# Patient Record
Sex: FEMALE | Race: WHITE | NOT HISPANIC OR LATINO | Employment: OTHER | URBAN - METROPOLITAN AREA
[De-identification: names, ages, dates, MRNs, and addresses within clinical notes are randomized per-mention and may not be internally consistent; named-entity substitution may affect disease eponyms.]

---

## 2017-01-05 ENCOUNTER — ALLSCRIPTS OFFICE VISIT (OUTPATIENT)
Dept: OTHER | Facility: OTHER | Age: 72
End: 2017-01-05

## 2017-05-04 ENCOUNTER — ALLSCRIPTS OFFICE VISIT (OUTPATIENT)
Dept: OTHER | Facility: OTHER | Age: 72
End: 2017-05-04

## 2017-05-04 DIAGNOSIS — G20 PARKINSON'S DISEASE (HCC): ICD-10-CM

## 2017-09-07 ENCOUNTER — GENERIC CONVERSION - ENCOUNTER (OUTPATIENT)
Dept: OTHER | Facility: OTHER | Age: 72
End: 2017-09-07

## 2017-09-08 ENCOUNTER — GENERIC CONVERSION - ENCOUNTER (OUTPATIENT)
Dept: OTHER | Facility: OTHER | Age: 72
End: 2017-09-08

## 2017-09-25 ENCOUNTER — GENERIC CONVERSION - ENCOUNTER (OUTPATIENT)
Dept: OTHER | Facility: OTHER | Age: 72
End: 2017-09-25

## 2017-09-25 ENCOUNTER — ALLSCRIPTS OFFICE VISIT (OUTPATIENT)
Dept: OTHER | Facility: OTHER | Age: 72
End: 2017-09-25

## 2018-01-11 ENCOUNTER — ALLSCRIPTS OFFICE VISIT (OUTPATIENT)
Dept: OTHER | Facility: OTHER | Age: 73
End: 2018-01-11

## 2018-01-12 NOTE — PROGRESS NOTES
Assessment   1  Parkinson's disease (332 0) (G20)   2  S/P deep brain stimulator placement (V45 89) (Z96 89)   3  Anxiety disorder (300 00) (F41 9)    Plan   Parkinson's disease    · Rasagiline Mesylate 1 MG Oral Tablet; Take 1 tablet by mouth  daily   Rx By: Rommel Williamson; Dispense: 90 Days ; #:90 Tablet; Refill: 2;For: Parkinson's disease; SHARDA = N; Verified Transmission to [address] [place] [address]; Last Updated By: [employer]; 1/11/2018 9:34:06 AM   · Follow-up visit in 5 months Evaluation and Treatment  Follow-up  DBS 1 with Dr Doreen Jacinto     Status: Abe Carranza for: [id number]   Ordered; For: Parkinson's disease; Ordered By: Rommel Williamson Performed:  Due: [id number]    Discussion/Summary   Discussion Summary:    Parkinson's disease with progression in postural instability and gait  Overall stable since last seen  No need for changes in stimulation or medication  She was encouraged to try to exercise at home  She never went back to the Boxing program     has been better controlled  Counseling Documentation With Imm: The patient was counseled regarding impressions  Patient Guardian understands agrees: The treatment plan was reviewed with the patient/guardian  The patient/guardian understands and agrees with the treatment plan      Chief Complaint   Chief Complaint Free Text Note Form: Patient present for a neurological follow up for Parkinson's disease with a DBS device, doing well  History of Present Illness   HPI: Ms Lord Soriano is a 66year old female s/p bilateral CTS s/p surgery, significant arthritis (psoriatic), Parkinson's disease since about 2009 now s/p bilateral STN DBS placement 11/5/14 with ACTIVA SC 11/11/14 here for follow up  She is no longer on Sinemet 25/100 q 4 hours tid and amantadine bid since surgery  She remains on Azilect  Previously she tried ropinirole, Requip XL (highest dose 8mg) and Mirapex ER 1 5 with no effect   Insurance would not cover Neupro and she found it a nuisance  Discussion about moving the IPG had with Dr Niya Crowe but she decided to wait until the next needed replacement  She continues to have postural instability and trouble with gait  We tried restarting Sinemet 25/100 1 po tid q 4 hours part to see if this provides more energy and helps with intermittent gait issues but she stopped it after 3 weeks as she noticed no difference  remains on Azilect 1mg  Overall stable  She continues to have issues with gait and balance  No falls since last seen She freezes on initiating gait  She is dressing and showering without any issues  She is eating well and denies any issues with swallowing  She sleeps well  No major cognitive changes  She sometimes needs to search for a name or words  tried PD Boxing but she stopped after 7-8 times because she felt it was not helping  One of the woman from the group called her asked her to come back as it does not happen all at once  She is considering returning  She is upsets seeing other that are farther along with PD  She gets upset thinking about what is going to happen to her and if she is going to die with PD  She does not talk to her  about this as she feels she is already a burden  Review of Systems   Neurological ROS:      Constitutional: fatigue  HEENT:  no sinus problems, not feeling congested, no blurred vision, no dryness of the eyes, no eye pain, no hearing loss, no tinnitus, no mouth sores, no sore throat, no hoarseness, no dysphagia, no masses, no bleeding  Cardiovascular:  no chest pain or pressure, no palpitations present, the heart rate was not rapid or irregular, no swelling in the arms or legs, no poor circulation  Respiratory: unusual or persistant cough  Gastrointestinal:  no nausea, no vomiting, no diarrhea, no abdominal pain, no changes in bowel habits, no melena, no loss of bowel control  Genitourinary: incontinence        Musculoskeletal:  no arthralgias, no myalgias, no immobility or loss of function, no head/neck/back pain, no pain while walking  Integumentary  no masses, no rash, no skin lesions, no livedo reticularis  Psychiatric:  no anxiety, no depression, no mood swings, no psychiatric hospitalizations, no sleep problems  Endocrine  no unusual weight loss or gain, no excessive urination, no excessive thirst, no hair loss or gain, no hot or cold intolerance, no menstrual period change or irregularity, no loss of sexual ability or drive, no erection difficulty, no nipple discharge  Hematologic/Lymphatic:  no unusual bleeding, no tendency for easy bruising, no clotting skin or lumps  Neurological General:  no headache, no nausea or vomiting, no lightheadedness, no convulsions, no blackouts, no syncope, no trauma, no photopsia, no increased sleepiness, no trouble falling asleep, no snoring, no awakening at night  Neurological Mental Status:  no confusion, no mood swings, no alteration or loss of consciousness, no difficulty expressing/understanding speech, no memory problems  Neurological Cranial Nerves:  no blurry or double vision, no loss of vision, no face drooping, no facial numbness or weakness, no taste or smell loss/changes, no hearing loss or ringing, no vertigo or dizziness, no dysphagia, no slurred speech  Neurological Motor findings include:  no tremor, no twitching, no cramping(pre/post exercise), no atrophy  Neurological Coordination: unsteadiness-- and-- balance difficulties  Neurological Sensory:  no numbness, no pain, no tingling, does not fall when eyes closed or taking a shower  Neurological Gait: difficulty walking  ROS Reviewed:    ROS reviewed  Active Problems   1  Anxiety disorder (300 00) (F41 9)   2  Arthritis (716 90) (M19 90)   3  Hyperlipidemia (272 4) (E78 5)   4  Hypertension (401 9) (I10)   5  Parkinson's disease (332 0) (G20)   6   S/P deep brain stimulator placement (V45 89) (S32 05)    Past Medical History   1  History of Ovarian cyst (620 2) (N83 20)  Active Problems And Past Medical History Reviewed: The active problems and past medical history were reviewed and updated today  Surgical History   1  History of Peripheral Nerve Block Wrist Median Bilateral   2  History of Reported Hx Of Knee Replacement  Surgical History Reviewed: The surgical history was reviewed and updated today  Family History   Mother    1  No pertinent family history    Social History    · Alcohol Use (History)   · Caffeine Use   · Educational Level - Completed Bachelors Degree   · Marital History - Currently    · Never A Smoker  Social History Reviewed: The social history was reviewed and updated today  Current Meds    1  Aspirin 81 MG TABS; Take 1 tablet daily; Therapy: (Recorded:05Jan2017) to Recorded   2  Calcium 600/Vitamin D 600-400 MG-UNIT Oral Tablet; Take 1 tablet daily; Therapy: (Recorded:05Jan2017) to Recorded   3  Cinnamon 500 MG Oral Tablet; TAKE 2 TABLETS ONCE AS DIRECTED Recorded   4  Coenzyme Q10 100 MG Oral Capsule; take 1 capsule daily; Therapy: (Recorded:05Jan2017) to Recorded   5  Crestor 5 MG Oral Tablet; TAKE 1 TABLET EVERY OTHER DAY; Therapy: (Recorded:01Hgb2703) to Recorded   6  Omega-3 Fish Oil 1200 MG Oral Capsule; take 1 capsule daily; Therapy: (Recorded:05Jan2017) to Recorded   7  Paxil 20 MG Oral Tablet; TAKE 1 TABLET DAILY; Therapy: (Recorded:02Jan2014) to Recorded   8  Rasagiline Mesylate 1 MG Oral Tablet; Take 1 tablet by mouth  daily; Therapy: [id number] to (Monisha Arana)  Requested for: 05OLP9517; Last     Rx:[id number] Ordered   9  Tenormin 50 MG Oral Tablet; TAKE 1 TABLET DAILY; Therapy: (Recorded:02Jan2014) to Recorded  Medication List Reviewed: The medication list was reviewed and updated today  Allergies   1   Sulfa Drugs    Vitals   Signs   Recorded: 14KHB2812 09:35AM Heart Rate: 55  Systolic: 551, LUE, Standing  Diastolic: 58, LUE, Standing  Recorded: 50FJI7844 09:34AM   Heart Rate: 50  Systolic: 359, LUE, Sitting  Diastolic: 68, LUE, Sitting  Height: 5 ft 7 in  Weight: 238 lb   BMI Calculated: 37 28  BSA Calculated: 2 18    Physical Exam        Constitutional      General appearance: Abnormal  -- (Moderate facial masking 2  Mild hypophonia 1)      Musculoskeletal      Gait and station: Abnormal  -- (Arose using her hands without difficulty  Rounded shoulders  Steady gait with slight decrease stride  Normal pull test No freezing)      Muscle strength: Normal strength throughout  Muscle tone: Abnormal  -- (mild in arms and legs 1,1, neck 3)      Motor tone:  cogwheel rigidity was present in both arms  Involuntary movements: Abnormal involuntary movements were observed  -- (Finger to nose was normal  No rest or action tremor  Moderate decrement on finger taps 2,2 Normal handgrip , rapid alternating movements  Mild decrement on heel taps 1,1  Partially limited due to arthritis  There is no dystonia, or dyskinesia)      Neurologic      Orientation to person, place, and time: Normal        Recent and remote memory: Demonstrates normal memory  Attention span and concentration: Normal thought process and attention span  Language: Names objects, able to repeat phrases and speaks spontaneously         2nd cranial nerve: Normal        3rd, 4th, and 6th cranial nerves: Normal        5th cranial nerve: Normal        7th cranial nerve: Normal        8th cranial nerve: Normal        9th cranial nerve: Normal        11th cranial nerve: Normal        12th cranial nerve: Normal        Mood and affect: Normal        Signatures    Electronically signed by : Ramses Sanchez MD; Jan 11 2018  9:52AM EST                       (Author)

## 2018-01-14 VITALS
BODY MASS INDEX: 37.9 KG/M2 | DIASTOLIC BLOOD PRESSURE: 59 MMHG | WEIGHT: 241.5 LBS | HEART RATE: 58 BPM | HEIGHT: 67 IN | RESPIRATION RATE: 16 BRPM | SYSTOLIC BLOOD PRESSURE: 123 MMHG

## 2018-01-15 VITALS
OXYGEN SATURATION: 98 % | RESPIRATION RATE: 12 BRPM | WEIGHT: 241 LBS | SYSTOLIC BLOOD PRESSURE: 124 MMHG | BODY MASS INDEX: 37.83 KG/M2 | DIASTOLIC BLOOD PRESSURE: 80 MMHG | HEART RATE: 64 BPM | HEIGHT: 67 IN

## 2018-01-22 VITALS — SYSTOLIC BLOOD PRESSURE: 122 MMHG | HEART RATE: 60 BPM | DIASTOLIC BLOOD PRESSURE: 67 MMHG

## 2018-01-22 VITALS
SYSTOLIC BLOOD PRESSURE: 150 MMHG | DIASTOLIC BLOOD PRESSURE: 70 MMHG | WEIGHT: 238 LBS | HEART RATE: 54 BPM | BODY MASS INDEX: 37.28 KG/M2

## 2018-01-22 VITALS — DIASTOLIC BLOOD PRESSURE: 70 MMHG | HEART RATE: 60 BPM | SYSTOLIC BLOOD PRESSURE: 145 MMHG

## 2018-01-22 VITALS
HEIGHT: 67 IN | DIASTOLIC BLOOD PRESSURE: 65 MMHG | SYSTOLIC BLOOD PRESSURE: 136 MMHG | RESPIRATION RATE: 16 BRPM | HEART RATE: 54 BPM | WEIGHT: 245 LBS | BODY MASS INDEX: 38.45 KG/M2

## 2018-01-23 VITALS
SYSTOLIC BLOOD PRESSURE: 122 MMHG | HEART RATE: 55 BPM | HEIGHT: 67 IN | WEIGHT: 238 LBS | DIASTOLIC BLOOD PRESSURE: 58 MMHG | BODY MASS INDEX: 37.35 KG/M2

## 2018-04-25 ENCOUNTER — CONVERSION ENCOUNTER (OUTPATIENT)
Dept: MAMMOGRAPHY | Facility: CLINIC | Age: 73
End: 2018-04-25

## 2018-06-21 ENCOUNTER — PROCEDURE VISIT (OUTPATIENT)
Dept: NEUROLOGY | Facility: CLINIC | Age: 73
End: 2018-06-21
Payer: MEDICARE

## 2018-06-21 VITALS
HEIGHT: 67 IN | DIASTOLIC BLOOD PRESSURE: 64 MMHG | BODY MASS INDEX: 37.83 KG/M2 | SYSTOLIC BLOOD PRESSURE: 116 MMHG | WEIGHT: 241 LBS | HEART RATE: 75 BPM

## 2018-06-21 DIAGNOSIS — G20 PARKINSON'S DISEASE WITH USE OF ELECTRICAL BRAIN STIMULATION (HCC): Primary | ICD-10-CM

## 2018-06-21 PROBLEM — G20.A1 PARKINSON'S DISEASE WITH USE OF ELECTRICAL BRAIN STIMULATION: Status: ACTIVE | Noted: 2018-06-21

## 2018-06-21 PROCEDURE — 95970 ALYS NPGT W/O PRGRMG: CPT | Performed by: PSYCHIATRY & NEUROLOGY

## 2018-06-21 PROCEDURE — 99214 OFFICE O/P EST MOD 30 MIN: CPT | Performed by: PSYCHIATRY & NEUROLOGY

## 2018-06-21 RX ORDER — UBIDECARENONE 100 MG
1 CAPSULE ORAL DAILY
COMMUNITY
End: 2018-09-21 | Stop reason: HOSPADM

## 2018-06-21 RX ORDER — RASAGILINE 1 MG/1
1 TABLET ORAL DAILY
COMMUNITY
Start: 2016-07-25 | End: 2018-06-21 | Stop reason: SDUPTHER

## 2018-06-21 RX ORDER — ROSUVASTATIN CALCIUM 5 MG/1
1 TABLET, COATED ORAL EVERY OTHER DAY
COMMUNITY
End: 2018-09-21 | Stop reason: HOSPADM

## 2018-06-21 RX ORDER — B-COMPLEX WITH VITAMIN C
1 TABLET ORAL DAILY
COMMUNITY
End: 2018-09-21 | Stop reason: HOSPADM

## 2018-06-21 RX ORDER — CHLORAL HYDRATE 500 MG
1 CAPSULE ORAL DAILY
COMMUNITY
End: 2018-09-21 | Stop reason: HOSPADM

## 2018-06-21 RX ORDER — PAROXETINE HYDROCHLORIDE 20 MG/1
TABLET, FILM COATED ORAL
COMMUNITY
Start: 2018-03-28 | End: 2018-09-21 | Stop reason: HOSPADM

## 2018-06-21 RX ORDER — RASAGILINE 1 MG/1
1 TABLET ORAL DAILY
Qty: 90 EACH | Refills: 2 | Status: SHIPPED | OUTPATIENT
Start: 2018-06-21 | End: 2018-09-21 | Stop reason: HOSPADM

## 2018-06-21 RX ORDER — ATENOLOL 50 MG/1
50 TABLET ORAL
COMMUNITY
Start: 2007-10-24 | End: 2018-09-21 | Stop reason: HOSPADM

## 2018-06-21 NOTE — PROGRESS NOTES
Patient ID: Syeda Santiago is a 67 y o  female  Assessment/Plan:    Parkinson's disease with use of electrical brain stimulation (HCC)  Parkinsonian symptoms are overall well controlled with mild bradykinesia  Main issue if occasional freezing of gait on initiation, particularly after prolonged sitting  Previous trial of restarting levodopa , ineffective  Last time  checked was in 2017 so DBS interrogated but no changes made  She will continue on Azilect  Discussed retrial of levodopa but she is not interested at this time  She was encouraged to continue to stay active with her exercise at home and at the center  Diagnoses and all orders for this visit:    Parkinson's disease with use of electrical brain [facility] ([facility] )  -     rasagiline (AZILECT) 1 MG; Take 1 tablet (1 mg total) by mouth daily    Other orders  -     atenolol (TENORMIN) 50 mg tablet; Take 50 mg by mouth  -     aspirin 81 MG tablet; Take 1 tablet by mouth daily  -     Discontinue: rasagiline (AZILECT) 1 MG; Take 1 tablet by mouth daily  -     Calcium Carbonate-Vitamin D (CALCIUM 600+D) 600-200 MG-UNIT TABS; Take 1 tablet by mouth daily  -     Cinnamon 500 MG TABS; Take 2 tablets by mouth  -     co-enzyme Q-10 100 mg capsule; Take 1 capsule by mouth daily  -     rosuvastatin (CRESTOR) 5 mg tablet; Take 1 tablet by mouth every other day  -     Omega-3 Fatty Acids (FISH OIL) 1,000 mg; Take 1 capsule by mouth daily  -     PARoxetine (PAXIL) 20 mg tablet;          Subjective:    Ms Syeda Santiago is a 67 y o  female s/p bilateral CTS s/p surgery, significant arthritis (psoriatic), Parkinson's disease since about 2009 now s/p bilateral STN DBS placement 11/5/14 with ACTIVA SC 11/11/14 here for follow up  She is no longer on Sinemet 25/100 q 4 hours tid and amantadine bid since surgery  She remains on Azilect  Previously she tried ropinirole, Requip XL (highest dose 8mg) and Mirapex ER 1 5 with no effect   Insurance would not cover Neupro and she found it a nuisance  Discussion about moving the IPG had with Dr Didi Frank but she decided to wait until the next needed replacement  She continues to have postural instability and trouble with gait  We tried restarting Sinemet 25/100 1 po tid q 4 hours part to see if this provides more energy and helps with intermittent gait issues but she stopped it after 3 weeks as she noticed no difference  She remains on Azilect 1mg  Overall stable  She continues to have issues with gait and balance  No falls since last seen She continues to freeze when initiating gait  She is dressing and showering without any issues with mild difficulty with pants  She denies any issues with swallowing except the calcium which is not coated  No issues with her other pills  She sleeps well  She denies any daytime sedation  No cognitive changes except for occasional word finding difficulty  She is taking a balance class and a chair yoga class  The following portions of the patient's history were reviewed and updated as appropriate: past family history, past medical history, past social history and past surgical history  Objective:    Blood pressure 116/64, pulse 75, height 5' 7" (1 702 m), weight 109 kg (241 lb)  Physical Exam   Constitutional: She appears well-developed  Eyes: EOM are normal  Pupils are equal, round, and reactive to light  Neurological: She has normal strength  Gait normal    Psychiatric: Her speech is normal    Vitals reviewed  Neurological Exam    Mental Status  The patient is alert and oriented to person, place, time, and situation  Her recent and remote memory are normal  She has no visuospatial neglect  Her speech is normal  Her language is fluent with no aphasia  She has normal attention span and concentration  She has a normal fund of knowledge      Cranial Nerves  CN I: The patient has not tested  CN III, IV, VI: The patient's pupils are equally round and reactive to light and ocular movements are normal   CN V: The patient has normal facial sensation  CN VII:  The patient has symmetric facial movement  CN VIII:  The patient's hearing is normal   CN IX, X: The patient has symmetric palate movement  CN XI: The patient's shoulder shrug strength is normal   CN XII: The patient's tongue is midline without atrophy or fasciculations  Motor   Her overall muscle tone is normal throughout  Her strength is 5/5 throughout all four extremities  UPDRS 20  Moderate Hypomimia 2  Mild hypophonia 1  No rest tremor RUE, LUE, RLE, LLE  No facial, lip, or chin tremor  No action tremors of RUE, LUE  Rigidity present in neck 2  None in  RUE, LUE, RLE, LUE  Mild to moderate bradykinesia on finger taps 2,2, handgrips 1,0, CJ 1,1, heel taps 0,0 , toe taps 1,0  Arose without using hands on second attempt 1  Posture stooped 1  Gait with shortened stride but no freezing 2  Postural instability with retropulsion but recovered in three steps 1  Mild but abnormal body bradykinesia 2       uoinger to nose was normal  No rest or action tremor  Moderate decrement on finger taps 2,2 Normal handgrip , rapid alternating movements  Mild decrement on heel taps 1,1  Partially limited due to arthritis  There is no dystonia, or dyskinesia)      Neurologic         Sensory  The patient's sensation is to light touch  Reflexes  She has glabellar tap release signs present  Gait and Coordination  The patient has normal gait and station  Gait with shortened stride but no freezing 2  Postural instability with retropulsion but recovered in three steps         ROS:    Review of Systems   Constitutional: Negative  Eyes: Negative  Respiratory: Negative  Cardiovascular: Negative  Gastrointestinal: Negative  Endocrine: Negative  Genitourinary:        Loss of bladder control   Musculoskeletal: Positive for neck pain  Skin: Negative  Allergic/Immunologic: Negative      Neurological:        Foot freezing   Hematological: Negative  Psychiatric/Behavioral: Negative

## 2018-06-21 NOTE — ASSESSMENT & PLAN NOTE
Parkinsonian symptoms are overall well controlled with mild bradykinesia  Main issue if occasional freezing of gait on initiation, particularly after prolonged sitting  Previous trial of restarting levodopa , ineffective  Last time  checked was in 2017 so DBS interrogated but no changes made  Right STN  Battery 2 78  C+1-, 2 4V, PW90, Rate 160  Left STN:  Battery 2 94  C+1-,  2 2PW60, Rate 160  Questions regarding medical marijuana answered  Azilect refilled  She will continue on Azilect  Discussed retrial of levodopa but she is not interested at this time  She was encouraged to continue to stay active with her exercise at home and at the center

## 2018-08-22 ENCOUNTER — HOSPITAL ENCOUNTER (INPATIENT)
Facility: HOSPITAL | Age: 73
LOS: 3 days | DRG: 483 | End: 2018-08-25
Attending: EMERGENCY MEDICINE | Admitting: ORTHOPAEDIC SURGERY
Payer: MEDICARE

## 2018-08-22 ENCOUNTER — APPOINTMENT (EMERGENCY)
Dept: RADIOLOGY | Facility: HOSPITAL | Age: 73
DRG: 483 | End: 2018-08-22
Payer: MEDICARE

## 2018-08-22 ENCOUNTER — APPOINTMENT (INPATIENT)
Dept: CT IMAGING | Facility: HOSPITAL | Age: 73
DRG: 483 | End: 2018-08-22
Payer: MEDICARE

## 2018-08-22 DIAGNOSIS — G20 PARKINSON'S DISEASE WITH USE OF ELECTRICAL BRAIN STIMULATION (HCC): ICD-10-CM

## 2018-08-22 DIAGNOSIS — S42.92XA TRAUMATIC CLOSED DISPLACED FRACTURE OF LEFT SHOULDER WITH ANTERIOR DISLOCATION, INITIAL ENCOUNTER: ICD-10-CM
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
## 2018-08-22 DIAGNOSIS — S42.92XA FRACTURE, SHOULDER, LEFT, CLOSED, INITIAL ENCOUNTER: ICD-10-CM

## 2018-08-22 LAB
ALBUMIN SERPL BCP-MCNC: 3.6 G/DL (ref 3.5–5)
ALP SERPL-CCNC: 37 U/L (ref 46–116)
ALT SERPL W P-5'-P-CCNC: 20 U/L (ref 12–78)
ANION GAP SERPL CALCULATED.3IONS-SCNC: 10 MMOL/L (ref 4–13)
APTT PPP: 27 SECONDS (ref 24–36)
AST SERPL W P-5'-P-CCNC: 20 U/L (ref 5–45)
BASOPHILS # BLD AUTO: 0.01 THOUSANDS/ΜL (ref 0–0.1)
BASOPHILS NFR BLD AUTO: 0 % (ref 0–1)
BILIRUB SERPL-MCNC: 0.45 MG/DL (ref 0.2–1)
BUN SERPL-MCNC: 19 MG/DL (ref 5–25)
CALCIUM SERPL-MCNC: 9.6 MG/DL (ref 8.3–10.1)
CHLORIDE SERPL-SCNC: 104 MMOL/L (ref 100–108)
CO2 SERPL-SCNC: 26 MMOL/L (ref 21–32)
CREAT SERPL-MCNC: 0.88 MG/DL (ref 0.6–1.3)
EOSINOPHIL # BLD AUTO: 0 THOUSAND/ΜL (ref 0–0.61)
EOSINOPHIL NFR BLD AUTO: 0 % (ref 0–6)
ERYTHROCYTE [DISTWIDTH] IN BLOOD BY AUTOMATED COUNT: 13.1 % (ref 11.6–15.1)
GFR SERPL CREATININE-BSD FRML MDRD: 65 ML/MIN/1.73SQ M
GLUCOSE SERPL-MCNC: 129 MG/DL (ref 65–140)
HCT VFR BLD AUTO: 39.3 % (ref 34.8–46.1)
HGB BLD-MCNC: 13.1 G/DL (ref 11.5–15.4)
INR PPP: 1.01 (ref 0.86–1.17)
LYMPHOCYTES # BLD AUTO: 1.47 THOUSANDS/ΜL (ref 0.6–4.47)
LYMPHOCYTES NFR BLD AUTO: 11 % (ref 14–44)
MCH RBC QN AUTO: 31.2 PG (ref 26.8–34.3)
MCHC RBC AUTO-ENTMCNC: 33.3 G/DL (ref 31.4–37.4)
MCV RBC AUTO: 94 FL (ref 82–98)
MONOCYTES # BLD AUTO: 0.81 THOUSAND/ΜL (ref 0.17–1.22)
MONOCYTES NFR BLD AUTO: 6 % (ref 4–12)
NEUTROPHILS # BLD AUTO: 10.91 THOUSANDS/ΜL (ref 1.85–7.62)
NEUTS SEG NFR BLD AUTO: 83 % (ref 43–75)
NRBC BLD AUTO-RTO: 0 /100 WBCS
PLATELET # BLD AUTO: 214 THOUSANDS/UL (ref 149–390)
PMV BLD AUTO: 12 FL (ref 8.9–12.7)
POTASSIUM SERPL-SCNC: 4 MMOL/L (ref 3.5–5.3)
PROT SERPL-MCNC: 7.1 G/DL (ref 6.4–8.2)
PROTHROMBIN TIME: 13.4 SECONDS (ref 11.8–14.2)
RBC # BLD AUTO: 4.2 MILLION/UL (ref 3.81–5.12)
SODIUM SERPL-SCNC: 140 MMOL/L (ref 136–145)
WBC # BLD AUTO: 13.2 THOUSAND/UL (ref 4.31–10.16)

## 2018-08-22 PROCEDURE — 93005 ELECTROCARDIOGRAM TRACING: CPT

## 2018-08-22 PROCEDURE — 85025 COMPLETE CBC W/AUTO DIFF WBC: CPT | Performed by: EMERGENCY MEDICINE

## 2018-08-22 PROCEDURE — 73030 X-RAY EXAM OF SHOULDER: CPT

## 2018-08-22 PROCEDURE — 73020 X-RAY EXAM OF SHOULDER: CPT

## 2018-08-22 PROCEDURE — 73200 CT UPPER EXTREMITY W/O DYE: CPT

## 2018-08-22 PROCEDURE — 99285 EMERGENCY DEPT VISIT HI MDM: CPT

## 2018-08-22 PROCEDURE — 85610 PROTHROMBIN TIME: CPT | Performed by: EMERGENCY MEDICINE

## 2018-08-22 PROCEDURE — 36415 COLL VENOUS BLD VENIPUNCTURE: CPT | Performed by: EMERGENCY MEDICINE

## 2018-08-22 PROCEDURE — 85730 THROMBOPLASTIN TIME PARTIAL: CPT | Performed by: EMERGENCY MEDICINE

## 2018-08-22 PROCEDURE — 96374 THER/PROPH/DIAG INJ IV PUSH: CPT

## 2018-08-22 PROCEDURE — 80053 COMPREHEN METABOLIC PANEL: CPT | Performed by: EMERGENCY MEDICINE

## 2018-08-22 PROCEDURE — 71045 X-RAY EXAM CHEST 1 VIEW: CPT

## 2018-08-22 RX ORDER — PROPOFOL 10 MG/ML
100 INJECTION, EMULSION INTRAVENOUS ONCE
Status: COMPLETED | OUTPATIENT
Start: 2018-08-22 | End: 2018-08-22

## 2018-08-22 RX ORDER — DOCUSATE SODIUM 100 MG/1
100 CAPSULE, LIQUID FILLED ORAL 2 TIMES DAILY
Status: DISCONTINUED | OUTPATIENT
Start: 2018-08-22 | End: 2018-08-25 | Stop reason: HOSPADM

## 2018-08-22 RX ORDER — FENTANYL CITRATE 50 UG/ML
100 INJECTION, SOLUTION INTRAMUSCULAR; INTRAVENOUS ONCE
Status: COMPLETED | OUTPATIENT
Start: 2018-08-22 | End: 2018-08-22

## 2018-08-22 RX ORDER — OXYCODONE HYDROCHLORIDE AND ACETAMINOPHEN 5; 325 MG/1; MG/1
2 TABLET ORAL EVERY 6 HOURS PRN
Status: DISCONTINUED | OUTPATIENT
Start: 2018-08-22 | End: 2018-08-23

## 2018-08-22 RX ORDER — MORPHINE SULFATE 2 MG/ML
2 INJECTION, SOLUTION INTRAMUSCULAR; INTRAVENOUS
Status: DISCONTINUED | OUTPATIENT
Start: 2018-08-22 | End: 2018-08-24

## 2018-08-22 RX ORDER — PROPOFOL 10 MG/ML
50 INJECTION, EMULSION INTRAVENOUS ONCE
Status: COMPLETED | OUTPATIENT
Start: 2018-08-22 | End: 2018-08-22

## 2018-08-22 RX ORDER — ONDANSETRON 2 MG/ML
4 INJECTION INTRAMUSCULAR; INTRAVENOUS EVERY 6 HOURS PRN
Status: DISCONTINUED | OUTPATIENT
Start: 2018-08-22 | End: 2018-08-25 | Stop reason: HOSPADM

## 2018-08-22 RX ORDER — POLYETHYLENE GLYCOL 3350 17 G/17G
17 POWDER, FOR SOLUTION ORAL DAILY
Status: DISCONTINUED | OUTPATIENT
Start: 2018-08-23 | End: 2018-08-25 | Stop reason: HOSPADM

## 2018-08-22 RX ORDER — BISACODYL 10 MG
10 SUPPOSITORY, RECTAL RECTAL DAILY PRN
Status: DISCONTINUED | OUTPATIENT
Start: 2018-08-22 | End: 2018-08-25 | Stop reason: HOSPADM

## 2018-08-22 RX ORDER — SODIUM CHLORIDE, SODIUM LACTATE, POTASSIUM CHLORIDE, CALCIUM CHLORIDE 600; 310; 30; 20 MG/100ML; MG/100ML; MG/100ML; MG/100ML
75 INJECTION, SOLUTION INTRAVENOUS CONTINUOUS
Status: DISCONTINUED | OUTPATIENT
Start: 2018-08-22 | End: 2018-08-23

## 2018-08-22 RX ADMIN — PROPOFOL 50 MG: 10 INJECTION, EMULSION INTRAVENOUS at 16:57

## 2018-08-22 RX ADMIN — FENTANYL CITRATE 100 MCG: 50 INJECTION, SOLUTION INTRAMUSCULAR; INTRAVENOUS at 16:56

## 2018-08-22 RX ADMIN — DOCUSATE SODIUM 100 MG: 100 CAPSULE, LIQUID FILLED ORAL at 21:21

## 2018-08-22 RX ADMIN — PROPOFOL 100 MG: 10 INJECTION, EMULSION INTRAVENOUS at 16:56

## 2018-08-22 RX ADMIN — SODIUM CHLORIDE, SODIUM LACTATE, POTASSIUM CHLORIDE, AND CALCIUM CHLORIDE 75 ML/HR: .6; .31; .03; .02 INJECTION, SOLUTION INTRAVENOUS at 21:18

## 2018-08-22 NOTE — ED PROVIDER NOTES
History  Chief Complaint   Patient presents with    Shoulder Injury     pt reports fall this morning left shoulder injury, went to PCP had xray told she fractured her shoulder  pt reports went to [facility] in waiting room for 3 hours and left  pt denies hitting head or blood thinner use  radial pulse palpable +2 cap refill <3       History provided by:  Patient   used: No    Shoulder Injury   Location:  Shoulder  Shoulder location:  L shoulder  Injury: yes    Time since incident:  11 hours  Mechanism of injury: fall    Fall:     Fall occurred:  Walking and tripped    Height of fall:  Same level    Impact surface:  Hard floor    Point of impact: Left shoulder  Entrapped after fall: no    Pain details:     Quality:  Aching    Radiates to:  Does not radiate    Severity:  Moderate    Onset quality:  Gradual    Duration:  11 hours    Timing:  Intermittent    Progression:  Unchanged  Dislocation: yes    Foreign body present:  Unable to specify  Tetanus status:  Unknown  Prior injury to area:  Unable to specify  Relieved by:  Nothing  Worsened by:  Nothing  Ineffective treatments:  None tried  Associated symptoms: decreased range of motion and swelling    Associated symptoms: no back pain, no fever, no muscle weakness and no neck pain    Swelling:     Location:  Arm    Onset quality:  Gradual    Duration:  11 hours    Timing:  Constant    Progression:  Unchanged    Chronicity:  New  Risk factors: no concern for non-accidental trauma    Risk factors comment:  Parkinson's disease      Prior to Admission Medications   Prescriptions Last Dose Informant Patient Reported? Taking?    Calcium Carbonate-Vitamin D (CALCIUM 600+D) 600-200 MG-UNIT TABS Past Week at Unknown time Self Yes Yes   Sig: Take 1 tablet by mouth daily   Cinnamon 500 MG TABS Past Week at Unknown time Self Yes Yes   Sig: Take 2 tablets by mouth   Omega-3 Fatty Acids (FISH OIL) 1,000 mg 8/21/2018 at Unknown time Self Yes Yes   Sig: Take 1 capsule by mouth daily   PARoxetine (PAXIL) 20 mg tablet 8/22/2018 at Unknown time Self Yes Yes   aspirin 81 MG tablet 8/22/2018 at Unknown time Self Yes Yes   Sig: Take 1 tablet by mouth daily   atenolol (TENORMIN) 50 mg tablet 8/22/2018 at Unknown time Self Yes Yes   Sig: Take 50 mg by mouth   co-enzyme Q-10 100 mg capsule 8/21/2018 at Unknown time Self Yes Yes   Sig: Take 1 capsule by mouth daily   rasagiline (AZILECT) 1 MG 8/22/2018 at Unknown time  No Yes   Sig: Take 1 tablet (1 mg total) by mouth daily   rosuvastatin (CRESTOR) 5 mg tablet 8/22/2018 at Unknown time Self Yes Yes   Sig: Take 1 tablet by mouth every other day      Facility-Administered Medications: None       Past Medical History:   Diagnosis Date    Anxiety     Diabetes mellitus ([facility] )     Hyperlipidemia     Hypertension     Parkinson disease ([facility] )        Past Surgical History:   Procedure Laterality Date    ACHILLES TENDON SURGERY      DEEP BRAIN STIMULATOR PLACEMENT      DENTAL SURGERY      REPLACEMENT TOTAL KNEE      TONSILLECTOMY         History reviewed  No pertinent family history  I have reviewed and agree with the history as documented  Social History   Substance Use Topics    Smoking status: Never Smoker    Smokeless tobacco: Never Used    Alcohol use No        Review of Systems   Constitutional: Negative for activity change, chills and fever  HENT: Negative for facial swelling, sore throat and trouble swallowing  Eyes: Negative for pain and visual disturbance  Respiratory: Negative for cough, chest tightness and shortness of breath  Cardiovascular: Negative for chest pain and leg swelling  Gastrointestinal: Negative for abdominal pain, blood in stool, diarrhea, nausea and vomiting  Genitourinary: Negative for dysuria and flank pain  Musculoskeletal: Negative for back pain, neck pain and neck stiffness  Pain and swelling left shoulder   Skin: Negative for pallor and rash     Allergic/Immunologic: Negative for environmental allergies and immunocompromised state  Neurological: Negative for dizziness and headaches  Hematological: Negative for adenopathy  Does not bruise/bleed easily  Psychiatric/Behavioral: Negative for agitation and behavioral problems  All other systems reviewed and are negative  Physical Exam  Physical Exam   Constitutional: She is oriented to person, place, and time  She appears well-developed and well-nourished  No distress  HENT:   Head: Normocephalic and atraumatic  Eyes: EOM are normal    Neck: Normal range of motion  Neck supple  Cardiovascular: Normal rate, regular rhythm, normal heart sounds and intact distal pulses  Pulmonary/Chest: Effort normal and breath sounds normal    Abdominal: Soft  Bowel sounds are normal  There is no tenderness  There is no rebound and no guarding  Musculoskeletal:   Large area of swelling and ecchymosis of anterior and lateral aspects of the left shoulder, decreased range of movement over left shoulder; intact range of movement of left elbow, left wrist, neurovascular intact distally   Neurological: She is alert and oriented to person, place, and time  Skin: Skin is warm and dry  Psychiatric: She has a normal mood and affect  Nursing note and vitals reviewed        Vital Signs  ED Triage Vitals   Temperature Pulse Respirations Blood Pressure SpO2   08/22/18 1422 08/22/18 1422 08/22/18 1422 08/22/18 1422 08/22/18 1422   98 4 °F (36 9 °C) 89 17 160/76 97 %      Temp Source Heart Rate Source Patient Position - Orthostatic VS BP Location FiO2 (%)   08/22/18 1422 08/22/18 1422 08/22/18 1422 08/22/18 1422 --   Temporal Monitor Sitting Right arm       Pain Score       08/22/18 1655       5           Vitals:    08/22/18 1757 08/22/18 1800 08/22/18 1803 08/22/18 1943   BP: 118/58 116/58 117/58 150/79   Pulse: 63 63 59 81   Patient Position - Orthostatic VS: Sitting Sitting Sitting Lying       Visual Acuity      ED Medications  Medications   lactated ringers infusion (75 mL/hr Intravenous New Bag 8/22/18 2118)   docusate sodium (COLACE) capsule 100 mg (100 mg Oral Given 8/22/18 2121)   polyethylene glycol (MIRALAX) packet 17 g (not administered)   bisacodyl (DULCOLAX) rectal suppository 10 mg (not administered)   ondansetron (ZOFRAN) injection 4 mg (not administered)   oxyCODONE-acetaminophen (PERCOCET) 5-325 mg per tablet 2 tablet (not administered)   morphine injection 2 mg (not administered)   fentanyl citrate (PF) 100 MCG/2ML 100 mcg (100 mcg Intravenous Given 8/22/18 1656)   propofol (DIPRIVAN) 200 MG/20ML bolus injection 100 mg (100 mg Intravenous Given 8/22/18 1656)   propofol (DIPRIVAN) 200 MG/20ML bolus injection 50 mg (50 mg Intravenous Given 8/22/18 1657)       Diagnostic Studies  Results Reviewed     Procedure Component Value Units Date/Time    Comprehensive metabolic panel [[id number]]  (Abnormal) Collected:  08/22/18 1700    Lab Status:  Final result Specimen:  Blood from Arm, Right Updated:  08/22/18 1906     Sodium 140 mmol/L      Potassium 4 0 mmol/L      Chloride 104 mmol/L      CO2 26 mmol/L      Anion Gap 10 mmol/L      BUN 19 mg/dL      Creatinine 0 88 mg/dL      Glucose 129 mg/dL      Calcium 9 6 mg/dL      AST 20 U/L      ALT 20 U/L      Alkaline Phosphatase 37 (L) U/L      Total Protein 7 1 g/dL      Albumin 3 6 g/dL      Total Bilirubin 0 45 mg/dL      eGFR 65 ml/min/1 73sq m     Narrative:         National Kidney Disease Education Program recommendations are as follows:  GFR calculation is accurate only with a steady state creatinine  Chronic Kidney disease less than 60 ml/min/1 73 sq  meters  Kidney failure less than 15 ml/min/1 73 sq  meters      Protime-INR [[id number]]  (Normal) Collected:  08/22/18 1700    Lab Status:  Final result Specimen:  Blood from Arm, Right Updated:  08/22/18 1857     Protime 13 4 seconds      INR 1 01    APTT [[id number]]  (Normal) Collected:  08/22/18 1700    Lab Status:  Final result Specimen:  Blood from Arm, Right Updated:  08/22/18 1857     PTT 27 seconds     CBC and differential [[id number]]  (Abnormal) Collected:  08/22/18 1700    Lab Status:  Final result Specimen:  Blood from Arm, Right Updated:  08/22/18 1855     WBC 13 20 (H) Thousand/uL      RBC 4 20 Million/uL      Hemoglobin 13 1 g/dL      Hematocrit 39 3 %      MCV 94 fL      MCH 31 2 pg      MCHC 33 3 g/dL      RDW 13 1 %      MPV 12 0 fL      Platelets 123 Thousands/uL      nRBC 0 /100 WBCs      Neutrophils Relative 83 (H) %      Lymphocytes Relative 11 (L) %      Monocytes Relative 6 %      Eosinophils Relative 0 %      Basophils Relative 0 %      Neutrophils Absolute 10 91 (H) Thousands/µL      Lymphocytes Absolute 1 47 Thousands/µL      Monocytes Absolute 0 81 Thousand/µL      Eosinophils Absolute 0 00 Thousand/µL      Basophils Absolute 0 01 Thousands/µL     POCT urinalysis dipstick [[id number]]     Lab Status:  No result Specimen:  Urine                  CT shoulder left wo contrast   Final Result by Tone Elliott MD (08/22 1923)      Severely displaced and comminuted fractures involving the humeral head and proximal humeral shaft with medial displacement of the humeral shaft  The humeral head articulates with the glenoid though there is mild inferior subluxation and multiple    fracture fragments displaced into the joint space between the glenoid and humeral head  Workstation performed: [id number]         XR shoulder 1 vw LEFT POST REDUCTION   Final Result by Joi Magallon MD (08/22 1813)      Stable complete fracture of the left humeral head and neck with distracted fracture fragments and dislocation  Workstation performed: [id number]         XR shoulder 2+ views LEFT   Final Result by Barrington Stevenson MD (08/22 1707)      Acute markedly displaced fracture of the proximal humerus with fragmentation of the humeral head which is slightly downwardly displaced with respect to the glenoid  Workstation performed: [id number]         XR chest 1 view portable    (Results Pending)              Procedures  Procedural Sedation  Date/Time: 8/22/2018 5:54 PM  Performed by: Yasmani Villegas by: Madalyn Santos     Consent:     Consent obtained:  Written    Consent given by:  Patient    Risks discussed:  Inadequate sedation, respiratory compromise necessitating ventilatory assistance and intubation and prolonged sedation necessitating reversal    Alternatives discussed: None due to significant fracture/dislocation  Universal protocol:     Procedure explained and questions answered to patient or proxy's satisfaction: yes      Relevant documents present and verified: yes      Test results available and properly labeled: yes      Radiology Images displayed and confirmed    If images not available, report reviewed: yes      Required blood products, implants, devices, and special equipment available: yes      Immediately prior to procedure a time out was called: yes      Patient identity confirmation method:  Verbally with patient and hospital-assigned identification number  Indications:     Sedation purpose:  Dislocation reduction    Procedure necessitating sedation performed by:  Physician performing sedation (Assisted by ER Resident Physician)    Intended level of sedation:  Moderate (conscious sedation)  Pre-sedation assessment:     Time since last food or drink:  Food _12 hrs, Sips of water _2 hr    NPO status caution: urgency dictates proceeding with non-ideal NPO status      ASA classification: class 2 - patient with mild systemic disease      Neck mobility: normal      Mouth opening:  3 or more finger widths    Mallampati score:  II - soft palate, uvula, fauces visible    Pre-sedation assessments completed and reviewed: airway patency, cardiovascular function, hydration status, mental status, nausea/vomiting, pain level, respiratory function and temperature      History of difficult intubation: no Immediate pre-procedure details:     Reassessment: Patient reassessed immediately prior to procedure      Reviewed: vital signs, relevant labs/tests and NPO status      Verified: bag valve mask available, emergency equipment available, intubation equipment available, IV patency confirmed, oxygen available, reversal medications available and suction available    Procedure details (see MAR for exact dosages):     Preoxygenation:  Nasal cannula    Sedation:  Propofol    Analgesia:  Fentanyl    Intra-procedure monitoring:  Blood pressure monitoring, cardiac monitor, continuous capnometry, continuous pulse oximetry, frequent LOC assessments and frequent vital sign checks    Intra-procedure events: none      Intra-procedure management:  Supplemental oxygen  Post-procedure details:     Attendance: Constant attendance by certified staff until patient recovered      Recovery: Patient returned to pre-procedure baseline      Post-sedation assessments completed and reviewed: airway patency, cardiovascular function, mental status, nausea/vomiting, pain level and respiratory function      Patient tolerance: Tolerated well, no immediate complications           Phone Contacts  ED Phone Contact    ED Course  ED Course as of Aug 22 2153   Wed Aug 22, 2018   1622 Case discussed with Dr Amira Lozano, looked at the images, advised to try reduction and repeat XR     1744 Reduction of left shoulder dislocation attempted, XR repeated, unsuccessful; we will discuss with ortho       1830  Reduction attempted as advised by Dr Garcia Bernal; please see separate procedure note by ER Resident, Dr Marky Mckeon, I was physically present during the whole procedure, sedation and took part in reduction attempt  Post Reduction XR still shows distracted fracture fragments with persistent dislocation   Case discussed again with Dr Garcia Bernal, will admit to Ortho Service, advised to get CT Left Shoulder, labs prior to floor transfer, which were ordered  MDM  Number of Diagnoses or Management Options  Fall, initial encounter:   Parkinson's disease with use of electrical brain stimulation [facility]):   Traumatic closed displaced fracture of left shoulder with anterior dislocation, initial encounter: new and requires workup  Diagnosis management comments: Patient is 18-year-old female, history of Parkinson's disease, comes in with complaints of mechanical fall, states that she tripped and fall on the same level, landed on left shoulder, had pain and decreased range of movement, went to her family doctor who got an x-ray that was done that showed fracture and dislocation of the left shoulder, patient went to the [facility] ER where the waiting time was very long and she came to the ER here  No films available in epic  X-rays were repeated, that showed left shoulder fracture and dislocation, orthopedics consulted by phone  Amount and/or Complexity of Data Reviewed  Clinical lab tests: reviewed and ordered  Tests in the radiology section of CPT®: ordered and reviewed  Tests in the medicine section of CPT®: ordered and reviewed  Discuss the patient with other providers: yes  Independent visualization of images, tracings, or specimens: yes      CritCare Time    Disposition  Final diagnoses:   Fall, initial encounter   Traumatic closed displaced fracture of left shoulder with anterior dislocation, initial encounter   Parkinson's disease with use of electrical brain stimulation ([facility] )     Time reflects when diagnosis was documented in both MDM as applicable and the Disposition within this note     Time User Action Codes Description Comment    8/22/2018  4:22 PM Lisa Mary [M67  LGAL] Fall, initial encounter     8/22/2018  4:22 PM Lisa Mary [place]  89XA] Traumatic closed displaced fracture of left shoulder with anterior dislocation, initial encounter     8/22/2018  9:42 PM George Ridley 1979 Parkinson's disease with use of electrical brain stimulation [facility])       ED Disposition     ED Disposition Condition Comment    Admit  Case was discussed with Dr Pedrito Moscoso and the patient's admission status was agreed to be Admission Status: inpatient status to the service of Dr Pedrito Moscoso  Follow-up Information    None         Current Discharge Medication List      CONTINUE these medications which have NOT CHANGED    Details   aspirin 81 MG tablet Take 1 tablet by mouth daily      atenolol (TENORMIN) 50 mg tablet Take 50 mg by mouth      Calcium Carbonate-Vitamin D (CALCIUM 600+D) 600-200 MG-UNIT TABS Take 1 tablet by mouth daily      Cinnamon 500 MG TABS Take 2 tablets by mouth      co-enzyme Q-10 100 mg capsule Take 1 capsule by mouth daily      Omega-3 Fatty Acids (FISH OIL) 1,000 mg Take 1 capsule by mouth daily      PARoxetine (PAXIL) 20 mg tablet       rasagiline (AZILECT) 1 MG Take 1 tablet (1 mg total) by mouth daily  Qty: 90 each, Refills: 2    Associated Diagnoses: Parkinson's disease with use of electrical brain stimulation ([facility])      rosuvastatin (CRESTOR) 5 mg tablet Take 1 tablet by mouth every other day           No discharge procedures on file      ED Provider  Electronically Signed by           Bhavesh Sharma MD  08/22/18 2555

## 2018-08-22 NOTE — ED NOTES
Patient placed on capnography, cardiac monitoring and continuous pulse oximetry at this time              Pallavi Wright RN  08/22/18 9208

## 2018-08-22 NOTE — SEDATION DOCUMENTATION
Per Dr Suzanne Friedman, RN is able to end sedation narrator at this time  Patient is not to drink due to possibility of going to OR  Patient has history of Parkinson's and is "uncoordinated on my feet" so patient will remain on stretcher until further directions are received from orthopedics  Patient is awake, alert and oriented to baseline  Patient able to hold meaningful conversation with RN and patients

## 2018-08-23 ENCOUNTER — ANESTHESIA EVENT (INPATIENT)
Dept: PERIOP | Facility: HOSPITAL | Age: 73
DRG: 483 | End: 2018-08-23
Payer: MEDICARE

## 2018-08-23 ENCOUNTER — ANESTHESIA (INPATIENT)
Dept: PERIOP | Facility: HOSPITAL | Age: 73
DRG: 483 | End: 2018-08-23
Payer: MEDICARE

## 2018-08-23 ENCOUNTER — APPOINTMENT (INPATIENT)
Dept: RADIOLOGY | Facility: HOSPITAL | Age: 73
DRG: 483 | End: 2018-08-23
Payer: MEDICARE

## 2018-08-23 PROBLEM — Z86.79 HISTORY OF HYPERTENSION: Status: ACTIVE | Noted: 2018-08-23

## 2018-08-23 LAB
ABO GROUP BLD: NORMAL
ANION GAP SERPL CALCULATED.3IONS-SCNC: 10 MMOL/L (ref 4–13)
BLD GP AB SCN SERPL QL: NEGATIVE
BUN SERPL-MCNC: 17 MG/DL (ref 5–25)
CALCIUM SERPL-MCNC: 8.9 MG/DL (ref 8.3–10.1)
CHLORIDE SERPL-SCNC: 107 MMOL/L (ref 100–108)
CO2 SERPL-SCNC: 26 MMOL/L (ref 21–32)
CREAT SERPL-MCNC: 0.86 MG/DL (ref 0.6–1.3)
ERYTHROCYTE [DISTWIDTH] IN BLOOD BY AUTOMATED COUNT: 13.1 % (ref 11.6–15.1)
GFR SERPL CREATININE-BSD FRML MDRD: 67 ML/MIN/1.73SQ M
GLUCOSE SERPL-MCNC: 138 MG/DL (ref 65–140)
HCT VFR BLD AUTO: 37 % (ref 34.8–46.1)
HGB BLD-MCNC: 12.3 G/DL (ref 11.5–15.4)
MCH RBC QN AUTO: 31 PG (ref 26.8–34.3)
MCHC RBC AUTO-ENTMCNC: 33.2 G/DL (ref 31.4–37.4)
MCV RBC AUTO: 93 FL (ref 82–98)
PLATELET # BLD AUTO: 187 THOUSANDS/UL (ref 149–390)
PMV BLD AUTO: 11 FL (ref 8.9–12.7)
POTASSIUM SERPL-SCNC: 4 MMOL/L (ref 3.5–5.3)
RBC # BLD AUTO: 3.97 MILLION/UL (ref 3.81–5.12)
RH BLD: POSITIVE
SODIUM SERPL-SCNC: 143 MMOL/L (ref 136–145)
SPECIMEN EXPIRATION DATE: NORMAL
WBC # BLD AUTO: 13.4 THOUSAND/UL (ref 4.31–10.16)

## 2018-08-23 PROCEDURE — 99221 1ST HOSP IP/OBS SF/LOW 40: CPT | Performed by: INTERNAL MEDICINE

## 2018-08-23 PROCEDURE — C1776 JOINT DEVICE (IMPLANTABLE): HCPCS | Performed by: ORTHOPAEDIC SURGERY

## 2018-08-23 PROCEDURE — C1713 ANCHOR/SCREW BN/BN,TIS/BN: HCPCS | Performed by: ORTHOPAEDIC SURGERY

## 2018-08-23 PROCEDURE — 23472 RECONSTRUCT SHOULDER JOINT: CPT | Performed by: PHYSICIAN ASSISTANT

## 2018-08-23 PROCEDURE — 85027 COMPLETE CBC AUTOMATED: CPT | Performed by: ORTHOPAEDIC SURGERY

## 2018-08-23 PROCEDURE — 80048 BASIC METABOLIC PNL TOTAL CA: CPT | Performed by: ORTHOPAEDIC SURGERY

## 2018-08-23 PROCEDURE — 73020 X-RAY EXAM OF SHOULDER: CPT

## 2018-08-23 PROCEDURE — 86900 BLOOD TYPING SEROLOGIC ABO: CPT | Performed by: ANESTHESIOLOGY

## 2018-08-23 PROCEDURE — 86901 BLOOD TYPING SEROLOGIC RH(D): CPT | Performed by: ANESTHESIOLOGY

## 2018-08-23 PROCEDURE — 99222 1ST HOSP IP/OBS MODERATE 55: CPT | Performed by: ORTHOPAEDIC SURGERY

## 2018-08-23 PROCEDURE — 86850 RBC ANTIBODY SCREEN: CPT | Performed by: ANESTHESIOLOGY

## 2018-08-23 PROCEDURE — 23472 RECONSTRUCT SHOULDER JOINT: CPT | Performed by: ORTHOPAEDIC SURGERY

## 2018-08-23 PROCEDURE — 0RRK00Z REPLACEMENT OF LEFT SHOULDER JOINT WITH REVERSE BALL AND SOCKET SYNTHETIC SUBSTITUTE, OPEN APPROACH: ICD-10-PCS | Performed by: ORTHOPAEDIC SURGERY

## 2018-08-23 DEVICE — SMARTSET GMV HIGH PERFORMANCE GENTAMICIN MEDIUM VISCOSITY BONE CEMENT 40G
Type: IMPLANTABLE DEVICE | Site: SHOULDER | Status: FUNCTIONAL
Brand: SMARTSET

## 2018-08-23 DEVICE — IMPLANTABLE DEVICE: Type: IMPLANTABLE DEVICE | Site: SHOULDER | Status: FUNCTIONAL

## 2018-08-23 DEVICE — INVERSE/REVERSE SCREW SYSTEM, 4.5-36
Type: IMPLANTABLE DEVICE | Site: SHOULDER | Status: FUNCTIONAL
Brand: INVERSE/REVERSE

## 2018-08-23 DEVICE — INVERSE/REVERSE SCREW SYSTEM, 4.5-33
Type: IMPLANTABLE DEVICE | Site: SHOULDER | Status: FUNCTIONAL
Brand: INVERSE/REVERSE

## 2018-08-23 DEVICE — GLENOSHPERE 36MM TM REVERSE: Type: IMPLANTABLE DEVICE | Site: SHOULDER | Status: FUNCTIONAL

## 2018-08-23 DEVICE — BASEPLATE GLENOID TM REVERSE 15MM SHOULDER: Type: IMPLANTABLE DEVICE | Site: SHOULDER | Status: FUNCTIONAL

## 2018-08-23 RX ORDER — GLYCOPYRROLATE 0.2 MG/ML
INJECTION INTRAMUSCULAR; INTRAVENOUS AS NEEDED
Status: DISCONTINUED | OUTPATIENT
Start: 2018-08-23 | End: 2018-08-23 | Stop reason: SURG

## 2018-08-23 RX ORDER — PAROXETINE HYDROCHLORIDE 20 MG/1
20 TABLET, FILM COATED ORAL DAILY
Status: DISCONTINUED | OUTPATIENT
Start: 2018-08-23 | End: 2018-08-25 | Stop reason: HOSPADM

## 2018-08-23 RX ORDER — PROPOFOL 10 MG/ML
INJECTION, EMULSION INTRAVENOUS AS NEEDED
Status: DISCONTINUED | OUTPATIENT
Start: 2018-08-23 | End: 2018-08-23 | Stop reason: SURG

## 2018-08-23 RX ORDER — OXYCODONE HYDROCHLORIDE 5 MG/1
5 TABLET ORAL EVERY 4 HOURS PRN
Status: DISCONTINUED | OUTPATIENT
Start: 2018-08-23 | End: 2018-08-25 | Stop reason: HOSPADM

## 2018-08-23 RX ORDER — OXYCODONE HYDROCHLORIDE 10 MG/1
10 TABLET ORAL EVERY 4 HOURS PRN
Status: DISCONTINUED | OUTPATIENT
Start: 2018-08-23 | End: 2018-08-25 | Stop reason: HOSPADM

## 2018-08-23 RX ORDER — ATENOLOL 50 MG/1
50 TABLET ORAL DAILY
Status: DISCONTINUED | OUTPATIENT
Start: 2018-08-23 | End: 2018-08-25

## 2018-08-23 RX ORDER — ONDANSETRON 2 MG/ML
4 INJECTION INTRAMUSCULAR; INTRAVENOUS EVERY 6 HOURS PRN
Status: DISCONTINUED | OUTPATIENT
Start: 2018-08-23 | End: 2018-08-23 | Stop reason: HOSPADM

## 2018-08-23 RX ORDER — SODIUM CHLORIDE 9 MG/ML
INJECTION, SOLUTION INTRAVENOUS CONTINUOUS PRN
Status: DISCONTINUED | OUTPATIENT
Start: 2018-08-23 | End: 2018-08-23 | Stop reason: SURG

## 2018-08-23 RX ORDER — FENTANYL CITRATE 50 UG/ML
INJECTION, SOLUTION INTRAMUSCULAR; INTRAVENOUS AS NEEDED
Status: DISCONTINUED | OUTPATIENT
Start: 2018-08-23 | End: 2018-08-23 | Stop reason: SURG

## 2018-08-23 RX ORDER — ROCURONIUM BROMIDE 10 MG/ML
INJECTION, SOLUTION INTRAVENOUS AS NEEDED
Status: DISCONTINUED | OUTPATIENT
Start: 2018-08-23 | End: 2018-08-23 | Stop reason: SURG

## 2018-08-23 RX ORDER — MIDAZOLAM HYDROCHLORIDE 1 MG/ML
INJECTION INTRAMUSCULAR; INTRAVENOUS AS NEEDED
Status: DISCONTINUED | OUTPATIENT
Start: 2018-08-23 | End: 2018-08-23 | Stop reason: SURG

## 2018-08-23 RX ORDER — SODIUM CHLORIDE, SODIUM LACTATE, POTASSIUM CHLORIDE, CALCIUM CHLORIDE 600; 310; 30; 20 MG/100ML; MG/100ML; MG/100ML; MG/100ML
75 INJECTION, SOLUTION INTRAVENOUS CONTINUOUS
Status: DISCONTINUED | OUTPATIENT
Start: 2018-08-23 | End: 2018-08-24

## 2018-08-23 RX ORDER — RASAGILINE 1 MG/1
1 TABLET ORAL DAILY
Status: DISCONTINUED | OUTPATIENT
Start: 2018-08-23 | End: 2018-08-25 | Stop reason: HOSPADM

## 2018-08-23 RX ADMIN — CEFAZOLIN SODIUM 2000 MG: 2 SOLUTION INTRAVENOUS at 20:09

## 2018-08-23 RX ADMIN — GLYCOPYRROLATE 0.4 MG: 0.2 INJECTION, SOLUTION INTRAMUSCULAR; INTRAVENOUS at 18:07

## 2018-08-23 RX ADMIN — PAROXETINE HYDROCHLORIDE 20 MG: 20 TABLET, FILM COATED ORAL at 08:16

## 2018-08-23 RX ADMIN — OXYCODONE HYDROCHLORIDE AND ACETAMINOPHEN 2 TABLET: 5; 325 TABLET ORAL at 08:16

## 2018-08-23 RX ADMIN — SODIUM CHLORIDE, SODIUM LACTATE, POTASSIUM CHLORIDE, AND CALCIUM CHLORIDE 75 ML/HR: .6; .31; .03; .02 INJECTION, SOLUTION INTRAVENOUS at 10:04

## 2018-08-23 RX ADMIN — HYDROMORPHONE HYDROCHLORIDE 0.5 MG: 1 INJECTION, SOLUTION INTRAMUSCULAR; INTRAVENOUS; SUBCUTANEOUS at 16:26

## 2018-08-23 RX ADMIN — MIDAZOLAM 1 MG: 1 INJECTION INTRAMUSCULAR; INTRAVENOUS at 15:39

## 2018-08-23 RX ADMIN — CEFAZOLIN SODIUM 2000 MG: 2 SOLUTION INTRAVENOUS at 15:52

## 2018-08-23 RX ADMIN — FENTANYL CITRATE 50 MCG: 50 INJECTION, SOLUTION INTRAMUSCULAR; INTRAVENOUS at 16:11

## 2018-08-23 RX ADMIN — FENTANYL CITRATE 50 MCG: 50 INJECTION, SOLUTION INTRAMUSCULAR; INTRAVENOUS at 16:16

## 2018-08-23 RX ADMIN — SODIUM CHLORIDE, SODIUM LACTATE, POTASSIUM CHLORIDE, AND CALCIUM CHLORIDE 75 ML/HR: .6; .31; .03; .02 INJECTION, SOLUTION INTRAVENOUS at 20:09

## 2018-08-23 RX ADMIN — PROPOFOL 200 MG: 10 INJECTION, EMULSION INTRAVENOUS at 15:43

## 2018-08-23 RX ADMIN — POLYETHYLENE GLYCOL 3350 17 G: 17 POWDER, FOR SOLUTION ORAL at 08:16

## 2018-08-23 RX ADMIN — LIDOCAINE HYDROCHLORIDE 80 MG: 20 INJECTION, SOLUTION INTRAVENOUS at 15:43

## 2018-08-23 RX ADMIN — MIDAZOLAM 1 MG: 1 INJECTION INTRAMUSCULAR; INTRAVENOUS at 15:32

## 2018-08-23 RX ADMIN — NEOSTIGMINE METHYLSULFATE 3 MG: 1 INJECTION, SOLUTION INTRAMUSCULAR; INTRAVENOUS; SUBCUTANEOUS at 18:07

## 2018-08-23 RX ADMIN — DOCUSATE SODIUM 100 MG: 100 CAPSULE, LIQUID FILLED ORAL at 08:16

## 2018-08-23 RX ADMIN — FENTANYL CITRATE 50 MCG: 50 INJECTION, SOLUTION INTRAMUSCULAR; INTRAVENOUS at 15:50

## 2018-08-23 RX ADMIN — HYDROMORPHONE HYDROCHLORIDE 0.5 MG: 1 INJECTION, SOLUTION INTRAMUSCULAR; INTRAVENOUS; SUBCUTANEOUS at 16:18

## 2018-08-23 RX ADMIN — ONDANSETRON HYDROCHLORIDE 4 MG: 2 INJECTION, SOLUTION INTRAVENOUS at 16:10

## 2018-08-23 RX ADMIN — ATENOLOL 50 MG: 50 TABLET ORAL at 08:16

## 2018-08-23 RX ADMIN — FENTANYL CITRATE 50 MCG: 50 INJECTION, SOLUTION INTRAMUSCULAR; INTRAVENOUS at 15:43

## 2018-08-23 RX ADMIN — ROCURONIUM BROMIDE 40 MG: 10 INJECTION INTRAVENOUS at 15:43

## 2018-08-23 RX ADMIN — SODIUM CHLORIDE: 0.9 INJECTION, SOLUTION INTRAVENOUS at 15:36

## 2018-08-23 NOTE — OP NOTE
OPERATIVE REPORT    PATIENT NAME: Willa Bunch   :    MRN: [id number]  Pt Location: [facility] OR ROOM 01    SURGERY DATE: 2018    SURGEON(S) and ROLE:  Primary: Raghu Olmos MD  Assisting: Luis Cedillo PA-C    NOTE:  The presence of a physician assistant was necessary to help with patient positioning, surgical exposure, wound retraction, wound closure, and other key portions of the procedure  No qualified resident was available for this case  PREOPERATIVE DIAGNOSES:  Left Proximal Humerus Fracture    POSTOPERATIVE DIAGNOSES:  Same as Preoperative Diagnosis    PROCEDURES:  Left Reverse Total Shoulder Arthroplasty  Left Biceps Tenotomy    ANESTHESIA TYPE:  General endotracheal    ANESTHESIA STAFF:   Anesthesiologist: James Bowers DO  CRNA: LAZ Peck CRNA    ESTIMATED BLOOD LOSS:  200 mL    PERIOPERATIVE ANTIBIOTICS:  cefazolin, 2 grams    IMPLANTS: Ayana Trabecular Metal Reverse Shoulder System    Humeral Stem:  10 cemented    Base Plate:  15 mm post    Glenosphere:  36 mm    Poly Liner:  +0 mm    Superior Screw:  33 mm    Inferior Screw:  36 mm      Implant Name Type Inv   Item Serial No   Lot No  LRB No  Used Action   CEMENT BONE SMART SET MED VISC W/ GENT - [id number]  CEMENT BONE SMART SET MED VISC W/ GENT  DEPUY  Left 1 Implanted   SCREW CANC 4 5 X 33MM INVERSE/REVERSE - [id number]  SCREW CANC 4 5 X 33MM INVERSE/REVERSE  Ayana [id number] Left 1 Implanted   SCREW CANC 4 5 X 36MM SLF TAP - [id number]  SCREW CANC 4 5 X 36MM SLF TAP  Ayana [id number] Left 1 Implanted   BASEPLATE GLENOID TM REVERSE 15MM SHOULDER - [id number]  BASEPLATE GLENOID TM REVERSE 15MM SHOULDER  Ayana [id number] Left 1 Implanted   GLENOSHPERE 36MM TM REVERSE - [id number]  GLENOSHPERE 36MM TM REVERSE  Ayana [id number] Left 1 Implanted   LINER HUMERAL WEDGE 36MM TM REVERSE +0MM - [id number]   LINER HUMERAL WEDGE 36MM TM REVERSE +0MM   Ayana   Left 1 Implanted       SPECIMENS:  * No specimens in log *    DRAINS:  None      OPERATIVE INDICATIONS:  The patient is a 67 y o  female with left shoulder pain and a displaced four-part proximal humerus fracture  Surgical treatment was indicated due to instability, displacement and liklihood of prolonged or permanent functional impairment with non-surgical treatment  After a thorough discussion of the potential risks, benefits, and alternative treatments, the patient agreed to proceed with surgery  The patient understands that the risks of surgery include, but are not limited to: nonunion, malunion, loss of fixation, infection, neurovascular injury, wound healing complications, venous thromboembolism, persistent pain, stiffness, instability, recurrence of symptoms, potential need for additional surgeries, and loss of limb or life  Oral and written consent for surgery was obtained from the patient preoperatively  PROCEDURE AND TECHNIQUE:  On the day of surgery, the patient was identified in the preoperative holding area  The operative site was marked by the surgeon  The patient was taken into the operating room  A time-out was conducted to confirm the patient's identity, the operative site, and the proposed procedure  The patient was anesthetized, and perioperative antibiotics were administered  The patient was positioned beach chair on the OR table  All bony prominences were padded  The operative site was prepped and draped using standard sterile technique  A deltopectoral approach was made  The cephalic vein was identified and mobilized with the pectoralis major  The superior 1/3 of the pectoralis major tendon was released  The clavipectoral fascia was incised just lateral to the conjoint tendon  Subdeltoid adhesions were released with finger dissection  A Jayden retractor was placed  The long head of biceps tendon was identified in the bicipital groove, and a tenotomy was performed  The fracture was identified as a four-part fracture    The humeral head fragment was displaced and rotated laterally into the subdeltoid space  The greater and lesser tuberosities had significant comminution and each lacked one major bone fragment  The head fragment was devoid of soft tissue attachments and it was removed  Three #2 FiberWire sutures were used to tag the rotator cuff  Glenoid retractors were placed  The labrum was excised and the bony glenoid margins were exposed  A guide pin was placed centrally in the glenoid and aligned to position the baseplate with slight inferior tilt and flush with the inferior glenoid rim  The glenoid was reamed and prepared to accept a Ayana trabecular metal baseplate  The glenoid was irrigated with sterile saline  The baseplate was impacted until flush with the glenoid  One inferior screw and one superior screw were placed through the baseplate, and locking caps were inserted  The glenosphere was impacted onto the baseplate  The arm was adducted and externally rotated to expose the humeral neck  The medial calcar was attached to the humeral shaft and contained the inferomedial aspect of the articular surface  This was debrided with a rongeur  The humeral canal was reamed by hand until cortical contact was achieved distally  A trial humeral stem was placed in 20 degrees of retroversion using the calcar to  rotation and height  Trial reductions were performed, and the humeral stem height resting on the medial calcar was found to provide appropriate stability, range of motion, and soft tissue tension  The trial stem was removed, and the humeral canal was irrigated  The humeral stem was cemented into place  The +0 mm polyethylene liner was impacted onto the stem  The shoulder was reduced, and there was excellent stability, range of motion, and soft tissue tension  The rotator cuff remnants were repaired to the humeral stem suture holes and also to each other    One suture passed through a drill hole in the lateral humeral cortex  Complete coverage of the prosthesis was achieved with the rotator cuff  The deltopectoral interval was loosely reapproximated  The skin was closed with staples  A sterile dressing was applied  The drapes were removed and the patient was given a sling with abduction pillow  The patient was awakened from anesthesia and taken to recovery in stable condition        COMPLICATIONS:  None    PATIENT DISPOSITION:  PACU       SIGNATURE:  David Shrestha MD  DATE:  August 23, 2018  TIME:  6:16 PM

## 2018-08-23 NOTE — CONSULTS
Consult- Ivett Hendricksonand [birth year], 67 y o  female MRN: [id number]    Unit/Bed#: E2 -30 Encounter: [id number]    Primary Care Provider: Benson Quintana MD   Date and time admitted to hospital: 8/22/2018  3:36 PM      Inpatient consult to Internal Medicine  Consult performed by: Giuliana Grigsby ordered by: Nathan Farmer          * Fracture, shoulder, left, closed, initial encounter   Assessment & Plan    Patient presented with a fall on her shoulder followed by pain  X-ray /CT scan in the emergency room shows Severely displaced and comminuted fractures involving the humeral head and proximal humeral shaft with medial displacement of the humeral shaft  The humeral head articulates with the glenoid though there is mild inferior subluxation and multiple   fracture fragments displaced into the joint space between the glenoid and humeral head  Patient be kept NPO past midnight for orthopedic evaluation  She denies any cardiac history, other than history of hypertension  Has diet-controlled type 2 diabetes  She will be at intermediate risk for surgery  Continue with beta-blocker perioperatively           History of hypertension   Assessment & Plan    Continue with home dose of atenolol        Parkinson's disease with use of electrical brain stimulation [facility])   Assessment & Plan    Patient has ambulatory dysfunction and history of multiple falls however does not use any ambulatory device at home  She has bilateral deep brain stimulator and follows up with Dr Wm Turpin from Neurology  Continue with outpatient medications  Maintain fall precautions  Physical therapy evaluation postoperatively will be obtained  VTE Prophylaxis: Enoxaparin (Lovenox)  / sequential compression device     Recommendations for Discharge:  · To follow    Counseling / Coordination of Care Time: 30 minutes  Greater than 50% of total time spent on patient counseling and coordination of care      Collaboration of Care: Were Recommendations Directly Discussed with Primary Treatment Team? - Yes     History of Present Illness:    Huber Giron is a 67 y o  female who is originally admitted to the orthopedic service due to  left shoulder fracture  We are consulted for preoperative evaluation and medical management  Patient has a history of Parkinson's disease status post deep brain stimulator placement and follows up with Dr Tameka Harris as an outpatient  She has history of gait instability secondary to that however does not use any ambulatory device  Today while attempting to walk in her house the patient fell on her left shoulder  She developed instantaneous pain and was unable to move her left upper extremity  He was brought to the emergency room and was found to have a communated left humeral fracture  The patient has a history of hypertension which is well controlled on atenolol  She takes medications for Parkinson's disease and hyperlipidemia  She denies any history of ischemic heart disease, congestive heart failure or any arrhythmias  Denies any chest pain or shortness of breath  Denies any recent surgical procedure  Patient also sustained a fall while attempting to get out of bed in the hospital and landed on her knees  She denied hitting her head  She was instructed to use the call bell and to avoid getting out of the bed without presence of a nurse  Review of Systems:    Review of Systems   Constitutional: Positive for activity change  HENT: Negative  Eyes: Negative  Cardiovascular: Negative  Gastrointestinal: Negative  Endocrine: Negative  Musculoskeletal: Positive for gait problem and joint swelling  Allergic/Immunologic: Negative  Neurological: Positive for weakness  Hematological: Negative  Psychiatric/Behavioral: Negative          Past Medical and Surgical History:     Past Medical History:   Diagnosis Date    Anxiety     [facility]     Hyperlipidemia     Hypertension  Parkinson disease ([facility] [facility] )        Past Surgical History:   Procedure Laterality Date    ACHILLES TENDON SURGERY      DEEP BRAIN STIMULATOR PLACEMENT      DENTAL SURGERY      REPLACEMENT TOTAL KNEE      TONSILLECTOMY         Meds/Allergies:    all medications and allergies reviewed    Allergies: Allergies   Allergen Reactions    Sulfa Antibiotics Hives       Social History:     Marital Status: /Civil Union    Substance Use History:   History   Alcohol Use No     History   Smoking Status    Never Smoker   Smokeless Tobacco    Never Used     History   Drug Use No       Family History:    non-contributory    Physical Exam:     Vitals:   Blood Pressure: 111/52 (08/22/18 2255)  Pulse: 57 (08/22/18 2255)  Temperature: (!) 97 3 °F (36 3 °C) (08/22/18 2255)  Temp Source: Temporal (08/22/18 2255)  Respirations: 20 (08/22/18 2255)  Height: 5' 7" (170 2 cm) (08/22/18 1943)  Weight - Scale: 112 kg (246 lb 4 1 oz) (08/22/18 1943)  SpO2: 94 % (08/22/18 2255)    Physical Exam   Constitutional: She is oriented to person, place, and time  No distress  HENT:   Head: Normocephalic and atraumatic  Mouth/Throat: Oropharynx is clear and moist    Eyes: Conjunctivae are normal  Pupils are equal, round, and reactive to light  Neck: Normal range of motion  Neck supple  Cardiovascular: Normal rate and regular rhythm  Pulmonary/Chest: Effort normal and breath sounds normal    Abdominal: Soft  Bowel sounds are normal    Musculoskeletal:   Extensive hematoma left shoulder with intact 2+ radial pulse and sensations left hand   Neurological: She is alert and oriented to person, place, and time  Skin: Skin is warm  She is not diaphoretic  (  Lab Results: I have personally reviewed pertinent reports          Results from last 7 days  Lab Units 08/22/18  1700   WBC Thousand/uL 13 20*   HEMOGLOBIN g/dL 13 1   HEMATOCRIT % 39 3   PLATELETS Thousands/uL 214   NEUTROS PCT % 83*   LYMPHS PCT % 11*   MONOS PCT % 6   EOS PCT % 0       Results from last 7 days  Lab Units 08/22/18  1700   SODIUM mmol/L 140   POTASSIUM mmol/L 4 0   CHLORIDE mmol/L 104   CO2 mmol/L 26   BUN mg/dL 19   CREATININE mg/dL 0 88   CALCIUM mg/dL 9 6   TOTAL PROTEIN g/dL 7 1   BILIRUBIN TOTAL mg/dL 0 45   ALK PHOS U/L 37*   ALT U/L 20   AST U/L 20   GLUCOSE RANDOM mg/dL 129       Results from last 7 days  Lab Units 08/22/18  1700   INR  1 01         No results found for: HGBA1C        Imaging: I have personally reviewed pertinent reports  CT shoulder left wo contrast   Final Result by Torie Loaiza MD (08/22 1923)      Severely displaced and comminuted fractures involving the humeral head and proximal humeral shaft with medial displacement of the humeral shaft  The humeral head articulates with the glenoid though there is mild inferior subluxation and multiple    fracture fragments displaced into the joint space between the glenoid and humeral head  Workstation performed: [id number]         XR shoulder 1 vw LEFT POST REDUCTION   Final Result by Radha Irizarry MD (08/22 1813)      Stable complete fracture of the left humeral head and neck with distracted fracture fragments and dislocation  Workstation performed: [id number]         XR shoulder 2+ views LEFT   Final Result by Mary Cedillo MD (08/22 1707)      Acute markedly displaced fracture of the proximal humerus with fragmentation of the humeral head which is slightly downwardly displaced with respect to the glenoid  Workstation performed: [id number]         XR chest 1 view portable    (Results Pending)       EKG, Pathology, and Other Studies Reviewed on Admission:   · EKG:  Reviewed    ** Please Note: This note has been constructed using a voice recognition system   **

## 2018-08-23 NOTE — CASE MANAGEMENT
Initial Clinical Review    Admission: Date/Time/Statement: 8/22/18 @ 1833     Orders Placed This Encounter   Procedures    Inpatient Admission (expected length of stay for this patient is greater than two midnights)     Standing Status:   Standing     Number of Occurrences:   1     Order Specific Question:   Admitting Physician     Answer:   Diogo Isabel     Order Specific Question:   Level of Care     Answer:   Med Surg [16]     Order Specific Question:   Estimated length of stay     Answer:   More than 2 Midnights     Order Specific Question:   Certification     Answer:   I certify that inpatient services are medically necessary for this patient for a duration of greater than two midnights  See H&P and MD Progress Notes for additional information about the patient's course of treatment  ED: Date/Time/Mode of Arrival:   ED Arrival Information     Expected Arrival Acuity Means of Arrival Escorted By Service Admission Type    - 8/22/2018 14:15 Urgent Walk-In Family Member Orthopedic Surgery Urgent    Arrival Complaint    shoulder injury          Chief Complaint:   Chief Complaint   Patient presents with    Shoulder Injury     pt reports fall this morning left shoulder injury, went to PCP had xray told she fractured her shoulder  pt reports went to [facility] in waiting room for 3 hours and left  pt denies hitting head or blood thinner use  radial pulse palpable +2 cap refill <3       History of Illness: Patient has a history of Parkinson's disease status post deep brain stimulator placement and follows up with Dr Tristan Arora as an outpatient  She has history of gait instability secondary to that however does not use any ambulatory device  Today while attempting to walk in her house the patient fell on her left shoulder  She developed instantaneous pain and was unable to move her left upper extremity  He was brought to the emergency room and was found to have a communated left humeral fracture    The patient has a history of hypertension which is well controlled on atenolol  She takes medications for Parkinson's disease and hyperlipidemia  She denies any history of ischemic heart disease, congestive heart failure or any arrhythmias  Denies any chest pain or shortness of breath  Denies any recent surgical procedure  Patient also sustained a fall while attempting to get out of bed in the hospital and landed on her knees  She denied hitting her head  She was instructed to use the call bell and to avoid getting out of the bed without presence of a nurse  ED Vital Signs:   ED Triage Vitals   Temperature Pulse Respirations Blood Pressure SpO2   08/22/18 1422 08/22/18 1422 08/22/18 1422 08/22/18 1422 08/22/18 1422   98 4 °F (36 9 °C) 89 17 160/76 97 %      Temp Source Heart Rate Source Patient Position - Orthostatic VS BP Location FiO2 (%)   08/22/18 1422 08/22/18 1422 08/22/18 1422 08/22/18 1422 --   Temporal Monitor Sitting Right arm       Pain Score       08/22/18 1655       5        Wt Readings from Last 1 Encounters:   08/22/18 112 kg (246 lb 4 1 oz)       Vital Signs (abnormal):    above    Abnormal Labs/Diagnostic Test Results:   WBC   13 20  Abs  neutro    10 91  Ct  l shoulder:     Severely displaced and comminuted fractures involving the humeral head and proximal humeral shaft with medial displacement of the humeral shaft   The humeral head articulates with the glenoid though there is mild inferior subluxation and multiple   fracture fragments displaced into the joint space between the glenoid and humeral head      ED Treatment:   Medication Administration from 08/22/2018 1415 to 08/22/2018 1920       Date/Time Order Dose Route Action Action by Comments     08/22/2018 1656 fentanyl citrate (PF) 100 MCG/2ML 100 mcg 100 mcg Intravenous Given Len Guzman MD      08/22/2018 1656 propofol (DIPRIVAN) 200 MG/20ML bolus injection 100 mg 100 mg Intravenous Given Len Guzman MD      08/22/2018 1657 propofol (DIPRIVAN) 200 MG/20ML bolus injection 50 mg 50 mg Intravenous Given Kathryn Pozo MD           Past Medical/Surgical History: Active Ambulatory Problems     Diagnosis Date Noted    [facility]     Resolved Ambulatory Problems     Diagnosis Date Noted    No Resolved Ambulatory Problems     Past Medical History:   Diagnosis Date    Anxiety     [facility] mellitus [facility] [address] [facility]     Hyperlipidemia     Hypertension     [facility]        Admitting Diagnosis: Shoulder injury [S49 90XA]  Fall, initial encounter [W19  XXXA]  Traumatic closed displaced fracture of left shoulder with anterior dislocation, initial encounter [Z23  92XA]    Age/Sex: 67 y o  female    Assessment/Plan:   Fracture, shoulder, left, closed, initial encounter   Assessment & Plan     Patient presented with a fall on her shoulder followed by pain  X-ray /CT scan in the emergency room shows Severely displaced and comminuted fractures involving the humeral head and proximal humeral shaft with medial displacement of the humeral shaft   The humeral head articulates with the glenoid though there is mild inferior subluxation and multiple   fracture fragments displaced into the joint space between the glenoid and humeral head  Patient be kept NPO past midnight for orthopedic evaluation  She denies any cardiac history, other than history of hypertension  Has diet-controlled type 2 diabetes  She will be at intermediate risk for surgery  Continue with beta-blocker perioperatively             History of hypertension   Assessment & Plan     Continue with home dose of atenolol     Parkinson's disease with use of electrical brain stimulation [facility])   Assessment & Plan     Patient has ambulatory dysfunction and history of multiple falls however does not use any ambulatory device at home  She has bilateral deep brain stimulator and follows up with Dr Veronika Fallon from Neurology    Continue with outpatient medications  Maintain fall precautions    Physical therapy evaluation postoperatively will be obtained             Admission Orders:    IP    8/22 @    1833  Scheduled Meds:   Current Facility-Administered Medications:  atenolol 50 mg Oral Daily Carolina Dockery MD    bisacodyl 10 mg Rectal Daily PRN Kevin Dover MD    docusate sodium 100 mg Oral BID Kevin Dover MD    lactated ringers 75 mL/hr Intravenous Continuous Kevin Dover MD Last Rate: 75 mL/hr (08/23/18 1004)   morphine injection 2 mg Intravenous Q3H PRN Kevin Dover MD    ondansetron 4 mg Intravenous Q6H PRN Kevin Dover MD    oxyCODONE-acetaminophen 2 tablet Oral Q6H PRN Kevin Dover MD    PARoxetine 20 mg Oral Daily Carolina Dockery MD    polyethylene glycol 17 g Oral Daily Kevin Dover MD    rasagiline 1 mg Oral Daily Carolina Dockery MD      Continuous Infusions:   lactated ringers 75 mL/hr Last Rate: 75 mL/hr (08/23/18 1004)     PRN Meds: bisacodyl    morphine injection    ondansetron    oxyCODONE-acetaminophen     Fall precautions  Cons  IM  Plan  OR    8/23

## 2018-08-23 NOTE — PLAN OF CARE
DISCHARGE PLANNING     Discharge to home or other facility with appropriate resources Progressing        Knowledge Deficit     Patient/family/caregiver demonstrates understanding of disease process, treatment plan, medications, and discharge instructions Progressing        MUSCULOSKELETAL - ADULT     Maintain or return mobility to safest level of function Progressing     Maintain proper alignment of affected body part Progressing        PAIN - ADULT     Verbalizes/displays adequate comfort level or baseline comfort level Progressing        Potential for Falls     Patient will remain free of falls Progressing        Prexisting or High Potential for Compromised Skin Integrity     Skin integrity is maintained or improved Progressing        SAFETY ADULT     Maintain or return to baseline ADL function Progressing     Maintain or return mobility status to optimal level Progressing

## 2018-08-23 NOTE — ASSESSMENT & PLAN NOTE
Patient has ambulatory dysfunction and history of multiple falls however does not use any ambulatory device at home  She has bilateral deep brain stimulator and follows up with Dr Neli Gibbs from Neurology  Continue with outpatient medications  Maintain fall precautions  Physical therapy evaluation postoperatively will be obtained

## 2018-08-23 NOTE — ASSESSMENT & PLAN NOTE
Patient presented with a fall on her shoulder followed by pain  X-ray /CT scan in the emergency room shows Severely displaced and comminuted fractures involving the humeral head and proximal humeral shaft with medial displacement of the humeral shaft  The humeral head articulates with the glenoid though there is mild inferior subluxation and multiple   fracture fragments displaced into the joint space between the glenoid and humeral head  Patient be kept NPO past midnight for orthopedic evaluation  She denies any cardiac history, other than history of hypertension  Has diet-controlled type 2 diabetes  She will be at intermediate risk for surgery    Continue with beta-blocker perioperatively

## 2018-08-23 NOTE — H&P
Orthopedics   Willa Bunch 67 y o  female MRN: [id number]  Unit/Bed#: E2 -80      Chief Complaint:   left arm and shoulder pain    HPI:   67 y o  right hand dominant female status post fall at home in her bathroom complaining of right shoulder pain  Patient lives at home with her , and after fall she was brought to [facility] where she was found to have comminuted left proximal humerus fracture  Patient denies any history of injury to the right arm in the past   Following the fall she was unable to move her arm and had significant pain and bruising in her upper arm  She does have a history of Parkinson's disease and does have a deep brain stimulator  She does deny any history of heart issues, chest pain or shortness of breath  She is has history of hyperlipidemia  Patient is a community ambulator with a cane  She does have a history of total knee replacement  Review Of Systems:   Review of Systems   Constitutional: Positive for activity change  HENT: Negative  Eyes: Negative  Cardiovascular: Negative  Gastrointestinal: Negative  Endocrine: Negative  Musculoskeletal: Positive for gait problem and joint swelling  Allergic/Immunologic: Negative  Neurological: Positive for weakness  Hematological: Negative  Psychiatric/Behavioral: Negative  Past Medical History:   Past Medical History:   Diagnosis Date    Anxiety     [facility]     Hyperlipidemia     Hypertension     Parkinson disease ([facility] )        Past Surgical History:   Past Surgical History:   Procedure Laterality Date    ACHILLES TENDON SURGERY      DEEP BRAIN STIMULATOR PLACEMENT      DENTAL SURGERY      REPLACEMENT TOTAL KNEE      TONSILLECTOMY         Family History:  Family history reviewed and non-contributory  History reviewed  No pertinent family history      Social History:  Social History     Social History    Marital status: /Civil Union     Spouse name: N/A    Number of children: N/A    Years of education: N/A     Social History Main Topics    Smoking status: Never Smoker    Smokeless tobacco: Never Used    Alcohol use No    Drug use: No    Sexual activity: Not Asked     Other Topics Concern    None     Social History Narrative    None       Allergies:    Allergies   Allergen Reactions    Sulfa Antibiotics Hives           Labs:    0  Lab Value Date/Time   HCT 37 0 08/23/2018 0523   HCT 39 3 08/22/2018 1700   HCT 39 1 11/06/2014 0557   HCT 42 8 10/22/2014 1030   HGB 12 3 08/23/2018 0523   HGB 13 1 08/22/2018 1700   HGB 13 0 11/06/2014 0557   HGB 14 0 10/22/2014 1030   INR 1 01 08/22/2018 1700   INR 1 07 11/06/2014 0557   WBC 13 40 (H) 08/23/2018 0523   WBC 13 20 (H) 08/22/2018 1700   WBC 11 45 (H) 11/06/2014 0557   WBC 8 42 10/22/2014 1030       Meds:    Current Facility-Administered Medications:     atenolol (TENORMIN) tablet 50 mg, 50 mg, Oral, Daily, Eduardo Condon MD, 50 mg at 08/23/18 0816    bisacodyl (DULCOLAX) rectal suppository 10 mg, 10 mg, Rectal, Daily PRN, Chriss Cobos MD    docusate sodium (COLACE) capsule 100 mg, 100 mg, Oral, BID, Chriss Cobos MD, 100 mg at 08/23/18 0816    lactated ringers infusion, 75 mL/hr, Intravenous, Continuous, Chriss Cobos MD, Last Rate: 75 mL/hr at 08/23/18 1004, 75 mL/hr at 08/23/18 1004    morphine injection 2 mg, 2 mg, Intravenous, Q3H PRN, Chriss Cobos MD    ondansetron [facility]) injection 4 mg, 4 mg, Intravenous, Q6H PRN, Chriss Cobos MD    oxyCODONE-acetaminophen (PERCOCET) 5-325 mg per tablet 2 tablet, 2 tablet, Oral, Q6H PRN, Chriss Cobos MD, 2 tablet at 08/23/18 0816    PARoxetine (PAXIL) tablet 20 mg, 20 mg, Oral, Daily, Eduardo Condon MD, 20 mg at 08/23/18 0816    polyethylene glycol (MIRALAX) packet 17 g, 17 g, Oral, Daily, Chriss Cobos MD, 17 g at 08/23/18 0816    rasagiline (AZILECT) tablet 1 mg, 1 mg, Oral, Daily, Eduardo Condon MD    Blood Culture:   No results found for: BLOODCX    Wound Culture:   No results found for: WOUNDCULT    Ins and Outs:  I/O last 24 hours: In: 252 5 [P O :200; I V :52 5]  Out: 985 [Urine:985]          Physical Exam:   /60 (BP Location: Right arm)   Pulse 58   Temp (!) 96 2 °F (35 7 °C) (Temporal)   Resp 18   Ht 5' 7" (1 702 m)   Wt 112 kg (246 lb 4 1 oz)   SpO2 98%   BMI 38 57 kg/m²   Gen: Alert and oriented to person, place, time  HEENT: EOMI, eyes clear, moist mucus membranes, hearing intact  Respiratory: Bilateral chest rise  No audible wheezing found  Cardiovascular: Regular Rate and Rhythm  Abdomen: soft nontender/nondistended  Musculoskeletal: left upper extremity  · Skin intact, ecchymosis and swelling noted over shoulder  · Tender to palpation over Shoulder and upper arm  · Sensation intact to radial, ulna, median, musculocutaneous, axillary nerve distributions  · Motor intact to  radial, ulna, median, musculocutaneous, axillary nerve distributions      Radiology:   I personally reviewed the films  X-rays left humerus shows complete fracture left humeral head and neck with structure fracture fragments and dislocation    _*_*_*_*_*_*_*_*_*_*_*_*_*_*_*_*_*_*_*_*_*_*_*_*_*_*_*_*_*_*_*_*_*_*_*_*_*_*_*_*_*    Assessment:  67 y  o female S/P following a home with left Proximal humerus fracture    Plan:   · Sling placed  · Analgesics for pain  · Internal medicine consult for clearance and to follow medically    · NPO  · reverse total shoulder arthroplasty of left proximal humeral fracture  · Dispo: Ortho will follow      Matt Cisneros PA-C

## 2018-08-23 NOTE — ANESTHESIA PREPROCEDURE EVALUATION
Review of Systems/Medical History          Cardiovascular  EKG reviewed, Hyperlipidemia, Hypertension controlled,    Pulmonary  Negative pulmonary ROS        GI/Hepatic  Negative GI/hepatic ROS          Negative  ROS        Endo/Other  Diabetes well controlled Diet controlled,   Obesity    GYN       Hematology  Negative hematology ROS      Musculoskeletal  Negative musculoskeletal ROS        Neurology      Comment: Parkinson's disease  Patient has deep brain stimulator  will turn off prior to surgery Psychology   Anxiety,              Physical Exam    Airway    Mallampati score: III  TM Distance: >3 FB  Neck ROM: limited     Dental   implants,     Cardiovascular  Rhythm: regular, Rate: normal, Cardiovascular exam normal    Pulmonary  Pulmonary exam normal Breath sounds clear to auscultation,     Other Findings        Anesthesia Plan  ASA Score- 2     Anesthesia Type- general and regional with ASA Monitors  Additional Monitors:   Airway Plan:     Comment: Discussed GA with ERTT and Left interscalene nerve block with patient   will turn off the deep brain stimulator prior to surgery        Plan Factors- Patient instructed to abstain from smoking on day of procedure  Patient did not smoke on day of surgery  Induction- intravenous  Postoperative Plan- Plan for postoperative opioid use  Planned trial extubation    Informed Consent- Anesthetic plan and risks discussed with patient, spouse and son

## 2018-08-24 LAB
ANION GAP SERPL CALCULATED.3IONS-SCNC: 6 MMOL/L (ref 4–13)
BACTERIA UR QL AUTO: ABNORMAL /HPF
BILIRUB UR QL STRIP: NEGATIVE
BUN SERPL-MCNC: 17 MG/DL (ref 5–25)
CALCIUM SERPL-MCNC: 8.1 MG/DL (ref 8.3–10.1)
CHLORIDE SERPL-SCNC: 105 MMOL/L (ref 100–108)
CLARITY UR: ABNORMAL
CO2 SERPL-SCNC: 29 MMOL/L (ref 21–32)
COLOR UR: YELLOW
CREAT SERPL-MCNC: 0.86 MG/DL (ref 0.6–1.3)
GFR SERPL CREATININE-BSD FRML MDRD: 67 ML/MIN/1.73SQ M
GLUCOSE SERPL-MCNC: 125 MG/DL (ref 65–140)
GLUCOSE UR STRIP-MCNC: NEGATIVE MG/DL
HGB UR QL STRIP.AUTO: NEGATIVE
KETONES UR STRIP-MCNC: NEGATIVE MG/DL
LEUKOCYTE ESTERASE UR QL STRIP: ABNORMAL
NITRITE UR QL STRIP: NEGATIVE
NON-SQ EPI CELLS URNS QL MICRO: ABNORMAL /HPF
PH UR STRIP.AUTO: 5.5 [PH] (ref 4.5–8)
POTASSIUM SERPL-SCNC: 3.7 MMOL/L (ref 3.5–5.3)
PROT UR STRIP-MCNC: NEGATIVE MG/DL
RBC #/AREA URNS AUTO: ABNORMAL /HPF
SODIUM SERPL-SCNC: 140 MMOL/L (ref 136–145)
SP GR UR STRIP.AUTO: >=1.03 (ref 1–1.03)
UROBILINOGEN UR QL STRIP.AUTO: 0.2 E.U./DL
WBC #/AREA URNS AUTO: ABNORMAL /HPF

## 2018-08-24 PROCEDURE — 80048 BASIC METABOLIC PNL TOTAL CA: CPT | Performed by: PHYSICIAN ASSISTANT

## 2018-08-24 PROCEDURE — 99024 POSTOP FOLLOW-UP VISIT: CPT | Performed by: PHYSICIAN ASSISTANT

## 2018-08-24 PROCEDURE — G8988 SELF CARE GOAL STATUS: HCPCS

## 2018-08-24 PROCEDURE — 99232 SBSQ HOSP IP/OBS MODERATE 35: CPT | Performed by: INTERNAL MEDICINE

## 2018-08-24 PROCEDURE — 97167 OT EVAL HIGH COMPLEX 60 MIN: CPT

## 2018-08-24 PROCEDURE — G8987 SELF CARE CURRENT STATUS: HCPCS

## 2018-08-24 PROCEDURE — 87086 URINE CULTURE/COLONY COUNT: CPT | Performed by: INTERNAL MEDICINE

## 2018-08-24 PROCEDURE — G8979 MOBILITY GOAL STATUS: HCPCS

## 2018-08-24 PROCEDURE — G8978 MOBILITY CURRENT STATUS: HCPCS

## 2018-08-24 PROCEDURE — 81001 URINALYSIS AUTO W/SCOPE: CPT | Performed by: INTERNAL MEDICINE

## 2018-08-24 PROCEDURE — 97163 PT EVAL HIGH COMPLEX 45 MIN: CPT

## 2018-08-24 RX ORDER — OXYCODONE HYDROCHLORIDE 5 MG/1
5 TABLET ORAL EVERY 4 HOURS PRN
Qty: 30 TABLET | Refills: 0 | Status: SHIPPED | OUTPATIENT
Start: 2018-08-24 | End: 2018-09-03

## 2018-08-24 RX ORDER — MORPHINE SULFATE 2 MG/ML
2 INJECTION, SOLUTION INTRAMUSCULAR; INTRAVENOUS
Status: DISCONTINUED | OUTPATIENT
Start: 2018-08-24 | End: 2018-08-25 | Stop reason: HOSPADM

## 2018-08-24 RX ORDER — ACETAMINOPHEN 325 MG/1
650 TABLET ORAL EVERY 6 HOURS PRN
Status: DISCONTINUED | OUTPATIENT
Start: 2018-08-24 | End: 2018-08-25 | Stop reason: HOSPADM

## 2018-08-24 RX ADMIN — CEFAZOLIN SODIUM 2000 MG: 2 SOLUTION INTRAVENOUS at 03:44

## 2018-08-24 RX ADMIN — POLYETHYLENE GLYCOL 3350 17 G: 17 POWDER, FOR SOLUTION ORAL at 08:30

## 2018-08-24 RX ADMIN — ENOXAPARIN SODIUM 40 MG: 40 INJECTION SUBCUTANEOUS at 08:30

## 2018-08-24 RX ADMIN — OXYCODONE HYDROCHLORIDE 5 MG: 5 TABLET ORAL at 15:55

## 2018-08-24 RX ADMIN — CEFAZOLIN SODIUM 2000 MG: 2 SOLUTION INTRAVENOUS at 12:55

## 2018-08-24 RX ADMIN — DOCUSATE SODIUM 100 MG: 100 CAPSULE, LIQUID FILLED ORAL at 08:31

## 2018-08-24 RX ADMIN — OXYCODONE HYDROCHLORIDE 10 MG: 10 TABLET ORAL at 08:31

## 2018-08-24 RX ADMIN — DOCUSATE SODIUM 100 MG: 100 CAPSULE, LIQUID FILLED ORAL at 15:54

## 2018-08-24 RX ADMIN — ATENOLOL 50 MG: 50 TABLET ORAL at 08:31

## 2018-08-24 RX ADMIN — RASAGILINE 1 MG: 1 TABLET ORAL at 08:30

## 2018-08-24 RX ADMIN — OXYCODONE HYDROCHLORIDE 5 MG: 5 TABLET ORAL at 19:57

## 2018-08-24 RX ADMIN — PAROXETINE HYDROCHLORIDE 20 MG: 20 TABLET, FILM COATED ORAL at 08:30

## 2018-08-24 RX ADMIN — OXYCODONE HYDROCHLORIDE 5 MG: 5 TABLET ORAL at 03:33

## 2018-08-24 NOTE — PLAN OF CARE
Problem: PHYSICAL THERAPY ADULT  Goal: Performs mobility at highest level of function for planned discharge setting  See evaluation for individualized goals  Treatment/Interventions: Functional transfer training, LE strengthening/ROM, Elevations, Therapeutic exercise, Endurance training, Patient/family training, Equipment eval/education, Bed mobility, Gait training, Spoke to nursing, OT, Family  Equipment Recommended: Other (Comment) (quad cane)       See flowsheet documentation for full assessment, interventions and recommendations  Prognosis: Fair  Problem List: Decreased strength, Decreased range of motion, Decreased endurance, Impaired balance, Decreased mobility, Decreased cognition, Impaired judgement, Decreased safety awareness, Pain, Orthopedic restrictions  Assessment: Pt is 67 y o  female seen for PT evaluation [facility]/p [facility]  on [facility]/22/2018  Two pt identifiers were used to confirm  Pt presented w/ s/p fall at home  Pt was admitted with a primary dx of: L proximal humerus fx  PT now consulted for assessment of mobility and d/c needs  Pt with Up with assistance orders  Pts current co morbidities effecting treatment include: anxiety, DM, HTN, HLD, parkinsons disease, and personal factors including MARTINE home   Pts current clinical presentation is Unstable/ Unpredictable (high complexity) due to Ongoing medical management for primary dx, Increased reliance on more restrictive AD compared to baseline, Decreased activity tolerance compared to baseline, Fall risk, Increased assistance needed from caregiver at current time, Current WBS    Prior to admission, pt was utilizing a quad cane for ambulation as per pt  Upon evaluation, pt currently is requiring ; mod A for transfers and mod A for ambulation w/ quad cane    Pt denies any lightheadedness or dizziness with ambulation   Pt presents at PT eval functioning below baseline and currently w/ overall mobility deficits 2* to: BLE weakness, decreased ROM, impaired balance, decreased endurance, gait deviations, pain, decreased activity tolerance compared to baseline, decreased safety awareness, impaired judgement, fall risk, orthopedic restrictions  Pt currently at a fall risk 2* to impairments listed above  Based on the aforementioned PT evaluation, pt will continue to benefit from skilled Acute PT interventions to address stated impairments; to maximize functional mobility; for ongoing pt/ family training; and DME needs  At conclusion of PT session pt returned back in chair and chair alarm engaged with phone and call bell within reach  Pt denies any further questions at this time  PT is currently recommending rehab due to decreased functional mobility compared to baseline and increased  A needed from caregiver at current time  Pt/ family agreeable to plan and goals as stated on evaluation  PT will continue to follow during hospital stay  Barriers to Discharge: Decreased caregiver support, Inaccessible home environment     Recommendation: Short-term skilled PT     PT - OK to Discharge: Yes (to rehab when medically cleared )    See flowsheet documentation for full assessment

## 2018-08-24 NOTE — OCCUPATIONAL THERAPY NOTE
OccupationalTherapy Evaluation(time=0950-1020)     Patient Name: Saskia Malcolm  [id number] Date: 8/24/2018  Problem List  Patient Active Problem List   Diagnosis    Parkinson's disease with use of electrical brain stimulation ([facility] )    Fracture, shoulder, left, closed, initial encounter    History of hypertension     Past Medical History  Past Medical History:   Diagnosis Date    Anxiety     [facility] )     Hyperlipidemia     Hypertension     Parkinson disease ([facility] )      Past Surgical History  Past Surgical History:   Procedure Laterality Date    ACHILLES TENDON SURGERY      DEEP BRAIN STIMULATOR PLACEMENT      DENTAL SURGERY      REPLACEMENT TOTAL KNEE      REVERSE TOTAL SHOULDER ARTHROPLASTY Left 8/23/2018    Procedure: ARTHROPLASTY SHOULDER REVERSE;  Surgeon: David Shrestha MD;  Location: [facility];  Service: Orthopedics    TONSILLECTOMY        08/24/18 1020   Note Type   Note type Eval only   Restrictions/Precautions   Weight Bearing Precautions Per Order Yes   LUE Weight Bearing Per Order NWB   Braces or Orthoses Sling  (at all times)   Other Precautions Pain; Fall Risk; Chair Alarm; Impulsive;Multiple lines  (allowed pendulum, elbow-distal ROM)   Pain Assessment   Pain Assessment 0-10   Pain Score 4   Pain Type Acute pain;Surgical pain   Pain Location Shoulder   Pain Orientation Left   Home Living   Type of [address] One level  ([address] with railing)   Bathroom Equipment Shower chair   Home Equipment Quad cane   Prior Function   Lives With Spouse   Lifestyle   Autonomy PTA pt states independence with all aspects of her ADLs, transfers, ambulation--ocassional use of QC; +, +home alone, +falls=1   Reciprocal Relationships 2 sons   Service to Others worked as a  for the [address] [employer] [address] watching TV   Psychosocial   Psychosocial (WDL) X   Patient Behaviors/Mood Anxious; Cooperative   Subjective   Subjective "The sling was bothering me, so I took it off "   ADL   Where Assessed Edge of bed   Eating Assistance 6  Modified independent   Grooming Assistance 6  Modified Independent   UB Bathing Assistance 4  Minimal Assistance   LB Bathing Assistance 3  Moderate Assistance   [address] 4  Minimal [address] 2  Maximal Assistance   Transfers   Sit to Stand 3  Moderate assistance   Additional items Assist x 2; Increased time required;Verbal cues   Stand to Sit 3  Moderate assistance   Additional items Increased time required;Verbal cues; Assist x 1   Functional Mobility   Functional Mobility 3  Moderate assistance   Additional Comments x2   Additional items (quad cane)   Balance   Static Sitting Fair -   Dynamic Sitting Poor +   Static Standing Poor   Dynamic Standing Poor   Activity Tolerance   Activity Tolerance Patient limited by fatigue;Patient limited by pain   Medical Staff Made Aware nsg, P T     RUE Assessment   RUE Assessment WFL   RUE Strength   RUE Overall Strength Within Functional Limits - able to perform ADL tasks with strength  (4/5 throughout)   LUE Assessment   LUE Assessment X  (shr=NT, actively moving elbow-distal)   LUE Strength   LUE Overall Strength (shr=NT, elbow=3-/5, hand=3/5)   Hand Function   Gross Motor Coordination (R=int, L=impaired)   Fine Motor Coordination Functional   Sensation   Light Touch No apparent deficits   Proprioception   Proprioception No apparent deficits   Vision-Basic Assessment   Current Vision Wears glasses only for reading   Vision - Complex Assessment   Acuity (impaired)   Perception   Inattention/Neglect Appears intact   Cognition   Overall Cognitive Status Impaired   Arousal/Participation Alert   Attention Attends with cues to redirect   Orientation Level Oriented to person;Oriented to place;Oriented to situation   Memory Decreased short term memory   Following Commands Follows one step commands without difficulty   Assessment   Limitation Decreased ADL status; Decreased UE ROM; Decreased UE strength;Decreased Safe judgement during ADL;Decreased cognition;Decreased endurance;Decreased high-level ADLs   Prognosis Fair   Assessment Pt is a 73y/o female admitted to the hospital after falling at home, resulting in a comminuted L proximal humerus fx  Pt required a s/p L arthroplasty shoulder reverse(8/23)  Pt is currently NWB with her L UE and required to wear sling at all times(with exception to therapy)--pt allowed pendulum, elbow-hand ROM  Pt with hx Parkinson's  PTA pt states independence with all aspects of her ADLs, transfers, ambulation--ocassional use of QC; +, +home alone, +falls=1  During initial eval, pt demonstrated deficits with her functional balance, functional mobility, ADL status, L UE ROM/strength, activity tolerance(currently fair=15-20mins), and transfers safety  Pt would benefit from continued OT tx for the above deficits  3-5xwk/1-2wks  Goals   Patient Goals "to get better "   STG Time Frame 3-5   Short Term Goal #1 Pt will demonstrate mod I with their sit-stand transfers to assist with completion of their LE dressing  Short Term Goal #2 Pt will demonstrate improved activity tolerance to good(20-30mins) and standing tolerance to 3-5mins to assist with ADLs  Short Term Goal  Pt will demonstrate independence with her L UE HEP/precautions 100% of the time  LTG Time Frame (5-10days)   Long Term Goal #1 Pt will demonstrate proper walker/transfer safety(maintaining L UE NWB status) 100% of the time  Long Term Goal #2 Pt will demonstrate improved functional balance by 1 grade to assist with ADLs  Long Term Goal Pt will demonstrate mod I with their UE and LE bathing/dresssing  Plan   Treatment Interventions ADL retraining;Functional transfer training;UE strengthening/ROM; Endurance training;Cognitive reorientation;Patient/family training;Equipment evaluation/education; Compensatory technique education;Continued evaluation   Goal Expiration Date 09/04/18   Treatment Day 0   OT Frequency 3-5x/wk   Recommendation   OT Discharge Recommendation Short Term Rehab   Barthel Index   Feeding 10   Bathing 0   Grooming Score 5   Dressing Score 5   Bladder Score 0   Bowels Score 10   Toilet Use Score 5   Transfers (Bed/Chair) Score 5   Mobility (Level Surface) Score 0   Stairs Score 0   Barthel Index Score 40   Modified Coosa Scale   Modified Coosa Scale 4   Angelic Butler, OT

## 2018-08-24 NOTE — PROGRESS NOTES
Orthopaedic Surgery - Progress Note  Nain Alarcon (46 y o  female)   : 1945   MRN: [id number]  Date: 2018   Encounter: [id number]   Unit/Bed#: E2 -01    Assessment / Plan  Postop day 1 status post left reverse total shoulder arthroplasty    · Continue with nonweightbearing status on left arm at this time  · Patient should wear the sling at all times except during physical therapy with a should be doing pendulum exercises, elbow range of motion, and hand in wrist range of motion  · No active lifting of the left arm at this time  · Lovenox for dvt prophylaxis in the hospital resume ASA 81 mg on discharge  · Continue with ice and analgesics as needed  · Plan for discharge home versus rehab when cleared medically  Subjective  65-year-old female Postop day 1 status post left reverse total shoulder arthroplasty  Patient doing well at this time states that she does not have much discomfort at all  She thinks her shoulder feels better now than it did prior to surgery  Vitals  Temp:  [96 2 °F (35 7 °C)-98 7 °F (37 1 °C)] 97 6 °F (36 4 °C)  HR:  [58-78] 71  Resp:  [15-22] 18  BP: (104-155)/(59-70) 129/66  Body mass index is 38 57 kg/m²  I/O last 24 hours: In: 2281 3 [P O :200; I V :1 3]  Out: 860 [Urine:660; Blood:200]    Ortho Exam - Left Upper Extremity   Left Shoulder Exam  Alignment / Posture:  Normal shoulder posture  Inspection:  Moderate left shoulder and upper arm swelling  No erythema  Moderate upper arm and left shoulder ecchymosis  No deformity  Palpation:  Mild tenderness at Area around the left shoulder as expected  ROM:  Not tested  Strength:  Not tested  5/5  and pinch  Stability:  No objective shoulder instability  Tests: No pertinent positive or negative tests  Neurovascular:  Sensation intact in Ax/R/M/U nerve distributions  Sensation intact in all digital nerve distributions  Fingers warm and perfused      Lab Results  (I have personally reviewed pertinent lab results )    Results from last 7 days  Lab Units 08/23/18  0523 08/22/18  1700   WBC Thousand/uL 13 40* 13 20*   HEMOGLOBIN g/dL 12 3 13 1   HEMATOCRIT % 37 0 39 3   PLATELETS Thousands/uL 187 214       Results from last 7 days  Lab Units 08/22/18  1700   PTT seconds 27   INR  1 01       Results from last 7 days  Lab Units 08/24/18  0607 08/23/18  0523 08/22/18  1700   SODIUM mmol/L 140 143 140   POTASSIUM mmol/L 3 7 4 0 4 0   CHLORIDE mmol/L 105 107 104   CO2 mmol/L 29 26 26   ANION GAP mmol/L 6 10 10   BUN mg/dL 17 17 19   CREATININE mg/dL 0 86 0 86 0 88   EGFR ml/min/1 73sq m 67 67 65   CALCIUM mg/dL 8 1* 8 9 9 6   ALK PHOS U/L  --   --  37*   TOTAL PROTEIN g/dL  --   --  7 1   ALT U/L  --   --  20   AST U/L  --   --  20   BILIRUBIN TOTAL mg/dL  --   --  0 45   GLUCOSE RANDOM mg/dL 125 138 400 [address]

## 2018-08-24 NOTE — PLAN OF CARE
Problem: OCCUPATIONAL THERAPY ADULT  Goal: Performs self-care activities at highest level of function for planned discharge setting  See evaluation for individualized goals  Treatment Interventions: ADL retraining, Functional transfer training, UE strengthening/ROM, Endurance training, Cognitive reorientation, Patient/family training, Equipment evaluation/education, Compensatory technique education, Continued evaluation          See flowsheet documentation for full assessment, interventions and recommendations  Limitation: Decreased ADL status, Decreased UE ROM, Decreased UE strength, Decreased Safe judgement during ADL, Decreased cognition, Decreased endurance, Decreased high-level ADLs  Prognosis: Fair  Assessment: Pt is a 71y/o female admitted to the hospital after falling at home, resulting in a comminuted L proximal humerus fx  Pt required a s/p L arthroplasty shoulder reverse(8/23)  Pt is currently NWB with her L UE and required to wear sling at all times(with exception to therapy)--pt allowed pendulum, elbow-hand ROM  Pt with hx Parkinson's  PTA pt states independence with all aspects of her ADLs, transfers, ambulation--ocassional use of QC; +, +home alone, +falls=1  During initial eval, pt demonstrated deficits with her functional balance, functional mobility, ADL status, L UE ROM/strength, activity tolerance(currently fair=15-20mins), and transfers safety  Pt would benefit from continued OT tx for the above deficits  3-5xwk/1-2wks        OT Discharge Recommendation: Short Term Rehab

## 2018-08-24 NOTE — PLAN OF CARE
Problem: DISCHARGE PLANNING - CARE MANAGEMENT  Goal: Discharge to post-acute care or home with appropriate resources  INTERVENTIONS:  - Conduct assessment to determine patient/family and health care team treatment goals, and need for post-acute services based on payer coverage, community resources, and patient preferences, and barriers to discharge  - Address psychosocial, clinical, and financial barriers to discharge as identified in assessment in conjunction with the patient/family and health care team  - Arrange appropriate level of post-acute services according to patients   needs and preference and payer coverage in collaboration with the physician and health care team  - Communicate with and update the patient/family, physician, and health care team regarding progress on the discharge plan  - Arrange appropriate transportation to post-acute venues  Outcome: Adequate for Discharge  Patient to discharge to [facility] when appropriate bed is available  CM following as needed

## 2018-08-24 NOTE — ANESTHESIA POSTPROCEDURE EVALUATION
Post-Op Assessment Note      CV Status:  Stable    Mental Status:  Alert and awake    Hydration Status:  Euvolemic    PONV Controlled:  Controlled    Airway Patency:  Patent    Post Op Vitals Reviewed: Yes          Staff: Anesthesiologist           BP      Temp     Pulse     Resp      SpO2 95 % (08/23/18 2016)

## 2018-08-24 NOTE — PHYSICAL THERAPY NOTE
PHYSICAL THERAPY EVALUATION  NAME:  Jez Parish  DATE: 08/24/18    AGE:   67 y o  Mrn:   [id number]  ADMIT DX:  Shoulder injury [S49 90XA]  Traumatic closed displaced fracture of left shoulder with anterior dislocation, initial encounter [L73  92XA]    Past Medical History:   Diagnosis Date    Anxiety     Diabetes mellitus ([facility] )     Hyperlipidemia     Hypertension     Parkinson disease ([facility] )        Past Surgical History:   Procedure Laterality Date    ACHILLES TENDON SURGERY      DEEP BRAIN STIMULATOR PLACEMENT      DENTAL SURGERY      REPLACEMENT TOTAL KNEE      TONSILLECTOMY         Length Of Stay: 2    PHYSICAL THERAPY EVALUATION:      08/24/18 1023   Note Type   Note type Eval only   Pain Assessment   Pain Assessment 0-10   Pain Score 4   Pain Type Acute pain   Pain Location Shoulder   Pain Orientation Left   Pain Descriptors Aching   Pain Frequency Constant/continuous   Pain Onset Ongoing   Clinical Progression Gradually improving   Effect of Pain on Daily Activities increased pain with activity    Patient's Stated Pain Goal No pain   Hospital Pain Intervention(s) Ambulation/increased activity;Repositioned   Response to Interventions tolerated    Home Living   Type of [address] [place] [address] One level;Stairs to enter with rails  (5 [address] )   Home Equipment Quad cane   Additional Comments [facility] pt if needed  Pt + home alone, reports limited support from local family    Prior Function   Level of [place] Independent with ADLs and functional mobility   Lives With Spouse   Receives Help From Family;Friend(s)   ADL Assistance Independent   Falls in the last 6 months 1 to 4   Vocational Retired   Comments Pt reports the use of a quad cane for ambulation PTA    Restrictions/Precautions   Weight Bearing Precautions Per Order Yes   LUE Weight Bearing Per Order NWB   Braces or Orthoses Sling; Other (Comment)  (abduction sling )   Other Precautions WBS;Cognitive; Chair Alarm; Bed Alarm; Fall Risk;Multiple lines;Pain  (chair alarm on post session )   General   Additional Pertinent History Upon entering pts room for PT eval, pt found without sling donned seated in chair  Pt educated on the importance of maintaining sling on at all times and sling was donned back on pt during PT eval  CRISTHIAN Blankenship made aware pt found without sling on  Pt also educated on NWB status to L UE at this time    Family/Caregiver Present Yes   Cognition   Overall Cognitive Status Impaired   Arousal/Participation Alert   Orientation Level Oriented to person;Oriented to place;Oriented to time   Memory Decreased recall of precautions;Decreased recall of recent events   Following Commands Follows one step commands with increased time or repetition   RUE Assessment   RUE Assessment WFL   LUE Assessment   LUE Assessment X   RLE Assessment   RLE Assessment WFL   Strength RLE   RLE Overall Strength 4-/5   LLE Assessment   LLE Assessment WFL   Strength LLE   LLE Overall Strength 4-/5   Bed Mobility   Additional Comments NA, Pt seated OOB in chair at time of PT eval    Transfers   Sit to Stand 3  Moderate assistance   Additional items Assist x 2; Increased time required;Verbal cues   Stand to Sit 3  Moderate assistance   Additional items Assist x 1; Increased time required;Verbal cues   Additional Comments VC needed for hand placement and safety    Ambulation/Elevation   Gait pattern Excessively slow; Short stride; Foward flexed; Inconsistent dayday   Gait Assistance 3  Moderate assist   Additional items Assist x 1   Assistive Device Small base quad cane   Distance 15ft with chair follow  (limited by fatigue )   Balance   Static Sitting Fair -   Static Standing Poor   Ambulatory Poor -   Endurance Deficit   Endurance Deficit Yes   Endurance Deficit Description fatigue and pain    Activity Tolerance   Activity Tolerance Patient limited by fatigue;Patient limited by pain   Nurse Made Aware Pt appropriate to be seen and mobilize per CRISTHIAN Blankenship   Assessment   Prognosis Fair   Problem List Decreased strength;Decreased range of motion;Decreased endurance; Impaired balance;Decreased mobility; Decreased cognition; Impaired judgement;Decreased safety awareness;Pain;Orthopedic restrictions   Assessment Pt is 67 y o  female seen for PT evaluation s/p admit to [facility] on 8/22/2018  Two pt identifiers were used to confirm  Pt presented w/ s/p fall at home  Pt was admitted with a primary dx of: L proximal humerus fx  PT now consulted for assessment of mobility and d/c needs  Pt with Up with assistance orders  Pts current co morbidities effecting treatment include: anxiety, DM, HTN, HLD, parkinsons disease, and personal factors including MARTINE home   Pts current clinical presentation is Unstable/ Unpredictable (high complexity) due to Ongoing medical management for primary dx, Increased reliance on more restrictive AD compared to baseline, Decreased activity tolerance compared to baseline, Fall risk, Increased assistance needed from caregiver at current time, Current WBS    Prior to admission, pt was utilizing a quad cane for ambulation as per pt  Upon evaluation, pt currently is requiring ; mod A for transfers and mod A for ambulation w/ quad cane    Pt denies any lightheadedness or dizziness with ambulation  Pt presents at PT eval functioning below baseline and currently w/ overall mobility deficits 2* to: BLE weakness, decreased ROM, impaired balance, decreased endurance, gait deviations, pain, decreased activity tolerance compared to baseline, decreased safety awareness, impaired judgement, fall risk, orthopedic restrictions  Pt currently at a fall risk 2* to impairments listed above  Based on the aforementioned PT evaluation, pt will continue to benefit from skilled Acute PT interventions to address stated impairments; to maximize functional mobility; for ongoing pt/ family training; and DME needs   At conclusion of PT session pt returned back in chair and chair alarm engaged with phone and call bell within reach  Pt denies any further questions at this time  PT is currently recommending rehab due to decreased functional mobility compared to baseline and increased  A needed from caregiver at current time  Pt/ family agreeable to plan and goals as stated on evaluation  PT will continue to follow during hospital stay  Barriers to Discharge Decreased caregiver support; Inaccessible home environment   Goals   Patient Goals " to get better"   [facility] Expiration Date 09/03/18   Short Term Goal #1 In 10 days pt will complete: 1) Bed mobility skills with S to increase safety and independence as well as decrease caregiver burden  2) Functional transfers with S to promote increased independence, safety, and QOL in the home environment  3) Ambulate 76' using least restrictive AD with S without LOB and stable vitals so that pt can negotiate home environment safely and promote independence with functional mobility and return to PLOF  4) Stair training up/ down 5 step/s using rail/s with S so that pt can enter/negotiate home environment safely and decrease fall risk  5) Improve balance grades to Good to increase safety with all mobility and decrease fall risk  6) Improve BLE strength by 1/2 grade to help increase overall functional mobility and decrease fall risk  7) PT for ongoing pt and family education; DME needs and D/C planning to promote highest level of function in least restrictive environment  Plan   Treatment/Interventions Functional transfer training;LE strengthening/ROM; Elevations; Therapeutic exercise; Endurance training;Patient/family training;Equipment eval/education; Bed mobility;Gait training;Spoke to nursing;OT;Family   PT Frequency Other (Comment)  (4-5x a week )   Recommendation   Recommendation Short-term skilled PT   Equipment Recommended Other (Comment)  (quad cane)   PT - OK to Discharge Yes  (to rehab when medically cleared )   Modified Ashvin Scale   Modified Ashvin Scale 4   Barthel Index   Feeding 10   Bathing 0   Grooming Score 5   Dressing Score 5   Bladder Score 0   Bowels Score 10   Toilet Use Score 5   Transfers (Bed/Chair) Score 5   Mobility (Level Surface) Score 0   Stairs Score 0   Barthel Index Score 40   Lera Dancer, PT

## 2018-08-24 NOTE — DISCHARGE INSTRUCTIONS
POSTOPERATIVE INSTRUCTIONS following SHOULDER SURGERY    MEDICATIONS:  · Resume all home medications unless otherwise instructed by your surgeon  · Pain Medication:  Oxycodone 5 mg, 1-3 tablets every 3 hours as needed  · If you were given a regional anesthetic (nerve block), please begin taking the pain medication as soon as you get home, even if you have minimal or no pain  DO NOT WAIT FOR THE NERVE BLOCK TO WEAR OFF  · Possible side effects include nausea, constipation, and urinary retention  If you experience these side effects, please call our office for assistance  · Pain med refills are authorized only during office hours (8am-4pm, Mon-Fri)  · Anti-Inflammatory:  Resume your home anti-inflammatory medication  · TAKE WITH FOOD  Stop if you experience nausea, reflux, or stomach pain  · Nausea Medication:  None  · Fill prescription ONLY if you expericnce severe nausea  WOUND CARE:  · Keep the dressing clean and dry  Light drainage may occur the first 2 days postop  · Remove the dressing in 72 hours postoperatively continue with dry dressing using gauze and paper tape until staples removed 2 weeks postoperatively  · Please call our office ([phone]) if you experience either of the following:  · Sudden increase in swelling, redness, or warmth at the surgical site  · Excessive incisional drainage that persists beyond the 3rd day after surgery  · Oral temperature greater than 101 degrees, not relieved with Tylenol  · Shortness of breath, chest pain, nausea, or any other concerning symptoms    SWELLING CONTROL:  · Cold Therapy: The cold therapy device may be used either continuously or only as needed, according to your preference  Do not let the pad directly touch your skin  Alternatively, apply ice (20 min on, 20 min off) as often as you feel is necessary  SLING:  · Wear your sling AT ALL TIMES (including sleep) until your first postoperative office visit    You may remove the sling for showering but must keep your arm at your side  ACTIVITY:   · DO NOT lift, carry, push, or pull anything with your operative arm  · Shoulder:  Begin small, gentle circular motions (pendulums) with the arm as tolerated  30 times clockwise and counterclockwise, 3 times per day  · Place a pillow behind the elbow while lying down  · Sleeping in a more upright position (recliner) may be more comfortable initially  · Wrist / Finger Motion:  With the sling on, move your wrist and fingers through a full range of motion 20 times per hour while awake  PHYSICAL THERAPY:  · You will be given a physical therapy prescription when you are seen in the office for your postoperative appointment  FOLLOW-UP APPOINTMENT:  · 2 weeks after surgery with:    Dr Angela Fleming   [facility], [address]  [address] [place] [address], [address] [place] [address], [facility], [facility]  [phone] ([facility])  [phone] (After Hours)

## 2018-08-24 NOTE — SOCIAL WORK
Cm met with patient and spouse Asa Hollingsworth at bedside to address discharge needs  During assessment patient was visibly upset and tearful due to pain and inability to move; patient reports she can no longer do "this" referring to not being able to move and expressed "I should just kill myself"  CM explored this further and patient does not have a plan, patient report " I should have never said that, I did not mean it" Patient reports feeling depressed due to pain and inability to move  Cm made Ortho and SLIM aware of this; at this time patient does not have a plan to self harm  Patient has a history of being at [facility] after knee replacement surgery and would like to go there for STR  Cm provided patient and spouse with preferred provider list and explained benefits of going to a preferred provider facility  Patient expressed that she knows people at [facility] and would be most comfortable there and would consider additional choices if PHILHAVEN denies; referral submitted  Patient accepted by [facility]; CM made patient aware of same  Patient is interested in [facility] transport and is aware of cost associated with transport  Patient arranged for [address] transport with [place]  Cm made patient and spouse aware of same  Patient reported In a Ranch style house with spouse; spouse is not available to assist patient all of the time and would prefer if patient went to STR  Patient was independent with ADLs and functional mobility  Patient was using a quad cane PTA  POA identified  PCP identified as Dr Nelson Caruso  Limited help/support reported  Patient made aware of CM's name, number and role  No other needs at present  Cm to follow as needed

## 2018-08-24 NOTE — PROGRESS NOTES
Progress Note - Kwesi Nation 67 y o  female MRN: [id number]    Unit/Bed#: E2 -01 Encounter: [id number]    Assessment/Plan:    Left humerus fracture   left reverse total shoulder arthroplasty completed yesterday continue postop care by Orthopedics    Parkinson's    patient is status post brain stimulator, await PT eval    Hypertension    well controlled with beta-blocker    Diabetes    diet control    Ambulatory dysfunction  await PT eval    Subjective:   Complains of shoulder pain, denies chest pain shortness of breath nausea vomiting diarrhea no fevers chills appetite is okay    Objective:     Vitals: Blood pressure 136/74, pulse 72, temperature 98 6 °F (37 °C), resp  rate 18, height 5' 7" (1 702 m), weight 112 kg (246 lb 4 1 oz), SpO2 100 %  ,Body mass index is 38 57 kg/m²  Results from last 7 days  Lab Units 08/23/18  0523 08/22/18  1700   WBC Thousand/uL 13 40* 13 20*   HEMOGLOBIN g/dL 12 3 13 1   HEMATOCRIT % 37 0 39 3   PLATELETS Thousands/uL 187 214   INR   --  1 01       Results from last 7 days  Lab Units 08/24/18  0607  08/22/18  1700   SODIUM mmol/L 140  < > 140   POTASSIUM mmol/L 3 7  < > 4 0   CHLORIDE mmol/L 105  < > 104   CO2 mmol/L 29  < > 26   BUN mg/dL 17  < > 19   CREATININE mg/dL 0 86  < > 0 88   CALCIUM mg/dL 8 1*  < > 9 6   TOTAL PROTEIN g/dL  --   --  7 1   BILIRUBIN TOTAL mg/dL  --   --  0 45   ALK PHOS U/L  --   --  37*   ALT U/L  --   --  20   AST U/L  --   --  20   GLUCOSE RANDOM mg/dL 125  < > 129   < > = values in this interval not displayed      Scheduled Meds:    Current Facility-Administered Medications:  acetaminophen 650 mg Oral Q6H PRN Nishi Lowe PA-C    atenolol 50 mg Oral Daily Bhumika Gaitan MD    bisacodyl 10 mg Rectal Daily PRN Adela Kendrick MD    cefazolin 2,000 mg Intravenous Q8H Nishi Lowe PA-C Last Rate: 2,000 mg (08/24/18 0344)   docusate sodium 100 mg Oral BID Adela Kendrick MD    enoxaparin 40 mg Subcutaneous Daily Nishi Lowe PA-C morphine injection 2 mg Intravenous Q3H PRN Melissa Villegas PA-C    ondansetron 4 mg Intravenous Q6H PRN Michael Ignacio MD    oxyCODONE 10 mg Oral Q4H PRN Melissa Villegas PA-C    oxyCODONE 5 mg Oral Q4H PRN Melissa Villegas PA-C    PARoxetine 20 mg Oral Daily Chace Shah MD    polyethylene glycol 17 g Oral Daily Michael Ignacio MD    rasagiline 1 mg Oral Daily Chace Shah MD        Continuous Infusions:     Physical exam:  General appearance:  Alert interaction appropriate oriented x3   Head/Eyes:  Nonicteric PERRL EOMI  Neck:  Supple  Lungs:  Decreased BS bilateral no wheezing rhonchi or rales  Heart: normal S1 S2 regular  Abdomen:  Obese nontender with bowel sounds  Extremities: no edema  Skin: no rash    Invasive Devices     Peripheral Intravenous Line            Peripheral IV 08/22/18 Right Forearm 1 day                  VTE Pharmacologic Prophylaxis:  Lovenox   VTE Mechanical Prophylaxis:  SCDs                     Counseling / Coordination of Care  Total floor / unit time spent today  30   minutes  Greater than 50% of total time was spent with the patient and / or family counseling and / or coordination of care    A description of the counseling / coordination of care:

## 2018-08-25 ENCOUNTER — HOSPITAL ENCOUNTER (INPATIENT)
Facility: HOSPITAL | Age: 73
LOS: 1 days | Discharge: NURSING, REHAB OR LONG-TERM CARE | End: 2018-09-10
Attending: PHYSICAL MEDICINE & REHABILITATION | Admitting: FAMILY MEDICINE
Payer: MEDICARE

## 2018-08-25 VITALS
SYSTOLIC BLOOD PRESSURE: 98 MMHG | OXYGEN SATURATION: 96 % | RESPIRATION RATE: 18 BRPM | HEART RATE: 74 BPM | TEMPERATURE: 98 F | WEIGHT: 246.25 LBS | BODY MASS INDEX: 38.65 KG/M2 | DIASTOLIC BLOOD PRESSURE: 62 MMHG | HEIGHT: 67 IN

## 2018-08-25 DIAGNOSIS — E66.9 OBESITY (BMI 30-39.9): ICD-10-CM

## 2018-08-25 DIAGNOSIS — Z86.79 HISTORY OF HYPERTENSION: Primary | ICD-10-CM

## 2018-08-25 DIAGNOSIS — G20 PARKINSON'S DISEASE WITH USE OF ELECTRICAL BRAIN STIMULATION (HCC): ICD-10-CM

## 2018-08-25 LAB
ANION GAP SERPL CALCULATED.3IONS-SCNC: 8 MMOL/L (ref 4–13)
BACTERIA UR CULT: NORMAL
BUN SERPL-MCNC: 15 MG/DL (ref 5–25)
CALCIUM SERPL-MCNC: 8.3 MG/DL (ref 8.3–10.1)
CHLORIDE SERPL-SCNC: 105 MMOL/L (ref 100–108)
CO2 SERPL-SCNC: 27 MMOL/L (ref 21–32)
CREAT SERPL-MCNC: 0.74 MG/DL (ref 0.6–1.3)
GFR SERPL CREATININE-BSD FRML MDRD: 81 ML/MIN/1.73SQ M
GLUCOSE SERPL-MCNC: 132 MG/DL (ref 65–140)
POTASSIUM SERPL-SCNC: 3.4 MMOL/L (ref 3.5–5.3)
SODIUM SERPL-SCNC: 140 MMOL/L (ref 136–145)

## 2018-08-25 PROCEDURE — 99232 SBSQ HOSP IP/OBS MODERATE 35: CPT | Performed by: INTERNAL MEDICINE

## 2018-08-25 PROCEDURE — 80048 BASIC METABOLIC PNL TOTAL CA: CPT | Performed by: PHYSICIAN ASSISTANT

## 2018-08-25 PROCEDURE — 99024 POSTOP FOLLOW-UP VISIT: CPT | Performed by: ORTHOPAEDIC SURGERY

## 2018-08-25 PROCEDURE — 97110 THERAPEUTIC EXERCISES: CPT

## 2018-08-25 RX ORDER — AMPICILLIN TRIHYDRATE 250 MG
500 CAPSULE ORAL DAILY
Status: DISCONTINUED | OUTPATIENT
Start: 2018-08-25 | End: 2018-09-10 | Stop reason: HOSPADM

## 2018-08-25 RX ORDER — ATENOLOL 25 MG/1
25 TABLET ORAL DAILY
Status: DISCONTINUED | OUTPATIENT
Start: 2018-08-25 | End: 2018-08-25 | Stop reason: HOSPADM

## 2018-08-25 RX ORDER — ROSUVASTATIN CALCIUM 5 MG/1
5 TABLET, COATED ORAL
Status: DISCONTINUED | OUTPATIENT
Start: 2018-08-25 | End: 2018-08-27

## 2018-08-25 RX ORDER — CHLORAL HYDRATE 500 MG
1000 CAPSULE ORAL DAILY
Status: DISCONTINUED | OUTPATIENT
Start: 2018-08-26 | End: 2018-09-10 | Stop reason: HOSPADM

## 2018-08-25 RX ORDER — RASAGILINE 1 MG/1
1 TABLET ORAL DAILY
Status: DISCONTINUED | OUTPATIENT
Start: 2018-08-26 | End: 2018-09-10 | Stop reason: HOSPADM

## 2018-08-25 RX ORDER — OXYCODONE HYDROCHLORIDE 5 MG/1
5 TABLET ORAL EVERY 4 HOURS PRN
Status: DISCONTINUED | OUTPATIENT
Start: 2018-08-25 | End: 2018-08-27

## 2018-08-25 RX ORDER — ASPIRIN 81 MG/1
81 TABLET, CHEWABLE ORAL DAILY
Status: DISCONTINUED | OUTPATIENT
Start: 2018-08-26 | End: 2018-09-10 | Stop reason: HOSPADM

## 2018-08-25 RX ORDER — PAROXETINE HYDROCHLORIDE 20 MG/1
20 TABLET, FILM COATED ORAL DAILY
Status: DISCONTINUED | OUTPATIENT
Start: 2018-08-26 | End: 2018-09-10 | Stop reason: HOSPADM

## 2018-08-25 RX ORDER — ATENOLOL 25 MG/1
50 TABLET ORAL DAILY
Status: DISCONTINUED | OUTPATIENT
Start: 2018-08-26 | End: 2018-09-10 | Stop reason: HOSPADM

## 2018-08-25 RX ORDER — B-COMPLEX WITH VITAMIN C
1 TABLET ORAL
Status: DISCONTINUED | OUTPATIENT
Start: 2018-08-26 | End: 2018-08-26 | Stop reason: CLARIF

## 2018-08-25 RX ADMIN — OXYCODONE HYDROCHLORIDE 5 MG: 5 TABLET ORAL at 00:29

## 2018-08-25 RX ADMIN — RASAGILINE 1 MG: 1 TABLET ORAL at 08:33

## 2018-08-25 RX ADMIN — PAROXETINE HYDROCHLORIDE 20 MG: 20 TABLET, FILM COATED ORAL at 08:33

## 2018-08-25 RX ADMIN — ROSUVASTATIN CALCIUM 5 MG: 5 TABLET, COATED ORAL at 22:59

## 2018-08-25 RX ADMIN — OXYCODONE HYDROCHLORIDE 5 MG: 5 TABLET ORAL at 20:25

## 2018-08-25 RX ADMIN — DOCUSATE SODIUM 100 MG: 100 CAPSULE, LIQUID FILLED ORAL at 08:33

## 2018-08-25 RX ADMIN — ENOXAPARIN SODIUM 40 MG: 40 INJECTION SUBCUTANEOUS at 08:34

## 2018-08-25 NOTE — H&P
H&P Exam - PMR   Syeda Santiago 67 y o  female MRN: [id number]  Unit/Bed#: QUIROGA 268-02 Encounter: [id number]    Rehabilitation Diagnosis:  Ambulation, ambulation, activities of daily living, transfers dysfunction, stairs dysfunction due to Parkinson's disease and left total shoulder replacement (reverse)  Etiologic Diagnosis:  Left shoulder replacement due to proximal fracture of the humerus and long  standing Parkinson's disease    Assessment/Plan     Assessment:ambulation, activities of daily living dysfunction, transfers dysfunction and stairs dysfunction due to Parkinson's disease and reverse left total shoulder replacement (status post comminuted fracture of the proximal left humerus)       Plan:  Admitted to [facility] rehab at [address] [place] [address]  For comprehensive inpatient rehabilitation  The patient is stable for 3 hours of therapy per day and requires multiple therapies in order to achieve independence at a narrow base quad cane level medical supervision is required due to the early postoperative status of the wound and underlying reversed total shoulder replacement, management of hypertension, and  Parkinson's disease  Estimated length of stay 10-14 days     Other Problems:    1  Hypertension -  Continue atenolol as ordered  Hold for systolic blood pressure less than 110    2  Pain -oxycodone 5 mg every 3 hours as needed for pain    3  Hyperlipidemia- continue outside Crestor  ( generic)    4  Depression-continue Paxil  No obvious signs of current affective disorder  5  Cardiac prophylaxis - continue aspirin and fish oil  Coenzyme Q10 will be brought in by the family as well as her homeopathic doses of cinnamon 500 mg     6  Parkinson's disease -appears in control on current monotherapy  The current medication is being supplied from home as   It is non formulary at [facility]   Continue wearing the sling at all times until such time as Orthopedics allows for range of motion  No range of motion or with hearing is currently permitted on the left side     8  DVT prophylaxis -as the patient is able to ambulate and the major problem is the left shoulder, the  81 mgbaby aspirin should be sufficient  History of Present Illness   HPI:  Ramon Quan is a 67 y o  female who presents with   Decreased ambulation, activities of daily living and transfers function due to a recent fall  She was admitted to [facility] in [address] where she underwent a left reverse total shoulder replacement by Dr Yung Gay  There were no significant  Complications  The 1st time the dressing is to be changed his Sunday, August 26  She is taking minimal pain medication  Parkinson's is in good control on her current monotherapy  There are no tremors  Review of Systems   Constitutional: Negative  HENT: Negative  Eyes: Negative  Respiratory: Negative  Cardiovascular: Positive for leg swelling  Gastrointestinal: Negative  Endocrine: Negative  Genitourinary: Negative  Difficulty urinating:  ambulation, activities of daily living dysfunction, transfers dysfunction and stairs dysfunction due to Parkinson's disease and reverse left total shoulder replacement (status post comminuted fracture of the proximal left humerus)   Musculoskeletal: Negative  Skin: Positive for wound (Surgical wound of the left shoulder with surrounding ecchymosis)  Allergic/Immunologic: Negative  Neurological: Negative  Hematological: Negative  Psychiatric/Behavioral: Negative  Family History-   Her son  Has hypertension   ; otherwise unknown    Historical Information   Past Medical History:   Diagnosis Date    Anxiety     [facility]     Hyperlipidemia     Hypertension     Parkinson disease ([facility] )      Past Surgical History:   Procedure Laterality Date    ACHILLES TENDON SURGERY      DEEP BRAIN STIMULATOR PLACEMENT      DENTAL SURGERY  REPLACEMENT TOTAL KNEE      REVERSE TOTAL SHOULDER ARTHROPLASTY Left 8/23/2018    Procedure: ARTHROPLASTY SHOULDER REVERSE;  Surgeon: Will Goodwin MD;  Location: [facility];  Service: Orthopedics    TONSILLECTOMY       Social History   History   Alcohol Use No     History   Drug Use No     History   Smoking Status    Never Smoker   Smokeless Tobacco    Never Used     Home Setup:   She lives in a ranch home with her   There are 5 steps to enter with bilateral rails  There is a basement but she never goes into that basement  Her foot that there is a garage status with 5 steps to enter that same   First floor  Functional Status Prior to Admission:   Independent with a narrow base quad cane held in the right hand and no  Bracing  Functional Status on Admission:  Requires assistance for transfers and ambulating with a narrow base quad cane  Requires assistance for all self-care activities as the left upper extremity cannot be utilized  No family history on file  Meds/Allergies   all medications and allergies reviewed  Allergies   Allergen Reactions    Sulfa Antibiotics Hives       Objective   Vitals: Blood pressure 144/64, pulse 70, temperature 99 °F (37 2 °C), temperature source Temporal, resp  rate 20, height 5' 8" (1 727 m), weight 113 kg (249 lb 8 oz), SpO2 96 %  Invasive Devices          No matching active lines, drains, or airways          Physical Exam   Constitutional: She is oriented to person, place, and time  She appears well-developed and well-nourished  HENT:   Head: Normocephalic and atraumatic  Nose: Nose normal    Mouth/Throat: Oropharynx is clear and moist    Eyes: Conjunctivae and EOM are normal  Pupils are equal, round, and reactive to light  Neck: Normal range of motion  Neck supple  No JVD present  No tracheal deviation present  No thyromegaly present  Cardiovascular: Normal rate, regular rhythm, normal heart sounds and intact distal pulses  Pulmonary/Chest: Effort normal and breath sounds normal  No respiratory distress  She has no wheezes  She has no rales  She exhibits no tenderness  Abdominal: Soft  Bowel sounds are normal  She exhibits no distension and no mass  There is no tenderness  There is no rebound and no guarding  Musculoskeletal: She exhibits edema (Proximal shoulder swelling at the operative site) and tenderness (Mild tenderness around the operative site of the shoulder on the left)  Neurological: She is alert and oriented to person, place, and time  She displays abnormal reflex (Lower extremity deep tendon reflexes are intact with the exception of the left ankle where it is absent  )  Skin: Skin is warm and dry  No rash noted  No erythema  No pallor  Psychiatric: She has a normal mood and affect  Her behavior is normal  Thought content normal        Lab Results: I have personally reviewed pertinent lab results , INR: No results found for: INR  Imaging: I have personally reviewed pertinent reports  EKG, Pathology, and Other Studies: I have personally reviewed pertinent films in PACS    Code Status: Prior  Advance Directive and Living Will:      Power of :    POLST:      Post Admission Physician Assessment  The patient has the potential to make improvement and is in need of at least 2 of the following multidisciplinary therapies including, but not limited to physical, occupational, speech and respiratory  The patient may also need nutritional services, wound care and prosthetics/orthotics prescription  Given the patient's complex medical condition and risk of further medical complications, rehabilitative services cannot be safely provided at a lower level of care, such as a skilled nursing facility  I have reviewed the patient's functional and medical status at the time of the preadmission screening and they are the same as on the day of this admission   I acknowledge that I have personally performed a full physical examination on this patient within 24 hours of admission  I have determined that the patient is able to tolerate the above course of treatment, at the appropriate level of intensity, for a reasonable period of time  The patient demonstrated understanding the rehabilitation program and the discharge process after we discussed them  Counseling / Coordination of Care  Total floor / unit time spent today 90 minutes  [place] 50% of total time was spent with the patient and / or family counseling and / or coordination of care    A description of the counseling / coordination of care:

## 2018-08-25 NOTE — PROGRESS NOTES
Orthopedics   Hunter Chairez 67 y o  female MRN: [id number]  Unit/Bed#: E2 -01      Subjective:  67 y  o female post operative day 2 left reverse total shoulder arthroplasty  Patient doing well overall, says her pain is well controlled  Says she has had a little difficulty with her sling but reports she has kept it on   Denies any numbness or tingling    Labs:    0  Lab Value Date/Time   HCT 37 0 08/23/2018 0523   HCT 39 3 08/22/2018 1700   HCT 39 1 11/06/2014 0557   HCT 42 8 10/22/2014 1030   HGB 12 3 08/23/2018 0523   HGB 13 1 08/22/2018 1700   HGB 13 0 11/06/2014 0557   HGB 14 0 10/22/2014 1030   INR 1 01 08/22/2018 1700   INR 1 07 11/06/2014 0557   WBC 13 40 (H) 08/23/2018 0523   WBC 13 20 (H) 08/22/2018 1700   WBC 11 45 (H) 11/06/2014 0557   WBC 8 42 10/22/2014 1030       Meds:    Current Facility-Administered Medications:     acetaminophen (TYLENOL) tablet 650 mg, 650 mg, Oral, Q6H PRN, Chad Grant PA-C    atenolol (TENORMIN) tablet 25 mg, 25 mg, Oral, Daily, Vinod Rachel DO    bisacodyl (DULCOLAX) rectal suppository 10 mg, 10 mg, Rectal, Daily PRN, Shyla Okeefe MD    docusate sodium (COLACE) capsule 100 mg, 100 mg, Oral, BID, Shyla Okeefe MD, 100 mg at 08/25/18 3381    enoxaparin (LOVENOX) subcutaneous injection 40 mg, 40 mg, Subcutaneous, Daily, Chad Grant PA-C, 40 mg at 08/25/18 1200    morphine injection 2 mg, 2 mg, Intravenous, Q3H PRN, Chad Grant PA-C    ondansetron [facility]) injection 4 mg, 4 mg, Intravenous, Q6H PRN, Shyla Okeefe MD, 4 mg at 08/23/18 1610    oxyCODONE (ROXICODONE) IR tablet 10 mg, 10 mg, Oral, Q4H PRN, Chad Grant PA-C, 10 mg at 08/24/18 0831    oxyCODONE (ROXICODONE) IR tablet 5 mg, 5 mg, Oral, Q4H PRN, Chad Grant PA-C, 5 mg at 08/25/18 0029    PARoxetine (PAXIL) tablet 20 mg, 20 mg, Oral, Daily, Bjorn Holbrook MD, 20 mg at 08/25/18 4377    polyethylene glycol (MIRALAX) packet 17 g, 17 g, Oral, Daily, Shyla Okeefe MD, 17 g at 08/24/18 0830    rasagiline (AZILECT) tablet 1 mg, 1 mg, Oral, Daily, Oxana Daily MD, 1 mg at 08/25/18 9928    Blood Culture:   No results found for: BLOODCX    Wound Culture:   No results found for: WOUNDCULT    Ins and Outs:  I/O last 24 hours: In: 742 5 [P O :500; I V :242 5]  Out: 1416 [Urine:1416]          Physical:  Vitals:    08/25/18 0655   BP: 98/62   Pulse: 74   Resp: 18   Temp: 98 °F (36 7 °C)   SpO2: 96%     left upper extremity  · Dressings clean dry intact, no active drainage  · Moderate swelling and ecchymosis over left shoulder, proximal arm  · Sensation intact to axillary, musculocutaneous, radial, ulna, median nerves  · Motor intact to axillary, musculocutaneous, radial, ulna, median nerves  · 2+ Radial pulse    _*_*_*_*_*_*_*_*_*_*_*_*_*_*_*_*_*_*_*_*_*_*_*_*_*_*_*_*_*_*_*_*_*_*_*_*_*_*_*_*_*    Assessment: 67 y  o female post operative day 2 left reverse total shoulder arthroplasty   Doing well    Plan:  · Nonweight Bearing left upper extremity  · Up and out of bed  · Sling at all times except with PT  · PT at rehab, pendulum exercises as well as elbow ROM, hand and wrist ROM  · DVT prophylaxis - Lovenox in hospital, resume ASA 81 mg on d/c  · Ice and analgesics  · Plan for d/c today to rehab      Jayden Peck PA-C

## 2018-08-25 NOTE — PLAN OF CARE
Problem: OCCUPATIONAL THERAPY ADULT  Goal: Performs self-care activities at highest level of function for planned discharge setting  See evaluation for individualized goals  Treatment Interventions: ADL retraining, Functional transfer training, UE strengthening/ROM, Endurance training, Cognitive reorientation, Patient/family training, Equipment evaluation/education, Compensatory technique education, Continued evaluation          See flowsheet documentation for full assessment, interventions and recommendations  Limitation: Decreased ADL status, Decreased UE ROM, Decreased UE strength, Decreased Safe judgement during ADL, Decreased cognition, Decreased endurance, Decreased high-level ADLs  Prognosis: Fair  Assessment: Pt seen for 15 min tx session with focus on education  Therapist reviewed L UE HEP--pendulum, elbow-distal ROM; handout provided   present during tx session  Sling re-adjusted; pt stating that she removed sling prior to tx session; pt noted with decreased judgement/safety  Good carryover noted with   Pt would benefit from inpt rehab to improve her overall level of function  Will continue        OT Discharge Recommendation: Short Term Rehab

## 2018-08-25 NOTE — NURSING NOTE
Reviewed discharge instruction with pt/spouse and receiving facility along with prescriptions  IV removed and discharged via [facility] with spouse and transporter

## 2018-08-25 NOTE — PLAN OF CARE
Problem: Prexisting or High Potential for Compromised Skin Integrity  Goal: Skin integrity is maintained or improved  INTERVENTIONS:  - Identify patients at risk for skin breakdown  - Assess and monitor skin integrity  - Assess and monitor nutrition and hydration status  - Monitor labs (i e  albumin)  - Assess for incontinence   - Turn and reposition patient  - Assist with mobility/ambulation  - Relieve pressure over bony prominences  - Avoid friction and shearing  - Provide appropriate hygiene as needed including keeping skin clean and dry  - Evaluate need for skin moisturizer/barrier cream  - Collaborate with interdisciplinary team (i e  Nutrition, Rehabilitation, etc )   - Patient/family teaching   Outcome: Progressing      Problem: Potential for Falls  Goal: Patient will remain free of falls  INTERVENTIONS:  - Assess patient frequently for physical needs  -  Identify cognitive and physical deficits and behaviors that affect risk of falls    -  [place] fall precautions as indicated by assessment   - Educate patient/family on patient safety including physical limitations  - Instruct patient to call for assistance with activity based on assessment  - Modify environment to reduce risk of injury  - Consider OT/PT consult to assist with strengthening/mobility   Outcome: Progressing      Problem: PAIN - ADULT  Goal: Verbalizes/displays adequate comfort level or baseline comfort level  Interventions:  - Encourage patient to monitor pain and request assistance  - Assess pain using appropriate pain scale  - Administer analgesics based on type and severity of pain and evaluate response  - Implement non-pharmacological measures as appropriate and evaluate response  - Consider cultural and social influences on pain and pain management  - Notify physician/advanced practitioner if interventions unsuccessful or patient reports new pain  Outcome: Progressing      Problem: INFECTION - ADULT  Goal: Absence or prevention of progression during hospitalization  INTERVENTIONS:  - Assess and monitor for signs and symptoms of infection  - Monitor lab/diagnostic results  - Monitor all insertion sites, i e  indwelling lines, tubes, and drains  - Monitor endotracheal (as able) and nasal secretions for changes in amount and color  - [place] appropriate cooling/warming therapies per order  - Administer medications as ordered  - Instruct and encourage patient and family to use good hand hygiene technique  - Identify and instruct in appropriate isolation precautions for identified infection/condition  Outcome: Progressing    Goal: Absence of fever/infection during neutropenic period  INTERVENTIONS:  - Monitor WBC  - Implement neutropenic guidelines  Outcome: Progressing      Problem: SAFETY ADULT  Goal: Patient will remain free of falls  INTERVENTIONS:  - Assess patient frequently for physical needs  -  Identify cognitive and physical deficits and behaviors that affect risk of falls    -  [place] fall precautions as indicated by assessment   - Educate patient/family on patient safety including physical limitations  - Instruct patient to call for assistance with activity based on assessment  - Modify environment to reduce risk of injury  - Consider OT/PT consult to assist with strengthening/mobility  Outcome: Progressing    Goal: Maintain or return to baseline ADL function  INTERVENTIONS:  -  Assess patient's ability to carry out ADLs; assess patient's baseline for ADL function and identify physical deficits which impact ability to perform ADLs (bathing, care of mouth/teeth, toileting, grooming, dressing, etc )  - Assess/evaluate cause of self-care deficits   - Assess range of motion  - Assess patient's mobility; develop plan if impaired  - Assess patient's need for assistive devices and provide as appropriate  - Encourage maximum independence but intervene and supervise when necessary  ¯ Involve family in performance of ADLs  ¯ Assess for home care needs following discharge   ¯ Request OT consult to assist with ADL evaluation and planning for discharge  ¯ Provide patient education as appropriate  Outcome: Progressing    Goal: Maintain or return mobility status to optimal level  INTERVENTIONS:  - Assess patient's baseline mobility status (ambulation, transfers, stairs, etc )    - Identify cognitive and physical deficits and behaviors that affect mobility  - Identify mobility aids required to assist with transfers and/or ambulation (gait belt, sit-to-stand, lift, walker, cane, etc )  - [place] fall precautions as indicated by assessment  - Record patient progress and toleration of activity level on Mobility SBAR; progress patient to next Phase/Stage  - Instruct patient to call for assistance with activity based on assessment  - Request Rehabilitation consult to assist with strengthening/weightbearing, etc   Outcome: Progressing      Problem: DISCHARGE PLANNING  Goal: Discharge to home or other facility with appropriate resources  INTERVENTIONS:  - Identify barriers to discharge w/patient and caregiver  - Arrange for needed discharge resources and transportation as appropriate  - Identify discharge learning needs (meds, wound care, etc )  - Arrange for interpretive services to assist at discharge as needed  - Refer to Case Management Department for coordinating discharge planning if the patient needs post-hospital services based on physician/advanced practitioner order or complex needs related to functional status, cognitive ability, or social support system  Outcome: Progressing

## 2018-08-25 NOTE — PROGRESS NOTES
Progress Note - Alhaji Jackson 67 y o  female MRN: [id number]    Unit/Bed#: E2 -01 Encounter: [id number]    Assessment/Plan:    Left humeral fracture  status post arthroplasty, postop care by Orthopedics, plan rehab today    Parkinson's disease   patient has brain stimulator, recommend rehab    Hypertension    control with beta-blocker    Diabetes    continue ADA diet    Ambulatory dysfunction  PT recommend rehab    Subjective:   Anxious about rehab, still left arm pain, denies chest pain shortness of breath nausea vomiting diarrhea no fevers chills appetite poor right anxiety    Objective:     Vitals: Blood pressure 98/62, pulse 74, temperature 98 °F (36 7 °C), temperature source Tympanic, resp  rate 18, height 5' 7" (1 702 m), weight 112 kg (246 lb 4 1 oz), SpO2 96 %  ,Body mass index is 38 57 kg/m²  Results from last 7 days  Lab Units 08/23/18  0523 08/22/18  1700   WBC Thousand/uL 13 40* 13 20*   HEMOGLOBIN g/dL 12 3 13 1   HEMATOCRIT % 37 0 39 3   PLATELETS Thousands/uL 187 214   INR   --  1 01       Results from last 7 days  Lab Units 08/25/18  0535  08/22/18  1700   SODIUM mmol/L 140  < > 140   POTASSIUM mmol/L 3 4*  < > 4 0   CHLORIDE mmol/L 105  < > 104   CO2 mmol/L 27  < > 26   BUN mg/dL 15  < > 19   CREATININE mg/dL 0 74  < > 0 88   CALCIUM mg/dL 8 3  < > 9 6   TOTAL PROTEIN g/dL  --   --  7 1   BILIRUBIN TOTAL mg/dL  --   --  0 45   ALK PHOS U/L  --   --  37*   ALT U/L  --   --  20   AST U/L  --   --  20   GLUCOSE RANDOM mg/dL 132  < > 129   < > = values in this interval not displayed      Scheduled Meds:    Current Facility-Administered Medications:  acetaminophen 650 mg Oral Q6H PRN Melissa Villegas PA-C   atenolol 50 mg Oral Daily Chace Shah MD   bisacodyl 10 mg Rectal Daily PRN Michael Ignacio MD   docusate sodium 100 mg Oral BID Michael Ignacio MD   enoxaparin 40 mg Subcutaneous Daily Melissa Villegas PA-C   morphine injection 2 mg Intravenous Q3H PRN Melissa Villegas PA-C   ondansetron 4 mg Intravenous Q6H PRN Ila Fabry, MD   oxyCODONE 10 mg Oral Q4H PRN Martín Zarco PA-C   oxyCODONE 5 mg Oral Q4H PRN Martín Zarco PA-C   PARoxetine 20 mg Oral Daily Azell Lundborg, MD   polyethylene glycol 17 g Oral Daily Ila Fabry, MD   rasagiline 1 mg Oral Daily Azell Lundborg, MD       Continuous Infusions:         Physical exam:  General appearance:  Alert anxious no distress interaction appropriate  Head/Eyes:  Nonicteric PERRL EOMI  Neck:  Supple  Lungs:  Decreased BS bilateral no wheezing rhonchi or rales  Heart: normal S1 S2 regular  Abdomen: Soft nontender with bowel sounds  Extremities: no edema  Skin: no rash    Invasive Devices     Peripheral Intravenous Line            Peripheral IV 08/22/18 Right Forearm 2 days                      Counseling / Coordination of Care  Total floor / unit time spent today 30   minutes  Greater than 50% of total time was spent with the patient and / or family counseling and / or coordination of care    A description of the counseling / coordination of care:  Discussed with

## 2018-08-25 NOTE — OCCUPATIONAL THERAPY NOTE
OccupationalTherapy Progress Note(kycw=[id number])     Patient Name: Kush Hamilton  GBM[facility]'E Date: 8/25/2018  Problem List  Patient Active Problem List   Diagnosis    Parkinson's disease with use of electrical brain stimulation ([facility] [address] )    Fracture, shoulder, left, closed, initial encounter    History of hypertension            Subjective:     08/25/18 0910   Restrictions/Precautions   Weight Bearing Precautions Per Order Yes   LUE Weight Bearing Per Order NWB   Braces or Orthoses Sling  (L UE)   Other Precautions Fall Risk;Cognitive; Chair Alarm   Pain Assessment   Pain Assessment FLACC   Pain Rating: FLACC (Rest) - Face 0   Pain Rating: FLACC (Rest) - Legs 0   Pain Rating: FLACC (Rest) - Activity 0   Pain Rating: FLACC (Rest) - Cry 1   Pain Rating: FLACC (Rest) - Consolability 0   Score: FLACC (Rest) 1   Cognition   Overall Cognitive Status Impaired   Arousal/Participation Arousable   Attention Attends with cues to redirect   Following Commands Follows one step commands with increased time or repetition   Activity Tolerance   Activity Tolerance Patient limited by fatigue;Patient limited by pain   Medical Staff Made Aware nsg, P T  Assessment   Assessment Pt seen for 15 min tx session with focus on education  Therapist reviewed L UE HEP--pendulum, elbow-distal ROM; handout provided   present during tx session  Sling re-adjusted; pt stating that she removed sling prior to tx session; pt noted with decreased judgement/safety  Good carryover noted with   Pt would benefit from inpt rehab to improve her overall level of function  Will continue  Plan   Treatment Interventions UE strengthening/ROM; Cognitive reorientation;Patient/family training; Compensatory technique education   Goal Expiration Date 09/04/18   Treatment Day 1   OT Frequency 3-5x/wk   Recommendation   OT Discharge Recommendation Short Term Rehab   [place] Index   Feeding 10   Bathing 0   Grooming Score 5   Dressing Score 5   Bladder Score 5   Bowels Score 5   Toilet Use Score 5   Transfers (Bed/Chair) Score 5   Mobility (Level Surface) Score 0   Stairs Score 0   B[place] Index Score 40   Sonya Juárez, OT

## 2018-08-26 LAB
ALBUMIN SERPL BCP-MCNC: 3.3 G/DL (ref 3–5.2)
ALP SERPL-CCNC: 38 U/L (ref 43–122)
ALT SERPL W P-5'-P-CCNC: 31 U/L (ref 9–52)
ANION GAP SERPL CALCULATED.3IONS-SCNC: 6 MMOL/L (ref 5–14)
AST SERPL W P-5'-P-CCNC: 35 U/L (ref 14–36)
BILIRUB SERPL-MCNC: 0.8 MG/DL
BUN SERPL-MCNC: 13 MG/DL (ref 5–25)
CALCIUM SERPL-MCNC: 8.6 MG/DL (ref 8.4–10.2)
CHLORIDE SERPL-SCNC: 104 MMOL/L (ref 97–108)
CO2 SERPL-SCNC: 28 MMOL/L (ref 22–30)
CREAT SERPL-MCNC: 0.57 MG/DL (ref 0.6–1.2)
EOSINOPHIL # BLD AUTO: 0.31 THOUSAND/UL (ref 0–0.4)
EOSINOPHIL NFR BLD MANUAL: 3 % (ref 0–6)
ERYTHROCYTE [DISTWIDTH] IN BLOOD BY AUTOMATED COUNT: 13 %
GFR SERPL CREATININE-BSD FRML MDRD: 92 ML/MIN/1.73SQ M
GLUCOSE P FAST SERPL-MCNC: 122 MG/DL (ref 70–99)
GLUCOSE SERPL-MCNC: 122 MG/DL (ref 70–99)
HCT VFR BLD AUTO: 29.5 % (ref 36–46)
HGB BLD-MCNC: 10.2 G/DL (ref 12–16)
LYMPHOCYTES # BLD AUTO: 1.85 THOUSAND/UL (ref 0.5–4)
LYMPHOCYTES # BLD AUTO: 18 % (ref 20–50)
MCH RBC QN AUTO: 32.3 PG (ref 26–34)
MCHC RBC AUTO-ENTMCNC: 34.7 G/DL (ref 31–36)
MCV RBC AUTO: 93 FL (ref 80–100)
MONOCYTES # BLD AUTO: 0.21 THOUSAND/UL (ref 0.2–0.9)
MONOCYTES NFR BLD AUTO: 2 % (ref 1–10)
NEUTS SEG # BLD: 7.93 THOUSAND/UL (ref 1.8–7.8)
NEUTS SEG NFR BLD AUTO: 77 %
PLATELET # BLD AUTO: 213 THOUSANDS/UL (ref 150–450)
PLATELET BLD QL SMEAR: ADEQUATE
PMV BLD AUTO: 9.1 FL (ref 8.9–12.7)
POTASSIUM SERPL-SCNC: 3.8 MMOL/L (ref 3.6–5)
PROT SERPL-MCNC: 6.2 G/DL (ref 5.9–8.4)
RBC # BLD AUTO: 3.17 MILLION/UL (ref 4–5.2)
RBC MORPH BLD: NORMAL
SODIUM SERPL-SCNC: 138 MMOL/L (ref 137–147)
TOTAL CELLS COUNTED SPEC: 100
WBC # BLD AUTO: 10.3 THOUSAND/UL (ref 4.5–11)

## 2018-08-26 PROCEDURE — 99304 1ST NF CARE SF/LOW MDM 25: CPT | Performed by: FAMILY MEDICINE

## 2018-08-26 PROCEDURE — 80053 COMPREHEN METABOLIC PANEL: CPT | Performed by: PHYSICAL MEDICINE & REHABILITATION

## 2018-08-26 PROCEDURE — 85027 COMPLETE CBC AUTOMATED: CPT | Performed by: PHYSICAL MEDICINE & REHABILITATION

## 2018-08-26 PROCEDURE — 85007 BL SMEAR W/DIFF WBC COUNT: CPT | Performed by: PHYSICAL MEDICINE & REHABILITATION

## 2018-08-26 RX ORDER — B-COMPLEX WITH VITAMIN C
1 TABLET ORAL
Status: DISCONTINUED | OUTPATIENT
Start: 2018-08-26 | End: 2018-09-10 | Stop reason: HOSPADM

## 2018-08-26 RX ADMIN — ASPIRIN 81 MG 81 MG: 81 TABLET ORAL at 08:00

## 2018-08-26 RX ADMIN — OXYCODONE HYDROCHLORIDE 5 MG: 5 TABLET ORAL at 08:03

## 2018-08-26 RX ADMIN — ATENOLOL 50 MG: 25 TABLET ORAL at 08:00

## 2018-08-26 RX ADMIN — OMEGA-3 FATTY ACIDS CAP 1000 MG 1000 MG: 1000 CAP at 08:00

## 2018-08-26 RX ADMIN — OYSTER SHELL CALCIUM WITH VITAMIN D 1 TABLET: 500; 200 TABLET, FILM COATED ORAL at 10:51

## 2018-08-26 RX ADMIN — PAROXETINE 20 MG: 20 TABLET, FILM COATED ORAL at 08:01

## 2018-08-26 RX ADMIN — RASAGILINE 1 MG: 1 TABLET ORAL at 08:01

## 2018-08-26 NOTE — CONSULTS
Consult- Ramon Quan [id number], 67 y o  female MRN: [id number]    Unit/Bed#: [facility] 268-02 Encounter: [id number]    Primary Care Provider: Yanna Calle MD   Date and time admitted to hospital: 8/25/2018 12:11 PM      Inpatient consult to Internal Medicine  Consult performed by: Odell Stubbs ordered by: Kanwal Kuhn          History of hypertension   Assessment & Plan    Controlled on atenolol        Parkinson's disease with use of electrical brain stimulation ([facility] [address] )   Assessment & Plan    Stable at this time with rasagline and deep brain stimulator  * Fracture, shoulder, left, closed, initial encounter   Assessment & Plan    Cont rehab   Recently had surgical repair done  Pain is well controlled          Acute blood loss anemia secondary to recent fracture and surgery:hb 10 observe for now  Hypokalemia:stable for now  VTE Prophylaxis: Reason for no pharmacologic prophylaxis tubi  and ambulation  /     Recommendations for Discharge:  · none    Counseling / Coordination of Care Time: 30 minutes  Greater than 50% of total time spent on patient counseling and coordination of care  Collaboration of Care: Were Recommendations Directly Discussed with Primary Treatment Team? - Yes     History of Present Illness:    Ramon Quan is a 67 y o  female who is originally admitted to the physiatry service due to rehab needs  We are consulted for medical management  patient recently fell and fractured her left shoulder and underwent surgery and is now here in [place] for rehab denies any pain and is doing well today    Review of Systems:    Review of Systems   Constitutional: Negative for appetite change, chills, fatigue and fever  HENT: Negative for hearing loss, sore throat and trouble swallowing  Eyes: Negative for photophobia, discharge and visual disturbance  Respiratory: Negative for chest tightness and shortness of breath  Cardiovascular: Negative for chest pain and palpitations  Gastrointestinal: Negative for abdominal pain, blood in stool and vomiting  Endocrine: Negative for polydipsia and polyuria  Genitourinary: Negative for difficulty urinating, dysuria, flank pain and hematuria  Musculoskeletal: Positive for arthralgias and myalgias  Negative for back pain and gait problem  Skin: Negative for rash  Allergic/Immunologic: Negative for environmental allergies and food allergies  Neurological: Negative for dizziness, seizures, syncope and headaches  Hematological: Does not bruise/bleed easily  Psychiatric/Behavioral: Negative for behavioral problems  All other systems reviewed and are negative  Past Medical and Surgical History:     Past Medical History:   Diagnosis Date    Anxiety     Diabetes mellitus [facility] [address] [facility]     Diabetes mellitus type 2, diet-controlled ([facility] [address] )     Hyperlipidemia     Hypertension     [facility]        Past Surgical History:   Procedure Laterality Date    ACHILLES TENDON SURGERY      DEEP BRAIN STIMULATOR PLACEMENT      DENTAL SURGERY      REPLACEMENT TOTAL KNEE      REVERSE TOTAL SHOULDER ARTHROPLASTY Left 8/23/2018    Procedure: ARTHROPLASTY SHOULDER REVERSE;  Surgeon: Akiko Orr MD;  [facility];  Service: Orthopedics    TONSILLECTOMY         Meds/Allergies:    PTA meds:   Prior to Admission Medications   Prescriptions Last Dose Informant Patient Reported? Taking?    Calcium Carbonate-Vitamin D (CALCIUM 600+D) 600-200 MG-UNIT TABS  Self Yes No   Sig: Take 1 tablet by mouth daily   Cinnamon 500 MG TABS  Self Yes No   Sig: Take 2 tablets by mouth   Omega-3 Fatty Acids (FISH OIL) 1,000 mg  Self Yes No   Sig: Take 1 capsule by mouth daily   PARoxetine (PAXIL) 20 mg tablet  Self Yes No   aspirin 81 MG tablet  Self Yes No   Sig: Take 1 tablet by mouth daily   atenolol (TENORMIN) 50 mg tablet  Self Yes No   Sig: Take 50 mg by mouth   co-enzyme Q-10 100 mg capsule  Self Yes No   Sig: Take 1 capsule by mouth daily oxyCODONE (ROXICODONE) 5 mg immediate release tablet   No No   Sig: Take 1 tablet (5 mg total) by mouth every 4 (four) hours as needed for moderate pain for up to 10 days Max Daily Amount: 30 mg   rasagiline (AZILECT) 1 MG   No No   Sig: Take 1 tablet (1 mg total) by mouth daily   rosuvastatin (CRESTOR) 5 mg tablet  Self Yes No   Sig: Take 1 tablet by mouth every other day      Facility-Administered Medications: None       Allergies: Allergies   Allergen Reactions    Sulfa Antibiotics Hives       Social History:     Marital Status: /Civil Union    Substance Use History:   History   Alcohol Use No     History   Smoking Status    Never Smoker   Smokeless Tobacco    Never Used     History   Drug Use No       Family History:    Family History   Problem Relation Age of Onset    Arthritis Mother        Physical Exam:     Vitals:   Blood Pressure: 140/61 (08/26/18 0754)  Pulse: 73 (08/26/18 0754)  Temperature: 98 6 °F (37 °C) (08/26/18 0754)  Temp Source: Temporal (08/26/18 0754)  Respirations: 18 (08/26/18 0754)  Height: 5' 8" (172 7 cm) (08/25/18 1210)  Weight - Scale: 113 kg (249 lb 8 oz) (08/25/18 1210)  SpO2: 94 % (08/26/18 0754)    Physical Exam   Constitutional: She is oriented to person, place, and time  She appears well-developed and well-nourished  HENT:   Head: Normocephalic and atraumatic  Right Ear: External ear normal    Left Ear: External ear normal    Mouth/Throat: Oropharynx is clear and moist    Eyes: Conjunctivae and EOM are normal  Pupils are equal, round, and reactive to light  Neck: Normal range of motion  Neck supple  Cardiovascular: Normal rate, regular rhythm, normal heart sounds and intact distal pulses  Pulmonary/Chest: Effort normal and breath sounds normal    Abdominal: Soft  Bowel sounds are normal  She exhibits no mass  There is no tenderness  There is no rebound and no guarding     Genitourinary:   Genitourinary Comments: deferred   Musculoskeletal: She exhibits edema  Left arm dressing and in sling  able to move her fingers  pulses intact   Neurological: She is alert and oriented to person, place, and time  She has normal reflexes  Skin: Skin is warm and dry  No rash noted  Psychiatric: She has a normal mood and affect  Nursing note and vitals reviewed  Additional Data:     Lab Results: I have personally reviewed pertinent reports  Results from last 7 days  Lab Units 08/26/18  1050  08/22/18  1700   WBC Thousand/uL 10 30  < > 13 20*   HEMOGLOBIN g/dL 10 2*  < > 13 1   HEMATOCRIT % 29 5*  < > 39 3   PLATELETS Thousands/uL 213  < > 214   NEUTROS PCT %  --   --  83*   LYMPHS PCT %  --   --  11*   LYMPHO PCT % 18*  --   --    MONOS PCT %  --   --  6   MONO PCT MAN % 2  --   --    EOS PCT %  --   --  0   EOSINO PCT MANUAL % 3  --   --    < > = values in this interval not displayed  Results from last 7 days  Lab Units 08/26/18  0712   SODIUM mmol/L 138   POTASSIUM mmol/L 3 8   CHLORIDE mmol/L 104   CO2 mmol/L 28   BUN mg/dL 13   CREATININE mg/dL 0 57*   CALCIUM mg/dL 8 6   TOTAL PROTEIN g/dL 6 2   BILIRUBIN TOTAL mg/dL 0 80   ALK PHOS U/L 38*   ALT U/L 31   AST U/L 35   GLUCOSE RANDOM mg/dL 122*       Results from last 7 days  Lab Units 08/22/18  1700   INR  1 01         No results found for: HGBA1C        Imaging: I have personally reviewed pertinent reports  No orders to display       EKG, Pathology, and Other Studies Reviewed on Admission:   · EKG: reviewed from [facility]    ** Please Note: This note has been constructed using a voice recognition system   **

## 2018-08-27 LAB
ATRIAL RATE: 61 BPM
PR INTERVAL: 200 MS
QRS AXIS: 5 DEGREES
QRSD INTERVAL: 82 MS
QT INTERVAL: 410 MS
QTC INTERVAL: 412 MS
T WAVE AXIS: 84 DEGREES
VENTRICULAR RATE: 61 BPM

## 2018-08-27 PROCEDURE — 93010 ELECTROCARDIOGRAM REPORT: CPT | Performed by: INTERNAL MEDICINE

## 2018-08-27 RX ORDER — ROSUVASTATIN CALCIUM 5 MG/1
5 TABLET, COATED ORAL EVERY OTHER DAY
Status: DISCONTINUED | OUTPATIENT
Start: 2018-08-27 | End: 2018-09-10 | Stop reason: HOSPADM

## 2018-08-27 RX ORDER — POLYETHYLENE GLYCOL 3350 17 G/17G
17 POWDER, FOR SOLUTION ORAL DAILY
Status: DISCONTINUED | OUTPATIENT
Start: 2018-08-27 | End: 2018-09-10 | Stop reason: HOSPADM

## 2018-08-27 RX ORDER — ACETAMINOPHEN 325 MG/1
650 TABLET ORAL 3 TIMES DAILY
Status: DISCONTINUED | OUTPATIENT
Start: 2018-08-27 | End: 2018-09-10 | Stop reason: HOSPADM

## 2018-08-27 RX ORDER — IBUPROFEN 400 MG/1
400 TABLET ORAL EVERY 6 HOURS PRN
Status: DISCONTINUED | OUTPATIENT
Start: 2018-08-27 | End: 2018-09-10 | Stop reason: HOSPADM

## 2018-08-27 RX ADMIN — ASPIRIN 81 MG 81 MG: 81 TABLET ORAL at 08:52

## 2018-08-27 RX ADMIN — ATENOLOL 50 MG: 25 TABLET ORAL at 08:52

## 2018-08-27 RX ADMIN — ROSUVASTATIN CALCIUM 5 MG: 5 TABLET, COATED ORAL at 09:07

## 2018-08-27 RX ADMIN — OMEGA-3 FATTY ACIDS CAP 1000 MG 1000 MG: 1000 CAP at 08:53

## 2018-08-27 RX ADMIN — OYSTER SHELL CALCIUM WITH VITAMIN D 1 TABLET: 500; 200 TABLET, FILM COATED ORAL at 08:52

## 2018-08-27 RX ADMIN — ACETAMINOPHEN 650 MG: 325 TABLET ORAL at 21:23

## 2018-08-27 RX ADMIN — RASAGILINE 1 MG: 1 TABLET ORAL at 08:53

## 2018-08-27 RX ADMIN — PAROXETINE 20 MG: 20 TABLET, FILM COATED ORAL at 08:52

## 2018-08-27 RX ADMIN — ACETAMINOPHEN 650 MG: 325 TABLET ORAL at 09:35

## 2018-08-27 RX ADMIN — ACETAMINOPHEN 650 MG: 325 TABLET ORAL at 16:36

## 2018-08-27 NOTE — PLAN OF CARE
Problem: Prexisting or High Potential for Compromised Skin Integrity  Goal: Skin integrity is maintained or improved  INTERVENTIONS:  - Identify patients at risk for skin breakdown  - Assess and monitor skin integrity  - Assess and monitor nutrition and hydration status  - Monitor labs (i e  albumin)  - Assess for incontinence   - Turn and reposition patient  - Assist with mobility/ambulation  - Relieve pressure over bony prominences  - Avoid friction and shearing  - Provide appropriate hygiene as needed including keeping skin clean and dry  - Evaluate need for skin moisturizer/barrier cream  - Collaborate with interdisciplinary team (i e  Nutrition, Rehabilitation, etc )   - Patient/family teaching   Outcome: Progressing      Problem: Potential for Falls  Goal: Patient will remain free of falls  INTERVENTIONS:  - Assess patient frequently for physical needs  -  Identify cognitive and physical deficits and behaviors that affect risk of falls    -  [place] fall precautions as indicated by assessment   - Educate patient/family on patient safety including physical limitations  - Instruct patient to call for assistance with activity based on assessment  - Modify environment to reduce risk of injury  - Consider OT/PT consult to assist with strengthening/mobility   Outcome: Progressing      Problem: PAIN - ADULT  Goal: Verbalizes/displays adequate comfort level or baseline comfort level  Interventions:  - Encourage patient to monitor pain and request assistance  - Assess pain using appropriate pain scale  - Administer analgesics based on type and severity of pain and evaluate response  - Implement non-pharmacological measures as appropriate and evaluate response  - Consider cultural and social influences on pain and pain management  - Notify physician/advanced practitioner if interventions unsuccessful or patient reports new pain   Outcome: Progressing      Problem: INFECTION - ADULT  Goal: Absence or prevention of progression during hospitalization  INTERVENTIONS:  - Assess and monitor for signs and symptoms of infection  - Monitor lab/diagnostic results  - Monitor all insertion sites, i e  indwelling lines, tubes, and drains  - Monitor endotracheal (as able) and nasal secretions for changes in amount and color  - [place] appropriate cooling/warming therapies per order  - Administer medications as ordered  - Instruct and encourage patient and family to use good hand hygiene technique  - Identify and instruct in appropriate isolation precautions for identified infection/condition   Outcome: Progressing    Goal: Absence of fever/infection during neutropenic period  INTERVENTIONS:  - Monitor WBC  - Implement neutropenic guidelines   Outcome: Progressing      Problem: SAFETY ADULT  Goal: Patient will remain free of falls  INTERVENTIONS:  - Assess patient frequently for physical needs  -  Identify cognitive and physical deficits and behaviors that affect risk of falls    -  [place] fall precautions as indicated by assessment   - Educate patient/family on patient safety including physical limitations  - Instruct patient to call for assistance with activity based on assessment  - Modify environment to reduce risk of injury  - Consider OT/PT consult to assist with strengthening/mobility   Outcome: Progressing    Goal: Maintain or return to baseline ADL function  INTERVENTIONS:  -  Assess patient's ability to carry out ADLs; assess patient's baseline for ADL function and identify physical deficits which impact ability to perform ADLs (bathing, care of mouth/teeth, toileting, grooming, dressing, etc )  - Assess/evaluate cause of self-care deficits   - Assess range of motion  - Assess patient's mobility; develop plan if impaired  - Assess patient's need for assistive devices and provide as appropriate  - Encourage maximum independence but intervene and supervise when necessary  ¯ Involve family in performance of ADLs  ¯ Assess for home care needs following discharge   ¯ Request OT consult to assist with ADL evaluation and planning for discharge  ¯ Provide patient education as appropriate   Outcome: Progressing    Goal: Maintain or return mobility status to optimal level  INTERVENTIONS:  - Assess patient's baseline mobility status (ambulation, transfers, stairs, etc )    - Identify cognitive and physical deficits and behaviors that affect mobility  - Identify mobility aids required to assist with transfers and/or ambulation (gait belt, sit-to-stand, lift, walker, cane, etc )  - [place] fall precautions as indicated by assessment  - Record patient progress and toleration of activity level on Mobility SBAR; progress patient to next Phase/Stage  - Instruct patient to call for assistance with activity based on assessment  - Request Rehabilitation consult to assist with strengthening/weightbearing, etc    Outcome: Progressing      Problem: DISCHARGE PLANNING  Goal: Discharge to home or other facility with appropriate resources  INTERVENTIONS:  - Identify barriers to discharge w/patient and caregiver  - Arrange for needed discharge resources and transportation as appropriate  - Identify discharge learning needs (meds, wound care, etc )  - Arrange for interpretive services to assist at discharge as needed  - Refer to Case Management Department for coordinating discharge planning if the patient needs post-hospital services based on physician/advanced practitioner order or complex needs related to functional status, cognitive ability, or social support system   Outcome: Progressing

## 2018-08-28 RX ADMIN — ACETAMINOPHEN 650 MG: 325 TABLET ORAL at 21:27

## 2018-08-28 RX ADMIN — OMEGA-3 FATTY ACIDS CAP 1000 MG 1000 MG: 1000 CAP at 08:02

## 2018-08-28 RX ADMIN — OYSTER SHELL CALCIUM WITH VITAMIN D 1 TABLET: 500; 200 TABLET, FILM COATED ORAL at 08:01

## 2018-08-28 RX ADMIN — PAROXETINE 20 MG: 20 TABLET, FILM COATED ORAL at 08:02

## 2018-08-28 RX ADMIN — POLYETHYLENE GLYCOL 3350 17 G: 17 POWDER, FOR SOLUTION ORAL at 08:02

## 2018-08-28 RX ADMIN — RASAGILINE 1 MG: 1 TABLET ORAL at 08:02

## 2018-08-28 RX ADMIN — ATENOLOL 50 MG: 25 TABLET ORAL at 08:02

## 2018-08-28 RX ADMIN — ASPIRIN 81 MG 81 MG: 81 TABLET ORAL at 08:01

## 2018-08-28 RX ADMIN — ACETAMINOPHEN 650 MG: 325 TABLET ORAL at 16:56

## 2018-08-28 RX ADMIN — ACETAMINOPHEN 650 MG: 325 TABLET ORAL at 08:02

## 2018-08-29 RX ADMIN — PAROXETINE 20 MG: 20 TABLET, FILM COATED ORAL at 09:21

## 2018-08-29 RX ADMIN — POLYETHYLENE GLYCOL 3350 17 G: 17 POWDER, FOR SOLUTION ORAL at 09:21

## 2018-08-29 RX ADMIN — ASPIRIN 81 MG 81 MG: 81 TABLET ORAL at 09:20

## 2018-08-29 RX ADMIN — OMEGA-3 FATTY ACIDS CAP 1000 MG 1000 MG: 1000 CAP at 09:20

## 2018-08-29 RX ADMIN — RASAGILINE 1 MG: 1 TABLET ORAL at 09:26

## 2018-08-29 RX ADMIN — ROSUVASTATIN CALCIUM 5 MG: 5 TABLET, COATED ORAL at 09:26

## 2018-08-29 RX ADMIN — OYSTER SHELL CALCIUM WITH VITAMIN D 1 TABLET: 500; 200 TABLET, FILM COATED ORAL at 09:20

## 2018-08-29 RX ADMIN — Medication 500 MG: at 09:26

## 2018-08-29 RX ADMIN — ACETAMINOPHEN 650 MG: 325 TABLET ORAL at 09:21

## 2018-08-29 RX ADMIN — ACETAMINOPHEN 650 MG: 325 TABLET ORAL at 21:29

## 2018-08-29 RX ADMIN — ACETAMINOPHEN 650 MG: 325 TABLET ORAL at 15:55

## 2018-08-29 NOTE — DISCHARGE SUMMARY
ORTHOPEDICS DISCHARGE SUMMARY  Dorene Shea 67 y o  female MRN: [id number]  Unit/Bed#: [facility] 268-02    Attending Physician: Dorita Simmons MD    Admitting diagnosis: Shoulder injury [S49 90XA]  Traumatic closed displaced fracture of left shoulder with anterior dislocation, initial encounter [S42  92XA]    Discharge diagnosis: Shoulder injury [S49 90XA]  Traumatic closed displaced fracture of left shoulder with anterior dislocation, initial encounter [X79  92XA]    Date of admission: 8/22/2018    Date of discharge: 08/25/18         Procedure: Left reverse total shoulder arthroplasty    HPI:  This is a 67y o  year old female that presented to the Emergency department following a fall at home in her bathroom  Patient was found to have a comminuted left proximal humerus fracture with anterior dislocation  After evaluation of the patient and imaging, reverse total shoulder arthroplasty was recommended  The risks, benefits, and complications of the procedure were discussed with the patient and informed consent was obtained  Hospital Course: The patient was admitted to the hospital on 8/22/2018 and underwent an uncomplicated left   Reverse total shoulder arthroplasty  They were transferred to the floor after a brief stay in the post-anesthesia care unit  Their pain was well managed with IV and oral pain medications  On discharge date pt was cleared by PT and the medicine team and determined to be safe for discharge  Patient was discharged to acute rehab facility  0  Lab Value Date/Time   HGB 10 2 (L) 08/26/2018 1050   HGB 12 3 08/23/2018 0523   HGB 13 1 08/22/2018 1700   HGB 13 0 11/06/2014 0557   HGB 14 0 10/22/2014 1030       Greater than 2 gram drop which qualifies for diagnosis of acute blood loss anemia  Vital signs remained stable and pt was resuscitated with IVF as needed     Discharge Instructions: The patient was discharged nonweight bearing to the left upper extremity   Sling at all times except during physical therapy or she should be doing pendulum exercises with elbow wrist and hand range of motion  Take pain medications as instructed  Aspirin 81 milligrams daily due to prophylaxis  Discharge Medications: For the complete list of discharge medications, please refer to the patient's medication reconciliation

## 2018-08-30 RX ADMIN — OYSTER SHELL CALCIUM WITH VITAMIN D 1 TABLET: 500; 200 TABLET, FILM COATED ORAL at 08:25

## 2018-08-30 RX ADMIN — ATENOLOL 50 MG: 25 TABLET ORAL at 08:24

## 2018-08-30 RX ADMIN — ACETAMINOPHEN 650 MG: 325 TABLET ORAL at 08:23

## 2018-08-30 RX ADMIN — PAROXETINE 20 MG: 20 TABLET, FILM COATED ORAL at 08:24

## 2018-08-30 RX ADMIN — ACETAMINOPHEN 650 MG: 325 TABLET ORAL at 17:15

## 2018-08-30 RX ADMIN — RASAGILINE 1 MG: 1 TABLET ORAL at 08:24

## 2018-08-30 RX ADMIN — POLYETHYLENE GLYCOL 3350 17 G: 17 POWDER, FOR SOLUTION ORAL at 08:25

## 2018-08-30 RX ADMIN — OMEGA-3 FATTY ACIDS CAP 1000 MG 1000 MG: 1000 CAP at 08:24

## 2018-08-30 RX ADMIN — Medication 500 MG: at 08:24

## 2018-08-30 RX ADMIN — ASPIRIN 81 MG 81 MG: 81 TABLET ORAL at 08:24

## 2018-08-30 RX ADMIN — ACETAMINOPHEN 650 MG: 325 TABLET ORAL at 20:59

## 2018-08-30 NOTE — PLAN OF CARE

## 2018-08-30 NOTE — NURSING NOTE
Dr Nicky Horta was contacted, RN reported that pt may shower, no direct spray of water on surgical area for long period of time, area may be washed with soap and water, rinsed and patted dry  Dry guaze and paper tape to incision until staples are removed  Pt is aware of shower recommendations

## 2018-08-31 RX ADMIN — ASPIRIN 81 MG 81 MG: 81 TABLET ORAL at 09:12

## 2018-08-31 RX ADMIN — PAROXETINE 20 MG: 20 TABLET, FILM COATED ORAL at 09:11

## 2018-08-31 RX ADMIN — OMEGA-3 FATTY ACIDS CAP 1000 MG 1000 MG: 1000 CAP at 09:13

## 2018-08-31 RX ADMIN — POLYETHYLENE GLYCOL 3350 17 G: 17 POWDER, FOR SOLUTION ORAL at 09:14

## 2018-08-31 RX ADMIN — OYSTER SHELL CALCIUM WITH VITAMIN D 1 TABLET: 500; 200 TABLET, FILM COATED ORAL at 09:12

## 2018-08-31 RX ADMIN — Medication 500 MG: at 09:13

## 2018-08-31 RX ADMIN — ROSUVASTATIN CALCIUM 5 MG: 5 TABLET, COATED ORAL at 09:13

## 2018-08-31 RX ADMIN — RASAGILINE 1 MG: 1 TABLET ORAL at 09:13

## 2018-08-31 RX ADMIN — ATENOLOL 50 MG: 25 TABLET ORAL at 09:11

## 2018-08-31 RX ADMIN — ACETAMINOPHEN 650 MG: 325 TABLET ORAL at 16:10

## 2018-08-31 RX ADMIN — ACETAMINOPHEN 650 MG: 325 TABLET ORAL at 09:13

## 2018-08-31 RX ADMIN — ACETAMINOPHEN 650 MG: 325 TABLET ORAL at 20:55

## 2018-08-31 NOTE — PLAN OF CARE

## 2018-09-01 RX ADMIN — ASPIRIN 81 MG 81 MG: 81 TABLET ORAL at 08:12

## 2018-09-01 RX ADMIN — ACETAMINOPHEN 650 MG: 325 TABLET ORAL at 08:13

## 2018-09-01 RX ADMIN — PAROXETINE 20 MG: 20 TABLET, FILM COATED ORAL at 08:12

## 2018-09-01 RX ADMIN — Medication 500 MG: at 08:12

## 2018-09-01 RX ADMIN — OYSTER SHELL CALCIUM WITH VITAMIN D 1 TABLET: 500; 200 TABLET, FILM COATED ORAL at 08:12

## 2018-09-01 RX ADMIN — ATENOLOL 50 MG: 25 TABLET ORAL at 08:12

## 2018-09-01 RX ADMIN — OMEGA-3 FATTY ACIDS CAP 1000 MG 1000 MG: 1000 CAP at 08:12

## 2018-09-01 RX ADMIN — RASAGILINE 1 MG: 1 TABLET ORAL at 08:12

## 2018-09-02 RX ADMIN — OMEGA-3 FATTY ACIDS CAP 1000 MG 1000 MG: 1000 CAP at 08:30

## 2018-09-02 RX ADMIN — RASAGILINE 1 MG: 1 TABLET ORAL at 08:30

## 2018-09-02 RX ADMIN — ACETAMINOPHEN 650 MG: 325 TABLET ORAL at 08:30

## 2018-09-02 RX ADMIN — ACETAMINOPHEN 650 MG: 325 TABLET ORAL at 17:14

## 2018-09-02 RX ADMIN — IBUPROFEN 400 MG: 400 TABLET ORAL at 20:57

## 2018-09-02 RX ADMIN — Medication 500 MG: at 08:30

## 2018-09-02 RX ADMIN — PAROXETINE 20 MG: 20 TABLET, FILM COATED ORAL at 08:30

## 2018-09-02 RX ADMIN — ASPIRIN 81 MG 81 MG: 81 TABLET ORAL at 08:30

## 2018-09-02 RX ADMIN — OYSTER SHELL CALCIUM WITH VITAMIN D 1 TABLET: 500; 200 TABLET, FILM COATED ORAL at 08:30

## 2018-09-02 RX ADMIN — ROSUVASTATIN CALCIUM 5 MG: 5 TABLET, COATED ORAL at 08:30

## 2018-09-02 RX ADMIN — ATENOLOL 50 MG: 25 TABLET ORAL at 08:30

## 2018-09-03 RX ADMIN — ACETAMINOPHEN 650 MG: 325 TABLET ORAL at 15:55

## 2018-09-03 RX ADMIN — Medication 500 MG: at 09:07

## 2018-09-03 RX ADMIN — POLYETHYLENE GLYCOL 3350 17 G: 17 POWDER, FOR SOLUTION ORAL at 08:21

## 2018-09-03 RX ADMIN — RASAGILINE 1 MG: 1 TABLET ORAL at 09:07

## 2018-09-03 RX ADMIN — OYSTER SHELL CALCIUM WITH VITAMIN D 1 TABLET: 500; 200 TABLET, FILM COATED ORAL at 08:21

## 2018-09-03 RX ADMIN — PAROXETINE 20 MG: 20 TABLET, FILM COATED ORAL at 08:21

## 2018-09-03 RX ADMIN — ACETAMINOPHEN 650 MG: 325 TABLET ORAL at 08:19

## 2018-09-03 RX ADMIN — ASPIRIN 81 MG 81 MG: 81 TABLET ORAL at 08:21

## 2018-09-03 RX ADMIN — OMEGA-3 FATTY ACIDS CAP 1000 MG 1000 MG: 1000 CAP at 08:21

## 2018-09-03 RX ADMIN — ATENOLOL 50 MG: 25 TABLET ORAL at 08:20

## 2018-09-03 RX ADMIN — ACETAMINOPHEN 650 MG: 325 TABLET ORAL at 20:46

## 2018-09-03 NOTE — PLAN OF CARE

## 2018-09-04 ENCOUNTER — TELEPHONE (OUTPATIENT)
Dept: NEUROLOGY | Facility: CLINIC | Age: 73
End: 2018-09-04

## 2018-09-04 RX ADMIN — RASAGILINE 1 MG: 1 TABLET ORAL at 08:08

## 2018-09-04 RX ADMIN — Medication 500 MG: at 08:08

## 2018-09-04 RX ADMIN — ROSUVASTATIN CALCIUM 5 MG: 5 TABLET, COATED ORAL at 09:26

## 2018-09-04 RX ADMIN — OYSTER SHELL CALCIUM WITH VITAMIN D 1 TABLET: 500; 200 TABLET, FILM COATED ORAL at 08:11

## 2018-09-04 RX ADMIN — ACETAMINOPHEN 650 MG: 325 TABLET ORAL at 16:18

## 2018-09-04 RX ADMIN — ASPIRIN 81 MG 81 MG: 81 TABLET ORAL at 08:08

## 2018-09-04 RX ADMIN — PAROXETINE 20 MG: 20 TABLET, FILM COATED ORAL at 08:08

## 2018-09-04 RX ADMIN — ACETAMINOPHEN 650 MG: 325 TABLET ORAL at 08:07

## 2018-09-04 RX ADMIN — ACETAMINOPHEN 650 MG: 325 TABLET ORAL at 20:47

## 2018-09-04 RX ADMIN — OMEGA-3 FATTY ACIDS CAP 1000 MG 1000 MG: 1000 CAP at 08:08

## 2018-09-04 NOTE — TELEPHONE ENCOUNTER
Dr Linh Rivera from [facility] at [facility] called reporting pt fell & broke arm  Had shoulder surgery  Reports pt freezing at transitions - doorways, when stepping from carpet to floors, etc  Pt stopped sinemet per last office note  He is unsure of DBS settings  Pt on Azilect 1mg  Questioning restarting sinemet  He is aware you are not in the office today  Please advise       Dr Rochelle Martin cell #[phone]

## 2018-09-04 NOTE — PLAN OF CARE

## 2018-09-05 ENCOUNTER — TELEPHONE (OUTPATIENT)
Dept: OBGYN CLINIC | Facility: HOSPITAL | Age: 73
End: 2018-09-05

## 2018-09-05 RX ADMIN — RASAGILINE 1 MG: 1 TABLET ORAL at 08:02

## 2018-09-05 RX ADMIN — ACETAMINOPHEN 650 MG: 325 TABLET ORAL at 08:01

## 2018-09-05 RX ADMIN — ATENOLOL 50 MG: 25 TABLET ORAL at 08:01

## 2018-09-05 RX ADMIN — ACETAMINOPHEN 650 MG: 325 TABLET ORAL at 16:52

## 2018-09-05 RX ADMIN — CARBIDOPA AND LEVODOPA 1 TABLET: 25; 100 TABLET ORAL at 16:52

## 2018-09-05 RX ADMIN — ASPIRIN 81 MG 81 MG: 81 TABLET ORAL at 08:02

## 2018-09-05 RX ADMIN — ACETAMINOPHEN 650 MG: 325 TABLET ORAL at 21:32

## 2018-09-05 RX ADMIN — Medication 500 MG: at 08:02

## 2018-09-05 RX ADMIN — OMEGA-3 FATTY ACIDS CAP 1000 MG 1000 MG: 1000 CAP at 08:01

## 2018-09-05 RX ADMIN — PAROXETINE 20 MG: 20 TABLET, FILM COATED ORAL at 08:02

## 2018-09-05 RX ADMIN — OYSTER SHELL CALCIUM WITH VITAMIN D 1 TABLET: 500; 200 TABLET, FILM COATED ORAL at 08:06

## 2018-09-05 NOTE — TELEPHONE ENCOUNTER
Caller: Danyell    Call Back: [phone]      [place] called in wanting to reschedule patient's appt  Patient has an appt on 9/7/18 but would like to be seen the following week  Dr Garo Ortiz is out and there are no appts open with his PA as of yet  Please assist with scheduling thank you

## 2018-09-05 NOTE — TELEPHONE ENCOUNTER
She was not wanting to retrial when last seen   If she is willing to retrial I am fine with restarting at 1 tab qid

## 2018-09-05 NOTE — TELEPHONE ENCOUNTER
I spoke with Martin Eli at [facility] and she will see that patient gets to her 9/7 post op appt

## 2018-09-06 RX ADMIN — OMEGA-3 FATTY ACIDS CAP 1000 MG 1000 MG: 1000 CAP at 08:34

## 2018-09-06 RX ADMIN — ATENOLOL 50 MG: 25 TABLET ORAL at 08:34

## 2018-09-06 RX ADMIN — Medication 500 MG: at 08:34

## 2018-09-06 RX ADMIN — CARBIDOPA AND LEVODOPA 1 TABLET: 25; 100 TABLET ORAL at 06:12

## 2018-09-06 RX ADMIN — ACETAMINOPHEN 650 MG: 325 TABLET ORAL at 16:40

## 2018-09-06 RX ADMIN — ACETAMINOPHEN 650 MG: 325 TABLET ORAL at 08:33

## 2018-09-06 RX ADMIN — ROSUVASTATIN CALCIUM 5 MG: 5 TABLET, COATED ORAL at 09:06

## 2018-09-06 RX ADMIN — CARBIDOPA AND LEVODOPA 1 TABLET: 25; 100 TABLET ORAL at 16:40

## 2018-09-06 RX ADMIN — CARBIDOPA AND LEVODOPA 1 TABLET: 25; 100 TABLET ORAL at 12:02

## 2018-09-06 RX ADMIN — ACETAMINOPHEN 650 MG: 325 TABLET ORAL at 20:45

## 2018-09-06 RX ADMIN — OYSTER SHELL CALCIUM WITH VITAMIN D 1 TABLET: 500; 200 TABLET, FILM COATED ORAL at 08:34

## 2018-09-06 RX ADMIN — RASAGILINE 1 MG: 1 TABLET ORAL at 08:34

## 2018-09-06 RX ADMIN — PAROXETINE 20 MG: 20 TABLET, FILM COATED ORAL at 08:34

## 2018-09-06 RX ADMIN — ASPIRIN 81 MG 81 MG: 81 TABLET ORAL at 08:34

## 2018-09-06 NOTE — PLAN OF CARE
Problem: PAIN - ADULT  Goal: Verbalizes/displays adequate comfort level or baseline comfort level  Interventions:  - Encourage patient to monitor pain and request assistance  - Assess pain using appropriate pain scale  - Administer analgesics based on type and severity of pain and evaluate response  - Implement non-pharmacological measures as appropriate and evaluate response  - Consider cultural and social influences on pain and pain management  - Notify physician/advanced practitioner if interventions unsuccessful or patient reports new pain   Outcome: Progressing      Problem: INFECTION - ADULT  Goal: Absence or prevention of progression during hospitalization  INTERVENTIONS:  - Assess and monitor for signs and symptoms of infection  - Monitor lab/diagnostic results  - Monitor all insertion sites, i e  indwelling lines, tubes, and drains  - Monitor endotracheal (as able) and nasal secretions for changes in amount and color  - [place] appropriate cooling/warming therapies per order  - Administer medications as ordered  - Instruct and encourage patient and family to use good hand hygiene technique  - Identify and instruct in appropriate isolation precautions for identified infection/condition   Outcome: Adequate for Discharge    Goal: Absence of fever/infection during neutropenic period  INTERVENTIONS:  - Monitor WBC  - Implement neutropenic guidelines   Outcome: Adequate for Discharge      Problem: SAFETY ADULT  Goal: Patient will remain free of falls  INTERVENTIONS:  - Assess patient frequently for physical needs  -  Identify cognitive and physical deficits and behaviors that affect risk of falls    -  [place] fall precautions as indicated by assessment   - Educate patient/family on patient safety including physical limitations  - Instruct patient to call for assistance with activity based on assessment  - Modify environment to reduce risk of injury  - Consider OT/PT consult to assist with strengthening/mobility   Outcome: Progressing    Goal: Maintain or return to baseline ADL function  INTERVENTIONS:  -  Assess patient's ability to carry out ADLs; assess patient's baseline for ADL function and identify physical deficits which impact ability to perform ADLs (bathing, care of mouth/teeth, toileting, grooming, dressing, etc )  - Assess/evaluate cause of self-care deficits   - Assess range of motion  - Assess patient's mobility; develop plan if impaired  - Assess patient's need for assistive devices and provide as appropriate  - Encourage maximum independence but intervene and supervise when necessary  ¯ Involve family in performance of ADLs  ¯ Assess for home care needs following discharge   ¯ Request OT consult to assist with ADL evaluation and planning for discharge  ¯ Provide patient education as appropriate   Outcome: Progressing    Goal: Maintain or return mobility status to optimal level  INTERVENTIONS:  - Assess patient's baseline mobility status (ambulation, transfers, stairs, etc )    - Identify cognitive and physical deficits and behaviors that affect mobility  - Identify mobility aids required to assist with transfers and/or ambulation (gait belt, sit-to-stand, lift, walker, cane, etc )  - [place] fall precautions as indicated by assessment  - Record patient progress and toleration of activity level on Mobility SBAR; progress patient to next Phase/Stage  - Instruct patient to call for assistance with activity based on assessment  - Request Rehabilitation consult to assist with strengthening/weightbearing, etc    Outcome: Progressing      Problem: DISCHARGE PLANNING  Goal: Discharge to home or other facility with appropriate resources  INTERVENTIONS:  - Identify barriers to discharge w/patient and caregiver  - Arrange for needed discharge resources and transportation as appropriate  - Identify discharge learning needs (meds, wound care, etc )  - Arrange for interpretive services to assist at discharge as needed  - Refer to Case Management Department for coordinating discharge planning if the patient needs post-hospital services based on physician/advanced practitioner order or complex needs related to functional status, cognitive ability, or social support system   Outcome: Progressing      Problem: Nutrition/Hydration-ADULT  Goal: Nutrient/Hydration intake appropriate for improving, restoring or maintaining nutritional needs  Monitor and assess patient's nutrition/hydration status for malnutrition (ex- brittle hair, bruises, dry skin, pale skin and conjunctiva, muscle wasting, smooth red tongue, and disorientation)  Collaborate with interdisciplinary team and initiate plan and interventions as ordered  Monitor patient's weight and dietary intake as ordered or per policy  Utilize nutrition screening tool and intervene per policy  Determine patient's food preferences and provide high-protein, high-caloric foods as appropriate       INTERVENTIONS:  - Monitor oral intake, urinary output, labs, and treatment plans  - Assess nutrition and hydration status and recommend course of action  - Evaluate amount of meals eaten  - Assist patient with eating if necessary   - Allow adequate time for meals  - Recommend/ encourage appropriate diets, oral nutritional supplements, and vitamin/mineral supplements  - Order, calculate, and assess calorie counts as needed  - Recommend, monitor, and adjust tube feedings and TPN/PPN based on assessed needs  - Assess need for intravenous fluids  - Provide specific nutrition/hydration education as appropriate  - Include patient/family/caregiver in decisions related to nutrition   Outcome: Progressing

## 2018-09-07 ENCOUNTER — OFFICE VISIT (OUTPATIENT)
Dept: OBGYN CLINIC | Facility: MEDICAL CENTER | Age: 73
End: 2018-09-07

## 2018-09-07 VITALS — DIASTOLIC BLOOD PRESSURE: 65 MMHG | SYSTOLIC BLOOD PRESSURE: 99 MMHG | HEART RATE: 67 BPM

## 2018-09-07 DIAGNOSIS — Z96.612 S/P REVERSE TOTAL SHOULDER ARTHROPLASTY, LEFT: Primary | ICD-10-CM

## 2018-09-07 PROCEDURE — 99024 POSTOP FOLLOW-UP VISIT: CPT | Performed by: ORTHOPAEDIC SURGERY

## 2018-09-07 RX ORDER — IBUPROFEN 400 MG/1
TABLET ORAL EVERY 6 HOURS PRN
COMMUNITY
End: 2018-09-21 | Stop reason: HOSPADM

## 2018-09-07 RX ORDER — POLYETHYLENE GLYCOL 3350 17 G/17G
17 POWDER, FOR SOLUTION ORAL DAILY
COMMUNITY
End: 2018-09-21 | Stop reason: HOSPADM

## 2018-09-07 RX ORDER — SENNOSIDES 8.6 MG
650 CAPSULE ORAL EVERY 8 HOURS PRN
COMMUNITY
End: 2018-12-13 | Stop reason: ALTCHOICE

## 2018-09-07 RX ADMIN — CARBIDOPA AND LEVODOPA 1 TABLET: 25; 100 TABLET ORAL at 14:31

## 2018-09-07 RX ADMIN — CARBIDOPA AND LEVODOPA 1 TABLET: 25; 100 TABLET ORAL at 19:27

## 2018-09-07 RX ADMIN — ACETAMINOPHEN 650 MG: 325 TABLET ORAL at 20:59

## 2018-09-07 RX ADMIN — PAROXETINE 20 MG: 20 TABLET, FILM COATED ORAL at 08:22

## 2018-09-07 RX ADMIN — CARBIDOPA AND LEVODOPA 1 TABLET: 25; 100 TABLET ORAL at 06:12

## 2018-09-07 RX ADMIN — ACETAMINOPHEN 650 MG: 325 TABLET ORAL at 08:22

## 2018-09-07 RX ADMIN — OMEGA-3 FATTY ACIDS CAP 1000 MG 1000 MG: 1000 CAP at 08:22

## 2018-09-07 RX ADMIN — RASAGILINE 1 MG: 1 TABLET ORAL at 08:23

## 2018-09-07 RX ADMIN — ASPIRIN 81 MG 81 MG: 81 TABLET ORAL at 08:22

## 2018-09-07 RX ADMIN — ATENOLOL 50 MG: 25 TABLET ORAL at 08:23

## 2018-09-07 RX ADMIN — Medication 500 MG: at 08:23

## 2018-09-07 RX ADMIN — ACETAMINOPHEN 650 MG: 325 TABLET ORAL at 16:14

## 2018-09-07 RX ADMIN — OYSTER SHELL CALCIUM WITH VITAMIN D 1 TABLET: 500; 200 TABLET, FILM COATED ORAL at 08:22

## 2018-09-07 NOTE — PROGRESS NOTES
Orthopaedic Surgery - Office Note  Mayank Chapman (40 y o  female)   : 1945   MRN: [id number]  Encounter Date: 2018    Chief Complaint   Patient presents with    Left Shoulder - Post-op       Assessment / Plan  S/p Left reverse total shoulder arthroplasty with left biceps tenotomy on 18    · C/w PROM exercises with PT  Instructed to use the sling as needed  · Reviewed WB restrictions with patient per protocol  · Continue physical therapy following the shoulder replacement protocol provided to the patient  · C/w ice and oral analgesic as needed  Return in about 4 weeks (around 10/5/2018)  New x-rays will be obtained next visit  History of Present Illness  Mayank Chapman is a 67 y o  female who presents S/p Left reverse total shoulder arthroplasty with left biceps tenotomy on 18  She states that she is having minimal pain in the left shoulder  She states that she is working with physical therapy twice a week at the short-term rehab facility  She states that they are doing passive range of motion exercises with her  She states that she has been using the sling as instructed  She denies any tingling in her left arm or hand  Review of Systems  Pertinent items are noted in HPI  All other systems were reviewed and are negative  Physical Exam  BP 99/65   Pulse 67   Cons: Appears well  No apparent distress  Psych: Alert  Oriented x3  Mood and affect normal   Eyes: PERRLA, EOMI  Resp: Normal effort  No audible wheezing or stridor  CV: Palpable pulse  No discernable arrhythmia  No LE edema  Lymph:  No palpable cervical, axillary, or inguinal lymphadenopathy  Skin: Warm  No palpable masses  No visible lesions  Neuro: Normal muscle tone  Normal and symmetric DTR's  Left Shoulder Exam  Alignment / Posture:  Normal shoulder posture  Normal scapular position  No scapular dyskinesis or winging  Normal elbow alignment and carrying angle    Inspection:  Mild as expected swelling  Incision clean and dry  Palpation:  Mild as expected tenderness  ROM:  Shoulder FE 90° actively, 100° passively  Elbow Extension WNL  Elbow Flexion WNL  Strength:  Not tested  Stability:  No objective shoulder instability  Tests: No pertinent positive or negative tests  Neurovascular:  Sensation intact in Ax/R/M/U nerve distributions  2+ radial pulse  Studies Reviewed  No studies to review    Procedures  No procedures today  Medical, Surgical, Family, and Social History  The patient's medical history, family history, and social history, were reviewed and updated as appropriate  Past Medical History:   Diagnosis Date    Anxiety     [facility]     Diabetes mellitus type 2, diet-controlled ([facility] )     Hyperlipidemia     Hypertension     Parkinson disease ([facility] )        Past Surgical History:   Procedure Laterality Date    ACHILLES TENDON SURGERY      DEEP BRAIN STIMULATOR PLACEMENT      DENTAL SURGERY      REPLACEMENT TOTAL KNEE      REVERSE TOTAL SHOULDER ARTHROPLASTY Left 8/23/2018    Procedure: ARTHROPLASTY SHOULDER REVERSE;  Surgeon: Lillie Steve MD;  Location: [facility];  Service: Orthopedics    TONSILLECTOMY         Family History   Problem Relation Age of Onset    Arthritis Mother        Social History     Occupational History    Not on file  Social History Main Topics    Smoking status: Never Smoker    Smokeless tobacco: Never Used    Alcohol use No    Drug use: No    Sexual activity: No       Allergies   Allergen Reactions    Sulfa Antibiotics Hives       No current facility-administered medications for this visit  No current outpatient prescriptions on file      Facility-Administered Medications Ordered in Other Visits:     acetaminophen (TYLENOL) tablet 650 mg, 650 mg, Oral, TID, Rios Case MD, 650 mg at 09/07/18 5244    aspirin chewable tablet 81 mg, 81 mg, Oral, Daily, Jeremias Erazo MD, 81 mg at 09/07/18 0209    atenolol (TENORMIN) tablet 50 mg, 50 mg, Oral, Daily, Geno Michel MD, 50 mg at 09/07/18 9848    calcium carbonate-vitamin D (OSCAL-D) 500 mg-200 units per tablet 1 tablet, 1 tablet, Oral, Daily With Breakfast, Geno Michel MD, 1 tablet at 09/07/18 7615    carbidopa-levodopa (SINEMET)  mg per tablet 1 tablet, 1 tablet, Oral, TID AC, Stevie Hawkins MD, 1 tablet at 09/07/18 0612    Cinnamon 500 mg, 500 mg, Oral, Daily, Geno Michel MD, 500 mg at 09/07/18 4249    Coenzyme Q10 CAPS 400 mg, 400 mg, Oral, Daily, Geno Michel MD, 400 mg at 09/07/18 6758    fish oil capsule 1,000 mg, 1,000 mg, Oral, Daily, Geno Michel MD, 1,000 mg at 09/07/18 0067    ibuprofen (MOTRIN) tablet 400 mg, 400 mg, Oral, Q6H PRN, Stevie Hawkins MD, 400 mg at 09/02/18 2057    PARoxetine (PAXIL) tablet 20 mg, 20 mg, Oral, Daily, Geno Michel MD, 20 mg at 09/07/18 5395    polyethylene glycol (MIRALAX) packet 17 g, 17 g, Oral, Daily, Stevie Hawkins MD, 17 g at 09/03/18 2796    rasagiline (AZILECT) tablet 1 mg, 1 mg, Oral, Daily, Geno Michel MD, 1 mg at 09/07/18 3330    rosuvastatin (CRESTOR) tablet 5 mg, 5 mg, Oral, Every Other Day, Stevie Hawkins MD, 5 mg at 09/06/18 7420      Romayne Piedra, DPM    Scribe Attestation    I,:    am acting as a scribe while in the presence of the attending physician :        I,:    personally performed the services described in this documentation    as scribed in my presence :

## 2018-09-07 NOTE — NURSING NOTE
Pt left the floor to be transported to Dr Chris Nieto, ortho doctor  Seatbelt on in wheelchair, unit clerk accompanied pt to the appointment  [facility] transported

## 2018-09-08 RX ADMIN — ACETAMINOPHEN 650 MG: 325 TABLET ORAL at 08:25

## 2018-09-08 RX ADMIN — OMEGA-3 FATTY ACIDS CAP 1000 MG 1000 MG: 1000 CAP at 08:22

## 2018-09-08 RX ADMIN — Medication 500 MG: at 08:21

## 2018-09-08 RX ADMIN — CARBIDOPA AND LEVODOPA 1 TABLET: 25; 100 TABLET ORAL at 06:02

## 2018-09-08 RX ADMIN — ASPIRIN 81 MG 81 MG: 81 TABLET ORAL at 08:22

## 2018-09-08 RX ADMIN — ROSUVASTATIN CALCIUM 5 MG: 5 TABLET, COATED ORAL at 08:21

## 2018-09-08 RX ADMIN — CARBIDOPA AND LEVODOPA 1 TABLET: 25; 100 TABLET ORAL at 12:12

## 2018-09-08 RX ADMIN — RASAGILINE 1 MG: 1 TABLET ORAL at 08:21

## 2018-09-08 RX ADMIN — PAROXETINE 20 MG: 20 TABLET, FILM COATED ORAL at 08:22

## 2018-09-08 RX ADMIN — ACETAMINOPHEN 650 MG: 325 TABLET ORAL at 17:15

## 2018-09-08 RX ADMIN — ATENOLOL 50 MG: 25 TABLET ORAL at 08:21

## 2018-09-08 RX ADMIN — OYSTER SHELL CALCIUM WITH VITAMIN D 1 TABLET: 500; 200 TABLET, FILM COATED ORAL at 08:21

## 2018-09-08 RX ADMIN — CARBIDOPA AND LEVODOPA 1 TABLET: 25; 100 TABLET ORAL at 17:15

## 2018-09-08 RX ADMIN — ACETAMINOPHEN 650 MG: 325 TABLET ORAL at 20:36

## 2018-09-08 NOTE — PLAN OF CARE

## 2018-09-08 NOTE — PROGRESS NOTES
Progress Note - Barron Tinsley 67 y o  female MRN: [id number]    Unit/Bed#: [facility] 268-02 Encounter: [id number]      Assessment:  Parkinson's disease with use of electrical brain stimulation (HCC)   Fracture, shoulder, left, closed, initial encounter   History of hypertension   S/P reverse total shoulder arthroplasty, left     Plan:  Continue comprehensive inpatient multidisciplinary rehabilitation program    acetaminophen (TYLENOL) tablet 650 mg    aspirin chewable tablet 81 mg    atenolol (TENORMIN) tablet 50 mg    calcium carbonate-vitamin D (OSCAL-D) 500 mg-200 units per tablet 1 tablet    carbidopa-levodopa (SINEMET)  mg per tablet 1 tablet    Cinnamon 500 mg    Coenzyme Q10 CAPS 400 mg    fish oil capsule 1,000 mg    ibuprofen (MOTRIN) tablet 400 mg    PARoxetine (PAXIL) tablet 20 mg    polyethylene glycol (MIRALAX) packet 17 g    rasagiline (AZILECT) tablet 1 mg    rosuvastatin (CRESTOR) tablet 5 mg          Subjective: In good spirits  Denied complaints    Objective:     Vitals: Blood pressure 129/84, pulse 84, temperature 97 5 °F (36 4 °C), temperature source Temporal, resp  rate 18, height 5' 8" (1 727 m), weight 113 kg (249 lb 8 oz), SpO2 95 %  ,Body mass index is 37 94 kg/m²  Intake/Output Summary (Last 24 hours) at 09/08/18 1315  Last data filed at 09/08/18 1243   Gross per 24 hour   Intake              540 ml   Output                0 ml   Net              540 ml     Constitutional:   Elderly adult white female in no distress  HENT:   Head: Normocephalic and atraumatic  Nose: Nose normal    Mouth/Throat: Oropharynx is clear and moist    Eyes: Conjunctivae and EOM are normal  Pupils are equal, round, and reactive to light  Neck:   Supple  Cardiovascular: Normal rate, regular rhythm, normal heart sounds and intact distal pulses  Pulmonary/Chest: Effort normal and breath sounds normal  No respiratory distress  She has no wheezes  She has no rales  She exhibits no tenderness     Abdominal: Soft  Bowel sounds are normal  She exhibits no distension and no mass  There is no tenderness  There is no rebound and no guarding  Musculoskeletal:   Minimal lower extremity edema  No calf tenderness  Mild swelling and tenderness around the left shoulder         Neurological:   Antigravity strength except around proximal left upper extremity limited by pain  Normal level of arousal  Skin: Skin is warm and dry  No rash noted  No erythema  No pallor  Psychiatric: She has a normal mood and affect   Her behavior is normal  Thought content normal

## 2018-09-09 RX ADMIN — ACETAMINOPHEN 650 MG: 325 TABLET ORAL at 08:12

## 2018-09-09 RX ADMIN — OYSTER SHELL CALCIUM WITH VITAMIN D 1 TABLET: 500; 200 TABLET, FILM COATED ORAL at 08:12

## 2018-09-09 RX ADMIN — CARBIDOPA AND LEVODOPA 1 TABLET: 25; 100 TABLET ORAL at 17:10

## 2018-09-09 RX ADMIN — PAROXETINE 20 MG: 20 TABLET, FILM COATED ORAL at 08:12

## 2018-09-09 RX ADMIN — ATENOLOL 50 MG: 25 TABLET ORAL at 08:12

## 2018-09-09 RX ADMIN — CARBIDOPA AND LEVODOPA 1 TABLET: 25; 100 TABLET ORAL at 06:24

## 2018-09-09 RX ADMIN — ACETAMINOPHEN 650 MG: 325 TABLET ORAL at 17:10

## 2018-09-09 RX ADMIN — OMEGA-3 FATTY ACIDS CAP 1000 MG 1000 MG: 1000 CAP at 08:12

## 2018-09-09 RX ADMIN — IBUPROFEN 400 MG: 400 TABLET ORAL at 21:33

## 2018-09-09 RX ADMIN — Medication 500 MG: at 08:12

## 2018-09-09 RX ADMIN — ASPIRIN 81 MG 81 MG: 81 TABLET ORAL at 08:12

## 2018-09-09 RX ADMIN — RASAGILINE 1 MG: 1 TABLET ORAL at 08:13

## 2018-09-09 RX ADMIN — CARBIDOPA AND LEVODOPA 1 TABLET: 25; 100 TABLET ORAL at 13:13

## 2018-09-09 RX ADMIN — POLYETHYLENE GLYCOL 3350 17 G: 17 POWDER, FOR SOLUTION ORAL at 08:15

## 2018-09-09 NOTE — PLAN OF CARE

## 2018-09-10 ENCOUNTER — HOSPITAL ENCOUNTER (INPATIENT)
Facility: HOSPITAL | Age: 73
LOS: 11 days | Discharge: HOME HEALTH | DRG: 561 | End: 2018-09-21
Attending: FAMILY MEDICINE | Admitting: FAMILY MEDICINE
Payer: MEDICARE

## 2018-09-10 VITALS
DIASTOLIC BLOOD PRESSURE: 70 MMHG | BODY MASS INDEX: 36.4 KG/M2 | HEART RATE: 60 BPM | RESPIRATION RATE: 20 BRPM | WEIGHT: 240.2 LBS | SYSTOLIC BLOOD PRESSURE: 131 MMHG | TEMPERATURE: 96.6 F | OXYGEN SATURATION: 97 % | HEIGHT: 68 IN

## 2018-09-10 DIAGNOSIS — Z86.79 HISTORY OF HYPERTENSION: ICD-10-CM

## 2018-09-10 DIAGNOSIS — Z96.612 S/P REVERSE TOTAL SHOULDER ARTHROPLASTY, LEFT: ICD-10-CM

## 2018-09-10 DIAGNOSIS — G20 PARKINSON'S DISEASE WITH USE OF ELECTRICAL BRAIN STIMULATION (HCC): ICD-10-CM

## 2018-09-10 DIAGNOSIS — S42.92XA FRACTURE, SHOULDER, LEFT, CLOSED, INITIAL ENCOUNTER: ICD-10-CM

## 2018-09-10 DIAGNOSIS — E78.49 OTHER HYPERLIPIDEMIA: ICD-10-CM

## 2018-09-10 DIAGNOSIS — K59.00 CONSTIPATION, UNSPECIFIED CONSTIPATION TYPE: Primary | ICD-10-CM

## 2018-09-10 DIAGNOSIS — F41.9 ANXIETY: ICD-10-CM

## 2018-09-10 DIAGNOSIS — E11.9 TYPE 2 DIABETES MELLITUS WITHOUT COMPLICATION (HCC): ICD-10-CM

## 2018-09-10 PROCEDURE — 99305 1ST NF CARE MODERATE MDM 35: CPT | Performed by: NURSE PRACTITIONER

## 2018-09-10 RX ORDER — ASPIRIN 81 MG/1
81 TABLET, CHEWABLE ORAL DAILY
Status: DISCONTINUED | OUTPATIENT
Start: 2018-09-11 | End: 2018-09-21 | Stop reason: HOSPADM

## 2018-09-10 RX ORDER — ATENOLOL 50 MG/1
50 TABLET ORAL DAILY
Status: DISCONTINUED | OUTPATIENT
Start: 2018-09-11 | End: 2018-09-21 | Stop reason: HOSPADM

## 2018-09-10 RX ORDER — B-COMPLEX WITH VITAMIN C
1 TABLET ORAL
Status: DISCONTINUED | OUTPATIENT
Start: 2018-09-11 | End: 2018-09-21 | Stop reason: HOSPADM

## 2018-09-10 RX ORDER — ASPIRIN 81 MG/1
81 TABLET, CHEWABLE ORAL DAILY
Status: CANCELLED | OUTPATIENT
Start: 2018-09-11

## 2018-09-10 RX ORDER — ACETAMINOPHEN 325 MG/1
650 TABLET ORAL 3 TIMES DAILY
Status: CANCELLED | OUTPATIENT
Start: 2018-09-10

## 2018-09-10 RX ORDER — PAROXETINE HYDROCHLORIDE 20 MG/1
20 TABLET, FILM COATED ORAL DAILY
Status: DISCONTINUED | OUTPATIENT
Start: 2018-09-11 | End: 2018-09-21 | Stop reason: HOSPADM

## 2018-09-10 RX ORDER — ATENOLOL 25 MG/1
50 TABLET ORAL DAILY
Status: CANCELLED | OUTPATIENT
Start: 2018-09-11

## 2018-09-10 RX ORDER — RASAGILINE 1 MG/1
1 TABLET ORAL DAILY
Status: DISCONTINUED | OUTPATIENT
Start: 2018-09-11 | End: 2018-09-21 | Stop reason: HOSPADM

## 2018-09-10 RX ORDER — ROSUVASTATIN CALCIUM 10 MG/1
5 TABLET, COATED ORAL EVERY OTHER DAY
Status: DISCONTINUED | OUTPATIENT
Start: 2018-09-12 | End: 2018-09-12

## 2018-09-10 RX ORDER — ACETAMINOPHEN 325 MG/1
650 TABLET ORAL 3 TIMES DAILY
Status: DISCONTINUED | OUTPATIENT
Start: 2018-09-10 | End: 2018-09-12

## 2018-09-10 RX ORDER — ROSUVASTATIN CALCIUM 10 MG/1
5 TABLET, COATED ORAL EVERY OTHER DAY
Status: CANCELLED | OUTPATIENT
Start: 2018-09-12

## 2018-09-10 RX ORDER — AMPICILLIN TRIHYDRATE 250 MG
500 CAPSULE ORAL DAILY
Status: CANCELLED | OUTPATIENT
Start: 2018-09-11

## 2018-09-10 RX ORDER — AMPICILLIN TRIHYDRATE 250 MG
500 CAPSULE ORAL DAILY
Status: DISCONTINUED | OUTPATIENT
Start: 2018-09-11 | End: 2018-09-21 | Stop reason: HOSPADM

## 2018-09-10 RX ORDER — CHLORAL HYDRATE 500 MG
1000 CAPSULE ORAL DAILY
Status: DISCONTINUED | OUTPATIENT
Start: 2018-09-11 | End: 2018-09-21 | Stop reason: HOSPADM

## 2018-09-10 RX ORDER — PAROXETINE HYDROCHLORIDE 20 MG/1
20 TABLET, FILM COATED ORAL DAILY
Status: CANCELLED | OUTPATIENT
Start: 2018-09-11

## 2018-09-10 RX ORDER — RASAGILINE 1 MG/1
1 TABLET ORAL DAILY
Status: CANCELLED | OUTPATIENT
Start: 2018-09-11

## 2018-09-10 RX ORDER — POLYETHYLENE GLYCOL 3350 17 G/17G
17 POWDER, FOR SOLUTION ORAL DAILY
Status: CANCELLED | OUTPATIENT
Start: 2018-09-11

## 2018-09-10 RX ORDER — B-COMPLEX WITH VITAMIN C
1 TABLET ORAL
Status: CANCELLED | OUTPATIENT
Start: 2018-09-11

## 2018-09-10 RX ORDER — CHLORAL HYDRATE 500 MG
1000 CAPSULE ORAL DAILY
Status: CANCELLED | OUTPATIENT
Start: 2018-09-11

## 2018-09-10 RX ORDER — POLYETHYLENE GLYCOL 3350 17 G/17G
17 POWDER, FOR SOLUTION ORAL DAILY
Status: DISCONTINUED | OUTPATIENT
Start: 2018-09-11 | End: 2018-09-21 | Stop reason: HOSPADM

## 2018-09-10 RX ADMIN — Medication 500 MG: at 08:04

## 2018-09-10 RX ADMIN — ACETAMINOPHEN 650 MG: 325 TABLET ORAL at 16:42

## 2018-09-10 RX ADMIN — TUBERCULIN PURIFIED PROTEIN DERIVATIVE 5 UNITS: 5 INJECTION INTRADERMAL at 17:31

## 2018-09-10 RX ADMIN — OYSTER SHELL CALCIUM WITH VITAMIN D 1 TABLET: 500; 200 TABLET, FILM COATED ORAL at 08:04

## 2018-09-10 RX ADMIN — OMEGA-3 FATTY ACIDS CAP 1000 MG 1000 MG: 1000 CAP at 08:04

## 2018-09-10 RX ADMIN — ATENOLOL 50 MG: 25 TABLET ORAL at 08:03

## 2018-09-10 RX ADMIN — ASPIRIN 81 MG 81 MG: 81 TABLET ORAL at 08:04

## 2018-09-10 RX ADMIN — ACETAMINOPHEN 650 MG: 325 TABLET ORAL at 08:04

## 2018-09-10 RX ADMIN — CARBIDOPA AND LEVODOPA 1 TABLET: 25; 100 TABLET ORAL at 06:30

## 2018-09-10 RX ADMIN — POLYETHYLENE GLYCOL 3350 17 G: 17 POWDER, FOR SOLUTION ORAL at 08:04

## 2018-09-10 RX ADMIN — RASAGILINE 1 MG: 1 TABLET ORAL at 08:04

## 2018-09-10 RX ADMIN — CARBIDOPA AND LEVODOPA 1 TABLET: 25; 100 TABLET ORAL at 12:00

## 2018-09-10 RX ADMIN — PAROXETINE 20 MG: 20 TABLET, FILM COATED ORAL at 08:03

## 2018-09-10 NOTE — ASSESSMENT & PLAN NOTE
Continue Sinemet and Azilect  Tremors under control  Maintain fall precautions  Pt states she has a problem with her legs freezing, which is what precipitated her fall

## 2018-09-10 NOTE — H&P
Progress Note - Nicole Li [birth year], 67 y o  female MRN: [id number]    Unit/Bed#: [facility] 547-01 Encounter: [id number]    Primary Care Provider: Shala Castro MD   Date and time admitted to hospital: 9/10/2018 12:47 PM        * Fracture, shoulder, left, closed, initial encounter   Assessment & Plan    She was admitted to the hospital  On 8/22/18 and underwent an uncomplicated left reverse total shoulder arthroplasty, 2/2 fall at home in her bathroom  She was found to have a comminuted left proximal humerus fracture with anterior dislocation  She was sent to [facility] rehab, and now [facility] for continued PT/OT  Continue scheduled tylenol for now  Pt denies pain at this time  S/P reverse total shoulder arthroplasty, left   Assessment & Plan    NWB to LUE  Sling at all times except during PT  ASA 81mg daily for dvt ppx  Parkinson's disease with use of electrical brain stimulation ([facility] [address] )   Assessment & Plan    Continue Sinemet and Azilect  Tremors under control  Maintain fall precautions  Pt states she has a problem with her legs freezing, which is what precipitated her fall  Type 2 diabetes mellitus without complication ([facility] [address] )   Assessment & Plan    No results found for: HGBA1C    No results for input(s): POCGLU in the last 72 hours  Blood Sugar Average: Last 72 hrs: Will check A1c  Not on any meds at this time  Diet controlled  Other hyperlipidemia   Assessment & Plan    Continue crestor and fish oil  History of hypertension   Assessment & Plan    Controlled on atenolol 50mg daily  VTE Prophylaxis: aspirin  / sequential compression device   Code Status: Level 3, DNR/DNI  POLST: POLST form is not discussed and not completed at this time  Discussion with family: Family not present    Anticipated Length of Stay:  Patient will be admitted on an SNF Short Term Inpatient basis with an anticipated length of stay of  > 2 midnights     Justification for EYAL CONCEPCION Stay: Deconditioning and ambulatory dysfunction  Total Time for Visit, including Counseling / Coordination of Care: 45 minutes  Greater than 50% of this total time spent on direct patient counseling and coordination of care  Chief Complaint:   Ambulatory dysfunction  History of Present Illness:    Chelsey Neal is a 67 y o  female who presents with ambulatory dysfunction after sustaining a fall at home, and undergoing left reverse shoulder arthroplasty  She was sent to [facility] rehab, and now [facility] for continued PT/OT  Review of Systems:    Review of Systems   Constitutional: Negative for chills and fever  HENT: Negative for congestion, rhinorrhea, sinus pain, sinus pressure, sneezing and sore throat  Respiratory: Negative  Cardiovascular: Negative  Gastrointestinal: Negative for abdominal distention, constipation, diarrhea, nausea and vomiting  Genitourinary: Negative for difficulty urinating and dysuria  Musculoskeletal: Negative for arthralgias and myalgias  Skin: Negative for color change, pallor and rash  Neurological: Negative for dizziness, tremors, seizures, syncope and light-headedness  Has ambulatory dysfunction - problem with freezing 2/2 parkinsons  Psychiatric/Behavioral: Negative for agitation, behavioral problems and confusion         Past Medical and Surgical History:     Past Medical History:   Diagnosis Date    Anxiety     Diabetes mellitus ([facility]     Diabetes mellitus type 2, diet-controlled ([facility] [address] )     Hyperlipidemia     Hypertension     Parkinson disease ([facility] [address] )        Past Surgical History:   Procedure Laterality Date    ACHILLES TENDON SURGERY      DEEP BRAIN STIMULATOR PLACEMENT      DENTAL SURGERY      REPLACEMENT TOTAL KNEE      REVERSE TOTAL SHOULDER ARTHROPLASTY Left 8/23/2018    Procedure: ARTHROPLASTY SHOULDER REVERSE;  Surgeon: Willard Cabrera MD;  Location: [facility] OR;  Service: Orthopedics    TONSILLECTOMY         Meds/Allergies:    Prior to Admission medications    Medication Sig Start Date End Date Taking? Authorizing Provider   acetaminophen (TYLENOL) 650 mg CR tablet Take 650 mg by mouth every 8 (eight) hours as needed for mild pain    Historical Provider, MD   aspirin 81 MG tablet Take 1 tablet by mouth daily    Historical Provider, MD   atenolol (TENORMIN) 50 mg tablet Take 50 mg by mouth 10/24/07   Historical Provider, MD   Calcium Carbonate-Vitamin D (CALCIUM 600+D) 600-200 MG-UNIT TABS Take 1 tablet by mouth daily    Historical Provider, MD   carbidopa-levodopa (SINEMET)  mg per tablet Take 1 tablet by mouth 3 (three) times a day    Historical Provider, MD   Cinnamon 500 MG TABS Take 2 tablets by mouth    Historical Provider, MD   co-enzyme Q-10 100 mg capsule Take 1 capsule by mouth daily    Historical Provider, MD   ibuprofen (MOTRIN) 400 mg tablet Take by mouth every 6 (six) hours as needed for mild pain    Historical Provider, MD   Omega-3 Fatty Acids (FISH OIL) 1,000 mg Take 1 capsule by mouth daily    Historical Provider, MD   PARoxetine (PAXIL) 20 mg tablet  3/28/18   Historical Provider, MD   polyethylene glycol (MIRALAX) 17 g packet Take 17 g by mouth daily    Historical Provider, MD   rasagiline (AZILECT) 1 MG Take 1 tablet (1 mg total) by mouth daily 6/21/18   Valarie Valero MD   rosuvastatin (CRESTOR) 5 mg tablet Take 1 tablet by mouth every other day    Historical Provider, MD NESBITT have reviewed home medications using allscripts  Allergies:    Allergies   Allergen Reactions    Sulfa Antibiotics Hives       Social History:     Marital Status: /Civil Union   Occupation: unemployed  Patient Pre-hospital Living Situation: lives with   Patient Pre-hospital Level of Mobility: independent without assistive device  Patient Pre-hospital Diet Restrictions: diabetic diet  Substance Use History:   History   Alcohol Use No     History   Smoking Status    Never Smoker   Smokeless Tobacco    Never Used     History   Drug Use No       Family History:    non-contributory    Physical Exam:     Vitals:   Blood Pressure: 106/58 (09/10/18 1307)  Pulse: 72 (09/10/18 1307)  Temperature: (!) 97 °F (36 1 °C) (09/10/18 1258)  Temp Source: Temporal (09/10/18 1258)  Respirations: 21 (09/10/18 1258)  Height: 5' 7" (170 2 cm) (09/10/18 1301)  Weight - Scale: 110 kg (242 lb 8 1 oz) (09/10/18 1301)  SpO2: 96 % (09/10/18 1258)    Physical Exam   Constitutional: She is oriented to person, place, and time  She appears well-developed and well-nourished  No distress  HENT:   Head: Normocephalic and atraumatic  Eyes: Right eye exhibits no discharge  Left eye exhibits no discharge  Neck: No JVD present  Cardiovascular: Normal rate, regular rhythm, normal heart sounds and intact distal pulses  Exam reveals no gallop and no friction rub  No murmur heard  Pulmonary/Chest: Effort normal and breath sounds normal  No stridor  No respiratory distress  She has no wheezes  She has no rales  She exhibits no tenderness  Abdominal: Soft  Bowel sounds are normal  She exhibits no distension  There is no tenderness  There is no rebound and no guarding  Musculoskeletal: She exhibits no edema or tenderness  Neurological: She is alert and oriented to person, place, and time  Skin: Skin is warm and dry  She is not diaphoretic  Psychiatric: She has a normal mood and affect  Additional Data:     Lab Results: I have personally reviewed pertinent reports  Invalid input(s): LABALBU                Imaging: I have personally reviewed pertinent reports  No orders to display         [employer] Records Reviewed:  Yes

## 2018-09-10 NOTE — NURSING NOTE
Pt discharged to [facility]  Repoert called to Eimly Chavira, all belongings are with the pt  All questions answered related to discharging to [facility],  is aware of transfer

## 2018-09-10 NOTE — ASSESSMENT & PLAN NOTE
No results found for: HGBA1C    No results for input(s): POCGLU in the last 72 hours  Blood Sugar Average: Last 72 hrs: Will check A1c  Not on any meds at this time  Diet controlled

## 2018-09-10 NOTE — PLAN OF CARE
Problem: Prexisting or High Potential for Compromised Skin Integrity  Goal: Skin integrity is maintained or improved  INTERVENTIONS:  - Identify patients at risk for skin breakdown  - Assess and monitor skin integrity  - Assess and monitor nutrition and hydration status  - Monitor labs (i e  albumin)  - Assess for incontinence   - Turn and reposition patient  - Assist with mobility/ambulation  - Relieve pressure over bony prominences  - Avoid friction and shearing  - Provide appropriate hygiene as needed including keeping skin clean and dry  - Evaluate need for skin moisturizer/barrier cream  - Collaborate with interdisciplinary team (i e  Nutrition, Rehabilitation, etc )   - Patient/family teaching   Outcome: Adequate for Discharge      Problem: Potential for Falls  Goal: Patient will remain free of falls  INTERVENTIONS:  - Assess patient frequently for physical needs  -  Identify cognitive and physical deficits and behaviors that affect risk of falls    -  [place] fall precautions as indicated by assessment   - Educate patient/family on patient safety including physical limitations  - Instruct patient to call for assistance with activity based on assessment  - Modify environment to reduce risk of injury  - Consider OT/PT consult to assist with strengthening/mobility   Outcome: Adequate for Discharge      Problem: PAIN - ADULT  Goal: Verbalizes/displays adequate comfort level or baseline comfort level  Interventions:  - Encourage patient to monitor pain and request assistance  - Assess pain using appropriate pain scale  - Administer analgesics based on type and severity of pain and evaluate response  - Implement non-pharmacological measures as appropriate and evaluate response  - Consider cultural and social influences on pain and pain management  - Notify physician/advanced practitioner if interventions unsuccessful or patient reports new pain   Outcome: Not Progressing      Problem: INFECTION - ADULT  Goal: Absence or prevention of progression during hospitalization  INTERVENTIONS:  - Assess and monitor for signs and symptoms of infection  - Monitor lab/diagnostic results  - Monitor all insertion sites, i e  indwelling lines, tubes, and drains  - Monitor endotracheal (as able) and nasal secretions for changes in amount and color  - [place] appropriate cooling/warming therapies per order  - Administer medications as ordered  - Instruct and encourage patient and family to use good hand hygiene technique  - Identify and instruct in appropriate isolation precautions for identified infection/condition   Outcome: Adequate for Discharge    Goal: Absence of fever/infection during neutropenic period  INTERVENTIONS:  - Monitor WBC  - Implement neutropenic guidelines   Outcome: Completed Date Met: 09/10/18      Problem: SAFETY ADULT  Goal: Patient will remain free of falls  INTERVENTIONS:  - Assess patient frequently for physical needs  -  Identify cognitive and physical deficits and behaviors that affect risk of falls    -  [place] fall precautions as indicated by assessment   - Educate patient/family on patient safety including physical limitations  - Instruct patient to call for assistance with activity based on assessment  - Modify environment to reduce risk of injury  - Consider OT/PT consult to assist with strengthening/mobility   Outcome: Adequate for Discharge    Goal: Maintain or return to baseline ADL function  INTERVENTIONS:  -  Assess patient's ability to carry out ADLs; assess patient's baseline for ADL function and identify physical deficits which impact ability to perform ADLs (bathing, care of mouth/teeth, toileting, grooming, dressing, etc )  - Assess/evaluate cause of self-care deficits   - Assess range of motion  - Assess patient's mobility; develop plan if impaired  - Assess patient's need for assistive devices and provide as appropriate  - Encourage maximum independence but intervene and supervise when necessary  ¯ Involve family in performance of ADLs  ¯ Assess for home care needs following discharge   ¯ Request OT consult to assist with ADL evaluation and planning for discharge  ¯ Provide patient education as appropriate   Outcome: Adequate for Discharge    Goal: Maintain or return mobility status to optimal level  INTERVENTIONS:  - Assess patient's baseline mobility status (ambulation, transfers, stairs, etc )    - Identify cognitive and physical deficits and behaviors that affect mobility  - Identify mobility aids required to assist with transfers and/or ambulation (gait belt, sit-to-stand, lift, walker, cane, etc )  - [place] fall precautions as indicated by assessment  - Record patient progress and toleration of activity level on Mobility SBAR; progress patient to next Phase/Stage  - Instruct patient to call for assistance with activity based on assessment  - Request Rehabilitation consult to assist with strengthening/weightbearing, etc    Outcome: Adequate for Discharge      Problem: DISCHARGE PLANNING  Goal: Discharge to home or other facility with appropriate resources  INTERVENTIONS:  - Identify barriers to discharge w/patient and caregiver  - Arrange for needed discharge resources and transportation as appropriate  - Identify discharge learning needs (meds, wound care, etc )  - Arrange for interpretive services to assist at discharge as needed  - Refer to Case Management Department for coordinating discharge planning if the patient needs post-hospital services based on physician/advanced practitioner order or complex needs related to functional status, cognitive ability, or social support system   Outcome: Adequate for Discharge      Problem: Nutrition/Hydration-ADULT  Goal: Nutrient/Hydration intake appropriate for improving, restoring or maintaining nutritional needs  Monitor and assess patient's nutrition/hydration status for malnutrition (ex- brittle hair, bruises, dry skin, pale skin and conjunctiva, muscle wasting, smooth red tongue, and disorientation)  Collaborate with interdisciplinary team and initiate plan and interventions as ordered  Monitor patient's weight and dietary intake as ordered or per policy  Utilize nutrition screening tool and intervene per policy  Determine patient's food preferences and provide high-protein, high-caloric foods as appropriate       INTERVENTIONS:  - Monitor oral intake, urinary output, labs, and treatment plans  - Assess nutrition and hydration status and recommend course of action  - Evaluate amount of meals eaten  - Assist patient with eating if necessary   - Allow adequate time for meals  - Recommend/ encourage appropriate diets, oral nutritional supplements, and vitamin/mineral supplements  - Order, calculate, and assess calorie counts as needed  - Recommend, monitor, and adjust tube feedings and TPN/PPN based on assessed needs  - Assess need for intravenous fluids  - Provide specific nutrition/hydration education as appropriate  - Include patient/family/caregiver in decisions related to nutrition   Outcome: Adequate for Discharge

## 2018-09-10 NOTE — ASSESSMENT & PLAN NOTE
She was admitted to the hospital  On 8/22/18 and underwent an uncomplicated left reverse total shoulder arthroplasty, 2/2 fall at home in her bathroom  She was found to have a comminuted left proximal humerus fracture with anterior dislocation  She was sent to [facility] rehab, and now [facility] for continued PT/OT  Continue scheduled tylenol for now  Pt denies pain at this time

## 2018-09-11 LAB
ANION GAP SERPL CALCULATED.3IONS-SCNC: 9 MMOL/L (ref 5–14)
BACTERIA UR QL AUTO: ABNORMAL /HPF
BASOPHILS # BLD AUTO: 0 THOUSANDS/ΜL (ref 0–0.1)
BASOPHILS NFR BLD AUTO: 0 % (ref 0–1)
BILIRUB UR QL STRIP: NEGATIVE
BUN SERPL-MCNC: 22 MG/DL (ref 5–25)
CALCIUM SERPL-MCNC: 9.2 MG/DL (ref 8.4–10.2)
CHLORIDE SERPL-SCNC: 106 MMOL/L (ref 97–108)
CLARITY UR: CLEAR
CO2 SERPL-SCNC: 27 MMOL/L (ref 22–30)
COLOR UR: YELLOW
CREAT SERPL-MCNC: 0.74 MG/DL (ref 0.6–1.2)
EOSINOPHIL # BLD AUTO: 0.3 THOUSAND/ΜL (ref 0–0.4)
EOSINOPHIL NFR BLD AUTO: 4 % (ref 0–6)
ERYTHROCYTE [DISTWIDTH] IN BLOOD BY AUTOMATED COUNT: 14.2 %
EST. AVERAGE GLUCOSE BLD GHB EST-MCNC: 120 MG/DL
GFR SERPL CREATININE-BSD FRML MDRD: 81 ML/MIN/1.73SQ M
GLUCOSE P FAST SERPL-MCNC: 122 MG/DL (ref 70–99)
GLUCOSE SERPL-MCNC: 122 MG/DL (ref 70–99)
GLUCOSE UR STRIP-MCNC: NEGATIVE MG/DL
HBA1C MFR BLD: 5.8 % (ref 4.2–6.3)
HCT VFR BLD AUTO: 34.7 % (ref 36–46)
HGB BLD-MCNC: 11.4 G/DL (ref 12–16)
HGB UR QL STRIP.AUTO: 25
KETONES UR STRIP-MCNC: NEGATIVE MG/DL
LEUKOCYTE ESTERASE UR QL STRIP: 25
LYMPHOCYTES # BLD AUTO: 1.9 THOUSANDS/ΜL (ref 0.5–4)
LYMPHOCYTES NFR BLD AUTO: 23 % (ref 20–50)
MCH RBC QN AUTO: 30.9 PG (ref 26–34)
MCHC RBC AUTO-ENTMCNC: 32.8 G/DL (ref 31–36)
MCV RBC AUTO: 94 FL (ref 80–100)
MONOCYTES # BLD AUTO: 0.7 THOUSAND/ΜL (ref 0.2–0.9)
MONOCYTES NFR BLD AUTO: 8 % (ref 1–10)
NEUTROPHILS # BLD AUTO: 5.4 THOUSANDS/ΜL (ref 1.8–7.8)
NEUTS SEG NFR BLD AUTO: 65 % (ref 45–65)
NITRITE UR QL STRIP: NEGATIVE
NON-SQ EPI CELLS URNS QL MICRO: ABNORMAL /HPF
PH UR STRIP.AUTO: 5 [PH] (ref 4.5–8)
PLATELET # BLD AUTO: 245 THOUSANDS/UL (ref 150–450)
PMV BLD AUTO: 8.6 FL (ref 8.9–12.7)
POTASSIUM SERPL-SCNC: 4.1 MMOL/L (ref 3.6–5)
PROT UR STRIP-MCNC: NEGATIVE MG/DL
RBC # BLD AUTO: 3.69 MILLION/UL (ref 4–5.2)
RBC #/AREA URNS AUTO: ABNORMAL /HPF
SODIUM SERPL-SCNC: 142 MMOL/L (ref 137–147)
SP GR UR STRIP.AUTO: 1.02 (ref 1–1.04)
UROBILINOGEN UA: NEGATIVE MG/DL
WBC # BLD AUTO: 8.2 THOUSAND/UL (ref 4.5–11)
WBC #/AREA URNS AUTO: ABNORMAL /HPF

## 2018-09-11 PROCEDURE — 80048 BASIC METABOLIC PNL TOTAL CA: CPT | Performed by: NURSE PRACTITIONER

## 2018-09-11 PROCEDURE — 81003 URINALYSIS AUTO W/O SCOPE: CPT | Performed by: NURSE PRACTITIONER

## 2018-09-11 PROCEDURE — 83036 HEMOGLOBIN GLYCOSYLATED A1C: CPT | Performed by: NURSE PRACTITIONER

## 2018-09-11 PROCEDURE — 97530 THERAPEUTIC ACTIVITIES: CPT

## 2018-09-11 PROCEDURE — 97116 GAIT TRAINING THERAPY: CPT

## 2018-09-11 PROCEDURE — 97163 PT EVAL HIGH COMPLEX 45 MIN: CPT

## 2018-09-11 PROCEDURE — 97167 OT EVAL HIGH COMPLEX 60 MIN: CPT

## 2018-09-11 PROCEDURE — 81001 URINALYSIS AUTO W/SCOPE: CPT | Performed by: NURSE PRACTITIONER

## 2018-09-11 PROCEDURE — 85025 COMPLETE CBC W/AUTO DIFF WBC: CPT | Performed by: NURSE PRACTITIONER

## 2018-09-11 RX ADMIN — OMEGA-3 FATTY ACIDS CAP 1000 MG 1000 MG: 1000 CAP at 11:11

## 2018-09-11 RX ADMIN — ATENOLOL 50 MG: 50 TABLET ORAL at 11:13

## 2018-09-11 RX ADMIN — OYSTER SHELL CALCIUM WITH VITAMIN D 1 TABLET: 500; 200 TABLET, FILM COATED ORAL at 07:55

## 2018-09-11 RX ADMIN — PAROXETINE 20 MG: 20 TABLET, FILM COATED ORAL at 11:12

## 2018-09-11 RX ADMIN — RASAGILINE 1 MG: 1 TABLET ORAL at 11:16

## 2018-09-11 RX ADMIN — Medication 500 MG: at 11:16

## 2018-09-11 RX ADMIN — ACETAMINOPHEN 650 MG: 325 TABLET ORAL at 11:12

## 2018-09-11 RX ADMIN — ASPIRIN 81 MG 81 MG: 81 TABLET ORAL at 11:12

## 2018-09-11 RX ADMIN — ACETAMINOPHEN 650 MG: 325 TABLET ORAL at 17:47

## 2018-09-11 RX ADMIN — ACETAMINOPHEN 650 MG: 325 TABLET ORAL at 20:58

## 2018-09-11 NOTE — PLAN OF CARE
Problem: OCCUPATIONAL THERAPY ADULT  Goal: Performs self-care activities at highest level of function for planned discharge setting  See evaluation for individualized goals  Treatment Interventions: ADL retraining, Functional transfer training, UE strengthening/ROM, Endurance training, Patient/family training, Equipment evaluation/education, Neuromuscular reeducation, Activityengagement          See flowsheet documentation for full assessment, interventions and recommendations  Limitation: Decreased ADL status, Decreased UE ROM, Decreased UE strength, Decreased Safe judgement during ADL, Decreased endurance, Decreased self-care trans, Decreased high-level ADLs  Prognosis: Good  Assessment: Pt admit to [facility] for further rehab s/p rehab stay on [place] for L TSR (8/23/18)  Remains NWB on L UE with sling  Pt is allowed to perform pendulums & elbow to hand ROM  OT completed extensive review of pt's medical and social history  Pt with h/o Parkinsons, anxiety, DM, HTN, deep brain stimulator, and TKR  Prior to admit was living with spouse in [facility] with 5 MART[address] and able to complete ADLs and functional mobility (I)'ly  Spouse does cooking and she does laundry  Reports h/o 2 falls in the last 6 months  Pt is alone during the day at times  Spouse is having cataract sx and will not be able to assist pt for a few days  Pt presents to OT below baseline due to the following performance deficits: ROM; strength; balance; stand tolerance; functional mobility; problem solving; coping; community integration; self care; and IADLs  Therefore, pt would benefit from OT services to achieve optimal level of performance and decrease caregiver burden  Occupational performance areas to be addressed include: grooming, bathing, toileting, dressing, activity tolerance, functional mobility, community integration, clothing management, and home management  Based on findings, pt is of high complexity  Plan is to return home with spouse and services  OT Discharge Recommendation: 24 hour supervision/assist  OT - OK to Discharge: No    Pt will achieve the following goals in 2 5 wks   Grooming- MI standing with F balance   UB ADL-  (S) while maintaining precautions   LB ADL- (S) while maintaining precautions   Toileting- (S) with hygiene and clothing management    Bed Mobility- (S) with bed flat and no SR to prep for purposeful tasks   ADL Txfs- Distant S using QC for ADLs   Stand Balance- F/F+ dynamic & F unsupported for clothing management    Stand Tolerance- 3-4 mins for hygiene   Activity Tolerance- Fair + for ADLs   Pt will don/doff sling with (S)    Pt will perform L UE ROM as per protocol to increase ROM for ADL tasks (current: pendulums & elbow-hand ROM)   Pt will achieve 5/5 R UE strength for ADL txfs       ** Will add laundry goal, if spouse unable to perform and/or needs pt to perform **    Shani Gomez, OT

## 2018-09-11 NOTE — PROGRESS NOTES
Medication Regimen Review (MRR)    To promote positive health outcomes and reduce adverse consequences the patient's medication therapy has been reviewed by a pharmacist for the following potential problems:   1   documented indication and therapeutic benefits  2   appropriate dose, frequency, route, and duration of therapy  3   medication interactions, side effects, and allergies  4   medication or transcription errors  Medications are also reviewed for appropriate monitoring, duplicate therapy, and dose reduction  Based on the review please see the following recommendations  Patient information:    The patient is 67 y o  admitted for fracture of left shoulder and parkinson's disease  Wt Readings from Last 1 Encounters:   09/10/18 110 kg (242 lb 8 1 oz)       CrCl 86 6ml/min, /64    Patient is taking the following medications that need review:   Paxil 20mg po daily  Tylenol 650mg po tid for shoulder pain  Recommendations:  1  Need a documented diagnosis for the paxil  2   Please evaluate pain medication to see if acetaminophen can be decreased to 650mg po tid prn mild shoulder pain        nAdra Ríos, [facility]

## 2018-09-11 NOTE — PLAN OF CARE
Problem: PHYSICAL THERAPY ADULT  Goal: Performs mobility at highest level of function for planned discharge setting  See evaluation for individualized goals  Treatment/Interventions: ADL retraining, Functional transfer training, LE strengthening/ROM, Elevations, Therapeutic exercise, Endurance training, Patient/family training, Equipment eval/education, Bed mobility, Gait training, Compensatory technique education, Spoke to MD, Spoke to nursing, Spoke to case management, Spoke to advanced practitioner, OT, Family  Equipment Recommended:  (To be assessed closer to discharge)       See flowsheet documentation for full assessment, interventions and recommendations  Outcome: Progressing  Prognosis: Good  Problem List: Decreased strength, Decreased range of motion, Decreased endurance, Impaired balance, Decreased mobility, Decreased coordination, Impaired judgement, Decreased safety awareness, Impaired vision, Decreased skin integrity, Orthopedic restrictions  Assessment: Educated pt on benefits of mobility, risks of immobility, differences between PT/OT, functional mobility training with a quad cane ( proper quad cane management during SPT's and gait -> ocassionally holds quad cane in air; especially during turn negotiation); using appropriate hand placement  with sit <->stand transfers, importance of controlling decent during stand to sit transfers for low back protection,  use of call bell for assistance, and POC  Pt also assessed to have + freezing episodes during SPT's and when initiating turns during gait -> pt educated to increase hip flexion to break up freezing episodes with good response)  Pt receptive to education  Will need ongoing rehab to maximize safe mobility in prep for return to home with   At end of session pt remaining on toilet with call bell in hand  CNA's ( Faby and Anabelle Elizabeth) notified that pt is alone in bathroom     Barriers to Discharge: Inaccessible home environment, Decreased caregiver support (Pt alone at times, combative this AM )                See flowsheet documentation for full assessment
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
## 2018-09-11 NOTE — SOCIAL WORK
FUNMILAYO returned to present this pt with the TCF packet  Pt signed all consents and releases and was given copies of all she signed  Pt would like to use [facility] upon discharge and has no preference as to which agency  FUNMILAYO made a referral to  [facility] via [facility] for the pt's eventual discharge

## 2018-09-11 NOTE — SOCIAL WORK
SW spoke with pt and her  this morning regarding confirming information previously given in assessment  Pt's  is her primary helper at home, although prior to her shoulder fracture, the pt was primarily independent with all of her daily activities  Pt now using a quad cane on the R side with her sling on the L side  Pt's PCP is William Whitley and her surgeon was Umu Cook  Pt gets her meds from [address] [place] [address]  (online) or [facility] at the [facility]
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
## 2018-09-11 NOTE — CONSULTS
Consultation - PMR   Jez Parish 67 y o  female MRN: [id number]  Unit/Bed#: -01 Encounter: [id number]    Assessment/Plan     Assessment:  Parkinson's disease with abnormal gait  The physical deconditioning  Left humerus fracture with reverse total shoulder replacement  Obesity  Plan:  The patient is an appropriate candidate for rehabilitation therapies to improve function related to her Parkinson's these with abnormal gait  Her balance is impaired  This caused her recent fall with left humerus fracture and need for surgical repair  Her gait is also abnormal for narrow base of support and decreased speed and decreased dayday and decreased step length  She has the deep brain stimulator and is taking Azilect but still requires therapies to improve the quality of her gait, particularly balance and safety  Therapy should proceed with lower limb strength and endurance and flexibility exercises as well as balance and gait training  The patient recently started a trial of Sinemet but had difficulties with hallucinations and paranoia earlier today so the Sinemet was discontinued  She also has physical deconditioning because of the hospitalization  She has diminished strength and endurance for prolonged activity  The comprehensive rehabilitation program will be helpful in restoring her physical conditioning  She will also be educated in home activities so that her physical conditioning can continue to improve after discharge  I the regarding the humerus fracture, she had a reverse total shoulder by Dr Luis Carlos Mclean, and she is making gradual progress  She has received the printed protocol for rehabilitation of the reverse total shoulder and should be following it with the therapy team  He she has not required any analgesia stronger than Tylenol  I encouraged her to continue her hand exercises to maintain flexibility and dexterity and she proceeds through the total shoulder rehabilitation protocol  Her  Regarding obesity, she recognizes the need for weight loss and is in agreement with calorie reduction  History of Present Illness   HPI:  Frantz Batista is a 67 y o  female who presents with a fall requiring reverse total shoulder replacement to repair the left proximal humerus fracture  She has a history of Parkinson's disease with impaired mobility  She suffered a fall because of the Parkinson's and gait impairment  She participated in rehabilitation therapies at [facility] but did not achieve sufficient level of independence to return home  She is admitted to the transitional care facility for further management  On my visit, she complains of the abnormal gait related to the Parkinson's disease  The problem is of several years duration and involve the entire body, but particularly the lower limbs and gait process  Severity is moderate to severe  The problem is most evident when she is standing and walking  She is currently managed by Dr Julissa Russ and has the deep brain stimulator and is also taking Azilect  When she fell, she suffered the humerus fracture  She had the reverse total shoulder  She complains of impaired left upper limb function  Duration is 3 weeks and left upper limbs involved  Severity is severe  The issues are worse when she tries to use the left upper limb  I reminded her that she must refrain from trying to use the upper limb except in keeping with the therapy protocol  Inpatient consult to Physical Medicine Rehab  Consult performed by: Radha Babb  Consult ordered by: Jazmine Encarnacion          Review of Systems   Constitutional: Positive for activity change  Negative for chills and fever  HENT: Negative for sore throat and trouble swallowing  Eyes: Negative for photophobia and visual disturbance  Respiratory: Positive for cough  Negative for chest tightness and shortness of breath  Cardiovascular: Positive for leg swelling  Negative for chest pain  Gastrointestinal: Positive for constipation  Negative for abdominal pain  Endocrine: Negative for polyphagia and polyuria  Genitourinary: Negative for difficulty urinating and dysuria  Musculoskeletal: Positive for gait problem  Neurological: Positive for weakness  Negative for tremors  Hematological: Does not bruise/bleed easily  Psychiatric/Behavioral: Positive for hallucinations  Historical Information   Past Medical History:   Diagnosis Date    Anxiety     [facility]     Diabetes mellitus type 2, diet-controlled ([facility] )     Hyperlipidemia     Hypertension     Parkinson disease ([facility] )      Past Surgical History:   Procedure Laterality Date    ACHILLES TENDON SURGERY      DEEP BRAIN STIMULATOR PLACEMENT      DENTAL SURGERY      REPLACEMENT TOTAL KNEE      REVERSE TOTAL SHOULDER ARTHROPLASTY Left 8/23/2018    Procedure: ARTHROPLASTY SHOULDER REVERSE;  Surgeon: Jose Chavez MD;  Location: [facility] OR;  Service: Orthopedics    TONSILLECTOMY       Social History   History   Alcohol Use No     History   Drug Use No     History   Smoking Status    Never Smoker   Smokeless Tobacco    Never Used       Home Setup:   She lives with her  on 1 floor  Functional Status Prior to Admission:   She was ambulatory and use no upper limb assistive device  Functional Status on Admission:   She requires assistance for mobility and self-care    Family History   Problem Relation Age of Onset    Arthritis Mother        Meds/Allergies   all current active meds have been reviewed  Allergies   Allergen Reactions    Sulfa Antibiotics Hives       Objective   Vitals: Blood pressure 140/75, pulse 79, temperature 97 7 °F (36 5 °C), temperature source Temporal, resp  rate 19, height 5' 7" (1 702 m), weight 110 kg (242 lb 8 1 oz), SpO2 99 %      Invasive Devices          No matching active lines, drains, or airways          Physical Exam   Constitutional: She appears well-developed and well-nourished  HENT:   Head: Normocephalic and atraumatic  Eyes: Conjunctivae and EOM are normal    Neck: Normal range of motion  Neck supple  Cardiovascular: Normal rate, regular rhythm and intact distal pulses  Pulmonary/Chest: Effort normal and breath sounds normal  No respiratory distress  She has no wheezes  Abdominal: Soft  Bowel sounds are normal  There is no tenderness  Musculoskeletal:   Active range of motion is present in all limbs  It is functional in the right upper and bilateral lowers  She wears a sling on the left upper limb and is advised against activity other than that prescribed by the reverse total shoulder replacement protocol  Both calves are nontender  There is mild edema in both lower limbs  Neurological:   Her neurologic examination is unremarkable for the cognitive and cranial nerve portions except for diminished facial expression  Motor strength is generally 4/5 but movements are bradykinetic  There is no tremor and no cogwheeling  Left upper limb evaluation is very limited because of the sling and the restrictions from the reverse total shoulder replacement  Sensation is present throughout including light touch and joint position sense  As noted, movement patterns are bradykinetic  Muscle stretch reflexes are unremarkable  Coordination is adequate for finger to chin testing but movements are slow, consistent with the Parkinson's disease  She can stand and walk with minimal assistance using the quad cane held in the right hand  Her gait is abnormal with flexed posture and short steps and decreased dayday and decreased speed   Skin: Skin is warm and dry  Psychiatric: She has a normal mood and affect  Her behavior is normal    Nursing note and vitals reviewed  Lab Results: I have personally reviewed pertinent lab results  Imaging: I have personally reviewed pertinent reports      EKG, Pathology, and Other Studies: I have personally reviewed pertinent reports  Code Status: Level 3 - DNAR and DNI  Advance Directive and Living Will:      Power of :    POLST:      Counseling / Coordination of Care  Total floor / unit time spent today 45 minutes  Greater than 50% of total time was spent with the patient and / or family counseling and / or coordination of care  A description of the counseling / coordination of care: The her I explained that she should continue the rehabilitation program with exercises to improve left upper limb function and gait training for gait quality and safety and balance  Mandy Rivas
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
## 2018-09-12 PROBLEM — F41.9 ANXIETY: Status: ACTIVE | Noted: 2018-09-12

## 2018-09-12 PROCEDURE — 97530 THERAPEUTIC ACTIVITIES: CPT

## 2018-09-12 PROCEDURE — 97535 SELF CARE MNGMENT TRAINING: CPT

## 2018-09-12 PROCEDURE — 97110 THERAPEUTIC EXERCISES: CPT

## 2018-09-12 PROCEDURE — 97116 GAIT TRAINING THERAPY: CPT

## 2018-09-12 RX ORDER — ACETAMINOPHEN 325 MG/1
650 TABLET ORAL EVERY 8 HOURS PRN
Status: DISCONTINUED | OUTPATIENT
Start: 2018-09-12 | End: 2018-09-21 | Stop reason: HOSPADM

## 2018-09-12 RX ORDER — ROSUVASTATIN CALCIUM 5 MG/1
5 TABLET, COATED ORAL EVERY OTHER DAY
Status: DISCONTINUED | OUTPATIENT
Start: 2018-09-12 | End: 2018-09-21 | Stop reason: HOSPADM

## 2018-09-12 RX ADMIN — ACETAMINOPHEN 650 MG: 325 TABLET ORAL at 17:59

## 2018-09-12 RX ADMIN — RASAGILINE 1 MG: 1 TABLET ORAL at 10:49

## 2018-09-12 RX ADMIN — ROSUVASTATIN CALCIUM 5 MG: 5 TABLET, COATED ORAL at 13:13

## 2018-09-12 RX ADMIN — OYSTER SHELL CALCIUM WITH VITAMIN D 1 TABLET: 500; 200 TABLET, FILM COATED ORAL at 06:41

## 2018-09-12 RX ADMIN — Medication 500 MG: at 10:48

## 2018-09-12 RX ADMIN — ASPIRIN 81 MG 81 MG: 81 TABLET ORAL at 10:49

## 2018-09-12 RX ADMIN — OMEGA-3 FATTY ACIDS CAP 1000 MG 1000 MG: 1000 CAP at 10:48

## 2018-09-12 RX ADMIN — POLYETHYLENE GLYCOL 3350 17 G: 17 POWDER, FOR SOLUTION ORAL at 10:49

## 2018-09-12 RX ADMIN — ATENOLOL 50 MG: 50 TABLET ORAL at 10:49

## 2018-09-12 RX ADMIN — PAROXETINE 20 MG: 20 TABLET, FILM COATED ORAL at 10:49

## 2018-09-12 RX ADMIN — ACETAMINOPHEN 650 MG: 325 TABLET ORAL at 10:50

## 2018-09-12 NOTE — PLAN OF CARE
Problem: OCCUPATIONAL THERAPY ADULT  Goal: Performs self-care activities at highest level of function for planned discharge setting  See evaluation for individualized goals  Treatment Interventions: ADL retraining, Functional transfer training, UE strengthening/ROM, Endurance training, Patient/family training, Equipment evaluation/education, Neuromuscular reeducation, Activityengagement          See flowsheet documentation for full assessment, interventions and recommendations  Outcome: Progressing  Limitation: Decreased ADL status, Decreased UE ROM, Decreased UE strength, Decreased Safe judgement during ADL, Decreased endurance, Decreased self-care trans, Decreased high-level ADLs  Prognosis: Good  Assessment: Patient seen this date for goals as set by OTR  Patient for an ADL this am with patient needing min overall cues for NWB follow through,  Good tolerance overall with activities  Plans to treat this PM for focus on ROM per protocol  At end of session patient remains in room with all needs within reach and chair alarm in place        OT Discharge Recommendation: 24 hour supervision/assist  OT - OK to Discharge: No

## 2018-09-12 NOTE — SOCIAL WORK
Patient has a [facility] scheduled on Monday 9/17 at 3:00 PM with her family  Team is notified of the same

## 2018-09-12 NOTE — OCCUPATIONAL THERAPY NOTE
OccupationalTherapy Treatment Note     Patient Name: Chan SORIANO Date: 9/12/2018  Problem List  Patient Active Problem List   Diagnosis    Parkinson's disease with use of electrical brain stimulation ([facility])    Fracture, shoulder, left, closed, initial encounter    History of hypertension    S/P reverse total shoulder arthroplasty, left    Other hyperlipidemia    Type [facility] without [facility]    Anxiety      OT obtained protocol for L UE and made copy for OT & pt to have in room  Denies pain t/o entire session  Remains in bed at end of session with all needs, ice, and alarm intact  Vitals: SPO2 96% RA HR 75  Time: 25 mins        09/12/18 1325   Restrictions/Precautions   LUE Weight Bearing Per Order NWB   Braces or Orthoses Sling   Other Precautions Chair Alarm; Bed Alarm; Fall Risk   Pain Assessment   Pain Assessment 0-10   Pain Score No Pain   ADL   UB Dressing Assistance 4  Minimal Assistance   UB Dressing Deficit (Doff/don sling in supine )   Bed Mobility   Sit to Supine 6  Modified independent   Additional items (Bed flat & no SR (R) side of bed )   Transfers   Sit to Stand 5  Supervision   Additional items (Good hand placement )   Stand to Sit 5  Supervision   Additional items (Controlled descent )   Functional Mobility   Functional Mobility 4  Minimal assistance   Additional Comments (Using QC )   Activity Tolerance   Activity Tolerance (Fair+ activity tolerance )   Assessment   Assessment Treatment this PM focused on bed mobility & L UE ROM as per protocol  Tolerated session well without c/o pain  Pt did request ice at end of session  Pt reports only her distal UE was being ranged at [facility]  Therefore, OT obtained her protocol and engaged pt in [facility] PROM as instructed  Appears on target with motion and no c/o pain  Continues to make progress toward goals  Tomorrow will be 3 weeks post op  Remains in bed with all needs, ice, and alarm intact   Fair+ activity tolerance  Plan   Treatment Interventions UE strengthening/ROM; Activityengagement   Goal Expiration Date 09/28/18   Treatment Day 2     L UE ROM in SUPINE as follows (Weeks 0-3): * Passive supine forward flexion x 10 reps- achieved 90*   * Passive external rotation x 10 reps- achieved 20-25*  * Passive abduction x 10 reps- achieved 40*  * Light isometric adduction with towel x 10 reps  * AROM elbow flexion x 10 reps- achieved full range     Pt denied pain with all movement  However, did request ice s/p range due to stating it felt better when she did in [place]  OT provided ice pack  Requested to remain in bed to elevate LE's       [place], OT

## 2018-09-12 NOTE — PHYSICAL THERAPY NOTE
PHYSICAL THERAPY DAILY TREATMENT NOTE    TIME IN: 11:00  TIME OUT: 12:00  TOTAL MINUTES: 60mins    Name: Frantz Batista   MRN #: [id number]        09/12/18 1200   Pain Assessment   Pain Score No Pain   Restrictions/Precautions   LUE Weight Bearing Per Order NWB   Braces or Orthoses Sling  (LUE in Donjoy sling)   Other Precautions Chair Alarm; Fall Risk   General   Chart Reviewed Yes   Response to Previous Treatment Patient with no complaints from previous session  Family/Caregiver Present Yes   Cognition   Arousal/Participation Alert; Cooperative   Attention Within functional limits   Orientation Level Oriented X4   Following Commands Follows one step commands without difficulty   Subjective   Subjective Pt agreeable to therapy  Offered no complaints  Bed Mobility   Supine to Sit Unable to assess   Additional Comments pt OOB in chair pre & post session   Transfers   Sit to Stand 4  Minimal assistance   Additional items Assist x 1; Armrests; Increased time required;Verbal cues   Stand to Sit 4  Minimal assistance   Additional items Assist x 1; Armrests; Increased time required;Verbal cues   Additional Comments cues for techniques   Ambulation/Elevation   Gait pattern Narrow NANCY; Decreased foot clearance; Step to;Excessively slow; Foward flexed  (festinating during turns)   Gait Assistance 4  Minimal assist   Additional items Assist x 1;Verbal cues; Tactile cues; Other (Comment)  (+ chair follow)   Assistive Device Small base quad cane   Distance 140'x2 w/ chair follow   Balance   Static Sitting Good   Static Standing Fair -   Ambulatory Poor +   Endurance Deficit   Endurance Deficit Yes   Endurance Deficit Description dec endurance to activity require rest periods   Activity Tolerance   Activity Tolerance Patient limited by fatigue   Nurse Made Aware yes   Exercises   Hip Flexion Sitting;10 reps;AROM; Bilateral  (w/ 1 5lbs ankle wt  x 2sets)   Hip Abduction Sitting;10 reps;AROM; Bilateral  (w/ 1 5lbs ankle wt  x 2sets) Hip Adduction Sitting;10 reps;AROM; Bilateral  (w/ 1 5lbs ankle wt  x 2sets)   Knee AROM Long Arc Quad Sitting;10 reps;AROM; Bilateral  (w/ 1 5lbs ankle wt  x 2sets)   Ankle Pumps Sitting;10 reps;AROM; Bilateral  (w/ 1 5lbs ankle wt  x 2sets)   Marching Standing;10 reps;AROM; Bilateral   Balance training  side stepping & forward/backward stepping w/ parallel bar for support   Assessment   Prognosis Good   Problem List Decreased strength;Decreased range of motion;Decreased endurance; Impaired balance;Decreased mobility; Decreased cognition; Impaired judgement;Decreased safety awareness; Obesity;Orthopedic restrictions;Pain   Assessment Pt seen for PT per POC  Improved mobility & activity tolerance noted this tx session  See above levels of assistance required for all functional tasks  Pt demonstrate inc amb tolerance this tx session  Require chair follow during amb for safety  Gait deviations as above but no gross LOB noted  Require cues to look up & to dec speed at times  Noted pt festinating during turns & SPT -> pt educated on how to break festinating episodes by 1st stopping activity then re-group then start marching by increasing hip flexion then proceed w/ activity focusing on proper LE advancement, pt verbalized understanding w/ good return demo  Pt tolerated above mentioned thera  ex well, AROM w/ 1 5 lbs [id number] x2  Pt require regular rest periods t/o session to prevent fatigue  No SOB noted t/o session  Nsg staff most recent vital signs as follows: /59 (BP Location: Right arm)   Pulse 77   Temp (!) 97 4 °F (36 3 °C) (Temporal)   Resp 21   Ht 5' 7" (1 702 m)   Wt 109 kg (240 lb 4 8 oz)   SpO2 96%   BMI 37 64 kg/m²   Will continue PT per POC  May start stair training next tx session as appropriate  At end of session, pt remain OOB in chair in room w/o issues, call bell & phone in reach, chair alarm activated   Nsg staff to continue to mobilized pt (OOB in chair for all meals & ambulate in room/unit) as tolerated to prevent decline in function  Nsg notified  Barriers to Discharge Inaccessible home environment;Decreased caregiver support   Goals   Patient Goals to go home   STG Expiration Date 09/21/18   LTG Expiration Date 10/02/18   Treatment Day 2   Plan   Treatment/Interventions Functional transfer training;LE strengthening/ROM; Elevations; Therapeutic exercise; Endurance training;Patient/family training;Bed mobility;Gait training;Spoke to nursing;OT   Progress Progressing toward goals   PT Frequency Other (Comment)  (6x/wk)   Recommendation   Recommendation Defer at this time   Equipment Recommended Other (Comment)  (to be assessed closer to D/C)   PT - OK to Discharge No  (pt to achieve PT goals prior to D/C home)   Camron Almanzar, PT

## 2018-09-12 NOTE — PLAN OF CARE
Problem: PHYSICAL THERAPY ADULT  Goal: Performs mobility at highest level of function for planned discharge setting  See evaluation for individualized goals  Treatment/Interventions: ADL retraining, Functional transfer training, LE strengthening/ROM, Elevations, Therapeutic exercise, Endurance training, Patient/family training, Equipment eval/education, Bed mobility, Gait training, Compensatory technique education, Spoke to MD, Spoke to nursing, Spoke to case management, Spoke to advanced practitioner, OT, Family  Equipment Recommended:  (To be assessed closer to discharge)       See flowsheet documentation for full assessment, interventions and recommendations  Outcome: Progressing  Prognosis: Good  Problem List: Decreased strength, Decreased range of motion, Decreased endurance, Impaired balance, Decreased mobility, Decreased cognition, Impaired judgement, Decreased safety awareness, Obesity, Orthopedic restrictions, Pain  Assessment: Pt seen for PT per POC  Improved mobility & activity tolerance noted this tx session  See above levels of assistance required for all functional tasks  Pt demonstrate inc amb tolerance this tx session  Require chair follow during amb for safety  Gait deviations as above but no gross LOB noted  Require cues to look up & to dec speed at times  Noted pt festinating during turns & SPT -> pt educated on how to break festinating episodes by 1st stopping activity then re-group then start marching by increasing hip flexion then proceed w/ activity focusing on proper LE advancement, pt verbalized understanding w/ good return demo  Pt tolerated above mentioned thera  ex well, AROM w/ 1 5 lbs [id number] x2  Pt require regular rest periods t/o session to prevent fatigue  No SOB noted t/o session   Nsg staff most recent vital signs as follows: /59 (BP Location: Right arm)   Pulse 77   Temp (!) 97 4 °F (36 3 °C) (Temporal)   Resp 21   Ht 5' 7" (1 702 m)   Wt 109 kg (240 lb 4 8 oz) SpO2 96%   BMI 37 64 kg/m²   Will continue PT per POC  May start stair training next tx session as appropriate  At end of session, pt remain OOB in chair in room w/o issues, call bell & phone in reach, chair alarm activated  Nsg staff to continue to mobilized pt (OOB in chair for all meals & ambulate in room/unit) as tolerated to prevent decline in function  Nsg notified  Barriers to Discharge: Inaccessible home environment, Decreased caregiver support     Recommendation: Defer at this time     PT - OK to Discharge: No (pt to achieve PT goals prior to D/C home)    See flowsheet documentation for full assessment

## 2018-09-12 NOTE — PLAN OF CARE
Problem: OCCUPATIONAL THERAPY ADULT  Goal: Performs self-care activities at highest level of function for planned discharge setting  See evaluation for individualized goals  Treatment Interventions: ADL retraining, Functional transfer training, UE strengthening/ROM, Endurance training, Patient/family training, Equipment evaluation/education, Neuromuscular reeducation, Activityengagement          See flowsheet documentation for full assessment, interventions and recommendations  Outcome: Progressing  Limitation: Decreased ADL status, Decreased UE ROM, Decreased UE strength, Decreased Safe judgement during ADL, Decreased endurance, Decreased self-care trans, Decreased high-level ADLs  Prognosis: Good  Assessment: Treatment this PM focused on bed mobility & L UE ROM as per protocol  Tolerated session well without c/o pain  Pt did request ice at end of session  Pt reports only her distal UE was being ranged at [facility]  Therefore, OT obtained her protocol and engaged pt in [facility] PROM as instructed  Appears on target with motion and no c/o pain  Continues to make progress toward goals  Tomorrow will be 3 weeks post op  Remains in bed with all needs, ice, and alarm intact  Fair+ activity tolerance  OT Discharge Recommendation: 24 hour supervision/assist  OT - OK to Discharge: No    L UE ROM in SUPINE as follows (Weeks 0-3): * Passive supine forward flexion x 10 reps- achieved 90*   * Passive external rotation x 10 reps- achieved 20-25*  * Passive abduction x 10 reps- achieved 40*  * Light isometric adduction with towel x 10 reps  * AROM elbow flexion x 10 reps- achieved full range     Pt denied pain with all movement  However, did request ice s/p range due to stating it felt better when she did in [facility]  OT provided ice pack  Requested to remain in bed to elevate LE's       [place], OT

## 2018-09-12 NOTE — OCCUPATIONAL THERAPY NOTE
Occupational Therapy Treatment Note    Name:  Tasneem Orozco   MRN:   [id number]  Age:     67 y o  Patient Active Problem List   Diagnosis    Parkinson's disease with use of electrical brain stimulation (HCC)    Fracture, shoulder, left, closed, initial encounter    History of hypertension    S/P reverse total shoulder arthroplasty, left    Other hyperlipidemia    [facility] ([facility] )     Status post total shoulder arthroplasty, unspecified laterality [Z96 619]      Subjective/Goals: "can I get a shower"-- patient educated on shower days and plans to assess function via sponge bath this date  Agreeable  Vitals: 126/59BP, 77HR    OT total treatment time: (361-695) 55 minutes    Additional goals & Comments:       09/12/18 0936   Restrictions/Precautions   Weight Bearing Precautions Per Order Yes   LUE Weight Bearing Per Order NWB   Other Precautions Visual impairment; Fall Risk; Chair Alarm;Limb alert   Pain Assessment   Pain Assessment 0-10   Pain Score 3   Pain Type Surgical pain   Pain Location Shoulder   Pain Orientation Left   Pain Descriptors Aching;Discomfort   Pain Frequency Intermittent   Clinical Progression Gradually improving   Hospital Pain Intervention(s) Medication (See MAR); Ambulation/increased activity;Repositioned;Distraction   Diversional Activities Television;Books   ADL   Where Assessed Chair   Grooming Assistance 5  Supervision/Setup   Grooming Deficit Verbal cueing;Supervision/safety   Grooming Comments sinkside oral care- min cues for NWB  Fair balance   UB Bathing Assistance 4  Minimal Assistance   UB Bathing Deficit Verbal cueing;Supervision/safety; Increased time to complete   UB Bathing Comments min cues for adherance to NWB left UE   LB Bathing Assistance 5  Supervision/Setup   LB Bathing Deficit Increased time to complete;Supervision/safety;Use of adaptive equipment   UB Dressing Assistance 4  Minimal Assistance   UB Dressing Deficit Verbal cueing;Supervision/safety; Increased time to complete   UB Dressing Comments min cues NWB follow thorugh  Supervision don/doff sling   LB Dressing Assistance 5  Supervision/Setup   LB Dressing Deficit Verbal cueing;Supervision/safety; Increased time to complete;Use of adaptive equipment   Toileting Deficit Increased time to complete;Supervison/safety;Verbal cueing;Clothing management up;Clothing management down   Toileting Comments CM min/mod assist and supervision hygiene   Kitchen Mobility   Kitchen Activity Transport items; Retrieve items   Kitchen Mobility Comments min/Supervision with QC to obtain clothing from closet   Functional Standing Tolerance   Time 3-4 min   Activity oral care sinkside and transfers/mobility   Bed Mobility   Supine to Sit 4  Minimal assistance   Sit to Supine 5  Supervision   Additional Comments semi flat bed, rail   Transfers   Sit to Stand 5  Supervision   Additional items Assist x 1; Increased time required;Armrests   Stand to Sit 5  Supervision   Additional items Assist x 1; Increased time required;Armrests   Stand pivot 4  Minimal assistance   Additional items Assist x 1; Increased time required;Armrests   Functional Mobility   Functional Mobility (min initally to CS with quad cane)   Therapeutic Excerise-Strength   UE Strength (Right UE incooporated throughout ADL activities)   Cognition   Overall Cognitive Status Impaired   Arousal/Participation Cooperative; Alert   Attention Within functional limits   Orientation Level Oriented X4   Memory Within functional limits   Following Commands Follows one step commands with increased time or repetition   Additional Activities   Additional Activities Comments Balance: Fair dynamic and minimally challenged unsupported for ADL   Activity Tolerance   Activity Tolerance Patient tolerated treatment well  (Fair+)   Assessment   Assessment Patient seen this date for goals as set by OTR    Patient for an ADL this am with patient needing min overall cues for NWB follow through,  Good tolerance overall with activities  Plans to treat this PM for focus on ROM per protocol  At end of session patient remains in room with all needs within reach and chair alarm in place  Plan   Treatment Interventions ADL retraining;Functional transfer training;UE strengthening/ROM; Energy conservation; Activityengagement   Goal Expiration Date 09/28/18   Treatment Day 2   OT Frequency (6x/week)   Recommendation   OT Discharge Recommendation 24 hour supervision/assist   OT - OK to Discharge Jeane Melgar  9/12/2018

## 2018-09-13 PROCEDURE — 97530 THERAPEUTIC ACTIVITIES: CPT

## 2018-09-13 PROCEDURE — 97110 THERAPEUTIC EXERCISES: CPT

## 2018-09-13 PROCEDURE — 97116 GAIT TRAINING THERAPY: CPT

## 2018-09-13 RX ADMIN — POLYETHYLENE GLYCOL 3350 17 G: 17 POWDER, FOR SOLUTION ORAL at 09:19

## 2018-09-13 RX ADMIN — ATENOLOL 50 MG: 50 TABLET ORAL at 09:17

## 2018-09-13 RX ADMIN — OMEGA-3 FATTY ACIDS CAP 1000 MG 1000 MG: 1000 CAP at 09:17

## 2018-09-13 RX ADMIN — PAROXETINE 20 MG: 20 TABLET, FILM COATED ORAL at 09:17

## 2018-09-13 RX ADMIN — ASPIRIN 81 MG 81 MG: 81 TABLET ORAL at 09:17

## 2018-09-13 RX ADMIN — Medication 500 MG: at 09:18

## 2018-09-13 RX ADMIN — RASAGILINE 1 MG: 1 TABLET ORAL at 09:18

## 2018-09-13 NOTE — NURSING NOTE
addedum , post note from yesterday  No c/o chest pain or respiratory distress  Ambulation steady with cane and minimal assist  Left arm in sling   steristrip intact, open to air

## 2018-09-13 NOTE — PHYSICAL THERAPY NOTE
50 minute treatment       09/13/18 0957   Pain Assessment   Pain Assessment No/denies pain   Pain Score No Pain   Restrictions/Precautions   Weight Bearing Precautions Per Order Yes   LUE Weight Bearing Per Order NWB   Braces or Orthoses Sling  (LUE)   General   Chart Reviewed Yes   Response to Previous Treatment Patient with no complaints from previous session  Family/Caregiver Present No   Cognition   Following Commands Follows one step commands without difficulty   Subjective   Subjective Pt rpeotrs her  just had cataract surgery yesterday   Transfers   Sit to Stand 5  Supervision   Additional items Armrests; Increased time required;Verbal cues   Stand to Sit 5  Supervision   Additional items Armrests; Increased time required   Stand pivot 4  Minimal assistance   Additional items Assist x 1  (w/ SBQC)   Ambulation/Elevation   Gait pattern Narrow NANCY; Decreased foot clearance; Step to;Excessively slow   Gait Assistance 4  Minimal assist   Additional items Assist x 1;Verbal cues  (cues required for proper gait pattern)   Assistive Device Small base quad cane   Distance 140' x 2   Stair Management Assistance 5  Supervision  (close)   Stair Management Technique Step to pattern; One rail R   Number of Stairs 8   Balance   Static Sitting Good   Exercises   Hip Flexion Sitting;20 reps;Bilateral  (2#)   Hip Abduction Sitting;Bilateral;20 reps  (Hip abd w/ tband)   Hip Adduction Sitting;20 reps  (Add ball squeeze )   Knee AROM Long Arc Quad Sitting;20 reps  (2# )   Ankle Pumps Sitting;20 reps  (Prostretch )   Assessment   Prognosis Good   Problem List Decreased strength;Decreased range of motion;Decreased endurance; Impaired balance;Decreased mobility; Decreased cognition; Impaired judgement;Decreased safety awareness; Obesity;Orthopedic restrictions;Pain   Assessment Pt seen for PT  Pt pleasant and cooperative  Pts gait is slightly unsteady w/ SBQC, but no LOB  Pt does require cues for gait pattern w/ SBQC    Wt w/ exercise was increased to 2 5# w/o difficulty  Pt returned to room w/ call bell in reach after PT  Chair alarm intact       Goals   Patient Goals to walk without the cane and go home   STG Expiration Date 09/21/18   LTG Expiration Date 10/02/18   Treatment Day 3   Plan   Treatment/Interventions (Continue per plan of care)   Progress Progressing toward goals   PT Frequency (6x/week)   Camilo Soriano PTA

## 2018-09-13 NOTE — PLAN OF CARE
Problem: PHYSICAL THERAPY ADULT  Goal: Performs mobility at highest level of function for planned discharge setting  See evaluation for individualized goals  Treatment/Interventions: ADL retraining, Functional transfer training, LE strengthening/ROM, Elevations, Therapeutic exercise, Endurance training, Patient/family training, Equipment eval/education, Bed mobility, Gait training, Compensatory technique education, Spoke to MD, Spoke to nursing, Spoke to case management, Spoke to advanced practitioner, OT, Family  Equipment Recommended:  (To be assessed closer to discharge)       See flowsheet documentation for full assessment, interventions and recommendations  Outcome: Progressing  Prognosis: Good  Problem List: Decreased strength, Decreased range of motion, Decreased endurance, Impaired balance, Decreased mobility, Decreased cognition, Impaired judgement, Decreased safety awareness, Obesity, Orthopedic restrictions, Pain  Assessment: Pt seen for PT  Pt pleasant and cooperative  Pts gait is slightly unsteady w/ SBQC, but no LOB  Pt does require cues for gait pattern w/ SBQC  Wt w/ exercise was increased to 2 5# w/o difficulty  Pt returned to room w/ call bell in reach after PT  Chair alarm intact  Barriers to Discharge: Inaccessible home environment, Decreased caregiver support     Recommendation: Defer at this time     PT - OK to Discharge: No (pt to achieve PT goals prior to D/C home)    See flowsheet documentation for full assessment

## 2018-09-13 NOTE — OCCUPATIONAL THERAPY NOTE
OccupationalTherapy Treatment  Note     Patient Name: Kim He  VZXLI'Z Date: 9/13/2018  Problem List  Patient Active Problem List   Diagnosis    Parkinson's disease with use of electrical brain stimulation (HCC)    Fracture, shoulder, left, closed, initial encounter    History of hypertension    S/P reverse total shoulder arthroplasty, left    Other hyperlipidemia    Type [facility] mellitus without complication ([facility] )    Anxiety      Pt remains in bed with all needs, ice on shld, and LE's elevated  Alarm engaged, however pt appears to be compliant with ringing  09/13/18 1255   Restrictions/Precautions   Weight Bearing Precautions Per Order Yes   LUE Weight Bearing Per Order NWB   Braces or Orthoses Sling   Other Precautions Bed Alarm; Fall Risk   Pain Assessment   Pain Assessment 0-10   Pain Score No Pain   Bed Mobility   Sit to Supine 6  Modified independent   Additional items (Bed flat and no SR (R side of bed) )   Additional Comments Min to reposition shlds once in supine    Transfers   Sit to Stand 5  Supervision   Additional items (Using R hand only )   Stand to Sit 6  Modified independent   Additional items (Controlled descent )   Stand pivot 5  Supervision  (Close using QC with increased time )   Functional Mobility   Functional Mobility 5  Supervision   Additional Comments Close around bed with QC  Increased time  Additional items (F dynamic stand balance)   Cognition   Arousal/Participation Cooperative   Following Commands Follows all commands and directions without difficulty   Activity Tolerance   Activity Tolerance (Fair+ activity tolerance)   Assessment   Assessment OT TX (20 mins): Treatment this PM focused on bed mobility and L UE PROM in supine  Pt tolerated session well without c/o pain  L UE moving well without any restrictions in muscles  Limited internal rotation and isometric abduction  Pt is making slow steady gains toward goals   Remains in bed with all needs, ice on shld, and LE's elevated  Alarm engaged  Plan   Treatment Interventions UE strengthening/ROM; Functional transfer training; Activityengagement     L UE ROM in SUPINE as follows (Weeks 0-3): * Passive supine forward flexion x 10 reps- achieved 90*   * Passive external rotation x 10 reps- achieved 25-30*  * Passive internal rotation x 10 reps- achieved 5* (limited)  * Passive abduction x 10 reps- achieved 45*  * Light isometric adduction & abduction with towel x 10 reps- weakness in abduction       Pt denied pain with all movement  OT provided ice pack  Remains in bed with all needs, LE's elevated, and alarm engaged         Ingrid Chauhan, OT

## 2018-09-13 NOTE — OCCUPATIONAL THERAPY NOTE
OccupationalTherapy Progress Note     Patient Name: Barron Tinsley  WZEWO'K Date: 9/13/2018  Problem List  Patient Active Problem List   Diagnosis    Parkinson's disease with use of electrical brain stimulation (HCC)    Fracture, shoulder, left, closed, initial encounter    History of hypertension    S/P reverse total shoulder arthroplasty, left    Other hyperlipidemia    [facility] without [facility] )    Anxiety      Pt remains seated in chair with all needs and alarm intact for lunch  OT placed pillow under L UE while sling intact for increased support  09/13/18 1140   Restrictions/Precautions   Weight Bearing Precautions Per Order Yes   LUE Weight Bearing Per Order NWB   Braces or Orthoses Sling   Other Precautions Chair Alarm; Fall Risk   Pain Assessment   Pain Assessment 0-10   Pain Score No Pain   ADL   UB Dressing Deficit (MI doff sling; Min don sling )   Toileting Assistance  (Declined the need to use the bathroom )   Functional Standing Tolerance   Time 4 mins    Activity Pendulums x 15 reps each plane   Comments Cues to perform pendulums properly    Transfers   Sit to Stand 5  Supervision   Additional items (1 cue not to use L UE due to sling off for pendulums)   Stand to Sit 5  Supervision   Additional items (Controlled descent )   Therapeutic Excerise-Strength   UE Strength (R ther ex 2# wt x 30 reps all planes )   Cognition   Arousal/Participation Cooperative   Following Commands Follows all commands and directions without difficulty   Additional Activities   Additional Activities (L hand flexion with ball x 10 reps; L elbow flexion x 20)   Additional Activities Comments Fair+ static stand for pendulums   Activity Tolerance   Activity Tolerance (Fair+ activity tolerance)   Assessment   Assessment OT TX (25 mins): Treatment this AM focused on R UE there ex, sling management, stand tolerance, pendulums, L AROM distal there ex, and activity tolerance   Pt continues to deny pain  Able to doff sling with good tech but requires assist to don  Good tolerance to R UE there ex with 2# wt but did report muscles feeling fatigued from work out  Tolerated AROM L elbow flexion with increased speed today and hand flexion with theraball and no complaints  Performed pendulums in stance with cues to use body and not UE  May benefit from handout to reinforce tech  Pt cooperative and motivated  Improvement noted t/o L UE  Plan to complete PROM in supine this afternoon  Pt happy with performance and therapy  Plan   Treatment Interventions UE strengthening/ROM; Functional transfer training;Patient/family training;Equipment evaluation/education; Activityengagement   Goal Expiration Date 09/28/18   Treatment Day 3   Recommendation   OT - OK to Discharge No     Chaz, OT

## 2018-09-13 NOTE — PLAN OF CARE
Problem: OCCUPATIONAL THERAPY ADULT  Goal: Performs self-care activities at highest level of function for planned discharge setting  See evaluation for individualized goals  Treatment Interventions: ADL retraining, Functional transfer training, UE strengthening/ROM, Endurance training, Patient/family training, Equipment evaluation/education, Neuromuscular reeducation, Activityengagement          See flowsheet documentation for full assessment, interventions and recommendations  Outcome: Progressing  Limitation: Decreased ADL status, Decreased UE ROM, Decreased UE strength, Decreased Safe judgement during ADL, Decreased endurance, Decreased self-care trans, Decreased high-level ADLs  Prognosis: Good  Assessment: OT TX (20 mins): Treatment this PM focused on bed mobility and L UE PROM in supine  Pt tolerated session well without c/o pain  L UE moving well without any restrictions in muscles  Limited internal rotation and isometric abduction  Pt is making slow steady gains toward goals  Remains in bed with all needs, ice on shld, and LE's elevated  Alarm engaged  OT Discharge Recommendation: 24 hour supervision/assist  OT - OK to Discharge: No    L UE ROM in SUPINE as follows (Weeks 0-3):      * Passive supine forward flexion x 10 reps- achieved 90*   * Passive external rotation x 10 reps- achieved 25-30*  * Passive internal rotation x 10 reps- achieved 5* (limited)  * Passive abduction x 10 reps- achieved 45*  * Light isometric adduction & abduction with towel x 10 reps- weakness in abduction        Pt denied pain with all movement  OT provided ice pack   Remains in bed with all needs, LE's elevated, and alarm engaged          Shiva Strong, OT

## 2018-09-14 PROCEDURE — 97116 GAIT TRAINING THERAPY: CPT

## 2018-09-14 PROCEDURE — 97530 THERAPEUTIC ACTIVITIES: CPT

## 2018-09-14 PROCEDURE — 97110 THERAPEUTIC EXERCISES: CPT

## 2018-09-14 RX ADMIN — ACETAMINOPHEN 650 MG: 325 TABLET ORAL at 22:36

## 2018-09-14 RX ADMIN — Medication 500 MG: at 08:36

## 2018-09-14 RX ADMIN — ROSUVASTATIN CALCIUM 5 MG: 5 TABLET, COATED ORAL at 08:34

## 2018-09-14 RX ADMIN — POLYETHYLENE GLYCOL 3350 17 G: 17 POWDER, FOR SOLUTION ORAL at 08:34

## 2018-09-14 RX ADMIN — OYSTER SHELL CALCIUM WITH VITAMIN D 1 TABLET: 500; 200 TABLET, FILM COATED ORAL at 06:41

## 2018-09-14 RX ADMIN — PAROXETINE 20 MG: 20 TABLET, FILM COATED ORAL at 08:37

## 2018-09-14 RX ADMIN — ATENOLOL 50 MG: 50 TABLET ORAL at 08:40

## 2018-09-14 RX ADMIN — ASPIRIN 81 MG 81 MG: 81 TABLET ORAL at 08:36

## 2018-09-14 RX ADMIN — RASAGILINE 1 MG: 1 TABLET ORAL at 08:36

## 2018-09-14 RX ADMIN — OMEGA-3 FATTY ACIDS CAP 1000 MG 1000 MG: 1000 CAP at 08:36

## 2018-09-14 NOTE — PLAN OF CARE
Problem: OCCUPATIONAL THERAPY ADULT  Goal: Performs self-care activities at highest level of function for planned discharge setting  See evaluation for individualized goals  Treatment Interventions: ADL retraining, Functional transfer training, UE strengthening/ROM, Endurance training, Patient/family training, Equipment evaluation/education, Neuromuscular reeducation, Activityengagement          See flowsheet documentation for full assessment, interventions and recommendations  Outcome: Progressing  Limitation: Decreased ADL status, Decreased UE ROM, Decreased UE strength, Decreased Safe judgement during ADL, Decreased endurance, Decreased self-care trans, Decreased high-level ADLs  Prognosis: Good  Assessment: Patient seen this AM for transfers, mobility, balance and standing tolerance  Plans to see this AM for ROM per protocol  Patient continues to make progress in goals set by OTR and continued Ot recommended  At end of session patient remains in room with all needs within reach         OT Discharge Recommendation: 24 hour supervision/assist  OT - OK to Discharge: No

## 2018-09-14 NOTE — PLAN OF CARE
Problem: PHYSICAL THERAPY ADULT  Goal: Performs mobility at highest level of function for planned discharge setting  See evaluation for individualized goals  Treatment/Interventions: ADL retraining, Functional transfer training, LE strengthening/ROM, Elevations, Therapeutic exercise, Endurance training, Patient/family training, Equipment eval/education, Bed mobility, Gait training, Compensatory technique education, Spoke to MD, Spoke to nursing, Spoke to case management, Spoke to advanced practitioner, OT, Family  Equipment Recommended:  (To be assessed closer to discharge)       See flowsheet documentation for full assessment, interventions and recommendations  Outcome: Progressing  Prognosis: Good  Problem List: Decreased endurance, Impaired balance, Decreased mobility, Decreased safety awareness, Impaired vision, Orthopedic restrictions  Assessment: Increased ambulation distance with slight improved balance, requiring CGA to occasional close supervision  VCs occasionally for sequencing in gait  Gait and body movement generally slow  Noted to be incontinent of urine in brief  Barriers to Discharge: Inaccessible home environment, Decreased caregiver support     Recommendation: Defer at this time     PT - OK to Discharge: No (pt to achieve PT goals prior to D/C home)    See flowsheet documentation for full assessment

## 2018-09-14 NOTE — SOCIAL WORK
Patient was set-up for transport by Roge Cottrell with [facility] to appointment for mapping sedation scheduled at [address] [place] [address]  Patient's pick-up time in [facility] is confirmed for 9:30 on 9/18/18  SW notified DON to confirm for CNA to go with patient for safety  DON to arrange  No further questions/concerns at this time

## 2018-09-14 NOTE — OCCUPATIONAL THERAPY NOTE
Occupational Therapy Treatment Note    Name:  Bev Montana   MRN:   [id number]  Age:     67 y o  Patient Active Problem List   Diagnosis    Parkinson's disease with use of electrical brain stimulation ([facility] )    Fracture, shoulder, left, closed, initial encounter    History of hypertension    S/P reverse total shoulder arthroplasty, left    Other hyperlipidemia    Type 2 diabetes mellitus without complication ([facility] )    Anxiety     Status post total shoulder arthroplasty, unspecified laterality [Z96 619]      Subjective/Goals: "i thought I could take the sling off at 3 weeks"-- BENNETT to get clarification as protocol states for comfort    Vitals: see summary sheet    OT total treatment time: (4655-2959) 23 minutes    Additional goals & Comments:       09/14/18 1206   Restrictions/Precautions   Weight Bearing Precautions Per Order Yes   LUE Weight Bearing Per Order NWB   Braces or Orthoses Sling   Other Precautions Bed Alarm; Chair Alarm; Fall Risk;Limb alert   Pain Assessment   Pain Assessment 0-10   Pain Score 1   Pain Type Surgical pain   Pain Location Shoulder   Pain Orientation Left   Pain Descriptors Dull;Aching   Pain Frequency Intermittent   Clinical Progression Gradually improving   Patient's Stated Pain Goal No pain   Hospital Pain Intervention(s) Medication (See MAR); Repositioned   Diversional Activities Television   ADL   LB Dressing Comments min assit with shoes due to tightness   Functional Standing Tolerance   Time 15 min with mobility on unit   Transfers   Sit to Stand 5  Supervision   Stand to Sit 5  Supervision   Additional items (ed for safety with stand to sit transfers)   Stand pivot 5  Supervision   Functional Mobility   Functional Mobility 5  Supervision   Additional Comments quad cane   Cognition   Overall Cognitive Status [facility]   Arousal/Participation Alert; Cooperative   Attention Within functional limits   Orientation Level Oriented X4   Memory Within functional limits   Following Commands Follows one step commands with increased time or repetition   Additional Activities   Additional Activities Comments Balance:  fair dynamic and unsupported (min challenged), Fair+ static   Activity Tolerance   Activity Tolerance Patient tolerated treatment well  (Fair+)   Assessment   Assessment Patient seen this AM for transfers, mobility, balance and standing tolerance  Plans to see this AM for ROM per protocol  Patient continues to make progress in goals set by OTR and continued Ot recommended  At end of session patient remains in room with all needs within reach  Plan   Treatment Interventions ADL retraining;Functional transfer training;UE strengthening/ROM; Energy conservation; Activityengagement   Goal Expiration Date 09/28/18   Treatment Day 4   OT Frequency (6x/week)   Recommendation   OT Discharge Recommendation 24 hour supervision/assist   OT - OK to Discharge Jeane Valero  9/14/2018

## 2018-09-14 NOTE — PHYSICAL THERAPY NOTE
PHYSICAL THERAPY TREATMENT NOTE    Time In: 16:30   Time Out[de-identified] 15:25  Total Treatment Time: 55 min  MRN: [id number]    Chart reviewed  Admit Date: 9/10/2018 Admit Dx: Status post total shoulder arthroplasty, unspecified laterality [Z96 619] Length Of Stay: 4 days    Subjective and Objective findings as follows:     09/14/18 1630   Pain Assessment   Pain Assessment No/denies pain   Pain Score No Pain   Restrictions/Precautions   Weight Bearing Precautions Per Order Yes   LUE Weight Bearing Per Order NWB   Braces or Orthoses Sling   Other Precautions Fall Risk;Visual impairment   General   Chart Reviewed Yes   Family/Caregiver Present Yes  ()   Cognition   Overall Cognitive Status WFL   Arousal/Participation Alert   Orientation Level Oriented X4   Following Commands Follows all commands and directions without difficulty   Transfers   Sit to Stand 5  Supervision  (For safety)   Additional items Increased time required   Stand to Sit 5  Supervision  (Close, for safety)   Additional items Increased time required   Toilet transfer 4  Minimal assistance  (For balance)   Additional items Assist x 1;Other;Raised toilet seat  (Mod A for clothing/brief management)   Ambulation/Elevation   Gait pattern Excessively slow; Short stride;Decreased foot clearance  (Head down posture)   Gait Assistance 4  Minimal assist  ((CGA to occasional close supervision) for balance)   Additional items Assist x 1;Verbal cues; Other (Comment)  (VCs occ for sequencing to erect posture & scan visual field)   Assistive Device Small base quad cane  (On right)   Distance 150 ft x 2   Stair Management Assistance Not tested   Endurance Deficit   Endurance Deficit Yes   Endurance Deficit Description Limited endurance for activity   Activity Tolerance   Activity Tolerance Patient limited by fatigue   Exercises   Hamstring Sets Sitting;15 reps;AROM; Bilateral  (On [place])   Hip Adduction Bilateral;AROM;25 reps; Sitting  (Ball squeezes)   Knee Extension Stretch Bilateral;AROM;15 reps; Sitting  (Against [place])   Balance training  Parallel bar support on right - heel raises, toe raises, mini-squats, bilateral alternating hip abduction   Assessment   Problem List Decreased endurance; Impaired balance;Decreased mobility; Decreased safety awareness; Impaired vision;Orthopedic restrictions   Assessment Increased ambulation distance with slight improved balance, requiring CGA to occasional close supervision  VCs occasionally for sequencing in gait  Gait and body movement generally slow  Noted to be incontinent of urine in brief  Goals   Patient Goals "To get home and be careful and don't fall again"  STG Expiration Date 09/21/18   LTG Expiration Date 10/02/18   Plan   Treatment/Interventions ADL retraining;Functional transfer training;LE strengthening/ROM; Elevations; Therapeutic exercise; Endurance training;Patient/family training;Equipment eval/education; Bed mobility;Gait training;OT;Family   Progress Progressing toward goals   PT Frequency Other (Comment)  (6x/wk)     Vitals: Seated post-ambulation and right UE BP = 134/78, Heart Rate = 63 bpm, SpO2 = 94%  on RA  Patient transferred to toilet; CNA made aware and pull string within reach of patient's right hand      Rudi Colmenares, PT, DPT

## 2018-09-14 NOTE — PLAN OF CARE
Problem: OCCUPATIONAL THERAPY ADULT  Goal: Performs self-care activities at highest level of function for planned discharge setting  See evaluation for individualized goals  Treatment Interventions: ADL retraining, Functional transfer training, UE strengthening/ROM, Endurance training, Patient/family training, Equipment evaluation/education, Neuromuscular reeducation, Activityengagement          See flowsheet documentation for full assessment, interventions and recommendations  Outcome: Progressing  Limitation: Decreased ADL status, Decreased UE ROM, Decreased UE strength, Decreased Safe judgement during ADL, Decreased endurance, Decreased self-care trans, Decreased high-level ADLs  Prognosis: Good  Assessment: Patient seen this date for goals as set by OTR focusing on ROM as able to left, UE strength to right, standing tolerance/balance and mobility  Continue OT at this time with goals as set by OTR  At end of treatment session patient walked to sunroom to visit with          OT Discharge Recommendation: 24 hour supervision/assist  OT - OK to Discharge: No

## 2018-09-14 NOTE — OCCUPATIONAL THERAPY NOTE
Occupational Therapy Treatment Note    Name:  Tanvi Lopez   MRN:   [id number]  Age:     67 y o  Patient Active Problem List   Diagnosis    Parkinson's disease with use of electrical brain stimulation ([facility] )    Fracture, shoulder, left, closed, initial encounter    History of hypertension    S/P reverse total shoulder arthroplasty, left    Other hyperlipidemia    Type 2 diabetes mellitus without complication ([facility] )    Anxiety     Status post total shoulder arthroplasty, unspecified laterality [Z96 619]      Subjective/Goals: to get this shoulder moving    Vitals: 133/78BP, 67HR, 96 O2    OT total treatment time: (3106-6807) 35 minutes    Additional goals & Comments:       09/14/18 1609   Restrictions/Precautions   Weight Bearing Precautions Per Order Yes   LUE Weight Bearing Per Order NWB   Braces or Orthoses Sling   Other Precautions Bed Alarm; Chair Alarm;WBS; Limb alert   Pain Assessment   Pain Assessment No/denies pain   Pain Score No Pain   Bed Mobility   Supine to Sit (S/MI)   Sit to Supine (S/MI)   Transfers   Sit to Stand 6  Modified independent   Stand to Sit 5  Supervision   Additional items (v/c to make complete turns for safety)   Stand pivot 5  Supervision   Functional Mobility   Functional Mobility 5  Supervision   Additional Comments Quad cane   Therapeutic Exercise - ROM   UE-ROM Yes   ROM - Left Upper Extremities    L Shoulder PROM; Flexion;ABduction; Extension; External rotation; Internal rotation  (supine abduction 45*, flex 90*, external rot 25*, internal 5)   L Elbow AROM  (x20)   L Hand AROM  (x20)   LUE ROM Comment Patient completed ther ex to left shoulder per guidelines provided by ortho  Pendulums completed with v/c and supervision    Isometric exercise with towel roll add, abd x10 and flexion x10   Cognition   Overall Cognitive Status WFL   Arousal/Participation Alert   Attention Within functional limits   Orientation Level Oriented X4   Memory Within functional limits   Following Commands Follows all commands and directions without difficulty   Additional Activities   Additional Activities Comments Balance:  Fair dynamic and unsupported   Activity Tolerance   Activity Tolerance Patient tolerated treatment well  (Fair+ with ice given at end of treatment session)   Assessment   Assessment Patient seen this date for goals as set by OTR focusing on ROM as able to left, UE strength to right, standing tolerance/balance and mobility  Continue OT at this time with goals as set by OTR  At end of treatment session patient walked to sunroom to visit with   Plan   Treatment Interventions ADL retraining;Functional transfer training;UE strengthening/ROM; Energy conservation; Activityengagement   Goal Expiration Date 09/28/18   Treatment Day 5   OT Frequency (6x/week)   Recommendation   OT Discharge Recommendation 24 hour supervision/assist   OT - OK to Discharge No   Scott Matter  9/14/2018

## 2018-09-15 PROCEDURE — 97110 THERAPEUTIC EXERCISES: CPT

## 2018-09-15 PROCEDURE — 97116 GAIT TRAINING THERAPY: CPT

## 2018-09-15 PROCEDURE — 97530 THERAPEUTIC ACTIVITIES: CPT

## 2018-09-15 RX ADMIN — OYSTER SHELL CALCIUM WITH VITAMIN D 1 TABLET: 500; 200 TABLET, FILM COATED ORAL at 08:09

## 2018-09-15 RX ADMIN — POLYETHYLENE GLYCOL 3350 17 G: 17 POWDER, FOR SOLUTION ORAL at 08:46

## 2018-09-15 RX ADMIN — PAROXETINE 20 MG: 20 TABLET, FILM COATED ORAL at 08:46

## 2018-09-15 RX ADMIN — OMEGA-3 FATTY ACIDS CAP 1000 MG 1000 MG: 1000 CAP at 08:46

## 2018-09-15 RX ADMIN — Medication 500 MG: at 08:48

## 2018-09-15 RX ADMIN — RASAGILINE 1 MG: 1 TABLET ORAL at 08:51

## 2018-09-15 RX ADMIN — ASPIRIN 81 MG 81 MG: 81 TABLET ORAL at 08:46

## 2018-09-15 RX ADMIN — ATENOLOL 50 MG: 50 TABLET ORAL at 08:46

## 2018-09-15 NOTE — PLAN OF CARE
Problem: PHYSICAL THERAPY ADULT  Goal: Performs mobility at highest level of function for planned discharge setting  See evaluation for individualized goals  Treatment/Interventions: ADL retraining, Functional transfer training, LE strengthening/ROM, Elevations, Therapeutic exercise, Endurance training, Patient/family training, Equipment eval/education, Bed mobility, Gait training, Compensatory technique education, Spoke to MD, Spoke to nursing, Spoke to case management, Spoke to advanced practitioner, OT, Family  Equipment Recommended:  (To be assessed closer to discharge)       See flowsheet documentation for full assessment, interventions and recommendations  Outcome: Progressing  Prognosis: Good  Problem List: Decreased endurance, Impaired balance, Decreased mobility, Decreased safety awareness, Impaired vision, Orthopedic restrictions  Assessment: Pts gait w/ SBQC is slow slightly unsteady  Pt amb w/ head down looking at feet  Pt also amb w/ step to pattern, and watching feet  Pt appeared steadier w/ SBQC, held head upright and amb w/ a more normal step through gait  Possibly, Trial amb w/ pts own hurrycane  Barriers to Discharge: Inaccessible home environment, Decreased caregiver support     Recommendation: Defer at this time     PT - OK to Discharge: No (pt to achieve PT goals prior to D/C home)    See flowsheet documentation for full assessment

## 2018-09-15 NOTE — PHYSICAL THERAPY NOTE
24 minute treatment       09/15/18 1126   Pain Assessment   Pain Assessment No/denies pain   Pain Score No Pain   Restrictions/Precautions   Weight Bearing Precautions Per Order Yes   LUE Weight Bearing Per Order NWB   Braces or Orthoses Sling   Other Precautions Fall Risk;Visual impairment   General   Chart Reviewed Yes   Response to Previous Treatment Patient with no complaints from previous session  Family/Caregiver Present Yes   Cognition   Overall Cognitive Status WFL   Following Commands Follows all commands and directions without difficulty   Subjective   Subjective Pt reports she is unsure if she will want a w/c for home   Transfers   Sit to Stand 5  Supervision   Stand to Sit 5  Supervision   Stand pivot 5  Supervision   Additional items (w/ SBQC, and w/ SPC)   Ambulation/Elevation   Gait pattern Decreased foot clearance; Excessively slow; Step to  (forward head posture w/ SBQC  Better w/ SPC-head up, faster )   Gait Assistance 4  Minimal assist  (Close Supervision)   Additional items Assist x 1   Assistive Device Small base quad cane;SPC  (Pt appears to amb better / [facility])   Distance 150' x 2   Stair Management Technique Step to pattern; One rail R   Number of Stairs 6   Assessment   Prognosis Good   Problem List Decreased endurance; Impaired balance;Decreased mobility; Decreased safety awareness; Impaired vision;Orthopedic restrictions   Assessment Pts gait w/ SBQC is slow slightly unsteady  Pt amb w/ head down looking at feet  Pt also amb w/ step to pattern, and watching feet  Pt appeared steadier w/ SBQC, held head upright and amb w/ a more normal step through gait  Possibly, Trial amb w/ pts own hurrycane   Goals   Patient Goals to go home   STG Expiration Date 09/21/18   LTG Expiration Date 10/02/18   Treatment Day 4   Plan   Treatment/Interventions (Continue per plan of care    Trial pt w/ her hurrycane)   PT Frequency (6x/week)   Rickey Hutchinson, PTA

## 2018-09-15 NOTE — PLAN OF CARE
Problem: OCCUPATIONAL THERAPY ADULT  Goal: Performs self-care activities at highest level of function for planned discharge setting  See evaluation for individualized goals  Treatment Interventions: ADL retraining, Functional transfer training, UE strengthening/ROM, Endurance training, Patient/family training, Equipment evaluation/education, Neuromuscular reeducation, Activityengagement          See flowsheet documentation for full assessment, interventions and recommendations  Outcome: Progressing  Limitation: Decreased ADL status, Decreased UE ROM, Decreased UE strength, Decreased Safe judgement during ADL, Decreased endurance, Decreased self-care trans, Decreased high-level ADLs  Prognosis: Good  Assessment: Patient seen this date with focus on left UE ROM< right UE strengthening, transfers/mobility, standing tolerance/balance and bed mobility  Patient making progress in therapy goal sets with plans to d/c home with spouse  NOTE: patient ed on importance to NWB and sling use as nursing mentioned to this BENNETT that patient was working in room with sling removed  Continue OT at this time with goals set by OTR  At end of treatment session patient remains in room with all needs within reach       OT Discharge Recommendation: 24 hour supervision/assist  OT - OK to Discharge: No

## 2018-09-15 NOTE — OCCUPATIONAL THERAPY NOTE
Occupational Therapy Treatment Note    Name:  Willa Bunch   MRN:   [id number]  Age:     67 y o  Patient Active Problem List   Diagnosis    Parkinson's disease with use of electrical brain stimulation ([facility] )    Fracture, shoulder, left, closed, initial encounter    History of hypertension    S/P reverse total shoulder arthroplasty, left    Other hyperlipidemia    Type 2 diabetes mellitus without complication ([facility] )    Anxiety     Status post total shoulder arthroplasty, unspecified laterality [Z96 619]      Subjective/Goals:     Vitals: 110/61BP, 69HR    OT total treatment time: (9827-2241) 24 minutes    Additional goals & Comments: Ed on importance to sling use and maintain of NWB orders  Ice provided post ROM and encouraged to ice in evening  09/15/18 1044   Restrictions/Precautions   Weight Bearing Precautions Per Order Yes   LUE Weight Bearing Per Order NWB   Braces or Orthoses Sling   Other Precautions Fall Risk;Pain;Limb alert   Pain Assessment   Pain Assessment 0-10   Pain Score 5   Pain Type Surgical pain   Pain Location Arm   Pain Orientation Left   Pain Descriptors Aching;Dull;Discomfort   Pain Frequency Intermittent   Clinical Progression Gradually improving   Patient's Stated Pain Goal No pain   Hospital Pain Intervention(s) Medication (See MAR); Repositioned; Ambulation/increased activity; Distraction   Diversional Activities Television   Functional Standing Tolerance   Time 4 min with activity   Bed Mobility   Supine to Sit 6  Modified independent   Additional items Increased time required   Sit to Supine 6  Modified independent   Additional items Increased time required   Additional Comments flat bed, no rail from right side of bed   Transfers   Sit to Stand 6  Modified independent   Additional items Increased time required;Armrests   Stand to Sit 6  Modified independent   Additional items Armrests; Increased time required   Stand pivot 5  Supervision   Additional items Increased time required;Armrests   Additional Comments overall supervision safety   Functional Mobility   Functional Mobility 5  Supervision   Additional Comments Quad cane   ROM- Right Upper Extremities   R Shoulder AROM; Flexion;ABduction; Extension;Horizontal ABduction  (2x10 2# weight)   R Elbow Elbow flexion;Elbow extension;AROM  (2x10 2# weight)   R Hand AROM  (x30 digiflex)   ROM - Left Upper Extremities    L Shoulder PROM; Flexion;ABduction; Extension; External rotation; Internal rotation  (all to tolerance and within precautions set by ortho- NWB)   L Elbow AROM  (2x10)   L Hand AROM  (x30 digiflex)   LUE ROM Comment ROM supine   Cognition   Overall Cognitive Status WFL   Arousal/Participation Alert   Attention Within functional limits   Orientation Level Oriented X4   Memory Within functional limits   Following Commands Follows one step commands with increased time or repetition   Additional Activities   Additional Activities Comments Balance:  Fair/fair+ dynamic and Fair unsupported   Activity Tolerance   Activity Tolerance Patient tolerated treatment well  (Fair+)   Assessment   Assessment Patient seen this date with focus on left UE ROM< right UE strengthening, transfers/mobility, standing tolerance/balance and bed mobility  Patient making progress in therapy goal sets with plans to d/c home with spouse  NOTE: patient ed on importance to NWB and sling use as nursing mentioned to this BENNETT that patient was working in room with sling removed  Continue OT at this time with goals set by OTR  At end of treatment session patient remains in room with all needs within reach  Plan   Treatment Interventions ADL retraining;Functional transfer training;UE strengthening/ROM; Energy conservation; Activityengagement   Goal Expiration Date 09/28/18   Treatment Day 6   OT Frequency (6x/week)   Recommendation   OT Discharge Recommendation 24 hour supervision/assist   OT - OK to Discharge Jeane Cornelius  9/15/2018

## 2018-09-16 RX ADMIN — OMEGA-3 FATTY ACIDS CAP 1000 MG 1000 MG: 1000 CAP at 09:05

## 2018-09-16 RX ADMIN — PAROXETINE 20 MG: 20 TABLET, FILM COATED ORAL at 09:05

## 2018-09-16 RX ADMIN — RASAGILINE 1 MG: 1 TABLET ORAL at 09:39

## 2018-09-16 RX ADMIN — ACETAMINOPHEN 650 MG: 325 TABLET ORAL at 09:06

## 2018-09-16 RX ADMIN — ASPIRIN 81 MG 81 MG: 81 TABLET ORAL at 09:05

## 2018-09-16 RX ADMIN — OYSTER SHELL CALCIUM WITH VITAMIN D 1 TABLET: 500; 200 TABLET, FILM COATED ORAL at 09:06

## 2018-09-16 RX ADMIN — ROSUVASTATIN CALCIUM 5 MG: 5 TABLET, COATED ORAL at 09:39

## 2018-09-16 RX ADMIN — Medication 500 MG: at 09:39

## 2018-09-16 RX ADMIN — POLYETHYLENE GLYCOL 3350 17 G: 17 POWDER, FOR SOLUTION ORAL at 09:04

## 2018-09-16 NOTE — NURSING NOTE
Alert and oriented x 3 ambulatory ad nayana , steady on feet   Steri strips removed , scheduled pain med is controlling pain , continent during the day bowels moved

## 2018-09-17 PROCEDURE — 97530 THERAPEUTIC ACTIVITIES: CPT

## 2018-09-17 PROCEDURE — 97116 GAIT TRAINING THERAPY: CPT

## 2018-09-17 PROCEDURE — 97110 THERAPEUTIC EXERCISES: CPT

## 2018-09-17 RX ADMIN — RASAGILINE 1 MG: 1 TABLET ORAL at 10:45

## 2018-09-17 RX ADMIN — OMEGA-3 FATTY ACIDS CAP 1000 MG 1000 MG: 1000 CAP at 10:44

## 2018-09-17 RX ADMIN — ATENOLOL 50 MG: 50 TABLET ORAL at 10:34

## 2018-09-17 RX ADMIN — ASPIRIN 81 MG 81 MG: 81 TABLET ORAL at 10:34

## 2018-09-17 RX ADMIN — OYSTER SHELL CALCIUM WITH VITAMIN D 1 TABLET: 500; 200 TABLET, FILM COATED ORAL at 10:34

## 2018-09-17 RX ADMIN — PAROXETINE 20 MG: 20 TABLET, FILM COATED ORAL at 10:34

## 2018-09-17 RX ADMIN — Medication 500 MG: at 10:45

## 2018-09-17 RX ADMIN — POLYETHYLENE GLYCOL 3350 17 G: 17 POWDER, FOR SOLUTION ORAL at 10:35

## 2018-09-17 NOTE — SOCIAL WORK
[facility] held today with patient and her   DON and SW also present  Please see scanned attendance sheet  Patient continues to progress in therapy  Patient currently using a Quad cane  SW confirmed patient does not own at home  SW to confirm recommended equipment for patient at /[facility], commode, and style of cane)  Patient reports her biggest barrier to going home is being able to independently get in/out of bed without a bed rail  Patient is interested in purchasing one  [facility]  Patient and her  would be agreeable to discharge home on Friday if patient is cleared  SW to follow-up to arrange  Patient agreeable to [facility] for RN/PT/OT services and requested [facility] for bathing assistance

## 2018-09-17 NOTE — PLAN OF CARE
Problem: PHYSICAL THERAPY ADULT  Goal: Performs mobility at highest level of function for planned discharge setting  See evaluation for individualized goals  Treatment/Interventions: ADL retraining, Functional transfer training, LE strengthening/ROM, Elevations, Therapeutic exercise, Endurance training, Patient/family training, Equipment eval/education, Bed mobility, Gait training, Compensatory technique education, Spoke to MD, Spoke to nursing, Spoke to case management, Spoke to advanced practitioner, OT, Family  Equipment Recommended:  (To be assessed closer to discharge)       See flowsheet documentation for full assessment, interventions and recommendations  Outcome: Progressing  Prognosis: Good  Problem List: Decreased strength, Decreased range of motion, Decreased endurance, Impaired balance, Decreased mobility, Decreased coordination, Obesity, Decreased skin integrity, Orthopedic restrictions, Pain  Assessment: Since 9/11/18 PT Eval, pt was seen for 6/6 skilled PT tx sessions for therex, theract, and gait/elevation training  Pt with very good progress in skilled PT this past week  Improvement assessed with: BLE MMT strength ( left knee /, left ankle DF/PF, and right ankle DF), activity tolerance, safety, sit and stand balance,sit <->supine transfers, sit <->stand transfers, SPT's and gait  Elevation training initiated this past week  Pt trialed functional mobility activities with NBQC, SPC (9/15/18), and personal Hurrycane (today)  Pt appears much safer and steadier with NBQC today  However during 9/15/18 PT tx session, PTA documented pt appeared safer with SPC usage  Will continue to assess appropriateness of various AD's  All STG's are achieved  As per POC, continue skilled PT tx intervention 1 to 2 weeks pending progress   Plan to work towards achievement of remaining LTG's in prep for return to home with      Barriers to Discharge: Inaccessible home environment, Decreased caregiver support Recommendation: Defer at this time     PT - OK to Discharge: No (pt to achieve PT goals prior to D/C home)    See flowsheet documentation for full assessment

## 2018-09-17 NOTE — OCCUPATIONAL THERAPY NOTE
Occupational Therapy Treatment Note    Name:  Sophia Padilla   MRN:   [id number]  Age:     67 y o  Patient Active Problem List   Diagnosis    Parkinson's disease with use of electrical brain stimulation ([facility] )    Fracture, shoulder, left, closed, initial encounter    History of hypertension    S/P reverse total shoulder arthroplasty, left    Other hyperlipidemia    Type 2 diabetes mellitus without complication ([facility] )    Anxiety     Status post total shoulder arthroplasty, unspecified laterality [Z96 619]      Subjective/Goals: "to work on my arm"    Vitals: 98/51BP, 60HR    OT total treatment time: (1915-2286) 30 min    Additional goals & Comments:       09/17/18 1215   Restrictions/Precautions   Weight Bearing Precautions Per Order Yes   LUE Weight Bearing Per Order NWB   Braces or Orthoses Sling   Other Precautions WBS; Bed Alarm; Chair Alarm; Fall Risk   Pain Assessment   Pain Assessment 0-10   Pain Score 3   Pain Type Surgical pain   Pain Location Shoulder   Pain Orientation Left; Anterior;Proximal   Pain Descriptors Aching;Discomfort   Pain Frequency Intermittent   Clinical Progression Gradually improving   Patient's Stated Pain Goal No pain   Hospital Pain Intervention(s) Medication (See MAR); Repositioned; Ambulation/increased activity   Diversional Activities Television   ADL   Grooming Comments MI sinkside with supervision safety and ensure of NWB-- Fair balance   Toileting Comments declined need this AM   Functional Standing Tolerance   Time 7 min walk around bed and for pendulums   Comments cues for proper pendulum exercise -- reports getting a handout    Bed Mobility   Supine to Sit 4  Minimal assistance   Additional items Increased time required;Verbal cues   Sit to Supine 6  Modified independent   Transfers   Sit to Stand 6  Modified independent   Stand to Sit 6  Modified independent   Stand pivot (S/MI)   Functional Mobility   Functional Mobility 5  Supervision   Additional Comments quad cane   ROM - Left Upper Extremities    LUE ROM Comment ROM supine tolerating shoulder flexion to 90* x10 passive, Add/abd x10 to tolerance, external rotation to 40* and internal rotation to 5*  Isometric ther ex x10 with cues and positioning  Shoulder elevation x10, protraction/retraction x10, bicep active x10    Cognition   Overall Cognitive Status WFL   Arousal/Participation Alert; Cooperative   Attention Within functional limits   Orientation Level Oriented X4   Memory Within functional limits   Following Commands Follows one step commands with increased time or repetition   Additional Activities   Additional Activities Comments Balance:  Dynamic fair/fair+, unsupported Fair   Activity Tolerance   Activity Tolerance Patient tolerated treatment well  (Fair+)   Assessment   Assessment Patient seen this date for OT with focus on standing tolerance/transfers and left UE ROM per protocol  Patient remains in room at end of session seated in chair for lunch with ice  Continue OT at this time with goals set by OTR  Plan   Treatment Interventions ADL retraining;Functional transfer training;UE strengthening/ROM; Energy conservation; Activityengagement   Goal Expiration Date 09/28/18   Treatment Day 7   OT Frequency (6x/week)   Recommendation   OT Discharge Recommendation Home with family support   OT - OK to Discharge Jeane Sahni  9/17/2018

## 2018-09-17 NOTE — PHYSICAL THERAPY NOTE
Physical Therapy Daily Treatment Note and Weekly PT Progress Note       09/17/18 PT Treatment Session: 68 minutes (10:02 to 11:10)   Pain Assessment   Pain Score 4   Pain Type Surgical pain   Pain Location Shoulder   Pain Orientation Left;Proximal;Anterior   Pain Descriptors Aching   Pain Frequency Intermittent   Clinical Progression Gradually improving   Patient's Stated Pain Goal No pain   Restrictions/Precautions   Weight Bearing Precautions Per Order Yes   LUE Weight Bearing Per Order NWB   Braces or Orthoses Sling   Other Precautions Fall Risk; Chair Alarm; Bed Alarm;WBS  (LUE NWB (in sling))   General   Chart Reviewed Yes   Response to Previous Treatment Patient with no complaints from previous session  Cognition   Overall Cognitive Status WFL   Arousal/Participation Alert   Attention Within functional limits   Orientation Level Oriented to time  (Pt stated it was October)   Memory Within functional limits   Following Commands Follows one step commands without difficulty   Subjective   Subjective (" I slept good last night")   Bed Mobility   Supine to Sit 6  Modified independent   Additional items Increased time required   Sit to Supine 6  Modified independent   Additional items Increased time required   Additional Comments (Bed Mobility: HOB flat, no rail)   Transfers   Sit to Stand 5  Supervision   Additional items (Increased time and effort, good hand placement)   Stand to Sit 5  Supervision   Additional items (+ controlled  descent, good hand placement )   Stand pivot 5  Supervision   Additional items (SPT with quad cane, then with SPC)   Ambulation/Elevation   Gait pattern Improper Weight shift; Forward Flexion;Decreased foot clearance; Foward flexed; Short stride  (Gait fluctuates between a step to & step thru gait pattern)   Additional items (Amb with SBQC 48' with S; Amb with personal hurrycane 50' Close S )   Stair Management Assistance (CG A)   Stair Management Technique (Right hand on right HR up; LUE NWB in sling)   Number of Stairs (6)   Balance   Static Sitting (Good + ( improved, was Good on PT Eval))   Dynamic Sitting (Good- ( improved, was Fair+ on PT Eval))   Static Standing (Fair /Fair+ ( improved, was Poor on PT Eval))   Dynamic Standing (Fair with quad cane ( improved, was Poor on PT Eval))   Ambulatory (Fair with quad cane ( improved, was Poor on PT Eval))   Endurance Deficit   Endurance Deficit Yes   Endurance Deficit Description (Limited endurance for activity)   Activity Tolerance   Activity Tolerance Patient limited by fatigue   Nurse Made Aware (RN aware of pt's medical status)   Assessment: Since 9/11/18 PT Eval, pt was seen for 6/6 skilled PT tx sessions for therex, theract, and gait/elevation training  Pt with very good progress in skilled PT this past week  Improvement assessed with: BLE MMT strength ( left knee /, left ankle DF/PF, and right ankle DF), activity tolerance, safety, sit and stand balance,sit <->supine transfers, sit <->stand transfers, SPT's and gait  Elevation training initiated this past week  Pt trialed functional mobility activities with NBQC, SPC (9/15/18), and personal Hurrycane (today)  Pt appears much safer and steadier with NBQC today  However during 9/15/18 PT tx session, PTA documented pt appeared safer with SPC usage  Will continue to assess appropriateness of various AD's  All STG's are achieved  As per POC, continue skilled PT tx intervention 1 to 2 weeks pending progress  Plan to work towards achievement of remaining LTG's in prep for return to home with          Prognosis Good   Problem List Decreased strength;Decreased range of motion;Decreased endurance; Impaired balance;Decreased mobility; Decreased coordination;Obesity; Decreased skin integrity;Orthopedic restrictions;Pain   Barriers to Discharge Inaccessible home environment;Decreased caregiver support   Goals   Patient Goals ( "Dismissal")   STG's (Expiration Date, previously 9/21/18   STG's now achieved)    STGs ( To be performed with appropriate LUE WBS and sling as indicated ->  Expiration Date: 9/21/18)      1  Patient will perform sit to supine transfer ( HOB flat, no rail) with MIN A ( in order to get into bed) ACHIEVED     2    Patient will perform  supine to sit transfer ( HOB flat, no rail) with MIN A ( in order to get out of bed) ACHIEVED        3  Patient will perform all functional transfers with: MIN A ( in order to  transfer from one surface to another)  ACHIEVED     4  Patient will ambulate with a quad cane vs alternative AD for 48 feet with MIN A ( to simulate a short household distance)  ACHIEVED     5  Patient will ascend/descend 5 steps with a handrail, with MOD A  ( to allow pt to safely enter and exit home) ACHIEVED            LTG's    LTGs ( To be performed with appropriate LUE WBS and sling as indicated ->  Expiration Date: 10/02/18)     1  Patient will perform sit to supine transfer ( HOB flat, no rail) with MOD I ( in order to get into bed) ACHIEVED      2    Patient will perform  supine to sit transfer ( HOB flat, no rail) with MOD I ( in order to get out of bed)   ACHIEVED      3  Patient will perform all functional transfers with: MOD I in a supervised environment ( in order to  transfer from one surface to another)  [facility] [address] ( for sit <->stand transfers; but not for SPT with NBQC)  CURRENTLY: sit <->stand with Supervision  CURRENTLY: SPT with quad cane and Distant S  CURRENTLY: SPT with SPC and Supervision  CURRENTLY: SPT with personal Hurrycane and Supervision     4    Patient will ambulate with a quad cane vs alternative AD for > or =  50 feet with MOD I in a supervised environment ( to simulate a short household distance) PARTIALLY ACHIEVED ( for distance ; but not for assistance level)  CURRENTLY: Pt ambulated with a NBQC for 150 feet REGINALDO/Close S -> 50 feet today with NBQC and Supervision     CURRENTLY: Pt ambulated with a SPC for 150 feet MIN A/Close S -> 50 feet today with SPC and Close S     5  Patient will ascend/descend 5 steps with a handrail, with Supervision( to allow pt to safely enter and exit home) ONGOING  CURRENTLY: Pt negotiated 6 steps with right hand on right handrail up with: Close S         Plan   Treatment/Interventions ADL retraining;Functional transfer training;LE strengthening/ROM; Elevations; Therapeutic exercise; Endurance training;Patient/family training;Bed mobility; Equipment eval/education;Gait training; Compensatory technique education;Spoke to MD;Spoke to nursing;Spoke to case management;Spoke to advanced practitioner;OT;Family   Progress Improving as expected   PT Frequency (6x/week)   Recommendation   Equipment Recommended (To be assessed closer to discharge)     Vitals  Seated at rest: /73, HR 84, SPO2 (RA) 98%  After elevation training on 6 steps with right handrail up ( seated): SPO2 (RA) 94%, HR 94        Right ankle ROM:  Right ankle DF AROM: - 20 degrees ( no change)        BLE MMT Strength:  Right hip flexion: 4/5 ( no change)  Left hip flexion: 4/5 ( no change)  Right knee /: 5/5 ( no change)  Left knee /: 5/5 (improved, was 4+/5)  Right ankle DF: 4 to 4+/5 within available range ( improved, was 4/5)  Left ankle DF: 4/5 ( improved, was 3-/5)   Right ankle PF:  4/5 ( no change, since PT Eval)  Left ankle PF: 3+/5 ( improved, was 3 to 3+/5)

## 2018-09-17 NOTE — OCCUPATIONAL THERAPY NOTE
Occupational Therapy Treatment Note    Name:  Ramon Quan   MRN:   [id number]  Age:     67 y o  Patient Active Problem List   Diagnosis    Parkinson's disease with use of electrical brain stimulation ([facility] )    Fracture, shoulder, left, closed, initial encounter    History of hypertension    S/P reverse total shoulder arthroplasty, left    Other hyperlipidemia    Type 2 diabetes mellitus without complication ([facility] )    Anxiety     Status post total shoulder arthroplasty, unspecified laterality [Z96 619]      Subjective/Goals: "i'd like to go for a walk"    Vitals: see nursing flow sheet    OT total treatment time: (6434-9094) 15 minutes    Additional goals & Comments:       09/17/18 5835   Restrictions/Precautions   Weight Bearing Precautions Per Order Yes   LUE Weight Bearing Per Order NWB   Braces or Orthoses Sling   Other Precautions Pain; Fall Risk;WBS   Pain Assessment   Pain Assessment No/denies pain   Pain Score No Pain   ADL   Toileting Comments declines need this PM   Functional Standing Tolerance   Time 10 min with mobility   Activity slow steady pace   Transfers   Sit to Stand 6  Modified independent   Stand to Sit 6  Modified independent   Stand pivot (S/MI-- feet "freeze PRN"- increased time needed)   Functional Mobility   Functional Mobility 4  Minimal assistance  (supervision post approx 4 min mobility)   Additional Comments 1 LOB with min to correct, needed v/c for quad cane use this PM states being "off sequence"--  present and also provided needed cues  Cognition   Overall Cognitive Status WFL   Arousal/Participation Alert; Cooperative   Attention Within functional limits   Orientation Level Oriented X4   Memory Within functional limits   Following Commands Follows all commands and directions without difficulty   Additional Activities   Additional Activities Comments Balance:  dynamic Fair- this PM   Activity Tolerance   Activity Tolerance Patient tolerated treatment well  (Fair/fair+)   Assessment   Assessment Patient seen this PM for 2nd OT treatment as patient ordered for BID treatments  Patient reports no pain this PM and requesting a walk as she was sitting for several hours, BENNETT agrees  Patient started with supervision however did need occational min assist for balance and cues for sequencing with quad cane  Patient was aware she was "off" this PM and was able to follow directional cues provided  Patient reports that per CC meeting she is hoping to d/c with spouse home by end of week but no formal date has been set at this time  Continue OT per POC recommended with new ADL to be scheduled for the week  At end of session patient remains in room with all needs within reach, alarm in place and ice provided this PM for right shoulder  Plan   Treatment Interventions ADL retraining;Functional transfer training;UE strengthening/ROM; Energy conservation; Activityengagement   Goal Expiration Date 09/28/18   Treatment Day 8   OT Frequency (6x/week )   Recommendation   OT Discharge Recommendation Home with family support   OT - OK to Discharge Jeane Stevenson  9/17/2018

## 2018-09-17 NOTE — PLAN OF CARE
Problem: OCCUPATIONAL THERAPY ADULT  Goal: Performs self-care activities at highest level of function for planned discharge setting  See evaluation for individualized goals  Treatment Interventions: ADL retraining, Functional transfer training, UE strengthening/ROM, Endurance training, Patient/family training, Equipment evaluation/education, Neuromuscular reeducation, Activityengagement          See flowsheet documentation for full assessment, interventions and recommendations  Outcome: Progressing  Limitation: Decreased ADL status, Decreased UE ROM, Decreased UE strength, Decreased Safe judgement during ADL, Decreased endurance, Decreased self-care trans, Decreased high-level ADLs  Prognosis: Good  Assessment: Patient seen this PM for 2nd OT treatment as patient ordered for BID treatments  Patient reports no pain this PM and requesting a walk as she was sitting for several hours, BENNETT agrees  Patient started with supervision however did need occational min assist for balance and cues for sequencing with quad cane  Patient was aware she was "off" this PM and was able to follow directional cues provided  Patient reports that per CC meeting she is hoping to d/c with spouse home by end of week but no formal date has been set at this time  Continue OT per POC recommended with new ADL to be scheduled for the week  At end of session patient remains in room with all needs within reach, alarm in place and ice provided this PM for right shoulder       OT Discharge Recommendation: Home with family support  OT - OK to Discharge: No

## 2018-09-17 NOTE — PLAN OF CARE
Problem: OCCUPATIONAL THERAPY ADULT  Goal: Performs self-care activities at highest level of function for planned discharge setting  See evaluation for individualized goals  Treatment Interventions: ADL retraining, Functional transfer training, UE strengthening/ROM, Endurance training, Patient/family training, Equipment evaluation/education, Neuromuscular reeducation, Activityengagement          See flowsheet documentation for full assessment, interventions and recommendations  Outcome: Progressing  Limitation: Decreased ADL status, Decreased UE ROM, Decreased UE strength, Decreased Safe judgement during ADL, Decreased endurance, Decreased self-care trans, Decreased high-level ADLs  Prognosis: Good  Assessment: Patient seen this date for OT with focus on standing tolerance/transfers and left UE ROM per protocol  Patient remains in room at end of session seated in chair for lunch with ice  Continue OT at this time with goals set by OTR       OT Discharge Recommendation: Home with family support  OT - OK to Discharge: No

## 2018-09-18 PROCEDURE — 97530 THERAPEUTIC ACTIVITIES: CPT

## 2018-09-18 PROCEDURE — 97110 THERAPEUTIC EXERCISES: CPT

## 2018-09-18 PROCEDURE — 99308 SBSQ NF CARE LOW MDM 20: CPT | Performed by: NURSE PRACTITIONER

## 2018-09-18 PROCEDURE — 97116 GAIT TRAINING THERAPY: CPT

## 2018-09-18 RX ADMIN — POLYETHYLENE GLYCOL 3350 17 G: 17 POWDER, FOR SOLUTION ORAL at 09:37

## 2018-09-18 RX ADMIN — PAROXETINE 20 MG: 20 TABLET, FILM COATED ORAL at 09:37

## 2018-09-18 RX ADMIN — OMEGA-3 FATTY ACIDS CAP 1000 MG 1000 MG: 1000 CAP at 09:37

## 2018-09-18 RX ADMIN — RASAGILINE 1 MG: 1 TABLET ORAL at 09:41

## 2018-09-18 RX ADMIN — ROSUVASTATIN CALCIUM 5 MG: 5 TABLET, COATED ORAL at 09:41

## 2018-09-18 RX ADMIN — ATENOLOL 50 MG: 50 TABLET ORAL at 09:37

## 2018-09-18 RX ADMIN — OYSTER SHELL CALCIUM WITH VITAMIN D 1 TABLET: 500; 200 TABLET, FILM COATED ORAL at 09:37

## 2018-09-18 RX ADMIN — Medication 500 MG: at 09:41

## 2018-09-18 RX ADMIN — ASPIRIN 81 MG 81 MG: 81 TABLET ORAL at 09:37

## 2018-09-18 NOTE — ASSESSMENT & PLAN NOTE
Continue Azilect  Sinemet d/c'd for confusion  Pt states she was hallucinating and this stopped since sinemet was discontinued  Tremors under control  Has brain stimulator  Maintain fall precautions  Pt states she has a problem with her legs freezing, which is what precipitated her fall

## 2018-09-18 NOTE — PLAN OF CARE
Problem: PHYSICAL THERAPY ADULT  Goal: Performs mobility at highest level of function for planned discharge setting  See evaluation for individualized goals  Treatment/Interventions: ADL retraining, Functional transfer training, LE strengthening/ROM, Elevations, Therapeutic exercise, Endurance training, Patient/family training, Equipment eval/education, Bed mobility, Gait training, Compensatory technique education, Spoke to MD, Spoke to nursing, Spoke to case management, Spoke to advanced practitioner, OT, Family  Equipment Recommended:  (To be assessed closer to discharge)       See flowsheet documentation for full assessment, interventions and recommendations  Outcome: Progressing  Prognosis: Good  Problem List: Decreased strength, Decreased range of motion, Decreased endurance, Impaired balance, Decreased mobility, Decreased coordination, Obesity, Decreased skin integrity, Orthopedic restrictions, Pain  Assessment: Gt is very slow and occ has to stop and restart 2* confusing gt pattern  No festinating gt noted today  Pt amb with head down and occ step to gt with  R LE- corrects with cues but then tends to confuse gt sequencing   Pt did very well on the steps- pt reports spouse suggests using front entrance for quicker access to  living room   4 steps with 2 rails as well as in the garage  Pt also did very well with  Sup <-> sit with bed flat and no rail x 2 reps ( mod I )  Barriers to Discharge: Inaccessible home environment, Decreased caregiver support     Recommendation: Defer at this time     PT - OK to Discharge: No (pt to achieve PT goals prior to D/C home)    See flowsheet documentation for full assessment

## 2018-09-18 NOTE — PLAN OF CARE
Problem: OCCUPATIONAL THERAPY ADULT  Goal: Performs self-care activities at highest level of function for planned discharge setting  See evaluation for individualized goals  Treatment Interventions: ADL retraining, Functional transfer training, UE strengthening/ROM, Endurance training, Patient/family training, Equipment evaluation/education, Neuromuscular reeducation, Activityengagement          See flowsheet documentation for full assessment, interventions and recommendations  Outcome: Progressing  Limitation: Decreased ADL status, Decreased UE ROM, Decreased UE strength, Decreased Safe judgement during ADL, Decreased endurance, Decreased self-care trans, Decreased high-level ADLs  Prognosis: Good  Assessment: Patient seen this date for goals as set by OTR  Prime focus this AM on bed mobility, transfers, balance, ROM to left per protocol and right UE strength  Patient plans to go home at end of week with spouse therefore OTR to be consulted on training  on ROM protocol  Patient also to be scheduled for an ADL for next treatment session however was educated again on importance of NWB follow through and recommendations for HHA to assist PRN until able to do more without NWB restrictions   able to assist but minimally due to his recent medical procedure  Conitnue OT at this time with goals as set by OTR  At end of session patient remains in room seated at bedside with all needs within reach and chair alarm in place        OT Discharge Recommendation: Home with family support  OT - OK to Discharge: No

## 2018-09-18 NOTE — OCCUPATIONAL THERAPY NOTE
Occupational Therapy Treatment Note    Name:  Maricela Castleman   MRN:   [id number]  Age:     67 y o  Patient Active Problem List   Diagnosis    [facility]'s disease with use of electrical brain stimulation ([facility] )    Fracture, shoulder, left, closed, initial encounter    History of hypertension    S/P reverse total shoulder arthroplasty, left    Other hyperlipidemia    [facility] )    Anxiety     Status post total shoulder arthroplasty, unspecified laterality [Z96 619]      Subjective/Goals: "i got out of bed this AM"-- this was flagged as 1 of patient's concerns for d/c  It was brought to therapy attention post [facility] that patient has a elevating bed (head and feet)  Vitals: 134/60 BP, 57 HR    OT total treatment time: (0452-7597) 46 minutes    Additional goals & Comments:       09/18/18 1154   Restrictions/Precautions   Weight Bearing Precautions Per Order Yes   LUE Weight Bearing Per Order NWB   Braces or Orthoses Sling   Other Precautions Fall Risk; Chair Alarm; Bed Alarm;WBS   Pain Assessment   Pain Assessment No/denies pain   Pain Score 2   Pain Type Surgical pain   Pain Location Shoulder   Pain Orientation Left   Pain Descriptors Aching   Pain Frequency Intermittent   Clinical Progression Gradually improving   Patient's Stated Pain Goal No pain   Hospital Pain Intervention(s) Medication (See MAR); Cold applied; Ambulation/increased activity; Distraction   Diversional Activities Television   ADL   UB Dressing Comments Supervision with v/c to don and MI to doff    Education that orders remain for sling use- patient reports an understanding   Toileting Comments declines need this AM   Functional Standing Tolerance   Time 3-4 min during AM session with standing pendulums and walk around bed for supine exercise   Bed Mobility   Supine to Sit 5  Supervision   Additional items Increased time required;Verbal cues  (HOB slightly elevated as patient has at home)   Sit to Supine 6 Modified independent   Additional items (flat bed from right side of bed)   Transfers   Sit to Stand 6  Modified independent   Stand to Sit 6  Modified independent   Stand pivot (S/MI (increased time/effort with turns))   Functional Mobility   Functional Mobility (S/DS with Quad cane in room  Slow steady pace)   ROM - Left Upper Extremities    L Shoulder (see ROM comments)   L Elbow AROM   L Hand AROM   L Position Supine;Seated   LUE ROM Comment ROM supine:  passive flexion to 90*, abduction 45*, external rotation to 40*, internal (limited to 5-10*)  Isometrics: flexion, extension, abduction, adduction, external rotation with cues for technique  Seated shoulder elevation and scapular retraction x10  Therapeutic Excerise-Strength   UE Strength (2# shoulder flexion, horz abduction, biceps x10)   Cognition   Overall Cognitive Status WFL   Arousal/Participation Alert; Cooperative   Attention Within functional limits   Orientation Level Oriented X4   Memory Within functional limits   Following Commands Follows one step commands with increased time or repetition   Additional Activities   Additional Activities Comments Balance:  Fair/fair+ dynamic and fair Unsupported   Activity Tolerance   Activity Tolerance Patient tolerated treatment well  (Fair+)   Assessment   Assessment Patient seen this date for goals as set by OTR  Prime focus this AM on bed mobility, transfers, balance, ROM to left per protocol and right UE strength  Patient plans to go home at end of week with spouse therefore OTR to be consulted on training  on ROM protocol  Patient also to be scheduled for an ADL for next treatment session however was educated again on importance of NWB follow through and recommendations for HHA to assist PRN until able to do more without NWB restrictions   able to assist but minimally due to his recent medical procedure  Conitnue OT at this time with goals as set by OTR    At end of session patient remains in room seated at bedside with all needs within reach and chair alarm in place  Plan   Treatment Interventions ADL retraining;Functional transfer training;UE strengthening/ROM; Energy conservation; Activityengagement   Goal Expiration Date 09/28/18   Treatment Day 9   OT Frequency (6x/week)   Recommendation   OT Discharge Recommendation Home with family support   OT - OK to Discharge No   Leonardo Parcel  9/18/2018

## 2018-09-18 NOTE — PLAN OF CARE
Problem: OCCUPATIONAL THERAPY ADULT  Goal: Performs self-care activities at highest level of function for planned discharge setting  See evaluation for individualized goals  Treatment Interventions: ADL retraining, Functional transfer training, UE strengthening/ROM, Endurance training, Patient/family training, Equipment evaluation/education, Neuromuscular reeducation, Activityengagement          See flowsheet documentation for full assessment, interventions and recommendations  Outcome: Progressing  Limitation: Decreased ADL status, Decreased UE ROM, Decreased UE strength, Decreased Safe judgement during ADL, Decreased endurance, Decreased self-care trans, Decreased high-level ADLs  Prognosis: Good  Assessment: Patient seen this PM for functional mobility with quad cane, transfers SPT, isometric exercise left shoulder per protocal including seated scapular elevation and retraction  Patient's son present thorughout treatment session with education to both patient and son on mobility recommendations of quad cane (per PT and trials of other devices), education on NWB, current functional level with set protocal of doctor and overall safety  Multiple questions answered as able or referred to respected parties  Continue OT is recommended at this time  At end of session patient remains in room with all needs within reach and son at bedside  Chair alarm intact       OT Discharge Recommendation: Home with family support (outpatient OT)  OT - OK to Discharge: No

## 2018-09-18 NOTE — ASSESSMENT & PLAN NOTE
She was admitted to the hospital  On 8/22/18 and underwent an uncomplicated left reverse total shoulder arthroplasty, 2/2 fall at home in her bathroom  She was found to have a comminuted left proximal humerus fracture with anterior dislocation  She was sent to [facility] rehab, and now [facility] for continued PT/OT  Continue scheduled tylenol for now  Pt denies pain at this time  She has LUE sling on  States she if for d/c this Friday

## 2018-09-18 NOTE — PROGRESS NOTES
Progress Note - Chan Mcknight [birth year], 67 y o  female MRN: [id number]    Unit/Bed#: [facility] 554-01 Encounter: [id number]    Primary Care Provider: Mariaelena Duran MD   Date and time admitted to hospital: 9/10/2018 12:47 PM        * Fracture, shoulder, left, closed, initial encounter   Assessment & Plan    She was admitted to the hospital  On 8/22/18 and underwent an uncomplicated left reverse total shoulder arthroplasty, 2/2 fall at home in her bathroom  She was found to have a comminuted left proximal humerus fracture with anterior dislocation  She was sent to [facility] rehab, and now [facility] for continued PT/OT  Continue scheduled tylenol for now  Pt denies pain at this time  She has LUE sling on  States she if for d/c this Friday  S/P reverse total shoulder arthroplasty, left   Assessment & Plan    NWB to LUE  Sling at all times except during PT  ASA 81mg daily for dvt ppx  Parkinson's disease with use of electrical brain stimulation [facility])   Assessment & Plan    Continue Azilect  Sinemet d/c'd for confusion  Pt states she was hallucinating and this stopped since sinemet was discontinued  Tremors under control  Has brain stimulator  Maintain fall precautions  Pt states she has a problem with her legs freezing, which is what precipitated her fall  Anxiety   Assessment & Plan    Controlled on paxil 20mg daily  Type 2 diabetes mellitus without complication [facility])   Assessment & Plan    Lab Results   Component Value Date    HGBA1C 5 8 09/11/2018       No results for input(s): POCGLU in the last 72 hours  Blood Sugar Average: Last 72 hrs:  Not on any meds at this time  Diet controlled  Other hyperlipidemia   Assessment & Plan    Continue crestor and fish oil  History of hypertension   Assessment & Plan    Controlled on atenolol 50mg daily                VTE Pharmacologic Prophylaxis:   Pharmacologic: aspirin  Mechanical VTE Prophylaxis in Place: Yes    Patient Centered Rounds: I have performed bedside rounds with nursing staff today  Discussions with Specialists or Other Care Team Provider:     Education and Discussions with Family / Patient: Plan of care and progress discussed with pt  Time Spent for Care: 20 minutes  More than 50% of total time spent on counseling and coordination of care as described above  Current Length of Stay: 8 day(s)    Current Patient Status: SNF Short Term Inpatient   Certification Statement: The patient will continue to require additional inpatient hospital stay due to ambulatory dysfunction    Discharge Plan: Plan for Friday home with services if OK with PT/OT  Code Status: Level 3 - DNAR and DNI      Subjective:   Pt reports she is doing well  No longer hallucinating  No pain  No CP, SOB, N/V/D, constipation, dysuria, dizziness, or light-headedness    Objective:     Vitals:   Temp (24hrs), Av 9 °F (36 6 °C), Min:97 5 °F (36 4 °C), Max:98 2 °F (36 8 °C)    HR:  [57-65] 65  Resp:  [16] 16  BP: (123-134)/(59-60) 123/59  SpO2:  [98 %-99 %] 98 %  Body mass index is 37 33 kg/m²  Input and Output Summary (last 24 hours):     No intake or output data in the 24 hours ending 18 1231    Physical Exam:     Physical Exam   Constitutional: She is oriented to person, place, and time  She appears well-developed and well-nourished  No distress  HENT:   Head: Normocephalic and atraumatic  Eyes: Right eye exhibits no discharge  Left eye exhibits no discharge  Neck: No JVD present  Cardiovascular: Normal rate, regular rhythm and normal heart sounds  Exam reveals no gallop and no friction rub  No murmur heard  Pulmonary/Chest: Effort normal and breath sounds normal  No stridor  No respiratory distress  She has no wheezes  She has no rales  She exhibits no tenderness  Abdominal: Soft  Bowel sounds are normal  She exhibits no distension  There is no tenderness  There is no rebound and no guarding     Musculoskeletal: She exhibits edema (1+ BLE edema - tubigrip stockings in place)  She exhibits no tenderness  Neurological: She is alert and oriented to person, place, and time  Skin: Skin is warm and dry  She is not diaphoretic  Psychiatric: She has a normal mood and affect  Additional Data:     Labs: Invalid input(s): LABALBU                  * I Have Reviewed All Lab Data Listed Above  * Additional Pertinent Lab Tests Reviewed:  Most recent labs reviewed - labs ordered for AM    Imaging:    Imaging Reports Reviewed Today Include: none  Imaging Personally Reviewed by Myself Includes:  none    Recent Cultures (last 7 days):           Last 24 Hours Medication List:     Current Facility-Administered Medications:  acetaminophen 650 mg Oral Q8H PRN Shayna Ciminieri, CRNP   aspirin 81 mg Oral Daily Shayna Ciminieri, CRNP   atenolol 50 mg Oral Daily Blank Peals, CRNP   calcium carbonate-vitamin D 1 tablet Oral Daily With Breakfast Shayna Ciminieri, CRNP   Cinnamon 500 mg Oral Daily Shayna Ciminieri, CRNP   Coenzyme Q10 400 mg Oral Daily Blank Peals, CRNP   fish oil 1,000 mg Oral Daily Blank Peafabio, CRNP   PARoxetine 20 mg Oral Daily Shayna Ciminieri, CRNP   polyethylene glycol 17 g Oral Daily Shayna Ciminieri, CRNP   rasagiline 1 mg Oral Daily Blank Peafabio, BRADNP   rosuvastatin 5 mg Oral Every Other Lola Reinoso MD        Today, Patient Was Seen By: GRETA Mcfarlane

## 2018-09-18 NOTE — PROGRESS NOTES
FOLLOW UP [address] XENIA Oliva    female    [id number]    1945    /-01    Assessment / Plan:  Parkinson's disease with abnormal gait - the patient still needs supervision and occasional minimal assistance with walking  He she uses a quad cane held in the right hand  On walking today, she was unsteady at times and required assistance and additional time  I reviewed this with her  I reminded her that the parkinsonism does affect gait and balance in a negative way and that she needs to continue her follow-up with Dr Randa Cruz of Neurology and must continue her medications on less instructed otherwise  I also reminded her that the left upper limb plays a role in balance when she walks and that she should expect some improvement in her gait quality with the left upper limb is able to function more normally  I also reminded her that at the present time, she needs to use the cane for upper limb support and must follow the instructions of physical therapy regarding walking with or without assistance  Physical deconditioning - her overall strength and endurance are gradually improving  The I discussed this with her  I reminded her that during the hospitalization, she should continue performing lower limb strength and endurance exercises during the time when she is not in the Physical therapy or Occupational therapy Department  I also reminded her that at home, she must continue the exercises and activities taught to her in by the rehabilitation team  I advised her that failure to do so increases the likelihood of decline in function and loss of independence as time progresses   she understands and agrees  Subjective:  She generally feels well  She denies any pain including the left shoulder and the lower limbs  She reports that she is adhering to the left shoulder restrictions and does exercise the left wrist and hand    She denies any further episodes of confusion or hallucinations  She is no longer taking Sinemet  She reports adequate sleep and appetite  She reports adequate bowel and bladder function  She denies chest pain or shortness of breath or lightheadedness or dizziness  Objective:    Vitals:    09/18/18 1501   BP: 123/59   Pulse: 65   Resp:    Temp: 98 2 °F (36 8 °C)   SpO2: 98%        Physical Exam:  The vital signs are stable  Head is normocephalic and atraumatic  Neck has functional range of motion  Cardiac rhythm is regular  Lungs are clear  Abdomen is soft and nontender  The left upper limb is in the sling consistent with orthopedic precautions  The right upper limb has functional strength and range  There is no tremor or cogwheeling  Pulse is present  Both lower limbs have functional strength and range of motion  Calves are nontender  There is mild edema in the feet  Functionally, she requires supervision and verbal cues for safe ambulation and transfers  I have reviewed the following diagnostic studies:   White blood count is normal   There is mild anemia           Current Facility-Administered Medications:     acetaminophen (TYLENOL) tablet 650 mg, 650 mg, Oral, Q8H PRN, Shayna Ciminieri, CRNP, 650 mg at 09/16/18 0906    aspirin chewable tablet 81 mg, 81 mg, Oral, Daily, Shayna Ciminieri, CRNP, 81 mg at 09/18/18 6269    atenolol (TENORMIN) tablet 50 mg, 50 mg, Oral, Daily, Shayna Ciminieri, CRNP, 50 mg at 09/18/18 5626    calcium carbonate-vitamin D (OSCAL-D) 500 mg-200 units per tablet 1 tablet, 1 tablet, Oral, Daily With Breakfast, Shayna Ciminieri, CRNP, 1 tablet at 09/18/18 0937    Cinnamon 500 mg, 500 mg, Oral, Daily, Shayna Ciminieri, CRNP, 500 mg at 09/18/18 0941    Coenzyme Q10 CAPS 400 mg, 400 mg, Oral, Daily, Shayna Ciminieri, CRNP, 400 mg at 09/18/18 1000    fish oil capsule 1,000 mg, 1,000 mg, Oral, Daily, Shayna Ciminieri, CRNP, 1,000 mg at 09/18/18 1130    PARoxetine (PAXIL) tablet 20 mg, 20 mg, Oral, Daily, Azell Din Ciminieri, CRNP, 20 mg at 09/18/18 6055    polyethylene glycol (MIRALAX) packet 17 g, 17 g, Oral, Daily, Shayna Ciminieri, CRNP, 17 g at 09/18/18 1284    rasagiline (AZILECT) tablet 1 mg, 1 mg, Oral, Daily, Shayna Ciminieri, CRNP, 1 mg at 09/18/18 0941    rosuvastatin (CRESTOR) tablet 5 mg, 5 mg, Oral, Every Other Day, Brigette Ludwig MD, 5 mg at 09/18/18 0941    Patient Active Problem List   Diagnosis    Parkinson's disease with use of electrical brain stimulation ([facility] [address] )    Fracture, shoulder, left, closed, initial encounter    History of hypertension    S/P reverse total shoulder arthroplasty, left    Other hyperlipidemia    Type 2 diabetes mellitus without complication (HCC)   Simran OLSON

## 2018-09-18 NOTE — PHYSICAL THERAPY NOTE
62' session     09/18/18 1056   Pain Assessment   Pain Assessment 0-10   Pain Score 1   Pain Type Surgical pain   Pain Location Shoulder   Pain Orientation Left   Pain Descriptors Aching   Patient's Stated Pain Goal No pain   Restrictions/Precautions   LUE Weight Bearing Per Order NWB   Braces or Orthoses Sling   General   Chart Reviewed Yes   Family/Caregiver Present No   Cognition   Overall Cognitive Status WFL   Attention Within functional limits   Following Commands Follows all commands and directions without difficulty   Subjective   Subjective i couldn't get then rhythm yesterday- today is better   Bed Mobility   Supine to Sit 6  Modified independent   Sit to Supine 6  Modified independent   Additional Comments bed flat and no rail x 2 trials    Transfers   Sit to Stand 6  Modified independent   Stand to Sit 6  Modified independent   Additional items (one time cue to turn all of the way prior to sitting)   Stand pivot 5  Supervision   Additional items (one time cue for full turn prior to sitting)   Ambulation/Elevation   Gait pattern Improper Weight shift; Antalgic;Narrow NANCY; Forward Flexion;Decreased foot clearance  (occ step to with R - cued for increase step length/wider NANCY)   Gait Assistance 5  Supervision   Assistive Device Small base quad cane   Distance 150' x 2   Stair Management Assistance 5  Supervision   Stair Management Technique Step to pattern  (one rail up and down ( has 2 rails at home))   Number of Stairs 6   Balance   Static Standing Fair +   Dynamic Standing Fair   Ambulatory Fair   Endurance Deficit   Endurance Deficit No   Activity Tolerance   Activity Tolerance Patient tolerated treatment well   Exercises   Balance training  repeated sit <-  stand with no UE support upin standing , 10x 2 with S  adn noted fatigue last few reps   Assessment   Prognosis Good   Assessment Gt is very slow and occ has to stop and restart 2* confusing gt pattern  No festinating gt noted today   Pt amb with head down and occ step to gt with  R LE- corrects with cues but then tends to confuse gt sequencing   Pt did very well on the steps- pt reports spouse suggests using front entrance for quicker access to  living room   4 steps with 2 rails as well as in the garage   Pt also did very well with  Sup <-> sit with bed flat and no rail x 2 reps ( mod I )   Goals   Patient Goals walk a little better   LTG Expiration Date 10/02/18   Treatment Day 5   Plan   Treatment/Interventions (cont as per POC)   Progress Improving as expected   Recommendation   Equipment Recommended [facility] ELODIA

## 2018-09-18 NOTE — ASSESSMENT & PLAN NOTE
Lab Results   Component Value Date    HGBA1C 5 8 09/11/2018       No results for input(s): POCGLU in the last 72 hours  Blood Sugar Average: Last 72 hrs:  Not on any meds at this time  Diet controlled

## 2018-09-19 LAB
ANION GAP SERPL CALCULATED.3IONS-SCNC: 5 MMOL/L (ref 5–14)
BASOPHILS # BLD AUTO: 0.1 THOUSANDS/ΜL (ref 0–0.1)
BASOPHILS NFR BLD AUTO: 1 % (ref 0–1)
BUN SERPL-MCNC: 21 MG/DL (ref 5–25)
CALCIUM SERPL-MCNC: 8.9 MG/DL (ref 8.4–10.2)
CHLORIDE SERPL-SCNC: 104 MMOL/L (ref 97–108)
CO2 SERPL-SCNC: 31 MMOL/L (ref 22–30)
CREAT SERPL-MCNC: 0.74 MG/DL (ref 0.6–1.2)
EOSINOPHIL # BLD AUTO: 0.4 THOUSAND/ΜL (ref 0–0.4)
EOSINOPHIL NFR BLD AUTO: 5 % (ref 0–6)
ERYTHROCYTE [DISTWIDTH] IN BLOOD BY AUTOMATED COUNT: 13.9 %
GFR SERPL CREATININE-BSD FRML MDRD: 81 ML/MIN/1.73SQ M
GLUCOSE P FAST SERPL-MCNC: 106 MG/DL (ref 70–99)
GLUCOSE SERPL-MCNC: 106 MG/DL (ref 70–99)
HCT VFR BLD AUTO: 34.4 % (ref 36–46)
HGB BLD-MCNC: 11.6 G/DL (ref 12–16)
LYMPHOCYTES # BLD AUTO: 2.5 THOUSANDS/ΜL (ref 0.5–4)
LYMPHOCYTES NFR BLD AUTO: 29 % (ref 20–50)
MCH RBC QN AUTO: 31.7 PG (ref 26–34)
MCHC RBC AUTO-ENTMCNC: 33.8 G/DL (ref 31–36)
MCV RBC AUTO: 94 FL (ref 80–100)
MONOCYTES # BLD AUTO: 0.8 THOUSAND/ΜL (ref 0.2–0.9)
MONOCYTES NFR BLD AUTO: 9 % (ref 1–10)
NEUTROPHILS # BLD AUTO: 4.8 THOUSANDS/ΜL (ref 1.8–7.8)
NEUTS SEG NFR BLD AUTO: 56 % (ref 45–65)
PLATELET # BLD AUTO: 214 THOUSANDS/UL (ref 150–450)
PMV BLD AUTO: 9.1 FL (ref 8.9–12.7)
POTASSIUM SERPL-SCNC: 4.2 MMOL/L (ref 3.6–5)
RBC # BLD AUTO: 3.67 MILLION/UL (ref 4–5.2)
SODIUM SERPL-SCNC: 140 MMOL/L (ref 137–147)
WBC # BLD AUTO: 8.5 THOUSAND/UL (ref 4.5–11)

## 2018-09-19 PROCEDURE — 85025 COMPLETE CBC W/AUTO DIFF WBC: CPT | Performed by: NURSE PRACTITIONER

## 2018-09-19 PROCEDURE — 97530 THERAPEUTIC ACTIVITIES: CPT

## 2018-09-19 PROCEDURE — 97116 GAIT TRAINING THERAPY: CPT

## 2018-09-19 PROCEDURE — 80048 BASIC METABOLIC PNL TOTAL CA: CPT | Performed by: NURSE PRACTITIONER

## 2018-09-19 PROCEDURE — 97535 SELF CARE MNGMENT TRAINING: CPT

## 2018-09-19 RX ADMIN — OYSTER SHELL CALCIUM WITH VITAMIN D 1 TABLET: 500; 200 TABLET, FILM COATED ORAL at 09:31

## 2018-09-19 RX ADMIN — ATENOLOL 50 MG: 50 TABLET ORAL at 09:32

## 2018-09-19 RX ADMIN — RASAGILINE 1 MG: 1 TABLET ORAL at 09:32

## 2018-09-19 RX ADMIN — OMEGA-3 FATTY ACIDS CAP 1000 MG 1000 MG: 1000 CAP at 09:32

## 2018-09-19 RX ADMIN — ASPIRIN 81 MG 81 MG: 81 TABLET ORAL at 09:31

## 2018-09-19 RX ADMIN — Medication 500 MG: at 09:32

## 2018-09-19 RX ADMIN — PAROXETINE 20 MG: 20 TABLET, FILM COATED ORAL at 09:32

## 2018-09-19 NOTE — PROGRESS NOTES
RECREATIONAL THERAPY PARTICIPATION LOG      ACTIVITY:    GAMES:        BINGO:        MUSIC STIM:        ARTS & CRAFTS:        EXERCISE: Ambulating in hallway with walker, assisted by Therapy member  CLUBS & MEETING:        SOCIALS: Declined "Lunch Florence"  SPIRITUAL:        INDEPENDENT: Reading her novel in her room  1:1:  Resident received a Recreational Therapy greeting and invitation to participate in [place] today  Resident declined, due to reading her novel  XENIA Winston

## 2018-09-19 NOTE — PHYSICAL THERAPY NOTE
25' session     09/19/18 1520   Pain Assessment   Pain Assessment No/denies pain   Restrictions/Precautions   LUE Weight Bearing Per Order NWB   Braces or Orthoses Sling   Other Precautions Fall Risk   General   Chart Reviewed Yes   Family/Caregiver Present Yes   Cognition   Overall Cognitive Status WFL   Arousal/Participation Alert; Cooperative   Attention (eaasily distracted by activity in the  kim)   Following Commands Follows one step commands without difficulty   Comments (spouse present for  education)   Subjective   Subjective yes, I was more unsteady this morning ( with OT)   Bed Mobility   Supine to Sit 6  Modified independent   Sit to Supine 6  Modified independent   Additional Comments see assessment   Transfers   Sit to Stand 6  Modified independent   Stand to Sit 6  Modified independent   Ambulation/Elevation   Gait pattern Improper Weight shift; Antalgic; Forward Flexion;Decreased foot clearance;Shuffling; Inconsistent dayday; Short stride   Gait Assistance 4  Minimal assist   Additional items Assist x 1   Assistive Device Small base quad cane   Distance 75' and 28'   Stair Management Assistance (not performed this session)   Balance   Ambulatory Poor +   Activity Tolerance   Activity Tolerance Patient tolerated treatment well   Assessment   Prognosis Good   Assessment Gt is more unsteady today with one episode of severe LOB requiring mod  A of 1 to regain balance  Pt is easily distracted by activity in the kim and had difficulty with sequencing of gt  Pt performed sup <-> sit with mod I  An dbed flat, no rail- then spouse reported bed to be higher at home  Elevated bed to approx height of bed at home and practiced 3 more times for sup <-> sit with cues for logroll for in and out of bed - pt's feet do not touch the floor after scooting back onto mattress   Improved to  Mod I after 3 trials ( S at first with   cues)    Goals   Patient Goals walk better   LTG Expiration Date 10/02/18   Treatment Day 6 Plan   Treatment/Interventions (coont as per POC)   Progress Progressing toward goals   PT Frequency (6x/week)

## 2018-09-19 NOTE — PLAN OF CARE
Problem: OCCUPATIONAL THERAPY ADULT  Goal: Performs self-care activities at highest level of function for planned discharge setting  See evaluation for individualized goals  Treatment Interventions: ADL retraining, Functional transfer training, UE strengthening/ROM, Endurance training, Patient/family training, Equipment evaluation/education, Neuromuscular reeducation, Activityengagement          See flowsheet documentation for full assessment, interventions and recommendations  Outcome: Progressing  Limitation: Decreased ADL status, Decreased UE ROM, Decreased UE strength, Decreased Safe judgement during ADL, Decreased endurance, Decreased self-care trans, Decreased high-level ADLs  Prognosis: Good  Assessment: Patient seen this AM for an ADL  Patient requesting a shower however patient not scheduled on unit for shower day and therefore patient educated in such and sponge bath preformed  Patient needed moderate cues for WBS follow thorugh during functional tasks  Patient also needs assist with shoe and socks this date which she reports  able to assist   During functional mobility patient with 1 significant LOB needing mod assist to correct-- patient to have supervision assist with all mobility due to balance deficits  At this time continued OT is recomemdned  At end of treatment session patient remains in room seated with ice pack and all needs within reach       OT Discharge Recommendation: Home with family support  OT - OK to Discharge: No

## 2018-09-19 NOTE — OCCUPATIONAL THERAPY NOTE
Occupational Therapy Treatment Note    Name:  Chelsey Neal   MRN:   [id number]  Age:     67 y o  Patient Active Problem List   Diagnosis    Parkinson's disease with use of electrical brain stimulation ([facility] )    Fracture, shoulder, left, closed, initial encounter    History of hypertension    S/P reverse total shoulder arthroplasty, left    Other hyperlipidemia    Type 2 diabetes mellitus without complication ([facility] )    Anxiety     Status post total shoulder arthroplasty, unspecified laterality [Z96 [id number]]      Subjective/Goals: "i'd like a shower"-- patient scheduled for Thursday showers and shower room occupied at time of treatment session    Vitals: 116/58 BP, 59 HR    OT total treatment time: (400-7802) 54 min    Additional goals & Comments: Patient with LOB during mobility with quad cane to bathroom needing mod assist to correct  Patient continued to need supervision with ALL mobility for safety/balance  Cues also needed for sequence with quad cane use  During ADL mod cues needed for NWB follow thorough with ed on importance  09/19/18 1004   Restrictions/Precautions   Weight Bearing Precautions Per Order Yes   LUE Weight Bearing Per Order NWB   Braces or Orthoses Sling   Other Precautions Fall Risk;WBS;Chair Alarm   Pain Assessment   Pain Assessment 0-10   Pain Score 2   Pain Type Surgical pain   Pain Location Shoulder   Pain Orientation Left   Pain Descriptors Aching   Pain Frequency Intermittent   Clinical Progression Gradually improving   Patient's Stated Pain Goal No pain   Hospital Pain Intervention(s) Medication (See MAR); Repositioned;Distraction   Diversional Activities Television   ADL   Where Assessed Chair   Grooming Comments Supervision sinkside for balance (fair balance)   UB Bathing Comments moderate verbal cues for NWB    Patient completed with Supervision needing BENNETT for PROM assist to allow management of axillary-- ed on importance to WBS follow though and risks   LB Bathing Comments patient washed with Supervision through ankles  Patient donned sling prior to bathing LE for improved follow through of WBS  Patient declined feet but does have 2 long handed sponges for use as needed  UB Dressing Comments Supervision needing moderate cues for NWB follow through-- v/c on 1 handed techniques    LB Dressing Comments Supervision except for tubigrip socks and shoes  patient reports that she will have  assist at home as needed  Patient attempted home made foot funnel but unsuccessful-- plans to educate on TCF model to determine imporved abilities during PM session   Toileting Comments Supervision with hygiene, Min assist with CM due to tightness of clothing and left UE NWB   Kitchen Mobility   Kitchen Activity Retrieve items   Kitchen Mobility Comments Close supervision with v/c WBS follow hailey   Functional Standing Tolerance   Time 5 min with mobility during ADL   Bed Mobility   Supine to Sit 6  Modified independent   Sit to Supine 6  Modified independent   Additional Comments HOB elevated and no bed rail-- increased time/effort   Transfers   Sit to Stand 6  Modified independent   Additional items Armrests; Increased time required   Stand to Sit 6  Modified independent   Additional items Armrests; Increased time required   Stand pivot 5  Supervision   Additional items Increased time required;Armrests   Functional Mobility   Functional Mobility (Supervision with 1 LOB needing moderate assist to regain)   Additional Comments 1 LOB (mod to regain)    Cognition   Overall Cognitive Status [facility]   Arousal/Participation Alert; Cooperative   Attention Within functional limits   Orientation Level Oriented X4   Memory Within functional limits   Following Commands Follows one step commands with increased time or repetition   Comments CONTINUED ED AND CUES NEEDED FOR WBS FOLLOWTHROUGH TERRY WITH FUNCTIONAL ADL TASKS   Additional Activities   Additional Activities Comments Balance: Fair/Fair- dynamic and unsupported   Activity Tolerance   Activity Tolerance Patient tolerated treatment well  (fair+)   Assessment   Assessment Patient seen this AM for an ADL  Patient requesting a shower however patient not scheduled on unit for shower day and therefore patient educated in such and sponge bath preformed  Patient needed moderate cues for WBS follow thorugh during functional tasks  Patient also needs assist with shoe and socks this date which she reports  able to assist   During functional mobility patient with 1 significant LOB needing mod assist to correct-- patient to have supervision assist with all mobility due to balance deficits  At this time continued OT is recomemdned  At end of treatment session patient remains in room seated with ice pack and all needs within reach  Plan   Treatment Interventions ADL retraining;Functional transfer training;UE strengthening/ROM; Energy conservation; Activityengagement   Goal Expiration Date 09/28/18   Treatment Day 8   OT Frequency (6x/week)   Recommendation   OT Discharge Recommendation Home with family support   OT - OK to Discharge Jeane Varma  9/19/2018

## 2018-09-19 NOTE — OCCUPATIONAL THERAPY NOTE
Occupational Therapy Treatment Note    Name:  Tasneem Orozco   MRN:   [id number]  Age:     67 y o  Patient Active Problem List   Diagnosis    Parkinson's disease with use of electrical brain stimulation ([facility] )    Fracture, shoulder, left, closed, initial encounter    History of hypertension    S/P reverse total shoulder arthroplasty, left    Other hyperlipidemia    Type 2 diabetes mellitus without complication ([facility] )    Anxiety     Status post total shoulder arthroplasty, unspecified laterality [Z96 [id number]]      Subjective/Goals: "to get home"    Vitals: 116/58 BP, 68 HR    OT total treatment time: (6413-9784) 28 min    Additional goals & Comments:       09/19/18 6638   Restrictions/Precautions   Weight Bearing Precautions Per Order Yes   LUE Weight Bearing Per Order NWB   Braces or Orthoses Sling   Other Precautions Fall Risk   Pain Assessment   Pain Assessment No/denies pain   ADL   UB Dressing Comments sling management -- MI doff, min don   Bed Mobility   Supine to Sit 5  Supervision   Additional items Increased time required;Verbal cues   Sit to Supine 6  Modified independent   Additional items Increased time required;Verbal cues   Transfers   Sit to Stand 6  Modified independent   Additional items Armrests; Increased time required   Stand to Sit 6  Modified independent   Additional items Armrests; Increased time required   Stand pivot 5  Supervision   Additional items Increased time required;Armrests   Functional Mobility   Functional Mobility 5  Supervision   Additional Comments Quad cane- cues to coordinate  Patient's  educated on recommendations for supervision with balance   ROM - Left Upper Extremities    LUE ROM Comment Supine PROM per protocal set by ortho all with good tolerance and no c/o pain  Seated scapular elevation, retraction and isometric exercise x10 all planes  Ed on importance to NWB follow through   also educated on restrictions and recommendations       Cognition Overall Cognitive Status [facility]   Arousal/Participation Alert; Cooperative   Orientation Level Oriented X4   Memory Within functional limits   Following Commands Follows all commands and directions without difficulty   Additional Activities   Additional Activities Comments Balance:  fair/Fair+ dynamic and unsupported   Activity Tolerance   Activity Tolerance Patient tolerated treatment well  (Fair+)   Assessment   Assessment Patient seen this PM for left UE ROM per protocal from ortho while supine and seated  Patient plans to d/c with spouse 9/21/18  Continue OT through scheduled DC  At end of treatment patient seated at bedside with sling donned and ice provided-- all needs within reach  Plan   Treatment Interventions ADL retraining;Functional transfer training;UE strengthening/ROM; Energy conservation; Activityengagement   Goal Expiration Date 09/28/18   Treatment Day 8   OT Frequency (6x/week)   Recommendation   OT Discharge Recommendation Home with family support   OT - OK to Discharge Jeane Brown  9/19/2018

## 2018-09-19 NOTE — PLAN OF CARE
Problem: PHYSICAL THERAPY ADULT  Goal: Performs mobility at highest level of function for planned discharge setting  See evaluation for individualized goals  Treatment/Interventions: ADL retraining, Functional transfer training, LE strengthening/ROM, Elevations, Therapeutic exercise, Endurance training, Patient/family training, Equipment eval/education, Bed mobility, Gait training, Compensatory technique education, Spoke to MD, Spoke to nursing, Spoke to case management, Spoke to advanced practitioner, OT, Family  Equipment Recommended:  (To be assessed closer to discharge)       See flowsheet documentation for full assessment, interventions and recommendations  Outcome: Progressing  Prognosis: Good  Problem List: Decreased strength, Decreased range of motion, Decreased endurance, Impaired balance, Decreased mobility, Decreased coordination, Obesity, Decreased skin integrity, Orthopedic restrictions, Pain  Assessment: Gt is more unsteady today with one episode of severe LOB requiring mod  A of 1 to regain balance  Pt is easily distracted by activity in the kim and had difficulty with sequencing of gt  Pt performed sup <-> sit with mod I  An dbed flat, no rail- then spouse reported bed to be higher at home  Elevated bed to approx height of bed at home and practiced 3 more times for sup <-> sit with cues for logroll for in and out of bed - pt's feet do not touch the floor after scooting back onto mattress  Improved to  Mod I after 3 trials ( S at first with   cues)   Barriers to Discharge: Inaccessible home environment, Decreased caregiver support     Recommendation: Defer at this time     PT - OK to Discharge: No (pt to achieve PT goals prior to D/C home)    See flowsheet documentation for full assessment

## 2018-09-19 NOTE — PLAN OF CARE
Problem: OCCUPATIONAL THERAPY ADULT  Goal: Performs self-care activities at highest level of function for planned discharge setting  See evaluation for individualized goals  Treatment Interventions: ADL retraining, Functional transfer training, UE strengthening/ROM, Endurance training, Patient/family training, Equipment evaluation/education, Neuromuscular reeducation, Activityengagement          See flowsheet documentation for full assessment, interventions and recommendations  Outcome: Progressing  Limitation: Decreased ADL status, Decreased UE ROM, Decreased UE strength, Decreased Safe judgement during ADL, Decreased endurance, Decreased self-care trans, Decreased high-level ADLs  Prognosis: Good  Assessment: Patient seen this PM for left UE ROM per protocal from ortho while supine and seated  Patient plans to d/c with spouse 9/21/18  Continue OT through scheduled DC  At end of treatment patient seated at bedside with sling donned and ice provided-- all needs within reach       OT Discharge Recommendation: Home with family support  OT - OK to Discharge: No

## 2018-09-20 PROCEDURE — 97116 GAIT TRAINING THERAPY: CPT

## 2018-09-20 PROCEDURE — 97530 THERAPEUTIC ACTIVITIES: CPT

## 2018-09-20 PROCEDURE — 97110 THERAPEUTIC EXERCISES: CPT

## 2018-09-20 RX ORDER — POLYETHYLENE GLYCOL 3350 17 G/17G
17 POWDER, FOR SOLUTION ORAL DAILY
Qty: 30 EACH | Refills: 0 | Status: SHIPPED | OUTPATIENT
Start: 2018-09-21 | End: 2019-12-13

## 2018-09-20 RX ORDER — B-COMPLEX WITH VITAMIN C
1 TABLET ORAL
Qty: 30 TABLET | Refills: 0 | Status: SHIPPED | OUTPATIENT
Start: 2018-09-21 | End: 2018-12-06

## 2018-09-20 RX ORDER — ATENOLOL 50 MG/1
50 TABLET ORAL DAILY
Qty: 30 TABLET | Refills: 0 | Status: SHIPPED | OUTPATIENT
Start: 2018-09-21

## 2018-09-20 RX ORDER — CHLORAL HYDRATE 500 MG
1000 CAPSULE ORAL DAILY
Qty: 30 CAPSULE | Refills: 0 | Status: SHIPPED | OUTPATIENT
Start: 2018-09-21 | End: 2021-04-07 | Stop reason: HOSPADM

## 2018-09-20 RX ORDER — ROSUVASTATIN CALCIUM 5 MG/1
5 TABLET, COATED ORAL EVERY OTHER DAY
Qty: 30 TABLET | Refills: 0 | Status: SHIPPED | OUTPATIENT
Start: 2018-09-22

## 2018-09-20 RX ORDER — RASAGILINE 1 MG/1
1 TABLET ORAL DAILY
Qty: 30 EACH | Refills: 0 | Status: SHIPPED | OUTPATIENT
Start: 2018-09-21 | End: 2019-03-11

## 2018-09-20 RX ADMIN — RASAGILINE 1 MG: 1 TABLET ORAL at 09:32

## 2018-09-20 RX ADMIN — ASPIRIN 81 MG 81 MG: 81 TABLET ORAL at 09:32

## 2018-09-20 RX ADMIN — ATENOLOL 50 MG: 50 TABLET ORAL at 09:32

## 2018-09-20 RX ADMIN — OYSTER SHELL CALCIUM WITH VITAMIN D 1 TABLET: 500; 200 TABLET, FILM COATED ORAL at 09:33

## 2018-09-20 RX ADMIN — Medication 500 MG: at 09:31

## 2018-09-20 RX ADMIN — ROSUVASTATIN CALCIUM 5 MG: 5 TABLET, COATED ORAL at 09:32

## 2018-09-20 RX ADMIN — PAROXETINE 20 MG: 20 TABLET, FILM COATED ORAL at 09:32

## 2018-09-20 RX ADMIN — OMEGA-3 FATTY ACIDS CAP 1000 MG 1000 MG: 1000 CAP at 09:31

## 2018-09-20 NOTE — PHYSICAL THERAPY NOTE
45' session     09/20/18 1616   Pain Assessment   Pain Assessment No/denies pain   General   Chart Reviewed Yes   Family/Caregiver Present Yes   Cognition   Attention Within functional limits   Following Commands Follows all commands and directions without difficulty   Subjective   Subjective i think i was off rhythm with the cane yesterday   Transfers   Sit to Stand 6  Modified independent   Stand to Sit 6  Modified independent   Stand pivot 5  Supervision   Additional items (occ cues to complete turn)   Ambulation/Elevation   Gait pattern Improper Weight shift; Forward Flexion;Narrow NANCY; Excessively slow   Gait Assistance (close S)   Assistive Device Small base quad cane   Distance 160' and 12' x 2   Stair Management Assistance 5  Supervision   Stair Management Technique (one rail , step to )   Number of Stairs 5   Balance   Dynamic Standing Fair  (in parrallel bars)   Ambulatory Fair -   Endurance Deficit   Endurance Deficit No   Activity Tolerance   Activity Tolerance Patient tolerated treatment well   Exercises   Balance training  side stepping , retro amb with one hand support in bars, alternating step taps with no UE use and close S to occ CG 10 x 2   Assessment   Prognosis Good   Assessment Pt's spouse present for session- pt is very slow with amb - occ stops to regroup then starts again with confusion of gt sequencing  Shuffling steps with turns with occ reminders to complete turn prior to sitting   Pt does very well on steps  Good fluid movement with use of one hand on parallel bars  Rec to pt and souse to try RW when able to use L UE   Goals   Patient Goals walk better than yesterday   LTG Expiration Date 10/02/18   Treatment Day 7   Plan   Treatment/Interventions (cont as per pOC- plan for dc to home tomorrow)   Progress Progressing toward goals   PT Frequency (6x/week)   Recommendation   Recommendation Home PT; Home with family support   Equipment Recommended (was issued QC)   PT - OK to Discharge Yes

## 2018-09-20 NOTE — PLAN OF CARE
Problem: PHYSICAL THERAPY ADULT  Goal: Performs mobility at highest level of function for planned discharge setting  See evaluation for individualized goals  Treatment/Interventions: ADL retraining, Functional transfer training, LE strengthening/ROM, Elevations, Therapeutic exercise, Endurance training, Patient/family training, Equipment eval/education, Bed mobility, Gait training, Compensatory technique education, Spoke to MD, Spoke to nursing, Spoke to case management, Spoke to advanced practitioner, OT, Family  Equipment Recommended:  (To be assessed closer to discharge)       See flowsheet documentation for full assessment, interventions and recommendations  Outcome: Adequate for Discharge  Prognosis: Good  Problem List: Decreased strength, Decreased range of motion, Decreased endurance, Impaired balance, Decreased mobility, Decreased coordination, Obesity, Decreased skin integrity, Orthopedic restrictions, Pain  Assessment: Pt's spouse present for session- pt is very slow with amb - occ stops to regroup then starts again with confusion of gt sequencing  Shuffling steps with turns with occ reminders to complete turn prior to sitting   Pt does very well on steps  Good fluid movement with use of one hand on parallel bars  Rec to pt and souse to try RW when able to use L UE  Barriers to Discharge: Inaccessible home environment, Decreased caregiver support     Recommendation: Home PT, Home with family support     PT - OK to Discharge: Yes    See flowsheet documentation for full assessment

## 2018-09-20 NOTE — SOCIAL WORK
SW met with patient to discuss discharge planning  SW confirmed with PT patient will need a narrow based quad cane  SW ordered with [address] , OOP is $45  Patient aware and agreeable  Patient reports she has toilet grab bars installed and her family picked up a shower chair for her  SW advised that [facility] has bed pads  SW provided with address if patient would like to pick-up on discharge  Family may also order on [employer]

## 2018-09-20 NOTE — PLAN OF CARE
Problem: OCCUPATIONAL THERAPY ADULT  Goal: Performs self-care activities at highest level of function for planned discharge setting  See evaluation for individualized goals  Treatment Interventions: ADL retraining, Functional transfer training, UE strengthening/ROM, Endurance training, Patient/family training, Equipment evaluation/education, Neuromuscular reeducation, Activityengagement          See flowsheet documentation for full assessment, interventions and recommendations  Outcome: Adequate for Discharge  Limitation: Decreased ADL status, Decreased UE ROM, Decreased UE strength, Decreased Safe judgement during ADL, Decreased endurance, Decreased self-care trans, Decreased high-level ADLs  Prognosis: Good  Assessment: Patient seen this date for goals as set by OTR focusing on standing tolerance/balance, mobility with quad cane, right UE strengtheing, ed with NWB left UE and use of sling, and LE management with shoes using AE  Patient recommended to have  present for mobility with extra caution for turns- patient agrees  Patient plans for d/c home with spouse 9/21/18  OTR to sarah this PM   Patient remains in room with all needs within reach at end of treatment session  OT Discharge Recommendation: Home with family support (outpatient OT)  OT - OK to Discharge:  Yes

## 2018-09-20 NOTE — OCCUPATIONAL THERAPY NOTE
OT TX (30 mins): Upon entry pt sitting in chair  Educated pt on being her final session in prep for discharge home tomorrow; agreed  Denies pain  SPO2 96% RA HR 62  Reviewed AE/DME for home as follows: NBQC, foot funnel, and UE sing   purchased toilet riser with rails and ordered bed pads  Pt confirms she already has shower chair at home  R UE MMT: 5/5 t/o all planes  Educated pt on keeping up with R UE there ex to maximize strength potential  Pt agreed and states her son bought her 3 lb hand weights  Reinforced using on R UE only  OT educated pt that she will be allowed to start week 4-10 of shoulder protocol  OT advised she would not start today, due to no follow up tomorrow 2* discharge  Recommend pt have her protocol available to home therapy to ensure no delay; agreed  However, OT reviewed the next steps in the protocol   entered room  OT answered all questions in regards to pt's L shld progress and the next phases  Also, reinforced to  that he should monitor pt's NWB status of L UE due to her wanting to use   states " I will because I see her using it too!"     Completed PROM: L shld flexion x 5 reps, L shld abduction x 5 reps, and L shld external rotation x 5 reps  OT noted upper scapular muscle to be very tight  Performed gentle massage x 5 mins to loosen  Pt states "that feels good " Performed AROM L elbow flexion x 10 reps; jerky muscles  Educated pt on that being weakness and will improve when allowed to strengthen; agreed  Denies pain t/o PROM & AROM  Pt remains in chair with all needs and  present; sling intact  No further questions or concerns  Per SS, NBQC to be delivered tomorrow  Good- activity tolerance  Ice pack applied  D/c'd from OT  OCCUPATIONAL THERAPY DISCHARGE SUMMARY      DISPOSITION: Home tomorrow 9/21/18 with   AE/DME: NBQC; Foot funnel; and UE sling   purchased toilet rise with rails for home   Pt states she already has shower chair at home as well  Pt and  to purchase bed pads from [id number]  DISCHARGE SUMMARY: Pt with scheduled discharge home tomorrow with   Participated in a total of 10/10 OT sessions  Pt achieved 8/12 goals as per POC  Deficits remain in grooming, toileting, ADL txfs, and stand balance   to assist with these areas due to remaining NWB on L UE  Pt will also have VNA services to ensure safety in own environment and maximize function  Recommend home therapy focus on remaining deficits and continue with shoulder protocol (advance to week 4-10)  Pt is safe to return home with support from  and services  No further concerns noted  RECOMMENDATIONS: VNA services & assist from spouse PRN  REMAINS NWB L UE and needs reinforcement due to no pain       Alicia Milan, OT

## 2018-09-21 ENCOUNTER — DOCUMENTATION (OUTPATIENT)
Dept: INPATIENT UNIT | Facility: HOSPITAL | Age: 73
End: 2018-09-21

## 2018-09-21 VITALS
HEART RATE: 65 BPM | RESPIRATION RATE: 18 BRPM | SYSTOLIC BLOOD PRESSURE: 150 MMHG | OXYGEN SATURATION: 96 % | DIASTOLIC BLOOD PRESSURE: 74 MMHG | BODY MASS INDEX: 37.85 KG/M2 | TEMPERATURE: 96.2 F | HEIGHT: 67 IN | WEIGHT: 241.18 LBS

## 2018-09-21 PROCEDURE — 99316 NF DSCHRG MGMT 30 MIN+: CPT | Performed by: FAMILY MEDICINE

## 2018-09-21 RX ORDER — PAROXETINE HYDROCHLORIDE 20 MG/1
20 TABLET, FILM COATED ORAL DAILY
Qty: 30 TABLET | Refills: 0 | Status: SHIPPED | OUTPATIENT
Start: 2018-09-22

## 2018-09-21 RX ADMIN — ATENOLOL 50 MG: 50 TABLET ORAL at 08:39

## 2018-09-21 RX ADMIN — OYSTER SHELL CALCIUM WITH VITAMIN D 1 TABLET: 500; 200 TABLET, FILM COATED ORAL at 08:39

## 2018-09-21 RX ADMIN — RASAGILINE 1 MG: 1 TABLET ORAL at 08:38

## 2018-09-21 RX ADMIN — ASPIRIN 81 MG 81 MG: 81 TABLET ORAL at 08:38

## 2018-09-21 RX ADMIN — OMEGA-3 FATTY ACIDS CAP 1000 MG 1000 MG: 1000 CAP at 08:39

## 2018-09-21 RX ADMIN — PAROXETINE 20 MG: 20 TABLET, FILM COATED ORAL at 08:39

## 2018-09-21 RX ADMIN — Medication 500 MG: at 08:39

## 2018-09-21 NOTE — PHYSICAL THERAPY NOTE
Physical Therapy Discharge Summary    Pt is discharged from skilled PT effective today, 9/21/18  Pt left [facility] Unit to home with  this morning  Since 9/17/18 PT Progress Note, pt has been seen for 3/3 skilled PT tx sessions for therex, theract, and gait/elevation training  Improvement assessed with: functional strength, balance, sit <->stand transfers, and gait/elevations  Pt continues to demonstrate consistent ability to maintain LUE NWB in sling  Please see goals below for details on pt's functional mobility status at discharge  LTGs ( To be performed with appropriate LUE WBS and sling as indicated ->  Expiration Date: 10/02/18)     1  Patient will perform sit to supine transfer ( HOB flat, no rail) with MOD I ( in order to get into bed) ACHIEVED CONSISTENTLY     2    Patient will perform  supine to sit transfer ( HOB flat, no rail) with MOD I ( in order to get out of bed)   ACHIEVED CONSISTENTLY     3  Patient will perform all functional transfers with: MOD I in a supervised environment ( in order to  transfer from one surface to another) PARTIALLY ACHIEVED ( for sit <->stand transfers, but not for SPT)  CURRENTLY: sit <->stand with MOD I  CURRENTLY: SPT with quad cane and Supervision      4    Patient will ambulate with a quad cane vs alternative AD for > or =  50 feet with MOD I in a supervised environment ( to simulate a short household distance) PARTIALLY ACHIEVED ( for distance ; but not for assistance level)  CURRENTLY: Pt ambulated with a NBQC for 150 feet with Supervision ( 9/18/18), 75 feet with MIN A ( 9/19/18), and 160 feet with Close S ( 9/20/18)     5  Patient will ascend/descend 5 steps with a handrail, with Supervision( to allow pt to safely enter and exit home)  ACHIEVED  CURRENTLY: Pt negotiated 5 to 6 steps with right hand on handrail  with: Supervision         Recommendations:  1  Home PT to maximize safe mobility within pt's personal environment; to decrease fall risk     2  24/7 Supervision and assistance as needed;  with all aspects of care and functional mobility    3  Continue with LUE NWB (in sling) and other precautions; until cleared/upgraded by ortho

## 2018-09-21 NOTE — ASSESSMENT & PLAN NOTE
She was admitted to the hospital  On 8/22/18 and underwent an uncomplicated left reverse total shoulder arthroplasty, 2/2 fall at home in her bathroom  She was found to have a comminuted left proximal humerus fracture with anterior dislocation  She was sent to [facility] rehab, and now [facility] for continued PT/OT  Continue scheduled tylenol for now  Pt denies pain at this time  She has LUE sling on    DC today

## 2018-09-21 NOTE — DISCHARGE SUMMARY
Discharge- Kwesi Nation [id number], 67 y o  female MRN: [id number]    Unit/Bed#: [facility] 554-01 Encounter: [id number]    Primary Care Provider: Diogo Cintron MD   Date and time admitted to hospital: 9/10/2018 12:47 PM        Anxiety   Assessment & Plan    Controlled on paxil 20mg daily  Type 2 diabetes mellitus without complication [facility])   Assessment & Plan    Lab Results   Component Value Date    HGBA1C 5 8 09/11/2018       No results for input(s): POCGLU in the last 72 hours  Blood Sugar Average: Last 72 hrs:  Not on any meds at this time  Diet controlled  Other hyperlipidemia   Assessment & Plan    Continue crestor and fish oil  S/P reverse total shoulder arthroplasty, left   Assessment & Plan    NWB to LUE  Sling at all times except during PT  ASA 81mg daily for dvt ppx  Stable for DC today        History of hypertension   Assessment & Plan    Controlled on atenolol 50mg daily  Parkinson's disease with use of electrical brain stimulation [facility])   Assessment & Plan    Continue Azilect  Sinemet d/c'd for confusion  Pt states she was hallucinating and this stopped since sinemet was discontinued  Tremors under control  Has brain stimulator  Maintain fall precautions  Pt states she has a problem with her legs freezing, which is what precipitated her fall  * Fracture, shoulder, left, closed, initial encounter   Assessment & Plan    She was admitted to the hospital  On 8/22/18 and underwent an uncomplicated left reverse total shoulder arthroplasty, 2/2 fall at home in her bathroom  She was found to have a comminuted left proximal humerus fracture with anterior dislocation  She was sent to [facility] rehab, and now [facility] for continued PT/OT  Continue scheduled tylenol for now  Pt denies pain at this time  She has LUE sling on    DC today               Discharging Physician / Practitioner: Chely Hoff MD  PCP: Diogo Cintron MD  Admission Date:   Discharge Date: 09/21/18    Resolved Problems  Date Reviewed: 9/21/2018    None          Consultations During Hospital Stay:  · Physical medicine     Procedures Performed:     · none    Significant Findings / Test Results:     · none    Incidental Findings:   · none     Test Results Pending at Discharge (will require follow up):   · none     Outpatient Tests Requested:  · none    Complications:  none    Reason for Admission: rehab    Hospital Course:     Kim He is a 67 y o  female patient who originally presented to the hospital on 9/10/2018 due to ambulatory dysfunction after sustaining a fall at home and undergo a left reverse shoulder arthroplasty  [facility] and then continue her therapy at transitional care facility  Her pain medications were adjusted she had no complications while here labs were stable  Pain is controlled she is deemed medically clear to be discharged home  Please see above list of diagnoses and related plan for additional information  Condition at Discharge: stable     Discharge Day Visit / Exam:     Subjective:  Patient seen examined denies any shoulder pain denies any chest pain or shortness of breath nausea vomiting diarrhea abdominal pain patient decided to go home  Vitals: Blood Pressure: 150/74 (09/21/18 0750)  Pulse: 65 (09/21/18 0750)  Temperature: (!) 96 2 °F (35 7 °C) (09/21/18 0750)  Temp Source: Temporal (09/21/18 0750)  Respirations: 18 (09/21/18 0750)  Height: 5' 7" (170 2 cm) (09/10/18 1301)  Weight - Scale: 109 kg (241 lb 2 9 oz) (09/19/18 0656)  SpO2: 96 % (09/20/18 1530)  Exam:   Physical Exam   Constitutional: She is oriented to person, place, and time  obese   HENT:   Head: Normocephalic and atraumatic  Eyes: EOM are normal  Pupils are equal, round, and reactive to light  Neck: Normal range of motion  Cardiovascular: Normal rate, regular rhythm and normal heart sounds      Pulmonary/Chest: Effort normal and breath sounds normal  Abdominal: Soft  Bowel sounds are normal    Musculoskeletal: Normal range of motion  She exhibits edema (trace b/l lower extremity)  Left sling    Neurological: She is alert and oriented to person, place, and time  She has normal reflexes  Skin: Skin is warm  Psychiatric: She has a normal mood and affect  Discussion with Family: patient    Discharge instructions/Information to patient and family:   See after visit summary for information provided to patient and family  Provisions for Follow-Up Care:  See after visit summary for information related to follow-up care and any pertinent home health orders  Disposition:     Home    For Discharges to   [facility] SN[facility]:   · Not Applicable to this Patient - Not Applicable to this Patient    Planned Readmission: no     Discharge Statement:  I spent >35 minutes discharging the patient  This time was spent on the day of discharge  I had direct contact with the patient on the day of discharge  Greater than 50% of the total time was spent examining patient, answering all patient questions, arranging and discussing plan of care with patient as well as directly providing post-discharge instructions  Additional time then spent on discharge activities  Discharge Medications:  See after visit summary for reconciled discharge medications provided to patient and family        ** Please Note: This note has been constructed using a voice recognition system **

## 2018-09-22 NOTE — NURSING NOTE
Discharge instructions given to patient  Medications reviewed  Instructed no weight bearing until surgeon says ok to use and to keep using sling until instructed not to  Also instructed to call surgeon with any problems or concerns  Discharged home

## 2018-10-05 ENCOUNTER — APPOINTMENT (OUTPATIENT)
Dept: RADIOLOGY | Facility: CLINIC | Age: 73
End: 2018-10-05
Payer: MEDICARE

## 2018-10-05 ENCOUNTER — OFFICE VISIT (OUTPATIENT)
Dept: OBGYN CLINIC | Facility: MEDICAL CENTER | Age: 73
End: 2018-10-05

## 2018-10-05 VITALS — HEART RATE: 63 BPM | SYSTOLIC BLOOD PRESSURE: 124 MMHG | DIASTOLIC BLOOD PRESSURE: 78 MMHG

## 2018-10-05 DIAGNOSIS — Z96.612 S/P REVERSE TOTAL SHOULDER ARTHROPLASTY, LEFT: ICD-10-CM

## 2018-10-05 DIAGNOSIS — Z96.612 S/P REVERSE TOTAL SHOULDER ARTHROPLASTY, LEFT: Primary | ICD-10-CM

## 2018-10-05 PROCEDURE — 73030 X-RAY EXAM OF SHOULDER: CPT

## 2018-10-05 PROCEDURE — 99024 POSTOP FOLLOW-UP VISIT: CPT | Performed by: ORTHOPAEDIC SURGERY

## 2018-10-05 NOTE — PROGRESS NOTES
Orthopaedic Surgery - Office Note  Patel Hawk (34 y o  female)   : 1945   MRN: [id number]  Encounter Date: 10/5/2018    Chief Complaint   Patient presents with    Left Shoulder - Follow-up, Fracture       Assessment / Plan  s/p left reverse total shoulder arthroplasty with biceps tenotomy on 18    · X-rays show intact hardware with normal alignment   · Discontinue shoulder sling  · Start formal physical therapy for PROM and AROM in all planes, referral provided  · No heavy lifting or driving  · Repeat x-rays in 6 weeks  · Return in about 6 weeks (around 2018)  History of Present Illness  Patel Hawk is a 67 y o  female who presents for follow up of left shoulder s/p left reverse total shoulder arthroplasty with biceps tenotomy on 18  She reports that she is doing well and has no major pain concerns  She is performing home PT and tolerating it well and is able to get her arm to shoulder height  She mentions that home PT thinks she can progress to formal PT at this point  Denies pain, swelling, numbness, tingling of the left upper extremity  Review of Systems  Pertinent items are noted in HPI  All other systems were reviewed and are negative  Physical Exam  /78   Pulse 63   Cons: Appears well  No apparent distress  Psych: Alert  Oriented x3  Mood and affect normal   Eyes: PERRLA, EOMI  Resp: Normal effort  No audible wheezing or stridor  CV: Palpable pulse  No discernable arrhythmia  Lymph:  No palpable cervical, axillary, or inguinal lymphadenopathy  Skin: Warm  No palpable masses  No visible lesions  Neuro: Normal muscle tone  Normal and symmetric DTR's  Left Shoulder Exam  Alignment / Posture:  Normal shoulder posture  Inspection:  No swelling  No erythema  No ecchymosis  Incision site well healed  Palpation:  No tenderness  No effusion  ROM:  Shoulder FE AROM 80 degrees, PROM 110 degrees  Shoulder ER PROM 20 degrees   Shoulder IR to lumbar spine   Strength:  Supraspinatus 4/5  Infraspinatus 4/5  Subscapularis 4/5  Stability:  Not tested  Tests: No pertinent positive or negative tests  Neurovascular:  Sensation intact in Ax/R/M/U nerve distributions  2+ radial pulse  Studies Reviewed  I have personally reviewed pertinent films in PACS  Four view x-ray of the left shoulder shows intact hardware with normal alignment  Procedures  No procedures today  Medical, Surgical, Family, and Social History  The patient's medical history, family history, and social history, were reviewed and updated as appropriate  Past Medical History:   Diagnosis Date    Anxiety     [facility] mellitus [facility]     Diabetes mellitus type 2, diet-controlled ([facility] [facility]     Hyperlipidemia     Hypertension     Parkinson disease ([facility] [facility]        Past Surgical History:   Procedure Laterality Date    ACHILLES TENDON SURGERY      DEEP BRAIN STIMULATOR PLACEMENT      DENTAL SURGERY      REPLACEMENT TOTAL KNEE      REVERSE TOTAL SHOULDER ARTHROPLASTY Left 8/23/2018    Procedure: ARTHROPLASTY SHOULDER REVERSE;  Surgeon: Mike Boudreaux MD;  Location: [facility];  Service: Orthopedics    TONSILLECTOMY         Family History   Problem Relation Age of Onset    Arthritis Mother        Social History     Occupational History    Not on file       Social History Main Topics    Smoking status: Never Smoker    Smokeless tobacco: Never Used    Alcohol use No    Drug use: No    Sexual activity: No       Allergies   Allergen Reactions    Sulfa Antibiotics Hives         Current Outpatient Prescriptions:     acetaminophen (TYLENOL) 650 mg CR tablet, Take 650 mg by mouth every 8 (eight) hours as needed for mild pain, Disp: , Rfl:     aspirin 81 MG tablet, Take 1 tablet by mouth daily, Disp: , Rfl:     atenolol (TENORMIN) 50 mg tablet, Take 1 tablet (50 mg total) by mouth daily, Disp: 30 tablet, Rfl: 0    Cinnamon 500 MG TABS, Take 2 tablets by mouth, Disp: , Rfl:     Coenzyme Q10 400 MG CAPS, Take 1 capsule (400 mg total) by mouth daily, Disp: 30 capsule, Rfl: 0    Omega-3 Fatty Acids (FISH OIL) 1,000 mg, Take 1 capsule (1,000 mg total) by mouth daily, Disp: 30 capsule, Rfl: 0    PARoxetine (PAXIL) 20 mg tablet, Take 1 tablet (20 mg total) by mouth daily, Disp: 30 tablet, Rfl: 0    polyethylene glycol (MIRALAX) 17 g packet, Take 17 g by mouth daily, Disp: 30 each, Rfl: 0    rasagiline (AZILECT) 1 MG, Take 1 tablet (1 mg total) by mouth daily, Disp: 30 each, Rfl: 0    rosuvastatin (CRESTOR) 5 mg tablet, Take 1 tablet (5 mg total) by mouth every other day, Disp: 30 tablet, Rfl: 0    calcium carbonate-vitamin D (OSCAL-D) 500 mg-200 units per tablet, Take 1 tablet by mouth daily with breakfast (Patient not taking: Reported on 10/5/2018 ), Disp: 30 tablet, Rfl: 0      Libby Salinas MD    Scribe Attestation    I,:    am acting as a scribe while in the presence of the attending physician :        I,:    personally performed the services described in this documentation    as scribed in my presence :

## 2018-10-08 ENCOUNTER — EVALUATION (OUTPATIENT)
Dept: PHYSICAL THERAPY | Facility: MEDICAL CENTER | Age: 73
End: 2018-10-08
Payer: MEDICARE

## 2018-10-08 DIAGNOSIS — Z96.612 S/P REVERSE TOTAL SHOULDER ARTHROPLASTY, LEFT: Primary | ICD-10-CM

## 2018-10-08 PROCEDURE — 97161 PT EVAL LOW COMPLEX 20 MIN: CPT

## 2018-10-08 PROCEDURE — G8984 CARRY CURRENT STATUS: HCPCS

## 2018-10-08 PROCEDURE — 97140 MANUAL THERAPY 1/> REGIONS: CPT

## 2018-10-08 PROCEDURE — G8985 CARRY GOAL STATUS: HCPCS

## 2018-10-08 NOTE — PROGRESS NOTES
PT Evaluation     Today's date: 10/8/2018  Patient name: Hailee Olmedo  :   MRN: [id number]  Referring provider: Alejandro Ching MD  Dx:   Encounter Diagnosis     ICD-10-CM    1  S/P reverse total shoulder arthroplasty, left Z96 612 Ambulatory referral to Physical Therapy                  Assessment  Impairments: abnormal or restricted ROM, activity intolerance, impaired physical strength and pain with function  Functional limitations: unable to use L UE for majoroty of her ADL's  Assessment details: Pt is a 67year old RHD female who presents to PT s/p L shoulder reverse TSA (18)  She presents with decreased A/PROM in her L shoulder with firm end feel  She also has limited L forearm supination most likely due to wearing sling for several weeks  She will benefit from skilled PT to help pt regain functional ROM in her L shoulder, eventually increase strength in her deltoid, and improve her overall functioning     Barriers to therapy: Unable to drive  Understanding of Dx/Px/POC: good   Prognosis: good    Goals  STG (2-3 weeks)  1: increase PROM 10-15 degrees  2: increase AROM 10 degrees  3: Pt independent with HEP    LTG (4-6 weeks) (9-12 weeks s/p)  1: Pt able to raise arm above shoulder level without pain  2: full distal AROM in L UE  3: strength to GHJ rotators 4 to 4+/5  4: deltoid 4/5    Plan  Patient would benefit from: skilled physical therapy  Planned modality interventions: cryotherapy  Planned therapy interventions: manual therapy, neuromuscular re-education, patient education, stretching, therapeutic activities, therapeutic exercise, strengthening, home exercise program and functional ROM exercises  Other planned therapy interventions: strengthening - minimal isoemtrics to elbow and forearm  Frequency: 2x week  Duration in visits: 12  Duration in weeks: 6  Plan of Care beginning date: 10/8/2018  Plan of Care expiration date: 2018  Treatment plan discussed with: patient  Plan details: Initiate PT as per POC        Subjective Evaluation    History of Present Illness  Date of onset: 2018  Date of surgery: 2018  Mechanism of injury: Putting something in closet, tried grabbing onto tile wall, fell on L side  Went to see family doctor the next day  Was sent to ER for x-ray  Saw surgeon last week, D/C sling  Home PT 2 weeks, was doing pendulums, table slides  No previous injuries to L shoulder  Denies parasthesias      Not a recurrent problem   Quality of life: good    Pain  Current pain ratin  At best pain ratin  At worst pain ratin  Location: L shoulder, pain with movement  Quality: dull ache  Relieving factors: rest and ice  Progression: improved    Social Support  Lives with: spouse    Employment status: not working  Hand dominance: right    Treatments  Previous treatment: home therapy  Discharged from (in last 30 days): home health care  Patient Goals  Patient goals for therapy: improved balance, increased motion, decreased pain, independence with ADLs/IADLs and increased strength          Objective     Tenderness     Additional Tenderness Details  lateral deltoid tenderness    Active Range of Motion   Left Shoulder   Flexion: 85 degrees   Abduction: 75 degrees   External rotation BTH: Active external rotation behind the head: R ear       Right Shoulder   Flexion: 125 degrees   Abduction: 130 degrees   External rotation BTH: C7     Additional Active Range of Motion Details  Full elbow ext/flex AROM  Supination L 55: 75  Pronation: full BL 85  IR avoided    Passive Range of Motion     Right Shoulder   Flexion: 110 degrees   Abduction: 95 degrees   External rotation 45°: 15 degrees   Internal rotation 45°: 30 degrees     Additional Passive Range of Motion Details  firm end feel all ranges          Precautions: DM, parkinson's with deep brain stimulator, HTN, amb with RW- will try to transition back to [facility]  R shoulder reverse TSA - protocol  DOS 18    Daily Treatment Diary     Manual              R shoulder PROM             Gentle isometrics elbow, FA, and wrist                                                        Exercise Diary              Cane overhead             Michi Massa ER             Active IR/ER in neutral                          Seated elbow ext/flex             Seated sup/pron                          lucretia                          Wall slides                                                                                                                                                   Modalities              CP post TE                                           HEP:table slides, doorway stretch, cane overhead, cane ER, forearm sup/pron with small object

## 2018-10-10 ENCOUNTER — OFFICE VISIT (OUTPATIENT)
Dept: PHYSICAL THERAPY | Facility: MEDICAL CENTER | Age: 73
End: 2018-10-10
Payer: MEDICARE

## 2018-10-10 DIAGNOSIS — Z96.612 S/P REVERSE TOTAL SHOULDER ARTHROPLASTY, LEFT: Primary | ICD-10-CM

## 2018-10-10 DIAGNOSIS — R26.9 ABNORMAL GAIT: ICD-10-CM

## 2018-10-10 PROCEDURE — 97140 MANUAL THERAPY 1/> REGIONS: CPT

## 2018-10-10 PROCEDURE — 97110 THERAPEUTIC EXERCISES: CPT

## 2018-10-10 PROCEDURE — 97116 GAIT TRAINING THERAPY: CPT

## 2018-10-10 NOTE — PROGRESS NOTES
Daily Note     Today's date: 10/10/2018  Patient name: Brad Aviles  : [birth year]  MRN: [id number]  Referring provider: Dennis Roberson MD  Dx:   Encounter Diagnosis     ICD-10-CM    1  S/P reverse total shoulder arthroplasty, left L19 921                   Subjective: Pt saw her doctor at [facility], advised her to start therapy for her gait and balance but still make shoulder priority  No problems with HEP  Objective: See treatment diary below      Assessment: Tolerated treatment well  Patient exhibited good technique with therapeutic exercises Gait/balance training may be more of a concern  once pt is able to do light strengthening with shoulder so she can be more protected  Gait training today focused on amb with SPC vs  RW  Pt was able to do step-to 3-pt gait with SPC amb about 100 ft with SPC  Advised pt and her  to keep RW with her when they are out of the house in case she becomes fatigued or feels unbalanced  Pt had some muscle guarding today more with biceps when trying to passively stretch in ER  Improve supination of L forearm  Pt required VC's for proper technique for her exercises  Plan: Progress note during next visit       Precautions: DM, parkinson's with deep brain stimulator, HTN, amb with RW- will try to transition back to [facility]  R shoulder reverse TSA - protocol  DOS 18    Daily Treatment Diary     Manual  10/10            R shoulder PROM 15            Gentle isometrics elbow, FA, and wrist 5                                       20                Exercise Diary  10/10            Cane overhead 15x            Cane ER 15x            Active IR/ER in neutral 10x                         Seated elbow ext/flex 20x            flexbar towel twisting 20x  yellow            Seated scap squeezes 15x            pulley 3 min                         Wall slides 10x Gait training 10 min             15/10                Modalities  10/10            CP post TE NP                                          HEP:table slides, doorway stretch, cane overhead, cane ER, forearm sup/pron with small object

## 2018-10-15 ENCOUNTER — OFFICE VISIT (OUTPATIENT)
Dept: PHYSICAL THERAPY | Facility: MEDICAL CENTER | Age: 73
End: 2018-10-15
Payer: MEDICARE

## 2018-10-15 DIAGNOSIS — Z96.612 S/P REVERSE TOTAL SHOULDER ARTHROPLASTY, LEFT: Primary | ICD-10-CM

## 2018-10-15 PROCEDURE — 97112 NEUROMUSCULAR REEDUCATION: CPT

## 2018-10-15 PROCEDURE — 97140 MANUAL THERAPY 1/> REGIONS: CPT

## 2018-10-15 PROCEDURE — 97110 THERAPEUTIC EXERCISES: CPT

## 2018-10-15 NOTE — PROGRESS NOTES
Daily Note     Today's date: 10/15/2018  Patient name: Seymour Mcdonough  :   MRN: [id number]  Referring provider: Ken You MD  Dx:   Encounter Diagnosis     ICD-10-CM    1  S/P reverse total shoulder arthroplasty, left Z96 612                   Subjective: AA shoulder flexion in supine gives her the most soreness after out of all other home exercises  Objective: See treatment diary below      Assessment: Tolerated treatment well  Patient would benefit from continued PT Pt rpeorted feeling good in overhead stretch in supine after maualls  Pt's main lmitation is in ER especially with shoulder in neutral  Added ball roll on table and weight to bicep curls-two handed  Pt tolerated session well  Continue to work on ER ROM  Plan: Continue per plan of care       Precautions: DM, parkinson's with deep brain stimulator, HTN, amb with RW- will try to transition back to [address] [place] [address]  R shoulder reverse TSA - protocol  DOS 18    Daily Treatment Diary     Manual  10/10 10/15           R shoulder PROM 15 15           Gentle isometrics elbow, FA, and wrist 5                                       20 15               Exercise Diary  10/10 10/15           Cane overhead 15x 15x           Cane ER 15x 15x           Active IR/ER in neutral 10x                         Seated elbow ext/flex 20x 20x 2# cuff on bar           flexbar towel twisting 20x  yellow 20x yellow           Seated scap squeezes 15x 15x           pulley 3 min 5 min                        Wall slides 10x Ball roll on plinth 15x                                                                                                                   Gait training 10 min             15/10 15/10               Modalities  10/10 10/15           CP post TE NP                                          HEP:table slides, doorway stretch, cane overhead, cane ER, forearm sup/pron with small object

## 2018-10-17 ENCOUNTER — OFFICE VISIT (OUTPATIENT)
Dept: PHYSICAL THERAPY | Facility: MEDICAL CENTER | Age: 73
End: 2018-10-17
Payer: MEDICARE

## 2018-10-17 DIAGNOSIS — Z96.612 S/P REVERSE TOTAL SHOULDER ARTHROPLASTY, LEFT: Primary | ICD-10-CM

## 2018-10-17 PROCEDURE — 97140 MANUAL THERAPY 1/> REGIONS: CPT

## 2018-10-17 PROCEDURE — 97112 NEUROMUSCULAR REEDUCATION: CPT

## 2018-10-17 PROCEDURE — 97110 THERAPEUTIC EXERCISES: CPT

## 2018-10-17 NOTE — PROGRESS NOTES
Daily Note     Today's date: 10/17/2018  Patient name: Dariusz Jacome  :   MRN: [id number]  Referring provider: Dima Parra MD  Dx:   Encounter Diagnosis     ICD-10-CM    1  S/P reverse total shoulder arthroplasty, left Z96 612                   Subjective: L shoulder feeling okay  Objective: See treatment diary below      Assessment: Tolerated treatment well  Patient exhibited good technique with therapeutic exercises and would benefit from continued PT Initiated sub-maximal contraction of IR/ER and deltoid (50% effort)  ER has improved by about 10-15 degrees with shoulder is neutral  Added active scap pinches and shoulder ER with shoulder in neutral for home  Plan: Continue per plan of care     recautions: DM, parkinson's with deep brain stimulator, HTN, amb with RW- will try to transition back to [address] [place] [address]  R shoulder reverse TSA - protocol  DOS 18    Daily Treatment Diary     Manual  10/10 10/15 10/17          R shoulder PROM 15 15 15          Gentle isometrics elbow, FA, and wrist 5  5 IR/ER, deltoid 50% effort                                     20 15 20              Exercise Diary  10/10 10/15 10/17          Cane overhead 15x 15x 15x          Cane ER 15x 15x 15x seated          Active IR/ER in neutral 10x  10x                       Seated elbow ext/flex 20x 20x 2# cuff on bar 2# cuff on bar          flexbar towel twisting 20x  yellow 20x yellow 20x red + sup/pron          Seated scap squeezes 15x 15x 15x          pulley 3 min 5 min 5 min                       Wall slides 10x Ball roll on plinth 15x Ball roll on plinth flex/abd 15x each                                                                                                                  Gait training 10 min             15/10 15/10 15/15              Modalities  10/10 10/15 10/17          CP post TE NP                                          HEP:table slides, doorway stretch, cane overhead, cane ER, forearm sup/pron with small object

## 2018-10-22 ENCOUNTER — OFFICE VISIT (OUTPATIENT)
Dept: PHYSICAL THERAPY | Facility: MEDICAL CENTER | Age: 73
End: 2018-10-22
Payer: MEDICARE

## 2018-10-22 DIAGNOSIS — R26.9 ABNORMAL GAIT: ICD-10-CM

## 2018-10-22 DIAGNOSIS — Z96.612 S/P REVERSE TOTAL SHOULDER ARTHROPLASTY, LEFT: Primary | ICD-10-CM

## 2018-10-22 PROCEDURE — 97140 MANUAL THERAPY 1/> REGIONS: CPT

## 2018-10-22 PROCEDURE — 97112 NEUROMUSCULAR REEDUCATION: CPT

## 2018-10-22 PROCEDURE — 97110 THERAPEUTIC EXERCISES: CPT

## 2018-10-22 NOTE — PROGRESS NOTES
Daily Note     Today's date: 10/22/2018  Patient name: Nay Dela Cruz  :   MRN: [id number]  Referring provider: Luis Mccormick MD  Dx:   Encounter Diagnosis     ICD-10-CM    1  S/P reverse total shoulder arthroplasty, left Z96 612    2  Abnormal gait R26 9                   Subjective:  L shoulder feeling sore today along L upper arm  Son came in with her concerned she is not using AD properly  Advised her and her son to bring in [facility] [place] to practice gait pattern, to use walker outside of home when necessary and SPC inside and outside of home  Objective: See treatment diary below      Assessment: Tolerated treatment well  Patient exhibited good technique with therapeutic exercises and would benefit from continued PT  Passive ER in neutral 35 degrees  Pt still limited in active ER  Added AA scaption in standing, pt tolerated well, mild fatigue at the end of reps  Review gait pattern NV  Plan: Continue per plan of care     recautions: DM, parkinson's with deep brain stimulator, HTN, amb with RW- will try to transition back to [facility]  R shoulder reverse TSA - protocol  DOS 18    Daily Treatment Diary     Manual  10/10 10/15 10/17 10/22         R shoulder PROM 15 15 15 15         Gentle isometrics elbow, FA, and wrist 5  5 IR/ER, deltoid 50% effort 5                                    20 15 20 20             Exercise Diary  10/10 10/15 10/17 10/22         Cane overhead 15x 15x 15x 15x         Cane ER 15x 15x 15x seated 15x         Active IR/ER in neutral 10x  10x                       Seated elbow ext/flex 20x 20x 2# cuff on bar 2# cuff on bar 3# cuff on bar         flexbar towel twisting 20x  yellow 20x yellow 20x red + sup/pron 20x Red + sup/pron         Seated scap squeezes 15x 15x 15x 15x tactile cueing         pulley 3 min 5 min 5 min 5 min                      Wall slides 10x Ball roll on plinth 15x Ball roll on plinth flex/abd 15x each 15x each         Doorway stretch    5x20" Standing AA scaption with stick    15x                                                                                       Gait training 10 min   [place]          15/10 15/10 15/15 15/15             Modalities  10/10 10/15 10/17 10/22         CP post TE NP                                          HEP:table slides, doorway stretch, cane overhead, cane ER, forearm sup/pron with small object

## 2018-10-24 ENCOUNTER — OFFICE VISIT (OUTPATIENT)
Dept: PHYSICAL THERAPY | Facility: MEDICAL CENTER | Age: 73
End: 2018-10-24
Payer: MEDICARE

## 2018-10-24 DIAGNOSIS — Z96.612 S/P REVERSE TOTAL SHOULDER ARTHROPLASTY, LEFT: Primary | ICD-10-CM

## 2018-10-24 DIAGNOSIS — R26.9 ABNORMAL GAIT: ICD-10-CM

## 2018-10-24 PROCEDURE — 97112 NEUROMUSCULAR REEDUCATION: CPT

## 2018-10-24 PROCEDURE — 97110 THERAPEUTIC EXERCISES: CPT

## 2018-10-24 PROCEDURE — 97140 MANUAL THERAPY 1/> REGIONS: CPT

## 2018-10-24 NOTE — PROGRESS NOTES
Daily Note     Today's date: 10/24/2018  Patient name: Laan Gupta  :   MRN: [id number]  Referring provider: Leela Funes MD  Dx:   Encounter Diagnosis     ICD-10-CM    1  S/P reverse total shoulder arthroplasty, left Z96 612    2  Abnormal gait R26 9                   Subjective: L shoulder feeling well, was surprised how high she could reach on her own  Objective: See treatment diary below      Assessment: Tolerated treatment well  Patient exhibited good technique with therapeutic exercises and would benefit from continued PT Added sh flex in  range in supine, pt tolerated well, Pt performed better staying in scapular plane  Added TB row avoiding shoulder ext to help strengthen mid trap Pt still not feeling muscle contraction despite tactile cues and present but fair contraction  Pt 8 weeks 6 days s/p  Plan: Continue per plan of care     recautions: DM, parkinson's with deep brain stimulator, HTN, amb with RW- will try to transition back to [facility]  R shoulder reverse TSA - protocol  DOS 18      Daily Treatment Diary     Manual  10/10 10/15 10/17 10/22 10/24        R shoulder PROM 15 15 15 15 15        Gentle isometrics elbow, FA, and wrist 5  5 IR/ER, deltoid 50% effort 5 5                                   20 15 20 20 20            Exercise Diary  10/10 10/15 10/17 10/22 10/24        Cane overhead 15x 15x 15x 15x Sh flex  15x        Cane ER 15x 15x 15x seated 15x NP        Active IR/ER in neutral 10x  10x  10x                     Seated elbow ext/flex 20x 20x 2# cuff on bar 2# cuff on bar 3# cuff on bar 3# cuff on bar        flexbar towel twisting 20x  yellow 20x yellow 20x red + sup/pron 20x Red + sup/pron 20x each        Seated scap squeezes 15x 15x 15x 15x tactile cueing RTB row 2x10 avoiding sh ext        pulley 3 min 5 min 5 min 5 min 5 min                     Wall slides 10x Ball roll on plinth 15x Ball roll on plinth flex/abd 15x each 15x each 15x each Doorway stretch    5x20" 5x20"        Standing AA scaption with stick    15x 15x                                                                                      Gait training 10 min   [place]          15/10 15/10 15/15 15/15 15/15            Modalities  10/10 10/15 10/17 10/22 10/24        CP post TE NP                                          HEP:table slides, doorway stretch, cane overhead, cane ER, forearm sup/pron with small object

## 2018-10-29 ENCOUNTER — OFFICE VISIT (OUTPATIENT)
Dept: PHYSICAL THERAPY | Facility: MEDICAL CENTER | Age: 73
End: 2018-10-29
Payer: MEDICARE

## 2018-10-29 DIAGNOSIS — Z96.612 S/P REVERSE TOTAL SHOULDER ARTHROPLASTY, LEFT: Primary | ICD-10-CM

## 2018-10-29 DIAGNOSIS — R26.9 ABNORMAL GAIT: ICD-10-CM

## 2018-10-29 PROCEDURE — 97112 NEUROMUSCULAR REEDUCATION: CPT

## 2018-10-29 PROCEDURE — 97110 THERAPEUTIC EXERCISES: CPT

## 2018-10-29 PROCEDURE — 97140 MANUAL THERAPY 1/> REGIONS: CPT

## 2018-10-29 NOTE — PROGRESS NOTES
Daily Note     Today's date: 10/29/2018  Patient name: Nay Dela Cruz  :   MRN: [id number]  Referring provider: Luis Mccormick MD  Dx:   Encounter Diagnosis     ICD-10-CM    1  S/P reverse total shoulder arthroplasty, left Z96 612    2  Abnormal gait R26 9                   Subjective: Accidentally carried purse in her L hand since her SPC was in her R hand  Wasn't sure if it was okay  Objective: See treatment diary below      Assessment: Tolerated treatment well  Patient exhibited good technique with therapeutic exercises and would benefit from continued PT Pt 9 wks 4 days as/p L Reverse TSA  Advised Pt to limit the amount of weight she lifts, stick with using her L hand for feeding, no more than coffee cup  ER PROM still most limited  Added SL abd, Pt had good control of her arm to 90 degrees, no c/o pain after session  During TB row, pt reported she couldn't feel contraction of her middle trap, yet there was palpable contraction observed by therapist        Plan: Continue per plan of care     Precautions: DM, parkinson's with deep brain stimulator, HTN, amb with SPC  R shoulder reverse TSA - protocol  DOS 18      Daily Treatment Diary     Manual  10/10 10/15 10/17 10/22 10/24 10/29       R shoulder PROM 15 15 15 15 15 15       Gentle isometrics elbow, FA, and wrist 5  5 IR/ER, deltoid 50% effort 5 5 5                                  20 15 20 20 20 20           Exercise Diary  10/10 10/15 10/17 10/22 10/24 10/29       Cane overhead 15x 15x 15x 15x Sh flex  15x 20x       Cane ER 15x 15x 15x seated 15x NP        Active IR/ER in neutral 10x  10x  10x 10x                    Seated elbow ext/flex 20x 20x 2# cuff on bar 2# cuff on bar 3# cuff on bar 3# cuff on bar 3# cuff on bar       flexbar towel twisting 20x  yellow 20x yellow 20x red + sup/pron 20x Red + sup/pron 20x each 20x each       Seated scap squeezes 15x 15x 15x 15x tactile cueing RTB row 2x10 avoiding sh ext BTB 2x10 each pulley 3 min 5 min 5 min 5 min 5 min 5 min                    Wall slides 10x Ball roll on plinth 15x Ball roll on plinth flex/abd 15x each 15x each 15x each 15x each       Doorway stretch    5x20" 5x20" HEP       Standing AA scaption with stick    15x 15x 15x       SL abd      15x                                                                        Gait training 10 min   [place]          15/10 15/10 15/15 15/15 15/15 20/15           Modalities  10/10 10/15 10/17 10/22 10/24 10/29       CP post TE NP                                          HEP:table slides, doorway stretch, cane overhead, cane ER, forearm sup/pron with small object

## 2018-10-31 ENCOUNTER — OFFICE VISIT (OUTPATIENT)
Dept: PHYSICAL THERAPY | Facility: MEDICAL CENTER | Age: 73
End: 2018-10-31
Payer: MEDICARE

## 2018-10-31 DIAGNOSIS — Z96.612 S/P REVERSE TOTAL SHOULDER ARTHROPLASTY, LEFT: Primary | ICD-10-CM

## 2018-10-31 DIAGNOSIS — R26.9 ABNORMAL GAIT: ICD-10-CM

## 2018-10-31 PROCEDURE — 97110 THERAPEUTIC EXERCISES: CPT

## 2018-10-31 PROCEDURE — 97140 MANUAL THERAPY 1/> REGIONS: CPT

## 2018-10-31 PROCEDURE — 97112 NEUROMUSCULAR REEDUCATION: CPT

## 2018-10-31 NOTE — PROGRESS NOTES
Daily Note     Today's date: 10/31/2018  Patient name: Stephanie Benítez  :   MRN: [id number]  Referring provider: Mariaa Waller MD  Dx:   Encounter Diagnosis     ICD-10-CM    1  S/P reverse total shoulder arthroplasty, left Z96 612    2  Abnormal gait R26 9                   Subjective: Pt reports a little achiness in L shoulder today  Objective: See treatment diary below      Assessment: Tolerated treatment well  Patient exhibited good technique with therapeutic exercises and would benefit from continued PT  Pt required VC'ing to keep arm at her side when performing bicep curls with bar in both hands  Pt becoming stronger in her anterior and lateral deltoid, increase force output during manual isometrics and less fatigue with arm elevation in supine and SL  Pt reported achiness subsided by the end of session  Plan: Continue per plan of care     Precautions: DM, parkinson's with deep brain stimulator, HTN, amb with SPC  R shoulder reverse TSA - protocol  DOS 18      Daily Treatment Diary     Manual  10/10 10/15 10/17 10/22 10/24 10/29 10/31      R shoulder PROM 15 15 15 15 15 15 15      Gentle isometrics elbow, FA, and wrist 5  5 IR/ER, deltoid 50% effort 5 5 5 5                                 20 15 20 20 20 20 20          Exercise Diary  10/10 10/15 10/17 10/22 10/24 10/29 10/31      Cane overhead 15x 15x 15x 15x Sh flex  15x 20x 20x      Cane ER 15x 15x 15x seated 15x NP        Active IR/ER in neutral 10x  10x  10x 10x 10x                   Seated elbow ext/flex 20x 20x 2# cuff on bar 2# cuff on bar 3# cuff on bar 3# cuff on bar 3# cuff on bar 4# cuff on bar      flexbar towel twisting 20x  yellow 20x yellow 20x red + sup/pron 20x Red + sup/pron 20x each 20x each 20x      Seated scap squeezes 15x 15x 15x 15x tactile cueing RTB row 2x10 avoiding sh ext BTB 2x10 each BTB 2x10 each      pulley 3 min 5 min 5 min 5 min 5 min 5 min 5 min                   Wall slides 10x Ball roll on plinth 15x Ball roll on plinth flex/abd 15x each 15x each 15x each 15x each 15x each      Doorway stretch    5x20" 5x20" HEP       Standing AA scaption with stick    15x 15x 15x 15x      SL abd      15x 15x                                                                       Gait training 10 min   NV          15/10 15/10 15/15 15/15 15/15 20/15 15/15          Modalities  10/10 10/15 10/17 10/22 10/24 10/29 10/31      CP post TE NP                                          HEP:table slides, doorway stretch, cane overhead, cane ER, forearm sup/pron with small object

## 2018-11-05 ENCOUNTER — OFFICE VISIT (OUTPATIENT)
Dept: PHYSICAL THERAPY | Facility: MEDICAL CENTER | Age: 73
End: 2018-11-05
Payer: MEDICARE

## 2018-11-05 DIAGNOSIS — R26.9 ABNORMAL GAIT: ICD-10-CM

## 2018-11-05 DIAGNOSIS — Z96.612 S/P REVERSE TOTAL SHOULDER ARTHROPLASTY, LEFT: Primary | ICD-10-CM

## 2018-11-05 PROCEDURE — 97110 THERAPEUTIC EXERCISES: CPT

## 2018-11-05 PROCEDURE — 97140 MANUAL THERAPY 1/> REGIONS: CPT

## 2018-11-05 NOTE — PROGRESS NOTES
Daily Note     Today's date: 2018  Patient name: Radha Garduno  :   MRN: [id number]  Referring provider: Hoda Holland MD  Dx:   Encounter Diagnosis     ICD-10-CM    1  S/P reverse total shoulder arthroplasty, left Z96 612    2  Abnormal gait R26 9        Subjective: Pt reports shoulder is feeling really good  Wanted to learn some exercises that can help with getting out of a chair  Objective: See treatment diary below      Assessment: Tolerated treatment well  Patient exhibited good technique with therapeutic exercises and would benefit from continued PT Had Pt perform mini-squats, demonstrated proper form, pt able to perform independently  Added sh flexion on ~40 degree incline for progressed strengthening  Pt had some fatigue toward end of reps  Advised her to try at home propping herself up on pillows  Plan: Continue per plan of care  Progress note during next visit     Precautions: DM, parkinson's with deep brain stimulator, HTN, amb with SPC  R shoulder reverse TSA - protocol  DOS 18      Daily Treatment Diary     Manual  10/10 10/15 10/17 10/22 10/24 10/29 10/31 11/5     R shoulder PROM 15 15 15 15 15 15 15 10     Gentle isometrics elbow, FA, and wrist 5  5 IR/ER, deltoid 50% effort 5 5 5 5 5                                20 15 20 20 20 20 20 15         Exercise Diary  10/10 10/15 10/17 10/22 10/24 10/29 10/31 11/5     Cane overhead 15x 15x 15x 15x Sh flex  15x 20x 20x Sh flex  20x     Cane ER 15x 15x 15x seated 15x NP   Sh flex 40 degree incline 20x     Active IR/ER in neutral 10x  10x  10x 10x 10x 10x                  Seated elbow ext/flex 20x 20x 2# cuff on bar 2# cuff on bar 3# cuff on bar 3# cuff on bar 3# cuff on bar 4# cuff on bar 4# cuff on bar     flexbar towel twisting 20x  yellow 20x yellow 20x red + sup/pron 20x Red + sup/pron 20x each 20x each 20x 20x     Seated scap squeezes 15x 15x 15x 15x tactile cueing RTB row 2x10 avoiding sh ext BTB 2x10 each BTB 2x10 each BTB 2x10 each     pulley 3 min 5 min 5 min 5 min 5 min 5 min 5 min 5 min                  Wall slides 10x Ball roll on plinth 15x Ball roll on plinth flex/abd 15x each 15x each 15x each 15x each 15x each NP     Doorway stretch    5x20" 5x20" HEP       Standing AA scaption with stick    15x 15x 15x 15x 15x     SL abd      15x 15x 15x                                                                      Gait training 10 min   NV          15/10 15/10 15/15 15/15 15/15 20/15 15/15 15/15         Modalities  10/10 10/15 10/17 10/22 10/24 10/29 10/31 11/5     CP post TE NP                                          HEP:table slides, doorway stretch, cane overhead, cane ER, forearm sup/pron with small object

## 2018-11-07 ENCOUNTER — OFFICE VISIT (OUTPATIENT)
Dept: PHYSICAL THERAPY | Facility: MEDICAL CENTER | Age: 73
End: 2018-11-07
Payer: MEDICARE

## 2018-11-07 DIAGNOSIS — Z96.612 S/P REVERSE TOTAL SHOULDER ARTHROPLASTY, LEFT: Primary | ICD-10-CM

## 2018-11-07 PROCEDURE — 97110 THERAPEUTIC EXERCISES: CPT

## 2018-11-07 PROCEDURE — G8985 CARRY GOAL STATUS: HCPCS

## 2018-11-07 PROCEDURE — 97112 NEUROMUSCULAR REEDUCATION: CPT

## 2018-11-07 PROCEDURE — G8984 CARRY CURRENT STATUS: HCPCS

## 2018-11-07 PROCEDURE — 97140 MANUAL THERAPY 1/> REGIONS: CPT

## 2018-11-07 NOTE — PROGRESS NOTES
PT Re-Evaluation     Today's date: 2018  Patient name: Mike Shabazz  :   MRN: [id number]  Referring provider: Avis Walker MD  Dx:   Encounter Diagnosis     ICD-10-CM    1  S/P reverse total shoulder arthroplasty, left Z96 612                   Assessment  Impairments: abnormal or restricted ROM, activity intolerance, impaired physical strength and pain with function    Assessment details: Pt is 10 weeks 6 days s/p L shoulder reverse TSA  She has been progressing well with therapy with minimal pain symptoms  She has improved her ROM measurements  Also, we initiated light strengthening for her scapular stabilizers as well as isotonics/isometrics for her deltoid muscle group  Recommend another 4 weeks of therapy to help further her strength and improve her overall functioning  Thank you     Barriers to therapy: Unable to drive  Understanding of Dx/Px/POC: good   Prognosis: good    Goals  STG (2-3 weeks)  1: increase PROM 10-15 degrees- met  2: increase AROM 10 degrees- met  3: Pt independent with HEP- met    LTG (4-6 weeks) (9-12 weeks s/p)  1: Pt able to raise arm above shoulder level without pain- met  2: full distal AROM in L UE- met  3: strength to GHJ rotators 4 to 4+/5- not met  4: deltoid 4/5-met    Plan  Patient would benefit from: skilled physical therapy  Planned modality interventions: cryotherapy  Planned therapy interventions: manual therapy, neuromuscular re-education, patient education, stretching, therapeutic activities, therapeutic exercise, strengthening, home exercise program and functional ROM exercises  Other planned therapy interventions: strengthening - minimal isoemtrics to elbow and forearm  Frequency: 2x week  Duration in visits: 8  Duration in weeks: 4  Plan of Care beginning date: 2018  Plan of Care expiration date: 2018  Treatment plan discussed with: patient  Plan details: Continue PT as per POC        Subjective Evaluation    History of Present Illness  Date of onset: 2018  Date of surgery: 2018  Mechanism of injury: Putting something in closet, tried grabbing onto tile wall, fell on L side  Went to see family doctor the next day  Was sent to ER for x-ray  Saw surgeon last week, D/C sling  Home PT 2 weeks, was doing pendulums, table slides  No previous injuries to L shoulder  Denies parasthesias  Re-eval: Pt reports decreased pain, increased motion, able to helpunload  and do loads of laundry      Not a recurrent problem   Quality of life: good    Pain  Current pain ratin  At best pain ratin  At worst pain rating: 3  Location: apin only after activity  Quality: dull ache  Relieving factors: rest and ice  Progression: improved    Social Support  Lives with: spouse    Employment status: not working  Hand dominance: right    Treatments  Previous treatment: home therapy  Discharged from (in last 30 days): home health care  Patient Goals  Patient goals for therapy: improved balance, increased motion, decreased pain, independence with ADLs/IADLs and increased strength          Objective     Active Range of Motion   Left Shoulder   Flexion: 110 degrees   Abduction: 90 degrees   External rotation BTH: Active external rotation behind the head: R ear     Internal rotation BTB: T10     Right Shoulder   Flexion: 125 degrees   Abduction: 130 degrees   External rotation BTH: C7     Additional Active Range of Motion Details  Full elbow ext/flex AROM  Supination L 70  Pronation: full BL 85      Passive Range of Motion   Left Shoulder   Flexion: 120 degrees   Abduction: 115 degrees   External rotation 45°: 20 degrees   Internal rotation 45°: 40 degrees     Additional Passive Range of Motion Details  firm end feel ER    Strength/Myotome Testing     Left Shoulder     Isolated Muscles   Anterior deltoid: 4   Biceps: 4+   Infraspinatus: 3-   Middle deltoid: 4   Posterior deltoid: 4   Subscapularis: 4     Right Shoulder     Isolated Muscles   Anterior deltoid: 4+   Biceps: 5   Infraspinatus: 4   Middle deltoid: 4+   Posterior deltoid: 4+   Subscapularis: 4+           Precautions: DM, parkinson's with deep brain stimulator, HTN, amb with RW- will try to transition back to [facility]  R shoulder reverse TSA - protocol  DOS 8/23/18    Daily Treatment Diary     Manual  10/10 10/15 10/17 10/22 10/24 10/29 10/31 11/5 11/7    R shoulder PROM 15 15 15 15 15 15 15 10 10    Gentle isometrics elbow, FA, and wrist 5  5 IR/ER, deltoid 50% effort 5 5 5 5 5 5                               20 15 20 20 20 20 20 15 15        Exercise Diary  10/10 10/15 10/17 10/22 10/24 10/29 10/31 11/5 11/7    Cane overhead 15x 15x 15x 15x Sh flex  15x 20x 20x Sh flex  20x Sh flex 7--120 1# wt    Cane ER 15x 15x 15x seated 15x NP   Sh flex 40 degree incline 20x Sh flex 40 degree incline 20x    Active IR/ER in neutral 10x  10x  10x 10x 10x 10x 10x                 Seated elbow ext/flex 20x 20x 2# cuff on bar 2# cuff on bar 3# cuff on bar 3# cuff on bar 3# cuff on bar 4# cuff on bar 4# cuff on bar 4# cuff on bar    flexbar towel twisting 20x  yellow 20x yellow 20x red + sup/pron 20x Red + sup/pron 20x each 20x each 20x 20x 20x    Seated scap squeezes 15x 15x 15x 15x tactile cueing RTB row 2x10 avoiding sh ext BTB 2x10 each BTB 2x10 each BTB 2x10 each BTB 2x10 each    pulley 3 min 5 min 5 min 5 min 5 min 5 min 5 min 5 min 5 min                 Wall slides 10x Ball roll on plinth 15x Ball roll on plinth flex/abd 15x each 15x each 15x each 15x each 15x each NP NP    Doorway stretch    5x20" 5x20" HEP       Standing AA scaption with stick    15x 15x 15x 15x 15x 15x    SL abd      15x 15x 15x 15x                                                                     Gait training 10 min   NV          15/10 15/10 15/15 15/15 15/15 20/15 15/15 15/15 15/15        Modalities  10/10 10/15 10/17 10/22 10/24 10/29 10/31 11/5 11/7    CP post TE NP                                          HEP:table slides, doorway stretch, cane overhead, cane ER, forearm sup/pron with small object

## 2018-11-12 ENCOUNTER — OFFICE VISIT (OUTPATIENT)
Dept: PHYSICAL THERAPY | Facility: MEDICAL CENTER | Age: 73
End: 2018-11-12
Payer: MEDICARE

## 2018-11-12 DIAGNOSIS — R26.9 ABNORMAL GAIT: ICD-10-CM

## 2018-11-12 DIAGNOSIS — Z96.612 S/P REVERSE TOTAL SHOULDER ARTHROPLASTY, LEFT: Primary | ICD-10-CM

## 2018-11-12 PROCEDURE — 97112 NEUROMUSCULAR REEDUCATION: CPT

## 2018-11-12 PROCEDURE — 97110 THERAPEUTIC EXERCISES: CPT

## 2018-11-12 PROCEDURE — 97140 MANUAL THERAPY 1/> REGIONS: CPT

## 2018-11-12 NOTE — PROGRESS NOTES
Daily Note     Today's date: 2018  Patient name: Laura Morrissey  :   MRN: [id number]  Referring provider: Dasha Lopez MD  Dx:   Encounter Diagnosis     ICD-10-CM    1  S/P reverse total shoulder arthroplasty, left Z96 612    2  Abnormal gait R26 9                   Subjective: Pt reports no issues with L shoulder  Objective: See treatment diary below      Assessment: Tolerated treatment well  Patient demonstrated fatigue post treatment, exhibited good technique with therapeutic exercises and would benefit from continued PT  Had pt perform gentle isotonic for GHJ rotators in scapular plane, pt had higher success compared to active ER in neutral  Added TB exercises to TE program, pt tolerated each well  Pt had some mild fatigue at end of session  No c/o pain  Plan: Continue per plan of care     Precautions: DM, parkinson's with deep brain stimulator, HTN, amb with RW- will try to transition back to [facility]  R shoulder reverse TSA - protocol  DOS 18    Daily Treatment Diary     Manual  10/10 10/15 10/17 10/22 10/24 10/29 10/31 11/5 11/7 11/12   R shoulder PROM 15 15 15 15 15 15 15 10 10 10   Gentle isometrics elbow, FA, and wrist 5  5 IR/ER, deltoid 50% effort 5 5 5 5 5 5 5 isotonics IR/ER in scap plane; isometric deltoid                              20 15 20 20 20 20 20 15 15 15       Exercise Diary  10/10 10/15 10/17 10/22 10/24 10/29 10/31 11/5 11/7 11/12   Cane overhead 15x 15x 15x 15x Sh flex  15x 20x 20x Sh flex  20x Sh flex 7--120 1# wt 1#    Cane ER 15x 15x 15x seated 15x NP   Sh flex 40 degree incline 20x Sh flex 40 degree incline 20x 20x   Active IR/ER in neutral 10x  10x  10x 10x 10x 10x 10x 10x in scaption                Seated elbow ext/flex 20x 20x 2# cuff on bar 2# cuff on bar 3# cuff on bar 3# cuff on bar 3# cuff on bar 4# cuff on bar 4# cuff on bar 4# cuff on bar 4# cuff on bar   flexbar towel twisting 20x  yellow 20x yellow 20x red + sup/pron 20x Red + sup/pron 20x each 20x each 20x 20x 20x Green 20x   Seated scap squeezes 15x 15x 15x 15x tactile cueing RTB row 2x10 avoiding sh ext BTB 2x10 each BTB 2x10 each BTB 2x10 each BTB 2x10 each BTB 2x10 each   pulley 3 min 5 min 5 min 5 min 5 min 5 min 5 min 5 min 5 min 5 min                Wall slides 10x Ball roll on plinth 15x Ball roll on plinth flex/abd 15x each 15x each 15x each 15x each 15x each NP NP    Doorway stretch    5x20" 5x20" HEP       Standing AA scaption with stick    15x 15x 15x 15x 15x 15x 15x   SL abd      15x 15x 15x 15x    TB sh abd to 90          RTB 2x10   TB IR          BTB 2x10                                          Gait training 10 min   NV          15/10 15/10 15/15 15/15 15/15 20/15 15/15 15/15 15/15 15/20       Modalities  10/10 10/15 10/17 10/22 10/24 10/29 10/31 11/5 11/7    CP post TE NP                                          HEP:table slides, doorway stretch, cane overhead, cane ER, forearm sup/pron with small object

## 2018-11-14 ENCOUNTER — OFFICE VISIT (OUTPATIENT)
Dept: PHYSICAL THERAPY | Facility: MEDICAL CENTER | Age: 73
End: 2018-11-14
Payer: MEDICARE

## 2018-11-14 DIAGNOSIS — Z96.612 S/P REVERSE TOTAL SHOULDER ARTHROPLASTY, LEFT: Primary | ICD-10-CM

## 2018-11-14 DIAGNOSIS — R26.9 ABNORMAL GAIT: ICD-10-CM

## 2018-11-14 PROCEDURE — 97140 MANUAL THERAPY 1/> REGIONS: CPT

## 2018-11-14 PROCEDURE — 97110 THERAPEUTIC EXERCISES: CPT

## 2018-11-14 PROCEDURE — 97112 NEUROMUSCULAR REEDUCATION: CPT

## 2018-11-14 NOTE — PROGRESS NOTES
Daily Note     Today's date: 2018  Patient name: Estefani Bellamy  :   MRN: [id number]  Referring provider: Nicko Rivas MD  Dx:   Encounter Diagnosis     ICD-10-CM    1  S/P reverse total shoulder arthroplasty, left Z96 612    2  Abnormal gait R26 9                   Subjective: Pt reports L shoulder feeling okay  Objective: See treatment diary below      Assessment: Tolerated treatment well  Patient exhibited good technique with therapeutic exercises and would benefit from continued PT  Pt seems to perform various compensations during exercises (i e  Extending elbow in place of GHJ ER with abd at 0, abducting shoulder with elbow flexed during bicep curls, UT assisting with shoulder abd TB exercise) Pt able to correct with tactile cues but Pt would return after fatigue would set in after only a few reps  Advised Pt to perform sh flex and abd in supine as well as incline and push arm to fatigue but not pain  Plan: Continue per plan of care     Precautions: DM, parkinson's with deep brain stimulator, HTN, amb with RW- will try to transition back to [facility]  R shoulder reverse TSA - protocol  DOS 18    Daily Treatment Diary     Manual              R shoulder PROM 10            Gentle isotonics IR/ER in scap plane, isometric deltoid 5                                       15                Exercise Diary              Supine sh flexion  1#            A IR/ER in scaption 15x            incline sh flexion 0# 20x            Ceiling punches 0# 20x                         flexbar towel twisting Green 20x                         pulley 5 min                         Standing scaption with stick 15x                         TB row, ext BTB 3x10 each             TB IR BTB 2x10            TB sh abd chicken wing RTB 2x10                                                                Gait training              20/20                Modalities              CP post TE HEP:table slides, doorway stretch, cane overhead, cane ER, forearm sup/pron with small object

## 2018-11-15 ENCOUNTER — PROCEDURE VISIT (OUTPATIENT)
Dept: NEUROLOGY | Facility: CLINIC | Age: 73
End: 2018-11-15
Payer: MEDICARE

## 2018-11-15 VITALS
HEART RATE: 66 BPM | BODY MASS INDEX: 37.67 KG/M2 | WEIGHT: 240 LBS | HEIGHT: 67 IN | DIASTOLIC BLOOD PRESSURE: 76 MMHG | SYSTOLIC BLOOD PRESSURE: 128 MMHG

## 2018-11-15 DIAGNOSIS — G20 PARKINSON'S DISEASE WITH USE OF ELECTRICAL BRAIN STIMULATION (HCC): ICD-10-CM

## 2018-11-15 DIAGNOSIS — G20 COGNITIVE DEFICIT DUE TO PARKINSON'S DISEASE (HCC): ICD-10-CM

## 2018-11-15 DIAGNOSIS — G20 PARKINSON'S DISEASE (HCC): Primary | ICD-10-CM

## 2018-11-15 PROBLEM — G20.A1 COGNITIVE DEFICIT DUE TO PARKINSON'S DISEASE: Status: ACTIVE | Noted: 2018-11-15

## 2018-11-15 PROCEDURE — 99215 OFFICE O/P EST HI 40 MIN: CPT | Performed by: PSYCHIATRY & NEUROLOGY

## 2018-11-15 NOTE — ASSESSMENT & PLAN NOTE
Mild cognitive changes noted but not interfering with function  MOCA 21  Time spent discussing cognitive changes in PD and the potential development of dementia  Will continue to monitor and discuss medication options further in the future

## 2018-11-15 NOTE — ASSESSMENT & PLAN NOTE
Postural instability and gait issues with  mild bradykinesia  She has occasional freezing of gait on initiation, particularly after prolonged sitting  She will continue on Azilect  Will retry Sinemet again  etry sinemet 25/100   1 tabs every 4 hours 4 times a day  If no improvement then increase to 1 5 tabs 4 times daily  She will return to see us on this dose for evaluation

## 2018-11-15 NOTE — PATIENT INSTRUCTIONS
Retry sinemet 25/100   1 tabs every 4 hours 4 times a day  If no improvement then increase to 1 5 tabs 4 times daily    Stay on this dose until follow up

## 2018-11-15 NOTE — PROGRESS NOTES
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 ([address] )  Mild cognitive changes noted but not interfering with function  MOCA 21  Time spent discussing cognitive changes in PD and the potential development of dementia  Will continue to monitor and discuss medication options further in the future  Diagnoses and all orders for this visit:    Parkinson's disease ([address] )  -     Discontinue: carbidopa-levodopa (SINEMET)  mg per tablet; Take 1 tablet by mouth 4 (four) times a day q 4 hours apart, if no improvement after 2 weeks can increase to 1 5 tabs q 4 hours apart  -     carbidopa-levodopa (SINEMET)  mg per tablet; Take 1 tablet by mouth 4 (four) times a day q 4 hours apart, if no improvement after 2 weeks can increase to 1 5 tabs q 4 hours apart    Parkinson's disease with use of electrical brain stimulation (HCC)    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 ([address] )       I have spent 40 minutes with Patient and family today in which greater than 50% of this time was spent in counseling/coordination of care regarding Risks and benefits of tx options, Intructions for management, Patient and family education and Impressions  Subjective:    Ms Mike Shabazz is a 67 y o  female s/p bilateral CTS s/p surgery, significant arthritis (psoriatic), Parkinson's disease since about 2009 now s/p bilateral STN DBS placement 11/5/14 with [facility] SC 11/11/14 here for follow up   Prior to surgery she was on Sinemet 25/100 q 4 hours tid and amantadine bid  She remains on Azilect  Previously she tried ropinirole, Requip XL (highest dose 8mg) and Mirapex ER 1 5 with no effect  Insurance would not cover Neupro and she found it a nuisance  Discussion about moving the IPG had with Dr Elizabeth Zavaleta but she decided to wait until the next needed replacement  She continues to have postural instability and trouble with gait  We tried restarting Sinemet 25/100 1 po tid q 4 hours part to see if this provides more energy and helps with intermittent gait issues but she stopped it after 3 weeks as she noticed no difference  She remains on Azilect 1mg  She broke her left shoulder form a fall in the shower in August She underwent a full replacement and is still in PT  No fall since then other than falls when trying to get out of bed  Overall stable  She continues to have issues with gait and balance  No falls since last seen She continues to freeze when initiating gait  She is dressing and showering without any issues with mild difficulty with pants  She denies any issues with swallowing except when taking larger bites  She will cough at times  She sleeps well  She denies any daytime sedation  No cognitive changes except for occasional word finding difficulty  She can forget dates and days of the week at times  She can have some freezing after prolonged sitting  The following portions of the patient's history were reviewed and updated as appropriate: allergies, current medications, past family history, past medical history, past social history and past surgical history  Objective:    Blood pressure 128/76, pulse 66, height 5' 7" (1 702 m), weight 109 kg (240 lb)  Physical Exam   Constitutional: She appears well-developed  Eyes: Pupils are equal, round, and reactive to light  EOM are normal    Neurological: She is alert  She has normal strength  Vitals reviewed        Neurological Exam  Mental Status  Awake and alert  Recent and remote memory are intact  Language is fluent with no aphasia  Attention and concentration are normal   MOCA 21 (11/15/18)  Cranial Nerves  CN III, IV, VI: Extraocular movements intact bilaterally  Pupils equal round and reactive to light bilaterally  CN V: Facial sensation is normal   CN VII: Full and symmetric facial movement  CN VIII: Hearing is normal   CN IX, X: Palate elevates symmetrically  CN XI: Shoulder shrug strength is normal   CN XII: Tongue midline without atrophy or fasciculations  Motor   Strength is 5/5 throughout all four extremities  Sensory  Light touch is normal in upper and lower extremities  Reflexes  Glabellar tap present  Coordination  Right: Finger-to-nose normal  Rapid alternating movement abnormality:  Left: Finger-to-nose normal  Rapid alternating movement abnormality:  Se UPDRS  Gait  Gait with shortened stride but no freezing 2  Postural instability with retropulsion but recovered in three steps      MD UPDRS motor   Time since last dose:      Speech  1    Facial Expression  3    Rigidity - Neck  3    Rigidity - Upper Extremity (Right)  1    Rigidity - Upper Extremity (Left)   1    Rigidity - Lower Extremity (Right)  0    Rigidity - Lower Extremity (Left)   0    Finger Taps (Right)   1    Finger Taps (Left)   2    Hand Movement (Right)  1    Hand Movement (Left)   2    Pronation/Supination (Right)  0    Pronation/Supination (Left)   3 shoulder   Toe Tapping (Right) 1    Toe Tapping (Left) 0    Leg Agility (Right)  0    Leg Agility (Left)   0    Arising from Chair   2    Gait   2    Freezing of Gait 0    Postural Stability   1    Posture 1    Global spontaneity of movement 1    Postural Tremor (Right) 0    Postural Tremor (Left) 0    Kinetic Tremor (Right)  0    Kinetic Tremor (Left)  0    Rest tremor amplitude RUE 0    Rest tremor amplitude LUE 0    Rest tremor amplitude RLE 0    Reset tremor amplitude LLE 0    Lip/Jaw Tremor  0 Consistency of tremor 0    Motor Exam Total:          ROS:    Review of Systems   Constitutional: Negative  Negative for appetite change and fever  HENT: Positive for voice change  Negative for hearing loss, tinnitus and trouble swallowing  Eyes: Negative  Negative for photophobia and pain  Respiratory: Positive for cough and choking  Negative for shortness of breath  Cardiovascular: Negative  Negative for palpitations  Gastrointestinal: Positive for constipation  Negative for nausea and vomiting  Endocrine: Negative  Negative for cold intolerance and heat intolerance  Genitourinary: Positive for frequency and urgency  Negative for dysuria  Musculoskeletal: Positive for arthralgias, gait problem and myalgias  Negative for neck pain  Skin: Negative  Negative for rash  Neurological: Positive for speech difficulty  Negative for dizziness, tremors, seizures, syncope, facial asymmetry, weakness, light-headedness, numbness and headaches  Hematological: Negative  Does not bruise/bleed easily  Psychiatric/Behavioral: Positive for confusion and hallucinations  Negative for sleep disturbance

## 2018-11-16 ENCOUNTER — TELEPHONE (OUTPATIENT)
Dept: NEUROLOGY | Facility: CLINIC | Age: 73
End: 2018-11-16

## 2018-11-16 ENCOUNTER — APPOINTMENT (OUTPATIENT)
Dept: RADIOLOGY | Facility: CLINIC | Age: 73
End: 2018-11-16
Payer: MEDICARE

## 2018-11-16 ENCOUNTER — OFFICE VISIT (OUTPATIENT)
Dept: OBGYN CLINIC | Facility: MEDICAL CENTER | Age: 73
End: 2018-11-16

## 2018-11-16 VITALS
BODY MASS INDEX: 38.14 KG/M2 | WEIGHT: 243 LBS | DIASTOLIC BLOOD PRESSURE: 80 MMHG | HEIGHT: 67 IN | SYSTOLIC BLOOD PRESSURE: 122 MMHG | HEART RATE: 58 BPM

## 2018-11-16 DIAGNOSIS — Z96.612 S/P REVERSE TOTAL SHOULDER ARTHROPLASTY, LEFT: ICD-10-CM

## 2018-11-16 DIAGNOSIS — Z96.612 S/P REVERSE TOTAL SHOULDER ARTHROPLASTY, LEFT: Primary | ICD-10-CM

## 2018-11-16 PROCEDURE — 73030 X-RAY EXAM OF SHOULDER: CPT

## 2018-11-16 PROCEDURE — 99024 POSTOP FOLLOW-UP VISIT: CPT | Performed by: ORTHOPAEDIC SURGERY

## 2018-11-16 NOTE — TELEPHONE ENCOUNTER
Pt called requesting PT referral for gait  States Dr Celena Montanez (ortho) cleared her for PT      [phone]  No need to call pt unless there is an issue

## 2018-11-16 NOTE — PROGRESS NOTES
Orthopaedic Surgery - Office Note  Kvng Mccray (20 y o  female)   : 1945   MRN: [id number]  Encounter Date: 2018    Chief Complaint   Patient presents with    Left Shoulder - Follow-up       Assessment / Plan  Status post left reverse total shoulder arthroplasty with biceps tenotomy on 2018    · Continue physical therapy  Focus on passive and active external rotation  New script was provided  · No heavy lifting  · No Follow-up on file  History of Present Illness  Kvng Mccray is a 67 y o  female who presents to the office status post left reverse total shoulder arthroplasty with biceps tenotomy on 2018  She has been progressing in physical therapy  She only experiences occasional soreness after activities  She reports doing well overall  She is accompanied by her  today in the office  Review of Systems  Pertinent items are noted in HPI  All other systems were reviewed and are negative  Physical Exam  /80   Pulse 58   Ht 5' 7" (1 702 m)   Wt 110 kg (243 lb)   BMI 38 06 kg/m²   Cons: Appears well  No apparent distress  Psych: Alert  Oriented x3  Mood and affect normal   Eyes: PERRLA, EOMI  Resp: Normal effort  No audible wheezing or stridor  CV: Palpable pulse  No discernable arrhythmia  No LE edema  Lymph:  No palpable cervical, axillary, or inguinal lymphadenopathy  Skin: Warm  No palpable masses  No visible lesions  Neuro: Normal muscle tone  Normal and symmetric DTR's  Left Shoulder Exam  Alignment / Posture:  Normal shoulder posture  Inspection:  No swelling  No ecchymosis  Incision healed  Palpation:  No tenderness  ROM:  Shoulder  degrees  Shoulder ER neutral  Shoulder IR T8  Strength:  Subscapularis 4-/5  FE 4/5  ER 0/5  Stability:  No objective shoulder instability  Tests: No pertinent positive or negative tests  Neurovascular:  Sensation intact in Ax/R/M/U nerve distributions  Palpable radial pulse        Studies Reviewed  I have personally reviewed pertinent films in PACS  XR of left shoulder - Hardware in good alignment    Procedures  No procedures today  Medical, Surgical, Family, and Social History  The patient's medical history, family history, and social history, were reviewed and updated as appropriate  Past Medical History:   Diagnosis Date    Anxiety     Diabetes mellitus [facility]     Diabetes mellitus type 2, diet-controlled ([facility] )     Hyperlipidemia     Hypertension     Parkinson disease ([facility] )        Past Surgical History:   Procedure Laterality Date    ACHILLES TENDON SURGERY      DEEP BRAIN STIMULATOR PLACEMENT      DENTAL SURGERY      REPLACEMENT TOTAL KNEE      REVERSE TOTAL SHOULDER ARTHROPLASTY Left 8/23/2018    Procedure: ARTHROPLASTY SHOULDER REVERSE;  Surgeon: Tiffanie Reynolds MD;  Location: [facility];  Service: Orthopedics    TONSILLECTOMY         Family History   Problem Relation Age of Onset    Arthritis Mother        Social History     Occupational History    Not on file       Social History Main Topics    Smoking status: Never Smoker    Smokeless tobacco: Never Used    Alcohol use No    Drug use: No    Sexual activity: No       Allergies   Allergen Reactions    Sulfa Antibiotics Hives         Current Outpatient Prescriptions:     acetaminophen (TYLENOL) 650 mg CR tablet, Take 650 mg by mouth every 8 (eight) hours as needed for mild pain, Disp: , Rfl:     aspirin 81 MG tablet, Take 1 tablet by mouth daily, Disp: , Rfl:     atenolol (TENORMIN) 50 mg tablet, Take 1 tablet (50 mg total) by mouth daily, Disp: 30 tablet, Rfl: 0    calcium carbonate-vitamin D (OSCAL-D) 500 mg-200 units per tablet, Take 1 tablet by mouth daily with breakfast, Disp: 30 tablet, Rfl: 0    carbidopa-levodopa (SINEMET)  mg per tablet, Take 1 tablet by mouth 4 (four) times a day q 4 hours apart, if no improvement after 2 weeks can increase to 1 5 tabs q 4 hours apart, Disp: 360 tablet, Rfl: 0   Cinnamon 500 MG TABS, Take 2 tablets by mouth, Disp: , Rfl:     Coenzyme Q10 400 MG CAPS, Take 1 capsule (400 mg total) by mouth daily, Disp: 30 capsule, Rfl: 0    Omega-3 Fatty Acids (FISH OIL) 1,000 mg, Take 1 capsule (1,000 mg total) by mouth daily, Disp: 30 capsule, Rfl: 0    PARoxetine (PAXIL) 20 mg tablet, Take 1 tablet (20 mg total) by mouth daily, Disp: 30 tablet, Rfl: 0    polyethylene glycol (MIRALAX) 17 g packet, Take 17 g by mouth daily, Disp: 30 each, Rfl: 0    rasagiline (AZILECT) 1 MG, Take 1 tablet (1 mg total) by mouth daily, Disp: 30 each, Rfl: 0    rosuvastatin (CRESTOR) 5 mg tablet, Take 1 tablet (5 mg total) by mouth every other day, Disp: 30 tablet, Rfl: 0      [employer]    I,:    am acting as a scribe while in the presence of the attending physician :        I,:    personally performed the services described in this documentation    as scribed in my presence :

## 2018-11-16 NOTE — PROGRESS NOTES
Orthopaedic Surgery - Office Note  Dennis Benito (20 y o  female)   : 1945   MRN: [id number]  Encounter Date: 2018    Chief Complaint   Patient presents with    Left Shoulder - Follow-up       Assessment / Plan  s/p left reverse total shoulder arthroplasty with biceps tenotomy on 18    · X-rays show intact hardware with normal alignment   · Discontinue shoulder sling  · Start formal physical therapy for PROM and AROM in all planes, referral provided  · No heavy lifting or driving  · Repeat x-rays in 6 weeks  No Follow-up on file  History of Present Illness  Dennis Benito is a 67 y o  female who presents for follow up of left shoulder s/p left reverse total shoulder arthroplasty with biceps tenotomy on 18  She reports that she is doing well and has no major pain concerns  She is performing home PT and tolerating it well and is able to get her arm to shoulder height  She mentions that home PT thinks she can progress to formal PT at this point  Denies pain, swelling, numbness, tingling of the left upper extremity  Review of Systems  Pertinent items are noted in HPI  All other systems were reviewed and are negative  Physical Exam  /80   Pulse 58   Ht 5' 7" (1 702 m)   Wt 110 kg (243 lb)   BMI 38 06 kg/m²   Cons: Appears well  No apparent distress  Psych: Alert  Oriented x3  Mood and affect normal   Eyes: PERRLA, EOMI  Resp: Normal effort  No audible wheezing or stridor  CV: Palpable pulse  No discernable arrhythmia  Lymph:  No palpable cervical, axillary, or inguinal lymphadenopathy  Skin: Warm  No palpable masses  No visible lesions  Neuro: Normal muscle tone  Normal and symmetric DTR's  Left Shoulder Exam  Alignment / Posture:  Normal shoulder posture  Inspection:  No swelling  No erythema  No ecchymosis  Incision site well healed  Palpation:  No tenderness  No effusion  ROM:  Shoulder FE AROM 80 degrees, PROM 110 degrees   Shoulder ER PROM 20 degrees  Shoulder IR to lumbar spine  Strength:  Supraspinatus 4/5  Infraspinatus 4/5  Subscapularis 4/5  Stability:  Not tested  Tests: No pertinent positive or negative tests  Neurovascular:  Sensation intact in Ax/R/M/U nerve distributions  2+ radial pulse  Studies Reviewed  I have personally reviewed pertinent films in PACS  Four view x-ray of the left shoulder shows intact hardware with normal alignment  Procedures  No procedures today  Medical, Surgical, Family, and Social History  The patient's medical history, family history, and social history, were reviewed and updated as appropriate  Past Medical History:   Diagnosis Date    Anxiety     [facility]     Diabetes mellitus type 2, diet-controlled ([facility]     Hyperlipidemia     Hypertension     Parkinson disease ([facility]        Past Surgical History:   Procedure Laterality Date    ACHILLES TENDON SURGERY      DEEP BRAIN STIMULATOR PLACEMENT      DENTAL SURGERY      REPLACEMENT TOTAL KNEE      REVERSE TOTAL SHOULDER ARTHROPLASTY Left 8/23/2018    Procedure: ARTHROPLASTY SHOULDER REVERSE;  Surgeon: Cosmo Medina MD;  [facility];  Service: Orthopedics    TONSILLECTOMY         Family History   Problem Relation Age of Onset    Arthritis Mother        Social History     Occupational History    Not on file       Social History Main Topics    Smoking status: Never Smoker    Smokeless tobacco: Never Used    Alcohol use No    Drug use: No    Sexual activity: No       Allergies   Allergen Reactions    Sulfa Antibiotics Hives         Current Outpatient Prescriptions:     acetaminophen (TYLENOL) 650 mg CR tablet, Take 650 mg by mouth every 8 (eight) hours as needed for mild pain, Disp: , Rfl:     aspirin 81 MG tablet, Take 1 tablet by mouth daily, Disp: , Rfl:     atenolol (TENORMIN) 50 mg tablet, Take 1 tablet (50 mg total) by mouth daily, Disp: 30 tablet, Rfl: 0    calcium carbonate-vitamin D (OSCAL-D) 500 mg-200 units per tablet, Take 1 tablet by mouth daily with breakfast, Disp: 30 tablet, Rfl: 0    carbidopa-levodopa (SINEMET)  mg per tablet, Take 1 tablet by mouth 4 (four) times a day q 4 hours apart, if no improvement after 2 weeks can increase to 1 5 tabs q 4 hours apart, Disp: 360 tablet, Rfl: 0    Cinnamon 500 MG TABS, Take 2 tablets by mouth, Disp: , Rfl:     Coenzyme Q10 400 MG CAPS, Take 1 capsule (400 mg total) by mouth daily, Disp: 30 capsule, Rfl: 0    Omega-3 Fatty Acids (FISH OIL) 1,000 mg, Take 1 capsule (1,000 mg total) by mouth daily, Disp: 30 capsule, Rfl: 0    PARoxetine (PAXIL) 20 mg tablet, Take 1 tablet (20 mg total) by mouth daily, Disp: 30 tablet, Rfl: 0    polyethylene glycol (MIRALAX) 17 g packet, Take 17 g by mouth daily, Disp: 30 each, Rfl: 0    rasagiline (AZILECT) 1 MG, Take 1 tablet (1 mg total) by mouth daily, Disp: 30 each, Rfl: 0    rosuvastatin (CRESTOR) 5 mg tablet, Take 1 tablet (5 mg total) by mouth every other day, Disp: 30 tablet, Rfl: 0      Carlos Alberto Cornelius PA-C    Scribe Attestation    I,:    am acting as a scribe while in the presence of the attending physician :        I,:    personally performed the services described in this documentation    as scribed in my presence :

## 2018-11-17 DIAGNOSIS — G20 PARKINSON DISEASE (HCC): Primary | ICD-10-CM

## 2018-11-19 ENCOUNTER — OFFICE VISIT (OUTPATIENT)
Dept: PHYSICAL THERAPY | Facility: MEDICAL CENTER | Age: 73
End: 2018-11-19
Payer: MEDICARE

## 2018-11-19 DIAGNOSIS — R26.9 ABNORMAL GAIT: ICD-10-CM

## 2018-11-19 DIAGNOSIS — Z96.612 S/P REVERSE TOTAL SHOULDER ARTHROPLASTY, LEFT: Primary | ICD-10-CM

## 2018-11-19 PROCEDURE — 97140 MANUAL THERAPY 1/> REGIONS: CPT

## 2018-11-19 PROCEDURE — 97110 THERAPEUTIC EXERCISES: CPT

## 2018-11-19 PROCEDURE — 97112 NEUROMUSCULAR REEDUCATION: CPT

## 2018-11-19 NOTE — PROGRESS NOTES
Daily Note     Today's date: 2018  Patient name: Radha Garduno  :   MRN: [id number]  Referring provider: Hoda Holland MD  Dx:   Encounter Diagnosis     ICD-10-CM    1  S/P reverse total shoulder arthroplasty, left Z96 612    2  Abnormal gait R26 9                   Subjective: Saw Dr Dari Lawton, pleased with progress  Ok to continue for a few weeks  Objective: See treatment diary below      Assessment: Tolerated treatment well  Patient exhibited good technique with therapeutic exercises Increased incline to 45 degrees for shoulder raises  Pt had less compensation with UT with TB sh abd exercise  Improved contraction of ER in scapular plane  Discussed with pt on wrapping up therapy for the L shoulder in the next coming weeks and then refocusing on her gait with a formal assessment to address her limitations with Parkinson's  Plan: Continue per plan of care     Precautions: DM, parkinson's with deep brain stimulator, HTN, amb with RW- will try to transition back to [facility]  R shoulder reverse TSA - protocol  DOS 18    Daily Treatment Diary     Manual             R shoulder PROM 10 10           Gentle isotonics IR/ER in scap plane, isometric deltoid 5 5                                      15 15               Exercise Diary             Supine sh flexion  1# 2# 20x           A IR/ER in scaption 15x 15x           incline sh flexion 0# 20x 20x           Ceiling punches 0# 20x 20x                        flexbar towel twisting Green 20x Green 20x                        pulley 5 min 5 min                        Standing scaption with stick 15x 15x                        TB row, ext BTB 3x10 each  BTB 3x10 each           TB IR BTB 2x10 BTB 2x10           TB sh abd chicken wing RTB 2x10 RTB 2x10                                                               Gait training               15/15               Modalities              CP post TE HEP:table slides, doorway stretch, cane overhead, cane ER, forearm sup/pron with small object

## 2018-11-21 ENCOUNTER — APPOINTMENT (OUTPATIENT)
Dept: PHYSICAL THERAPY | Facility: MEDICAL CENTER | Age: 73
End: 2018-11-21
Payer: MEDICARE

## 2018-11-21 ENCOUNTER — TELEPHONE (OUTPATIENT)
Dept: NEUROLOGY | Facility: CLINIC | Age: 73
End: 2018-11-21

## 2018-11-21 DIAGNOSIS — G20 PARKINSON'S DISEASE WITH USE OF ELECTRICAL BRAIN STIMULATION (HCC): Primary | ICD-10-CM

## 2018-11-21 NOTE — TELEPHONE ENCOUNTER
Pt called stated that she is having a lot of shaking and pain in her legs, mostly her left leg, reports this started early this afternoon, and she does not think she will make PT this evening  Pt states that she has been walking fairly well lately, the only new medication is her sinemet  Reports that she started the medication yesterday, she only took 3 yesterday (she woke up late)  Today she took it at 10am, and the shaking started 30 mins ago

## 2018-11-21 NOTE — TELEPHONE ENCOUNTER
Spoke with Laura Chavez  Tremor only while walking on it  Lasted 30 minutes  She thinks it is due to the Sinemet  We will stop it

## 2018-11-26 ENCOUNTER — OFFICE VISIT (OUTPATIENT)
Dept: PHYSICAL THERAPY | Facility: MEDICAL CENTER | Age: 73
End: 2018-11-26
Payer: MEDICARE

## 2018-11-26 DIAGNOSIS — Z96.612 S/P REVERSE TOTAL SHOULDER ARTHROPLASTY, LEFT: Primary | ICD-10-CM

## 2018-11-26 DIAGNOSIS — R26.9 ABNORMAL GAIT: ICD-10-CM

## 2018-11-26 PROCEDURE — 97140 MANUAL THERAPY 1/> REGIONS: CPT

## 2018-11-26 PROCEDURE — 97112 NEUROMUSCULAR REEDUCATION: CPT

## 2018-11-26 PROCEDURE — 97110 THERAPEUTIC EXERCISES: CPT

## 2018-11-26 NOTE — PROGRESS NOTES
Daily Note     Today's date: 2018  Patient name: Dom Peterson  :   MRN: [id number]  Referring provider: Michi Chapman MD  Dx:   Encounter Diagnosis     ICD-10-CM    1  S/P reverse total shoulder arthroplasty, left Z96 612    2  Abnormal gait R26 9                   Subjective: Pt reported she had increased pain in her legs Wed night, stopped medication pain has subsided  Had a fall th night, tripped on landing in door frame  Hit her head, feeling better  Objective: See treatment diary below      Assessment: Tolerated treatment well  Patient exhibited good technique with therapeutic exercises and would benefit from continued PT continued program for L shoulder  Pt had minor increase in her muscle fatigue with exercises  Pt had some compensations that needed tactile cues for correction especially when shoulder ER was required  Plan: Continue per plan of care     Precautions: DM, parkinson's with deep brain stimulator, HTN, amb with RW- will try to transition back to [facility]  R shoulder reverse TSA - protocol  DOS 18    Daily Treatment Diary     Manual            R shoulder PROM 10 10 10          Gentle isotonics IR/ER in scap plane, isometric deltoid 5 5 5                                     15 15 15              Exercise Diary            Supine sh flexion  1# 2# 20x 2# 20x          A IR/ER in scaption 15x 15x 15x          incline sh flexion 0# 20x 20x 20x          Ceiling punches 0# 20x 20x 20x                       flexbar towel twisting Green 20x Green 20x Green 20x                       pulley 5 min 5 min NP                       Standing scaption with stick 15x 15x 15x                       TB row, ext BTB 3x10 each  BTB 3x10 each BTB 3x10          TB IR BTB 2x10 BTB 2x10 BTB 3x10          TB sh abd chicken wing RTB 2x10 RTB 2x10 RTB 2x10                                                              Gait training               15/15 15/10              Modalities              CP post TE                                           HEP:table slides, doorway stretch, cane overhead, cane ER, forearm sup/pron with small object

## 2018-11-28 ENCOUNTER — OFFICE VISIT (OUTPATIENT)
Dept: PHYSICAL THERAPY | Facility: MEDICAL CENTER | Age: 73
End: 2018-11-28
Payer: MEDICARE

## 2018-11-28 DIAGNOSIS — R26.9 ABNORMAL GAIT: ICD-10-CM

## 2018-11-28 DIAGNOSIS — Z96.612 S/P REVERSE TOTAL SHOULDER ARTHROPLASTY, LEFT: Primary | ICD-10-CM

## 2018-11-28 PROCEDURE — 97110 THERAPEUTIC EXERCISES: CPT

## 2018-11-28 PROCEDURE — 97140 MANUAL THERAPY 1/> REGIONS: CPT

## 2018-11-28 PROCEDURE — 97112 NEUROMUSCULAR REEDUCATION: CPT

## 2018-12-03 ENCOUNTER — TELEPHONE (OUTPATIENT)
Dept: NEUROLOGY | Facility: CLINIC | Age: 73
End: 2018-12-03

## 2018-12-03 ENCOUNTER — OFFICE VISIT (OUTPATIENT)
Dept: PHYSICAL THERAPY | Facility: MEDICAL CENTER | Age: 73
End: 2018-12-03
Payer: MEDICARE

## 2018-12-03 ENCOUNTER — TELEPHONE (OUTPATIENT)
Dept: NEUROSURGERY | Facility: CLINIC | Age: 73
End: 2018-12-03

## 2018-12-03 DIAGNOSIS — R26.9 ABNORMAL GAIT: Primary | ICD-10-CM

## 2018-12-03 PROCEDURE — G8978 MOBILITY CURRENT STATUS: HCPCS

## 2018-12-03 PROCEDURE — G8979 MOBILITY GOAL STATUS: HCPCS

## 2018-12-03 PROCEDURE — 97140 MANUAL THERAPY 1/> REGIONS: CPT

## 2018-12-03 PROCEDURE — 97164 PT RE-EVAL EST PLAN CARE: CPT

## 2018-12-03 NOTE — PROGRESS NOTES
PT Re-Evaluation     Today's date: 12/3/2018  Patient name: Dariusz Jacome  :   MRN: [id number]  Referring provider: Ger John MD  Dx:   Encounter Diagnosis     ICD-10-CM    1  Abnormal gait R26 9                   Assessment  Assessment details: We plan to refocus pt's therapy on her gait and balance  She is able to ambulate without her quad cane but carries it with her for safety at this time  She has a short dayday with mild foot drag and small NANCY  She has exceptional strength in her LE's but has trouble with activation especially during sit to stand  She is at falls risk with her sit to stand and TUG values  She has more difficulty with her dynamic balance activities vs  Static balance  She should benefit from  Skilled PT to help improve her gait and balance and reduce her risk of falls  Thank you    Impairments: abnormal gait, lacks appropriate home exercise program, safety issue and poor body mechanics  Understanding of Dx/Px/POC: good   Prognosis: fair    Goals  STG (3-4 weeks)  1: sit to stand < 18 seconds  2: TUG less than 22 seconds  3: SLS R LE > 13 seconds  4: Pt independent with HEP    LTG (4-6 weeks)  1: Improve FOTO 10 pts  2: LE strength 4+ to 5/5 throughout  3: Pt able to sit to stand without HHA      Plan  Plan details: Continue PT for her gait and balance  Patient would benefit from: skilled physical therapy  Planned therapy interventions: balance, ADL training, neuromuscular re-education, therapeutic activities, therapeutic exercise, strengthening, functional ROM exercises, gait training, coordination, body mechanics training and home exercise program  Frequency: 2x week  Duration in visits: 10  Duration in weeks: 5  Plan of Care beginning date: 12/3/2018  Plan of Care expiration date: 2019  Treatment plan discussed with: patient        Subjective Evaluation    History of Present Illness  Mechanism of injury:   PKD  Difficulty with initiating gait  Main difficulty is getting out of a chair  Walking with quad cane- but only holding onto it  Recurrent probem    Quality of life: good    Social Support  Lives in: multiple-level home  Lives with: spouse    Exercise history: chair yoga    Patient Goals  Patient goals for therapy: increased strength and independence with ADLs/IADLs  Patient goal: imrpove gait anf balance        Objective     Ambulation     Ambulation: Level Surfaces   Ambulation with assistive device: independent  Ambulation without assistive device: contact guard assist    Observational Gait   Decreased left step length and right step length     Left foot contact pattern: foot flat  Right foot contact pattern: foot flat  Left arm swing: decreased  Right arm swing: decreased  Base of support: decreased    Additional Observational Gait Details  Mild foot drag, small NANCY    Comments   BL LE strength 4 to 4+/5 throughout  5- sit to stand: 39 seconds- with HHA  TUG 28 seconds  FT eyes open 30+ seconds  FT eyes closed 30+ seconds  Tandem RL- 30+ seconds  Tandem LR 30+ seconds  R SLS 25 seconds  L SLS 7 seconds  360 turn- 6 seconds to R; 10 seconds to L   Able to perform 5 steps alternating- some drag with each foot  Able to  small item off ground suing quad cane for balance            Precautions: Precautions: DM, parkinson's with deep brain stimulator, HTN, amb with quad cane  R shoulder reverse TSA     Daily Treatment Diary     Manual                                                                                   Exercise Diary              TM                          Standing heel/toe raises             Mini-squats             Side stepping  Forward/backwards walking             SLS                          Step-up fwd/lateral                          LAQ             Seated marches             TB roel seated                                                                                                                         Modalities HEP: sitting marches, standing hip abd, ext, mini squats, step-up, tandem walking with cane

## 2018-12-03 NOTE — TELEPHONE ENCOUNTER
pt called and is requesting to cancel appt with ruperto on 12/20  she states that this was to discuss medications and she is no longer taking the sinemet  do you want pt to keep appt with David Roberson?  please advise  [phone]

## 2018-12-03 NOTE — TELEPHONE ENCOUNTER
Pt inquired what her upcoming appt with Dr Lynn Brown would entail  Explained it is a new pt appt  To discuss her end of life DBS battery  She stated an understanding

## 2018-12-05 ENCOUNTER — OFFICE VISIT (OUTPATIENT)
Dept: PHYSICAL THERAPY | Facility: MEDICAL CENTER | Age: 73
End: 2018-12-05
Payer: MEDICARE

## 2018-12-05 DIAGNOSIS — R26.9 ABNORMAL GAIT: Primary | ICD-10-CM

## 2018-12-05 PROCEDURE — 97530 THERAPEUTIC ACTIVITIES: CPT

## 2018-12-05 PROCEDURE — 97116 GAIT TRAINING THERAPY: CPT

## 2018-12-05 PROCEDURE — 97110 THERAPEUTIC EXERCISES: CPT

## 2018-12-05 NOTE — PROGRESS NOTES
Daily Note     Today's date: 2018  Patient name: Lord Soriano  :   MRN: [id number]  Referring provider: Drake Simons MD  Dx:   Encounter Diagnosis     ICD-10-CM    1  Abnormal gait R26 9                   Subjective: Pt had trouble performing tandem walk at home  All the others were fine  Objective: See treatment diary below      Assessment: Tolerated treatment well  Patient exhibited good technique with therapeutic exercises and would benefit from continued PT reviewed and corrected squat technique with pt  Gait training- worked on longer stride and good heel strike with Pt, able to perform correctly, returned to previous form if she got distracted  Pt reported feeling good after session, no significant fatigue  Plan: Continue per plan of care     Precautions: Precautions: DM, parkinson's with deep brain stimulator, HTN, amb with quad cane  R shoulder reverse TSA     Daily Treatment Diary     Manual                                                                                   Exercise Diary              TM 5 min                         Standing heel/toe raises 20x            Mini-squats 20x            Side stepping  Forward/backwards walking 4 rounds each            SLS NV                         Step-up fwd/lateral L2 10x each                         LAQ 3# 2x10            Seated marches 3# 2x10            TB clamshell seated Pink TB 20x            Hip add ball squeeze 20x5"                                                                                           15/10/20                Modalities                                                         HEP: sitting marches, standing hip abd, ext, mini squats, step-up, tandem walking with cane

## 2018-12-06 ENCOUNTER — OFFICE VISIT (OUTPATIENT)
Dept: NEUROSURGERY | Facility: CLINIC | Age: 73
End: 2018-12-06
Payer: MEDICARE

## 2018-12-06 ENCOUNTER — TELEPHONE (OUTPATIENT)
Dept: NEUROLOGY | Facility: CLINIC | Age: 73
End: 2018-12-06

## 2018-12-06 VITALS
BODY MASS INDEX: 38.3 KG/M2 | HEART RATE: 55 BPM | HEIGHT: 67 IN | DIASTOLIC BLOOD PRESSURE: 76 MMHG | TEMPERATURE: 97.7 F | SYSTOLIC BLOOD PRESSURE: 111 MMHG | RESPIRATION RATE: 16 BRPM | WEIGHT: 244 LBS

## 2018-12-06 DIAGNOSIS — G20 PARKINSON'S DISEASE WITH USE OF ELECTRICAL BRAIN STIMULATION (HCC): Primary | ICD-10-CM

## 2018-12-06 PROCEDURE — 99214 OFFICE O/P EST MOD 30 MIN: CPT | Performed by: NEUROLOGICAL SURGERY

## 2018-12-06 RX ORDER — CHLORHEXIDINE GLUCONATE 0.12 MG/ML
15 RINSE ORAL ONCE
Status: CANCELLED | OUTPATIENT
Start: 2018-12-06 | End: 2018-12-06

## 2018-12-06 RX ORDER — CEFAZOLIN SODIUM 2 G/50ML
2000 SOLUTION INTRAVENOUS ONCE
Status: CANCELLED | OUTPATIENT
Start: 2018-12-06 | End: 2018-12-06

## 2018-12-06 NOTE — TELEPHONE ENCOUNTER
pt called and states that neuro sx told you that dr Cecile Rogers would need to re-program DBS after battery replacement  per asim she believes that DBS rep would be there and could re-program   is this correct?   could you please call pt back   [phone]

## 2018-12-06 NOTE — PROGRESS NOTES
Assessment/Plan:    No problem-specific Assessment & Plan notes found for this encounter  OR for replacement of right deep brain stimulator generator     Diagnoses and all orders for this visit:    Parkinson's disease with use of electrical brain stimulation ([facility] [address] )  -     Ambulatory referral to Neurosurgery  -     Case request operating room: REPLACEMENT IMPLANTABLE PULSE GENERATOR (IPG) DEEP BRAIN STIMULATION (DBS), RIGHT; Standing  -     Ambulatory referral to [facility]; Future  -     Case request operating room: REPLACEMENT IMPLANTABLE PULSE GENERATOR (IPG) DEEP BRAIN STIMULATION (DBS), RIGHT  -     UA w Reflex to Microscopic w Reflex to Culture  -     Comprehensive metabolic panel; Future  -     CBC and differential; Future  -     APTT; Future  -     Protime-INR; Future  -     HEMOGLOBIN A1C W/ EAG ESTIMATION; Future  -     EKG 12 lead; Future    Other orders  -     Diet NPO; Sips with meds; Standing  -     Void on call to OR; Standing  -     Insert peripheral IV; Standing  -     Nursing Communication Use 2 CHG cloths, have the patient wash his/her body from the neck down or have staff wash entire body (from neck down) if patient is unable; Standing  -     chlorhexidine (PERIDEX) 0 12 % oral rinse 15 mL; Swish and spit 15 mL once   -     ceFAZolin (ANCEF) IVPB (premix) 2,000 mg; Infuse 2,000 mg into a venous catheter once           Subjective:      Patient ID: Lord Soriano is a 75 y o  female  75year old female with parkinsons disease with bilateral impulse generators with the right at 2 5 the left is nearing end of life as well  She has no major complaints  Denies any current acute medical issues denies any recent need for blood thinners  The following portions of the patient's history were reviewed and updated as appropriate:   She  has a past medical history of Anxiety; Diabetes mellitus ([address] ); Diabetes mellitus type 2, diet-controlled ([address] ); Hyperlipidemia;  Hypertension; and Parkinson disease ([place] [address] )  She   Patient Active Problem List    Diagnosis Date Noted    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 ([place] [address] ) 11/15/2018    Anxiety 09/12/2018    Other hyperlipidemia 09/10/2018    Type 2 diabetes mellitus without complication ([place] [address] ) 33/97/9306    S/P reverse total shoulder arthroplasty, left 09/07/2018    History of hypertension 08/23/2018    Fracture, shoulder, left, closed, initial encounter 08/22/2018    Parkinson's disease with use of electrical brain stimulation ([place] [address] ) 06/21/2018     She  has a past surgical history that includes Replacement total knee; Achilles tendon surgery; Dental surgery; Deep brain stimulator placement; Tonsillectomy; and Reverse total shoulder arthroplasty (Left, 8/23/2018)  Her family history includes Arthritis in her mother  She  reports that she has never smoked  She has never used smokeless tobacco  She reports that she does not drink alcohol or use drugs    Current Outpatient Prescriptions on File Prior to Visit   Medication Sig    acetaminophen (TYLENOL) 650 mg CR tablet Take 650 mg by mouth every 8 (eight) hours as needed for mild pain    aspirin 81 MG tablet Take 1 tablet by mouth daily    atenolol (TENORMIN) 50 mg tablet Take 1 tablet (50 mg total) by mouth daily    Cinnamon 500 MG TABS Take 2 tablets by mouth    Coenzyme Q10 400 MG CAPS Take 1 capsule (400 mg total) by mouth daily    Omega-3 Fatty Acids (FISH OIL) 1,000 mg Take 1 capsule (1,000 mg total) by mouth daily    PARoxetine (PAXIL) 20 mg tablet Take 1 tablet (20 mg total) by mouth daily    polyethylene glycol (MIRALAX) 17 g packet Take 17 g by mouth daily    rasagiline (AZILECT) 1 MG Take 1 tablet (1 mg total) by mouth daily    rosuvastatin (CRESTOR) 5 mg tablet Take 1 tablet (5 mg total) by mouth every other day    [DISCONTINUED] calcium carbonate-vitamin D (OSCAL-D) 500 mg-200 units per tablet Take 1 tablet by mouth daily with breakfast (Patient not taking: Reported on 12/3/2018 )  [DISCONTINUED] carbidopa-levodopa (SINEMET)  mg per tablet Take 1 tablet by mouth 4 (four) times a day q 4 hours apart, if no improvement after 2 weeks can increase to 1 5 tabs q 4 hours apart (Patient not taking: Reported on 12/3/2018 )     No current facility-administered medications on file prior to visit       Review of Systems   Constitutional: Negative for chills, fatigue and fever  HENT: Negative  Eyes: Negative for pain and visual disturbance  Respiratory: Negative for cough, shortness of breath and wheezing  Cardiovascular: Negative for chest pain and palpitations  Gastrointestinal: Negative for abdominal pain and nausea  Genitourinary: Negative for difficulty urinating  Musculoskeletal: Positive for gait problem  Negative for arthralgias, back pain, neck pain and neck stiffness  Neurological: Positive for weakness  Negative for dizziness, speech difficulty, numbness and headaches  Psychiatric/Behavioral: Positive for confusion  Objective:      /76 (BP Location: Left arm, Patient Position: Sitting, Cuff Size: Standard)   Pulse 55   Temp 97 7 °F (36 5 °C) (Tympanic)   Resp 16   Ht 5' 7" (1 702 m)   Wt 111 kg (244 lb)   BMI 38 22 kg/m²          Physical Exam   Constitutional: She is oriented to person, place, and time  She appears well-developed  HENT:   Head: Normocephalic and atraumatic  Eyes: Pupils are equal, round, and reactive to light  EOM are normal    Neck: Normal range of motion  Cardiovascular: Normal rate  Pulmonary/Chest: Effort normal    Neurological: She is alert and oriented to person, place, and time  She has normal strength  No cranial nerve deficit or sensory deficit

## 2018-12-07 ENCOUNTER — TELEPHONE (OUTPATIENT)
Dept: NEUROSURGERY | Facility: CLINIC | Age: 73
End: 2018-12-07

## 2018-12-07 NOTE — TELEPHONE ENCOUNTER
Signed surgical consent in the presence of surgeon after procedure explained   Proposed surgical procedure:REPLACEMENT IMPLANTABLE PULSE GENERATOR (IPG) DEEP BRAIN STIMULATION (DBS) (Right Chest)    Assessment for comorbid medical conditions:   HO adverse response to general anesthesia:denies   Cardiac:denies   Pulmonary: denies   Endocrine:  Denies   MISC/Oncology :denies   Anticoagulant/Antiplatelet use etc : denies   Personal history of venous thromboembolic disease: denies   Imagining: N/A  Pain management:  N/A  Medication hold list for surgery (AC, ASA, NSAID, vitamins, dietary supplements, OTC):CQ10, ASA , cinnamon   Omega -3 , as per list hold   Discussed overview of surgical process from office appointment thru 6 weeks post op:  DBS;  DX ET or PD IPG replacement   Patient/SO verbalized and understanding

## 2018-12-07 NOTE — TELEPHONE ENCOUNTER
Called patient and canceled her appt on 12/17/18 with Dr Luz and rescheduled her for 1/16/19 with Dr Ara Roldan in the [facility] location at 11am due to IPG replacement      Thank you

## 2018-12-08 LAB — HBA1C MFR BLD HPLC: 6.3 %

## 2018-12-10 ENCOUNTER — OFFICE VISIT (OUTPATIENT)
Dept: PHYSICAL THERAPY | Facility: MEDICAL CENTER | Age: 73
End: 2018-12-10
Payer: MEDICARE

## 2018-12-10 DIAGNOSIS — R26.9 ABNORMAL GAIT: Primary | ICD-10-CM

## 2018-12-10 PROCEDURE — 97530 THERAPEUTIC ACTIVITIES: CPT

## 2018-12-10 PROCEDURE — 97110 THERAPEUTIC EXERCISES: CPT

## 2018-12-10 PROCEDURE — 97116 GAIT TRAINING THERAPY: CPT

## 2018-12-10 NOTE — PROGRESS NOTES
Daily Note     Today's date: 12/10/2018  Patient name: Madai Graham  :   MRN: [id number]  Referring provider: Esteban Jones MD  Dx:   Encounter Diagnosis     ICD-10-CM    1  Abnormal gait R26 9                   Subjective: Pt has been practicing gait cycle at home  Objective: See treatment diary below      Assessment: Tolerated treatment well  Patient exhibited good technique with therapeutic exercises  Improved heel strike observed during gait training  During conversation Pt had to be corrected on technique  Still taking a short stride length  Pt required VCs to correct lateral steps to keep feet facing forward  Plan: Continue per plan of care     Precautions: Precautions: DM, parkinson's with deep brain stimulator, HTN, amb with quad cane  R shoulder reverse TSA     Daily Treatment Diary     Manual                                                                                   Exercise Diary  12/5 12/10           TM 5 min 5 min                        Standing heel/toe raises 20x 20x           Mini-squats 20x 20x           Side stepping  Forward/backwards walking 4 rounds each 4 rounds each           SLS NV                         Step-up fwd/lateral L2 10x each L2 10x each                        LAQ 3# 2x10 3# 2x10           Seated marches 3# 2x10 3# 2x10           TB clamshell seated Pink TB 20x Pink TB 30x           Hip add ball squeeze 20x5" 30x5"                                                                                          15/10/20 15/15/15               Modalities                                                         HEP: sitting marches, standing hip abd, ext, mini squats, step-up, tandem walking with cane

## 2018-12-12 ENCOUNTER — OFFICE VISIT (OUTPATIENT)
Dept: PHYSICAL THERAPY | Facility: MEDICAL CENTER | Age: 73
End: 2018-12-12
Payer: MEDICARE

## 2018-12-12 DIAGNOSIS — R26.9 ABNORMAL GAIT: Primary | ICD-10-CM

## 2018-12-12 PROCEDURE — 97110 THERAPEUTIC EXERCISES: CPT

## 2018-12-12 PROCEDURE — 97116 GAIT TRAINING THERAPY: CPT

## 2018-12-12 PROCEDURE — 97112 NEUROMUSCULAR REEDUCATION: CPT

## 2018-12-12 NOTE — PROGRESS NOTES
Daily Note     Today's date: 2018  Patient name: Dennis Benito  :   MRN: [id number]  Referring provider: Soren Shook MD  Dx:   Encounter Diagnosis     ICD-10-CM    1  Abnormal gait R26 9                   Subjective: Pt reports improved walking      Objective: See treatment diary below      Assessment: Tolerated treatment well  Patient exhibited good technique with therapeutic exercises and would benefit from continued PT Pt had some events of "freezing" especially when turning directions  Decreased fatigue during LE strengthening  Plan: Continue per plan of care     Precautions: Precautions: DM, parkinson's with deep brain stimulator, HTN, amb with quad cane  R shoulder reverse TSA     Daily Treatment Diary     Manual                                                                                   Exercise Diary  12/5 12/10 12/12          TM 5 min 5 min 5 min                       Standing heel/toe raises 20x 20x 20x          Mini-squats 20x 20x 20x          Side stepping  Forward/backwards walking 4 rounds each 4 rounds each 4 rounds each          SLS NV            hurdles   4 laps          Step-up fwd/lateral L2 10x each L2 10x each L2 10x each                       LAQ 3# 2x10 3# 2x10 4# 2x10          Seated marches 3# 2x10 3# 2x10 4# 2x10          TB clamshell seated Pink TB 20x Pink TB 30x Green TB 30x          Hip add ball squeeze 20x5" 30x5" 30x5"          Sit to stand   10x                                                                            15/10/20 15/15/15 15/15/15              Modalities                                                         HEP: sitting marches, standing hip abd, ext, mini squats, step-up, tandem walking with cane

## 2018-12-13 DIAGNOSIS — G20 PARKINSON'S DISEASE (HCC): ICD-10-CM

## 2018-12-13 NOTE — PRE-PROCEDURE INSTRUCTIONS
Pre-Surgery Instructions:   Medication Instructions    atenolol (TENORMIN) 50 mg tablet Instructed patient per Anesthesia Guidelines   PARoxetine (PAXIL) 20 mg tablet Instructed patient per Anesthesia Guidelines   polyethylene glycol (MIRALAX) 17 g packet Instructed patient per Anesthesia Guidelines   rasagiline (AZILECT) 1 MG Instructed patient per Anesthesia Guidelines  Pre-procedure instructions given without any further questions or concerns at this time  Pt reports she has the CHG wash and will review the written instructions from Dr Stallworth Heads office prior to use

## 2018-12-17 ENCOUNTER — TELEPHONE (OUTPATIENT)
Dept: NEUROSURGERY | Facility: CLINIC | Age: 73
End: 2018-12-17

## 2018-12-17 ENCOUNTER — DOCUMENTATION (OUTPATIENT)
Dept: NEUROSURGERY | Facility: CLINIC | Age: 73
End: 2018-12-17

## 2018-12-17 NOTE — TELEPHONE ENCOUNTER
Patient reports DR Garcia Stager  PCP  Ethel Alfaro on Fri for  UTI , asymptomatic  denies burning, frequency ,no urgency  Cephalexin 500 mg po BID x 7 days started 1 st does on Friday , Saturday and Sunday 2 doses , will continue BID dosing thru completion    Urinalysis + nitrite  Positive   Leukocytes 75- 250  Urine blood  0 03 -0 019 bacteria 4 +  Discussed with DR Callie weaver w/ surgery --patient on ABX for several days ---asymptomatic no leukocytosis ---take cephalexin ads ordered by PCP, no additional abx for surgery           Pre operative call day prior surgery scheduled in the AM w/ Dr Karen Graff the following information is confirmed / discussed: REPLACEMENT IMPLANTABLE PULSE GENERATOR (IPG) DEEP BRAIN STIMULATION (DBS) (Right Chest)  Allergies Reviewed --yes   Hold medications reviewed: asa 7 days preop / 7 days post op OTC Dietary supplements   NPO after MN, night prior surgery reviewed:--  Medication (s) instructed by healthcare provider to take the morning of surgery w/ sip of water 4 OZ discussed:as per ASU nurse   Post operative scripts electronic transmission: non required ----has ABX for UTI RX continue as ordered , no additional ABX requires james take EST for pain   PDMP site reviewed accessed and reviewed scheduled drug list printed and scanned into record  Pain management script: mg every 4 hours not to exceed 6 per day   Pre- operative shower protocol reviewed; Clarify instructions as per protocol, third chlorhexidine shower tonight before surgery, then use OLY wipes as per packaging instructions, Use a clean towel and wash cloth starting tonight and continue nightly until seen 2 weeks post operative visit for staple removal --reinforced    Change bed linens tonight and continue at least 1-2 times weekly  --reinforced     Informed will receive a telephone call tonight from a hospital representative with time to report on surgery day: -----reinforced   Informed will receive a f/u call within in 24 -48 hours post-op to assess recovery reinforce instructions, and to answer any questions  --reinforced   Follow-up appointments reviewed----2 week incision check --     Patient verbalized understanding information provided /discussed

## 2018-12-18 ENCOUNTER — ANESTHESIA EVENT (OUTPATIENT)
Dept: PERIOP | Facility: HOSPITAL | Age: 73
End: 2018-12-18
Payer: MEDICARE

## 2018-12-18 ENCOUNTER — HOSPITAL ENCOUNTER (OUTPATIENT)
Facility: HOSPITAL | Age: 73
Setting detail: OUTPATIENT SURGERY
Discharge: HOME | End: 2018-12-18
Attending: NEUROLOGICAL SURGERY | Admitting: NEUROLOGICAL SURGERY
Payer: MEDICARE

## 2018-12-18 ENCOUNTER — ANESTHESIA (OUTPATIENT)
Dept: PERIOP | Facility: HOSPITAL | Age: 73
End: 2018-12-18
Payer: MEDICARE

## 2018-12-18 VITALS
HEART RATE: 58 BPM | HEIGHT: 67 IN | WEIGHT: 134.2 LBS | OXYGEN SATURATION: 96 % | BODY MASS INDEX: 21.06 KG/M2 | TEMPERATURE: 97.8 F | RESPIRATION RATE: 20 BRPM | DIASTOLIC BLOOD PRESSURE: 68 MMHG | SYSTOLIC BLOOD PRESSURE: 118 MMHG

## 2018-12-18 PROCEDURE — 61885 INSRT/REDO NEUROSTIM 1 ARRAY: CPT | Performed by: PHYSICIAN ASSISTANT

## 2018-12-18 PROCEDURE — C1787 PATIENT PROGR, NEUROSTIM: HCPCS | Performed by: NEUROLOGICAL SURGERY

## 2018-12-18 PROCEDURE — 61885 INSRT/REDO NEUROSTIM 1 ARRAY: CPT | Performed by: NEUROLOGICAL SURGERY

## 2018-12-18 PROCEDURE — 95978 PR ANALYZE NEUROSTIM BRAIN, FIRST 1H: CPT | Performed by: NEUROLOGICAL SURGERY

## 2018-12-18 PROCEDURE — C1767 GENERATOR, NEURO NON-RECHARG: HCPCS | Performed by: NEUROLOGICAL SURGERY

## 2018-12-18 DEVICE — INS 37603 ACTIVA SC NO PARYLN EMN DBSALP
Type: IMPLANTABLE DEVICE | Status: NON-FUNCTIONAL
Brand: ACTIVA® SC
Removed: 2022-06-27

## 2018-12-18 RX ORDER — LABETALOL HYDROCHLORIDE 5 MG/ML
5 INJECTION, SOLUTION INTRAVENOUS AS NEEDED
Status: DISCONTINUED | OUTPATIENT
Start: 2018-12-18 | End: 2018-12-18 | Stop reason: HOSPADM

## 2018-12-18 RX ORDER — FENTANYL CITRATE/PF 50 MCG/ML
25 SYRINGE (ML) INJECTION
Status: DISCONTINUED | OUTPATIENT
Start: 2018-12-18 | End: 2018-12-18 | Stop reason: HOSPADM

## 2018-12-18 RX ORDER — CEFAZOLIN SODIUM 2 G/50ML
2000 SOLUTION INTRAVENOUS ONCE
Status: COMPLETED | OUTPATIENT
Start: 2018-12-18 | End: 2018-12-18

## 2018-12-18 RX ORDER — SODIUM CHLORIDE, SODIUM LACTATE, POTASSIUM CHLORIDE, CALCIUM CHLORIDE 600; 310; 30; 20 MG/100ML; MG/100ML; MG/100ML; MG/100ML
75 INJECTION, SOLUTION INTRAVENOUS CONTINUOUS
Status: DISCONTINUED | OUTPATIENT
Start: 2018-12-18 | End: 2018-12-18 | Stop reason: HOSPADM

## 2018-12-18 RX ORDER — LIDOCAINE HYDROCHLORIDE AND EPINEPHRINE 10; 10 MG/ML; UG/ML
INJECTION, SOLUTION INFILTRATION; PERINEURAL AS NEEDED
Status: DISCONTINUED | OUTPATIENT
Start: 2018-12-18 | End: 2018-12-18 | Stop reason: HOSPADM

## 2018-12-18 RX ORDER — CEPHALEXIN 500 MG/1
500 CAPSULE ORAL EVERY 12 HOURS SCHEDULED
COMMUNITY
End: 2019-01-16 | Stop reason: ALTCHOICE

## 2018-12-18 RX ORDER — CHLORHEXIDINE GLUCONATE 0.12 MG/ML
15 RINSE ORAL ONCE
Status: COMPLETED | OUTPATIENT
Start: 2018-12-18 | End: 2018-12-18

## 2018-12-18 RX ORDER — FENTANYL CITRATE 50 UG/ML
INJECTION, SOLUTION INTRAMUSCULAR; INTRAVENOUS AS NEEDED
Status: DISCONTINUED | OUTPATIENT
Start: 2018-12-18 | End: 2018-12-18 | Stop reason: SURG

## 2018-12-18 RX ORDER — HYDROCODONE BITARTRATE AND ACETAMINOPHEN 5; 325 MG/1; MG/1
1 TABLET ORAL EVERY 6 HOURS PRN
Status: DISCONTINUED | OUTPATIENT
Start: 2018-12-18 | End: 2018-12-18 | Stop reason: HOSPADM

## 2018-12-18 RX ORDER — HYDROMORPHONE HCL/PF 1 MG/ML
0.2 SYRINGE (ML) INJECTION
Status: DISCONTINUED | OUTPATIENT
Start: 2018-12-18 | End: 2018-12-18 | Stop reason: HOSPADM

## 2018-12-18 RX ORDER — HYDROMORPHONE HCL/PF 1 MG/ML
0.5 SYRINGE (ML) INJECTION
Status: DISCONTINUED | OUTPATIENT
Start: 2018-12-18 | End: 2018-12-18 | Stop reason: HOSPADM

## 2018-12-18 RX ORDER — PROPOFOL 10 MG/ML
INJECTION, EMULSION INTRAVENOUS AS NEEDED
Status: DISCONTINUED | OUTPATIENT
Start: 2018-12-18 | End: 2018-12-18 | Stop reason: SURG

## 2018-12-18 RX ORDER — ONDANSETRON 2 MG/ML
4 INJECTION INTRAMUSCULAR; INTRAVENOUS ONCE AS NEEDED
Status: DISCONTINUED | OUTPATIENT
Start: 2018-12-18 | End: 2018-12-18 | Stop reason: HOSPADM

## 2018-12-18 RX ORDER — PROPOFOL 10 MG/ML
INJECTION, EMULSION INTRAVENOUS CONTINUOUS PRN
Status: DISCONTINUED | OUTPATIENT
Start: 2018-12-18 | End: 2018-12-18 | Stop reason: SURG

## 2018-12-18 RX ORDER — MEPERIDINE HYDROCHLORIDE 50 MG/ML
12.5 INJECTION INTRAMUSCULAR; INTRAVENOUS; SUBCUTANEOUS
Status: DISCONTINUED | OUTPATIENT
Start: 2018-12-18 | End: 2018-12-18 | Stop reason: HOSPADM

## 2018-12-18 RX ORDER — HYDRALAZINE HYDROCHLORIDE 20 MG/ML
5 INJECTION INTRAMUSCULAR; INTRAVENOUS AS NEEDED
Status: DISCONTINUED | OUTPATIENT
Start: 2018-12-18 | End: 2018-12-18 | Stop reason: HOSPADM

## 2018-12-18 RX ADMIN — PROPOFOL 30 MG: 10 INJECTION, EMULSION INTRAVENOUS at 12:41

## 2018-12-18 RX ADMIN — CEFAZOLIN SODIUM 2000 MG: 2 SOLUTION INTRAVENOUS at 12:41

## 2018-12-18 RX ADMIN — FENTANYL CITRATE 50 MCG: 50 INJECTION, SOLUTION INTRAMUSCULAR; INTRAVENOUS at 12:52

## 2018-12-18 RX ADMIN — PROPOFOL 50 MCG/KG/MIN: 10 INJECTION, EMULSION INTRAVENOUS at 12:45

## 2018-12-18 RX ADMIN — CHLORHEXIDINE GLUCONATE 0.12% ORAL RINSE 15 ML: 1.2 LIQUID ORAL at 11:40

## 2018-12-18 RX ADMIN — FENTANYL CITRATE 50 MCG: 50 INJECTION, SOLUTION INTRAMUSCULAR; INTRAVENOUS at 12:43

## 2018-12-18 RX ADMIN — SODIUM CHLORIDE, SODIUM LACTATE, POTASSIUM CHLORIDE, AND CALCIUM CHLORIDE 75 ML/HR: .6; .31; .03; .02 INJECTION, SOLUTION INTRAVENOUS at 11:51

## 2018-12-18 RX ADMIN — SODIUM CHLORIDE, SODIUM LACTATE, POTASSIUM CHLORIDE, AND CALCIUM CHLORIDE: .6; .31; .03; .02 INJECTION, SOLUTION INTRAVENOUS at 12:39

## 2018-12-18 NOTE — H&P (VIEW-ONLY)
Assessment/Plan:    No problem-specific Assessment & Plan notes found for this encounter  OR for replacement of right deep brain stimulator generator     Diagnoses and all orders for this visit:    Parkinson's disease with use of electrical brain stimulation ([facility] [address] )  -     Ambulatory referral to Neurosurgery  -     Case request operating room: REPLACEMENT IMPLANTABLE PULSE GENERATOR (IPG) DEEP BRAIN STIMULATION (DBS), RIGHT; Standing  -     Ambulatory referral to [facility]; Future  -     Case request operating room: REPLACEMENT IMPLANTABLE PULSE GENERATOR (IPG) DEEP BRAIN STIMULATION (DBS), RIGHT  -     UA w Reflex to Microscopic w Reflex to Culture  -     Comprehensive metabolic panel; Future  -     CBC and differential; Future  -     APTT; Future  -     Protime-INR; Future  -     HEMOGLOBIN A1C W/ EAG ESTIMATION; Future  -     EKG 12 lead; Future    Other orders  -     Diet NPO; Sips with meds; Standing  -     Void on call to OR; Standing  -     Insert peripheral IV; Standing  -     Nursing Communication Use 2 CHG cloths, have the patient wash his/her body from the neck down or have staff wash entire body (from neck down) if patient is unable; Standing  -     chlorhexidine (PERIDEX) 0 12 % oral rinse 15 mL; Swish and spit 15 mL once   -     ceFAZolin (ANCEF) IVPB (premix) 2,000 mg; Infuse 2,000 mg into a venous catheter once           Subjective:      Patient ID: Elbert Orr is a 75 y o  female  75year old female with parkinsons disease with bilateral impulse generators with the right at 2 5 the left is nearing end of life as well  She has no major complaints  Denies any current acute medical issues denies any recent need for blood thinners  The following portions of the patient's history were reviewed and updated as appropriate:   She  has a past medical history of Anxiety; Diabetes mellitus ([address] ); Diabetes mellitus type 2, diet-controlled ([facility] [address] ); Hyperlipidemia;  Hypertension; and Parkinson disease ([place] [address] )  She   Patient Active Problem List    Diagnosis Date Noted    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 ([place] [address] ) 11/15/2018    Anxiety 09/12/2018    Other hyperlipidemia 09/10/2018    Type 2 diabetes mellitus without complication ([place] [address] ) 29/25/9865    S/P reverse total shoulder arthroplasty, left 09/07/2018    History of hypertension 08/23/2018    Fracture, shoulder, left, closed, initial encounter 08/22/2018    Parkinson's disease with use of electrical brain stimulation ([place] [address] ) 06/21/2018     She  has a past surgical history that includes Replacement total knee; Achilles tendon surgery; Dental surgery; Deep brain stimulator placement; Tonsillectomy; and Reverse total shoulder arthroplasty (Left, 8/23/2018)  Her family history includes Arthritis in her mother  She  reports that she has never smoked  She has never used smokeless tobacco  She reports that she does not drink alcohol or use drugs    Current Outpatient Prescriptions on File Prior to Visit   Medication Sig    acetaminophen (TYLENOL) 650 mg CR tablet Take 650 mg by mouth every 8 (eight) hours as needed for mild pain    aspirin 81 MG tablet Take 1 tablet by mouth daily    atenolol (TENORMIN) 50 mg tablet Take 1 tablet (50 mg total) by mouth daily    Cinnamon 500 MG TABS Take 2 tablets by mouth    Coenzyme Q10 400 MG CAPS Take 1 capsule (400 mg total) by mouth daily    Omega-3 Fatty Acids (FISH OIL) 1,000 mg Take 1 capsule (1,000 mg total) by mouth daily    PARoxetine (PAXIL) 20 mg tablet Take 1 tablet (20 mg total) by mouth daily    polyethylene glycol (MIRALAX) 17 g packet Take 17 g by mouth daily    rasagiline (AZILECT) 1 MG Take 1 tablet (1 mg total) by mouth daily    rosuvastatin (CRESTOR) 5 mg tablet Take 1 tablet (5 mg total) by mouth every other day    [DISCONTINUED] calcium carbonate-vitamin D (OSCAL-D) 500 mg-200 units per tablet Take 1 tablet by mouth daily with breakfast (Patient not taking: Reported on 12/3/2018 )  [DISCONTINUED] carbidopa-levodopa (SINEMET)  mg per tablet Take 1 tablet by mouth 4 (four) times a day q 4 hours apart, if no improvement after 2 weeks can increase to 1 5 tabs q 4 hours apart (Patient not taking: Reported on 12/3/2018 )     No current facility-administered medications on file prior to visit       Review of Systems   Constitutional: Negative for chills, fatigue and fever  HENT: Negative  Eyes: Negative for pain and visual disturbance  Respiratory: Negative for cough, shortness of breath and wheezing  Cardiovascular: Negative for chest pain and palpitations  Gastrointestinal: Negative for abdominal pain and nausea  Genitourinary: Negative for difficulty urinating  Musculoskeletal: Positive for gait problem  Negative for arthralgias, back pain, neck pain and neck stiffness  Neurological: Positive for weakness  Negative for dizziness, speech difficulty, numbness and headaches  Psychiatric/Behavioral: Positive for confusion  Objective:      /76 (BP Location: Left arm, Patient Position: Sitting, Cuff Size: Standard)   Pulse 55   Temp 97 7 °F (36 5 °C) (Tympanic)   Resp 16   Ht 5' 7" (1 702 m)   Wt 111 kg (244 lb)   BMI 38 22 kg/m²          Physical Exam   Constitutional: She is oriented to person, place, and time  She appears well-developed  HENT:   Head: Normocephalic and atraumatic  Eyes: Pupils are equal, round, and reactive to light  EOM are normal    Neck: Normal range of motion  Cardiovascular: Normal rate  Pulmonary/Chest: Effort normal    Neurological: She is alert and oriented to person, place, and time  She has normal strength  No cranial nerve deficit or sensory deficit

## 2018-12-18 NOTE — ANESTHESIA POSTPROCEDURE EVALUATION
Post-Op Assessment Note      CV Status:  Stable    Mental Status:  Alert and awake    Hydration Status:  Stable    PONV Controlled:  None    Airway Patency:  Patent and adequate    Post Op Vitals Reviewed: Yes          Staff: Anesthesiologist           BP      Temp      Pulse    Resp      SpO2

## 2018-12-18 NOTE — DISCHARGE INSTRUCTIONS
Follow Up Dr German Bella 2 weeks  Remove Dressing 3 days  Antibiotics and Pain prescription at pharmacy  Hold your aspirin, you may resume the aspirin when instructed to by Dr Mimi Lerma    No strenous activities for 3 days following surgery  You can remove the dressing in 3 days  You may shower in 3 days  You will be given a prescription for one week of post-operative antibiotics  Please take them as directed  Please set up a two-week follow-up appointment with our office  Contact our office if you experience any of the following after your surgery:      Skin around the incision feels warm to the touch; there is redness, swelling, drainage, or bleeding from the incision site  You are experiencing a fever over 101 degrees  If any questions arise after your procedure, do not hesitate to call our office at [phone]

## 2018-12-18 NOTE — OP NOTE
OPERATIVE REPORT  PATIENT NAME: Nay Dela Cruz    :    MRN: [id number]  Pt Location: [facility] OR ROOM 03    SURGERY DATE: 2018    Surgeon(s) and Role:     * Dasha Redding MD - Primary     * Devyn Oh PA-C - Assisting    Preop Diagnosis:  Parkinson's disease with use of electrical brain stimulation ([address] ) [G20]    Post-Op Diagnosis Codes:     * Parkinson's disease with use of electrical brain stimulation ([address] ) [G20]    Procedure(s) (LRB):  REPLACEMENT IMPLANTABLE PULSE GENERATOR (IPG) DEEP BRAIN STIMULATION (DBS) (Right)    Specimen(s):  * No specimens in log *    Estimated Blood Loss:   Minimal    Drains:       Anesthesia Type:   IV Sedation with Anesthesia    Operative Indications:  Parkinson's disease with use of electrical brain stimulation ([address] ) [G20]      Operative Findings:  See dictated note    Complications:   None    Procedure and Technique:  The patient was taken to the operative theater and successfully induced under sedation  The patient was positioned supine on a gurney  The patients prior incision was marked on the right chest  Then the patient was prepped and draped in sterile fashion, a timeout was performed  I began by making an incision along the old scar with a #10 Blade  I then used monopolar cautery to dissect down to the generator       I then removed the old single channel generator and this was disconnected from the electrode using the proprietary screwdriver  Then the new single channel generator was brought into the field  The new single channel generator was reconnected with the screwdriver and then the impedances were tested and they were found to be within normal limits  Then We placed the new generator into the pocket and irrigated the wound  We then proceeded to close in layers with 2-0 Vicryl for the deeper tissues and 4-0 running monocryl for the skin       The patient was then was taken to the recovery area in stable condition   All needle and sponge counts were correct at the end of the procedure       I was present for the entire procedure, A qualified resident physician was not available and A physician assistant was required during the procedure for retraction tissue handling,dissection and suturing    Patient Disposition:  PACU     SIGNATURE: Jose Luis Britton MD  DATE: December 18, 2018  TIME: 1:13 PM    [employer] [facility]

## 2018-12-18 NOTE — ANESTHESIA PREPROCEDURE EVALUATION
Review of Systems/Medical History  Patient summary reviewed  Chart reviewed  No history of anesthetic complications     Cardiovascular  EKG reviewed, Exercise tolerance (METS): >4,  Hyperlipidemia, Hypertension ,    Pulmonary  Negative pulmonary ROS Not a smoker ,        GI/Hepatic  Negative GI/hepatic ROS            Comment: +UTI currently on antibiotics     Endo/Other  Diabetes ,   Obesity    GYN  Negative gynecology ROS          Hematology  Negative hematology ROS      Musculoskeletal  Negative musculoskeletal ROS        Neurology      Comment: Parkinson's disease s/p deep brain stimulator Psychology   Anxiety,              Physical Exam    Airway    Mallampati score: II  TM Distance: >3 FB  Neck ROM: full     Dental   No notable dental hx implants,     Cardiovascular  Rhythm: regular, Rate: normal, Cardiovascular exam normal    Pulmonary  Pulmonary exam normal Breath sounds clear to auscultation,     Other Findings        Anesthesia Plan  ASA Score- 2     Anesthesia Type- IV sedation with anesthesia with ASA Monitors  Additional Monitors:   Airway Plan:     Comment: Discussed plan for MAC w/GA LMA as back-up  [facility] Plan Factors-    Induction-     Postoperative Plan- Plan for postoperative opioid use  Informed Consent- Anesthetic plan and risks discussed with patient and spouse

## 2018-12-19 ENCOUNTER — APPOINTMENT (OUTPATIENT)
Dept: PHYSICAL THERAPY | Facility: MEDICAL CENTER | Age: 73
End: 2018-12-19
Payer: MEDICARE

## 2018-12-19 ENCOUNTER — TELEPHONE (OUTPATIENT)
Dept: NEUROSURGERY | Facility: CLINIC | Age: 73
End: 2018-12-19

## 2018-12-19 NOTE — TELEPHONE ENCOUNTER
Spoke with Miesha Lynch to see how she is doing after surgery yesterday with hospital discharge date of yesterday  She reports that she is doing well overall and denies any incisional issues or fevers  Advised that after three days she may take a shower and allow soapy water to wash over the surgical site, no direct contact with the water stream, and do not submerge in water until cleared by the surgeon  Went over incision care with patient including keeping incision clean and dry and not to apply any creams or ointments to the site, she has no further questions at this time  Verified date/time/location of her upcoming POV on 1/4/2019 and advised her to call the office with any further questions or concerns, or if any incisional issues or fevers would arise  Patient was appreciative for the call

## 2018-12-24 ENCOUNTER — OFFICE VISIT (OUTPATIENT)
Dept: PHYSICAL THERAPY | Facility: MEDICAL CENTER | Age: 73
End: 2018-12-24
Payer: MEDICARE

## 2018-12-24 DIAGNOSIS — R26.9 ABNORMAL GAIT: Primary | ICD-10-CM

## 2018-12-24 PROCEDURE — 97116 GAIT TRAINING THERAPY: CPT

## 2018-12-24 PROCEDURE — 97112 NEUROMUSCULAR REEDUCATION: CPT

## 2018-12-24 PROCEDURE — 97110 THERAPEUTIC EXERCISES: CPT

## 2018-12-24 NOTE — PROGRESS NOTES
Daily Note     Today's date: 2018  Patient name: Jorge Osei  :   MRN: [id number]  Referring provider: Brigette Montelongo MD  Dx:   Encounter Diagnosis     ICD-10-CM    1  Abnormal gait R26 9                   Subjective: pt reports she has been working on her heel strike during her gait cycle  Hasn't noticed a change in her LE strength  She reported she almost fell when getting out of a chair after her feet froze and she tried to initiate movement  Objective: See treatment diary below      Assessment: Tolerated treatment well  Patient would benefit from continued PT  Pt started squats with good technique but eventually fatigued and returned to using her hands and pulling herself to standing position  Pt was unable to correct fully unless she let go of bar  Advised her to try using her visual senses for proper squat technique vs  Using sensory-motor if she is not getting enough feedback  (i e  Standing in front of mirror and seeing knee flexion angle and amount of hip flexion)  Pt noted she feels she uses her arms to stand from a chair about 75% of the full effort- told her we need to be using her legs a lot more  Plan: Continue per plan of care     Precautions: Precautions: DM, parkinson's with deep brain stimulator, HTN, amb with quad cane  R shoulder reverse TSA     Daily Treatment Diary     Manual                                                                                   Exercise Diary  12/5 12/10 12/12 12/24         TM 5 min 5 min 5 min 5 min                      Standing heel/toe raises 20x 20x 20x 20x         Mini-squats 20x 20x 20x 20x         Side stepping  Forward/backwards walking 4 rounds each 4 rounds each 4 rounds each 4 rounds         SLS NV            hurdles   4 laps          Step-up fwd/lateral L2 10x each L2 10x each L2 10x each L2 15x ech                      LAQ 3# 2x10 3# 2x10 4# 2x10 4# 2x10         Seated marches 3# 2x10 3# 2x10 4# 2x10 4# 2x10         TB clamshell seated Pink TB 20x Pink TB 30x Green TB 30x Blue 30x         Hip add ball squeeze 20x5" 30x5" 30x5" 30x5"         Sit to stand   10x 5x                                                                           15/10/20 15/15/15 15/15/15 15/15/15             Modalities                                                         HEP: sitting marches, standing hip abd, ext, mini squats, step-up, tandem walking with cane

## 2018-12-26 ENCOUNTER — OFFICE VISIT (OUTPATIENT)
Dept: PHYSICAL THERAPY | Facility: MEDICAL CENTER | Age: 73
End: 2018-12-26
Payer: MEDICARE

## 2018-12-26 DIAGNOSIS — R26.9 ABNORMAL GAIT: Primary | ICD-10-CM

## 2018-12-26 PROCEDURE — 97116 GAIT TRAINING THERAPY: CPT

## 2018-12-26 PROCEDURE — 97110 THERAPEUTIC EXERCISES: CPT

## 2018-12-26 PROCEDURE — 97112 NEUROMUSCULAR REEDUCATION: CPT

## 2018-12-26 NOTE — PROGRESS NOTES
Daily Note     Today's date: 2018  Patient name: Kat Austin  :   MRN: [id number]  Referring provider: Jeffery Aponte MD  Dx:   Encounter Diagnosis     ICD-10-CM    1  Abnormal gait R26 9                   Subjective: Pt reported doing her squats over weekend but they didn't go over so well  Objective: See treatment diary below      Assessment: Tolerated treatment well  Patient exhibited good technique with therapeutic exercises and would benefit from continued PT Added standing hip abd and ext to program, pt required VC's to continue performing with maximum effort and correct form  Pt had improved technique with her squats but again reduced her effort toward the end of reps  Reminded Pt to perform exercises at home with same effort throughout exercise even if she requires rest breaks  Pt could only perform 3 sit to stands, first rep she was able to perform using more of her legs but couldn't continue without significant HHA  Plan: Continue per plan of care       Precautions: Precautions: DM, parkinson's with deep brain stimulator, HTN, amb with quad cane  R shoulder reverse TSA     Daily Treatment Diary     Manual                                                                                   Exercise Diary  12/5 12/10 12/12 12/24 12/26        TM 5 min 5 min 5 min 5 min 5 min                     Standing heel/toe raises 20x 20x 20x 20x 20x        Mini-squats 20x 20x 20x 20x 20x        Side stepping  Forward/backwards walking 4 rounds each 4 rounds each 4 rounds each 4 rounds 4 rounds        SLS NV            hurdles   4 laps          Step-up fwd/lateral L2 10x each L2 10x each L2 10x each L2 15x ech L2 15x each        Standing hip ext/abd      2x10 each        LAQ 3# 2x10 3# 2x10 4# 2x10 4# 2x10 4# 2x10        Seated marches 3# 2x10 3# 2x10 4# 2x10 4# 2x10 4# 2x10        TB clamshell seated Pink TB 20x Pink TB 30x Green TB 30x Blue 30x Blue 30x        Hip add ball squeeze 20x5" 30x5" 30x5" 30x5" 30"x5        Sit to stand   10x 5x 3x                     Kicking soccer ball     2 min                                                15/10/20 15/15/15 15/15/15 15/15/15 15/15/20            Modalities                                                         HEP: sitting marches, standing hip abd, ext, mini squats, step-up, tandem walking with cane

## 2019-01-02 ENCOUNTER — OFFICE VISIT (OUTPATIENT)
Dept: PHYSICAL THERAPY | Facility: MEDICAL CENTER | Age: 74
End: 2019-01-02
Payer: MEDICARE

## 2019-01-02 DIAGNOSIS — R26.9 ABNORMAL GAIT: Primary | ICD-10-CM

## 2019-01-02 PROCEDURE — 97116 GAIT TRAINING THERAPY: CPT

## 2019-01-02 PROCEDURE — 97110 THERAPEUTIC EXERCISES: CPT

## 2019-01-02 PROCEDURE — 97112 NEUROMUSCULAR REEDUCATION: CPT

## 2019-01-02 NOTE — PROGRESS NOTES
Daily Note     Today's date: 2019  Patient name: Vincent Rogel  :   MRN: [id number]  Referring provider: Job Snyder MD  Dx:   Encounter Diagnosis     ICD-10-CM    1  Abnormal gait R26 9                   Subjective: Pt reported she is doing her home program regularly  but still unsure if she is doing the squats correctly  She knows what she needs to do to perform them correctly but its difficult to transfer to her actual motor performance  Objective: See treatment diary below      Assessment: Tolerated treatment well  Patient exhibited good technique with therapeutic exercises Pt had improved technique with sit to stand- still using her hands to push up but using more of her leg musculature than prior  After 4 sit to stands Pt experienced freezing  Had pt take a 10 second break had her repeat tasks and she was able to sit up easily in one motion  Plan: Continue per plan of care     Precautions: Precautions: DM, parkinson's with deep brain stimulator, HTN, amb with quad cane  R shoulder reverse TSA     Daily Treatment Diary     Manual                                                                                   Exercise Diary  12/5 12/10 12/12 12/24 12/26 1/2       TM 5 min 5 min 5 min 5 min 5 min 5 min                    Standing heel/toe raises 20x 20x 20x 20x 20x 20x       Mini-squats 20x 20x 20x 20x 20x 20x       Side stepping  Forward/backwards walking 4 rounds each 4 rounds each 4 rounds each 4 rounds 4 rounds 4 rounds       SLS NV            hurdles   4 laps          Step-up fwd/lateral L2 10x each L2 10x each L2 10x each L2 15x ech L2 15x each L2 15x       Standing hip ext/abd      2x10 each 2x10 each       LAQ 3# 2x10 3# 2x10 4# 2x10 4# 2x10 4# 2x10 5# 2x10       Seated marches 3# 2x10 3# 2x10 4# 2x10 4# 2x10 4# 2x10 5# 2x10       TB clamshell seated Pink TB 20x Pink TB 30x Green TB 30x Blue 30x Blue 30x Blue 230x       Hip add ball squeeze 20x5" 30x5" 30x5" 30x5" 30x5" 30x5"       Sit to stand   10x 5x 3x 5x                    Kicking soccer ball     2 min 2 min                                               15/10/20 15/15/15 15/15/15 15/15/15 15/15/20 15/15/15           Modalities                                                         HEP: sitting marches, standing hip abd, ext, mini squats, step-up, tandem walking with cane

## 2019-01-04 ENCOUNTER — CLINICAL SUPPORT (OUTPATIENT)
Dept: NEUROSURGERY | Facility: CLINIC | Age: 74
End: 2019-01-04

## 2019-01-04 VITALS — DIASTOLIC BLOOD PRESSURE: 70 MMHG | TEMPERATURE: 98.1 F | SYSTOLIC BLOOD PRESSURE: 118 MMHG

## 2019-01-04 DIAGNOSIS — Z98.890 POST-OPERATIVE STATE: Primary | ICD-10-CM

## 2019-01-04 PROCEDURE — 99024 POSTOP FOLLOW-UP VISIT: CPT

## 2019-01-04 NOTE — PROGRESS NOTES
Post-Op Visit-Neurosurgery    Madai Graham 75 y o  female MRN: [id number]    Chief Complaint  Patient presents post: REPLACEMENT IMPLANTABLE PULSE GENERATOR (IPG) DEEP BRAIN STIMULATION (DBS) (Right Chest)    History of Present Illness  Patient presents for 2 week POV for incision check  Arrived unaccompanied and ambulated well with cane  Noted to have some difficulty standing from chair  This is something she reports working on in PT and has had some improvement  Reports pain is a 0/10 at the surgical site and does not currently take any pain medications  Denies headaches, seizure activity, confusion  Tremors remain unchanged from the placement of full system  Her gait has not improved or worsened at this time  As this is only a battery replacement no change in symptoms is expected  Follow up with Dr Ara Roldan is scheduled on 1/16/19 advised patient to call Neurology if she should notice any change in her symptoms  Assessment  Wound Exam: Incision is clean, dry, and in tact, well approximated, without, heat, swelling, or drainage  Minimal redness noted around the suture line  No obvious s/s of infection noted  See image below:        Procedure  Staple/suture observation  location: R chest wall   Procedure Note: Dissolvable sutures observed in tact  Patient Status:  the patient tolerated the procedure well  Complications: None  Incision LOLY  Discussion/Summary  Reviewed incision care with patient including daily observation for s/s infection including: increased erythema, edema, drainage, dehiscence of incision or fever >101  Should these be observed, she understands that she is to call and/or return immediately for reassessment  Advised patient to continue cleansing area with mild soap and water and pat dry  Not to apply any lotions, creams, or ointments, & not to submerge in any water for two more weeks   She is to maintain activity restrictions until cleared by the surgeon  Activity levels were also reviewed with the patient in detail, she is to lift no greater than 10 pounds, ambulation is encouraged as tolerated   She is to call the office with any further questions or concerns, or if any incisional issues or fevers would arise

## 2019-01-07 ENCOUNTER — APPOINTMENT (OUTPATIENT)
Dept: PHYSICAL THERAPY | Facility: MEDICAL CENTER | Age: 74
End: 2019-01-07
Payer: MEDICARE

## 2019-01-08 ENCOUNTER — OFFICE VISIT (OUTPATIENT)
Dept: PHYSICAL THERAPY | Facility: MEDICAL CENTER | Age: 74
End: 2019-01-08
Payer: MEDICARE

## 2019-01-08 DIAGNOSIS — R26.9 ABNORMAL GAIT: Primary | ICD-10-CM

## 2019-01-08 PROCEDURE — 97110 THERAPEUTIC EXERCISES: CPT | Performed by: PHYSICAL THERAPY ASSISTANT

## 2019-01-08 PROCEDURE — 97116 GAIT TRAINING THERAPY: CPT | Performed by: PHYSICAL THERAPY ASSISTANT

## 2019-01-08 PROCEDURE — 97112 NEUROMUSCULAR REEDUCATION: CPT | Performed by: PHYSICAL THERAPY ASSISTANT

## 2019-01-08 NOTE — PROGRESS NOTES
Daily Note     Today's date: 2019  Patient name: Stephanie Benítez  :   MRN: [id number]  Referring provider: Mariaa Waller MD  Dx:   Encounter Diagnosis     ICD-10-CM    1  Abnormal gait R26 9                   Subjective: Pt reports that her biggest problem remains walking due to the parkinsons  Objective: See treatment diary below      Assessment: Tolerated treatment well  Patient exhibited good technique with therapeutic exercises Pt had improved technique with sit to stand- still using her hands to push up but using more of her leg musculature than prior  No freezing noted during activity today  Continue to progress as tolerated  Plan: Continue per plan of care     Precautions: Precautions: DM, parkinson's with deep brain stimulator, HTN, amb with quad cane  R shoulder reverse TSA     Daily Treatment Diary     Manual                                                                                   Exercise Diary  12/5 12/10 12/12 12/24 12/26 1/2 1/8      TM 5 min 5 min 5 min 5 min 5 min 5 min 6'  1 5mph                   Standing heel/toe raises 20x 20x 20x 20x 20x 20x 30x      Mini-squats 20x 20x 20x 20x 20x 20x 30x      Side stepping  Forward/backwards walking 4 rounds each 4 rounds each 4 rounds each 4 rounds 4 rounds 4 rounds 25'x6 each      SLS NV            hurdles   4 laps          Step-up fwd/lateral L2 10x each L2 10x each L2 10x each L2 15x ech L2 15x each L2 15x L2 15x      Standing hip ext/abd      2x10 each 2x10 each 2x10 each      LAQ 3# 2x10 3# 2x10 4# 2x10 4# 2x10 4# 2x10 5# 2x10 5#  3x10      Seated marches 3# 2x10 3# 2x10 4# 2x10 4# 2x10 4# 2x10 5# 2x10 5#  3x10      TB clamshell seated Pink TB 20x Pink TB 30x Green TB 30x Blue 30x Blue 30x Blue 230x Blue  30x      Hip add ball squeeze 20x5" 30x5" 30x5" 30x5" 30x5" 30x5" 30x5"      Sit to stand   10x 5x 3x 5x 5x                   Kicking soccer ball     2 min 2 min 2 min 15/10/20 15/15/15 15/15/15 15/15/15 15/15/20 15/15/15           Modalities                                                         HEP: sitting marches, standing hip abd, ext, mini squats, step-up, tandem walking with cane

## 2019-01-09 ENCOUNTER — OFFICE VISIT (OUTPATIENT)
Dept: PHYSICAL THERAPY | Facility: MEDICAL CENTER | Age: 74
End: 2019-01-09
Payer: MEDICARE

## 2019-01-09 DIAGNOSIS — R26.9 ABNORMAL GAIT: Primary | ICD-10-CM

## 2019-01-09 PROCEDURE — 97116 GAIT TRAINING THERAPY: CPT | Performed by: PHYSICAL THERAPIST

## 2019-01-09 PROCEDURE — 97110 THERAPEUTIC EXERCISES: CPT | Performed by: PHYSICAL THERAPIST

## 2019-01-09 NOTE — PROGRESS NOTES
Discharge Note     Today's date: 2019  Patient name: Miesha Lynch  :   MRN: [id number]  Referring provider: Giselle Sanders MD  Dx:   Encounter Diagnosis     ICD-10-CM    1  Abnormal gait R26 9                   Subjective: Patient reports significant improvements in her walking and balance since starting physical therapy  However, she states she would like to continue with her home program at this time and then resume PT at a later date if necessary  Objective: See treatment diary below      Assessment: Patient tolerated treatment well  Reviewed and performed comprehensive HEP  Patient demonstrates good technique and understanding of all exercises  Addressed all of patient's questions today  Patient advised to follow up as needed if unable to manage with HEP  Plan: DC from PT with HEP       Precautions: Precautions: DM, parkinson's with deep brain stimulator, HTN, amb with quad cane  R shoulder reverse TSA     Daily Treatment Diary     Manual                                                                                   Exercise Diary  12/5 12/10 12/12 12/24 12/26 1/2 1/8 1/9     TM 5 min 5 min 5 min 5 min 5 min 5 min 6'  1 5mph 5' 1 4 mph                  Standing heel/toe raises 20x 20x 20x 20x 20x 20x 30x x30     Mini-squats 20x 20x 20x 20x 20x 20x 30x x30     Side stepping  Forward/backwards walking 4 rounds each 4 rounds each 4 rounds each 4 rounds 4 rounds 4 rounds 25'x6 each 4 rounds ea     SLS NV            hurdles   4 laps          Step-up fwd/lateral L2 10x each L2 10x each L2 10x each L2 15x ech L2 15x each L2 15x L2 15x L2 x15     Standing hip ext/abd      2x10 each 2x10 each 2x10 each 3x10 ea     LAQ 3# 2x10 3# 2x10 4# 2x10 4# 2x10 4# 2x10 5# 2x10 5#  3x10 5# 3x10     Seated marches 3# 2x10 3# 2x10 4# 2x10 4# 2x10 4# 2x10 5# 2x10 5#  3x10 5# 3x10     TB clamshell seated Pink TB 20x Pink TB 30x Green TB 30x Blue 30x Blue 30x Blue 230x Blue  30x Purple x30     Hip add ball squeeze 20x5" 30x5" 30x5" 30x5" 30x5" 30x5" 30x5" 30x5"     Sit to stand   10x 5x 3x 5x 5x x8                  Kicking soccer ball     2 min 2 min 2 min np                                             15/10/20 15/15/15 15/15/15 15/15/15 15/15/20 15/15/15           Modalities                                                         HEP: sitting marches, standing hip abd, ext, mini squats, step-up, tandem walking with cane

## 2019-01-16 ENCOUNTER — PROCEDURE VISIT (OUTPATIENT)
Dept: NEUROLOGY | Facility: CLINIC | Age: 74
End: 2019-01-16
Payer: MEDICARE

## 2019-01-16 VITALS
WEIGHT: 234 LBS | BODY MASS INDEX: 36.73 KG/M2 | HEART RATE: 68 BPM | DIASTOLIC BLOOD PRESSURE: 58 MMHG | SYSTOLIC BLOOD PRESSURE: 121 MMHG | HEIGHT: 67 IN

## 2019-01-16 DIAGNOSIS — G20 PARKINSON'S DISEASE WITH USE OF ELECTRICAL BRAIN STIMULATION (HCC): Primary | ICD-10-CM

## 2019-01-16 PROBLEM — G20.A1 PARKINSON'S DISEASE WITH USE OF ELECTRICAL BRAIN STIMULATION: Status: RESOLVED | Noted: 2018-06-21 | Resolved: 2019-01-16

## 2019-01-16 PROCEDURE — 99215 OFFICE O/P EST HI 40 MIN: CPT | Performed by: PSYCHIATRY & NEUROLOGY

## 2019-01-16 PROCEDURE — 95970 ALYS NPGT W/O PRGRMG: CPT | Performed by: PSYCHIATRY & NEUROLOGY

## 2019-01-16 NOTE — PROGRESS NOTES
Patient ID: Seymour Mcdonough is a 75 y o  female  Assessment/Plan:    Parkinson's disease with use of electrical brain stimulation (HCC)  Parkinsonian symptoms are fairly stable  She has gait issues with  mild bradykinesia and freezing of gait on initiation, particularly after prolonged sitting  She was unable to tolerate levodopa at low dose  She will continue on Azilect  Time spent answering questions about medications and alternative options  She has no wearing off  Previously on dopamine agonist at higher dose, with no effect on gait  Right IPG changed last month  Doing well  Stitches have yet to dissolve  DBS was interrogated but no changes made  Left IPG 2 91 battery  See attached clinical sheets for details  Diagnoses and all orders for this visit:    Parkinson's disease with use of electrical brain stimulation ([facility]       I have spent 30 minutes with Patient and family today in which greater than 50% of this time was spent in counseling/coordination of care regarding Risks and benefits of tx options, Patient and family education and Impressions  Subjective:    Ms Seymour Mcdonough is a 75 y o  female s/p bilateral CTS s/p surgery, significant arthritis (psoriatic), Parkinson's disease since about 2009 now s/p bilateral STN DBS placement 11/5/14 with ACTIVA SC 11/11/14, s/p right IPG replacement 12/18/18, here for follow up  Prior to surgery she was on Sinemet 25/100 q 4 hours tid and amantadine bid  She remains on Azilect  Previously she tried ropinirole, Requip XL (highest dose 8mg) and Mirapex ER 1 5 with no effect  Insurance would not cover Neupro and she found it a nuisance  Discussion about moving the IPG had with Dr Thang Agrawal but she decided to wait until the next needed replacement  She continues to have postural instability and trouble with gait   We tried restarting Sinemet 25/100 1 po tid q 4 hours part to see if this provides more energy and helps with intermittent gait issues but she stopped it after 3 weeks as she noticed no difference  She remains on Azilect 1mg  She tried restarting Sinemet but she developed hallucinations at low dose  They attended the lecture [facility] and found it helpful  She continues to have trouble with gait and balance  No falls  She completed her shoulder therapy and then went on to PT for gait and balance with modest degree of success  She uses a cane  She is freezing when initiating gait  She is dressing and showering without any issues but her  asks she let him know when she is showering and will help her at times  She uses a shower chair and bars  She denies any issues with swallowing and drooling  She sleeps well  She denies any daytime sedation  No cognitive changes except her forgetting dates and days of the week at times  She denies any issues with constipation  She has overactive bladder issues  The following portions of the patient's history were reviewed and updated as appropriate: allergies, current medications, past family history, past medical history, past social history and past surgical history  Objective:    Blood pressure 121/58, pulse 68, height 5' 7" (1 702 m), weight 106 kg (234 lb)  Physical Exam   Constitutional: She appears well-developed  Neurological: She has normal strength  Vitals reviewed  Neurological Exam  Mental Status   Oriented to person, place, time and situation  Recent and remote memory are intact  Language is fluent with no aphasia  Attention and concentration are normal     Cranial Nerves  CN II: Visual fields full to confrontation  CN III, IV, VI: Extraocular movements intact bilaterally  CN V: Facial sensation is normal   CN VII: Full and symmetric facial movement  CN VIII: Hearing is normal   CN IX, X: Palate elevates symmetrically  CN XI: Shoulder shrug strength is normal   CN XII: Tongue midline without atrophy or fasciculations  Motor   Normal muscle tone  Strength is 5/5 throughout all four extremities  Sensory  Light touch is normal in upper and lower extremities  Reflexes  Glabellar tap present  Right palmomental present  Left palmomental present  Coordination  Right: Finger-to-nose normal  Rapid alternating movement abnormality:  Left: Finger-to-nose normal  Rapid alternating movement abnormality:  See MDS UPDRS   Gait  Casual gait: Normal pull test  Unable to rise from chair without using arms  Shorted stride, decreased arm swing, en bloc turn  MDS UPDRS III                             Time since last dose:      Speech  1    Facial Expression  2    Rigidity - Neck  2    Rigidity - Upper Extremity (Right)  1    Rigidity - Upper Extremity (Left)   0    Rigidity - Lower Extremity (Right)  1    Rigidity - Lower Extremity (Left)   1    Finger Taps (Right)   2    Finger Taps (Left)   2    Hand Movement (Right)  2    Hand Movement (Left)   2    Pronation/Supination (Right)  0    Pronation/Supination (Left)   3 Left shoulder affects this   Toe Tapping (Right) 2    Toe Tapping (Left) 0    Leg Agility (Right)  1    Leg Agility (Left)   0    Arising from Chair   2    Gait   2    Freezing of Gait 0    Postural Stability   0    Posture 1    Global spontaneity of movement 2    Postural Tremor (Right) 0    Postural Tremor (Left) 0    Kinetic Tremor (Right)  0    Kinetic Tremor (Left)  0    Rest tremor amplitude RUE 0    Rest tremor amplitude LUE 0    Rest tremor amplitude RLE 0    Reset tremor amplitude LLE 0    Lip/Jaw Tremor  0    Consistency of tremor 0    Motor Exam Total:            ROS:    Review of Systems   Constitutional: Negative  Negative for appetite change and fever  HENT: Positive for voice change  Negative for hearing loss, tinnitus and trouble swallowing  Eyes: Negative  Negative for photophobia and pain  Respiratory: Negative  Negative for shortness of breath  Cardiovascular: Negative  Negative for palpitations  Gastrointestinal: Positive for constipation  Negative for nausea and vomiting  Endocrine: Negative  Negative for cold intolerance and heat intolerance  Genitourinary: Positive for frequency and urgency  Negative for dysuria  Musculoskeletal: Positive for gait problem  Negative for myalgias and neck pain  Skin: Negative  Negative for rash  Neurological: Positive for tremors and speech difficulty  Negative for dizziness, seizures, syncope, facial asymmetry, weakness, light-headedness, numbness and headaches  Hematological: Bruises/bleeds easily  Psychiatric/Behavioral: Positive for confusion  Negative for hallucinations and sleep disturbance  Review of system reviewed and agree with above

## 2019-01-16 NOTE — ASSESSMENT & PLAN NOTE
Parkinsonian symptoms are fairly stable  She has gait issues with  mild bradykinesia and freezing of gait on initiation, particularly after prolonged sitting  She was unable to tolerate levodopa at low dose  She will continue on Azilect  Time spent answering questions about medications and alternative options  She has no wearing off  Previously on dopamine agonist at higher dose, with no effect on gait  Right IPG changed last month  Doing well  Stitches have yet to dissolve  DBS was interrogated but no changes made  Left IPG 2 91 battery  See attached clinical sheets for details

## 2019-02-21 ENCOUNTER — TELEPHONE (OUTPATIENT)
Dept: NEUROLOGY | Facility: CLINIC | Age: 74
End: 2019-02-21

## 2019-02-22 ENCOUNTER — OFFICE VISIT (OUTPATIENT)
Dept: OBGYN CLINIC | Facility: MEDICAL CENTER | Age: 74
End: 2019-02-22
Payer: MEDICARE

## 2019-02-22 VITALS
WEIGHT: 234.6 LBS | BODY MASS INDEX: 36.74 KG/M2 | DIASTOLIC BLOOD PRESSURE: 84 MMHG | SYSTOLIC BLOOD PRESSURE: 136 MMHG | HEART RATE: 57 BPM

## 2019-02-22 DIAGNOSIS — Z96.612 S/P REVERSE TOTAL SHOULDER ARTHROPLASTY, LEFT: Primary | ICD-10-CM

## 2019-02-22 PROCEDURE — 99213 OFFICE O/P EST LOW 20 MIN: CPT | Performed by: ORTHOPAEDIC SURGERY

## 2019-03-11 DIAGNOSIS — G20 PARKINSON'S DISEASE WITH USE OF ELECTRICAL BRAIN STIMULATION (HCC): ICD-10-CM

## 2019-03-11 RX ORDER — RASAGILINE 1 MG/1
TABLET ORAL
Qty: 90 TABLET | Refills: 2 | Status: SHIPPED | OUTPATIENT
Start: 2019-03-11 | End: 2019-12-11 | Stop reason: SDUPTHER

## 2019-04-18 NOTE — PLAN OF CARE
Problem: OCCUPATIONAL THERAPY ADULT  Goal: Performs self-care activities at highest level of function for planned discharge setting  See evaluation for individualized goals  Treatment Interventions: ADL retraining, Functional transfer training, UE strengthening/ROM, Endurance training, Patient/family training, Equipment evaluation/education, Neuromuscular reeducation, Activityengagement          See flowsheet documentation for full assessment, interventions and recommendations  Outcome: Adequate for Discharge  Limitation: Decreased ADL status, Decreased UE ROM, Decreased UE strength, Decreased Safe judgement during ADL, Decreased endurance, Decreased self-care trans, Decreased high-level ADLs  Prognosis: Good     OT Discharge Recommendation: Home with family support (outpatient OT)  OT - OK to Discharge: Yes    OT TX (30 mins): Upon entry pt sitting in chair  Educated pt on being her final session in prep for discharge home tomorrow; agreed  Denies pain  SPO2 96% RA HR 62  Reviewed AE/DME for home as follows: NBQC, foot funnel, and UE sing   purchased toilet riser with rails and ordered bed pads  Pt confirms she already has shower chair at home  R UE MMT: 5/5 t/o all planes  Educated pt on keeping up with R UE there ex to maximize strength potential  Pt agreed and states her son bought her 3 lb hand weights  Reinforced using on R UE only  OT educated pt that she will be allowed to start week 4-10 of shoulder protocol  OT advised she would not start today, due to no follow up tomorrow 2* discharge  Recommend pt have her protocol available to home therapy to ensure no delay; agreed  However, OT reviewed the next steps in the protocol   entered room  OT answered all questions in regards to pt's L shld progress and the next phases  Also, reinforced to  that he should monitor pt's NWB status of L UE due to her wanting to use    states " I will because I see her using it too!" Completed PROM: L shld flexion x 5 reps, L shld abduction x 5 reps, and L shld external rotation x 5 reps  OT noted upper scapular muscle to be very tight  Performed gentle massage x 5 mins to loosen  Pt states "that feels good " Performed AROM L elbow flexion x 10 reps; jerky muscles  Educated pt on that being weakness and will improve when allowed to strengthen; agreed  Denies pain t/o PROM & AROM  Pt remains in chair with all needs and  present; sling intact  No further questions or concerns  Per SS, NBQC to be delivered tomorrow  Good- activity tolerance  Ice pack applied  D/c'd from OT  OCCUPATIONAL THERAPY DISCHARGE SUMMARY      DISPOSITION: Home tomorrow 9/21/18 with   AE/DME: NBQC; Foot funnel; and UE sling   purchased toilet rise with rails for home  Pt states she already has shower chair at home as well  Pt and  to purchase bed pads from [id number]  DISCHARGE SUMMARY: Pt with scheduled discharge home tomorrow with   Participated in a total of 10/10 OT sessions  Pt achieved 8/12 goals as per POC  Deficits remain in grooming, toileting, ADL txfs, and stand balance   to assist with these areas due to remaining NWB on L UE  Pt will also have VNA services to ensure safety in own environment and maximize function  Recommend home therapy focus on remaining deficits and continue with shoulder protocol (advance to week 4-10)  Pt is safe to return home with support from  and services  No further concerns noted  RECOMMENDATIONS: VNA services & assist from spouse PRN  REMAINS NWB L UE and needs reinforcement due to no pain       Elaine Landaverde, OT seizures

## 2019-05-09 ENCOUNTER — PROCEDURE VISIT (OUTPATIENT)
Dept: NEUROLOGY | Facility: CLINIC | Age: 74
End: 2019-05-09
Payer: MEDICARE

## 2019-05-09 VITALS
HEIGHT: 67 IN | DIASTOLIC BLOOD PRESSURE: 78 MMHG | BODY MASS INDEX: 36.76 KG/M2 | SYSTOLIC BLOOD PRESSURE: 112 MMHG | WEIGHT: 234.2 LBS

## 2019-05-09 DIAGNOSIS — G20 PARKINSON'S DISEASE WITH USE OF ELECTRICAL BRAIN STIMULATION (HCC): Primary | ICD-10-CM

## 2019-05-09 DIAGNOSIS — F41.9 ANXIETY: ICD-10-CM

## 2019-05-09 DIAGNOSIS — G20 COGNITIVE DEFICIT DUE TO PARKINSON'S DISEASE (HCC): ICD-10-CM

## 2019-05-09 PROCEDURE — 99215 OFFICE O/P EST HI 40 MIN: CPT | Performed by: PSYCHIATRY & NEUROLOGY

## 2019-05-09 RX ORDER — AMOXICILLIN 500 MG/1
CAPSULE ORAL AS NEEDED
COMMUNITY
End: 2019-12-13

## 2019-06-14 ENCOUNTER — TRANSCRIBE ORDERS (OUTPATIENT)
Dept: ADMINISTRATIVE | Facility: HOSPITAL | Age: 74
End: 2019-06-14

## 2019-06-14 DIAGNOSIS — Z12.39 BREAST SCREENING, UNSPECIFIED: Primary | ICD-10-CM

## 2019-06-18 ENCOUNTER — TELEPHONE (OUTPATIENT)
Dept: NEUROLOGY | Facility: CLINIC | Age: 74
End: 2019-06-18

## 2019-06-19 ENCOUNTER — HOSPITAL ENCOUNTER (OUTPATIENT)
Dept: MAMMOGRAPHY | Facility: CLINIC | Age: 74
Discharge: HOME | End: 2019-06-19
Payer: MEDICARE

## 2019-06-19 VITALS — BODY MASS INDEX: 36.73 KG/M2 | HEIGHT: 67 IN | WEIGHT: 234 LBS

## 2019-06-19 DIAGNOSIS — G20 PARKINSON'S DISEASE WITH USE OF ELECTRICAL BRAIN STIMULATION (HCC): Primary | ICD-10-CM

## 2019-06-19 DIAGNOSIS — Z12.39 BREAST SCREENING, UNSPECIFIED: ICD-10-CM

## 2019-06-19 PROCEDURE — 77067 SCR MAMMO BI INCL CAD: CPT

## 2019-07-08 ENCOUNTER — APPOINTMENT (EMERGENCY)
Dept: CT IMAGING | Facility: HOSPITAL | Age: 74
End: 2019-07-08
Payer: MEDICARE

## 2019-07-08 ENCOUNTER — HOSPITAL ENCOUNTER (EMERGENCY)
Facility: HOSPITAL | Age: 74
Discharge: HOME | End: 2019-07-08
Attending: EMERGENCY MEDICINE
Payer: MEDICARE

## 2019-07-08 ENCOUNTER — APPOINTMENT (EMERGENCY)
Dept: RADIOLOGY | Facility: HOSPITAL | Age: 74
End: 2019-07-08
Payer: MEDICARE

## 2019-07-08 VITALS
OXYGEN SATURATION: 95 % | HEART RATE: 53 BPM | SYSTOLIC BLOOD PRESSURE: 139 MMHG | RESPIRATION RATE: 17 BRPM | BODY MASS INDEX: 37.15 KG/M2 | TEMPERATURE: 98.5 F | DIASTOLIC BLOOD PRESSURE: 65 MMHG | WEIGHT: 237.22 LBS

## 2019-07-08 DIAGNOSIS — S60.032A CONTUSION OF LEFT MIDDLE FINGER WITHOUT DAMAGE TO NAIL, INITIAL ENCOUNTER: ICD-10-CM

## 2019-07-08 DIAGNOSIS — S09.90XA CLOSED HEAD INJURY, INITIAL ENCOUNTER: ICD-10-CM

## 2019-07-08 DIAGNOSIS — S70.02XA CONTUSION OF LEFT HIP, INITIAL ENCOUNTER: ICD-10-CM
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
## 2019-07-08 PROCEDURE — 73140 X-RAY EXAM OF FINGER(S): CPT

## 2019-07-08 PROCEDURE — 99284 EMERGENCY DEPT VISIT MOD MDM: CPT

## 2019-07-08 PROCEDURE — 70450 CT HEAD/BRAIN W/O DYE: CPT

## 2019-07-08 PROCEDURE — 99284 EMERGENCY DEPT VISIT MOD MDM: CPT | Performed by: EMERGENCY MEDICINE

## 2019-07-08 PROCEDURE — 73502 X-RAY EXAM HIP UNI 2-3 VIEWS: CPT

## 2019-07-08 NOTE — DISCHARGE INSTRUCTIONS
Head Injury   WHAT YOU NEED TO KNOW:   A head injury is most often caused by a blow to the head  This may occur from a fall, bicycle injury, sports injury, being struck in the head, or a motor vehicle accident  DISCHARGE INSTRUCTIONS:   Call 911 or have someone else call for any of the following: You cannot be woken  You have a seizure  You stop responding to others or you faint  You have blurry or double vision  Your speech becomes slurred or confused  You have arm or leg weakness, loss of feeling, or new problems with coordination  Your pupils are larger than usual or one pupil is a different size than the other  You have blood or clear fluid coming out of your ears or nose  Return to the emergency department if:   You have repeated or forceful vomiting  You feel confused  Your headache gets worse or becomes severe  You or someone caring for you notices that you are harder to wake than usual   Contact your healthcare provider if:   Your symptoms last longer than 6 weeks after the injury  Self-care:   Rest  or do quiet activities for 24 to 48 hours  Limit your time watching TV, using the computer, or doing tasks that require a lot of thinking  Slowly return to your normal activities as directed  Do not play sports or do activities that may cause you to get hit in the head  Ask your healthcare provider when you can return to sports  Apply ice  on your head for 15 to 20 minutes every hour or as directed  Use an ice pack, or put crushed ice in a plastic bag  Cover it with a towel before you apply it to your skin  Ice helps prevent tissue damage and decreases swelling and pain

## 2019-07-08 NOTE — ED PROVIDER NOTES
History  Chief Complaint   Patient presents with    Fall     per pt was walking down the [place] and lost balance  pt fell on her left side and hit the left side of her head  hx of parkinsons  Denies and dizziness or blurred vision  pt worried about her brain stimulater placement  pt reports falling saturday evening as well     76 yo female with h/o Parkinson's managed with medications, and an implanted deep brain stimulator, accidentally fell in her home about 1 hour PTA  She has balance problems at baseline, and as she was reaching out for a doorknob to leave the house with her , she fell to left, all the way to the floor, impacting on her head  Her  was at her side in seconds and she was fully alert  She recalls the event and denies LOC  She required lift assist from EMS but was on her feet and ambulating since then  She c/o pain on the left parietal where he head impact, left hip area, and we all found some bruising on her left middle finger that was tender to palpation in the mid phalanx   She also fell at home two days ago, similar circumstances,  was there, they did not seek emergency care because the impact wasn't as intense and she had no painful sequelae  History provided by:  Patient      Prior to Admission Medications   Prescriptions Last Dose Informant Patient Reported? Taking?    Coenzyme Q10 400 MG CAPS  Self No No   Sig: Take 1 capsule (400 mg total) by mouth daily   Misc Natural Products (OSTEO BI-FLEX ADV JOINT SHIELD PO)   Yes No   Sig: Take by mouth   Omega-3 Fatty Acids (FISH OIL) 1,000 mg  Self No No   Sig: Take 1 capsule (1,000 mg total) by mouth daily   PARoxetine (PAXIL) 20 mg tablet  Self No No   Sig: Take 1 tablet (20 mg total) by mouth daily   amoxicillin (AMOXIL) 500 mg capsule   Yes No   Sig: as needed   aspirin 81 MG tablet  Self Yes No   Sig: Take 1 tablet by mouth daily   atenolol (TENORMIN) 50 mg tablet  Self No No   Sig: Take 1 tablet (50 mg total) by mouth daily   polyethylene glycol (MIRALAX) 17 g packet  Self No No   Sig: Take 17 g by mouth daily   Patient taking differently: Take 17 g by mouth daily as needed     rasagiline (AZILECT) 1 MG   No No   Sig: TAKE 1 TABLET BY MOUTH  DAILY   rosuvastatin (CRESTOR) 5 mg tablet  Self No No   Sig: Take 1 tablet (5 mg total) by mouth every other day      Facility-Administered Medications: None       Past Medical History:   Diagnosis Date    Anxiety     Diabetes mellitus ([place] [address] )     Diabetes mellitus type 2, diet-controlled ([place] [address] )     Hyperlipidemia     Hypertension     Parkinson disease ([place] [address] )        Past Surgical History:   Procedure Laterality Date    ACHILLES TENDON SURGERY      DEEP BRAIN STIMULATOR PLACEMENT      DENTAL SURGERY      CT IMP STIM,CRANIAL,SUBQ,1 ARRAY Right 12/18/2018    Procedure: REPLACEMENT IMPLANTABLE PULSE GENERATOR (IPG) DEEP BRAIN STIMULATION (DBS); Surgeon: Malina Whyte MD;  Location:  MAIN OR;  Service: Neurosurgery    REPLACEMENT TOTAL KNEE      REVERSE TOTAL SHOULDER ARTHROPLASTY Left 8/23/2018    Procedure: ARTHROPLASTY SHOULDER REVERSE;  Surgeon: Lillie Steve MD;  Location: [facility];  Service: Orthopedics    TONSILLECTOMY         Family History   Problem Relation Age of Onset    Arthritis Mother     No Known Problems Father     No Known Problems Maternal Grandmother     No Known Problems Maternal Grandfather     No Known Problems Paternal Grandmother     No Known Problems Paternal Grandfather     No Known Problems Son     No Known Problems Son      I have reviewed and agree with the history as documented  Social History     Tobacco Use    Smoking status: Never Smoker    Smokeless tobacco: Never Used   Substance Use Topics    Alcohol use: Yes     Comment: occasional    Drug use: No        Review of Systems   All other systems reviewed and are negative  Physical Exam  Physical Exam   Constitutional: She appears well-developed and well-nourished   No distress  HENT:   Head: Normocephalic  Head is without raccoon's eyes, without Garza's sign, without abrasion, without contusion, without laceration, without right periorbital erythema and without left periorbital erythema  Right Ear: Tympanic membrane and external ear normal  No lacerations  Left Ear: Tympanic membrane and external ear normal  No lacerations  Nose: Nose normal  No nose lacerations, nasal deformity, septal deviation or nasal septal hematoma  No epistaxis  Mouth/Throat: Normal dentition  Eyes: Pupils are equal, round, and reactive to light  Conjunctivae and EOM are normal    Neck: Trachea normal, normal range of motion, full passive range of motion without pain and phonation normal  Neck supple  No spinous process tenderness (Cspine cleared) present  Cardiovascular: Normal rate, regular rhythm, normal heart sounds and normal pulses  Pulmonary/Chest: Breath sounds normal  No respiratory distress  She has no decreased breath sounds  She exhibits no tenderness, no crepitus and no deformity  Abdominal: Normal appearance  There is no tenderness  Musculoskeletal:        Right shoulder: Normal         Left shoulder: Normal  She exhibits normal range of motion, no bony tenderness, no swelling, no deformity and no pain  Right elbow: Normal        Left elbow: Normal         Right wrist: Normal         Left wrist: Normal         Right hip: Normal         Left hip: Normal  She exhibits tenderness (mild, with compression, )  She exhibits normal range of motion, normal strength, no swelling and no crepitus  Right knee: Normal         Left knee: Normal         Right ankle: Normal         Left ankle: Normal         Cervical back: Normal         Thoracic back: Normal         Lumbar back: Normal         Hands:       Right foot: Normal         Left foot: Normal    Neurological: She is alert  She has normal strength  No cranial nerve deficit or sensory deficit   She exhibits abnormal muscle tone  Coordination abnormal  GCS eye subscore is 4  GCS verbal subscore is 5  GCS motor subscore is 6  She has signs of baseline Parkinson, slow movement, some mild rigidity, no focal deficits   Skin: Skin is warm, dry and intact  She is not diaphoretic  Psychiatric: She has a normal mood and affect  Her speech is normal  She is slowed  Cognition and memory are normal    Nursing note and vitals reviewed  Vital Signs  ED Triage Vitals [07/08/19 1703]   Temperature Pulse Respirations Blood Pressure SpO2   98 5 °F (36 9 °C) (!) 53 17 139/65 95 %      Temp Source Heart Rate Source Patient Position - Orthostatic VS BP Location FiO2 (%)   Oral Monitor Lying Right arm --      Pain Score       7           Vitals:    07/08/19 1703   BP: 139/65   Pulse: (!) 53   Patient Position - Orthostatic VS: Lying         Visual Acuity      ED Medications  Medications - No data to display    Diagnostic Studies  Results Reviewed     None                 CT head without contrast   Final Result by Devan Riley MD (07/08 1828)      Stable bilateral deep brain stimulator leads without acute intracranial hemorrhage                    Workstation performed: [id number]         XR hip/pelv 2-3 vws left   ED Interpretation by Lynn Currie MD (07/08 1811)   No fracture      XR finger third digit-middle LEFT   ED Interpretation by Lynn Currie MD (07/08 1811)   No fracture                 Procedures  Procedures       ED Course  ED Course as of Jul 08 2032 Mon Jul 08, 2019   1909 No acute concerns   CT head without contrast   1910 No fracture   XR finger third digit-middle LEFT   1910 No fracture   XR hip/pelv 2-3 vws left                               MDM    Disposition  Final diagnoses:   Fall, initial encounter   Closed head injury, initial encounter   Contusion of left hip, initial encounter   Contusion of left middle finger without damage to nail, initial encounter     Time reflects when diagnosis was documented in both MDM as applicable and the Disposition within this note     Time User Action Codes Description Comment    7/8/2019  6:24 PM Thuy Spinmando Add [O83  QIAI] Fall, initial encounter     7/8/2019  6:24 PM Helen HOLLOWAY Add [Q91 04OS] Closed head injury, initial encounter     7/8/2019  6:24 PM Thuy Spinmando Add [S70 02XA] Contusion of left hip, initial encounter     7/8/2019  6:25 PM Thuy Spinner Add [S60 032A] Contusion of left middle finger without damage to nail, initial encounter       ED Disposition     ED Disposition Condition Date/Time Comment    Discharge Good Mon Jul 8, 2019  6:24 PM Olayinka Moment discharge to home/self care              Follow-up Information     Follow up With Specialties Details Why Contact Info    Neurologist Dr Jovi Alvarez  Call  For followup           Discharge Medication List as of 7/8/2019  7:10 PM      CONTINUE these medications which have NOT CHANGED    Details   amoxicillin (AMOXIL) 500 mg capsule as needed, Historical Med      aspirin 81 MG tablet Take 1 tablet by mouth daily, Historical Med      atenolol (TENORMIN) 50 mg tablet Take 1 tablet (50 mg total) by mouth daily, Starting Fri 9/21/2018, Print      Coenzyme Q10 400 MG CAPS Take 1 capsule (400 mg total) by mouth daily, Starting Fri 9/21/2018, Print      Misc Natural Products (OSTEO BI-FLEX ADV JOINT SHIELD PO) Take by mouth, Historical Med      Omega-3 Fatty Acids (FISH OIL) 1,000 mg Take 1 capsule (1,000 mg total) by mouth daily, Starting Fri 9/21/2018, Print      PARoxetine (PAXIL) 20 mg tablet Take 1 tablet (20 mg total) by mouth daily, Starting Sat 9/22/2018, Print      polyethylene glycol (MIRALAX) 17 g packet Take 17 g by mouth daily, Starting Fri 9/21/2018, Print      rasagiline (AZILECT) 1 MG TAKE 1 TABLET BY MOUTH  DAILY, Normal      rosuvastatin (CRESTOR) 5 mg tablet Take 1 tablet (5 mg total) by mouth every other day, Starting Sat 9/22/2018, Print           No discharge procedures on file     ED Provider  Electronically Signed by           Gerardo Diaz MD  07/08/19 0932

## 2019-07-09 ENCOUNTER — TELEPHONE (OUTPATIENT)
Dept: NEUROLOGY | Facility: CLINIC | Age: 74
End: 2019-07-09

## 2019-07-09 NOTE — TELEPHONE ENCOUNTER
Patient states she fell twice in the past week- once on Saturday and again last night  Patient was seen in the ED yesterday  She states she was reaching for a door knob yesterday while out of the house and she fell  She states she only uses her walker while she is in her home so she was not using it at the time of the fall  Patient reports freezing, shuffling, and trouble walking  She states this is typical for her and it has not gotten worse  Denies involuntary movements and hallucinations  Symptoms are worse when she gets up from sitting for too long, does not worsen at a certain time of day  Patient has a DBS  She is unsure of current settings, states her  is able to adjust them  Reports recent battery change  Azilect 1 mg daily around 9am    If recommending any new medications, patient would like to review side effects first  Please advise, thanks!

## 2019-07-11 DIAGNOSIS — G20 PARKINSON'S DISEASE (HCC): Primary | ICD-10-CM

## 2019-07-11 RX ORDER — DONEPEZIL HYDROCHLORIDE 10 MG/1
TABLET, FILM COATED ORAL
Qty: 30 TABLET | Refills: 3 | Status: SHIPPED | OUTPATIENT
Start: 2019-07-11 | End: 2019-12-13

## 2019-07-11 NOTE — TELEPHONE ENCOUNTER
Spoke with the patient  She continues to have issues with her gait as she had in the past   No clear change  Once again discussed the option of a trial of Aricept  She would like to try it at this time  Will send to the pharmacy  She is also currently in PT and will continue with this for now

## 2019-08-06 ENCOUNTER — APPOINTMENT (OUTPATIENT)
Dept: RADIOLOGY | Facility: MEDICAL CENTER | Age: 74
End: 2019-08-06
Payer: MEDICARE

## 2019-08-06 ENCOUNTER — OFFICE VISIT (OUTPATIENT)
Dept: OBGYN CLINIC | Facility: MEDICAL CENTER | Age: 74
End: 2019-08-06
Payer: MEDICARE

## 2019-08-06 VITALS
SYSTOLIC BLOOD PRESSURE: 117 MMHG | DIASTOLIC BLOOD PRESSURE: 78 MMHG | HEART RATE: 61 BPM | WEIGHT: 229 LBS | HEIGHT: 67 IN | BODY MASS INDEX: 35.94 KG/M2

## 2019-08-06 DIAGNOSIS — Z96.612 S/P REVERSE TOTAL SHOULDER ARTHROPLASTY, LEFT: ICD-10-CM

## 2019-08-06 DIAGNOSIS — Z96.612 S/P REVERSE TOTAL SHOULDER ARTHROPLASTY, LEFT: Primary | ICD-10-CM

## 2019-08-06 PROCEDURE — 73030 X-RAY EXAM OF SHOULDER: CPT

## 2019-08-06 PROCEDURE — 1123F ACP DISCUSS/DSCN MKR DOCD: CPT | Performed by: ORTHOPAEDIC SURGERY

## 2019-08-06 PROCEDURE — 99213 OFFICE O/P EST LOW 20 MIN: CPT | Performed by: ORTHOPAEDIC SURGERY

## 2019-08-06 NOTE — PROGRESS NOTES
Orthopaedic Surgery - Office Note  Gilmar Guevara (14 y o  female)   : 1945   MRN: [id number]  Encounter Date: 2019    Chief Complaint   Patient presents with    Left Shoulder - Post-op       Assessment / Plan  1 year s/p Left total shoulder arthroplasty, DOS: 19 released    · Patient already goes to [facility] for ambulation and balance, OT script written for shoulder, elbow, and wrist ROM written to add to her therapy   Return if symptoms worsen or fail to improve  History of Present Illness  Gilmar Guevara is a 75 y o  female who presents for 1 year follow-up s/p Left total shoulder arthroplasty  She stated that she is doing well overall and happy with the replacement  She stated that she has continued to have falls at home due to her Parkinson's but none of the falls have caused pain or damage to the shoulder or hardware  She still gets some general soreness to the tip of shoulder  She still has noticed decreased supination to the right elbow  She is currently [facility] working on balance and gait training  She denies numbness and tingling  Review of Systems  Const: negative for chills, fatigue and fevers  Eyes: negative for irritation and redness  Resp: negative for stridor and wheezing  CV: negative for chest pain and chest pressure/discomfort  MSK:negative for arthralgias and myalgias    Physical Exam  /78   Pulse 61   Ht 5' 7" (1 702 m)   Wt 104 kg (229 lb)   BMI 35 87 kg/m²   Cons: Appears well  No apparent distress  Psych: Alert  Oriented x3  Mood and affect normal   Eyes: PERRLA, EOMI  Resp: Normal effort  No audible wheezing or stridor  CV: Palpable pulse  No discernable arrhythmia  No LE edema  Lymph:  No palpable cervical, axillary, or inguinal lymphadenopathy  Skin: Warm  No palpable masses  No visible lesions  Neuro: Normal muscle tone  Normal and symmetric DTR's  Left Shoulder Exam  Alignment / Posture:  Normal shoulder posture  Normal scapular position  Inspection:  No erythema  No ecchymosis  Well healed naterior incison   Palpation:  No tenderness  No effusion  No crepitus  ROM:  Shoulder   External rotation lag,  IR L1  Strength:  Supraspinatus 5/5  Infraspinatus 1/5  5/5 biceps and triceps  Stability:  No objective shoulder instability  Tests: No pertinent positive or negative tests  Neurovascular:  Sensation intact in Ax/R/M/U nerve distributions  2+ radial pulse  Studies Reviewed  I have personally reviewed pertinent films in PACS and my interpretation is Xray of the Left shoulder demonstrates Left reverse total shoulder replacement with orthopedic hardware intact  Procedures  No procedures today  Medical, Surgical, Family, and Social History  The patient's medical history, family history, and social history, were reviewed and updated as appropriate  Past Medical History:   Diagnosis Date    Anxiety     Diabetes mellitus [facility] [address] [facility]     Diabetes mellitus type 2, diet-controlled ([facility] [address] [facility]     Hyperlipidemia     Hypertension     Parkinson disease ([facility] [address] )        Past Surgical History:   Procedure Laterality Date    ACHILLES TENDON SURGERY      DEEP BRAIN STIMULATOR PLACEMENT      DENTAL SURGERY      ME IMP STIM,CRANIAL,SUBQ,1 ARRAY Right 12/18/2018    Procedure: REPLACEMENT IMPLANTABLE PULSE GENERATOR (IPG) DEEP BRAIN STIMULATION (DBS);   Surgeon: Maria Del Rosario Simons MD;  Location:  MAIN OR;  Service: Neurosurgery    REPLACEMENT TOTAL KNEE      REVERSE TOTAL SHOULDER ARTHROPLASTY Left 8/23/2018    Procedure: ARTHROPLASTY SHOULDER REVERSE;  Surgeon: Yung Gay MD;  Location: [facility];  Service: Orthopedics    TONSILLECTOMY         Family History   Problem Relation Age of Onset    Arthritis Mother     No Known Problems Father     No Known Problems Maternal Grandmother     No Known Problems Maternal Grandfather     No Known Problems Paternal Grandmother     No Known Problems Paternal Grandfather     No Known Problems Son     No Known Problems Son        Social History     Occupational History    Not on file   Tobacco Use    Smoking status: Never Smoker    Smokeless tobacco: Never Used   Substance and Sexual Activity    Alcohol use: Yes     Comment: occasional    Drug use: No    Sexual activity: Not on file       Allergies   Allergen Reactions    Sulfa Antibiotics Hives         Current Outpatient Medications:     amoxicillin (AMOXIL) 500 mg capsule, as needed, Disp: , Rfl:     aspirin 81 MG tablet, Take 1 tablet by mouth daily, Disp: , Rfl:     atenolol (TENORMIN) 50 mg tablet, Take 1 tablet (50 mg total) by mouth daily, Disp: 30 tablet, Rfl: 0    Coenzyme Q10 400 MG CAPS, Take 1 capsule (400 mg total) by mouth daily, Disp: 30 capsule, Rfl: 0    donepezil (ARICEPT) 10 mg tablet, Take 1/2qhs tab for 3 weeks then increase to 1tab qhs, Disp: 30 tablet, Rfl: 3    Misc Natural Products (OSTEO BI-FLEX ADV JOINT SHIELD PO), Take by mouth, Disp: , Rfl:     Omega-3 Fatty Acids (FISH OIL) 1,000 mg, Take 1 capsule (1,000 mg total) by mouth daily, Disp: 30 capsule, Rfl: 0    PARoxetine (PAXIL) 20 mg tablet, Take 1 tablet (20 mg total) by mouth daily, Disp: 30 tablet, Rfl: 0    polyethylene glycol (MIRALAX) 17 g packet, Take 17 g by mouth daily (Patient taking differently: Take 17 g by mouth daily as needed  ), Disp: 30 each, Rfl: 0    rasagiline (AZILECT) 1 MG, TAKE 1 TABLET BY MOUTH  DAILY, Disp: 90 tablet, Rfl: 2    rosuvastatin (CRESTOR) 5 mg tablet, Take 1 tablet (5 mg total) by mouth every other day, Disp: 30 tablet, Rfl: 0      Alejandro Zavala PA-C

## 2019-10-03 ENCOUNTER — PROCEDURE VISIT (OUTPATIENT)
Dept: NEUROLOGY | Facility: CLINIC | Age: 74
End: 2019-10-03
Payer: MEDICARE

## 2019-10-03 VITALS
SYSTOLIC BLOOD PRESSURE: 126 MMHG | HEART RATE: 64 BPM | BODY MASS INDEX: 36.16 KG/M2 | DIASTOLIC BLOOD PRESSURE: 70 MMHG | HEIGHT: 67 IN | WEIGHT: 230.4 LBS

## 2019-10-03 DIAGNOSIS — G20 PARKINSON'S DISEASE WITH USE OF ELECTRICAL BRAIN STIMULATION (HCC): Primary | ICD-10-CM

## 2019-10-03 PROCEDURE — 99214 OFFICE O/P EST MOD 30 MIN: CPT | Performed by: PSYCHIATRY & NEUROLOGY

## 2019-10-03 NOTE — PATIENT INSTRUCTIONS
We will retry Sinemet 25/100  Week 1: 1/2 tab qam  Week 2: 1/2 tab bid  Week 3: 1/2 tab tid  Week 4: 1 tab in and 1/2 tab afternoon and evening  Week 5: 1 tab qam and afternoon and 1/2 in evening  Week 6: 1 tab 3 times daily  If tolerating but no change in gait call for further instruction for increasing the dose

## 2019-10-03 NOTE — PROGRESS NOTES
Patient ID: Brady Robb is a 75 y o  female  Assessment/Plan:    Parkinson's disease with use of electrical brain stimulation ([facility] [address] )  Parkinson's complicated by postural instability and gait  She is doing well after PT and now using a rollator  I agree that continued OT can help with improving ADL's and transfers, Time spent discussing PD, answering questoins regarding progression and treatment options with the patient and family  She was unable to tolerate levodopa at low dose in the past due to hallucinations  She stopped donepezil as she felt it did not help after 2 weeks on the full dose  She will continue on Azilect and has agreed to retry levodopa but trying to get he to tolerate it by using a slow titration  We will retry Sinemet 25/100  Week 1: 1/2 tab qam  Week 2: 1/2 tab bid  Week 3: 1/2 tab tid  Week 4: 1 tab in and 1/2 tab afternoon and evening  Week 5: 1 tab qam and afternoon and 1/2 in evening  Week 6: 1 tab 3 times daily  If tolerating but no change in gait call for further instruction for increasing the dose  Diagnoses and all orders for this visit:    Parkinson's disease with use of electrical brain stimulation [facility])  -     Ambulatory referral to Occupational Therapy; Future  -     Ambulatory referral to ot  adaptability evaluation; Future  -     carbidopa-levodopa (SINEMET)  mg per tablet; 1/2 tab daily and titrate up by 1/2 tab weekly to 1 tab tid as directed    Other orders  -     CINNAMON PO; Take by mouth         I have spent 55 minutes with Patient and family today in which greater than 50% of this time was spent in counseling/coordination of care regarding Prognosis, Risks and benefits of tx options, Intructions for management, Patient and family education, Importance of tx compliance, Risk factor reductions and Impressions        Subjective:    Ms Brady Robb is a pleasant female s/p bilateral CTS s/p surgery, significant arthritis (psoriatic), Parkinson's disease since about 2009 now s/p bilateral STN DBS placement 11/5/14 with ACTIVA SC 11/11/14, s/p right IPG replacement 12/18/18, here for follow up  Prior to surgery she was on Sinemet 25/100 q 4 hours tid and amantadine bid  She remains on Azilect  Previously she tried ropinirole, Requip XL (highest dose 8mg) and Mirapex ER 1 5 with no effect  Insurance would not cover Neupro and she found it a nuisance  Discussion about moving the IPG had with Dr Clari Pompa but she decided to wait until the next needed replacement  She continues to have postural instability and trouble with gait  We tried restarting Sinemet 25/100 1 po tid q 4 hours part to see if this provides more energy and helps with intermittent gait issues but she stopped it after 3 weeks as she noticed no difference and was associated with hallucinations  PT for gait and balance with modest degree of success  Amantadine trial in rhe past with no improvement  She is here wit her  and son who helped with history  She remains on rasagiline 1mg daily  She was having 3-4 times over 3 months  She has not fallen since starting to use a rollator  She called July 9th    We start donepezil to see if this would decrease falls  She underwent physical therapy  She is still in OT  She can continue exercises at [facility] but they are working on getting someone to go with her to help her  Her  is now home more and she is more conscious   She is dressing and showering and performing hygiene acts with slowness but independently  She uses a shower chair and bars  She is assisted out of bed in the am  It is very difficult dressing independently so her  helps  She denies any issues with swallowing and drooling  She sleeps well  She denies any daytime sedation or fatigue  She has occasion cough on saliva  She can be confused with dates at times  She may get confused with dates and days of the week  She typically is close    She can have short term memory issues forgetting recent conversations  He  has always managed the finances  She will buy things online  No hallucinations  The following portions of the patient's history were reviewed and updated as appropriate: allergies, current medications, past family history, past medical history, past social history, past surgical history and problem list          Objective:    Blood pressure 126/70, pulse 64, height 5' 7" (1 702 m), weight 105 kg (230 lb 6 4 oz)  Physical Exam   Constitutional: She appears well-developed  Eyes: Pupils are equal, round, and reactive to light  Neurological: She has normal strength  Psychiatric: Her speech is normal    Vitals reviewed  Neurological Exam  Mental Status   Oriented only to person, place and situation  Recent and remote memory are intact  Speech is normal  Speech: hypophonia  Language is fluent with no aphasia  Attention and concentration are normal     Cranial Nerves  CN III, IV, VI: Extraocular movements intact bilaterally  Pupils equal round and reactive to light bilaterally  CN V: Facial sensation is normal   CN VII: Full and symmetric facial movement  CN VIII: Hearing is normal   CN IX, X: Palate elevates symmetrically  CN XI: Shoulder shrug strength is normal   CN XII: Tongue midline without atrophy or fasciculations  Motor   Normal muscle tone  Strength is 5/5 throughout all four extremities  Sensory  Light touch is normal in upper and lower extremities  Coordination  Right: Finger-to-nose normal  Rapid alternating movement abnormality:  Left: Finger-to-nose normal  Rapid alternating movement abnormality:  See MDS UPDRS III  Gait  Casual gait: Unable to rise from chair without using arms  Ambulates with walker, good stride, en bloc turn no freezing today,        MDS UPDRS III                             Time since last dose:   5/9/19  10/3/19   Speech  1  1   Facial Expression  2  2   Rigidity - Neck  2  3 Rigidity - Upper Extremity (Right)  0  1   Rigidity - Upper Extremity (Left)   1  0   Rigidity - Lower Extremity (Right)  1  2   Rigidity - Lower Extremity (Left)   1  1   Finger Taps (Right)   2  1   Finger Taps (Left)   2  2   Hand Movement (Right)  1  0   Hand Movement (Left)   0  0   Pronation/Supination (Right)  1  1   Pronation/Supination (Left)   3 3   Toe Tapping (Right) 2  3   Toe Tapping (Left) 0  1   Leg Agility (Right)  1  1   Leg Agility (Left)   0  0   Arising from Chair   2  2   Gait   2   3 walker   Freezing of Gait 1 0   Postural Stability   0     Posture 1  1   Global spontaneity of movement 2  2   Postural Tremor (Right) 0  0   Postural Tremor (Left) 0  0   Kinetic Tremor (Right)  0  0   Kinetic Tremor (Left)  0  0   Rest tremor amplitude RUE 0  0   Rest tremor amplitude LUE 0  0   Rest tremor amplitude RLE 0  0   Reset tremor amplitude LLE 0  0   Lip/Jaw Tremor  0  0           Motor Exam Total:            ROS:    Review of Systems   Constitutional: Negative  Negative for appetite change and fever  HENT: Positive for voice change (talks alot softer)  Negative for hearing loss, tinnitus and trouble swallowing  Eyes: Negative  Negative for photophobia and pain  Respiratory: Negative  Negative for shortness of breath  Cardiovascular: Negative  Negative for palpitations  Gastrointestinal: Negative  Negative for nausea and vomiting  Endocrine: Negative  Negative for cold intolerance and heat intolerance  Genitourinary: Negative  Negative for dysuria, frequency and urgency  Musculoskeletal: Positive for gait problem and neck stiffness (Trouble turning to right side)  Negative for myalgias and neck pain  Balance issues  In the last 3 months she has fell 3-4 times (one time she went to hospital)     Skin: Negative  Negative for rash  Allergic/Immunologic: Negative      Neurological: Negative for dizziness, tremors, seizures, syncope, facial asymmetry, speech difficulty, weakness, light-headedness, numbness and headaches  Hematological: Negative  Does not bruise/bleed easily  Psychiatric/Behavioral: Negative  Negative for confusion, hallucinations and sleep disturbance  All other systems reviewed and are negative  Review of system was personally reviewed

## 2019-12-11 DIAGNOSIS — G20 PARKINSON'S DISEASE WITH USE OF ELECTRICAL BRAIN STIMULATION (HCC): ICD-10-CM

## 2019-12-11 RX ORDER — RASAGILINE 1 MG/1
TABLET ORAL
Qty: 90 TABLET | Refills: 2 | Status: SHIPPED | OUTPATIENT
Start: 2019-12-11 | End: 2020-09-26 | Stop reason: SDUPTHER

## 2019-12-13 ENCOUNTER — APPOINTMENT (INPATIENT)
Dept: RADIOLOGY | Facility: HOSPITAL | Age: 74
DRG: 481 | End: 2019-12-13
Payer: MEDICARE

## 2019-12-13 ENCOUNTER — HOSPITAL ENCOUNTER (INPATIENT)
Facility: HOSPITAL | Age: 74
LOS: 5 days | DRG: 481 | End: 2019-12-18
Attending: EMERGENCY MEDICINE | Admitting: FAMILY MEDICINE
Payer: MEDICARE

## 2019-12-13 ENCOUNTER — APPOINTMENT (EMERGENCY)
Dept: RADIOLOGY | Facility: HOSPITAL | Age: 74
DRG: 481 | End: 2019-12-13
Payer: MEDICARE

## 2019-12-13 ENCOUNTER — TELEPHONE (OUTPATIENT)
Dept: OBGYN CLINIC | Facility: HOSPITAL | Age: 74
End: 2019-12-13

## 2019-12-13 ENCOUNTER — ANESTHESIA EVENT (INPATIENT)
Dept: PERIOP | Facility: HOSPITAL | Age: 74
DRG: 481 | End: 2019-12-13
Payer: MEDICARE

## 2019-12-13 DIAGNOSIS — G20 PARKINSON'S DISEASE WITH USE OF ELECTRICAL BRAIN STIMULATION (HCC): ICD-10-CM

## 2019-12-13 DIAGNOSIS — R73.9 HYPERGLYCEMIA: ICD-10-CM

## 2019-12-13 DIAGNOSIS — R41.0 DELIRIUM: ICD-10-CM

## 2019-12-13 DIAGNOSIS — S72.001A CLOSED FRACTURE OF RIGHT HIP, INITIAL ENCOUNTER (HCC): Primary | ICD-10-CM

## 2019-12-13 PROBLEM — I10 ESSENTIAL HYPERTENSION: Status: ACTIVE | Noted: 2019-12-13

## 2019-12-13 LAB
ABO GROUP BLD: NORMAL
ANION GAP SERPL CALCULATED.3IONS-SCNC: 10 MMOL/L (ref 4–13)
APTT PPP: 27 SECONDS (ref 23–37)
BASOPHILS # BLD AUTO: 0.06 THOUSANDS/ΜL (ref 0–0.1)
BASOPHILS NFR BLD AUTO: 1 % (ref 0–1)
BLD GP AB SCN SERPL QL: NEGATIVE
BUN SERPL-MCNC: 20 MG/DL (ref 5–25)
CALCIUM SERPL-MCNC: 8.6 MG/DL (ref 8.3–10.1)
CHLORIDE SERPL-SCNC: 106 MMOL/L (ref 100–108)
CO2 SERPL-SCNC: 25 MMOL/L (ref 21–32)
CREAT SERPL-MCNC: 0.95 MG/DL (ref 0.6–1.3)
EOSINOPHIL # BLD AUTO: 0.14 THOUSAND/ΜL (ref 0–0.61)
EOSINOPHIL NFR BLD AUTO: 1 % (ref 0–6)
ERYTHROCYTE [DISTWIDTH] IN BLOOD BY AUTOMATED COUNT: 12.7 % (ref 11.6–15.1)
GFR SERPL CREATININE-BSD FRML MDRD: 59 ML/MIN/1.73SQ M
GLUCOSE SERPL-MCNC: 118 MG/DL (ref 65–140)
GLUCOSE SERPL-MCNC: 155 MG/DL (ref 65–140)
GLUCOSE SERPL-MCNC: 181 MG/DL (ref 65–140)
HCT VFR BLD AUTO: 43.1 % (ref 34.8–46.1)
HGB BLD-MCNC: 14 G/DL (ref 11.5–15.4)
IMM GRANULOCYTES # BLD AUTO: 0.07 THOUSAND/UL (ref 0–0.2)
IMM GRANULOCYTES NFR BLD AUTO: 1 % (ref 0–2)
INR PPP: 0.99 (ref 0.84–1.19)
LYMPHOCYTES # BLD AUTO: 1.43 THOUSANDS/ΜL (ref 0.6–4.47)
LYMPHOCYTES NFR BLD AUTO: 12 % (ref 14–44)
MCH RBC QN AUTO: 31.5 PG (ref 26.8–34.3)
MCHC RBC AUTO-ENTMCNC: 32.5 G/DL (ref 31.4–37.4)
MCV RBC AUTO: 97 FL (ref 82–98)
MONOCYTES # BLD AUTO: 0.6 THOUSAND/ΜL (ref 0.17–1.22)
MONOCYTES NFR BLD AUTO: 5 % (ref 4–12)
NEUTROPHILS # BLD AUTO: 9.79 THOUSANDS/ΜL (ref 1.85–7.62)
NEUTS SEG NFR BLD AUTO: 80 % (ref 43–75)
NRBC BLD AUTO-RTO: 0 /100 WBCS
PLATELET # BLD AUTO: 186 THOUSANDS/UL (ref 149–390)
PLATELET # BLD AUTO: 209 THOUSANDS/UL (ref 149–390)
PMV BLD AUTO: 10.4 FL (ref 8.9–12.7)
PMV BLD AUTO: 10.6 FL (ref 8.9–12.7)
POTASSIUM SERPL-SCNC: 3.9 MMOL/L (ref 3.5–5.3)
PROTHROMBIN TIME: 13.2 SECONDS (ref 11.6–14.5)
RBC # BLD AUTO: 4.45 MILLION/UL (ref 3.81–5.12)
RH BLD: POSITIVE
SODIUM SERPL-SCNC: 141 MMOL/L (ref 136–145)
SPECIMEN EXPIRATION DATE: NORMAL
WBC # BLD AUTO: 12.09 THOUSAND/UL (ref 4.31–10.16)

## 2019-12-13 PROCEDURE — 36415 COLL VENOUS BLD VENIPUNCTURE: CPT | Performed by: EMERGENCY MEDICINE

## 2019-12-13 PROCEDURE — 86901 BLOOD TYPING SEROLOGIC RH(D): CPT | Performed by: PHYSICIAN ASSISTANT

## 2019-12-13 PROCEDURE — 93005 ELECTROCARDIOGRAM TRACING: CPT

## 2019-12-13 PROCEDURE — 86850 RBC ANTIBODY SCREEN: CPT | Performed by: PHYSICIAN ASSISTANT

## 2019-12-13 PROCEDURE — 85049 AUTOMATED PLATELET COUNT: CPT | Performed by: FAMILY MEDICINE

## 2019-12-13 PROCEDURE — 99285 EMERGENCY DEPT VISIT HI MDM: CPT | Performed by: EMERGENCY MEDICINE

## 2019-12-13 PROCEDURE — 82948 REAGENT STRIP/BLOOD GLUCOSE: CPT

## 2019-12-13 PROCEDURE — 85025 COMPLETE CBC W/AUTO DIFF WBC: CPT | Performed by: EMERGENCY MEDICINE

## 2019-12-13 PROCEDURE — 99222 1ST HOSP IP/OBS MODERATE 55: CPT | Performed by: FAMILY MEDICINE

## 2019-12-13 PROCEDURE — 73502 X-RAY EXAM HIP UNI 2-3 VIEWS: CPT

## 2019-12-13 PROCEDURE — 85730 THROMBOPLASTIN TIME PARTIAL: CPT | Performed by: EMERGENCY MEDICINE

## 2019-12-13 PROCEDURE — 73552 X-RAY EXAM OF FEMUR 2/>: CPT

## 2019-12-13 PROCEDURE — 71045 X-RAY EXAM CHEST 1 VIEW: CPT

## 2019-12-13 PROCEDURE — 96374 THER/PROPH/DIAG INJ IV PUSH: CPT

## 2019-12-13 PROCEDURE — 99285 EMERGENCY DEPT VISIT HI MDM: CPT

## 2019-12-13 PROCEDURE — 85610 PROTHROMBIN TIME: CPT | Performed by: EMERGENCY MEDICINE

## 2019-12-13 PROCEDURE — 86900 BLOOD TYPING SEROLOGIC ABO: CPT | Performed by: PHYSICIAN ASSISTANT

## 2019-12-13 PROCEDURE — 80048 BASIC METABOLIC PNL TOTAL CA: CPT | Performed by: EMERGENCY MEDICINE

## 2019-12-13 RX ORDER — PRAVASTATIN SODIUM 40 MG
40 TABLET ORAL EVERY OTHER DAY
Status: DISCONTINUED | OUTPATIENT
Start: 2019-12-13 | End: 2019-12-18 | Stop reason: HOSPADM

## 2019-12-13 RX ORDER — PAROXETINE HYDROCHLORIDE 20 MG/1
20 TABLET, FILM COATED ORAL DAILY
Status: DISCONTINUED | OUTPATIENT
Start: 2019-12-13 | End: 2019-12-18 | Stop reason: HOSPADM

## 2019-12-13 RX ORDER — CHLORAL HYDRATE 500 MG
1000 CAPSULE ORAL DAILY
Status: DISCONTINUED | OUTPATIENT
Start: 2019-12-13 | End: 2019-12-14

## 2019-12-13 RX ORDER — ATENOLOL 50 MG/1
50 TABLET ORAL DAILY
Status: DISCONTINUED | OUTPATIENT
Start: 2019-12-13 | End: 2019-12-18 | Stop reason: HOSPADM

## 2019-12-13 RX ORDER — SELEGILINE HYDROCHLORIDE 5 MG/1
5 TABLET ORAL
Status: DISCONTINUED | OUTPATIENT
Start: 2019-12-14 | End: 2019-12-18 | Stop reason: HOSPADM

## 2019-12-13 RX ORDER — FENTANYL CITRATE 50 UG/ML
100 INJECTION, SOLUTION INTRAMUSCULAR; INTRAVENOUS ONCE
Status: COMPLETED | OUTPATIENT
Start: 2019-12-13 | End: 2019-12-13

## 2019-12-13 RX ORDER — OXYCODONE HYDROCHLORIDE 10 MG/1
10 TABLET ORAL EVERY 4 HOURS PRN
Status: DISCONTINUED | OUTPATIENT
Start: 2019-12-13 | End: 2019-12-14

## 2019-12-13 RX ORDER — HEPARIN SODIUM 5000 [USP'U]/ML
5000 INJECTION, SOLUTION INTRAVENOUS; SUBCUTANEOUS EVERY 8 HOURS SCHEDULED
Status: DISCONTINUED | OUTPATIENT
Start: 2019-12-13 | End: 2019-12-14

## 2019-12-13 RX ORDER — OXYCODONE HYDROCHLORIDE 5 MG/1
5 TABLET ORAL EVERY 4 HOURS PRN
Status: DISCONTINUED | OUTPATIENT
Start: 2019-12-13 | End: 2019-12-14

## 2019-12-13 RX ORDER — MORPHINE SULFATE 4 MG/ML
4 INJECTION, SOLUTION INTRAMUSCULAR; INTRAVENOUS ONCE
Status: COMPLETED | OUTPATIENT
Start: 2019-12-13 | End: 2019-12-13

## 2019-12-13 RX ORDER — ASPIRIN 81 MG/1
81 TABLET ORAL DAILY
Status: DISCONTINUED | OUTPATIENT
Start: 2019-12-13 | End: 2019-12-18 | Stop reason: HOSPADM

## 2019-12-13 RX ORDER — CEFAZOLIN SODIUM 2 G/50ML
2000 SOLUTION INTRAVENOUS ONCE
Status: COMPLETED | OUTPATIENT
Start: 2019-12-14 | End: 2019-12-14

## 2019-12-13 RX ORDER — CHOLECALCIFEROL (VITAMIN D3) 125 MCG
400 CAPSULE ORAL DAILY
Status: DISCONTINUED | OUTPATIENT
Start: 2019-12-13 | End: 2019-12-18 | Stop reason: HOSPADM

## 2019-12-13 RX ADMIN — PRAVASTATIN SODIUM 40 MG: 40 TABLET ORAL at 20:53

## 2019-12-13 RX ADMIN — HEPARIN SODIUM 5000 UNITS: 5000 INJECTION INTRAVENOUS; SUBCUTANEOUS at 22:33

## 2019-12-13 RX ADMIN — FENTANYL CITRATE 100 MCG: 50 INJECTION, SOLUTION INTRAMUSCULAR; INTRAVENOUS at 11:35

## 2019-12-13 RX ADMIN — MORPHINE SULFATE 2 MG: 2 INJECTION, SOLUTION INTRAMUSCULAR; INTRAVENOUS at 19:18

## 2019-12-13 RX ADMIN — HEPARIN SODIUM 5000 UNITS: 5000 INJECTION INTRAVENOUS; SUBCUTANEOUS at 14:30

## 2019-12-13 RX ADMIN — OXYCODONE HYDROCHLORIDE 10 MG: 10 TABLET ORAL at 20:54

## 2019-12-13 RX ADMIN — OXYCODONE HYDROCHLORIDE 10 MG: 10 TABLET ORAL at 15:55

## 2019-12-13 RX ADMIN — MORPHINE SULFATE 4 MG: 4 INJECTION INTRAVENOUS at 13:12

## 2019-12-13 NOTE — H&P
H&P- Cipriano Hogue [id number], 75 y o  female MRN: [id number]    Unit/Bed#: ED 19 Encounter: [id number]    Primary Care Provider: Oma Kiran MD   Date and time admitted to hospital: 12/13/2019 11:14 AM        * Closed right hip fracture, initial encounter [facility])  Assessment & Plan  Patient sustained a fall at home  X-rays in the ED revealed right hip fracture  Orthopedic surgery consult placed  Keep patient nonweightbearing on right lower extremity  DVT prophylaxis with is heparin 5000 units subcutaneously q 8 hours  Given patient's age as well as Parkinson's disease with no prior cardiac or pulmonary history, she will be a moderate risk for low risk noncardiac surgery if orthopedic surgery select operative intervention for her right hip fracture  Continue analgesics for pain control  Type 2 diabetes mellitus without complication [facility])  Assessment & Plan  Lab Results   Component Value Date    HGBA1C 6 3 12/08/2018       No results for input(s): POCGLU in the last 72 hours  Blood Sugar Average: Last 72 hrs:   started patient on sliding scale insulin, continue diabetic diet, Accu-Cheks q a c  And HS  Essential hypertension  Assessment & Plan  BP currently stable and optimal   Continue atenolol 50 mg daily  Parkinson's disease with use of electrical brain stimulation [facility])  Assessment & Plan  Disease currently controlled by electrical brain stimulation  Continue selegiline 5 mg daily substituted for rasagiline  VTE Prophylaxis: Heparin  / sequential compression device   Code Status:  DNR/DNI  POLST: There is no POLST form on file for this patient (pre-hospital)  Discussion with family:  Yes with patient's  at bedside    Anticipated Length of Stay:  Patient will be admitted on an Inpatient basis with an anticipated length of stay of  greater than 2 midnights     Justification for Hospital Stay:  Right hip fracture    Total Time for Visit, including Counseling / Coordination of Care: 15 minutes  Greater than 50% of this total time spent on direct patient counseling and coordination of care  Chief Complaint:   Right hip pain due to fall    History of Present Illness:    Lesly Jaime is a 75 y o  female patient with past medical history of Parkinson's disease managed by deep brain stimulation, type 2 diabetes mellitus, hypertension, who presented from home with right hip pain after a mechanical fall  X-rays performed in the ED revealed a right hip fracture  She was given IV fentanyl for management of her pain in the ED  She has no other complaints at this time  She denied any history of head trauma, headaches, lightheadedness, dizziness  She is to be admitted to the medical-surgical unit for orthopedic consultation to manage her right hip fracture  Review of Systems:    Review of Systems   Constitutional: Negative  HENT: Negative  Respiratory: Negative  Cardiovascular: Negative  Gastrointestinal: Negative  Endocrine: Negative  Genitourinary: Negative  Musculoskeletal:        Right hip pain   Skin: Negative  Neurological: Negative  Past Medical and Surgical History:     Past Medical History:   Diagnosis Date    Anxiety     Diabetes mellitus ([facility] [address] )     Diabetes mellitus type 2, diet-controlled ([facility] [address] )     Hyperlipidemia     Hypertension     Parkinson disease ([facility] [address] )        Past Surgical History:   Procedure Laterality Date    ACHILLES TENDON SURGERY      DEEP BRAIN STIMULATOR PLACEMENT      DENTAL SURGERY      NH IMP STIM,CRANIAL,SUBQ,1 ARRAY Right 12/18/2018    Procedure: REPLACEMENT IMPLANTABLE PULSE GENERATOR (IPG) DEEP BRAIN STIMULATION (DBS);   Surgeon: Zacarias Ca MD;  Location:  MAIN OR;  Service: Neurosurgery    REPLACEMENT TOTAL KNEE      REVERSE TOTAL SHOULDER ARTHROPLASTY Left 8/23/2018    Procedure: ARTHROPLASTY SHOULDER REVERSE;  Surgeon: Jing Ca MD;  Location: [facility] Main OR;  Service: Orthopedics    TONSILLECTOMY Meds/Allergies:    Prior to Admission medications    Medication Sig Start Date End Date Taking? Authorizing Provider   aspirin 81 MG tablet Take 1 tablet by mouth daily   Yes Historical Provider, MD   atenolol (TENORMIN) 50 mg tablet Take 1 tablet (50 mg total) by mouth daily 9/21/18  Yes GRETA Mae   CINNAMON PO Take by mouth   Yes Historical Provider, MD   Coenzyme Q10 400 MG CAPS Take 1 capsule (400 mg total) by mouth daily 9/21/18  Yes GRETA Mae   Omega-3 Fatty Acids (FISH OIL) 1,000 mg Take 1 capsule (1,000 mg total) by mouth daily 9/21/18  Yes GRETA Mae   PARoxetine (PAXIL) 20 mg tablet Take 1 tablet (20 mg total) by mouth daily 9/22/18  Yes Laura Schwab MD   rasagiline (AZILECT) 1 MG TAKE 1 TABLET BY MOUTH  DAILY 12/11/19  Yes Niall Chang MD   rosuvastatin (CRESTOR) 5 mg tablet Take 1 tablet (5 mg total) by mouth every other day 9/22/18  Yes GRETA Mae   amoxicillin (AMOXIL) 500 mg capsule as needed  12/13/19  Historical Provider, MD   carbidopa-levodopa (SINEMET)  mg per tablet 1/2 tab daily and titrate up by 1/2 tab weekly to 1 tab tid as directed 10/3/19 12/13/19  Niall Chang MD   donepezil (ARICEPT) 10 mg tablet Take 1/2qhs tab for 3 weeks then increase to 1tab qhs  Patient not taking: Reported on 10/3/2019 7/11/19 12/13/19  Oleg Mac PA-C   [facility] [place] [facility] (OSTEO BI-FLEX ADV JOINT SHIELD PO) Take by mouth  12/13/19  Historical Provider, MD   polyethylene glycol (MIRALAX) 17 g packet Take 17 g by mouth daily  Patient not taking: Reported on 10/3/2019 9/21/18 12/13/19  GRETA Mae     I have reviewed home medications using allscripts  Allergies:    Allergies   Allergen Reactions    Sulfa Antibiotics Hives       Social History:     Marital Status: /Civil Union   Occupation:  None  Patient Pre-hospital Living Situation:  Lives at home with spouse  Patient Pre-hospital Level of Mobility:  Ambulatory  Patient Pre-hospital Diet Restrictions:  None  Substance Use History:   Social History     Substance and Sexual Activity   Alcohol Use Yes    Comment: occasional     Social History     Tobacco Use   Smoking Status Never Smoker   Smokeless Tobacco Never Used     Social History     Substance and Sexual Activity   Drug Use No       Family History:    Family History   Problem Relation Age of Onset    Arthritis Mother     No Known Problems Father     No Known Problems Maternal Grandmother     No Known Problems Maternal Grandfather     No Known Problems Paternal Grandmother     No Known Problems Paternal Grandfather     No Known Problems Son     No Known Problems Son        Physical Exam:     Vitals:   Blood Pressure: 106/52 (12/13/19 1117)  Pulse: 64 (12/13/19 1117)  Temperature: 97 7 °F (36 5 °C) (12/13/19 1117)  Temp Source: Oral (12/13/19 1117)  Respirations: 18 (12/13/19 1117)  Weight - Scale: 110 kg (242 lb 4 6 oz) (12/13/19 1117)  SpO2: 92 % (12/13/19 1117)    Physical Exam   Constitutional: She is oriented to person, place, and time  She appears well-developed and well-nourished  No distress  HENT:   Head: Normocephalic and atraumatic  Eyes: Pupils are equal, round, and reactive to light  EOM are normal    Neck: Normal range of motion  Neck supple  No JVD present  Cardiovascular: Normal rate, regular rhythm and normal heart sounds  No murmur heard  Pulmonary/Chest: Effort normal and breath sounds normal    Abdominal: Soft  Bowel sounds are normal    Musculoskeletal: She exhibits tenderness  She exhibits no deformity  Tenderness to palpation of right hip joint with severely limited range of motion in right hip   Neurological: She is alert and oriented to person, place, and time  Skin: Skin is warm and dry  She is not diaphoretic  Additional Data:     Lab Results: I have personally reviewed pertinent reports        Results from last 7 days   Lab Units 12/13/19  1131   WBC Thousand/uL 12 09* HEMOGLOBIN g/dL 14 0   HEMATOCRIT % 43 1   PLATELETS Thousands/uL 209   NEUTROS PCT % 80*   LYMPHS PCT % 12*   MONOS PCT % 5   EOS PCT % 1     Results from last 7 days   Lab Units 12/13/19  1131   SODIUM mmol/L 141   POTASSIUM mmol/L 3 9   CHLORIDE mmol/L 106   CO2 mmol/L 25   BUN mg/dL 20   CREATININE mg/dL 0 95   ANION GAP mmol/L 10   CALCIUM mg/dL 8 6   GLUCOSE RANDOM mg/dL 181*     Results from last 7 days   Lab Units 12/13/19  1131   INR  0 99                   Imaging: I have personally reviewed pertinent reports  XR hip/pelv 2-3 vws right   ED Interpretation by Carla Cain MD (12/13 1209)   Right hip fracture      XR chest 1 view   ED Interpretation by Carla Cain MD (12/13 1258)   Elevated right hemidiaphragm, similar appearance to previous, no acute findings       by Christiano Jacobson (12/13 1202)          EKG, Pathology, and Other Studies Reviewed on Admission:   · EKG:  X-rays of right hip and pelvis, x-ray of chest one view    Allscripts / Epic Records Reviewed: Yes     ** Please Note: This note has been constructed using a voice recognition system   **

## 2019-12-13 NOTE — TELEPHONE ENCOUNTER
[facility]   Call back number: [phone]  Patient's doctor": Dr Clayton Guevara is asking to speak to [facility]  No other info was provided

## 2019-12-13 NOTE — ED PROVIDER NOTES
History  Chief Complaint   Patient presents with    Fall     Pt states lost her balance fell denies head injury, LOC, and use of thinners c/o right hip pain right leg shortned and rotated outward     76 yo female with Parkinson's managed by deep brain stimulator brought from home by ambulance after she accidentally fell while trying to arrange dishes in her cupboard  She denies head injury, denies LOC, but took impact on her right hip and was unable to stand with her 's help  The right leg is shortened and externally rotated  History provided by:  Patient      Prior to Admission Medications   Prescriptions Last Dose Informant Patient Reported? Taking? CINNAMON PO   Yes Yes   Sig: Take by mouth   Coenzyme Q10 400 MG CAPS  Self No Yes   Sig: Take 1 capsule (400 mg total) by mouth daily   Omega-3 Fatty Acids (FISH OIL) 1,000 mg  Self No Yes   Sig: Take 1 capsule (1,000 mg total) by mouth daily   PARoxetine (PAXIL) 20 mg tablet  Self No Yes   Sig: Take 1 tablet (20 mg total) by mouth daily   aspirin 81 MG tablet  Self Yes Yes   Sig: Take 1 tablet by mouth daily   atenolol (TENORMIN) 50 mg tablet  Self No Yes   Sig: Take 1 tablet (50 mg total) by mouth daily   rasagiline (AZILECT) 1 MG   No Yes   Sig: TAKE 1 TABLET BY MOUTH  DAILY   rosuvastatin (CRESTOR) 5 mg tablet  Self No Yes   Sig: Take 1 tablet (5 mg total) by mouth every other day      Facility-Administered Medications: None       Past Medical History:   Diagnosis Date    Anxiety     Diabetes mellitus ([facility] [address] )     Diabetes mellitus type 2, diet-controlled (HCC)     Hyperlipidemia     Hypertension     Parkinson disease ([facility] [address] )        Past Surgical History:   Procedure Laterality Date    ACHILLES TENDON SURGERY      DEEP BRAIN STIMULATOR PLACEMENT      DENTAL SURGERY      DC IMP STIM,CRANIAL,SUBQ,1 ARRAY Right 12/18/2018    Procedure: REPLACEMENT IMPLANTABLE PULSE GENERATOR (IPG) DEEP BRAIN STIMULATION (DBS);   Surgeon: Elo Osborn MD; Location:  MAIN OR;  Service: Neurosurgery    REPLACEMENT TOTAL KNEE      REVERSE TOTAL SHOULDER ARTHROPLASTY Left 8/23/2018    Procedure: ARTHROPLASTY SHOULDER REVERSE;  Surgeon: Lizzy Ag MD;  Location: [facility];  Service: Orthopedics    TONSILLECTOMY         Family History   Problem Relation Age of Onset    Arthritis Mother     No Known Problems Father     No Known Problems Maternal Grandmother     No Known Problems Maternal Grandfather     No Known Problems Paternal Grandmother     No Known Problems Paternal Grandfather     No Known Problems Son     No Known Problems Son      I have reviewed and agree with the history as documented  Social History     Tobacco Use    Smoking status: Never Smoker    Smokeless tobacco: Never Used   Substance Use Topics    Alcohol use: Yes     Comment: occasional    Drug use: No        Review of Systems   Constitutional: Negative for appetite change, chills and fever  HENT: Negative for sore throat  Respiratory: Negative for cough, shortness of breath and wheezing  Cardiovascular: Negative for chest pain and palpitations  Gastrointestinal: Negative for abdominal pain, diarrhea, nausea and vomiting  Genitourinary: Negative for dysuria and hematuria  Musculoskeletal: Negative for neck pain  Skin: Negative for rash  Neurological: Negative for dizziness, weakness and headaches  Psychiatric/Behavioral: Negative for suicidal ideas  All other systems reviewed and are negative  Physical Exam  Physical Exam   Constitutional: She is oriented to person, place, and time  She appears well-developed and well-nourished  No distress  HENT:   Head: Normocephalic and atraumatic  Right Ear: External ear normal    Left Ear: External ear normal    Nose: Nose normal    Eyes: Pupils are equal, round, and reactive to light  Conjunctivae and EOM are normal    Neck: Normal range of motion  Neck supple     Cardiovascular: Normal rate and regular rhythm  Pulmonary/Chest: Effort normal  No tachypnea  She has no decreased breath sounds  Abdominal: Soft  Musculoskeletal:        Right hip: She exhibits decreased range of motion, tenderness and deformity (shortened and externally rotated)  Neurological: She is alert and oriented to person, place, and time  She has normal strength  Gait normal    Skin: Skin is warm and dry  No rash noted  She is not diaphoretic  Psychiatric: She has a normal mood and affect  Her behavior is normal  Judgment and thought content normal    Nursing note and vitals reviewed        Vital Signs  ED Triage Vitals   Temperature Pulse Respirations Blood Pressure SpO2   12/13/19 1117 12/13/19 1117 12/13/19 1117 12/13/19 1117 12/13/19 1117   97 7 °F (36 5 °C) 64 18 106/52 92 %      Temp Source Heart Rate Source Patient Position - Orthostatic VS BP Location FiO2 (%)   12/13/19 1117 12/13/19 1117 12/13/19 1318 12/13/19 1318 --   Oral Monitor Lying Right arm       Pain Score       12/13/19 1117       7           Vitals:    12/13/19 1117 12/13/19 1318 12/13/19 1401   BP: 106/52 117/59 127/75   Pulse: 64 (!) 52 59   Patient Position - Orthostatic VS:  Lying Lying         Visual Acuity      ED Medications  Medications   aspirin (ECOTRIN LOW STRENGTH) EC tablet 81 mg (81 mg Oral Refused 12/13/19 1428)   atenolol (TENORMIN) tablet 50 mg (50 mg Oral Refused 12/13/19 1428)   co-enzyme Q-10 capsule 400 mg (400 mg Oral Refused 12/13/19 1429)   fish oil capsule 1,000 mg (1,000 mg Oral Refused 12/13/19 1429)   PARoxetine (PAXIL) tablet 20 mg (20 mg Oral Refused 12/13/19 1429)   selegiline (ELDEPRYL) tablet 5 mg (has no administration in time range)   pravastatin (PRAVACHOL) tablet 40 mg (has no administration in time range)   heparin (porcine) subcutaneous injection 5,000 Units (5,000 Units Subcutaneous Given 12/13/19 1430)   insulin lispro (HumaLOG) 100 units/mL subcutaneous injection 1-6 Units (1 Units Subcutaneous Not Given 12/13/19 1315) ceFAZolin (ANCEF) IVPB (premix) 2,000 mg (has no administration in time range)   morphine injection 2 mg (has no administration in time range)   oxyCODONE (ROXICODONE) IR tablet 5 mg (has no administration in time range)   oxyCODONE (ROXICODONE) immediate release tablet 10 mg (has no administration in time range)   fentanyl citrate (PF) 100 MCG/2ML 100 mcg (100 mcg Intravenous Given 12/13/19 1135)   morphine (PF) 4 mg/mL injection 4 mg (4 mg Intravenous Given 12/13/19 1312)       Diagnostic Studies  Results Reviewed     Procedure Component Value Units Date/Time    Fingerstick Glucose (POCT) [[id number]]  (Normal) Collected:  12/13/19 1313    Lab Status:  Final result Updated:  12/13/19 1314     POC Glucose 118 mg/dl     Basic metabolic panel [[id number]]  (Abnormal) Collected:  12/13/19 1131    Lab Status:  Final result Specimen:  Blood from Arm, Left Updated:  12/13/19 1147     Sodium 141 mmol/L      Potassium 3 9 mmol/L      Chloride 106 mmol/L      CO2 25 mmol/L      ANION GAP 10 mmol/L      BUN 20 mg/dL      Creatinine 0 95 mg/dL      Glucose 181 mg/dL      Calcium 8 6 mg/dL      eGFR 59 ml/min/1 73sq m     Narrative:       [place] guidelines for Chronic Kidney Disease (CKD):     Stage 1 with normal or high GFR (GFR > 90 mL/min/1 73 square meters)    Stage 2 Mild CKD (GFR = 60-89 mL/min/1 73 square meters)    Stage 3A Moderate CKD (GFR = 45-59 mL/min/1 73 square meters)    Stage 3B Moderate CKD (GFR = 30-44 mL/min/1 73 square meters)    Stage 4 Severe CKD (GFR = 15-29 mL/min/1 73 square meters)    Stage 5 End Stage CKD (GFR <15 mL/min/1 73 square meters)  Note: GFR calculation is accurate only with a steady state creatinine    Protime-INR [[id number]]  (Normal) Collected:  12/13/19 1131    Lab Status:  Final result Specimen:  Blood from Arm, Left Updated:  12/13/19 1145     Protime 13 2 seconds      INR 0 99    APTT [[id number]]  (Normal) Collected:  12/13/19 1131    Lab Status: Final result Specimen:  Blood from Arm, Left Updated:  12/13/19 1145     PTT 27 seconds     CBC and differential [[id number]]  (Abnormal) Collected:  12/13/19 1131    Lab Status:  Final result Specimen:  Blood from Arm, Left Updated:  12/13/19 1137     WBC 12 09 Thousand/uL      RBC 4 45 Million/uL      Hemoglobin 14 0 g/dL      Hematocrit 43 1 %      MCV 97 fL      MCH 31 5 pg      MCHC 32 5 g/dL      RDW 12 7 %      MPV 10 6 fL      Platelets 194 Thousands/uL      nRBC 0 /100 WBCs      Neutrophils Relative 80 %      Immat GRANS % 1 %      Lymphocytes Relative 12 %      Monocytes Relative 5 %      Eosinophils Relative 1 %      Basophils Relative 1 %      Neutrophils Absolute 9 79 Thousands/µL      Immature Grans Absolute 0 07 Thousand/uL      Lymphocytes Absolute 1 43 Thousands/µL      Monocytes Absolute 0 60 Thousand/µL      Eosinophils Absolute 0 14 Thousand/µL      Basophils Absolute 0 06 Thousands/µL                  XR hip/pelv 2-3 vws right   ED Interpretation by Javier Mix MD (12/13 1209)   Right hip fracture      Final Result by Desire Stock DO (12/13 1321)      Mildly displaced right femoral intertrochanteric fracture  Workstation performed: [id number]         XR chest 1 view   ED Interpretation by Javier Mix MD (12/13 1258)   Elevated right hemidiaphragm, similar appearance to previous, no acute findings      Final Result by Desire Stock DO (12/13 1324)      Stable chest with no acute cardiopulmonary disease              Workstation performed: [id number]                    Procedures  Procedures         ED Course  ED Course as of Dec 13 1510   Fri Dec 13, 2019   1208 Right hip fracture   XR hip/pelv 2-3 vws right   1257 Elevated right hemidiaphragm, similar appearance to previous, no acute findings   XR chest 1 view                               MDM      Disposition  Final diagnoses:   Closed fracture of right hip, initial encounter ([facility] [address] )   Hyperglycemia     Time reflects when diagnosis was documented in both MDM as applicable and the Disposition within this note     Time User Action Codes Description Comment    12/13/2019 12:41 PM Patito HOLLOWAY Add [S72 001A] Closed fracture of right hip, initial encounter ([facility] [address] )     12/13/2019 12:41 PM Mohan Jimenez Add [R73 9] Hyperglycemia       ED Disposition     ED Disposition Condition Date/Time Comment    Admit Good Fri Dec 13, 2019 12:41 PM Case was discussed with Dr Anthony Cook and the patient's admission status was agreed to be Admission Status: inpatient status to the service of Dr Anthony Cook   Follow-up Information    None         Current Discharge Medication List      CONTINUE these medications which have NOT CHANGED    Details   aspirin 81 MG tablet Take 1 tablet by mouth daily      atenolol (TENORMIN) 50 mg tablet Take 1 tablet (50 mg total) by mouth daily  Qty: 30 tablet, Refills: 0    Associated Diagnoses: History of hypertension      CINNAMON PO Take by mouth      Coenzyme Q10 400 MG CAPS Take 1 capsule (400 mg total) by mouth daily  Qty: 30 capsule, Refills: 0    Associated Diagnoses: Type 2 diabetes mellitus without complication (HCC)      Omega-3 Fatty Acids (FISH OIL) 1,000 mg Take 1 capsule (1,000 mg total) by mouth daily  Qty: 30 capsule, Refills: 0    Associated Diagnoses: Other hyperlipidemia      PARoxetine (PAXIL) 20 mg tablet Take 1 tablet (20 mg total) by mouth daily  Qty: 30 tablet, Refills: 0    Associated Diagnoses: Anxiety      rasagiline (AZILECT) 1 MG TAKE 1 TABLET BY MOUTH  DAILY  Qty: 90 tablet, Refills: 2    Associated Diagnoses: Parkinson's disease with use of electrical brain stimulation (HCC)      rosuvastatin (CRESTOR) 5 mg tablet Take 1 tablet (5 mg total) by mouth every other day  Qty: 30 tablet, Refills: 0    Associated Diagnoses: Other hyperlipidemia           No discharge procedures on file      ED Provider  Electronically Signed by           Edu Hopkins MD  12/13/19 7234

## 2019-12-13 NOTE — ED NOTES
Unable to obtain ECG at this time due to an interference from the patients brain stimulator  Patient is unable to turn off the stimulator at this time and stated her  may be able to do so upon arrival    Dr Callie Reyes made aware and stated the ECG can hold off  RN made aware        Thelma Jung  12/13/19 1200

## 2019-12-13 NOTE — ASSESSMENT & PLAN NOTE
Lab Results   Component Value Date    HGBA1C 6 3 12/08/2018       No results for input(s): POCGLU in the last 72 hours  Blood Sugar Average: Last 72 hrs:   started patient on sliding scale insulin, continue diabetic diet, Accu-Cheks q a c  And HS

## 2019-12-13 NOTE — ASSESSMENT & PLAN NOTE
Disease currently controlled by electrical brain stimulation  Continue selegiline 5 mg daily substituted for rasagiline

## 2019-12-13 NOTE — ASSESSMENT & PLAN NOTE
Patient sustained a fall at home  X-rays in the ED revealed right hip fracture  Orthopedic surgery consult placed  Keep patient nonweightbearing on right lower extremity  DVT prophylaxis with is heparin 5000 units subcutaneously q 8 hours  Given patient's age as well as Parkinson's disease with no prior cardiac or pulmonary history, she will be a moderate risk for low risk noncardiac surgery if orthopedic surgery select operative intervention for her right hip fracture  Continue analgesics for pain control

## 2019-12-13 NOTE — PLAN OF CARE
Problem: Prexisting or High Potential for Compromised Skin Integrity  Goal: Skin integrity is maintained or improved  Description  INTERVENTIONS:  - Identify patients at risk for skin breakdown  - Assess and monitor skin integrity  - Assess and monitor nutrition and hydration status  - Monitor labs   - Assess for incontinence   - Turn and reposition patient  - Assist with mobility/ambulation  - Relieve pressure over bony prominences  - Avoid friction and shearing  - Provide appropriate hygiene as needed including keeping skin clean and dry  - Evaluate need for skin moisturizer/barrier cream  - Collaborate with interdisciplinary team   - Patient/family teaching  - Consider wound care consult   Outcome: Progressing     Problem: PAIN - ADULT  Goal: Verbalizes/displays adequate comfort level or baseline comfort level  Description  Interventions:  - Encourage patient to monitor pain and request assistance  - Assess pain using appropriate pain scale  - Administer analgesics based on type and severity of pain and evaluate response  - Implement non-pharmacological measures as appropriate and evaluate response  - Consider cultural and social influences on pain and pain management  - Notify physician/advanced practitioner if interventions unsuccessful or patient reports new pain  Outcome: Progressing     Problem: INFECTION - ADULT  Goal: Absence or prevention of progression during hospitalization  Description  INTERVENTIONS:  - Assess and monitor for signs and symptoms of infection  - Monitor lab/diagnostic results  - Monitor all insertion sites, i e  indwelling lines, tubes, and drains  - Monitor endotracheal if appropriate and nasal secretions for changes in amount and color  - [place] appropriate cooling/warming therapies per order  - Administer medications as ordered  - Instruct and encourage patient and family to use good hand hygiene technique  - Identify and instruct in appropriate isolation precautions for identified infection/condition  Outcome: Progressing  Goal: Absence of fever/infection during neutropenic period  Description  INTERVENTIONS:  - Monitor WBC    Outcome: Progressing     Problem: INFECTION - ADULT  Goal: Absence of fever/infection during neutropenic period  Description  INTERVENTIONS:  - Monitor WBC    Outcome: Progressing     Problem: INFECTION - ADULT  Goal: Absence of fever/infection during neutropenic period  Description  INTERVENTIONS:  - Monitor WBC    Outcome: Progressing     Problem: DISCHARGE PLANNING  Goal: Discharge to home or other facility with appropriate resources  Description  INTERVENTIONS:  - Identify barriers to discharge w/patient and caregiver  - Arrange for needed discharge resources and transportation as appropriate  - Identify discharge learning needs (meds, wound care, etc )  - Arrange for interpretive services to assist at discharge as needed  - Refer to Case Management Department for coordinating discharge planning if the patient needs post-hospital services based on physician/advanced practitioner order or complex needs related to functional status, cognitive ability, or social support system  Outcome: Progressing     Problem: Knowledge Deficit  Goal: Patient/family/caregiver demonstrates understanding of disease process, treatment plan, medications, and discharge instructions  Description  Complete learning assessment and assess knowledge base  Interventions:  - Provide teaching at level of understanding  - Provide teaching via preferred learning methods  Outcome: Progressing     Problem: SAFETY ADULT  Goal: Patient will remain free of falls  Description  INTERVENTIONS:  - Assess patient frequently for physical needs  -  Identify cognitive and physical deficits and behaviors that affect risk of falls    -  [place] fall precautions as indicated by assessment   - Educate patient/family on patient safety including physical limitations  - Instruct patient to call for assistance with activity based on assessment  - Modify environment to reduce risk of injury  - Consider OT/PT consult to assist with strengthening/mobility  Outcome: Progressing  Goal: Maintain or return to baseline ADL function  Description  INTERVENTIONS:  -  Assess patient's ability to carry out ADLs; assess patient's baseline for ADL function and identify physical deficits which impact ability to perform ADLs (bathing, care of mouth/teeth, toileting, grooming, dressing, etc )  - Assess/evaluate cause of self-care deficits   - Assess range of motion  - Assess patient's mobility; develop plan if impaired  - Assess patient's need for assistive devices and provide as appropriate  - Encourage maximum independence but intervene and supervise when necessary  - Involve family in performance of ADLs  - Assess for home care needs following discharge   - Consider OT consult to assist with ADL evaluation and planning for discharge  - Provide patient education as appropriate  Outcome: Progressing  Goal: Maintain or return mobility status to optimal level  Description  INTERVENTIONS:  - Assess patient's baseline mobility status (ambulation, transfers, stairs, etc )    - Identify cognitive and physical deficits and behaviors that affect mobility  - Identify mobility aids required to assist with transfers and/or ambulation (gait belt, sit-to-stand, lift, walker, cane, etc )  - [place] fall precautions as indicated by assessment  - Record patient progress and toleration of activity level on Mobility SBAR; progress patient to next Phase/Stage  - Instruct patient to call for assistance with activity based on assessment  - Consider rehabilitation consult to assist with strengthening/weightbearing, etc   Outcome: Progressing     Problem: Nutrition/Hydration-ADULT  Goal: Nutrient/Hydration intake appropriate for improving, restoring or maintaining nutritional needs  Description  Monitor and assess patient's nutrition/hydration status for malnutrition  Collaborate with interdisciplinary team and initiate plan and interventions as ordered  Monitor patient's weight and dietary intake as ordered or per policy  Utilize nutrition screening tool and intervene as necessary  Determine patient's food preferences and provide high-protein, high-caloric foods as appropriate       INTERVENTIONS:  - Monitor oral intake, urinary output, labs, and treatment plans  - Assess nutrition and hydration status and recommend course of action  - Evaluate amount of meals eaten  - Assist patient with eating if necessary   - Allow adequate time for meals  - Recommend/ encourage appropriate diets, oral nutritional supplements, and vitamin/mineral supplements  - Order, calculate, and assess calorie counts as needed  - Recommend, monitor, and adjust tube feedings and TPN/PPN based on assessed needs  - Assess need for intravenous fluids  - Provide specific nutrition/hydration education as appropriate  - Include patient/family/caregiver in decisions related to nutrition  Outcome: Progressing

## 2019-12-13 NOTE — ED NOTES
arrived and does not have the remote for the stimulator  RN and Dr Jesenia Pepper made aware        Ryan Cheadle  12/13/19 3440

## 2019-12-14 ENCOUNTER — APPOINTMENT (INPATIENT)
Dept: RADIOLOGY | Facility: HOSPITAL | Age: 74
DRG: 481 | End: 2019-12-14
Payer: MEDICARE

## 2019-12-14 ENCOUNTER — ANESTHESIA (INPATIENT)
Dept: PERIOP | Facility: HOSPITAL | Age: 74
DRG: 481 | End: 2019-12-14
Payer: MEDICARE

## 2019-12-14 LAB
ANION GAP SERPL CALCULATED.3IONS-SCNC: 8 MMOL/L (ref 4–13)
ATRIAL RATE: 72 BPM
ATRIAL RATE: 72 BPM
BASOPHILS # BLD AUTO: 0.03 THOUSANDS/ΜL (ref 0–0.1)
BASOPHILS NFR BLD AUTO: 0 % (ref 0–1)
BUN SERPL-MCNC: 19 MG/DL (ref 5–25)
CALCIUM SERPL-MCNC: 8.8 MG/DL (ref 8.3–10.1)
CHLORIDE SERPL-SCNC: 105 MMOL/L (ref 100–108)
CO2 SERPL-SCNC: 27 MMOL/L (ref 21–32)
CREAT SERPL-MCNC: 0.79 MG/DL (ref 0.6–1.3)
EOSINOPHIL # BLD AUTO: 0.04 THOUSAND/ΜL (ref 0–0.61)
EOSINOPHIL NFR BLD AUTO: 0 % (ref 0–6)
ERYTHROCYTE [DISTWIDTH] IN BLOOD BY AUTOMATED COUNT: 12.9 % (ref 11.6–15.1)
GFR SERPL CREATININE-BSD FRML MDRD: 73 ML/MIN/1.73SQ M
GLUCOSE SERPL-MCNC: 116 MG/DL (ref 65–140)
GLUCOSE SERPL-MCNC: 129 MG/DL (ref 65–140)
GLUCOSE SERPL-MCNC: 131 MG/DL (ref 65–140)
GLUCOSE SERPL-MCNC: 134 MG/DL (ref 65–140)
GLUCOSE SERPL-MCNC: 149 MG/DL (ref 65–140)
HCT VFR BLD AUTO: 41.6 % (ref 34.8–46.1)
HGB BLD-MCNC: 13 G/DL (ref 11.5–15.4)
IMM GRANULOCYTES # BLD AUTO: 0.07 THOUSAND/UL (ref 0–0.2)
IMM GRANULOCYTES NFR BLD AUTO: 1 % (ref 0–2)
LYMPHOCYTES # BLD AUTO: 2.07 THOUSANDS/ΜL (ref 0.6–4.47)
LYMPHOCYTES NFR BLD AUTO: 15 % (ref 14–44)
MAGNESIUM SERPL-MCNC: 2 MG/DL (ref 1.6–2.6)
MCH RBC QN AUTO: 31 PG (ref 26.8–34.3)
MCHC RBC AUTO-ENTMCNC: 31.3 G/DL (ref 31.4–37.4)
MCV RBC AUTO: 99 FL (ref 82–98)
MONOCYTES # BLD AUTO: 1.11 THOUSAND/ΜL (ref 0.17–1.22)
MONOCYTES NFR BLD AUTO: 8 % (ref 4–12)
NEUTROPHILS # BLD AUTO: 10.38 THOUSANDS/ΜL (ref 1.85–7.62)
NEUTS SEG NFR BLD AUTO: 76 % (ref 43–75)
NRBC BLD AUTO-RTO: 0 /100 WBCS
P AXIS: 27 DEGREES
P AXIS: 49 DEGREES
PHOSPHATE SERPL-MCNC: 3.5 MG/DL (ref 2.3–4.1)
PLATELET # BLD AUTO: 181 THOUSANDS/UL (ref 149–390)
PMV BLD AUTO: 11.3 FL (ref 8.9–12.7)
POTASSIUM SERPL-SCNC: 3.8 MMOL/L (ref 3.5–5.3)
PR INTERVAL: 178 MS
PR INTERVAL: 180 MS
QRS AXIS: -25 DEGREES
QRS AXIS: -27 DEGREES
QRSD INTERVAL: 78 MS
QRSD INTERVAL: 80 MS
QT INTERVAL: 378 MS
QT INTERVAL: 410 MS
QTC INTERVAL: 413 MS
QTC INTERVAL: 448 MS
RBC # BLD AUTO: 4.19 MILLION/UL (ref 3.81–5.12)
SODIUM SERPL-SCNC: 140 MMOL/L (ref 136–145)
T WAVE AXIS: 23 DEGREES
T WAVE AXIS: 47 DEGREES
VENTRICULAR RATE: 72 BPM
VENTRICULAR RATE: 72 BPM
WBC # BLD AUTO: 13.7 THOUSAND/UL (ref 4.31–10.16)

## 2019-12-14 PROCEDURE — 93010 ELECTROCARDIOGRAM REPORT: CPT | Performed by: INTERNAL MEDICINE

## 2019-12-14 PROCEDURE — C1713 ANCHOR/SCREW BN/BN,TIS/BN: HCPCS | Performed by: ORTHOPAEDIC SURGERY

## 2019-12-14 PROCEDURE — 27245 TREAT THIGH FRACTURE: CPT | Performed by: PHYSICIAN ASSISTANT

## 2019-12-14 PROCEDURE — 27245 TREAT THIGH FRACTURE: CPT | Performed by: ORTHOPAEDIC SURGERY

## 2019-12-14 PROCEDURE — 84100 ASSAY OF PHOSPHORUS: CPT | Performed by: FAMILY MEDICINE

## 2019-12-14 PROCEDURE — 0QS604Z REPOSITION RIGHT UPPER FEMUR WITH INTERNAL FIXATION DEVICE, OPEN APPROACH: ICD-10-PCS | Performed by: ORTHOPAEDIC SURGERY

## 2019-12-14 PROCEDURE — 73502 X-RAY EXAM HIP UNI 2-3 VIEWS: CPT

## 2019-12-14 PROCEDURE — 99232 SBSQ HOSP IP/OBS MODERATE 35: CPT | Performed by: INTERNAL MEDICINE

## 2019-12-14 PROCEDURE — 99024 POSTOP FOLLOW-UP VISIT: CPT | Performed by: ORTHOPAEDIC SURGERY

## 2019-12-14 PROCEDURE — 80048 BASIC METABOLIC PNL TOTAL CA: CPT | Performed by: FAMILY MEDICINE

## 2019-12-14 PROCEDURE — 82948 REAGENT STRIP/BLOOD GLUCOSE: CPT

## 2019-12-14 PROCEDURE — C1769 GUIDE WIRE: HCPCS | Performed by: ORTHOPAEDIC SURGERY

## 2019-12-14 PROCEDURE — 83735 ASSAY OF MAGNESIUM: CPT | Performed by: FAMILY MEDICINE

## 2019-12-14 PROCEDURE — 85025 COMPLETE CBC W/AUTO DIFF WBC: CPT | Performed by: FAMILY MEDICINE

## 2019-12-14 PROCEDURE — 73502 X-RAY EXAM HIP UNI 2-3 VIEWS: CPT | Performed by: ORTHOPAEDIC SURGERY

## 2019-12-14 PROCEDURE — 0QS606Z REPOSITION RIGHT UPPER FEMUR WITH INTRAMEDULLARY INTERNAL FIXATION DEVICE, OPEN APPROACH: ICD-10-PCS | Performed by: ORTHOPAEDIC SURGERY

## 2019-12-14 PROCEDURE — 99223 1ST HOSP IP/OBS HIGH 75: CPT | Performed by: ORTHOPAEDIC SURGERY

## 2019-12-14 DEVICE — 5.0MM TI LOCKING SCREW 38MM- FOR IM NAILS-STERILE: Type: IMPLANTABLE DEVICE | Site: HIP | Status: FUNCTIONAL

## 2019-12-14 DEVICE — 10MM/130 DEG TI CANN TROCH FIXATION NAIL 170MM-STERILE: Type: IMPLANTABLE DEVICE | Site: HIP | Status: FUNCTIONAL

## 2019-12-14 DEVICE — 11.0MM TI HELICAL BLADE 100MM-STERILE: Type: IMPLANTABLE DEVICE | Site: HIP | Status: FUNCTIONAL

## 2019-12-14 RX ORDER — OXYCODONE HYDROCHLORIDE 5 MG/1
2.5 TABLET ORAL EVERY 4 HOURS PRN
Status: DISCONTINUED | OUTPATIENT
Start: 2019-12-14 | End: 2019-12-15

## 2019-12-14 RX ORDER — SODIUM CHLORIDE, SODIUM LACTATE, POTASSIUM CHLORIDE, CALCIUM CHLORIDE 600; 310; 30; 20 MG/100ML; MG/100ML; MG/100ML; MG/100ML
75 INJECTION, SOLUTION INTRAVENOUS CONTINUOUS
Status: DISCONTINUED | OUTPATIENT
Start: 2019-12-14 | End: 2019-12-15

## 2019-12-14 RX ORDER — CEFAZOLIN SODIUM 2 G/50ML
2000 SOLUTION INTRAVENOUS ONCE
Status: DISCONTINUED | OUTPATIENT
Start: 2019-12-14 | End: 2019-12-14 | Stop reason: SDUPTHER

## 2019-12-14 RX ORDER — PROPOFOL 10 MG/ML
INJECTION, EMULSION INTRAVENOUS AS NEEDED
Status: DISCONTINUED | OUTPATIENT
Start: 2019-12-14 | End: 2019-12-14 | Stop reason: SURG

## 2019-12-14 RX ORDER — OXYCODONE HYDROCHLORIDE 5 MG/1
5 TABLET ORAL EVERY 4 HOURS PRN
Status: DISCONTINUED | OUTPATIENT
Start: 2019-12-14 | End: 2019-12-18 | Stop reason: HOSPADM

## 2019-12-14 RX ORDER — ALBUTEROL SULFATE 90 UG/1
AEROSOL, METERED RESPIRATORY (INHALATION) AS NEEDED
Status: DISCONTINUED | OUTPATIENT
Start: 2019-12-14 | End: 2019-12-14 | Stop reason: SURG

## 2019-12-14 RX ORDER — HYDROMORPHONE HCL/PF 1 MG/ML
0.5 SYRINGE (ML) INJECTION
Status: DISCONTINUED | OUTPATIENT
Start: 2019-12-14 | End: 2019-12-14 | Stop reason: HOSPADM

## 2019-12-14 RX ORDER — NEOSTIGMINE METHYLSULFATE 1 MG/ML
INJECTION INTRAVENOUS AS NEEDED
Status: DISCONTINUED | OUTPATIENT
Start: 2019-12-14 | End: 2019-12-14 | Stop reason: SURG

## 2019-12-14 RX ORDER — SODIUM CHLORIDE, SODIUM LACTATE, POTASSIUM CHLORIDE, CALCIUM CHLORIDE 600; 310; 30; 20 MG/100ML; MG/100ML; MG/100ML; MG/100ML
125 INJECTION, SOLUTION INTRAVENOUS CONTINUOUS
Status: CANCELLED | OUTPATIENT
Start: 2019-12-14

## 2019-12-14 RX ORDER — ONDANSETRON 2 MG/ML
INJECTION INTRAMUSCULAR; INTRAVENOUS AS NEEDED
Status: DISCONTINUED | OUTPATIENT
Start: 2019-12-14 | End: 2019-12-14 | Stop reason: SURG

## 2019-12-14 RX ORDER — HYDROMORPHONE HCL/PF 1 MG/ML
0.2 SYRINGE (ML) INJECTION EVERY 2 HOUR PRN
Status: DISCONTINUED | OUTPATIENT
Start: 2019-12-14 | End: 2019-12-15

## 2019-12-14 RX ORDER — FENTANYL CITRATE/PF 50 MCG/ML
25 SYRINGE (ML) INJECTION
Status: DISCONTINUED | OUTPATIENT
Start: 2019-12-14 | End: 2019-12-14 | Stop reason: HOSPADM

## 2019-12-14 RX ORDER — EPHEDRINE SULFATE 50 MG/ML
INJECTION INTRAVENOUS AS NEEDED
Status: DISCONTINUED | OUTPATIENT
Start: 2019-12-14 | End: 2019-12-14 | Stop reason: SURG

## 2019-12-14 RX ORDER — MAGNESIUM HYDROXIDE 1200 MG/15ML
LIQUID ORAL AS NEEDED
Status: DISCONTINUED | OUTPATIENT
Start: 2019-12-14 | End: 2019-12-14 | Stop reason: HOSPADM

## 2019-12-14 RX ORDER — CEFAZOLIN SODIUM 1 G/50ML
1000 SOLUTION INTRAVENOUS EVERY 8 HOURS
Status: COMPLETED | OUTPATIENT
Start: 2019-12-14 | End: 2019-12-15

## 2019-12-14 RX ORDER — ROCURONIUM BROMIDE 10 MG/ML
INJECTION, SOLUTION INTRAVENOUS AS NEEDED
Status: DISCONTINUED | OUTPATIENT
Start: 2019-12-14 | End: 2019-12-14 | Stop reason: SURG

## 2019-12-14 RX ORDER — GLYCOPYRROLATE 0.2 MG/ML
INJECTION INTRAMUSCULAR; INTRAVENOUS AS NEEDED
Status: DISCONTINUED | OUTPATIENT
Start: 2019-12-14 | End: 2019-12-14 | Stop reason: SURG

## 2019-12-14 RX ORDER — SODIUM CHLORIDE 9 MG/ML
INJECTION, SOLUTION INTRAVENOUS CONTINUOUS PRN
Status: DISCONTINUED | OUTPATIENT
Start: 2019-12-14 | End: 2019-12-14 | Stop reason: SURG

## 2019-12-14 RX ORDER — CEFAZOLIN SODIUM 2 G/50ML
SOLUTION INTRAVENOUS AS NEEDED
Status: DISCONTINUED | OUTPATIENT
Start: 2019-12-14 | End: 2019-12-14 | Stop reason: SURG

## 2019-12-14 RX ORDER — FENTANYL CITRATE 50 UG/ML
INJECTION, SOLUTION INTRAMUSCULAR; INTRAVENOUS AS NEEDED
Status: DISCONTINUED | OUTPATIENT
Start: 2019-12-14 | End: 2019-12-14 | Stop reason: SURG

## 2019-12-14 RX ORDER — ONDANSETRON 2 MG/ML
4 INJECTION INTRAMUSCULAR; INTRAVENOUS ONCE AS NEEDED
Status: DISCONTINUED | OUTPATIENT
Start: 2019-12-14 | End: 2019-12-14 | Stop reason: HOSPADM

## 2019-12-14 RX ADMIN — CEFAZOLIN SODIUM 2000 MG: 2 SOLUTION INTRAVENOUS at 13:31

## 2019-12-14 RX ADMIN — ALBUTEROL SULFATE 2 PUFF: 90 AEROSOL, METERED RESPIRATORY (INHALATION) at 13:59

## 2019-12-14 RX ADMIN — GLYCOPYRROLATE 0.4 MG: 0.2 INJECTION INTRAMUSCULAR; INTRAVENOUS at 15:01

## 2019-12-14 RX ADMIN — ATENOLOL 50 MG: 50 TABLET ORAL at 09:38

## 2019-12-14 RX ADMIN — ALBUTEROL SULFATE 2 PUFF: 90 AEROSOL, METERED RESPIRATORY (INHALATION) at 13:56

## 2019-12-14 RX ADMIN — CEFAZOLIN SODIUM 1000 MG: 1 SOLUTION INTRAVENOUS at 20:41

## 2019-12-14 RX ADMIN — SODIUM CHLORIDE: 0.9 INJECTION, SOLUTION INTRAVENOUS at 14:40

## 2019-12-14 RX ADMIN — EPHEDRINE SULFATE 10 MG: 50 INJECTION, SOLUTION INTRAVENOUS at 14:47

## 2019-12-14 RX ADMIN — ROCURONIUM BROMIDE 40 MG: 50 INJECTION, SOLUTION INTRAVENOUS at 13:43

## 2019-12-14 RX ADMIN — PROPOFOL 130 MG: 10 INJECTION, EMULSION INTRAVENOUS at 13:43

## 2019-12-14 RX ADMIN — FENTANYL CITRATE 100 MCG: 50 INJECTION, SOLUTION INTRAMUSCULAR; INTRAVENOUS at 13:43

## 2019-12-14 RX ADMIN — SODIUM CHLORIDE, SODIUM LACTATE, POTASSIUM CHLORIDE, AND CALCIUM CHLORIDE 75 ML/HR: .6; .31; .03; .02 INJECTION, SOLUTION INTRAVENOUS at 16:19

## 2019-12-14 RX ADMIN — ONDANSETRON 4 MG: 2 INJECTION INTRAMUSCULAR; INTRAVENOUS at 14:46

## 2019-12-14 RX ADMIN — EPHEDRINE SULFATE 10 MG: 50 INJECTION, SOLUTION INTRAVENOUS at 14:04

## 2019-12-14 RX ADMIN — OXYCODONE HYDROCHLORIDE 5 MG: 5 TABLET ORAL at 18:58

## 2019-12-14 RX ADMIN — FENTANYL CITRATE 50 MCG: 50 INJECTION, SOLUTION INTRAMUSCULAR; INTRAVENOUS at 14:33

## 2019-12-14 RX ADMIN — LIDOCAINE HYDROCHLORIDE 100 MG: 20 INJECTION, SOLUTION INTRAVENOUS at 13:43

## 2019-12-14 RX ADMIN — SODIUM CHLORIDE: 0.9 INJECTION, SOLUTION INTRAVENOUS at 13:31

## 2019-12-14 RX ADMIN — NEOSTIGMINE METHYLSULFATE 3 MG: 1 INJECTION, SOLUTION INTRAVENOUS at 15:01

## 2019-12-14 RX ADMIN — MORPHINE SULFATE 2 MG: 2 INJECTION, SOLUTION INTRAMUSCULAR; INTRAVENOUS at 05:17

## 2019-12-14 RX ADMIN — CEFAZOLIN SODIUM 2000 MG: 2 SOLUTION INTRAVENOUS at 13:29

## 2019-12-14 NOTE — OR NURSING
Patient was brought to the surgical services holding area for scheduled orthopedic surgery  During this time, the operating room staff was notified of an emergent surgical case in the emergency department  Due to predicted delay of patient's surgery, secondary to this emergency, the decision was made to return patient to her inpatient room  The situation was explained to the patient by Dr Nahum Cisneros and any patient concerns were addressed  Patient will return to operating room later today for scheduled surgery

## 2019-12-14 NOTE — ANESTHESIA PREPROCEDURE EVALUATION
Review of Systems/Medical History          Cardiovascular  Hyperlipidemia, Hypertension ,    Pulmonary       GI/Hepatic            Endo/Other  Diabetes ,      GYN       Hematology   Musculoskeletal    Comment: Parkinson's disease with deep brain stimulator   will turn off prior to surgery      Neurology    Neuromuscular disease ,    Psychology   Anxiety,              Physical Exam    Airway    Mallampati score: III  TM Distance: >3 FB  Neck ROM: limited     Dental   Comment: Upper teeth permanent implant,     Cardiovascular  Rhythm: regular, Rate: normal, Cardiovascular exam normal    Pulmonary  Pulmonary exam normal Breath sounds clear to auscultation,     Other Findings        Anesthesia Plan  ASA Score- 2     Anesthesia Type- general with ASA Monitors  Additional Monitors:   Airway Plan: ETT  Plan Factors-    Induction- intravenous  Postoperative Plan- Plan for postoperative opioid use  Planned trial extubation    Informed Consent- Anesthetic plan and risks discussed with patient

## 2019-12-14 NOTE — PROGRESS NOTES
Pt seen and examined post on in pacu  Resting comfortably  No complaints and pain well controlled      /69   Pulse 78   Temp 97 9 °F (36 6 °C) (Tympanic)   Resp 20   Ht 5' 7" (1 702 m)   Wt 110 kg (242 lb 4 6 oz)   SpO2 95%   BMI 37 95 kg/m²     gen - resting comfortably, nad    rle -    drsg c/d/i  [place] l2-s1  5/5 ta/g/ehl  No calf tto  + 2 dp/pt    A/p  -  Pod 0 s/p R hip CMN    -wbat, pt/ot  -post op xray ordered   -abx x 24 hrs post op  -lovenox, scds  -pain control  -will see in am and f/u in 2 weeks post op

## 2019-12-14 NOTE — CONSULTS
Orthopedics   Jazmin Given 75 y o  female MRN: [id number]  Unit/Bed#: E2 -01      Chief Complaint:   right hip pain    HPI:   75 y o  female  status post mechanical fall at home complaining of right hip pain and inability to bear weight  Pain is well localized to the hip and is made worse with motion or contact to the area  Pain is moderate to severe in intensity, relatively constant  Denies any radiation of the pain  Pain is made better with rest   Denies new or changed numbness or tingling  Denies any injuries to this hip in the past   She denies any pain in the lower right leg or any other extremity     Review Of Systems:   · Skin: Normal  · Neuro: See HPI  · Musculoskeletal: See HPI  · 14 point review of systems negative except as stated above     Past Medical History:   Past Medical History:   Diagnosis Date    Anxiety     [facility] mellitus [facility] )     Diabetes mellitus type 2, diet-controlled ([facility] )     Hyperlipidemia     Hypertension     Parkinson disease [facility] )        Past Surgical History:   Past Surgical History:   Procedure Laterality Date    ACHILLES TENDON SURGERY      DEEP BRAIN STIMULATOR PLACEMENT      DENTAL SURGERY      NE IMP STIM,CRANIAL,SUBQ,1 ARRAY Right 12/18/2018    Procedure: REPLACEMENT IMPLANTABLE PULSE GENERATOR (IPG) DEEP BRAIN STIMULATION (DBS);   Surgeon: Cynthia Yanez MD;  Location:  MAIN OR;  Service: Neurosurgery    REPLACEMENT TOTAL KNEE      REVERSE TOTAL SHOULDER ARTHROPLASTY Left 8/23/2018    Procedure: ARTHROPLASTY SHOULDER REVERSE;  Surgeon: Maura Bales MD;  Location: [facility];  Service: Orthopedics    TONSILLECTOMY         Family History:  Family history reviewed and non-contributory  Family History   Problem Relation Age of Onset    Arthritis Mother     No Known Problems Father     No Known Problems Maternal Grandmother     No Known Problems Maternal Grandfather     No Known Problems Paternal Grandmother     No Known Problems Paternal Grandfather     No Known Problems Son     No Known Problems Son        Social History:  Social History     Socioeconomic History    Marital status: /Civil Union     Spouse name: None    Number of children: None    Years of education: None    Highest education level: None   Occupational History    None   Social Needs    Financial resource strain: None    Food insecurity:     Worry: None     Inability: None    Transportation needs:     Medical: None     Non-medical: None   Tobacco Use    Smoking status: Never Smoker    Smokeless tobacco: Never Used   Substance and Sexual Activity    Alcohol use: Yes     Comment: occasional    Drug use: No    Sexual activity: None   Lifestyle    Physical activity:     Days per week: None     Minutes per session: None    Stress: None   Relationships    Social connections:     Talks on phone: None     Gets together: None     Attends Gnosticism service: None     Active member of club or organization: None     Attends meetings of clubs or organizations: None     Relationship status: None    Intimate partner violence:     Fear of current or ex partner: None     Emotionally abused: None     Physically abused: None     Forced sexual activity: None   Other Topics Concern    None   Social History Narrative    None       Allergies:    Allergies   Allergen Reactions    Sulfa Antibiotics Hives           Labs:  0   Lab Value Date/Time    HCT 41 6 12/14/2019 0425    HCT 43 1 12/13/2019 1131    HCT 34 4 (L) 09/19/2018 0458    HCT 39 1 11/06/2014 0557    HCT 42 8 10/22/2014 1030    HGB 13 0 12/14/2019 0425    HGB 14 0 12/13/2019 1131    HGB 11 6 (L) 09/19/2018 0458    HGB 13 0 11/06/2014 0557    HGB 14 0 10/22/2014 1030    INR 0 99 12/13/2019 1131    INR 1 07 11/06/2014 0557    WBC 13 70 (H) 12/14/2019 0425    WBC 12 09 (H) 12/13/2019 1131    WBC 8 50 09/19/2018 0458    WBC 11 45 (H) 11/06/2014 0557    WBC 8 42 10/22/2014 1030       Meds:    Current Facility-Administered Medications:     aspirin (ECOTRIN LOW STRENGTH) EC tablet 81 mg, 81 mg, Oral, Daily, Kiana Ball MD    atenolol (TENORMIN) tablet 50 mg, 50 mg, Oral, Daily, Kiana Ball MD    ceFAZolin (ANCEF) IVPB (premix) 2,000 mg, 2,000 mg, Intravenous, Once, Sonny Brown PA-C    co-enzyme Q-10 capsule 400 mg, 400 mg, Oral, Daily, Kiana Ball MD    fish oil capsule 1,000 mg, 1,000 mg, Oral, Daily, Kiana Ball MD    heparin (porcine) subcutaneous injection 5,000 Units, 5,000 Units, Subcutaneous, Q8H [address], Stopped at 12/14/19 0516 **AND** [COMPLETED] Platelet count, , , Once, Kiana Ball MD    insulin lispro (HumaLOG) 100 units/mL subcutaneous injection 1-6 Units, 1-6 Units, Subcutaneous, TID AC **AND** Fingerstick Glucose (POCT), , , TID AC, Kiana Ball MD    morphine injection 2 mg, 2 mg, Intravenous, Q4H PRN, GILSON Cuellar-C, 2 mg at 12/14/19 9447    oxyCODONE (ROXICODONE) immediate release tablet 10 mg, 10 mg, Oral, Q4H PRN, GILSON Cuellar-C, 10 mg at 12/13/19 2054    oxyCODONE (ROXICODONE) IR tablet 5 mg, 5 mg, Oral, Q4H PRN, Sonny Brown PA-C    PARoxetine (PAXIL) tablet 20 mg, 20 mg, Oral, Daily, Kiana Ball MD    pravastatin (PRAVACHOL) tablet 40 mg, 40 mg, Oral, Every Other Day, Kiana Ball MD, 40 mg at 12/13/19 2053    selegiline (ELDEPRYL) tablet 5 mg, 5 mg, Oral, Daily With Breakfast, Kiana Ball MD    Blood Culture:   No results found for: BLOODCX    Wound Culture:   No results found for: WOUNDCULT    Ins and Outs:  I/O last 24 hours: In: -   Out: 204 [Urine:204]          Physical Exam:   /69   Pulse 78   Temp 97 9 °F (36 6 °C) (Tympanic)   Resp 20   Ht 5' 7" (1 702 m)   Wt 110 kg (242 lb 4 6 oz)   SpO2 95%   BMI 37 95 kg/m²   Gen: Alert and oriented to person, place, time  HEENT: EOMI, eyes clear, moist mucus membranes, hearing intact  Respiratory: Bilateral chest rise   No audible wheezing found  Cardiovascular: Regular Rate and Rhythm  Abdomen: soft nontender/nondistended  Musculoskeletal: right lower extremity  · Skin intact  · limb shortened and externally rotated  · Tender to palpation over lateral hip  · Pain with passive supine rotation   · Sensation intact to light touch L3-S1  · Motor Intact ankle dorsi/plantar flexion, EHL/FHL  · DP pulse intact  · No tenderness to palpation pain with range of motion of crepitance to distal femur knee tib-fib foot ankle    Manual scan negative for tenderness to palpation pain with range of motion or crepitance to left lower extremity and bilateral upper extremities    Radiology:   I personally reviewed the films  X-rays right hip shows Intertrochanteric femur fracture with varus deformity    _*_*_*_*_*_*_*_*_*_*_*_*_*_*_*_*_*_*_*_*_*_*_*_*_*_*_*_*_*_*_*_*_*_*_*_*_*_*_*_*_*    Assessment:  75 y  o female status post mechanical fall with rightIntertrochanteric femur fracture    Plan:   · Non weight bearing right lower extremity  · Analgesics per primary  · NPO   · Medicine management per SLIM  · Medically cleared  · Preop labs done  · Ancef 2 g IV ordered  · To OR for IM nail femur fracture of Intertrochanteric femur fracture  · Dispo: Ortho will follow    Thiago Greene PA-C

## 2019-12-14 NOTE — PROGRESS NOTES
Progress Note - Daniel Wynne 75 y o  female MRN: [id number]    Unit/Bed#: E2 -01 Encounter: [id number]    Assessment/Plan:    Right hip fracture   continue pain control with IV narcotics, planned surgical repair today by Orthopedic surgery    Diabetes    a m  Glucose 129 good control with NPO status continue sliding scale    Hypertension    control with beta-blocker    Parkinson's    patient uses electrical brain stimulator with benefit    Dyslipidemia    continue statin for LDL control    Subjective:   Continue discomfort of right hip, denies chest pain shortness of breath nausea vomiting diarrhea no fevers chills NPO status for surgery    Objective:     Vitals: Blood pressure 111/69, pulse 78, temperature 97 9 °F (36 6 °C), temperature source Tympanic, resp  rate 20, height 5' 7" (1 702 m), weight 110 kg (242 lb 4 6 oz), SpO2 95 %  ,Body mass index is 37 95 kg/m²  Results from last 7 days   Lab Units 12/14/19  0425  12/13/19  1131   WBC Thousand/uL 13 70*  --  12 09*   HEMOGLOBIN g/dL 13 0  --  14 0   HEMATOCRIT % 41 6  --  43 1   PLATELETS Thousands/uL 181   < > 209   INR   --   --  0 99    < > = values in this interval not displayed       Results from last 7 days   Lab Units 12/14/19  0425   POTASSIUM mmol/L 3 8   CHLORIDE mmol/L 105   CO2 mmol/L 27   BUN mg/dL 19   CREATININE mg/dL 0 79   CALCIUM mg/dL 8 8       Scheduled Meds:    Current Facility-Administered Medications:  aspirin 81 mg Oral Daily Dipesh Benavides MD   atenolol 50 mg Oral Daily Dipesh Benavides MD   cefazolin 2,000 mg Intravenous Once Ryan Barnes PA-C   co-enzyme Q-10 400 mg Oral Daily Dipesh Benavides MD   fish oil 1,000 mg Oral Daily Dipesh Benavides MD   heparin (porcine) 5,000 Units Subcutaneous Q8H [facility] Dipesh Benavides MD   insulin lispro 1-6 Units Subcutaneous TID AC Dipesh Benavides MD   morphine injection 2 mg Intravenous Q4H PRN Ryan Barnes PA-C   oxyCODONE 10 mg Oral Q4H PRN Lollie Knights, PA-C   oxyCODONE 5 mg Oral Q4H LISET Poon PA-C   PARoxetine 20 mg Oral Daily Karthik Oshea MD   pravastatin 40 mg Oral Every Other Day Karthik Oshea MD   selegiline 5 mg Oral Daily With Breakfast Karthik Oshea MD       Continuous Infusions:     Physical exam:  General appearance:  Alert no distress interaction appropriate   Head/Eyes:  Nonicteric PERRL EOMI  Neck:  Supple  Lungs: CTA bilateral no wheezing rhonchi or rales  Heart: normal S1 S2 regular  Abdomen: Soft nontender with bowel sounds  Extremities: no edema  Skin: no rash    Invasive Devices     Peripheral Intravenous Line            Peripheral IV 12/13/19 Left Forearm less than 1 day                  VTE Pharmacologic Prophylaxis:  Heparin    Counseling / Coordination of Care  Total floor / unit time spent today 30  minutes  Greater than 50% of total time was spent with the patient and / or family counseling and / or coordination of care    A description of the counseling / coordination of care:

## 2019-12-14 NOTE — OP NOTE
OPERATIVE REPORT  PATIENT NAME: Kamaljit Valle    :    MRN: [id number]  Pt Location: [facility] OR ROOM 02    SURGERY DATE: 2019    Surgeon(s) and Role:     Juan M Romero,  - Primary  Assitant: Evelin Olvera PA-C    Preop Diagnosis:  Closed fracture of right hip, initial encounter ([facility] ) [S72 001A]    Post-Op Diagnosis Codes:     * Closed fracture of right hip, initial encounter ([place] [address] ) [S72 001A]    Procedure(s) (LRB):  INSERTION NAIL IM FEMUR ANTEGRADE (TROCHANTERIC) (Right)   Right hip cephalomedullary IM nail with synthes TFN    Specimen(s):  * No specimens in log *    Estimated Blood Loss:   Minimal    Drains:  * No LDAs found *    Anesthesia Type:   Choice    Operative Indications:  Closed fracture of right hip, initial encounter ([place] [address] ) [S72 001A]  Right hip intertrochanteric fracture      Complications:   None    Procedure and Technique:    Estimated Blood Loss:  100 cc              Drains:  none           Implants:  Synthes 10 mm ° with 345 mm helical blade and  38 mm distal interlocking screw     Procedure Details      The patient was seen in the Holding Room  The risks, benefits, complications, treatment options, and expected outcomes were discussed with the patient  The risks and potential complications of their problem and purposed treatment including but not limited to failure of fracture healing or hardware, infection, neurovascular injury, anesthetic complications  The site of surgery properly noted/marked  The patient was taken to Operating Room and identified as Saintclair Allen and the procedure verified as      Right hip cephalomedullary nail with Synthes TFN  A Time Out was held and the above information confirmed      After induction of anesthesia, administration of IV  antibiotics, surgical pause was conducted   The patient was placed in a supine position on the fracture table with all bony prominences well padded including the peroneal post   Care was taken to pad the well leg in a well leg alvarez  After the patient was set up appropriate AP and lateral x-rays were obtained with traction and slight internal rotation of the extremity to confirm appropriate anatomic reduction of the intertrochanteric hip fracture  This was confirmed to be appropriately aligned in anatomic in its closed reduction on the fracture table prior to the procedure being performed        The Right leg and thigh were prepped and draped in the usual sterile fashion      After the patient was set up appropriate AP and lateral x-rays were obtained with traction and slight internal rotation of the extremity to confirm appropriate anatomic reduction of the intertrochanteric hip fracture  This was confirmed to be appropriately aligned in anatomic in its closed reduction on the fracture table prior to the procedure being performed      A #10 blade scalpel used to dissect a 2 in incision proximal and in line with the femur to the greater trochanteric tip  The skin and subcutaneous fat were dissected sharply with meticulous hemostasis obtained with electrocautery  The IT band was split in line with its fibers sharply in line with the skin incision  The abductors were split bluntly with finger dissection to expose the tip of the greater trochanter  The guide pin was placed under live x-ray at the proximal and just medial tip of the greater trochanter and centered on both AP and lateral x-rays  This guide pin was then advanced down the center of the femoral canal and confirmed on AP and lateral x-rays to be in the appropriate placement  The since these femoral opening Reamer was then used under x-ray guidance to open the proximal femoral canal   A 10 mm Synthes TFN was then placed on the guide down the proximal femur    This was confirmed on AP and lateral x-rays      The trocar system was inserted and a skin incision was made 1/2 inches in line with the appropriate placement of the trocar for the helical blade   The skin incision was dissected down through the skin and subcutaneous tissue with sharp dissection using a 10 blade and meticulous hemostasis obtained during the dissection with electrocautery  The lateral IT band and vastus lateralis was split sharply and then bluntly along the lateral femur and the triple trocar guide was then advanced failed to the lateral femoral cortex      X-rays confirmed appropriate placement of the guide on the lateral femur and proximal distal in the femoral neck  The guide pin was then placed up the center of the femoral head on the AP and lateral planes and confirmed under x-ray to be in the appropriate placement  The lateral opening Reamer was then used to open the lateral femoral cortex and a final measurement of the screw length was then obtained and it measured 105 mm  A Synthes drill was set to 105 mm and the femoral head was reamed with a drill and confirmed under multiple planes of x-ray to show appropriate length for the helical blade  A helical blade was then advanced over a guidewire into the femoral head and confirmed in AP and lateral x-rays to be appropriately placed at the center center position in the femoral head  The proximal screwdriver was then used to lock down the screw on the helical blade proximally and confirmed under x-ray to show advancement of the interference screw      Attention was then turned to the distal interlocking screw  The trocar was inserted through the guide and the skin incision previously made for the helical blade was extended 1 extra cm distally to accommodate the interlocking screws through the same incision  The skin and subcutaneous tissue was dissected along with the lateral fascia  The guide was placed on the bony confirmed on x-ray to be in appropriate placement along the femoral cortex  A drill was used to drill both the lateral and medial femoral cortex    A Synthes depth gauge confirmed a 38 mm screw which was placed under x-ray fluoroscopic guidance appropriately  At this time the proximal screwdriver was used to disengage the guide from the TFN intramedullary nail  Final AP and lateral x-rays showed anatomic alignment of the fracture as well as appropriate placement of the Synthes short cephalomedullary nail with interlocking screw distally            Once this was done,   I irrigated and then closed the deep fascial layers in both incisions with interrupted 0 Vicryl stitches  I then through 0 Vicryl subcutaneous buried fat stitches to close down the dead space of the fat layer  2-0 Vicryl was then used to close the subcutaneous tissue with interrupted buried subcutaneous stitches  The skin was then closed with staples      A Mepilex soft dressing, was applied  The patient was taken out of the fracture table in  returned to the recovery room without incident       Instrument, sponge, and needle counts were correct prior to closure and at the conclusion of the case        I was present for the entire procedure, A qualified resident physician was not available and A physician assistant was required during the procedure for retraction tissue handling,dissection and suturing    Patient Disposition:  PACU     SIGNATURE: Rob Huynh DO  DATE: December 14, 2019  TIME: 9:45 AM

## 2019-12-15 LAB
ANION GAP SERPL CALCULATED.3IONS-SCNC: 8 MMOL/L (ref 4–13)
BUN SERPL-MCNC: 14 MG/DL (ref 5–25)
CALCIUM SERPL-MCNC: 8.4 MG/DL (ref 8.3–10.1)
CHLORIDE SERPL-SCNC: 110 MMOL/L (ref 100–108)
CO2 SERPL-SCNC: 26 MMOL/L (ref 21–32)
CREAT SERPL-MCNC: 0.9 MG/DL (ref 0.6–1.3)
ERYTHROCYTE [DISTWIDTH] IN BLOOD BY AUTOMATED COUNT: 13.1 % (ref 11.6–15.1)
GFR SERPL CREATININE-BSD FRML MDRD: 63 ML/MIN/1.73SQ M
GLUCOSE SERPL-MCNC: 125 MG/DL (ref 65–140)
GLUCOSE SERPL-MCNC: 126 MG/DL (ref 65–140)
GLUCOSE SERPL-MCNC: 134 MG/DL (ref 65–140)
GLUCOSE SERPL-MCNC: 134 MG/DL (ref 65–140)
GLUCOSE SERPL-MCNC: 136 MG/DL (ref 65–140)
HCT VFR BLD AUTO: 35.3 % (ref 34.8–46.1)
HGB BLD-MCNC: 11.3 G/DL (ref 11.5–15.4)
MCH RBC QN AUTO: 31.4 PG (ref 26.8–34.3)
MCHC RBC AUTO-ENTMCNC: 32 G/DL (ref 31.4–37.4)
MCV RBC AUTO: 98 FL (ref 82–98)
PLATELET # BLD AUTO: 157 THOUSANDS/UL (ref 149–390)
PMV BLD AUTO: 10.8 FL (ref 8.9–12.7)
POTASSIUM SERPL-SCNC: 4.2 MMOL/L (ref 3.5–5.3)
RBC # BLD AUTO: 3.6 MILLION/UL (ref 3.81–5.12)
SODIUM SERPL-SCNC: 144 MMOL/L (ref 136–145)
WBC # BLD AUTO: 12.03 THOUSAND/UL (ref 4.31–10.16)

## 2019-12-15 PROCEDURE — G8988 SELF CARE GOAL STATUS: HCPCS

## 2019-12-15 PROCEDURE — 82948 REAGENT STRIP/BLOOD GLUCOSE: CPT

## 2019-12-15 PROCEDURE — 99024 POSTOP FOLLOW-UP VISIT: CPT | Performed by: ORTHOPAEDIC SURGERY

## 2019-12-15 PROCEDURE — 97163 PT EVAL HIGH COMPLEX 45 MIN: CPT

## 2019-12-15 PROCEDURE — 99232 SBSQ HOSP IP/OBS MODERATE 35: CPT | Performed by: INTERNAL MEDICINE

## 2019-12-15 PROCEDURE — 80048 BASIC METABOLIC PNL TOTAL CA: CPT | Performed by: ORTHOPAEDIC SURGERY

## 2019-12-15 PROCEDURE — G8987 SELF CARE CURRENT STATUS: HCPCS

## 2019-12-15 PROCEDURE — G8979 MOBILITY GOAL STATUS: HCPCS

## 2019-12-15 PROCEDURE — G8978 MOBILITY CURRENT STATUS: HCPCS

## 2019-12-15 PROCEDURE — 85027 COMPLETE CBC AUTOMATED: CPT | Performed by: ORTHOPAEDIC SURGERY

## 2019-12-15 PROCEDURE — 97167 OT EVAL HIGH COMPLEX 60 MIN: CPT

## 2019-12-15 RX ADMIN — CARBIDOPA AND LEVODOPA 1 TABLET: 25; 100 TABLET ORAL at 21:10

## 2019-12-15 RX ADMIN — ASPIRIN 81 MG: 81 TABLET, COATED ORAL at 09:25

## 2019-12-15 RX ADMIN — SELEGILINE HYDROCHLORIDE 5 MG: 5 TABLET ORAL at 09:25

## 2019-12-15 RX ADMIN — CEFAZOLIN SODIUM 1000 MG: 1 SOLUTION INTRAVENOUS at 04:48

## 2019-12-15 RX ADMIN — PAROXETINE 20 MG: 20 TABLET, FILM COATED ORAL at 09:24

## 2019-12-15 RX ADMIN — Medication 400 MG: at 09:25

## 2019-12-15 RX ADMIN — OXYCODONE HYDROCHLORIDE 5 MG: 5 TABLET ORAL at 14:57

## 2019-12-15 RX ADMIN — SODIUM CHLORIDE, SODIUM LACTATE, POTASSIUM CHLORIDE, AND CALCIUM CHLORIDE 75 ML/HR: .6; .31; .03; .02 INJECTION, SOLUTION INTRAVENOUS at 02:00

## 2019-12-15 RX ADMIN — PRAVASTATIN SODIUM 40 MG: 40 TABLET ORAL at 09:24

## 2019-12-15 RX ADMIN — ATENOLOL 50 MG: 50 TABLET ORAL at 09:25

## 2019-12-15 RX ADMIN — ENOXAPARIN SODIUM 40 MG: 40 INJECTION SUBCUTANEOUS at 02:08

## 2019-12-15 NOTE — OCCUPATIONAL THERAPY NOTE
OccupationalTherapy Evaluation(time=1030-1100)     Patient Name: Hugh Varghese  WBRPM'A Date: 12/15/2019  Problem List  Principal Problem:    Closed right hip fracture, initial encounter [facility])  Active Problems:    Parkinson's disease with use of electrical brain stimulation ([facility] )    Type 2 diabetes mellitus without complication ([facility] )    Essential hypertension    Closed fracture of right hip [facility])    Past Medical History  Past Medical History:   Diagnosis Date    Anxiety     Diabetes mellitus ([facility] )     Diabetes mellitus type 2, diet-controlled ([facility] )     Hyperlipidemia     Hypertension     Parkinson disease ([facility] )      Past Surgical History  Past Surgical History:   Procedure Laterality Date    ACHILLES TENDON SURGERY      DEEP BRAIN STIMULATOR PLACEMENT      DENTAL SURGERY      SC IMP STIM,CRANIAL,SUBQ,1 ARRAY Right 12/18/2018    Procedure: REPLACEMENT IMPLANTABLE PULSE GENERATOR (IPG) DEEP BRAIN STIMULATION (DBS); Surgeon: Argelia Jimenez MD;  Location:  MAIN OR;  Service: Neurosurgery    REPLACEMENT TOTAL KNEE      REVERSE TOTAL SHOULDER ARTHROPLASTY Left 8/23/2018    Procedure: ARTHROPLASTY SHOULDER REVERSE;  Surgeon: Macy Thrasher MD;  Location: [facility] OR;  Service: Orthopedics    TONSILLECTOMY               12/15/19 1100   Note Type   Note type Eval only   Restrictions/Precautions   Weight Bearing Precautions Per Order Yes   RLE Weight Bearing Per Order WBAT   Other Precautions Fall Risk;Pain;Cognitive; Chair Alarm; Bed Alarm;Multiple lines   Pain Assessment   Pain Assessment 0-10   Pain Score 8   Pain Type Acute pain;Surgical pain   Pain Location Hip   Pain Orientation Right   Home Living   Type of [address] One level  ([address] with railing)   [address] [place] [address] chair   Home Equipment Quad cane;Walker   Prior Function   Lives With Spouse   ADL Assistance Needs assistance   Falls in the last 6 months 1 to 4  (1)   Lifestyle   Autonomy PTA pt states that she had assistance with her ADLs; states independence with transfers, ambulation--with use of rollator; +falls=1, neg home alone   Reciprocal Relationships 2 sons   Service to Others 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   Psychosocial   Psychosocial (WDL) WDL   Subjective   Subjective "I think my  is out there in a monster costume "   ADL   Where Assessed Edge of bed   Eating Assistance 5  Supervision/Setup   Grooming Assistance 5  Supervision/Setup   UB Bathing Assistance 4  Minimal Assistance   LB [address] 2  Maximal [address] 4  [address] 2  Maximal [address]  5  Supervision/Setup   Toileting Deficit Steadying;Clothing management up;Clothing management down   Bed Mobility   Rolling R 2  Maximal assistance   Additional items Assist x 1; Increased time required;Verbal cues   Rolling L 2  Maximal assistance   Additional items Assist x 1; Increased time required;Verbal cues   Supine to Sit 2  Maximal assistance   Additional items Assist x 2   Sit to Supine 2  Maximal assistance   Additional items Assist x 1; Increased time required;LE management;Verbal cues   Transfers   Sit to Stand 2  Maximal assistance   Additional items Assist x 2; Increased time required;Verbal cues   Stand to Sit 2  Maximal assistance   Additional items Assist x 2; Increased time required;Verbal cues   Stand pivot 2  Maximal assistance   Additional items Assist x 2; Increased time required   Additional Comments bp's=135/69(after ambulation to chair); SPo2=96% on RA, HR=69bpm--nsg made aware    Functional Mobility   Functional Mobility 2  Maximal assistance   Additional Comments x2   Additional items Rolling walker   Balance   Static Sitting Fair -   Dynamic Sitting Poor   Static Standing Poor -   Dynamic Standing Poor -   Activity Tolerance   Activity Tolerance Patient limited by fatigue;Patient limited by pain Medical Staff Made Aware nsg, P T     RUE Assessment   RUE Assessment X  (poor shr AROM(i e flex=10-20*);elbow-distal=WFLs)   RUE Strength   RUE Overall Strength   (shr=2-/5, elbow-distal=3+/5)   LUE Assessment   LUE Assessment WFL   LUE Strength   LUE Overall Strength Within Functional Limits - able to perform ADL tasks with strength  (3+/5 throughout)   Hand Function   Gross Motor Coordination Functional   Fine Motor Coordination Functional   Sensation   Light Touch No apparent deficits   Proprioception   Proprioception No apparent deficits   Vision-Basic Assessment   Current Vision   (glasses)   Vision - Complex Assessment   Acuity   (impaired)   Perception   Inattention/Neglect Appears intact   Cognition   Overall Cognitive Status Impaired   Arousal/Participation Alert   Attention Attends with cues to redirect   Orientation Level Oriented to person;Oriented to place   Memory Decreased short term memory;Decreased recall of recent events;Decreased recall of precautions   Following Commands Follows one step commands with increased time or repetition   Comments noted hallucinations    Assessment   Limitation Decreased ADL status; Decreased UE ROM; Decreased UE strength;Decreased Safe judgement during ADL;Decreased cognition;Decreased endurance;Decreased fine motor control;Decreased high-level ADLs   Prognosis Fair   Assessment Pt is a 74 y/o female admitted to the hospital after falling at home, resulting in a R hip fx  Pt required a s/p R femur IM nail(12/14)  Pt is currently allowed WBAT with her R LE  Pt with PMH Parkinsons with DBS, DM, L shr fx/TSR, and anxiety  PTA pt states that she had assistance with her ADLs; states independence with transfers, ambulation--with use of rollator; +falls=1, neg home alone   During initial eval, pt demonstrated deficits with her functional balance, functional mobility, ADL status, activity tolerance(currently fair=15-20mins), transfer safety, b/l UE strength, and cognition(i e memory, orientation, problem-solving, judgement/safety)  Pt would benefit from continued OT tx for the above deficits  3-5xwk/1-2wks  Goals   Patient Goals "I need to get better "   STG Time Frame   (1-7 days)   Short Term Goal #1 Pt will tolerate continued cognitive/home-safety assessment and appropriate d/c recommendations will be provided  Short Term Goal #2 Pt will demonstrate mod A with her bed mobility and sit-stand transfers to assist with ADLs  Short Term Goal  Pt will demonstrate improved activity tolerance to good(20-30mins) and standing tolerance to 3-5mins to assist with ADLs  LTG Time Frame   (7-14days)   Long Term Goal #1 Pt will demonstrate proper walker/transfer safety 100% of the time  Long Term Goal #2 Pt will demonstrate improved functional balance by 1 grade to assist with ADLs/transfers  Long Term Goal Pt will demonstrate mod I with their UE and LE bathing/dresssing  Plan   Treatment Interventions ADL retraining;Functional transfer training;UE strengthening/ROM; Endurance training;Cognitive reorientation;Patient/family training;Equipment evaluation/education; Compensatory technique education;Continued evaluation   Goal Expiration Date 12/29/19   OT Treatment Day 0   OT Frequency 3-5x/wk   Recommendation   OT Discharge Recommendation Short Term Rehab   Barthel Index   Feeding 5   Bathing 0   Grooming Score 0   Dressing Score 5   Bladder Score 0   Bowels Score 5   Toilet Use Score 5   Transfers (Bed/Chair) Score 5   Mobility (Level Surface) Score 0   Stairs Score 0   Barthel Index Score 25   Jeffery Lowry, OT

## 2019-12-15 NOTE — PLAN OF CARE
Problem: OCCUPATIONAL THERAPY ADULT  Goal: Performs self-care activities at highest level of function for planned discharge setting  See evaluation for individualized goals  Description  Treatment Interventions: ADL retraining, Functional transfer training, UE strengthening/ROM, Endurance training, Cognitive reorientation, Patient/family training, Equipment evaluation/education, Compensatory technique education, Continued evaluation          See flowsheet documentation for full assessment, interventions and recommendations  Note:   Limitation: Decreased ADL status, Decreased UE ROM, Decreased UE strength, Decreased Safe judgement during ADL, Decreased cognition, Decreased endurance, Decreased fine motor control, Decreased high-level ADLs  Prognosis: Fair  Assessment: Pt is a 74 y/o female admitted to the hospital after falling at home, resulting in a R hip fx  Pt required a s/p R femur IM nail(12/14)  Pt is currently allowed WBAT with her R LE  Pt with PMH Parkinsons with DBS, DM, L shr fx/TSR, and anxiety  PTA pt states that she had assistance with her ADLs; states independence with transfers, ambulation--with use of rollator; +falls=1, neg home alone  During initial eval, pt demonstrated deficits with her functional balance, functional mobility, ADL status, activity tolerance(currently fair=15-20mins), transfer safety, b/l UE strength, and cognition(i e memory, orientation, problem-solving, judgement/safety)  Pt would benefit from continued OT tx for the above deficits  3-5xwk/1-2wks        OT Discharge Recommendation: Short Term Rehab

## 2019-12-15 NOTE — PLAN OF CARE
Problem: PHYSICAL THERAPY ADULT  Goal: Performs mobility at highest level of function for planned discharge setting  See evaluation for individualized goals  Description  Treatment/Interventions: Functional transfer training, LE strengthening/ROM, Elevations, Therapeutic exercise, Endurance training, Cognitive reorientation, Patient/family training, Equipment eval/education, Bed mobility, Gait training  Equipment Recommended: (S) Walker(RW, not rollator (not enough control for 4 wheeled device))       See flowsheet documentation for full assessment, interventions and recommendations  Note:   Prognosis: Fair  Problem List: Decreased strength, Decreased range of motion, Decreased endurance, Impaired balance, Decreased mobility, Decreased coordination, Decreased cognition, Impaired judgement, Decreased safety awareness, Orthopedic restrictions, Pain  Assessment: Pt is 75 y o  female seen for PT evaluation s/p admit to [address] on 12/13/2019 w/ Closed right hip fracture, initial encounter ([facility] )  PT consulted to assess pt's functional mobility and d/c needs  Order placed for PT eval and tx, w/ up w/ A order  Comorbidities affecting pt's physical performance at time of assessment include: PD with DBS and cog decline, DMII, HTN, L reverse total shoulder 2018, anxiety  PTA, pt was lives w/   in one  level house  She ambulates with rollator and  cues her to not leave it behind  Pt reports that  does not leave her alone unless it is for a very short period of time  She is a fair-poor historian on eval and home setup and PLOF need to be verified with    Personal factors affecting pt at time of IE include: stairs to enter home, inability to ambulate household distances, decreased cognition, positive fall history, anxiety, impulsivity, inability to perform IADLs, inability to perform ADLs and need for 2 person assist on eval  Please find objective findings from PT assessment regarding body systems outlined above with impairments and limitations including weakness, decreased ROM, impaired balance, decreased endurance, impaired coordination, gait deviations, pain, decreased activity tolerance, impaired judgement, fall risk, orthopedic restrictions and decreased cognition  The following objective measures performed on IE also reveal limitations: Barthel Index: 40/100  Pt's clinical presentation is currently unstable/unpredictable seen in pt's presentation of ongoing medical needs regarding R hip fx and IM nailing  Pt also presents with hallucinations and impulsivity on eval despite need for 2 person assist  Pt to benefit from continued PT tx to address deficits as defined above and maximize level of functional independent mobility and consistency  From PT/mobility standpoint, recommendation at time of d/c would be STR pending progress in order to facilitate return to PLOF  Barriers to Discharge: Decreased caregiver support(2 person assist for all mobility on eval)     Recommendation: Short-term skilled PT     PT - OK to Discharge: Yes(to rehab when medically appropriate)    See flowsheet documentation for full assessment

## 2019-12-15 NOTE — PLAN OF CARE
Problem: Prexisting or High Potential for Compromised Skin Integrity  Goal: Skin integrity is maintained or improved  Description  INTERVENTIONS:  - Identify patients at risk for skin breakdown  - Assess and monitor skin integrity  - Assess and monitor nutrition and hydration status  - Monitor labs   - Assess for incontinence   - Turn and reposition patient every 2 hours, accumax alternating pressure pump to mattress  - Assist with mobility/ambulation  - Relieve pressure over bony prominences  - Avoid friction and shearing  - Provide appropriate hygiene as needed including keeping skin clean and dry  - Evaluate need for skin moisturizer/barrier cream  - Collaborate with interdisciplinary team   - Patient teaching     Outcome: Progressing     Problem: PAIN - ADULT  Goal: Verbalizes/displays adequate comfort level or baseline comfort level  Description  Interventions:  - Encourage patient to monitor pain and request assistance  - Assess pain using appropriate pain scale  - Administer analgesics based on type and severity of pain and evaluate response  - Implement non-pharmacological measures as appropriate and evaluate response  - Consider cultural and social influences on pain and pain management  - Notify physician/advanced practitioner if interventions unsuccessful or patient reports new pain  Outcome: Progressing     Problem: INFECTION - ADULT  Goal: Absence or prevention of progression during hospitalization  Description  INTERVENTIONS:  - Assess and monitor for signs and symptoms of infection  - Monitor lab/diagnostic results  - Monitor all insertion sites, i e  indwelling lines, tubes, and drains  - Monitor endotracheal if appropriate and nasal secretions for changes in amount and color  - [place] appropriate cooling/warming therapies per order  - Administer medications as ordered  - Instruct and encourage patient and family to use good hand hygiene technique  - Identify and instruct in appropriate isolation precautions for identified infection/condition  Outcome: Progressing     Problem: DISCHARGE PLANNING  Goal: Discharge to home or other facility with appropriate resources  Description  INTERVENTIONS:  - Identify barriers to discharge w/patient and caregiver  - Arrange for needed discharge resources and transportation as appropriate  - Identify discharge learning needs (meds, wound care, etc )  - Refer to Case Management Department for coordinating discharge planning if the patient needs post-hospital services based on physician/advanced practitioner order or complex needs related to functional status, cognitive ability, or social support system   Outcome: Progressing     Problem: Knowledge Deficit  Goal: Patient/family/caregiver demonstrates understanding of disease process, treatment plan, medications, and discharge instructions  Description  Complete learning assessment and assess knowledge base    Interventions:  - Provide teaching at level of understanding  - Provide teaching via preferred learning methods  Outcome: Progressing     Problem: Nutrition/Hydration-ADULT  Goal: Nutrient/Hydration intake appropriate for improving, restoring or maintaining nutritional needs  Description  INTERVENTIONS:  - Monitor oral intake, urinary output, labs, and treatment plans  - Assess nutrition and hydration status and recommend course of action  - Assist patient with eating if necessary   - Allow adequate time for meals  - Recommend/ encourage appropriate diets, oral nutritional supplements, and vitamin/mineral supplements  - Assess need for intravenous fluids  - Provide specific nutrition/hydration education as appropriate  - Include patient/spouse in decisions related to nutrition   Outcome: Progressing     Problem: SAFETY ADULT  Goal: Patient will remain free of falls  Description  INTERVENTIONS:  - Assess patient frequently for physical needs  -  Identify cognitive and physical deficits and behaviors that affect risk of falls    -  [place] fall precautions as indicated by assessment   - Educate patient/family on patient safety including physical limitations  - Instruct patient to call for assistance with activity based on assessment  - Modify environment to reduce risk of injury  - Consider OT/PT consult to assist with strengthening/mobility  Outcome: Progressing  Goal: Maintain or return to baseline ADL function  Description  INTERVENTIONS:  -  Assess patient's ability to carry out ADLs; assess patient's baseline for ADL function and identify physical deficits which impact ability to perform ADLs (bathing, care of mouth/teeth, toileting, grooming, dressing, etc )  - Assess/evaluate cause of self-care deficits   - Assess range of motion  - Assess patient's mobility; develop plan if impaired  - Assess patient's need for assistive devices and provide as appropriate  - Encourage maximum independence but intervene and supervise when necessary  - Involve family in performance of ADLs  - Assess for home care needs following discharge   - Consider OT consult to assist with ADL evaluation and planning for discharge  - Provide patient education as appropriate  Outcome: Progressing  Goal: Maintain or return mobility status to optimal level  Description  INTERVENTIONS:  - Assess patient's baseline mobility status (ambulation, transfers, stairs, etc )    - Identify cognitive and physical deficits and behaviors that affect mobility  - Identify mobility aids required to assist with transfers and/or ambulation (gait belt, sit-to-stand, lift, walker, cane, etc )  - [place] fall precautions as indicated by assessment  - Record patient progress and toleration of activity level on Mobility SBAR; progress patient to next Phase/Stage  - Instruct patient to call for assistance with activity based on assessment  - Consider rehabilitation consult to assist with strengthening/weightbearing, etc   Outcome: Progressing     Problem: COPING  Goal: Pt/Family able to verbalize concerns and demonstrate effective coping strategies  Description  INTERVENTIONS:  - Assist patient/family to identify coping skills, available support systems and cultural and spiritual values  - Provide emotional support, including active listening and acknowledgement of concerns of patient and caregivers  - Reduce environmental stimuli, as able  - Provide patient education  - Assess for spiritual pain/suffering and initiate spiritual care, including notification of Pastoral Care or ranjit based community as needed  - Assess effectiveness of coping strategies  Outcome: Progressing     Problem: METABOLIC, FLUID AND ELECTROLYTES - ADULT  Goal: Glucose maintained within target range  Description  INTERVENTIONS:  - Monitor Blood Glucose as ordered  - Assess for signs and symptoms of hyperglycemia and hypoglycemia  - Administer ordered medications to maintain glucose within target range  - Assess nutritional intake and initiate nutrition service referral as needed  Outcome: Progressing     Problem: Potential for Falls  Goal: Patient will remain free of falls  Description  INTERVENTIONS:  - Assess patient frequently for physical needs  -  Identify cognitive and physical deficits and behaviors that affect risk of falls    -  [place] fall precautions as indicated by assessment   - Educate patient/family on patient safety including physical limitations  - Instruct patient to call for assistance with activity based on assessment  - Modify environment to reduce risk of injury  - Consider OT/PT consult to assist with strengthening/mobility  Outcome: Progressing

## 2019-12-15 NOTE — PHYSICAL THERAPY NOTE
Physical Therapy Evaluation     Patient's Name: Mikaela Donis    Admitting Diagnosis  Hyperglycemia [R73 9]  [facility] of right [facility], initial encounter [facility] [S72 001A]    Problem List  Patient Active Problem List   Diagnosis    Parkinson's disease with use of electrical brain stimulation ([facility] )    Fracture, shoulder, left, closed, initial encounter    History of hypertension    S/P reverse total shoulder arthroplasty, left    Other hyperlipidemia    Type 2 diabetes mellitus without complication ([place] [address] )    Anxiety    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)       Past Medical History  Past Medical History:   Diagnosis Date    Anxiety     Diabetes mellitus ([place] [address] )     Diabetes mellitus type 2, diet-controlled ([place] [address] )     Hyperlipidemia     Hypertension     Parkinson disease ([place] [address] )        Past Surgical History  Past Surgical History:   Procedure Laterality Date    ACHILLES TENDON SURGERY      DEEP BRAIN STIMULATOR PLACEMENT      DENTAL SURGERY      DC IMP STIM,CRANIAL,SUBQ,1 ARRAY Right 12/18/2018    Procedure: REPLACEMENT IMPLANTABLE PULSE GENERATOR (IPG) DEEP BRAIN STIMULATION (DBS);   Surgeon: Sweta Foy MD;  Location:  MAIN OR;  Service: Neurosurgery    REPLACEMENT TOTAL KNEE      REVERSE TOTAL SHOULDER ARTHROPLASTY Left 8/23/2018    Procedure: ARTHROPLASTY SHOULDER REVERSE;  Surgeon: Avinash Villa MD;  Location: [facility] Main OR;  Service: Orthopedics    TONSILLECTOMY          12/15/19 1055   Note Type   Note type Eval only   Pain Assessment   Pain Assessment 0-10   Pain Score 8   Pain Type Acute pain;Surgical pain   Pain Location Hip   Pain Orientation Right   Home Living   Type of [address] One level;Stairs to enter with rails   Home Equipment   (rollator)   Additional Comments info taken from previous adm; verify with    Prior Function Level of [place] Needs assistance with IADLs; Needs assistance with ADLs and functional mobility  (cues from  regarding rollator)   Lives With Spouse   Receives Help From Family   ADL Assistance Needs assistance   IADLs Needs assistance   Falls in the last 6 months 1 to 4   Vocational Retired   Restrictions/Precautions   [place] Bearing Precautions Per Order Yes   RLE [place] Bearing Per Order WBAT   General   Additional Pertinent History PD with DBS; hallucinations at time of shoulder sx; reverse total shoulder L in 2018   Family/Caregiver Present No   Cognition   Overall Cognitive Status Impaired   Arousal/Participation Cooperative   Orientation Level Oriented to person;Oriented to place   Memory Decreased recall of recent events;Decreased short term memory;Decreased recall of precautions   Following Commands Follows one step commands with increased time or repetition   Comments hallucinating during eval   Bed Mobility   Supine to Sit 2  Maximal assistance   Additional items Assist x 2; Increased time required;Verbal cues;LE management;HOB elevated; Bedrails   Additional Comments seated OOB with chair alarm at end of session   Transfers   Sit to Stand 2  Maximal assistance   Additional items Assist x 2; Increased time required;Verbal cues  (posterior bias)   Stand to Sit 2  Maximal assistance   Additional items Assist x 2; Increased time required;Verbal cues  (posterior bias)   Stand pivot 2  Maximal assistance   Additional items Assist x 2; Increased time required;Verbal cues   Additional Comments RW with transfers   Ambulation/Elevation   Gait pattern Excessively slow; Short stride; Foward flexed; Inconsistent dayday; Step to;Decreased R stance;Decreased foot clearance; Improper Weight shift  (retropulsion)   Gait Assistance 2  Maximal assist   Additional items Assist x 2;Verbal cues   Assistive Device Rolling walker   Distance 5' from commode to bedside chair, one turn   Stair Management Assistance Not tested Balance   Static Sitting Fair -   Dynamic Sitting Poor   Static Standing Poor -   Ambulatory Poor -   Endurance Deficit   Endurance Deficit Yes   Endurance Deficit Description fatigue with OOB to chair   Activity Tolerance   Activity Tolerance Patient limited by fatigue;Patient limited by pain   Medical Staff Made Aware OT and nsg   Nurse Made Aware ok to see per RN   Assessment   Prognosis Fair   Problem List Decreased strength;Decreased range of motion;Decreased endurance; Impaired balance;Decreased mobility; Decreased coordination;Decreased cognition; Impaired judgement;Decreased safety awareness;Orthopedic restrictions;Pain   Assessment Pt is 75 y o  female seen for PT evaluation s/p admit to [address] on 12/13/2019 w/ Closed right hip fracture, initial encounter ([facility] )  PT consulted to assess pt's functional mobility and d/c needs  Order placed for PT eval and tx, w/ up w/ A order  Comorbidities affecting pt's physical performance at time of assessment include: PD with DBS and cog decline, DMII, HTN, L reverse total shoulder 2018, anxiety  PTA, pt was lives w/   in one  level house  She ambulates with rollator and  cues her to not leave it behind  Pt reports that  does not leave her alone unless it is for a very short period of time  She is a fair-poor historian on eval and home setup and PLOF need to be verified with    Personal factors affecting pt at time of IE include: stairs to enter home, inability to ambulate household distances, decreased cognition, positive fall history, anxiety, impulsivity, inability to perform IADLs, inability to perform ADLs and need for 2 person assist on eval  Please find objective findings from PT assessment regarding body systems outlined above with impairments and limitations including weakness, decreased ROM, impaired balance, decreased endurance, impaired coordination, gait deviations, pain, decreased activity tolerance, impaired judgement, fall risk, orthopedic restrictions and decreased cognition  The following objective measures performed on IE also reveal limitations: Barthel Index: 40/100  Pt's clinical presentation is currently unstable/unpredictable seen in pt's presentation of ongoing medical needs regarding R hip fx and IM nailing  Pt also presents with hallucinations and impulsivity on eval despite need for 2 person assist  Pt to benefit from continued PT tx to address deficits as defined above and maximize level of functional independent mobility and consistency  From PT/mobility standpoint, recommendation at time of d/c would be STR pending progress in order to facilitate return to PLOF  Barriers to Discharge Decreased caregiver support  (2 person assist for all mobility on eval)   Goals   STG Expiration Date 12/25/19   Short Term Goal #1 In 10 days, Nonah Severs will demonstrate 1) bed mobility at maxA x1 level in order to get in/out of bed safely, 2) sit<>stand at modAx1 level in order to rise/sit from bed, chair, and complete toileting, 3) ambulate 25' x2 feet with RW at [place]omen[place] Cancer level in order to access their home environment, 4) negotiate 5 stairs with one railing at modAx2 level in order to safely enter/exit their home, 5) participation in HEP to include but not limited to strengthening, stretching, endurance, balance, and coordination in order to facilitate progress toward the above goals  PT Treatment Day 0   Plan   Treatment/Interventions Functional transfer training;LE strengthening/ROM; Elevations; Therapeutic exercise; Endurance training;Cognitive reorientation;Patient/family training;Equipment eval/education; Bed mobility;Gait training   PT Frequency   (3-5x/wk)   Recommendation   Recommendation Short-term skilled PT   Equipment Recommended Walker  (RW, not rollator (not enough control for 4 wheeled device))   PT - OK to Discharge Yes  (to rehab when medically appropriate)   Barthel Index   Feeding 5   Bathing 0 Grooming Score 0   Dressing Score 5   Bladder Score 10   Bowels Score 10   Toilet Use Score 5   Transfers (Bed/Chair) Score 5   Mobility (Level Surface) Score 0   Stairs Score 0   Barthel Index Score 40           Marcella Gilmore, PT

## 2019-12-15 NOTE — PROGRESS NOTES
Orthopedics   [place] Robb 75 y o  female MRN: [id number]  Unit/Bed#: E2 -01      Subjective:  75 y  o female post operative day 1 right short femoral cephalomedullary nail  Pt resting comfortably in bed this morning  She reports her pain is well controlled  Denies any chest pain or shortness of breath  No fevers or chills    Not oob yet/  Labs:  0   Lab Value Date/Time    HCT 35 3 12/15/2019 0506    HCT 41 6 12/14/2019 0425    HCT 43 1 12/13/2019 1131    HCT 39 1 11/06/2014 0557    HCT 42 8 10/22/2014 1030    HGB 11 3 (L) 12/15/2019 0506    HGB 13 0 12/14/2019 0425    HGB 14 0 12/13/2019 1131    HGB 13 0 11/06/2014 0557    HGB 14 0 10/22/2014 1030    INR 0 99 12/13/2019 1131    INR 1 07 11/06/2014 0557    WBC 12 03 (H) 12/15/2019 0506    WBC 13 70 (H) 12/14/2019 0425    WBC 12 09 (H) 12/13/2019 1131    WBC 11 45 (H) 11/06/2014 0557    WBC 8 42 10/22/2014 1030       Meds:    Current Facility-Administered Medications:     aspirin (ECOTRIN LOW STRENGTH) EC tablet 81 mg, 81 mg, Oral, Daily, Jose Weinstein DO    atenolol (TENORMIN) tablet 50 mg, 50 mg, Oral, Daily, Jose Weinstein, , 50 mg at 12/14/19 8556    co-enzyme Q-10 capsule 400 mg, 400 mg, Oral, Daily, Jose Weinstein DO    enoxaparin (LOVENOX) subcutaneous injection 40 mg, 40 mg, Subcutaneous, Daily, Jose Weinstein, , 40 mg at 12/15/19 0208    HYDROmorphone (DILAUDID) injection 0 2 mg, 0 2 mg, Intravenous, Q2H PRN, Jose Weinsteni DO    insulin lispro (HumaLOG) 100 units/mL subcutaneous injection 1-6 Units, 1-6 Units, Subcutaneous, TID AC **AND** Fingerstick Glucose (POCT), , , TID AC, Jose Weinstein DO    lactated ringers infusion, 75 mL/hr, Intravenous, Continuous, Jose Weinstein DO, Last Rate: 75 mL/hr at 12/15/19 0200, 75 mL/hr at 12/15/19 0200    oxyCODONE (ROXICODONE) IR tablet 2 5 mg, 2 5 mg, Oral, Q4H PRN, Jose Weinstein DO    oxyCODONE (ROXICODONE) IR tablet 5 mg, 5 mg, Oral, Q4H PRN, Jose Weinstein DO, 5 mg at 12/14/19 1858    PARoxetine (PAXIL) tablet 20 mg, 20 mg, Oral, Daily, Jose Weinstein DO    pravastatin (PRAVACHOL) tablet 40 mg, 40 mg, Oral, Every Other Day, Jose Weinstein DO, 40 mg at 12/13/19 2053    selegiline (ELDEPRYL) tablet 5 mg, 5 mg, Oral, Daily With Breakfast, Jose Weinstein DO    Blood Culture:   No results found for: BLOODCX    Wound Culture:   No results found for: WOUNDCULT    Ins and Outs:  I/O last 24 hours: In: 1700 [I V :1700]  Out: 765 [Urine:715; Blood:50]          Physical exam:  Vitals:    12/15/19 0734   BP: 122/72   Pulse: 76   Resp: 16   Temp: 99 1 °F (37 3 °C)   SpO2: 96%     right lower extremity  · Mepilex dressings are clean dry and intact, very small area of ecchymosis over the lateral thigh proximally  · No excessive soft tissue swelling in the thigh  · Sensation intact to light touch L2-S1  · Motor intact with knee flexion/extension, EHL/FHL  · DP pulse intact    Assessment: 74 y  o female post operative day 1 right short femur CMN for intertrochanteric fracture    Plan:  · Up and out of bed  · Weightbearing as tolerated right lower extremity  · PT/OT eval and treat  · DVT prophylaxis - SCDs, Lovenox times 30 days postoperatively  · Analgesics p r n    · Medical management per internal medicine, will reach out to internal medicine team regarding patient's complaints of hallucinations for further workup and management of those issues  · Dispo: Ortho will follow  · Patient noted to have acute blood loss anemia due to a drop in Hbg of > 2 0g from preop levels, will monitor vital signs and resuscitate with IV fluids as needed, hemoglobin this morning only slightly anemic at 11 3, vital signs are continuing to be stable, no indication for IV fluid resuscitation or blood transfusion at this time will continue to monitor

## 2019-12-15 NOTE — PROGRESS NOTES
Progress Note - Gregorio Triana 75 y o  female MRN: [id number]    Unit/Bed#: E2 -01 Encounter: [id number]    Assessment/Plan:    Right hip fracture  POD 1 femoral medullary nail, continue postop care by Orthopedics, ongoing PT OT    Delirium   postop high risk with a diagnosis of Parkinson's, limit narcotics, early mobility, continue cognitive stimulation/support      Diabetes   diet control, continue ADA diet and sliding scale    Hypertension   controlled with atenolol    Parkinson's   continue brain stimulator and selegiline    Dyslipidemia   continue statin for LDL control    Acute postop anemia  OR blood loss with Orthopedic surgery continue monitor hemoglobin with Orthopedics hemoglobin 11 3 today    Subjective:   Minimal pain of right hip, denies chest pain shortness of breath nausea vomiting diarrhea no fevers chills appetite stable episodes of visual hallucinations overnight    Objective:     Vitals: Blood pressure 122/72, pulse 76, temperature 99 1 °F (37 3 °C), temperature source Tympanic, resp  rate 16, height 5' 7" (1 702 m), weight 107 kg (235 lb 10 7 oz), SpO2 96 %  ,Body mass index is 36 91 kg/m²  Results from last 7 days   Lab Units 12/15/19  0506  12/13/19  1131   WBC Thousand/uL 12 03*   < > 12 09*   HEMOGLOBIN g/dL 11 3*   < > 14 0   HEMATOCRIT % 35 3   < > 43 1   PLATELETS Thousands/uL 157   < > 209   INR   --   --  0 99    < > = values in this interval not displayed       Results from last 7 days   Lab Units 12/15/19  0506   POTASSIUM mmol/L 4 2   CHLORIDE mmol/L 110*   CO2 mmol/L 26   BUN mg/dL 14   CREATININE mg/dL 0 90   CALCIUM mg/dL 8 4       Scheduled Meds:    Current Facility-Administered Medications:  aspirin 81 mg Oral Daily Jose Weinstein, DO   atenolol 50 mg Oral Daily Jose Weinstein, DO   co-enzyme Q-10 400 mg Oral Daily Jose Weinstein, DO   enoxaparin 40 mg Subcutaneous Daily Jose Weinstein, DO   insulin lispro 1-6 Units Subcutaneous TID AC Jose Weinstein, DO   oxyCODONE 5 mg Oral Q4H PRN Jose Weinstein,    PARoxetine 20 mg Oral Daily Jose Weinstein DO   pravastatin 40 mg Oral Every Other Day Jose Weinstein DO   selegiline 5 mg Oral Daily With Breakfast Jose Weinstein,        Continuous Infusions:     Physical exam:  General appearance:  Alert no distress interaction appropriate   Head/Eyes:  Nonicteric PERRL EOMI  Neck:  Supple  Lungs: CTA bilateral no wheezing rhonchi or rales  Heart: normal S1 S2 regular  Abdomen: Soft nontender with bowel sounds  Extremities: no edema  Skin: no rash    Invasive Devices     Peripheral Intravenous Line            Peripheral IV 12/15/19 Left Arm less than 1 day                  VTE Pharmacologic Prophylaxis:  Lovenox    Counseling / Coordination of Care  Total floor / unit time spent today 30  minutes  Greater than 50% of total time was spent with the patient and / or family counseling and / or coordination of care    A description of the counseling / coordination of care:

## 2019-12-16 PROBLEM — K59.00 CONSTIPATION: Status: ACTIVE | Noted: 2019-12-16

## 2019-12-16 LAB
ANION GAP SERPL CALCULATED.3IONS-SCNC: 9 MMOL/L (ref 4–13)
BUN SERPL-MCNC: 15 MG/DL (ref 5–25)
CALCIUM SERPL-MCNC: 8.5 MG/DL (ref 8.3–10.1)
CHLORIDE SERPL-SCNC: 105 MMOL/L (ref 100–108)
CO2 SERPL-SCNC: 26 MMOL/L (ref 21–32)
CREAT SERPL-MCNC: 0.81 MG/DL (ref 0.6–1.3)
ERYTHROCYTE [DISTWIDTH] IN BLOOD BY AUTOMATED COUNT: 13.2 % (ref 11.6–15.1)
GFR SERPL CREATININE-BSD FRML MDRD: 71 ML/MIN/1.73SQ M
GLUCOSE SERPL-MCNC: 120 MG/DL (ref 65–140)
GLUCOSE SERPL-MCNC: 122 MG/DL (ref 65–140)
GLUCOSE SERPL-MCNC: 122 MG/DL (ref 65–140)
GLUCOSE SERPL-MCNC: 126 MG/DL (ref 65–140)
GLUCOSE SERPL-MCNC: 140 MG/DL (ref 65–140)
HCT VFR BLD AUTO: 34.2 % (ref 34.8–46.1)
HGB BLD-MCNC: 10.9 G/DL (ref 11.5–15.4)
MCH RBC QN AUTO: 31.6 PG (ref 26.8–34.3)
MCHC RBC AUTO-ENTMCNC: 31.9 G/DL (ref 31.4–37.4)
MCV RBC AUTO: 99 FL (ref 82–98)
PLATELET # BLD AUTO: 167 THOUSANDS/UL (ref 149–390)
PMV BLD AUTO: 11.4 FL (ref 8.9–12.7)
POTASSIUM SERPL-SCNC: 3.6 MMOL/L (ref 3.5–5.3)
RBC # BLD AUTO: 3.45 MILLION/UL (ref 3.81–5.12)
SODIUM SERPL-SCNC: 140 MMOL/L (ref 136–145)
WBC # BLD AUTO: 10.99 THOUSAND/UL (ref 4.31–10.16)

## 2019-12-16 PROCEDURE — 97110 THERAPEUTIC EXERCISES: CPT

## 2019-12-16 PROCEDURE — 99232 SBSQ HOSP IP/OBS MODERATE 35: CPT | Performed by: INTERNAL MEDICINE

## 2019-12-16 PROCEDURE — NC001 PR NO CHARGE

## 2019-12-16 PROCEDURE — 80048 BASIC METABOLIC PNL TOTAL CA: CPT | Performed by: ORTHOPAEDIC SURGERY

## 2019-12-16 PROCEDURE — 97535 SELF CARE MNGMENT TRAINING: CPT

## 2019-12-16 PROCEDURE — 82948 REAGENT STRIP/BLOOD GLUCOSE: CPT

## 2019-12-16 PROCEDURE — 97116 GAIT TRAINING THERAPY: CPT

## 2019-12-16 PROCEDURE — 99024 POSTOP FOLLOW-UP VISIT: CPT | Performed by: PHYSICIAN ASSISTANT

## 2019-12-16 PROCEDURE — 85027 COMPLETE CBC AUTOMATED: CPT | Performed by: ORTHOPAEDIC SURGERY

## 2019-12-16 PROCEDURE — 97530 THERAPEUTIC ACTIVITIES: CPT

## 2019-12-16 RX ORDER — ONDANSETRON 2 MG/ML
4 INJECTION INTRAMUSCULAR; INTRAVENOUS EVERY 4 HOURS PRN
Status: DISCONTINUED | OUTPATIENT
Start: 2019-12-16 | End: 2019-12-18 | Stop reason: HOSPADM

## 2019-12-16 RX ORDER — POLYETHYLENE GLYCOL 3350 17 G/17G
17 POWDER, FOR SOLUTION ORAL DAILY
Status: DISCONTINUED | OUTPATIENT
Start: 2019-12-16 | End: 2019-12-18 | Stop reason: HOSPADM

## 2019-12-16 RX ORDER — AMOXICILLIN 250 MG
1 CAPSULE ORAL 2 TIMES DAILY
Status: DISCONTINUED | OUTPATIENT
Start: 2019-12-16 | End: 2019-12-18 | Stop reason: HOSPADM

## 2019-12-16 RX ORDER — ACETAMINOPHEN 325 MG/1
650 TABLET ORAL EVERY 6 HOURS PRN
Status: DISCONTINUED | OUTPATIENT
Start: 2019-12-16 | End: 2019-12-18 | Stop reason: HOSPADM

## 2019-12-16 RX ADMIN — PAROXETINE 20 MG: 20 TABLET, FILM COATED ORAL at 08:36

## 2019-12-16 RX ADMIN — POLYETHYLENE GLYCOL 3350 17 G: 17 POWDER, FOR SOLUTION ORAL at 15:47

## 2019-12-16 RX ADMIN — ASPIRIN 81 MG: 81 TABLET, COATED ORAL at 08:35

## 2019-12-16 RX ADMIN — ATENOLOL 50 MG: 50 TABLET ORAL at 08:35

## 2019-12-16 RX ADMIN — CARBIDOPA AND LEVODOPA 1 TABLET: 25; 100 TABLET ORAL at 20:14

## 2019-12-16 RX ADMIN — SENNOSIDES AND DOCUSATE SODIUM 1 TABLET: 8.6; 5 TABLET ORAL at 17:06

## 2019-12-16 RX ADMIN — CARBIDOPA AND LEVODOPA 1 TABLET: 25; 100 TABLET ORAL at 08:35

## 2019-12-16 RX ADMIN — ENOXAPARIN SODIUM 40 MG: 40 INJECTION SUBCUTANEOUS at 02:25

## 2019-12-16 RX ADMIN — SELEGILINE HYDROCHLORIDE 5 MG: 5 TABLET ORAL at 08:36

## 2019-12-16 RX ADMIN — CARBIDOPA AND LEVODOPA 1 TABLET: 25; 100 TABLET ORAL at 15:47

## 2019-12-16 RX ADMIN — Medication 400 MG: at 08:35

## 2019-12-16 NOTE — PHYSICAL THERAPY NOTE
PHYSICAL THERAPY NOTE          Patient Name: Keith Styles  [id number] Date: 12/16/2019     Aliza Villarreal calling for help  Upon arrival, pt appeared to be sliding out of her chair  Assisted pt to stand w/ maxAx2 but pt demonstrate poor standing balance & tolerance & unable to tolerate SPT  Hence attempted transfers via quick move device  Pt able to get onto quick move device w/ maxAx2 & assisted back in bed  Pt repositioned pt in bed comfortably  Bed alarm activated  Call bell & phone in reach  Will continue PT per CHRISTIANA Dwyer PT

## 2019-12-16 NOTE — ASSESSMENT & PLAN NOTE
Lab Results   Component Value Date    HGBA1C 6 3 12/08/2018     Recent Labs     12/15/19  1559 12/15/19  2048 12/16/19  0652 12/16/19  1120   POCGLU 126 125 126 140     Diabetes mellitus stable on sliding scale only without need for insulin thusfar

## 2019-12-16 NOTE — PROGRESS NOTES
Progress Note - Mikaela Donis [birth year], 75 y o  female MRN: [id number]    Unit/Bed#: E2 -01 Encounter: [id number]    Primary Care Provider: Shey Feng MD   Date and time admitted to hospital: 12/13/2019 11:14 AM        * Closed right hip fracture, initial encounter [facility])  Assessment & Plan  Right hip fracture status post ORIF  Awaiting rehab placement  Pain controlled  Constipation  Assessment & Plan  Constipation  Has not had bowel movement since admission  Add bowel regimen    Essential hypertension  Assessment & Plan  Essential hypertension continue atenolol    Type 2 diabetes mellitus without complication [facility])  Assessment & Plan  Lab Results   Component Value Date    HGBA1C 6 3 12/08/2018     Recent Labs     12/15/19  1559 12/15/19  2048 12/16/19  0652 12/16/19  1120   POCGLU 126 125 126 140     Diabetes mellitus stable on sliding scale only without need for insulin thusfar  Other hyperlipidemia  Assessment & Plan  Hyperlipidemia continue statin    Parkinson's disease with use of electrical brain stimulation [facility])  Assessment & Plan  Parkinson's disease with presence of DBS  Continue selegiline and sinemet      VTE Pharmacologic Prophylaxis: Enoxaparin (Lovenox)    Patient Centered Rounds: I have performed bedside rounds with nursing staff today  Discussions with Specialists or Other Care Team Provider:  Orthopedics  Education and Discussions with Family / Patient:  Spouse    Time Spent for Care: 25 mins  More than 50% of total time spent on counseling and coordination of care as described above      Current Length of Stay: 3 day(s)  Current Patient Status: Inpatient     Certification Statement: The patient will continue to require additional inpatient hospital stay due to need for rehab placement  Discharge Plan / Estimated Discharge Date:     Code Status: Level 3 - DNAR and DNI  ______________________________________________________________________________    Subjective:   Patient seen and examined  No new complaints  On further questioning no bowel movement    Objective:   Vitals: Blood pressure 114/73, pulse 68, temperature 97 9 °F (36 6 °C), temperature source Tympanic, resp  rate 18, height 5' 7" (1 702 m), weight 106 kg (232 lb 9 4 oz), SpO2 92 %  Physical Exam:   General appearance: alert, appears stated age and cooperative  Head: Normocephalic, without obvious abnormality, atraumatic  Lungs: diminished breath sounds  Heart: regular rate and rhythm  Abdomen: soft, non-tender, positive bowel sounds   Back: negative, range of motion normal  Extremities: extremities atraumatic, no cyanosis or edema  Neurologic: Grossly normal    Additional Data:   Labs:  Results from last 7 days   Lab Units 12/16/19  0545 12/15/19  0506 12/14/19  0425 12/13/19  1847 12/13/19  1131   WBC Thousand/uL 10 99* 12 03* 13 70*  --  12 09*   HEMOGLOBIN g/dL 10 9* 11 3* 13 0  --  14 0   HEMATOCRIT % 34 2* 35 3 41 6  --  43 1   MCV fL 99* 98 99*  --  97   PLATELETS Thousands/uL 167 157 181 186 209   INR   --   --   --   --  0 99     Results from last 7 days   Lab Units 12/16/19  0545 12/15/19  0506 12/14/19  0425 12/13/19  1131   SODIUM mmol/L 140 144 140 141   POTASSIUM mmol/L 3 6 4 2 3 8 3 9   CHLORIDE mmol/L 105 110* 105 106   CO2 mmol/L 26 26 27 25   ANION GAP mmol/L 9 8 8 10   BUN mg/dL 15 14 19 20   CREATININE mg/dL 0 81 0 90 0 79 0 95   CALCIUM mg/dL 8 5 8 4 8 8 8 6   EGFR ml/min/1 73sq m 71 63 73 59   GLUCOSE RANDOM mg/dL 122 134 116 181*     Results from last 7 days   Lab Units 12/14/19  0425   MAGNESIUM mg/dL 2 0   PHOSPHORUS mg/dL 3 5                  Results from last 7 days   Lab Units 12/16/19  1120 12/16/19  0652 12/15/19  2048 12/15/19  1559 12/15/19  1117 12/15/19  0702 12/14/19  2039 12/14/19  1608 12/14/19  1109 12/14/19  0627 12/13/19 2029 12/13/19  1313   POC GLUCOSE mg/dl 140 126 125 126 136 134 134 149* 131 129 155* 118             * I Have Reviewed All Lab Data Listed Above      Cultures: Imaging:  Imaging Reports Reviewed Today Include:   Procedure: Xr Hip/pelv 2-3 Vws Right If Performed  Result Date: 12/15/2019  Impression: Radiographically satisfactory appearance of intramedullary beatriz and screw fixation right femoral neck fracture, unchanged Workstation performed: [id number]       Scheduled Meds:  Current Facility-Administered Medications:  aspirin 81 mg Oral Daily Jose Weinstein, DO   atenolol 50 mg Oral Daily Jose Weinstein, DO   carbidopa-levodopa 1 tablet Oral TID Sergey Pompa, DO   co-enzyme Q-10 400 mg Oral Daily Jose Weinstein, DO   enoxaparin 40 mg Subcutaneous Daily Jose Weinstein, DO   insulin lispro 1-6 Units Subcutaneous TID AC Jose Weinstein, DO   oxyCODONE 5 mg Oral Q4H PRN Jose Weinstein, DO   PARoxetine 20 mg Oral Daily Jose Weinstein, DO   pravastatin 40 mg Oral Every Other Day Jose Weinstein, DO   selegiline 5 mg Oral Daily With Breakfast Jose Weinstein, DO Ward Koch, DO  [facility] Internal Medicine  Hospitalist    ** Please Note: This note has been constructed using a voice recognition system   **

## 2019-12-16 NOTE — PROGRESS NOTES
Orthopaedic Surgery - Progress Note  Hugh Varghese (89 y o  female)   : 1945   MRN: [id number]  Date: 2019   Encounter: [id number]   Unit/Bed#: E2 -01    Assessment / Plan   Postop day 2 status post right hip ORIF    · Continue with ice and analgesics as needed  · Continue with PT/OT as ordered with weight-bearing as tolerated on the right lower extremity  · Lovenox daily for 30 days as DVT prophylaxis of choice  · Patient continues with some confusion at this time, continue with management of this through medicine  · Hemoglobin stable 10 9 today will continue to observe at this time drop due to acute blood loss anemia  · Patient will most likely need rehab placement when cleared medically for discharge  Plan outpatient follow-up with Dr Elaina Carballo in 2 weeks  Subjective  Postop day 2 status post right hip ORIF  Patient feels that she has minimal pain at this time  The patient's son is at bedside and states that she is still having some confusion at this time  Patient denies any distal numbness or tingling at this time  Vitals  Temp:  [97 1 °F (36 2 °C)-98 9 °F (37 2 °C)] 97 5 °F (36 4 °C)  HR:  [63-74] 66  Resp:  [18] 18  BP: (101-119)/(59-76) 119/70  Body mass index is 36 43 kg/m²  I/O last 24 hours: In: -   Out: 622 [Urine:622]    Right Hip Exam  Alignment / Posture:  Normal resting hip posture  Inspection:  Mild right thigh swelling  No erythema  No ecchymosis  Mepilex dressing is clean, dry and intact at this time  Palpation:  Mild tenderness at The audra-incisional areas as expected     ROM:  Not tested  Strength:  5/5 AT, GSC, PT, and peroneals  Stability:  Not tested  Tests:  No pertinent positive or negative tests  Neurovascular:  Sensation intact in DP/SP/Frias/Sa/T nerve distributions  Sensation intact in all digital nerve distributions  Toes warm and perfused  Gait:  Not tested      Lab Results  (I have personally reviewed pertinent lab results )  Results from last 7 days   Lab Units 12/16/19  0545 12/15/19  0506 12/14/19  0425 12/13/19  1847 12/13/19  1131   WBC Thousand/uL 10 99* 12 03* 13 70*  --  12 09*   HEMOGLOBIN g/dL 10 9* 11 3* 13 0  --  14 0   HEMATOCRIT % 34 2* 35 3 41 6  --  43 1   PLATELETS Thousands/uL 167 157 181 186 209     Results from last 7 days   Lab Units 12/13/19  1131   PTT seconds 27   INR  0 99     Results from last 7 days   Lab Units 12/16/19  0545 12/15/19  0506 12/14/19  0425 12/13/19  1131   POTASSIUM mmol/L 3 6 4 2 3 8 3 9   CHLORIDE mmol/L 105 110* 105 106   CO2 mmol/L 26 26 27 25   BUN mg/dL 15 14 19 20   CREATININE mg/dL 0 81 0 90 0 79 0 95   EGFR ml/min/1 73sq m 71 63 73 59   CALCIUM mg/dL 8 5 8 4 8 8 8 6   PHOSPHORUS mg/dL  --   --  3 5  --                Sandhya Valdez PA-C

## 2019-12-16 NOTE — DISCHARGE INSTRUCTIONS
Discharge Instructions - Orthopedics      Weight Bearing Status:                                           Weightbearing as tolerated right lower extremity    DVT prophylaxis:  Complete course of Lovenox as directed    Pain:  Continue analgesics as directed    Dressing Instructions:   Keep dressing clean, dry and intact for 7 days  Daily dry non occlusive sterile dressing changes after the bandages are removed in 7 days from surgery  Do not shower until 7 days from surgery after initial bandages are removed    PT/OT:  Continue PT/OT on outpatient basis as directed    Appt Instructions: If you do not have your appointment, please call the clinic at [phone] to f/u with Dr Yasmine Perry in 10-14 days  Otherwise followup as scheduled     Contact the office sooner if you experience any increased numbness/tingling in the extremities

## 2019-12-16 NOTE — OCCUPATIONAL THERAPY NOTE
Occupational Therapy Treatment Note:         12/16/19 1208   Restrictions/Precautions   Weight Bearing Precautions Per Order Yes   RLE Weight Bearing Per Order WBAT   Other Precautions Pain; Fall Risk;Cognitive; Chair Alarm; Bed Alarm   Pain Assessment   Pain Assessment 0-10   Pain Score 3   Pain Type Acute pain;Surgical pain   Pain Location Hip   Pain Orientation Right   ADL   Where Assessed Chair   Grooming Assistance 5  Supervision/Setup   Grooming Deficit Setup   UB Bathing Assistance 4  Minimal Assistance   UB Bathing Deficit Setup   UB Bathing Comments with simulation and step by step verbal cues  LB Bathing Assistance 2  Maximal Assistance   LB Bathing Deficit Steadying;Setup;Verbal cueing;Supervision/safety; Increased time to complete;Perineal area; Buttocks;Right lower leg including foot; Left lower leg including foot   LB Bathing Comments limited stand tolerance/functional reach noted  UB Dressing Assistance 4  Minimal Assistance   UB Dressing Deficit Setup   UB Dressing Comments with simulation   LB Dressing Assistance 3  Moderate Assistance   LB Dressing Deficit Setup;Steadying; Requires assistive device for steadying;Supervision/safety;Verbal cueing; Increased time to complete; Don/doff L sock; Don/doff R sock   LB Dressing Comments Pt will benefit from further review of clothing mangement  Toileting Assistance  2  Maximal Assistance   Toileting Deficit Setup;Steadying;Verbal cueing;Supervison/safety; Increased time to complete;Clothing management down;Perineal hygiene;Clothing management up   Toileting Comments increased A for audra hygiene  Functional Standing Tolerance   Time 5 mins   Activity dynamic stand balance activity    Comments cues for safe hand placement and footing required  Improved tolerance noted this tx session  Bed Mobility   Additional Comments Pt OOB in bedside recliner upon arrival this tx session      Transfers   Sit to Stand 3  Moderate assistance   Additional items Assist x 2;Increased time required;Verbal cues;Armrests   Stand to Sit 3  Moderate assistance   Additional items Assist x 2;Verbal cues; Increased time required;Armrests   Stand pivot 3  Moderate assistance   Additional items Assist x 2; Increased time required;Verbal cues;Armrests   Toilet transfer 3  Moderate assistance   Additional items Assist x 2;Armrests; Increased time required;Verbal cues; Commode  (with nursing staff )   Additional Comments Pt with improved stand tolerance with step by step cues required  Functional Mobility   Functional Mobility 3  Moderate assistance   Additional Comments x2   Additional items Rolling walker   Cognition   Overall Cognitive Status Impaired   Arousal/Participation Alert; Responsive; Cooperative   Attention Attends with cues to redirect   Orientation Level Oriented to person;Oriented to place; Disoriented to time;Disoriented to situation   Memory Decreased short term memory;Decreased recall of precautions;Decreased recall of recent events   Following Commands Follows one step commands without difficulty   Comments Pt was able to follow simple commands with F carry over  Difficulty noted with review of complex items of conversation  Cognition Assessment Tools MOCA   Score 17   Additional Activities   Additional Activities Other (Comment)  (reviewed fall prevention/current plan of care  )   Additional Activities Comments Pt with limited carry over of reviewed information  Activity Tolerance   Activity Tolerance Patient limited by pain; Patient limited by fatigue   Medical Staff Made Aware reported all findings to RN Cherelle  Pt able to tolerate functional transfers with close step by step verbal cues provided  Staff will benefit from use of techs with functional transfers      Assessment   Assessment Pt was seen for skilled OT with focus on completion of functional transfers, review of RW safety, fall prevention, review of LB dressing techs, basic orientation, [facility] Cognitive Assessment version 7 1 and review of current plan of care  Pt's son was present at start of tx session  Son reports Pt having significant cognitive decline since her surgery with off topic comments made  Pt scored 17/30 indicating mild cognitive deficits  Areas of deficits included visuospatial/executive functioning scoring 1/5, attention scoring 4/6, language scoring 0/3 and delayed recall scoring 1/5  Pt was pleasant and cooperative through out tx session  Pt was able to carry over reviewed techs for RW safety with good tech  Limited advancement of LLE noted with activity  Min A x2 for functional transfers with step by step verbal cues for safe hand placement and footing  Pt with limited functional reach to BLE  Mod/Max A required overall for LE self care  See above levels of A required for all functional tasks  Pt with improved stand tolerance for more than 5 mins without LOB and Min A x2  Pt may benefit from further rehab with focus on achieving optimal performance levels with all functional tasks  Continue to recommend Inpatient rehab   Plan   Treatment Interventions ADL retraining;Functional transfer training; Endurance training;Cognitive reorientation   Goal Expiration Date 12/29/19   OT Treatment Day 1   OT Frequency 3-5x/wk   Recommendation   OT Discharge Recommendation Short Term Rehab   Barthel Index   Feeding 5   Bathing 0   Grooming Score 0   Dressing Score 5   Bladder Score 0   Bowels Score 5   Toilet Use Score 5   Transfers (Bed/Chair) Score 5   Mobility (Level Surface) Score 0   Stairs Score 0   Barthel Index Score 25   Brandon Ellison Rayray

## 2019-12-16 NOTE — PHYSICAL THERAPY NOTE
Physical Therapy Progress Note     12/16/19 1155   Pain Assessment   Pain Assessment 0-10   Pain Score 3   Pain Type Acute pain;Surgical pain   Pain Location Hip   Pain Orientation Right   Hospital Pain Intervention(s) Ambulation/increased activity;Repositioned;Cold applied   Response to Interventions Tolerated  Restrictions/Precautions   Weight Bearing Precautions Per Order Yes   RLE Weight Bearing Per Order WBAT   Other Precautions Fall Risk;Pain;Cognitive; Chair Alarm; Bed Alarm   General   Chart Reviewed Yes   Response to Previous Treatment Patient reporting fatigue but able to participate  Family/Caregiver Present No   Subjective   Subjective Willing to participate in therapy this AM    Transfers   Sit to Stand 3  Moderate assistance   Additional items Assist x 2;Armrests; Increased time required;Verbal cues   Stand to Sit 3  Moderate assistance   Additional items Assist x 2;Armrests; Increased time required;Verbal cues   Ambulation/Elevation   Gait pattern Decreased foot clearance; Forward Flexion; Short stride; Excessively slow; Inconsistent dayday;Decreased R stance; Antalgic   Gait Assistance 3  Moderate assist   Additional items Assist x 2;Verbal cues; Tactile cues   Assistive Device Rolling walker   Distance 25'   Balance   Static Sitting Fair -   Dynamic Sitting Poor   Static Standing Poor -   Dynamic Standing Poor -   Ambulatory Poor -   Endurance Deficit   Endurance Deficit Yes   Endurance Deficit Description fatigue/pain   Activity Tolerance   Activity Tolerance Patient limited by fatigue;Patient limited by pain   Medical Staff [address] [facility], [address]   Nurse Made Aware Yes   Exercises   THR Sitting;10 reps;AAROM; Bilateral   Assessment   Prognosis Fair   Problem List Decreased strength;Decreased range of motion;Decreased endurance; Impaired balance;Decreased mobility; Decreased cognition; Impaired judgement;Decreased safety awareness;Decreased skin integrity;Pain;Orthopedic restrictions; Obesity Assessment Pt  seated in bedside chair upon my arrival  Pt  reporting fatigue/pain, however agreeable to therapeutic intervention  Performance of HEP seated in bedside chair with cues provided for proper completion  Progressed with transfers being able to complete with A of 2 with cues for hand placement/technique  Pt  remains retropulsive upon standing requiring increased A of therapist to remain upright and overt falls  Progressed with an amb  trial with use of RW and A of 2 with a close chair follow  Pt  limited by fatigue/pain requiring quick return to bedside chair  Pt  remained seated in bedside chair with ice applied and alarm active at end of treatment session  PT will continue to recommend d/c to rehab when medically stable for continued improvement of noted impairments above  Barriers to Discharge Inaccessible home environment;Decreased caregiver support   Goals   Patient Goals To get better  STG Expiration Date 12/25/19   PT Treatment Day 1   Plan   Treatment/Interventions Functional transfer training;LE strengthening/ROM; Therapeutic exercise; Endurance training;Gait training;Spoke to nursing;Spoke to case management;OT;Family   Progress Slow progress, decreased activity tolerance   PT Frequency Other (Comment)  (3-5x/wk)   Recommendation   Recommendation Short-term skilled PT   Equipment Recommended Walker  (RW)   PT - OK to Discharge Yes  (if d/c to rehab when medically stable )     Jocelyn Closs, PTA

## 2019-12-16 NOTE — SOCIAL WORK
CM met with pt and son at bedside to discuss discharge needs  Pt lives in a house with her ; 5STE  Pt requiring some assistance at home; spouse able to help  Pt uses a rolator for ambulation needs  PCP identified as Dr Anne Marie Pérez  Pharmacy identified as [facility]  POA identified as pt's spouse, Raquel Walker  Pt reports hx of STR at [facility] AND [facility]  A post acute care recommendation was made by your care team for Acute Rehab  Discussed [place] [facility] [place] with both patient and POA  List of facilities given to both patient and POA via in person  both patient and POA aware the list is custom filtered for them by zip code location and that [facility] post acute providers are designated  Son reports that pt's spouse will be in later today and will likely choose [facility]  CM asked liaison to stop by to give some more information

## 2019-12-16 NOTE — PLAN OF CARE
Problem: OCCUPATIONAL THERAPY ADULT  Goal: Performs self-care activities at highest level of function for planned discharge setting  See evaluation for individualized goals  Description  Treatment Interventions: ADL retraining, Functional transfer training, UE strengthening/ROM, Endurance training, Cognitive reorientation, Patient/family training, Equipment evaluation/education, Compensatory technique education, Continued evaluation          See flowsheet documentation for full assessment, interventions and recommendations  Outcome: Progressing  Note:   Limitation: Decreased ADL status, Decreased UE ROM, Decreased UE strength, Decreased Safe judgement during ADL, Decreased cognition, Decreased endurance, Decreased fine motor control, Decreased high-level ADLs  Prognosis: Fair  Assessment: Pt was seen for skilled OT with focus on completion of functional transfers, review of RW safety, fall prevention, review of LB dressing techs, basic orientation, completion of the [facility] Cognitive Assessment version 7 1 and review of current plan of care  Pt's son was present at start of tx session  Son reports Pt having significant cognitive decline since her surgery with off topic comments made  Pt scored 17/30 indicating mild cognitive deficits  Areas of deficits included visuospatial/executive functioning scoring 1/5, attention scoring 4/6, language scoring 0/3 and delayed recall scoring 1/5  Pt was pleasant and cooperative through out tx session  Pt was able to carry over reviewed techs for RW safety with good tech  Limited advancement of LLE noted with activity  Min A x2 for functional transfers with step by step verbal cues for safe hand placement and footing  Pt with limited functional reach to BLE  Mod/Max A required overall for LE self care  See above levels of A required for all functional tasks  Pt with improved stand tolerance for more than 5 mins without LOB and Min A x2   Pt may benefit from further rehab with focus on achieving optimal performance levels with all functional tasks   Continue to recommend Inpatient rehab     OT Discharge Recommendation: Short Term Rehab

## 2019-12-16 NOTE — PLAN OF CARE
Problem: PHYSICAL THERAPY ADULT  Goal: Performs mobility at highest level of function for planned discharge setting  See evaluation for individualized goals  Description  Treatment/Interventions: Functional transfer training, LE strengthening/ROM, Elevations, Therapeutic exercise, Endurance training, Cognitive reorientation, Patient/family training, Equipment eval/education, Bed mobility, Gait training  Equipment Recommended: (S) Walker(RW, not rollator (not enough control for 4 wheeled device))       See flowsheet documentation for full assessment, interventions and recommendations  Outcome: Progressing  Note:   Prognosis: Fair  Problem List: Decreased strength, Decreased range of motion, Decreased endurance, Impaired balance, Decreased mobility, Decreased cognition, Impaired judgement, Decreased safety awareness, Decreased skin integrity, Pain, Orthopedic restrictions, Obesity  Assessment: Pt  seated in bedside chair upon my arrival  Pt  reporting fatigue/pain, however agreeable to therapeutic intervention  Performance of HEP seated in bedside chair with cues provided for proper completion  Progressed with transfers being able to complete with A of 2 with cues for hand placement/technique  Pt  remains retropulsive upon standing requiring increased A of therapist to remain upright and overt falls  Progressed with an amb  trial with use of RW and A of 2 with a close chair follow  Pt  limited by fatigue/pain requiring quick return to bedside chair  Pt  remained seated in bedside chair with ice applied and alarm active at end of treatment session  PT will continue to recommend d/c to rehab when medically stable for continued improvement of noted impairments above  Barriers to Discharge: Inaccessible home environment, Decreased caregiver support     Recommendation: Short-term skilled PT     PT - OK to Discharge: Yes(if d/c to rehab when medically stable )    See flowsheet documentation for full assessment

## 2019-12-17 LAB
ANION GAP SERPL CALCULATED.3IONS-SCNC: 8 MMOL/L (ref 4–13)
BUN SERPL-MCNC: 19 MG/DL (ref 5–25)
CALCIUM SERPL-MCNC: 8.4 MG/DL (ref 8.3–10.1)
CHLORIDE SERPL-SCNC: 107 MMOL/L (ref 100–108)
CO2 SERPL-SCNC: 28 MMOL/L (ref 21–32)
CREAT SERPL-MCNC: 0.75 MG/DL (ref 0.6–1.3)
ERYTHROCYTE [DISTWIDTH] IN BLOOD BY AUTOMATED COUNT: 12.9 % (ref 11.6–15.1)
GFR SERPL CREATININE-BSD FRML MDRD: 78 ML/MIN/1.73SQ M
GLUCOSE SERPL-MCNC: 111 MG/DL (ref 65–140)
GLUCOSE SERPL-MCNC: 117 MG/DL (ref 65–140)
GLUCOSE SERPL-MCNC: 117 MG/DL (ref 65–140)
GLUCOSE SERPL-MCNC: 118 MG/DL (ref 65–140)
GLUCOSE SERPL-MCNC: 153 MG/DL (ref 65–140)
HCT VFR BLD AUTO: 32.9 % (ref 34.8–46.1)
HGB BLD-MCNC: 10.6 G/DL (ref 11.5–15.4)
MCH RBC QN AUTO: 31.4 PG (ref 26.8–34.3)
MCHC RBC AUTO-ENTMCNC: 32.2 G/DL (ref 31.4–37.4)
MCV RBC AUTO: 97 FL (ref 82–98)
PLATELET # BLD AUTO: 176 THOUSANDS/UL (ref 149–390)
PMV BLD AUTO: 11.2 FL (ref 8.9–12.7)
POTASSIUM SERPL-SCNC: 3.5 MMOL/L (ref 3.5–5.3)
RBC # BLD AUTO: 3.38 MILLION/UL (ref 3.81–5.12)
SODIUM SERPL-SCNC: 143 MMOL/L (ref 136–145)
WBC # BLD AUTO: 9.96 THOUSAND/UL (ref 4.31–10.16)

## 2019-12-17 PROCEDURE — 97116 GAIT TRAINING THERAPY: CPT

## 2019-12-17 PROCEDURE — 97530 THERAPEUTIC ACTIVITIES: CPT

## 2019-12-17 PROCEDURE — 97110 THERAPEUTIC EXERCISES: CPT

## 2019-12-17 PROCEDURE — 99024 POSTOP FOLLOW-UP VISIT: CPT | Performed by: PHYSICIAN ASSISTANT

## 2019-12-17 PROCEDURE — 99232 SBSQ HOSP IP/OBS MODERATE 35: CPT | Performed by: INTERNAL MEDICINE

## 2019-12-17 PROCEDURE — 80048 BASIC METABOLIC PNL TOTAL CA: CPT | Performed by: ORTHOPAEDIC SURGERY

## 2019-12-17 PROCEDURE — 97535 SELF CARE MNGMENT TRAINING: CPT

## 2019-12-17 PROCEDURE — 85027 COMPLETE CBC AUTOMATED: CPT | Performed by: ORTHOPAEDIC SURGERY

## 2019-12-17 PROCEDURE — 82948 REAGENT STRIP/BLOOD GLUCOSE: CPT

## 2019-12-17 RX ORDER — QUETIAPINE FUMARATE 25 MG/1
25 TABLET, FILM COATED ORAL
Status: DISCONTINUED | OUTPATIENT
Start: 2019-12-17 | End: 2019-12-18

## 2019-12-17 RX ORDER — QUETIAPINE FUMARATE 25 MG/1
25 TABLET, FILM COATED ORAL
Status: DISCONTINUED | OUTPATIENT
Start: 2019-12-17 | End: 2019-12-17

## 2019-12-17 RX ADMIN — PAROXETINE 20 MG: 20 TABLET, FILM COATED ORAL at 08:14

## 2019-12-17 RX ADMIN — ATENOLOL 50 MG: 50 TABLET ORAL at 08:13

## 2019-12-17 RX ADMIN — PRAVASTATIN SODIUM 40 MG: 40 TABLET ORAL at 08:14

## 2019-12-17 RX ADMIN — POLYETHYLENE GLYCOL 3350 17 G: 17 POWDER, FOR SOLUTION ORAL at 08:14

## 2019-12-17 RX ADMIN — ASPIRIN 81 MG: 81 TABLET, COATED ORAL at 08:13

## 2019-12-17 RX ADMIN — CARBIDOPA AND LEVODOPA 1 TABLET: 25; 100 TABLET ORAL at 16:59

## 2019-12-17 RX ADMIN — Medication 400 MG: at 08:13

## 2019-12-17 RX ADMIN — CARBIDOPA AND LEVODOPA 1 TABLET: 25; 100 TABLET ORAL at 20:16

## 2019-12-17 RX ADMIN — SENNOSIDES AND DOCUSATE SODIUM 1 TABLET: 8.6; 5 TABLET ORAL at 08:14

## 2019-12-17 RX ADMIN — CARBIDOPA AND LEVODOPA 1 TABLET: 25; 100 TABLET ORAL at 08:13

## 2019-12-17 RX ADMIN — SELEGILINE HYDROCHLORIDE 5 MG: 5 TABLET ORAL at 08:14

## 2019-12-17 RX ADMIN — ENOXAPARIN SODIUM 40 MG: 40 INJECTION SUBCUTANEOUS at 02:35

## 2019-12-17 RX ADMIN — SENNOSIDES AND DOCUSATE SODIUM 1 TABLET: 8.6; 5 TABLET ORAL at 17:00

## 2019-12-17 RX ADMIN — QUETIAPINE FUMARATE 25 MG: 25 TABLET ORAL at 20:16

## 2019-12-17 NOTE — PROGRESS NOTES
PHYSICAL MEDICINE AND REHABILITATION   PREADMISSION ASSESSMENT     Projected [facility] Diagnoses:  Impairment of mobility, safety and Activities of Daily Living (ADLs) due to Orthopedic Disorders:  08 11  Unilateral Hip Fracture   Etiology: Right Intertrochanteric Femur Fracture  Date of Onset: 12/13/19   Date of surgery: Right Hip Cephalomedullary IM Nail with Synthes TFN    PATIENT INFORMATION  Name: Prosper Mcgowan Phone #: [phone] (home)   Address[address] [facility]  [address]  YOB: 1945 Age: 75 y o  SS#   Marital Status: /Civil Union  Ethnicity:   Employment Status: retired  Extended Emergency Contact Information  Primary Emergency Contact: [address] of [address] [facility] Phone: [phone]  Mobile Phone: [phone]  Relation: Spouse  Advance Directive: Level 3 DNAR/DNI, AD Unknown    INSURANCE/COVERAGE:     Primary Payor: MEDICARE / Plan: MEDICARE A AND B / Product Type: Medicare A & B Fee for Service /   Secondary Payer:Aetna   Payer Contact:  Payer Contact:   Contact Phone:  Contact Phone:   Authorization #:   Coverage Dates:  LCD:   Medicare ID #: [id number]  Medicare Days:60/30/60  Medical Record #: [id number]    REFERRAL SOURCE:   Referring provider: Lynne Gregory DO  Referring facility: [address] [place] [address]  Room: [place] [address] /E2 -*  PCP: Carrie Robb MD PCP phone number: [phone]    MEDICAL INFORMATION  HPI: Baldev Whaley is a 75year old female with PMH of DM 2, HLD, HTN, and Parkinson's disease s/p deep brain stimulator  [facility] by ambulance after a fall at home while trying to arrange dishes in a cabinet  She fell onto her right hip  She did not hit her head or lose consciousness  She was unable to stand and her right leg was shortened and externally rotated on exam in the ED    Right hip x-ray showed mildly displaced right femoral intertrochanteric fracture  Orthopedics was consulted and recommended that the patient be non weight bearing to the right lower extremity and under go surgical intervention  She went to the OR on 12/14/19 with Dr Terrance Boucher of orthopedics for a right hip cephalomedullary IM nail with synthes TFN  Post operatively orthopedics cleared her to be weight bearing as tolerated  On arrival patient's hemoglobin was 14 0 and was 10 6 on 12/17  She has not required transfusion but is being closely monitored  Postoperatively the she was with hallucinations  Internal medicine felt that hallucinations/ delirium was due to her sleep wake schedule and recommended seroquel  Family reports that she sometimes has these symptoms from her sinemet  Neurology was consulted and recommended changes to the dosing  Delerium and hallucinations have improved during her hospital course  She was with constipation and was started on a bowel regimen  PT and OT have evaluated the patient and are recommending inpatient rehab as well as attending physician  She is medically ready for transfer for today  Past Medical History:   Past Surgical History: Allergies:     Past Medical History:   Diagnosis Date    Anxiety     Diabetes mellitus [facility]     Diabetes mellitus type 2, diet-controlled ([facility] )     Hyperlipidemia     Hypertension     Parkinson disease ([facility] )     Past Surgical History:   Procedure Laterality Date    ACHILLES TENDON SURGERY      DEEP BRAIN STIMULATOR PLACEMENT      DENTAL SURGERY      MD IMP STIM,CRANIAL,SUBQ,1 ARRAY Right 12/18/2018    Procedure: REPLACEMENT IMPLANTABLE PULSE GENERATOR (IPG) DEEP BRAIN STIMULATION (DBS); Surgeon: Alex Holm MD;  Location:  MAIN OR;  Service: Neurosurgery    MD OPEN RX FEMUR FX+INTRAMED ERIKA Right 12/14/2019    Procedure: INSERTION NAIL IM FEMUR ANTEGRADE (TROCHANTERIC);   Surgeon: Isaiah Boxer, DO;  Location: [facility];  Service: [address] [place] [address]  REVERSE TOTAL SHOULDER ARTHROPLASTY Left 8/23/2018    Procedure: ARTHROPLASTY SHOULDER REVERSE;  Surgeon: Susan Vergara MD;  Location: [facility];  Service: Orthopedics    TONSILLECTOMY       Allergies   Allergen Reactions    Sulfa Antibiotics Hives         Comorbidities: S/p fall, s/p IM nailing, acute pain, constipation, DM 2, Parkinson's with DBS, and ABLA    CURRENT VITAL SIGNS:   Temp:  [97 1 °F (36 2 °C)-97 9 °F (36 6 °C)] 97 9 °F (36 6 °C)  HR:  [63-68] 68  Resp:  [18] 18  BP: (101-119)/(59-73) 114/73   Intake/Output Summary (Last 24 hours) at 12/16/2019 2048  Last data filed at 12/16/2019 0355  Gross per 24 hour   Intake    Output 372 ml   Net -372 ml        LABORATORY RESULTS:      Lab Results   Component Value Date    HGB 10 9 (L) 12/16/2019    HGB 13 0 11/06/2014    HCT 34 2 (L) 12/16/2019    HCT 39 1 11/06/2014    WBC 10 99 (H) 12/16/2019    WBC 11 45 (H) 11/06/2014     Lab Results   Component Value Date    BUN 15 12/16/2019    BUN 16 11/06/2014     11/06/2014    K 3 6 12/16/2019    K 3 6 11/06/2014     12/16/2019     11/06/2014    GLUCOSE 100 11/06/2014    CREATININE 0 81 12/16/2019    CREATININE 0 51 (L) 11/06/2014     Lab Results   Component Value Date    PROTIME 13 2 12/13/2019    PROTIME 13 7 11/06/2014    INR 0 99 12/13/2019    INR 1 07 11/06/2014        DIAGNOSTIC STUDIES:  Xr Hip/pelv 2-3 Vws Right If Performed    Result Date: 12/15/2019  Impression: Radiographically satisfactory appearance of intramedullary beatriz and screw fixation right femoral neck fracture, unchanged Workstation performed: [id number]     Xr Hip/pelv 2-3 Vws Right If Performed    Result Date: 12/15/2019  Impression: Fluoroscopic guidance provided for surgical procedure  Please refer to the separate procedure notes for additional details  Workstation performed: [id number]     Xr Hip/pelv 2-3 Vws Right    Result Date: 12/13/2019  Impression: Mildly displaced right femoral intertrochanteric fracture  Workstation performed: [id number]     Xr Femur 2 Vw Right    Result Date: 12/14/2019  Impression: Intertrochanteric fracture right femur  Osteoarthritis of the knee  Workstation performed: [id number]     Xr Chest 1 View    Result Date: 12/13/2019  Impression: Stable chest with no acute cardiopulmonary disease   Workstation performed: [id number]       PRECAUTIONS/SPECIAL NEEDS:  Tobacco:   Social History     Tobacco Use   Smoking Status Never Smoker   Smokeless Tobacco Never Used   , Alcohol:    Social History     Substance and Sexual Activity   Alcohol Use Yes    Comment: occasional   , Total Hip Precautions, Weight Bearing Precautions:  weight bearing as tolerated, Anticoagulation:  aspirin 81 mg orally every day and Lovenox 40mg daily, Blood Sugar Management: as per MD, Edema Management, Safety Concerns, Pain Management, Bladder Incontinence: # of accidents 8 times in 3 days, Dietary Restrictions: Consistent Carb Level 1, Language Preference: English and Fall Precations    MEDICATIONS:     Current Facility-Administered Medications:     acetaminophen (TYLENOL) tablet 650 mg, 650 mg, Oral, Q6H PRN, Willy Malik DO    aspirin (ECOTRIN LOW STRENGTH) EC tablet 81 mg, 81 mg, Oral, Daily, Jose Weinstein DO, 81 mg at 12/16/19 0835    atenolol (TENORMIN) tablet 50 mg, 50 mg, Oral, Daily, Jose Weinstein DO, 50 mg at 12/16/19 0835    carbidopa-levodopa (SINEMET)  mg per tablet 1 tablet, 1 tablet, Oral, TID, Treva Acron, DO, 1 tablet at 12/16/19 2014    co-enzyme Q-10 capsule 400 mg, 400 mg, Oral, Daily, Jose Weinstein DO, 400 mg at 12/16/19 0835    enoxaparin (LOVENOX) subcutaneous injection 40 mg, 40 mg, Subcutaneous, Daily, Jose Weinstein DO, 40 mg at 12/16/19 0225    insulin lispro (HumaLOG) 100 units/mL subcutaneous injection 1-6 Units, 1-6 Units, Subcutaneous, TID AC **AND** Fingerstick Glucose (POCT), , , TID AC, Jose Weinstein DO    ondansetron (ZOFRAN) injection 4 mg, 4 mg, Intravenous, Q4H PRN, Sherol King, DO    oxyCODONE (ROXICODONE) IR tablet 5 mg, 5 mg, Oral, Q4H PRN, Jose Julesow, DO, 5 mg at 12/15/19 1457    PARoxetine (PAXIL) tablet 20 mg, 20 mg, Oral, Daily, Jose Smithoshow, DO, 20 mg at 12/16/19 0836    polyethylene glycol (MIRALAX) packet 17 g, 17 g, Oral, Daily, Carlos Hardenng, DO, 17 g at 12/16/19 1547    pravastatin (PRAVACHOL) tablet 40 mg, 40 mg, Oral, Every Other Day, Jose Smithoshow, DO, 40 mg at 12/15/19 9447    selegiline (ELDEPRYL) tablet 5 mg, 5 mg, Oral, Daily With Breakfast, Jose Julesow, DO, 5 mg at 12/16/19 0836    senna-docusate sodium (SENOKOT S) 8 6-50 mg per tablet 1 tablet, 1 tablet, Oral, BID, Willy Lowe, DO, 1 tablet at 12/16/19 1706    SKIN INTEGRITY:   Bruising to the right hip and buttocks and right hip incision with silver dressing    PRIOR LEVEL OF FUNCTION:  She lives in a(n) single family home  Bebeto Shaw is  and lives with their spouse  Self Care: Needed some help, Indoor Mobility: Modified Independent, Stairs (in/outdoor): Modified Independent  and Cognition: Needed some help     HISTORY OF FALLS IN THE LAST 6 MONTHS: 1- cause for admission    HOME ENVIRONMENT:  The living area: can live on one level  There are 5 steps to enter the home  The patient will have 24 hour supervision/physical assistance available upon discharge  PREVIOUS DME:  Equipment in home (previous DME): Shower Chair, Wells Rush, Rollator, and Grab Bars     FUNCTIONAL STATUS:  Physical Therapy Occupational Therapy Speech Therapy   As per PTA:      12/17/19 1218   Pain Assessment   Pain Assessment 0-10   Pain Score 8   Pain Type Acute pain;Surgical pain   Pain Location Hip   Pain Orientation Right   Hospital Pain Intervention(s) Ambulation/increased activity;Repositioned;Cold applied   Response to Interventions Tolerated  Restrictions/Precautions   Weight Bearing Precautions Per Order Yes   RLE Weight Bearing Per Order WBAT   Other Precautions Fall Risk;Pain; Chair Alarm; Bed Alarm;Cognitive   General   Chart Reviewed Yes   Response to Previous Treatment Patient reporting fatigue but able to participate  Family/Caregiver Present Yes   Subjective   Subjective Willing to participate in therapy this PM    Transfers   Sit to Stand 3  Moderate assistance   Additional items Assist x 2;Armrests; Increased time required;Verbal cues   Stand to Sit 3  Moderate assistance   Additional items Assist x 2;Armrests; Increased time required;Verbal cues   Ambulation/Elevation   Gait pattern Decreased foot clearance; Forward Flexion; Improper Weight shift;Narrow NANCY; Short stride; Excessively slow; Step to; Inconsistent dayday; Shuffling;Decreased R stance; Antalgic   Gait Assistance 3  Moderate assist   Additional items Assist x 2;Verbal cues; Tactile cues   Assistive Device Rolling walker   Distance 30'   Balance   Static Sitting Fair -   Dynamic Sitting Poor   Static Standing Poor -   Dynamic Standing Poor -   Ambulatory Poor -   Endurance Deficit   Endurance Deficit Yes   Endurance Deficit Description fatigue/pain   Activity Tolerance   Activity Tolerance Patient limited by fatigue;Patient limited by pain   Medical Staff [facility], OTR   Nurse Made Aware Yes   Exercises   THR Sitting;10 reps;AAROM; Bilateral   Assessment   Prognosis Fair   Problem List Decreased strength;Decreased endurance;Decreased range of motion; Impaired balance;Decreased mobility;Obesity; Decreased skin integrity;Orthopedic restrictions;Pain;Decreased cognition;Decreased safety awareness; Impaired judgement   Assessment Pt  seated in bedside chair upon my arrival  Pt  reporting fatigue, however agreeable to therapeutic intervention  Performance of HEP seated in bedside chair with cues provided for proper completion  Progressed with transfers continuing to require A of 2 with cues for hand placement/technique  Progressed with an amb  trial with use of RW and A of 2 with cues provided for LE sequencing and a close chair follow  Pt  remains limited by increased fatigue requiring return to seated in bedside chair  Pt  remained seated in bedside chair with ice applied and alarm active at end of treatment session  PT will continue to recommend d/c to rehab when medically stable for continued improvement of noted impairments above  As per OT:      12/17/19 1200   Restrictions/Precautions   Weight Bearing Precautions Per Order Yes   RLE Weight Bearing Per Order WBAT   Other Precautions Fall Risk;Pain; Chair Alarm; Bed Alarm   Pain Assessment   Pain Assessment FLACC   Pain Score 8   Pain Type Acute pain;Surgical pain   Pain Location Hip   Pain Orientation Right   Pain Rating: FLACC (Rest) - Face 0   Pain Rating: FLACC (Rest) - Legs 0   Pain Rating: FLACC (Rest) - Activity 0   Pain Rating: FLACC (Rest) - Cry 0   Pain Rating: FLACC (Rest) - Consolability 0   Score: FLACC (Rest) 0   ADL   Where Assessed Edge of bed   LB Dressing Assistance 1  Total Assistance   LB Dressing Deficit Don/doff R sock; Don/doff L sock   Functional Standing Tolerance   Time 1-2mins   Bed Mobility   Rolling R 3  Moderate assistance   Additional items Assist x 1; Increased time required;Verbal cues;LE management   Rolling L 3  Moderate assistance   Additional items Assist x 1; Increased time required;Verbal cues;LE management   Supine to Sit 2  Maximal assistance   Additional items Assist x 1; Increased time required;Verbal cues;LE management   Transfers   Sit to Stand 3  Moderate assistance   Additional items Assist x 2; Increased time required;Verbal cues   Stand to Sit 3  Moderate assistance   Additional items Assist x 2; Increased time required;Verbal cues   Functional Mobility   Functional Mobility 3  Moderate assistance   Additional Comments x2   Additional items Rolling walker   Cognition   Overall Cognitive Status Impaired   Arousal/Participation Alert; Cooperative   Attention Attends with cues to redirect   Orientation Level Oriented X4   Memory Decreased recall of recent events;Decreased recall of precautions   Following Commands Follows one step commands with increased time or repetition   Activity Tolerance   Activity Tolerance Patient limited by fatigue;Patient limited by pain   Medical Staff Made Aware nsg, P T  Assessment   Assessment Pt seen for 40min tx session with focus on functional mobility, functional balance, activity tolerance, transfer safety, LE ADLs, and cognition  Pt able to tolerate OOB mobility; sitting balance=f+/f, standing balance=f-/p+  Pt required physical assistance to maintain transfer safety  Pt dependent with LE ADLs  Cognitive deficits noted(i e problem-solving, judgement/safety);  states cognition worsen than baseline, but states some baseline confusion  Functional mobility/balance improving  Pt continues to demonstrate appropriateness for inpt rehab to improve her overall level of independence  Will continue  As per BENNETT on 12/16/19:    Cognition Assessment Tools MOCA   Score 17          CURRENT GAP IN FUNCTION  Prior to admission the patient was supervision to modified independent with transfers and ambulation with rollator, supervision for bathing, independent with upper body dressing, required some assist with lower body dressing, and patient's  completed the IADLs  Estimated length of stay: 10 to 14 days    Anticipated Post-Discharge Disposition/Treatment  Disposition: Return to previous home/apartment    Outpatient Services: Physical Therapy (PT) and Occupational Therapy (OT)    BARRIERS TO DISCHARGE  Lovenox, Weakness, Pain, Diminished cognition/Mentation change, Balance Difficulty, Fatigue, Home Accessibility, Caregiver Accessibility, Financial Resources, Equipment Needs and Resource Availability    INTERVENTIONS FOR DISCHARGE  Adaptive equipment, Patient/Family/Caregiver Education, [employer], Financial Assistance, Arrange DME needs, Medication Changes as per MD, Therapy exercises, Center of balance support  and Energy conservation education     REQUIRED THERAPY:  Patient will require PT, OT and ST 60 minutes each per day, five days per week to achieve rehab goals  REQUIRED FUNCTIONAL AND MEDICAL MANAGEMENT FOR INPATIENT REHABILITATION:  Skin:  Bruising to the right hip and buttocks and right hip incision with silver dressing, Pain Management: Overall pain is well controlled, Deep Vein Thrombosis (DVT) Prophylaxis:  low molecular weight heparin and SCD's while in bed, Diabetes Management: continue sliding scale insulin, patient to do finger sticks as ordered, SLIM to continue to manage diabetes, and additional medical conditions, nursing for bowel and bladder management and education, PM&R to maximize function and provide medical oversight, PT/OT/ST intervention, patient and family education and training and any consults as needed  RECOMMENDED LEVEL OF CARE:  Marcello Pérez presented to [facility] on 12/13/19 by ambulance after a fall at home while trying to arrange dishes in a cabinet  She fell onto her right hip  She was unable to stand and her right leg was shortened and externally rotated on exam in the ED  Right hip x-ray showed mildly displaced right femoral intertrochanteric fracture  Orthopedics was consulted and recommended that the patient be non weight bearing to the right lower extremity and under go surgical intervention  She went to the OR on 12/14/19 with Dr Fili Oh of orthopedics for a right hip cephalomedullary IM nail with synthes TFN  Post operatively orthopedics cleared her to be weight bearing as tolerated  On arrival patient's hemoglobin was 14 0 and was 10 6 on 12/17  She has not required transfusion but is being closely monitored  Postoperatively the she was with hallucinations  Internal medicine felt that hallucinations/ delirium was due to her sleep wake schedule and recommended seroquel  Family reports that she sometimes has these symptoms from her sinemet  Neurology was consulted and recommended changes to the dosing  Delerium and hallucinations have improved during her hospital course  Prior to admission the patient was supervision to modified independent with transfers and ambulation with rollator, supervision for bathing, independent with upper body dressing, required some assist with lower body dressing, and patient's  completed the IADLs  Currently she is a mod assist of 2 with ambulation with rolling walker as well total assist with lower body ADLs  Nursing is being recommended for education and bladder management to prevent skin break down, internal medicine to monitor and manage medical conditions, PM&R to maximize function and provide medical oversight, and inpatient rehab to maximize self care and mobility upon discharge to home with the support and assistance of her

## 2019-12-17 NOTE — PHYSICAL THERAPY NOTE
Physical Therapy Progress Note     12/17/19 1218   Pain Assessment   Pain Assessment 0-10   Pain Score 8   Pain Type Acute pain;Surgical pain   Pain Location Hip   Pain Orientation Right   Hospital Pain Intervention(s) Ambulation/increased activity;Repositioned;Cold applied   Response to Interventions Tolerated  Restrictions/Precautions   Weight Bearing Precautions Per Order Yes   RLE Weight Bearing Per Order WBAT   Other Precautions Fall Risk;Pain; Chair Alarm; Bed Alarm;Cognitive   General   Chart Reviewed Yes   Response to Previous Treatment Patient reporting fatigue but able to participate  Family/Caregiver Present Yes   Subjective   Subjective Willing to participate in therapy this PM    Transfers   Sit to Stand 3  Moderate assistance   Additional items Assist x 2;Armrests; Increased time required;Verbal cues   Stand to Sit 3  Moderate assistance   Additional items Assist x 2;Armrests; Increased time required;Verbal cues   Ambulation/Elevation   Gait pattern Decreased foot clearance; Forward Flexion; Improper Weight shift;Narrow NANCY; Short stride; Excessively slow; Step to; Inconsistent dayday; Shuffling;Decreased R stance; Antalgic   Gait Assistance 3  Moderate assist   Additional items Assist x 2;Verbal cues; Tactile cues   Assistive Device Rolling walker   Distance 30'   Balance   Static Sitting Fair -   Dynamic Sitting Poor   Static Standing Poor -   Dynamic Standing Poor -   Ambulatory Poor -   Endurance Deficit   Endurance Deficit Yes   Endurance Deficit Description fatigue/pain   Activity Tolerance   Activity Tolerance Patient limited by fatigue;Patient limited by pain   [facility] [address] [facility], OTR   Nurse Made Aware Yes   Exercises   THR Sitting;10 reps;AAROM; Bilateral   Assessment   Prognosis Fair   Problem List Decreased strength;Decreased endurance;Decreased range of motion; Impaired balance;Decreased mobility;Obesity; Decreased skin integrity;Orthopedic restrictions;Pain;Decreased cognition;Decreased safety awareness; Impaired judgement   Assessment Pt  seated in bedside chair upon my arrival  Pt  reporting fatigue, however agreeable to therapeutic intervention  Performance of HEP seated in bedside chair with cues provided for proper completion  Progressed with transfers continuing to require A of 2 with cues for hand placement/technique  Progressed with an amb  trial with use of RW and A of 2 with cues provided for LE sequencing and a close chair follow  Pt  remains limited by increased fatigue requiring return to seated in bedside chair  Pt  remained seated in bedside chair with ice applied and alarm active at end of treatment session  PT will continue to recommend d/c to rehab when medically stable for continued improvement of noted impairments above  Barriers to Discharge Decreased caregiver support; Inaccessible home environment   Barriers to Discharge Comments MARTINE, level of support at home  Goals   Patient Goals To get better  STG Expiration Date 12/25/19   PT Treatment Day 2   Plan   Treatment/Interventions Functional transfer training;LE strengthening/ROM; Endurance training; Therapeutic exercise; Bed mobility;Gait training;Spoke to nursing;Spoke to case management;OT;Family   Progress Slow progress, decreased activity tolerance   PT Frequency Other (Comment)  (3-5x/wk)   Recommendation   Recommendation Short-term skilled PT   Equipment Recommended Walker  (RW)   PT - OK to Discharge Yes  (if d/c to rehab )     Jocelyn Closs, PTA

## 2019-12-17 NOTE — SOCIAL WORK
Pt approved to go to [facility] today for STR    CM arranged a BLS transport for 1PM   Number for report: [phone]

## 2019-12-17 NOTE — OCCUPATIONAL THERAPY NOTE
OccupationalTherapy Progress Note(time=1120-1200)     Patient Name: Gregorio RETANASC'D Date: 12/17/2019  Problem List  Principal Problem:    Closed right hip fracture, initial encounter [facility])  Active Problems:    Parkinson's disease with use of electrical brain stimulation ([facility] [address] )    Other hyperlipidemia    Type 2 diabetes mellitus without complication ([facility] [address] )    Essential hypertension    Closed fracture of right hip ([facility] [address] )    Constipation       12/17/19 1200   Restrictions/Precautions   Weight Bearing Precautions Per Order Yes   RLE Weight Bearing Per Order WBAT   Other Precautions Fall Risk;Pain; Chair Alarm; Bed Alarm   Pain Assessment   Pain Assessment FLACC   Pain Score 8   Pain Type Acute pain;Surgical pain   Pain Location Hip   Pain Orientation Right   Pain Rating: FLACC (Rest) - Face 0   Pain Rating: FLACC (Rest) - Legs 0   Pain Rating: FLACC (Rest) - Activity 0   Pain Rating: FLACC (Rest) - Cry 0   Pain Rating: FLACC (Rest) - Consolability 0   Score: FLACC (Rest) 0   ADL   Where Assessed Edge of bed   LB Dressing Assistance 1  Total Assistance   LB Dressing Deficit Don/doff R sock; Don/doff L sock   Functional Standing Tolerance   Time 1-2mins   Bed Mobility   Rolling R 3  Moderate assistance   Additional items Assist x 1; Increased time required;Verbal cues;LE management   Rolling L 3  Moderate assistance   Additional items Assist x 1; Increased time required;Verbal cues;LE management   Supine to Sit 2  Maximal assistance   Additional items Assist x 1; Increased time required;Verbal cues;LE management   Transfers   Sit to Stand 3  Moderate assistance   Additional items Assist x 2; Increased time required;Verbal cues   Stand to Sit 3  Moderate assistance   Additional items Assist x 2; Increased time required;Verbal cues   Functional Mobility   Functional Mobility 3  Moderate assistance   Additional Comments x2   Additional items Rolling walker   Cognition   Overall Cognitive Status Impaired Arousal/Participation Alert; Cooperative   Attention Attends with cues to redirect   Orientation Level Oriented X4   Memory Decreased recall of recent events;Decreased recall of precautions   Following Commands Follows one step commands with increased time or repetition   Activity Tolerance   Activity Tolerance Patient limited by fatigue;Patient limited by pain   Medical Staff Made Aware nsg, P T  Assessment   Assessment Pt seen for 40min tx session with focus on functional mobility, functional balance, activity tolerance, transfer safety, LE ADLs, and cognition  Pt able to tolerate OOB mobility; sitting balance=f+/f, standing balance=f-/p+  Pt required physical assistance to maintain transfer safety  Pt dependent with LE ADLs  Cognitive deficits noted(i e problem-solving, judgement/safety);  states cognition worsen than baseline, but states some baseline confusion  Functional mobility/balance improving  Pt continues to demonstrate appropriateness for inpt rehab to improve her overall level of independence  Will continue  Plan   Treatment Interventions ADL retraining;Functional transfer training;UE strengthening/ROM; Endurance training;Cognitive reorientation;Patient/family training;Equipment evaluation/education; Compensatory technique education   Goal Expiration Date 12/29/19   OT Treatment Day 2   OT Frequency 3-5x/wk   Recommendation   OT Discharge Recommendation Short Term Rehab   Barthel Index   Feeding 5   Bathing 0   Grooming Score 0   Dressing Score 5   Bladder Score 0   Bowels Score 5   Toilet Use Score 5   Transfers (Bed/Chair) Score 5   Mobility (Level Surface) Score 0   Stairs Score 0   Barthel Index Score 25   Jeffery Lowry, OT

## 2019-12-17 NOTE — SOCIAL WORK
Pt's son in this AM and stated that pt is not at her mental baseline  She is still showing some signs of confusion which son states is not normal for her  CM was asked to rearrange transportation for tomorrow  CM called [facility] and asked to have transport set for 1PM tomorrow

## 2019-12-17 NOTE — PROGRESS NOTES
Progress Note - Mikaela Donis [birth year], 75 y o  female MRN: [id number]    Unit/Bed#: E2 -01 Encounter: [id number]    Primary Care Provider: Shey Feng MD   Date and time admitted to hospital: 12/13/2019 11:14 AM        * Closed right hip fracture, initial encounter [facility])  Assessment & Plan  Right hip fracture status post ORIF  Awaiting rehab placement  Pain controlled  Constipation  Assessment & Plan  Constipation  Has not had bowel movement since admission  Added bowel regimen    Essential hypertension  Assessment & Plan  Essential hypertension continue atenolol    Type 2 diabetes mellitus without complication [facility])  Assessment & Plan  Lab Results   Component Value Date    HGBA1C 6 3 12/08/2018     Recent Labs     12/16/19  1120 12/16/19  1521 12/16/19  2056 12/17/19  0712   POCGLU 140 122 120 111     Diabetes mellitus stable on sliding scale only without need for insulin thusfar  Other hyperlipidemia  Assessment & Plan  Hyperlipidemia continue statin    Parkinson's disease with use of electrical brain stimulation [facility])  Assessment & Plan  Parkinson's disease with presence of DBS  Continue selegiline and sinemet  Does have delirium advised family that likely sleep/wake disturbance  Will start quetiapine but family requesting neurology evaluation as patient known to Dr Monico Fisher      VTE Pharmacologic Prophylaxis: Enoxaparin (Lovenox)    Patient Centered Rounds: I have performed bedside rounds with nursing staff today  Discussions with Specialists or Other Care Team Provider:  Case management  Education and Discussions with Family / Patient:  Son    Time Spent for Care: 25 mins  More than 50% of total time spent on counseling and coordination of care as described above      Current Length of Stay: 4 day(s)  Current Patient Status: Inpatient     Certification Statement: The patient will continue to require additional inpatient hospital stay due to Closed right hip fracture, initial encounter [facility])  Discharge Plan / Estimated Discharge Date:     Code Status: Level 3 - DNAR and DNI  ______________________________________________________________________________    Subjective:   Patient seen and examined  Delirious today, did not sleep last night  Objective:   Vitals: Blood pressure 141/76, pulse 68, temperature 97 9 °F (36 6 °C), temperature source Tympanic, resp  rate 18, height 5' 7" (1 702 m), weight 108 kg (237 lb 14 oz), SpO2 95 %      Physical Exam:   General appearance: alert, appears stated age and cooperative  Head: atraumatic  Lungs: clear to auscultation bilaterally  Heart: regular rate and rhythm  Abdomen: soft, non-tender, positive bowel sounds   Back: negative, range of motion normal  Extremities: extremities atraumatic, no cyanosis or edema  Neurologic:  Delirious    Additional Data:   Labs:  Results from last 7 days   Lab Units 12/17/19  0513 12/16/19  0545 12/15/19  0506 12/14/19  0425 12/13/19  1847 12/13/19  1131   WBC Thousand/uL 9 96 10 99* 12 03* 13 70*  --  12 09*   HEMOGLOBIN g/dL 10 6* 10 9* 11 3* 13 0  --  14 0   HEMATOCRIT % 32 9* 34 2* 35 3 41 6  --  43 1   MCV fL 97 99* 98 99*  --  97   PLATELETS Thousands/uL 176 167 157 181 186 209   INR   --   --   --   --   --  0 99     Results from last 7 days   Lab Units 12/17/19  0513 12/16/19  0545 12/15/19  0506 12/14/19  0425 12/13/19  1131   SODIUM mmol/L 143 140 144 140 141   POTASSIUM mmol/L 3 5 3 6 4 2 3 8 3 9   CHLORIDE mmol/L 107 105 110* 105 106   CO2 mmol/L 28 26 26 27 25   ANION GAP mmol/L 8 9 8 8 10   BUN mg/dL 19 15 14 19 20   CREATININE mg/dL 0 75 0 81 0 90 0 79 0 95   CALCIUM mg/dL 8 4 8 5 8 4 8 8 8 6   EGFR ml/min/1 73sq m 78 71 63 73 59   GLUCOSE RANDOM mg/dL 118 122 134 116 181*     Results from last 7 days   Lab Units 12/14/19  0425   MAGNESIUM mg/dL 2 0   PHOSPHORUS mg/dL 3 5                  Results from last 7 days   Lab Units 12/17/19  0712 12/16/19  2056 12/16/19  1521 12/16/19  1120 12/16/19  0727 12/15/19  2048 12/15/19  1559 12/15/19  1117 12/15/19  0702 12/14/19  2039 12/14/19  1608 12/14/19  1109   POC GLUCOSE mg/dl 111 120 122 140 126 125 126 136 134 134 149* 131             * I Have Reviewed All Lab Data Listed Above  Cultures:               Imaging:  Imaging Reports Reviewed Today Include:       Procedure: Xr Hip/pelv 2-3 Vws Right If Performed  Result Date: 12/15/2019  Impression: Fluoroscopic guidance provided for surgical procedure  Please refer to the separate procedure notes for additional details  Workstation performed: [id number]     Scheduled Meds:  Current Facility-Administered Medications:  acetaminophen 650 mg Oral Q6H PRN Vijaya Erwin, DO   aspirin 81 mg Oral Daily Jose Weinstein, DO   atenolol 50 mg Oral Daily Jose Weinstein, DO   carbidopa-levodopa 1 tablet Oral TID Cloyce Minion, DO   co-enzyme Q-10 400 mg Oral Daily Jose Weinstein, DO   enoxaparin 40 mg Subcutaneous Daily Jose Weinstein, DO   insulin lispro 1-6 Units Subcutaneous TID AC Jose Weinstein, DO   ondansetron 4 mg Intravenous Q4H PRN Vijaya Erwin, DO   oxyCODONE 5 mg Oral Q4H PRN Jose Weinstein, DO   PARoxetine 20 mg Oral Daily Jose Weinstein, DO   polyethylene glycol 17 g Oral Daily Carlos Nath, DO   pravastatin 40 mg Oral Every Other Day Jose Weinstein, DO   selegiline 5 mg Oral Daily With Breakfast Jose Weinstein, DO   senna-docusate sodium 1 tablet Oral BID Vijaya Erwin, DO       Vijaya Erwin, DO  [facility] Internal Medicine  Hospitalist    ** Please Note: This note has been constructed using a voice recognition system   **

## 2019-12-17 NOTE — PROGRESS NOTES
Orthopaedic Surgery - Progress Note  Jazmin Clement (48 y o  female)   : 1945   MRN: [id number]  Date: 2019   Encounter: [id number]   Unit/Bed#: E2 -01    Assessment / Plan   Postop day 3 status post right hip ORIF    · Continue with ice and analgesics as needed  · Continue with PT/OT as ordered with weight-bearing as tolerated on the right lower extremity  · Lovenox daily for 30 days as DVT prophylaxis of choice  · Patient continues with some confusion at this time though seems more oriented than yesterday, continue with management of this through medicine  · Hemoglobin stable 10 6 today will continue to observe at this time drop due to acute blood loss anemia  · Patient will most likely need rehab placement when cleared medically for discharge  Plan outpatient follow-up with Dr Raghu Terrazas in 2 weeks  Subjective  Postop day 3 status post right hip ORIF  Pain is still well controlled  Patient admits to still having confusion but is oriented to place and person at this time  Patient denies any distal numbness or tingling at this time  Vitals  Temp:  [97 5 °F (36 4 °C)-97 9 °F (36 6 °C)] 97 9 °F (36 6 °C)  HR:  [66-68] 68  Resp:  [18] 18  BP: (114-141)/(70-76) 141/76  Body mass index is 37 26 kg/m²  I/O last 24 hours: In: -   Out: 887 [[id number]]    Right Hip Exam  Alignment / Posture:  Normal resting hip posture  Inspection:  Mild to moderate right thigh swelling  No erythema  Mild audra-incisional ecchymosis  Mepilex dressings are clean, dry and intact at this time  Palpation:  Mild tenderness at The audra-incisional areas as expected     ROM:  Not tested  Strength:  5/5 AT, GSC, PT, and peroneals  Stability:  Not tested  Tests:  No pertinent positive or negative tests  Neurovascular:  Sensation intact in DP/SP/Frias/Sa/T nerve distributions  Sensation intact in all digital nerve distributions  Toes warm and perfused  Gait:  Not tested      Lab Results  (I have personally reviewed pertinent lab results )  Results from last 7 days   Lab Units 12/17/19  0513 12/16/19  0545 12/15/19  0506 12/14/19  0425 12/13/19  1847 12/13/19  1131   WBC Thousand/uL 9 96 10 99* 12 03* 13 70*  --  12 09*   HEMOGLOBIN g/dL 10 6* 10 9* 11 3* 13 0  --  14 0   HEMATOCRIT % 32 9* 34 2* 35 3 41 6  --  43 1   PLATELETS Thousands/uL 176 167 157 181 186 209     Results from last 7 days   Lab Units 12/13/19  1131   PTT seconds 27   INR  0 99     Results from last 7 days   Lab Units 12/17/19  0513 12/16/19  0545 12/15/19  0506 12/14/19  0425 12/13/19  1131   POTASSIUM mmol/L 3 5 3 6 4 2 3 8 3 9   CHLORIDE mmol/L 107 105 110* 105 106   CO2 mmol/L 28 26 26 27 25   BUN mg/dL 19 15 14 19 20   CREATININE mg/dL 0 75 0 81 0 90 0 79 0 95   EGFR ml/min/1 73sq m 78 71 63 73 59   CALCIUM mg/dL 8 4 8 5 8 4 8 8 8 6   PHOSPHORUS mg/dL  --   --   --  3 5  --                Rafa Srinivasan PA-C

## 2019-12-17 NOTE — PLAN OF CARE
Problem: PHYSICAL THERAPY ADULT  Goal: Performs mobility at highest level of function for planned discharge setting  See evaluation for individualized goals  Description  Treatment/Interventions: Functional transfer training, LE strengthening/ROM, Elevations, Therapeutic exercise, Endurance training, Cognitive reorientation, Patient/family training, Equipment eval/education, Bed mobility, Gait training  Equipment Recommended: (S) Walker(RW, not rollator (not enough control for 4 wheeled device))       See flowsheet documentation for full assessment, interventions and recommendations  Outcome: Progressing  Note:   Prognosis: Fair  Problem List: Decreased strength, Decreased endurance, Decreased range of motion, Impaired balance, Decreased mobility, Obesity, Decreased skin integrity, Orthopedic restrictions, Pain, Decreased cognition, Decreased safety awareness, Impaired judgement  Assessment: Pt  seated in bedside chair upon my arrival  Pt  reporting fatigue, however agreeable to therapeutic intervention  Performance of HEP seated in bedside chair with cues provided for proper completion  Progressed with transfers continuing to require A of 2 with cues for hand placement/technique  Progressed with an amb  trial with use of RW and A of 2 with cues provided for LE sequencing and a close chair follow  Pt  remains limited by increased fatigue requiring return to seated in bedside chair  Pt  remained seated in bedside chair with ice applied and alarm active at end of treatment session  PT will continue to recommend d/c to rehab when medically stable for continued improvement of noted impairments above  Barriers to Discharge: Decreased caregiver support, Inaccessible home environment  Barriers to Discharge Comments: MARTINE, level of support at home  Recommendation: Short-term skilled PT     PT - OK to Discharge: Yes(if d/c to rehab )    See flowsheet documentation for full assessment

## 2019-12-17 NOTE — PLAN OF CARE
Problem: OCCUPATIONAL THERAPY ADULT  Goal: Performs self-care activities at highest level of function for planned discharge setting  See evaluation for individualized goals  Description  Treatment Interventions: ADL retraining, Functional transfer training, UE strengthening/ROM, Endurance training, Cognitive reorientation, Patient/family training, Equipment evaluation/education, Compensatory technique education, Continued evaluation          See flowsheet documentation for full assessment, interventions and recommendations  Outcome: Progressing  Note:   Limitation: Decreased ADL status, Decreased UE ROM, Decreased UE strength, Decreased Safe judgement during ADL, Decreased cognition, Decreased endurance, Decreased fine motor control, Decreased high-level ADLs  Prognosis: Fair  Assessment: Pt seen for 40min tx session with focus on functional mobility, functional balance, activity tolerance, transfer safety, LE ADLs, and cognition  Pt able to tolerate OOB mobility; sitting balance=f+/f, standing balance=f-/p+  Pt required physical assistance to maintain transfer safety  Pt dependent with LE ADLs  Cognitive deficits noted(i e problem-solving, judgement/safety);  states cognition worsen than baseline, but states some baseline confusion  Functional mobility/balance improving  Pt continues to demonstrate appropriateness for inpt rehab to improve her overall level of independence  Will continue        OT Discharge Recommendation: Short Term Rehab

## 2019-12-17 NOTE — ASSESSMENT & PLAN NOTE
Lab Results   Component Value Date    HGBA1C 6 3 12/08/2018     Recent Labs     12/16/19  1120 12/16/19  1521 12/16/19 2056 12/17/19  0712   POCGLU 140 122 120 111     Diabetes mellitus stable on sliding scale only without need for insulin thusfar

## 2019-12-17 NOTE — PLAN OF CARE
Problem: Prexisting or High Potential for Compromised Skin Integrity  Goal: Skin integrity is maintained or improved  Description  INTERVENTIONS:  - Identify patients at risk for skin breakdown  - Assess and monitor skin integrity  - Assess and monitor nutrition and hydration status  - Monitor labs   - Assess for incontinence   - Turn and reposition patient every 2 hours, accumax alternating pressure pump to mattress  - Assist with mobility/ambulation  - Relieve pressure over bony prominences  - Avoid friction and shearing  - Provide appropriate hygiene as needed including keeping skin clean and dry  - Evaluate need for skin moisturizer/barrier cream  - Collaborate with interdisciplinary team   - Patient teaching     Outcome: Progressing     Problem: PAIN - ADULT  Goal: Verbalizes/displays adequate comfort level or baseline comfort level  Description  Interventions:  - Encourage patient to monitor pain and request assistance  - Assess pain using appropriate pain scale  - Administer analgesics based on type and severity of pain and evaluate response  - Implement non-pharmacological measures as appropriate and evaluate response  - Consider cultural and social influences on pain and pain management  - Notify physician/advanced practitioner if interventions unsuccessful or patient reports new pain  Outcome: Progressing     Problem: INFECTION - ADULT  Goal: Absence or prevention of progression during hospitalization  Description  INTERVENTIONS:  - Assess and monitor for signs and symptoms of infection  - Monitor lab/diagnostic results  - Monitor all insertion sites, i e  indwelling lines, tubes, and drains  - Monitor endotracheal if appropriate and nasal secretions for changes in amount and color  - [place] appropriate cooling/warming therapies per order  - Administer medications as ordered  - Instruct and encourage patient and family to use good hand hygiene technique  - Identify and instruct in appropriate isolation precautions for identified infection/condition  Outcome: Progressing     Problem: DISCHARGE PLANNING  Goal: Discharge to home or other facility with appropriate resources  Description  INTERVENTIONS:  - Identify barriers to discharge w/patient and caregiver  - Arrange for needed discharge resources and transportation as appropriate  - Identify discharge learning needs (meds, wound care, etc )  - Refer to Case Management Department for coordinating discharge planning if the patient needs post-hospital services based on physician/advanced practitioner order or complex needs related to functional status, cognitive ability, or social support system   Outcome: Progressing     Problem: Knowledge Deficit  Goal: Patient/family/caregiver demonstrates understanding of disease process, treatment plan, medications, and discharge instructions  Description  Complete learning assessment and assess knowledge base    Interventions:  - Provide teaching at level of understanding  - Provide teaching via preferred learning methods  Outcome: Progressing     Problem: Nutrition/Hydration-ADULT  Goal: Nutrient/Hydration intake appropriate for improving, restoring or maintaining nutritional needs  Description  INTERVENTIONS:  - Monitor oral intake, urinary output, labs, and treatment plans  - Assess nutrition and hydration status and recommend course of action  - Assist patient with eating if necessary   - Allow adequate time for meals  - Recommend/ encourage appropriate diets, oral nutritional supplements, and vitamin/mineral supplements  - Assess need for intravenous fluids  - Provide specific nutrition/hydration education as appropriate  - Include patient/spouse in decisions related to nutrition   Outcome: Progressing     Problem: SAFETY ADULT  Goal: Patient will remain free of falls  Description  INTERVENTIONS:  - Assess patient frequently for physical needs  -  Identify cognitive and physical deficits and behaviors that affect risk of falls    -  [place] fall precautions as indicated by assessment   - Educate patient/family on patient safety including physical limitations  - Instruct patient to call for assistance with activity based on assessment  - Modify environment to reduce risk of injury  - Consider OT/PT consult to assist with strengthening/mobility  Outcome: Progressing  Goal: Maintain or return to baseline ADL function  Description  INTERVENTIONS:  -  Assess patient's ability to carry out ADLs; assess patient's baseline for ADL function and identify physical deficits which impact ability to perform ADLs (bathing, care of mouth/teeth, toileting, grooming, dressing, etc )  - Assess/evaluate cause of self-care deficits   - Assess range of motion  - Assess patient's mobility; develop plan if impaired  - Assess patient's need for assistive devices and provide as appropriate  - Encourage maximum independence but intervene and supervise when necessary  - Involve family in performance of ADLs  - Assess for home care needs following discharge   - Consider OT consult to assist with ADL evaluation and planning for discharge  - Provide patient education as appropriate  Outcome: Progressing  Goal: Maintain or return mobility status to optimal level  Description  INTERVENTIONS:  - Assess patient's baseline mobility status (ambulation, transfers, stairs, etc )    - Identify cognitive and physical deficits and behaviors that affect mobility  - Identify mobility aids required to assist with transfers and/or ambulation (gait belt, sit-to-stand, lift, walker, cane, etc )  - [place] fall precautions as indicated by assessment  - Record patient progress and toleration of activity level on Mobility SBAR; progress patient to next Phase/Stage  - Instruct patient to call for assistance with activity based on assessment  - Consider rehabilitation consult to assist with strengthening/weightbearing, etc   Outcome: Progressing     Problem: COPING  Goal: Pt/Family able to verbalize concerns and demonstrate effective coping strategies  Description  INTERVENTIONS:  - Assist patient/family to identify coping skills, available support systems and cultural and spiritual values  - Provide emotional support, including active listening and acknowledgement of concerns of patient and caregivers  - Reduce environmental stimuli, as able  - Provide patient education  - Assess for spiritual pain/suffering and initiate spiritual care, including notification of Pastoral Care or ranjit based community as needed  - Assess effectiveness of coping strategies  Outcome: Progressing     Problem: METABOLIC, FLUID AND ELECTROLYTES - ADULT  Goal: Glucose maintained within target range  Description  INTERVENTIONS:  - Monitor Blood Glucose as ordered  - Assess for signs and symptoms of hyperglycemia and hypoglycemia  - Administer ordered medications to maintain glucose within target range  - Assess nutritional intake and initiate nutrition service referral as needed  Outcome: Progressing     Problem: Potential for Falls  Goal: Patient will remain free of falls  Description  INTERVENTIONS:  - Assess patient frequently for physical needs  -  Identify cognitive and physical deficits and behaviors that affect risk of falls    -  [place] fall precautions as indicated by assessment   - Educate patient/family on patient safety including physical limitations  - Instruct patient to call for assistance with activity based on assessment  - Modify environment to reduce risk of injury  - Consider OT/PT consult to assist with strengthening/mobility  Outcome: Progressing

## 2019-12-17 NOTE — ASSESSMENT & PLAN NOTE
Parkinson's disease with presence of DBS  Continue selegiline and sinemet  Does have delirium advised family that likely sleep/wake disturbance    Will start quetiapine but family requesting neurology evaluation as patient known to Dr Johnson Mantel

## 2019-12-18 ENCOUNTER — HOSPITAL ENCOUNTER (INPATIENT)
Facility: HOSPITAL | Age: 74
LOS: 15 days | Discharge: HOME HEALTH | DRG: 560 | End: 2020-01-02
Attending: PHYSICAL MEDICINE & REHABILITATION | Admitting: PHYSICAL MEDICINE & REHABILITATION
Payer: MEDICARE

## 2019-12-18 VITALS
OXYGEN SATURATION: 97 % | HEART RATE: 63 BPM | WEIGHT: 227.07 LBS | DIASTOLIC BLOOD PRESSURE: 74 MMHG | RESPIRATION RATE: 18 BRPM | BODY MASS INDEX: 35.64 KG/M2 | SYSTOLIC BLOOD PRESSURE: 133 MMHG | TEMPERATURE: 98.5 F | HEIGHT: 67 IN

## 2019-12-18 DIAGNOSIS — S72.001D CLOSED FRACTURE OF RIGHT HIP WITH ROUTINE HEALING, SUBSEQUENT ENCOUNTER: Primary | ICD-10-CM

## 2019-12-18 DIAGNOSIS — G20 PARKINSON'S DISEASE WITH USE OF ELECTRICAL BRAIN STIMULATION (HCC): ICD-10-CM

## 2019-12-18 DIAGNOSIS — S72.001A CLOSED FRACTURE OF RIGHT HIP, INITIAL ENCOUNTER (HCC): ICD-10-CM

## 2019-12-18 LAB
GLUCOSE SERPL-MCNC: 111 MG/DL (ref 65–140)
GLUCOSE SERPL-MCNC: 113 MG/DL (ref 65–140)
GLUCOSE SERPL-MCNC: 135 MG/DL (ref 65–140)
GLUCOSE SERPL-MCNC: 159 MG/DL (ref 65–140)

## 2019-12-18 PROCEDURE — 99239 HOSP IP/OBS DSCHRG MGMT >30: CPT | Performed by: INTERNAL MEDICINE

## 2019-12-18 PROCEDURE — 99222 1ST HOSP IP/OBS MODERATE 55: CPT | Performed by: PSYCHIATRY & NEUROLOGY

## 2019-12-18 PROCEDURE — 99024 POSTOP FOLLOW-UP VISIT: CPT | Performed by: PHYSICIAN ASSISTANT

## 2019-12-18 PROCEDURE — 99222 1ST HOSP IP/OBS MODERATE 55: CPT | Performed by: PHYSICAL MEDICINE & REHABILITATION

## 2019-12-18 PROCEDURE — 82948 REAGENT STRIP/BLOOD GLUCOSE: CPT

## 2019-12-18 RX ORDER — QUETIAPINE FUMARATE 25 MG/1
25 TABLET, FILM COATED ORAL
Refills: 0
Start: 2019-12-18 | End: 2019-12-18 | Stop reason: HOSPADM

## 2019-12-18 RX ORDER — ACETAMINOPHEN 325 MG/1
650 TABLET ORAL EVERY 6 HOURS PRN
Status: DISCONTINUED | OUTPATIENT
Start: 2019-12-18 | End: 2020-01-02 | Stop reason: HOSPADM

## 2019-12-18 RX ORDER — AMOXICILLIN 250 MG
1 CAPSULE ORAL 2 TIMES DAILY
Refills: 0
Start: 2019-12-18 | End: 2020-01-02 | Stop reason: HOSPADM

## 2019-12-18 RX ORDER — DIMENHYDRINATE 50 MG
400 TABLET ORAL DAILY
Status: DISCONTINUED | OUTPATIENT
Start: 2019-12-19 | End: 2019-12-19 | Stop reason: CLARIF

## 2019-12-18 RX ORDER — SELEGILINE HYDROCHLORIDE 5 MG/1
5 TABLET ORAL
Status: DISCONTINUED | OUTPATIENT
Start: 2019-12-19 | End: 2020-01-02 | Stop reason: HOSPADM

## 2019-12-18 RX ORDER — ASPIRIN 81 MG/1
81 TABLET ORAL DAILY
Status: DISCONTINUED | OUTPATIENT
Start: 2019-12-19 | End: 2020-01-02 | Stop reason: HOSPADM

## 2019-12-18 RX ORDER — ATENOLOL 25 MG/1
50 TABLET ORAL DAILY
Status: DISCONTINUED | OUTPATIENT
Start: 2019-12-19 | End: 2020-01-02 | Stop reason: HOSPADM

## 2019-12-18 RX ORDER — QUETIAPINE FUMARATE 25 MG/1
25 TABLET, FILM COATED ORAL
Status: DISCONTINUED | OUTPATIENT
Start: 2019-12-18 | End: 2019-12-18 | Stop reason: HOSPADM

## 2019-12-18 RX ORDER — OXYCODONE HYDROCHLORIDE 5 MG/1
5 TABLET ORAL EVERY 4 HOURS PRN
Status: DISCONTINUED | OUTPATIENT
Start: 2019-12-18 | End: 2019-12-20

## 2019-12-18 RX ORDER — POLYETHYLENE GLYCOL 3350 17 G/17G
17 POWDER, FOR SOLUTION ORAL DAILY
Qty: 14 EACH | Refills: 0 | Status: SHIPPED | OUTPATIENT
Start: 2019-12-19 | End: 2020-01-02 | Stop reason: HOSPADM

## 2019-12-18 RX ORDER — PRAVASTATIN SODIUM 40 MG
40 TABLET ORAL EVERY OTHER DAY
Status: DISCONTINUED | OUTPATIENT
Start: 2019-12-18 | End: 2020-01-02 | Stop reason: HOSPADM

## 2019-12-18 RX ORDER — QUETIAPINE FUMARATE 25 MG/1
25 TABLET, FILM COATED ORAL
Refills: 0
Start: 2019-12-18 | End: 2020-01-02 | Stop reason: HOSPADM

## 2019-12-18 RX ORDER — QUETIAPINE FUMARATE 25 MG/1
25 TABLET, FILM COATED ORAL
Status: DISCONTINUED | OUTPATIENT
Start: 2019-12-18 | End: 2019-12-23

## 2019-12-18 RX ORDER — POLYETHYLENE GLYCOL 3350 17 G/17G
17 POWDER, FOR SOLUTION ORAL DAILY
Status: DISCONTINUED | OUTPATIENT
Start: 2019-12-19 | End: 2019-12-23

## 2019-12-18 RX ORDER — ACETAMINOPHEN 325 MG/1
650 TABLET ORAL EVERY 6 HOURS PRN
Qty: 30 TABLET | Refills: 0 | Status: ON HOLD | OUTPATIENT
Start: 2019-12-18 | End: 2020-08-17 | Stop reason: SDUPTHER

## 2019-12-18 RX ORDER — AMOXICILLIN 250 MG
1 CAPSULE ORAL 2 TIMES DAILY
Status: DISCONTINUED | OUTPATIENT
Start: 2019-12-18 | End: 2020-01-02 | Stop reason: HOSPADM

## 2019-12-18 RX ORDER — PAROXETINE HYDROCHLORIDE 20 MG/1
20 TABLET, FILM COATED ORAL DAILY
Status: DISCONTINUED | OUTPATIENT
Start: 2019-12-19 | End: 2019-12-19

## 2019-12-18 RX ADMIN — ASPIRIN 81 MG: 81 TABLET, COATED ORAL at 09:46

## 2019-12-18 RX ADMIN — PAROXETINE 20 MG: 20 TABLET, FILM COATED ORAL at 09:45

## 2019-12-18 RX ADMIN — ACETAMINOPHEN 650 MG: 325 TABLET ORAL at 16:07

## 2019-12-18 RX ADMIN — ATENOLOL 50 MG: 50 TABLET ORAL at 09:45

## 2019-12-18 RX ADMIN — CARBIDOPA AND LEVODOPA 0.5 TABLET: 25; 100 TABLET ORAL at 21:23

## 2019-12-18 RX ADMIN — SENNOSIDES AND DOCUSATE SODIUM 1 TABLET: 8.6; 5 TABLET ORAL at 21:25

## 2019-12-18 RX ADMIN — Medication 400 MG: at 09:45

## 2019-12-18 RX ADMIN — CARBIDOPA AND LEVODOPA 1 TABLET: 25; 100 TABLET ORAL at 09:45

## 2019-12-18 RX ADMIN — PRAVASTATIN SODIUM 40 MG: 40 TABLET ORAL at 16:04

## 2019-12-18 RX ADMIN — SELEGILINE HYDROCHLORIDE 5 MG: 5 TABLET ORAL at 09:47

## 2019-12-18 RX ADMIN — CARBIDOPA AND LEVODOPA 0.5 TABLET: 25; 100 TABLET ORAL at 16:05

## 2019-12-18 RX ADMIN — QUETIAPINE FUMARATE 25 MG: 25 TABLET, FILM COATED ORAL at 21:24

## 2019-12-18 RX ADMIN — ENOXAPARIN SODIUM 40 MG: 40 INJECTION SUBCUTANEOUS at 09:46

## 2019-12-18 RX ADMIN — POLYETHYLENE GLYCOL 3350 17 G: 17 POWDER, FOR SOLUTION ORAL at 09:46

## 2019-12-18 RX ADMIN — SENNOSIDES AND DOCUSATE SODIUM 1 TABLET: 8.6; 5 TABLET ORAL at 09:46

## 2019-12-18 NOTE — DISCHARGE SUMMARY
Discharge- Renetta Pelletier [id number], 75 y o  female MRN: [id number]    Unit/Bed#: E2 -01 Encounter: [id number]    Primary Care Provider: Helaine Siemens, MD   Date and time admitted to hospital: 12/13/2019 11:14 AM        Admitting Provider:  Reji Garza MD  Discharge Provider:  Jason Willis DO  Admission Date: 12/13/2019       Discharge Date: 12/18/19   LOS: 5  Primary Care Physician at Discharge: Helaine Siemens, MD [facility]:  Renetta Pelletier is a 75 y o  female who presented to the hospital after suffering a fall onto right hip  She does have Parkinson's disease lives with  and has deep brain stimulator  She was found to have right hip fracture and eventually underwent ORIF  She did have postoperative delirium which improved with the addition of quetiapine  Her other medical comorbidities were unchanged and she is being sent to rehab for further rehabilitation  The case was discussed with spouse and son prior to discharge  See problem list below    DISCHARGE DIAGNOSES  * Closed right hip fracture, initial encounter [facility])  Assessment & Plan  Right hip fracture status post ORIF  For rehab placement today  Pain controlled  Constipation  Assessment & Plan  Constipation  Added bowel regimen and should continue at rehab facility    Essential hypertension  Assessment & Plan  Essential hypertension continue atenolol    Type 2 diabetes mellitus without complication [facility])  Assessment & Plan  Lab Results   Component Value Date    HGBA1C 6 3 12/08/2018     Recent Labs     12/17/19  1620 12/17/19  2048 12/18/19  0715 12/18/19  1110   POCGLU 117 153* 113 135     Diabetes mellitus stable on sliding scale only without need for insulin thusfar during hospitalization  Other hyperlipidemia  Assessment & Plan  Hyperlipidemia continue statin    Parkinson's disease with use of electrical brain stimulation [facility])  Assessment & Plan  Parkinson's disease with presence of DBS    Continue selegiline and sinemet  Does have delirium advised family that likely sleep/wake disturbance  Improved after starting quetiapine;  patient known to Dr Kong Evans  Procedure(s) (LRB):  INSERTION NAIL IM FEMUR ANTEGRADE (TROCHANTERIC) (Right)    RADIOLOGY RESULTS  Xr Hip/pelv 2-3 Vws Right If Performed  Result Date: 12/15/2019  Impression: Radiographically satisfactory appearance of intramedullary beatriz and screw fixation right femoral neck fracture, unchanged Workstation performed: [id number]     Xr Hip/pelv 2-3 Vws Right If Performed  Result Date: 12/15/2019  Impression: Fluoroscopic guidance provided for surgical procedure  Please refer to the separate procedure notes for additional details  Workstation performed: [id number]     Xr Hip/pelv 2-3 Vws Right  Result Date: 12/13/2019  Impression: Mildly displaced right femoral intertrochanteric fracture  Workstation performed: [id number]     Xr Femur 2 Vw Right  Result Date: 12/14/2019  Impression: Intertrochanteric fracture right femur  Osteoarthritis of the knee  Workstation performed: [id number]     Xr Chest 1 View  Result Date: 12/13/2019  Impression: Stable chest with no acute cardiopulmonary disease   Workstation performed: [id number]       LABS  Results from last 7 days   Lab Units 12/17/19  0513 12/16/19  0545 12/15/19  0506 12/14/19  0425 12/13/19  1847 12/13/19  1131   WBC Thousand/uL 9 96 10 99* 12 03* 13 70*  --  12 09*   HEMOGLOBIN g/dL 10 6* 10 9* 11 3* 13 0  --  14 0   HEMATOCRIT % 32 9* 34 2* 35 3 41 6  --  43 1   MCV fL 97 99* 98 99*  --  97   PLATELETS Thousands/uL 176 167 157 181 186 209   INR   --   --   --   --   --  0 99     Results from last 7 days   Lab Units 12/17/19  0513 12/16/19  0545 12/15/19  0506 12/14/19  0425 12/13/19  1131   SODIUM mmol/L 143 140 144 140 141   POTASSIUM mmol/L 3 5 3 6 4 2 3 8 3 9 CHLORIDE mmol/L 107 105 110* 105 106   CO2 mmol/L 28 26 26 27 25   BUN mg/dL 19 15 14 19 20   CREATININE mg/dL 0 75 0 81 0 90 0 79 0 95   CALCIUM mg/dL 8 4 8 5 8 4 8 8 8 6   EGFR ml/min/1 73sq m 78 71 63 73 59   GLUCOSE RANDOM mg/dL 118 122 134 116 181*                  Results from last 7 days   Lab Units 12/18/19  1110 12/18/19  0715 12/17/19  2048 12/17/19  1620 12/17/19  1108 12/17/19  0712 12/16/19  2056 12/16/19  1521 12/16/19  1120 12/16/19  0652   POC GLUCOSE mg/dl 135 113 153* 117 117 111 120 122 140 126       PHYSICAL EXAM:  Vitals:   Blood Pressure: 133/74 (12/18/19 0700)  Pulse: 63 (12/18/19 0700)  Temperature: 98 5 °F (36 9 °C) (12/18/19 0700)  Temp Source: Tympanic (12/18/19 0700)  Respirations: 18 (12/18/19 0700)  Height: 5' 7" (170 2 cm) (12/13/19 2154)  Weight - Scale: 103 kg (227 lb 1 2 oz) (12/18/19 0600)  SpO2: 97 % (12/18/19 0700)    General appearance: alert, appears stated age and cooperative  Eyes: conjunctivae/corneas clear  PERRL, EOM's intact  Lungs: diminished breath sounds  Heart: regular rate and rhythm  Abdomen: soft, non-tender; bowel sounds normal; no masses,  no organomegaly  Back: range of motion normal  Extremities: edema lower extremities bilaterally  Neurologic: Grossly normal    Planned Re-admission:  No  Discharge Disposition:  Rehab  Facility:  [facility]    Test Results Pending at Discharge:  None  Incidental findings:  None    Medications   · Discharge Medication List: See after visit summary for reconciled discharge medications  Diet restrictions:  Diabetic diet   Activity restrictions: No strenuous activity  Discharge Condition: stable    Outpatient Follow-Up and Discharge Instructions  See after visit summary section titled Discharge Instructions for information provided to patient and family  Code Status: Level 3 - DNAR and DNI  Discharge Statement   I spent 40 minutes discharging the patient  This time was spent on the day of discharge   Greater than 50% of total time was spent with the patient and / or family counseling and / or coordination of care  ** Please Note: This note has been constructed using a voice recognition system   **

## 2019-12-18 NOTE — NURSING NOTE
Agree with previous assessment  Patient alert to name only, restless, and trying to get out of bed  Patient tearful at times and crying for her   Sandra Castle who she sees in the room  Decreased environmental stimuli and  was ble to verbally redirect  Currently sleeping on and off  Call bell and personal care items are with in reach  Will continue to monitor

## 2019-12-18 NOTE — CONSULTS
Consultation - Neurology   Vladimir Stevenson 75 y o  female MRN: [id number]  Unit/Bed#: E2 -01 Encounter: [id number]      Assessment/Plan   Assessment:  74 yo female with Parkinson's disease with DBS, HTN, HLD, DM type 2 and anxiety admitted after mechanical fall with subsequent right hip fracture, now POD # 4 ORIF right hip  Neurology consulted per family request for delirium  Plan:  1  Visual hallucinations: Patient reporting increase in visual hallucinations since Friday evening  Etiology likely Delirium:  multifactorial in the setting of medications side effects (Sinemet), disrupted sleep/wake cycle, pain 2/2 recent hip surgery and change in environment  - decreased sinemet dose  - recommend Seroquel 25 mg to be given at 1800  - promote sleep/wake cycle  - avoid CNS altering medications    2  Parkinson's disease: patient with DBS  Follows with [facility] Neurologist Dr Augusto Kevin outpatient  - continued on selegiline 5mg daily  - decreased dose of sinemet /tab to 0 5 tab TID  - requested follow up with [facility] Neurology within 4-6 weeks    3  Right hip fracture s/p ORIF  - orthopedics following  - PT/OT following; patient leaving for short term rehabilitation today     4  DM type 2: Last HgA1c 6 3 on 12/8/19  - continue on sliding scale insulin regimen  Management per primary team     5  HTN: continued on atenolol    6  HLD: continued on home pravachol    History of Present Illness     Reason for Consult / Principal Problem: delerium  Hx and PE limited by: none  HPI: Vladimir Stevenson is a 75 y o   female with parkinson's disease (2009) with deep brain stimulator , DM type 2, HTN, HLD, psoriatic arthritis, and anxiety who presented to ED on 12/13/19 with right hip pain s/p mechanical fall  She was found to have right hip fracture and is now s/p right hip ORIF  Yesterday, family noting patient not at baseline mental status showing signs of confusion   Per [facility] Attending Dr Micki Painter report, patient did not sleep night prior  Family requesting Neurology consultation  Per chart review, patient follows with [facility] Neurologist Dr Corinne Connolly  Patient last seen on 10/3/19 for parkinson's disease with DBS complicated by postural instability and gait imbalance  Per report, the following plan was noted by Dr Corinne Connolly:  "She was unable to tolerate levodopa at low dose in the past due to hallucinations  She stopped donepezil as she felt it did not help after 2 weeks on the full dose  She will continue on Azilect and has agreed to retry levodopa but trying to get he to tolerate it by using a slow titration       We will retry Sinemet 25/100  Week 1: 1/2 tab qam  Week 2: 1/2 tab bid  Week 3: 1/2 tab tid  Week 4: 1 tab in and 1/2 tab afternoon and evening  Week 5: 1 tab qam and afternoon and 1/2 in evening  Week 6: 1 tab 3 times daily  If tolerating but no change in gait call for further instruction for increasing the dose  "    Per patient, she started with mild hallucinations a couple weeks ago  Friday evening they seemed to really increase  She thought she was at a party with her  at her friends house  After that visual hallucinations continued  She reports several episodes of seeing people standing in her room and this am a man was inside the BP machine and was smiling at her  Hallucinations seem to occur at night or in the early am  Per spouse at bedside, patient on rare occasion had mild hallucinations prior to starting sinemet  She had similar hallucinations when she had her shoulder surgery  Inpatient consult to Neurology  Consult performed by: GRTEA Shaw  Consult ordered by: Conner Carroll DO          Review of Systems   Constitutional: Negative  HENT: Negative  Eyes: Negative  Respiratory: Negative  Cardiovascular: Negative      Musculoskeletal:        RLE pain in hip with movement, LUE limited range of motion from prior shoulder surgery   Neurological: Positive for weakness (RLE after surgery)  Negative for facial asymmetry, speech difficulty and numbness  Psychiatric/Behavioral: Positive for hallucinations  Historical Information   Past Medical History:   Diagnosis Date    Anxiety     [facility]     Diabetes mellitus type 2, diet-controlled ([facility]     Hyperlipidemia     Hypertension     Parkinson disease ([facility] )      Past Surgical History:   Procedure Laterality Date    ACHILLES TENDON SURGERY      DEEP BRAIN STIMULATOR PLACEMENT      DENTAL SURGERY      SD IMP STIM,CRANIAL,SUBQ,1 ARRAY Right 12/18/2018    Procedure: REPLACEMENT IMPLANTABLE PULSE GENERATOR (IPG) DEEP BRAIN STIMULATION (DBS); Surgeon: More Dunlap MD;  Location: [facility];  Service: Neurosurgery    SD OPEN RX FEMUR FX+INTRAMED ERIKA Right 12/14/2019    Procedure: INSERTION NAIL IM FEMUR ANTEGRADE (TROCHANTERIC); Surgeon: Edin Navarro DO;  Location: [facility];  Service: Orthopedics    REPLACEMENT TOTAL KNEE      REVERSE TOTAL SHOULDER ARTHROPLASTY Left 8/23/2018    Procedure: ARTHROPLASTY SHOULDER REVERSE;  Surgeon: Jabari Lees MD;  Location[facility];  Service: Orthopedics    TONSILLECTOMY       Social History   Social History     Substance and Sexual Activity   Alcohol Use Yes    Comment: occasional     Social History     Substance and Sexual Activity   Drug Use No     Social History     Tobacco Use   Smoking Status Never Smoker   Smokeless Tobacco Never Used     Family History:   Family History   Problem Relation Age of Onset    Arthritis Mother     No Known Problems Father     No Known Problems Maternal Grandmother     No Known Problems Maternal Grandfather     No Known Problems Paternal Grandmother     No Known Problems Paternal Grandfather     No Known Problems Son     No Known Problems Son        Review of previous medical records was  completed       Meds/Allergies   all current active meds have been reviewed and PTA meds:   Prior to Admission Medications   Prescriptions Last Dose Informant Patient Reported? Taking? CINNAMON PO   Yes No   Sig: Take by mouth   Coenzyme Q10 400 MG CAPS  Self No Yes   Sig: Take 1 capsule (400 mg total) by mouth daily   Omega-3 Fatty Acids (FISH OIL) 1,000 mg  Self No Yes   Sig: Take 1 capsule (1,000 mg total) by mouth daily   PARoxetine (PAXIL) 20 mg tablet  Self No Yes   Sig: Take 1 tablet (20 mg total) by mouth daily   aspirin 81 MG tablet  Self Yes Yes   Sig: Take 1 tablet by mouth daily   atenolol (TENORMIN) 50 mg tablet  Self No Yes   Sig: Take 1 tablet (50 mg total) by mouth daily   rasagiline (AZILECT) 1 MG   No Yes   Sig: TAKE 1 TABLET BY MOUTH  DAILY   rosuvastatin (CRESTOR) 5 mg tablet  Self No Yes   Sig: Take 1 tablet (5 mg total) by mouth every other day      Facility-Administered Medications: None       Allergies   Allergen Reactions    Sulfa Antibiotics Hives       Objective   Vitals:Blood pressure 133/74, pulse 63, temperature 98 5 °F (36 9 °C), temperature source Tympanic, resp  rate 18, height 5' 7" (1 702 m), weight 103 kg (227 lb 1 2 oz), SpO2 97 %  ,Body mass index is 35 56 kg/m²  Intake/Output Summary (Last 24 hours) at 12/18/2019 0917  Last data filed at 12/17/2019 1225  Gross per 24 hour   Intake    Output 500 ml   Net -500 ml       Invasive Devices: Invasive Devices     None                 Physical Exam   Constitutional: She is oriented to person, place, and time  She appears well-developed and well-nourished  HENT:   Head: Normocephalic and atraumatic  Mouth/Throat: Oropharynx is clear and moist    Eyes: Pupils are equal, round, and reactive to light  Conjunctivae and EOM are normal  Right eye exhibits no discharge  Left eye exhibits no discharge  Neck: Normal range of motion  Cardiovascular: Normal rate, regular rhythm, normal heart sounds and intact distal pulses  Exam reveals no gallop and no friction rub  No murmur heard  Pulmonary/Chest: Effort normal and breath sounds normal  No stridor   No respiratory distress  She has no wheezes  She has no rales  Abdominal: Soft  Bowel sounds are normal  She exhibits no distension  Musculoskeletal: She exhibits no edema  Limited ROM in RLE ( after surgery, limited by pain) and LUE (chronic from shoulder surgery)   Neurological: She is alert and oriented to person, place, and time  Finger-nose-finger test: limited and slower on LUE, patient reports decreased ROM due to shoulder surgery  Skin: Skin is warm and dry  No erythema  Psychiatric: She has a normal mood and affect  Her speech is normal and behavior is normal  Judgment and thought content normal      Neurologic Exam     Mental Status   Oriented to person, place, and time  Oriented to person (name  and age  )  Oriented to place  Oriented to year, month and date  Follows 3 step commands  Attention: normal  Concentration: normal    Speech: speech is normal   Level of consciousness: alert  Knowledge: good  Able to perform simple calculations  Able to name object  Able to repeat  Normal comprehension  States president, name of spouse and years  "64 years"     Cranial Nerves     CN II   Visual fields full to confrontation  CN III, IV, VI   Pupils are equal, round, and reactive to light  Extraocular motions are normal    Right pupil: Size: 2 mm  Left pupil: Size: 2 mm  CN V   Facial sensation intact  CN VII   Facial expression full, symmetric  CN VIII   Hearing: intact    CN IX, X   CN IX normal      CN XI   CN XI normal      CN XII   CN XII normal      Motor Exam   Overall muscle tone: normal  Right arm pronator drift: absent  Left arm pronator drift: absent    Strength   Strength 5/5 except as noted   RLE hip flexor limited by pain     Sensory Exam   Light touch normal    Proprioception normal    Temperature sense intact all extremities     Gait, Coordination, and Reflexes     Coordination   Finger-nose-finger test: limited and slower on LUE, patient reports decreased ROM due to shoulder surgery  Tremor   Resting tremor: absent  Action tremor: absent      Lab Results:   I have personally reviewed pertinent reports  CBC:   Results from last 7 days   Lab Units 12/17/19  0513 12/16/19  0545 12/15/19  0506   WBC Thousand/uL 9 96 10 99* 12 03*   RBC Million/uL 3 38* 3 45* 3 60*   HEMOGLOBIN g/dL 10 6* 10 9* 11 3*   HEMATOCRIT % 32 9* 34 2* 35 3   MCV fL 97 99* 98   PLATELETS Thousands/uL 176 167 157     BMP/CMP:   Results from last 7 days   Lab Units 12/17/19  0513 12/16/19  0545 12/15/19  0506   SODIUM mmol/L 143 140 144   POTASSIUM mmol/L 3 5 3 6 4 2   CHLORIDE mmol/L 107 105 110*   CO2 mmol/L 28 26 26   BUN mg/dL 19 15 14   CREATININE mg/dL 0 75 0 81 0 90   CALCIUM mg/dL 8 4 8 5 8 4   EGFR ml/min/1 73sq m 78 71 63     Coagulation:   Results from last 7 days   Lab Units 12/13/19  1131   INR  0 99     Imaging Studies: I have personally reviewed pertinent reports  and I have personally reviewed pertinent films in PACS     EKG, Pathology, and Other Studies: I have personally reviewed pertinent reports     and I have personally reviewed pertinent films in PACS     VTE Prophylaxis: Sequential compression device (Venodyne)     Code Status: Level 3 - DNAR and DNI

## 2019-12-18 NOTE — ASSESSMENT & PLAN NOTE
· Continue selegiline (patient is on rasagiline 1mg daily as outpatient, evidently not available here on formulary)   Will resume home rasagiline on discharge  · Continue sinemet - dose was decreased by neurology due to hallucinations  · F/u Dr Valdez Setting as outpatient

## 2019-12-18 NOTE — PLAN OF CARE
Problem: Potential for Falls  Goal: Patient will remain free of falls  Description  INTERVENTIONS:  - Assess patient frequently for physical needs  -  Identify cognitive and physical deficits and behaviors that affect risk of falls  -  [place] fall precautions as indicated by assessment   - Educate patient/family on patient safety including physical limitations  - Instruct patient to call for assistance with activity based on assessment  - Modify environment to reduce risk of injury  - Consider OT/PT consult to assist with strengthening/mobility  Outcome: Progressing     Problem: NEUROSENSORY - ADULT  Goal: Achieves stable or improved neurological status  Description  INTERVENTIONS  - Monitor and report changes in neurological status  - Monitor vital signs such as temperature, blood pressure, glucose, and any other labs ordered   - Initiate measures to prevent increased intracranial pressure  - Monitor for seizure activity and implement precautions if appropriate      Outcome: Progressing  Goal: Achieves maximal functionality and self care  Description  INTERVENTIONS  - Monitor swallowing and airway patency with patient fatigue and changes in neurological status  - Encourage and assist patient to increase activity and self care     - Encourage visually impaired, hearing impaired and aphasic patients to use assistive/communication devices  Outcome: Progressing     Problem: GASTROINTESTINAL - ADULT  Goal: Maintains or returns to baseline bowel function  Description  INTERVENTIONS:  - Assess bowel function  - Encourage oral fluids to ensure adequate hydration  - Administer IV fluids if ordered to ensure adequate hydration  - Administer ordered medications as needed  - Encourage mobilization and activity  - Consider nutritional services referral to assist patient with adequate nutrition and appropriate food choices  Outcome: Progressing  Goal: Maintains adequate nutritional intake  Description  INTERVENTIONS:  - Monitor percentage of each meal consumed  - Identify factors contributing to decreased intake, treat as appropriate  - Assist with meals as needed  - Monitor I&O, weight, and lab values if indicated  - Obtain nutrition services referral as needed  Outcome: Progressing     Problem: GENITOURINARY - ADULT  Goal: Maintains or returns to baseline urinary function  Description  INTERVENTIONS:  - Assess urinary function  - Encourage oral fluids to ensure adequate hydration if ordered  - Administer IV fluids as ordered to ensure adequate hydration  - Administer ordered medications as needed  - Offer frequent toileting  - Follow urinary retention protocol if ordered  Outcome: Progressing     Problem: SKIN/TISSUE INTEGRITY - ADULT  Goal: Skin integrity remains intact  Description  INTERVENTIONS  - Identify patients at risk for skin breakdown  - Assess and monitor skin integrity  - Assess and monitor nutrition and hydration status  - Monitor labs (i e  albumin)  - Assess for incontinence   - Turn and reposition patient  - Assist with mobility/ambulation  - Relieve pressure over bony prominences  - Avoid friction and shearing  - Provide appropriate hygiene as needed including keeping skin clean and dry  - Evaluate need for skin moisturizer/barrier cream  - Collaborate with interdisciplinary team (i e  Nutrition, Rehabilitation, etc )   - Patient/family teaching  Outcome: Progressing  Goal: Incision(s), wounds(s) or drain site(s) healing without S/S of infection  Description  INTERVENTIONS  - Assess and document risk factors for skin impairment   - Assess and document dressing, incision, wound bed, drain sites and surrounding tissue  - Consider nutrition services referral as needed  - Oral mucous membranes remain intact  - Provide patient/ family education  Outcome: Progressing  Goal: Oral mucous membranes remain intact  Description  INTERVENTIONS  - Assess oral mucosa and hygiene practices  - Implement preventative oral hygiene regimen  - Implement oral medicated treatments as ordered  - Initiate Nutrition services referral as needed  Outcome: Progressing     Problem: MUSCULOSKELETAL - ADULT  Goal: Maintain or return mobility to safest level of function  Description  INTERVENTIONS:  - Assess patient's ability to carry out ADLs; assess patient's baseline for ADL function and identify physical deficits which impact ability to perform ADLs (bathing, care of mouth/teeth, toileting, grooming, dressing, etc )  - Assess/evaluate cause of self-care deficits   - Assess range of motion  - Assess patient's mobility  - Assess patient's need for assistive devices and provide as appropriate  - Encourage maximum independence but intervene and supervise when necessary  - Involve family in performance of ADLs  - Assess for home care needs following discharge   - Consider OT consult to assist with ADL evaluation and planning for discharge  - Provide patient education as appropriate  Outcome: Progressing  Goal: Maintain proper alignment of affected body part  Description  INTERVENTIONS:  - Support, maintain and protect limb and body alignment  - Provide patient/ family with appropriate education  Outcome: Progressing     Problem: COPING  Goal: Pt/Family able to verbalize concerns and demonstrate effective coping strategies  Description  INTERVENTIONS:  - Assist patient/family to identify coping skills, available support systems and cultural and spiritual values  - Provide emotional support, including active listening and acknowledgement of concerns of patient and caregivers  - Reduce environmental stimuli, as able  - Provide patient education  - Assess for spiritual pain/suffering and initiate spiritual care, including notification of Pastoral Care or ranjit based community as needed  - Assess effectiveness of coping strategies  Outcome: Progressing  Goal: Will report anxiety at manageable levels  Description  INTERVENTIONS:  - Administer medication as ordered  - Teach and encourage coping skills  - Provide emotional support  - Assess patient/family for anxiety and ability to cope  Outcome: Progressing     Problem: CONFUSION/THOUGHT DISTURBANCE  Goal: Thought disturbances (confusion, delirium, depression, dementia or psychosis) are managed to maintain or return to baseline mental status and functional level  Description  INTERVENTIONS:  - Assess for possible contributors to  thought disturbance, including but not limited to medications, infection, impaired vision or hearing, underlying metabolic abnormalities, dehydration, respiratory compromise,  psychiatric diagnoses and notify attending PHYSICAN/AP  - Monitor and intervene to maintain adequate nutrition, hydration, elimination, sleep and activity  - Decrease environmental stimuli, including noise as appropriate  - Provide frequent contacts to provide refocusing, direction and reassurance as needed  Approach patient calmly with eye contact and at their level    - [place] high risk fall precautions, aspiration precautions and other safety measures, as indicated  - If delirium suspected, notify physician/AP of change in condition and request immediate in-person evaluation  - Pursue consults as appropriate including Geriatric (campus dependent), OT for cognitive evaluation/activity planning, psychiatric, pastoral care, etc   Outcome: Progressing

## 2019-12-18 NOTE — PLAN OF CARE
Problem: Prexisting or High Potential for Compromised Skin Integrity  Goal: Skin integrity is maintained or improved  Description  INTERVENTIONS:  - Identify patients at risk for skin breakdown  - Assess and monitor skin integrity  - Assess and monitor nutrition and hydration status  - Monitor labs   - Assess for incontinence   - Turn and reposition patient every 2 hours, accumax alternating pressure pump to mattress  - Assist with mobility/ambulation  - Relieve pressure over bony prominences  - Avoid friction and shearing  - Provide appropriate hygiene as needed including keeping skin clean and dry  - Evaluate need for skin moisturizer/barrier cream  - Collaborate with interdisciplinary team   - Patient teaching     Outcome: Progressing     Problem: PAIN - ADULT  Goal: Verbalizes/displays adequate comfort level or baseline comfort level  Description  Interventions:  - Encourage patient to monitor pain and request assistance  - Assess pain using appropriate pain scale  - Administer analgesics based on type and severity of pain and evaluate response  - Implement non-pharmacological measures as appropriate and evaluate response  - Consider cultural and social influences on pain and pain management  - Notify physician/advanced practitioner if interventions unsuccessful or patient reports new pain  Outcome: Progressing     Problem: INFECTION - ADULT  Goal: Absence or prevention of progression during hospitalization  Description  INTERVENTIONS:  - Assess and monitor for signs and symptoms of infection  - Monitor lab/diagnostic results  - Monitor all insertion sites, i e  indwelling lines, tubes, and drains  - Monitor endotracheal if appropriate and nasal secretions for changes in amount and color  - [place] appropriate cooling/warming therapies per order  - Administer medications as ordered  - Instruct and encourage patient and family to use good hand hygiene technique  - Identify and instruct in appropriate isolation precautions for identified infection/condition  Outcome: Progressing     Problem: DISCHARGE PLANNING  Goal: Discharge to home or other facility with appropriate resources  Description  INTERVENTIONS:  - Identify barriers to discharge w/patient and caregiver  - Arrange for needed discharge resources and transportation as appropriate  - Identify discharge learning needs (meds, wound care, etc )  - Refer to Case Management Department for coordinating discharge planning if the patient needs post-hospital services based on physician/advanced practitioner order or complex needs related to functional status, cognitive ability, or social support system   Outcome: Progressing     Problem: Knowledge Deficit  Goal: Patient/family/caregiver demonstrates understanding of disease process, treatment plan, medications, and discharge instructions  Description  Complete learning assessment and assess knowledge base    Interventions:  - Provide teaching at level of understanding  - Provide teaching via preferred learning methods  Outcome: Progressing     Problem: Nutrition/Hydration-ADULT  Goal: Nutrient/Hydration intake appropriate for improving, restoring or maintaining nutritional needs  Description  INTERVENTIONS:  - Monitor oral intake, urinary output, labs, and treatment plans  - Assess nutrition and hydration status and recommend course of action  - Assist patient with eating if necessary   - Allow adequate time for meals  - Recommend/ encourage appropriate diets, oral nutritional supplements, and vitamin/mineral supplements  - Assess need for intravenous fluids  - Provide specific nutrition/hydration education as appropriate  - Include patient/spouse in decisions related to nutrition   Outcome: Progressing     Problem: SAFETY ADULT  Goal: Patient will remain free of falls  Description  INTERVENTIONS:  - Assess patient frequently for physical needs  -  Identify cognitive and physical deficits and behaviors that affect risk of falls    -  [place] fall precautions as indicated by assessment   - Educate patient/family on patient safety including physical limitations  - Instruct patient to call for assistance with activity based on assessment  - Modify environment to reduce risk of injury  - Consider OT/PT consult to assist with strengthening/mobility  Outcome: Progressing  Goal: Maintain or return to baseline ADL function  Description  INTERVENTIONS:  -  Assess patient's ability to carry out ADLs; assess patient's baseline for ADL function and identify physical deficits which impact ability to perform ADLs (bathing, care of mouth/teeth, toileting, grooming, dressing, etc )  - Assess/evaluate cause of self-care deficits   - Assess range of motion  - Assess patient's mobility; develop plan if impaired  - Assess patient's need for assistive devices and provide as appropriate  - Encourage maximum independence but intervene and supervise when necessary  - Involve family in performance of ADLs  - Assess for home care needs following discharge   - Consider OT consult to assist with ADL evaluation and planning for discharge  - Provide patient education as appropriate  Outcome: Progressing  Goal: Maintain or return mobility status to optimal level  Description  INTERVENTIONS:  - Assess patient's baseline mobility status (ambulation, transfers, stairs, etc )    - Identify cognitive and physical deficits and behaviors that affect mobility  - Identify mobility aids required to assist with transfers and/or ambulation (gait belt, sit-to-stand, lift, walker, cane, etc )  - [place] fall precautions as indicated by assessment  - Record patient progress and toleration of activity level on Mobility SBAR; progress patient to next Phase/Stage  - Instruct patient to call for assistance with activity based on assessment  - Consider rehabilitation consult to assist with strengthening/weightbearing, etc   Outcome: Progressing     Problem: COPING  Goal: Pt/Family able to verbalize concerns and demonstrate effective coping strategies  Description  INTERVENTIONS:  - Assist patient/family to identify coping skills, available support systems and cultural and spiritual values  - Provide emotional support, including active listening and acknowledgement of concerns of patient and caregivers  - Reduce environmental stimuli, as able  - Provide patient education  - Assess for spiritual pain/suffering and initiate spiritual care, including notification of Pastoral Care or ranjit based community as needed  - Assess effectiveness of coping strategies  Outcome: Progressing     Problem: METABOLIC, FLUID AND ELECTROLYTES - ADULT  Goal: Glucose maintained within target range  Description  INTERVENTIONS:  - Monitor Blood Glucose as ordered  - Assess for signs and symptoms of hyperglycemia and hypoglycemia  - Administer ordered medications to maintain glucose within target range  - Assess nutritional intake and initiate nutrition service referral as needed  Outcome: Progressing     Problem: Potential for Falls  Goal: Patient will remain free of falls  Description  INTERVENTIONS:  - Assess patient frequently for physical needs  -  Identify cognitive and physical deficits and behaviors that affect risk of falls    -  [place] fall precautions as indicated by assessment   - Educate patient/family on patient safety including physical limitations  - Instruct patient to call for assistance with activity based on assessment  - Modify environment to reduce risk of injury  - Consider OT/PT consult to assist with strengthening/mobility  Outcome: Progressing

## 2019-12-18 NOTE — ASSESSMENT & PLAN NOTE
Temp:  [97 2 °F (36 2 °C)-98 3 °F (36 8 °C)] 97 9 °F (36 6 °C)  HR:  [64-71] 71  Resp:  [16-20] 16  BP: (131-146)/(59-64) 131/59    · Continue Atenolol  · IM service managing anti-hypertensives, adjusting as needed

## 2019-12-18 NOTE — ASSESSMENT & PLAN NOTE
· Acute comprehensive interdisciplinary inpatient rehabilitation including PT, OT, and/or SLP, RN, CM, SW, dietary, psychology, etc   · Goal: supervision  · Clear patient to shower with incision covered  · WBAT  · S/p IM nailing on 12/14  · 2 week f/u performed by ortho, f/u as outpatient

## 2019-12-18 NOTE — ASSESSMENT & PLAN NOTE
Parkinson's disease with presence of DBS  Continue selegiline and sinemet  Does have delirium advised family that likely sleep/wake disturbance    Improved after starting quetiapine;  patient known to Dr Steven Elizondo

## 2019-12-18 NOTE — PROGRESS NOTES
Orthopaedic Surgery - Progress Note  Magda Frank (71 y o  female)   : 1945   MRN: [id number]  Date: 2019   Encounter: [id number]   Unit/Bed#: E2 -01    Assessment / Plan   Postop day 4 status post right hip ORIF    · Continue with ice and analgesics as needed  · Continue with PT/OT as ordered with weight-bearing as tolerated on the right lower extremity  · Lovenox daily for 30 days as DVT prophylaxis of choice  · Patient continues with some confusion at this time but again seems more oriented than yesterday, continue with management of this through medicine  · Hemoglobin stable 10 6 yesterday with no signs increased bleeding at this time  · Patient will most likely need rehab placement when cleared medically for discharge  Plan outpatient follow-up with Dr Yasmine Perry in 2 weeks  Subjective  Postop day 4 status post right hip ORIF  Pain is still well controlled, though patient continues with some confusion at this time  Patient denies any distal numbness or tingling at this time  Vitals  Temp:  [97 9 °F (36 6 °C)-98 5 °F (36 9 °C)] 98 5 °F (36 9 °C)  HR:  [63] 63  Resp:  [18] 18  BP: (133-138)/(70-74) 133/74  Body mass index is 35 56 kg/m²  I/O last 24 hours: In: -   Out: 500 [Urine:500]    Right Hip Exam  Alignment / Posture:  Normal resting hip posture  Inspection:  Mild to moderate right thigh swelling  No erythema  Mild audra-incisional ecchymosis  Mepilex dressings are clean, dry and intact at this time  Palpation:  Mild tenderness at The audra-incisional areas as expected     ROM:  Not tested  Strength:  5/5 AT, GSC, PT, and peroneals  Stability:  Not tested  Tests:  No pertinent positive or negative tests  Neurovascular:  Sensation intact in DP/SP/Frias/Sa/T nerve distributions  Sensation intact in all digital nerve distributions  Toes warm and perfused  Gait:  Not tested      Lab Results  (I have personally reviewed pertinent lab results )  Results from last 7 days Lab Units 12/17/19  0513 12/16/19  0545 12/15/19  0506 12/14/19  0425 12/13/19  1847 12/13/19  1131   WBC Thousand/uL 9 96 10 99* 12 03* 13 70*  --  12 09*   HEMOGLOBIN g/dL 10 6* 10 9* 11 3* 13 0  --  14 0   HEMATOCRIT % 32 9* 34 2* 35 3 41 6  --  43 1   PLATELETS Thousands/uL 176 167 157 181 186 209     Results from last 7 days   Lab Units 12/13/19  1131   PTT seconds 27   INR  0 99     Results from last 7 days   Lab Units 12/17/19  0513 12/16/19  0545 12/15/19  0506 12/14/19  0425 12/13/19  1131   POTASSIUM mmol/L 3 5 3 6 4 2 3 8 3 9   CHLORIDE mmol/L 107 105 110* 105 106   CO2 mmol/L 28 26 26 27 25   BUN mg/dL 19 15 14 19 20   CREATININE mg/dL 0 75 0 81 0 90 0 79 0 95   EGFR ml/min/1 73sq m 78 71 63 73 59   CALCIUM mg/dL 8 4 8 5 8 4 8 8 8 6   PHOSPHORUS mg/dL  --   --   --  3 5  --                Garcia Doll PA-C

## 2019-12-18 NOTE — ASSESSMENT & PLAN NOTE
Lab Results   Component Value Date    HGBA1C 6 3 12/08/2018     Recent Labs     12/17/19  1620 12/17/19  2048 12/18/19  0715 12/18/19  1110   POCGLU 117 153* 113 135     Diabetes mellitus stable on sliding scale only without need for insulin thusfar during hospitalization

## 2019-12-18 NOTE — ASSESSMENT & PLAN NOTE
· Patient with delirium, hallucinations following her surgery  · Evaluated by neurology, sinimet dose reduced  · Make seroquel PRN, as patient without hallucinations   Discontinue entirely on discharge

## 2019-12-18 NOTE — PCC PHYSICAL THERAPY
12/31/9  Pt continues to show improvements with transfers, amb, and steps with RW  At times however, pt still loses balance, therefore recommending 24/7 S upon d/c and CG assist to be provided with all functional mobilities  Family training with pt's  occurred on 12/31/19  And 1/1/20 educating  in providing CGA during tranfsers, amb, and assistance for bed mobilities   states he is to order bedrail for home to help decrease assist required to pt during bed mobilities  Pt is still at increased risk for fall due to PD, decreased balance, decreased righting reactions, and decreased safety awareness, therefore cont to recommend home therapies to improve pt's overall safety and decrease risk for falls as well as cont to work on pt's weakness, coordination, and endurance  Current goals to help pt reach d/c home on Thursday 1/2/19 are contact guard assist      12/24/19  Pt has shown improvements in past week with transfers, amb, and stairs  Pt's biggest barriers to d/c home at this time include bed mobility as pt requires increased time and assist to complete as well as amb as pt is variable and at times requires increased assist to maintain balance  Barriers to home also include stairs, decreased balance and increased fall risk, transfers, bed mobility and walking  PT POC to include stairs, walking, balance, endurance, education , family training and DME training  , with focus on walking with step through pattern, and stairs with nonreciprocal pattern and transfers to include stand pivots, supine to sit, sit to supine and car transfers   Plan to provide information to  regarding purchasing red line laser pointer to mount on pt's walker to improve her gait pattern and decrease overall risk for falls  Current goals to help pt reach d/c home are for supervision level  Pt is progressing well towards goals   Pt will need further improvement of weakness, balance,  AROM, coordination , righting reactions and pain to make more progress towards goals

## 2019-12-18 NOTE — H&P
PHYSICAL MEDICINE AND REHABILITATION H&P/ADMISSION NOTE  Woody Pearce 75 y o  female MRN: [id number]  Unit/Bed#: -02 Encounter: [id number]     Rehab Diagnosis: Impairment of mobility, safety and Activities of Daily Living (ADLs) due to Orthopedic Disorders:  08 11  Unilateral Hip Fracture    History of Present Illness:   Woody Pearce is a 75 y o  female who presented to the [facility] after fall at home  Found to have a right hip fracture, underwent IM nailing on 12/14/19  Post-procedure patient with delirium, hallucinations  Neurology service consulted, dose of sinemet was reduced  In addition, Seroquel started in evening  Patient does have DBS which  has controller for  Accepted to [facility] on 12/18/19  Subjective: Patient reports her right thigh/leg is sore  Otherwise denies any CP or SOB  No paraesthesias, no numbness  Review of Systems: A 10-point review of systems was performed  Negative except as listed above      Plan:     * Cl[facility] ([facility] )  Assessment & Plan  · Acute comprehensive interdisciplinary inpatient rehabilitation including PT, OT, and/or SLP, RN, CM, SW, dietary, psychology, etc   · Goal: supervision  · Patient not cleared to shower at this time  · WBAT  · S/p IM nailing on 12/14      Essential hypertension  Assessment & Plan  Temp:  [97 9 °F (36 6 °C)-98 5 °F (36 9 °C)] 98 5 °F (36 9 °C)  HR:  [63] 63  Resp:  [18] 18  BP: (133-138)/(70-74) 133/74    · Continue Atenolol  · IM service managing anti-hypertensives, adjusting as needed    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 ([facility] )  Assessment & Plan  · Patient with delirium, hallucinations following her surgery  · Evaluated by neurology, sinimet dose reduced  · Will continue seroquel for now, but will attempt to wean off by completion of rehab      Type 2 diabetes mellitus without complication [facility])  Assessment & Plan    Recent Labs     12/17/19  1620 12/17/19  2048 12/18/19  0715 12/18/19  1110   POCGLU 117 153* 113 135       · Last A1C: 6 3  · Continue diabetic diet  · Continue SSI  · IM service monitoring blood sugars, adjusting regimen as needed      Other hyperlipidemia  Assessment & Plan  · Continue statin    Parkinson's disease with use of electrical brain stimulation (HCC)  Assessment & Plan  · Continue selegiline  · Continue sinemet - dose was decreased by neurology due to hallucinations  · F/u Dr Tristan Bell as outpatient      # Skin  · Encourage regular turning as patient at risk for skin breakdown  · Staff to continue patient education on Q2h turning  · Rehabilitation team to perform skin checks regularly     # Bowel  · Patient reports no constipation  · To ensure regular BMs, bowel regimen consisting of:  colace , dulcolax suppository  and miralax     # Bladder  · Patient voiding spontaneously    # Pain  · Continue tylenol, for max of 3gm daily  · Continue oxycodone     # Rehab Psych   · There are no psychological or psychiatric problems identified    # Other  - Diet/Nutrition:        Diet Orders   (From admission, onward)             Start     Ordered    12/18/19 1449  Diet Regular; Regular House; Consistent Carbohydrate Diet Level 1 (4 carb servings/60 grams CHO/meal)  Diet effective now     Question Answer Comment   Diet Type Regular    Regular Regular House    Other Restriction(s): Consistent Carbohydrate Diet Level 1 (4 carb servings/60 grams CHO/meal)    RD to adjust diet per protocol?  Yes        12/18/19 1449              - DVT prophy: Sequential compression device (Venodyne)  and Enoxaparin (Lovenox)  - GI ppx: None  - Nausea: None  - Supplements: None  - Sleep: None    Disposition: TBD    CODE: Level 3: DNAR and DNI   Drug regimen reviewed, all potential adverse effects identified and addressed:    Scheduled Meds:    Current Facility-Administered Medications:  acetaminophen 650 mg Oral Q6H PRN Manuel Barba MD   [START ON 12/19/2019] aspirin 81 mg Oral Daily Ryan SMITH MD Darshana   [START ON 12/19/2019] atenolol 50 mg Oral Daily Ryan Gilliland MD   carbidopa-levodopa 0 5 tablet Oral TID Viktoria Montez MD   [START ON 12/19/2019] co-enzyme Q-10 400 mg Oral Daily Ryan Gilliland MD   [START ON 12/19/2019] enoxaparin 40 mg Subcutaneous Daily Ryan Gilliland MD   insulin lispro 1-6 Units Subcutaneous TID AC Ryan Gilliland MD   oxyCODONE 5 mg Oral Q4H PRN Viktoria Montez MD   [START ON 12/19/2019] PARoxetine 20 mg Oral Daily Ryan Gilliland MD   [START ON 12/19/2019] polyethylene glycol 17 g Oral Daily Ryan Gilliland MD   pravastatin 40 mg Oral Every Other Day Ryan Gilliland MD   QUEtiapine 25 mg Oral HS Viktoria Montez MD   [START ON 12/19/2019] selegiline 5 mg Oral Daily With Breakfast Viktoria Montez MD   senna-docusate sodium 1 tablet Oral BID Viktoria Montez MD        Restrictions include:  right lower extremity weight bearing as tolerated Fall precautions      Functional History - Prior to Admission:    - required assist with iADLs, adls from SO    Functional Status Upon Admission to ARC:  Mobility: mod  Transfers: mod  ADLs: max-total, particularly with lowers    [employer] lives with their spouse  She lives in [facility]) single family home  The living area: can live on one level  Equipment in home: Shower Chair, [employer], [employer], and [employer] [facility]   There 5 steps to enter the home  Patient/family's goals: Return to previous home/apartment  The patient will have 24 hour supervision/physical assistance available upon discharge      Physical Exam:  Temp:  [97 9 °F (36 6 °C)-98 5 °F (36 9 °C)] 98 5 °F (36 9 °C)  HR:  [63] 63  Resp:  [18] 18  BP: (133-138)/(70-74) 133/74  SpO2:  [94 %-97 %] 97 %    General: alert, no apparent distress, cooperative and comfortable  HEENT:  Head: Normocephalic, no lesions, without obvious abnormality  Eye: Normal external eye, conjunctiva, lids cornea, NORBERTO    Ears: normal external ears  Nose: Normal external nose, mucus membranes and septum  LUNGS:  no abnormal respiratory pattern, no retractions noted, non-labored breathing   ABDOMEN:  soft, non-tender  Bowel sounds normal  No masses, no organomegaly  EXTREMITIES:  edema 1+, R>L  NEURO:   awake, alert, oriented to person place (did not ask about time)  PSYCH:  Affect: euthymic  INCISION:  dressings present  MMT:   Strength:   Right  Left  Site  Right  Left  Site    5 5  S Ab: Shoulder Abductors  2 5  HF: Hip Flexors    5 5  EF: Elbow Flexors  2 5 KF: Knee Flexors    5  5  EE: Elbow Extensors  2 5  KE: Knee Extensors    5  5  WE: Wrist Extensors  4 5  DR: Dorsi Flexors    5  5  FF: Finger Flexors  5  5  PF: Plantar Flexors    5  5  HI: Hand Intrinsics  5  5  EHL: Extensor Hallucis Longus     Laboratory:    Results from last 7 days   Lab Units 12/17/19  0513 12/16/19  0545 12/15/19  0506   HEMOGLOBIN g/dL 10 6* 10 9* 11 3*   HEMATOCRIT % 32 9* 34 2* 35 3   WBC Thousand/uL 9 96 10 99* 12 03*     Results from last 7 days   Lab Units 12/17/19  0513 12/16/19  0545 12/15/19  0506   BUN mg/dL 19 15 14   SODIUM mmol/L 143 140 144   POTASSIUM mmol/L 3 5 3 6 4 2   CHLORIDE mmol/L 107 105 110*   CREATININE mg/dL 0 75 0 81 0 90     Results from last 7 days   Lab Units 12/13/19  1131   PROTIME seconds 13 2   INR  0 99        Wt Readings from Last 1 Encounters:   12/18/19 103 kg (227 lb 1 2 oz)     Estimated body mass index is 35 56 kg/m² as calculated from the following:    Height as of 12/13/19: 5' 7" (1 702 m)  Weight as of an earlier encounter on 12/18/19: 103 kg (227 lb 1 2 oz)  Imaging: reviewed     Rehabilitation Prognosis: good     Tolerance for three hours of therapy a day: good     Family/Patient Goals:  Patient/family's goals: Return to previous home/apartment  Patient will receive PT and OT 90 minutes each per day, five days per week to achieve rehab goals or participate in 900 minutes of therapy within a 7 day week period      Mobility Goals: Supervision / Standby Assist  Transfer Goals: Supervision / Standby Assist  Activities of Daily Living (ADLs) Goals: Supervision / Standby Assist    Discharge Planning:  Rehabilitation and discharge goals discussed with the patient and/or family  Case Managment and Social Work to review patient/family resources and to coordinate Discharge Planning  Estimated length of stay: 2 - 3 weeks    Patient and Family Education and Training:  Rehabilitation and discharge goals discussed with the patient and/or family  Patient/family education/training needs to be discussed in weekly team meeting  Equipment/DME needs: Therapy teams to assess and evaluate for additional equipment/DME needs throughout rehabilitation stay    Past Medical History:   Past Surgical History:   Family History:   Social history:   Past Medical History:   Diagnosis Date    Anxiety     [facility] mellitus [facility]     Diabetes mellitus type 2, diet-controlled ([facility] )     Hyperlipidemia     Hypertension     Parkinson disease ([facility] )     Past Surgical History:   Procedure Laterality Date    ACHILLES TENDON SURGERY      DEEP BRAIN STIMULATOR PLACEMENT      DENTAL SURGERY      SD IMP STIM,CRANIAL,SUBQ,1 ARRAY Right 12/18/2018    Procedure: REPLACEMENT IMPLANTABLE PULSE GENERATOR (IPG) DEEP BRAIN STIMULATION (DBS); Surgeon: Dakota Amin MD;  Location: [facility];  Service: Neurosurgery    SD OPEN RX FEMUR FX+INTRAMED ERIKA Right 12/14/2019    Procedure: INSERTION NAIL IM FEMUR ANTEGRADE (TROCHANTERIC);   Surgeon: Jakob Moore DO;  Location: [facility];  Service: Orthopedics    REPLACEMENT TOTAL KNEE      REVERSE TOTAL SHOULDER ARTHROPLASTY Left 8/23/2018    Procedure: ARTHROPLASTY SHOULDER REVERSE;  Surgeon: Lissette Stewart MD;  [facility];  Service: Orthopedics    TONSILLECTOMY       Family History   Problem Relation Age of Onset    Arthritis Mother     No Known Problems Father     No Known Problems Maternal Grandmother     No Known Problems Maternal Grandfather     No Known Problems Paternal Grandmother     No Known Problems Paternal Grandfather     No Known Problems Son     No Known Problems Son       Social History     Socioeconomic History    Marital status: /Civil Union     Spouse name: Not on file    Number of children: Not on file    Years of education: Not on file    Highest education level: Not on file   Occupational History    Not on file   Social Needs    Financial resource strain: Not on file    Food insecurity:     Worry: Not on file     Inability: Not on file    Transportation needs:     Medical: Not on file     Non-medical: Not on file   Tobacco Use    Smoking status: Never Smoker    Smokeless tobacco: Never Used   Substance and Sexual Activity    Alcohol use: Yes     Comment: occasional    Drug use: No    Sexual activity: Not on file   Lifestyle    Physical activity:     Days per week: Not on file     Minutes per session: Not on file    Stress: Not on file   Relationships    Social connections:     Talks on phone: Not on file     Gets together: Not on file     Attends Sabianism service: Not on file     Active member of club or organization: Not on file     Attends meetings of clubs or organizations: Not on file     Relationship status: Not on file    Intimate partner violence:     Fear of current or ex partner: Not on file     Emotionally abused: Not on file     Physically abused: Not on file     Forced sexual activity: Not on file   Other Topics Concern    Not on file   Social History Narrative    Not on file          Current Medical Diagnosis Allergies   Patient Active Problem List   Diagnosis    Parkinson's disease with use of electrical brain stimulation ([facility] )    Fracture, shoulder, left, closed, initial encounter    History of hypertension    S/P reverse total shoulder arthroplasty, left    Other hyperlipidemia    Type 2 diabetes mellitus without complication ([facility]    Anxiety    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 [facility])    Closed right hip fracture, initial encounter ([facility] )    Essential hypertension    Closed fracture of right hip (HCC)    Constipation    Allergies   Allergen Reactions    Sulfa Antibiotics Hives           Medical Necessity Criteria for [facility] Admission: Anemia, with the following plan: monitor H/H, Hypertension, Bowel/Bladder Management, Incision/Wound care and Leukocystosis  In addition, the preadmission screen, post-admission physical evaluation, overall plan of care and admissions order demonstrate a reasonable expectation that the following criteria were met at the time of admission to the [facility]  1  The patient requires active and ongoing therapeutic intervention of multiple therapy disciplines (physical therapy, occupational therapy, speech-language pathology, or prosthetics/orthotics), one of which is physical or occupational therapy  2  Patient requires an intensive rehabilitation therapy program, as defined in Chapter 1, section 110 2 2 of the CMS Medicare Policy Manual  This intensive rehabilitation therapy program will consist of at least 3 hours of therapy per day at least 5 days per week or at least 15 hours of intensive rehabilitation therapy within a 7 consecutive day period, beginning with the date of admission to the [facility]  3  The patient is reasonably expected to actively participate in, and benefit significantly from, the intensive rehabilitation therapy program as defined in Chapter 1, section 110 2 2 of the CMS Medicare Policy Manual at this time of admission to the [facility]  She can reasonably be expected to make measurable improvement (that will be of practical value to improve the patients functional capacity or adaptation to impairments) as a result of the rehabilitation treatment, as defined in section 110 3, and such improvement can be expected to be made within the prescribed period of time   As noted in the CMS Medicare Policy Manual, the patient need not be expected to achieve complete independence in the domain of self-care nor be expected to return to his or her prior level of functioning in order to meet this standard  4  The patient must require physician supervision by a rehabilitation physician  As such, a rehabilitation physician will conduct face-to-face visits with the patient at least 3 days per week throughout the patients stay in the [facility] to assess the patient both medically and functionally, as well as to modify the course of treatment as needed to maximize the patients capacity to benefit from the rehabilitation process  5  The patient requires an intensive and coordinated interdisciplinary approach to providing rehabilitation, as defined in Chapter 1, section 110 2 5 of the CMS Medicare Policy Manual  This will be achieved through periodic team conferences, conducted at least once in a 7-day period, and comprising of an interdisciplinary team of medical professionals consisting of: a rehabilitation physician, registered nurse,  and/or , and a licensed/certified therapist from each therapy discipline involved in treating the patient  Changes Since Pre-admission Assessment: None -This patient's participation in rehab continues to be reasonable, necessary and appropriate  CMS Required Post-Admission Physician Evaluation Elements  History and Physical, including medical history, functional history and active comorbidities as in above text      PostAdmission Physician Evaluation:  The patient has the potential to make improvement and is in need of physical, occupational, and/or therapy services  The patient may also need nutritional services  Given the patient's complex medical condition and risk of further medical complications, rehabilitative services cannot be safely provided at a lower level of care, such as a skilled nursing facility   I have reviewed the patient's functional and medical status at the time of the preadmission screening and they are the same as on the day of this admission  I acknowledge that I have personally performed a full physical examination on this patient within 24 hours of admission  The patient and/or family demonstrated understanding the rehabilitation program and the discharge process after we discussed them      Agree in entirety: yes  Minor adaptions: none    Major changes: none     Valencia Ruiz MD  Physical Medicine and Rehabilitation    ** Please Note: Fluency Direct voice to text software may have been used in the creation of this document   **

## 2019-12-19 LAB
ALBUMIN SERPL BCP-MCNC: 3.5 G/DL (ref 3–5.2)
ALP SERPL-CCNC: 38 U/L (ref 43–122)
ALT SERPL W P-5'-P-CCNC: 14 U/L (ref 9–52)
ANION GAP SERPL CALCULATED.3IONS-SCNC: 9 MMOL/L (ref 5–14)
AST SERPL W P-5'-P-CCNC: 20 U/L (ref 14–36)
BASOPHILS # BLD AUTO: 0 THOUSANDS/ΜL (ref 0–0.1)
BASOPHILS NFR BLD AUTO: 0 % (ref 0–1)
BILIRUB SERPL-MCNC: 1.3 MG/DL
BUN SERPL-MCNC: 19 MG/DL (ref 5–25)
CALCIUM SERPL-MCNC: 9.1 MG/DL (ref 8.4–10.2)
CHLORIDE SERPL-SCNC: 104 MMOL/L (ref 97–108)
CO2 SERPL-SCNC: 26 MMOL/L (ref 22–30)
CREAT SERPL-MCNC: 0.67 MG/DL (ref 0.6–1.2)
EOSINOPHIL # BLD AUTO: 0.2 THOUSAND/ΜL (ref 0–0.4)
EOSINOPHIL NFR BLD AUTO: 3 % (ref 0–6)
ERYTHROCYTE [DISTWIDTH] IN BLOOD BY AUTOMATED COUNT: 13.8 %
EST. AVERAGE GLUCOSE BLD GHB EST-MCNC: 117 MG/DL
GFR SERPL CREATININE-BSD FRML MDRD: 86 ML/MIN/1.73SQ M
GLUCOSE SERPL-MCNC: 100 MG/DL (ref 65–140)
GLUCOSE SERPL-MCNC: 112 MG/DL (ref 65–140)
GLUCOSE SERPL-MCNC: 148 MG/DL (ref 70–99)
GLUCOSE SERPL-MCNC: 154 MG/DL (ref 65–140)
GLUCOSE SERPL-MCNC: 174 MG/DL (ref 65–140)
HBA1C MFR BLD: 5.7 % (ref 4.2–6.3)
HCT VFR BLD AUTO: 34.4 % (ref 36–46)
HGB BLD-MCNC: 11.7 G/DL (ref 12–16)
LYMPHOCYTES # BLD AUTO: 2 THOUSANDS/ΜL (ref 0.5–4)
LYMPHOCYTES NFR BLD AUTO: 22 % (ref 25–45)
MCH RBC QN AUTO: 31.5 PG (ref 26–34)
MCHC RBC AUTO-ENTMCNC: 34.1 G/DL (ref 31–36)
MCV RBC AUTO: 93 FL (ref 80–100)
MONOCYTES # BLD AUTO: 0.7 THOUSAND/ΜL (ref 0.2–0.9)
MONOCYTES NFR BLD AUTO: 8 % (ref 1–10)
NEUTROPHILS # BLD AUTO: 6 THOUSANDS/ΜL (ref 1.8–7.8)
NEUTS SEG NFR BLD AUTO: 67 % (ref 45–65)
PLATELET # BLD AUTO: 303 THOUSANDS/UL (ref 150–450)
PMV BLD AUTO: 9 FL (ref 8.9–12.7)
POTASSIUM SERPL-SCNC: 3.6 MMOL/L (ref 3.6–5)
PROT SERPL-MCNC: 6.3 G/DL (ref 5.9–8.4)
RBC # BLD AUTO: 3.72 MILLION/UL (ref 4–5.2)
SODIUM SERPL-SCNC: 139 MMOL/L (ref 137–147)
WBC # BLD AUTO: 9 THOUSAND/UL (ref 4.5–11)

## 2019-12-19 PROCEDURE — 99233 SBSQ HOSP IP/OBS HIGH 50: CPT | Performed by: PHYSICAL MEDICINE & REHABILITATION

## 2019-12-19 PROCEDURE — 85025 COMPLETE CBC W/AUTO DIFF WBC: CPT | Performed by: NURSE PRACTITIONER

## 2019-12-19 PROCEDURE — 97116 GAIT TRAINING THERAPY: CPT

## 2019-12-19 PROCEDURE — 97167 OT EVAL HIGH COMPLEX 60 MIN: CPT

## 2019-12-19 PROCEDURE — 99223 1ST HOSP IP/OBS HIGH 75: CPT | Performed by: INTERNAL MEDICINE

## 2019-12-19 PROCEDURE — 97530 THERAPEUTIC ACTIVITIES: CPT

## 2019-12-19 PROCEDURE — 82948 REAGENT STRIP/BLOOD GLUCOSE: CPT

## 2019-12-19 PROCEDURE — 97163 PT EVAL HIGH COMPLEX 45 MIN: CPT

## 2019-12-19 PROCEDURE — 80053 COMPREHEN METABOLIC PANEL: CPT | Performed by: NURSE PRACTITIONER

## 2019-12-19 PROCEDURE — 83036 HEMOGLOBIN GLYCOSYLATED A1C: CPT | Performed by: NURSE PRACTITIONER

## 2019-12-19 PROCEDURE — 97535 SELF CARE MNGMENT TRAINING: CPT

## 2019-12-19 PROCEDURE — 97110 THERAPEUTIC EXERCISES: CPT

## 2019-12-19 RX ADMIN — ACETAMINOPHEN 650 MG: 325 TABLET ORAL at 18:25

## 2019-12-19 RX ADMIN — ASPIRIN 81 MG: 81 TABLET, COATED ORAL at 08:38

## 2019-12-19 RX ADMIN — ATENOLOL 50 MG: 25 TABLET ORAL at 08:38

## 2019-12-19 RX ADMIN — SENNOSIDES AND DOCUSATE SODIUM 1 TABLET: 8.6; 5 TABLET ORAL at 21:09

## 2019-12-19 RX ADMIN — CARBIDOPA AND LEVODOPA 0.5 TABLET: 25; 100 TABLET ORAL at 16:39

## 2019-12-19 RX ADMIN — INSULIN LISPRO 1 UNITS: 100 INJECTION, SOLUTION INTRAVENOUS; SUBCUTANEOUS at 11:57

## 2019-12-19 RX ADMIN — POLYETHYLENE GLYCOL 3350 17 G: 17 POWDER, FOR SOLUTION ORAL at 08:39

## 2019-12-19 RX ADMIN — QUETIAPINE FUMARATE 25 MG: 25 TABLET, FILM COATED ORAL at 21:05

## 2019-12-19 RX ADMIN — ENOXAPARIN SODIUM 40 MG: 40 INJECTION SUBCUTANEOUS at 08:38

## 2019-12-19 RX ADMIN — SENNOSIDES AND DOCUSATE SODIUM 1 TABLET: 8.6; 5 TABLET ORAL at 08:38

## 2019-12-19 RX ADMIN — CARBIDOPA AND LEVODOPA 0.5 TABLET: 25; 100 TABLET ORAL at 21:05

## 2019-12-19 RX ADMIN — CARBIDOPA AND LEVODOPA 0.5 TABLET: 25; 100 TABLET ORAL at 08:38

## 2019-12-19 NOTE — ASSESSMENT & PLAN NOTE
Vitals:    12/19/19 0838   BP: 114/58   Pulse: 60   Resp: 16   Temp: 99 °F (37 2 °C)   SpO2: 96%      Continue with Atenolol 50mg

## 2019-12-19 NOTE — ASSESSMENT & PLAN NOTE
· Continue selegiline (patient is on rasagiline 1mg daily as outpatient, evidently not available here on formulary)   Will resume home rasagiline on discharge  · Continue sinemet - dose was decreased by neurology due to hallucinations  · F/u Dr Eamon Patel as outpatient  · promote sleep/wake cycle  · avoid CNS altering medications

## 2019-12-19 NOTE — PROGRESS NOTES
12/19/19 0900   Rehab Team Goals   ADL Team Goal Patient will require supervision with ADLs with least restrictive device upon completion of rehab program   Rehab Team Interventions   OT Interventions Self Care; Therapeutic Exercise; Energy Conservation;Patient/Family Education;Group Therapy   Eating Goal   Eating Goal 06  Independent - Patient completes the activity by him/herself with no assistance from a helper  Meal Complete All meals   Status Ongoing; Target goal - two weeks   Interventions Optimal Position   Grooming Goal   Oral Hygiene Goal 04  Supervision or touching assistance- [place] provides VERBAL CUES or supervision throughout activity  Task Wash/Dry Face;Wash/Dry Hands;Brush Teeth;Comb Hair;Complete Groom; Initiate Task   Environment Seated at [employer]   Status Ongoing; Target goal - two weeks   Intervention Balance Work; Therapeutic Exercise; Tolerance Work   Tub/Shower Transfer Goal   Method Shower Stall   Assist Device Seat with [employer] Bar;Hand Held Shower   Status Ongoing; Target goal - two weeks   Interventions ADL Training   Bathing Goal   Shower/bathe self Goal 04  TOUCHING/ STEADYING assistance as patient completes activity  Environment Seated;Standing   Adaptive Equipment Seat with back;Grab Bar;Hand Held Shower   Safety Precautions THP   Status Ongoing; Target goal - two weeks   Intervention ADL Training; Therapeutic Exercise   Upper Body Dressing Goal   Upper body dressing Goal 04  Supervision or touching assistance- [place] provides VERBAL CUES or supervision throughout activity  Task Upper Body;Arms in/out; Over Head   Environment Seated   Status Ongoing; Target goal - two weeks   Intervention Tolerance Work   Lower Body Dressing Goal   Lower body dressing Goal 04  TOUCHING/ STEADYING assistance as patient completes activity  Putting on/taking off footwear Goal 04  TOUCHING/ STEADYING assistance as patient completes activity     Task Lower Body;Shoe/Slipper;Socks;Pants   Adaptive Equipment Reacher;Sock Aide; Shoe Horn;Dressing Stick   Environment Seated;Standing   Safety Precautions THP   Status Ongoing; Target goal - two weeks   Intervention Assistive Device;Balance Work; Therapeutic Exercise; Tolerance Work   Toileting Transfer Goal   Toilet transfer Goal 04  Supervision or touching assistance- [place] provides VERBAL CUES or supervision throughout activity  Assistive Device Juliet Candelario Alexey; Bedside Commode   Safety Hip Precaution; Safety Precaution;WBAT   Status Ongoing; Target goal - two weeks   Intervention ADL Training;Balance Work;Assistive Device   Toileting Goal   Toileting hygiene Goal 04  TOUCHING/ STEADYING assistance as patient completes activity  Task Pants Up;Pants Down;Hygiene   Assistive Device Grab Bar   Safety Balance   Status Ongoing; Target goal - two weeks   Intervention ADL Training;Balance Work;Assistive Device   Meal Prep and Kitchen Mobility   Assist Level   (spouse completes)   Medication Management   Assist Level   (spouse completes)   Laundry   Assist Level   (spouse completes)

## 2019-12-19 NOTE — ASSESSMENT & PLAN NOTE
· Patient with delirium, hallucinations following her surgery  · Had 1 hallucination last night  · Evaluated by neurology, sinimet dose reduced  · Will continue seroquel for now, but will attempt to wean off by completion of rehab

## 2019-12-19 NOTE — PCC CARE MANAGEMENT
Pt has participated well with therapy, and will d/c home today  Pt will have cont'd service from [facility] for PT/OT and HHA  Following to assist with any additional d/c planning needs

## 2019-12-19 NOTE — TREATMENT PLAN
Individualized Plan of [facility] XENIA Oliva 75 y o  female MRN: [id number]  Unit/Bed#: Brandon Camacho 982-81 Encounter: [id number]     PATIENT INFORMATION  ADMISSION DATE: 12/18/2019  2:44 PM PRE[facility] CATEGORY:Orthopedic Disorders:  08 11  Unilateral Hip Fracture   ADMISSION DIAGNOSIS: Femur fracture ([address] ) [S72 [id number]]  EXPECTED LOS: 10 to 14 days     MEDICAL/FUNCTIONAL PROGNOSIS  Based on my assessment of the patient's medical conditions and current functional status, the prognosis for attaining medical and functional goals or the IRF stay is:  Good    Medical Goals: Patient will be medically stable for discharge to [facility] upon completion of rehab program    [address]: Home - with supervision    ANTICIPATED FOLLOW-UP SERVICE:   Outpatient Therapy Services: PT and OT      Home Health Services: PT, OT and Nursing    DISCIPLINE SPECIFIC PLANS:  Required Disciplines & Services: Rehabillitation Nursing, Case Management, Dietay/Nutrition and Prosthetics/Orthostics    REQUIRED THERAPY:  Therapy Hours per Day Days per Week Total Days   Physical Therapy 1 5 5 5   Occupational Therapy 1 5 5 5   NOTE: Additional therapy time(s) may be added as appropriate to meet patient needs and to achieve functional goals      Patient will either participate in above therapy regimen or participate in 900 minutes of therapy within 7 day week consisting of PT and OT due to the following medical procedure/condition:Orthopedic Disorders:  08 11  Unilateral Hip Fracture    ANTICIPATED FUNCTIONAL OUTCOMES:  ADL:  supervision   Bladder/Bowel:   supervision   Transfers: Patient will require supervision with transfers with least restrictive device upon completion of rehab program   Locomotion: Patient will require supervision with locomotion with least restrictive device upon completion of rehab program   Cognitive:   supervision     DISCHARGE PLANNING NEEDS  Equipment needs: Discharge needs to be reviewed with team     REHAB ANTICIPATED PARTICIPATION RESTRICTIONS:  Assist with Mobility, Inability to Drive and Rquires Assist with ADLS

## 2019-12-19 NOTE — CONSULTS
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 ([facility] )  Assessment & Plan  · Patient with delirium, hallucinations following her surgery  · Had 1 hallucination last night  · Evaluated by neurology, sinimet dose reduced  · Will continue seroquel for now, but will attempt to wean off by completion of rehab      Type 2 diabetes mellitus without complication [facility])  Assessment & Plan  Lab Results   Component Value Date    HGBA1C 6 3 12/08/2018       Recent Labs     12/18/19  1611 12/18/19  2052 12/19/19  0608 12/19/19  1103   POCGLU 111 159* 112 154*       Blood Sugar Average: Last 72 hrs:  (P) 134   Patient pre- diabetic, not on home insulin  DM   Will follow up with PCP outpatient  Will d/c SSI    Other hyperlipidemia  Assessment & Plan  · Continue Pravastatin     Parkinson's disease with use of electrical brain stimulation ([facility] )  Assessment & Plan  · Continue selegiline (patient is on rasagiline 1mg daily as outpatient, evidently not available here on formulary)   Will resume home rasagiline on discharge  · Continue sinemet - dose was decreased by neurology due to hallucinations  · F/u Dr Ming Cooney as outpatient  · promote sleep/wake cycle  · avoid CNS altering medications      * Closed fracture of right hip [facility])  Assessment & Plan  · Acute comprehensive interdisciplinary inpatient rehabilitation including PT, OT, and/or SLP, RN, CM, SW, dietary, psychology, etc   · Goal: supervision  · Patient not cleared to shower at this time  · WBAT  · S/p IM nailing on 12/14      VTE Prophylaxis: Enoxaparin (Lovenox)  / sequential compression device     Recommendations for Discharge:  · As per treatment team      History of Present Illness:    Bam Driscoll is a 75 y o  female who is originally admitted to the [facility] on 12/18/2019 due to   Impairment of mobility, status post Intertrochanteric femur fracture  Patient presents to the hospital on 12/13/2019 by ambulance after a fall while trying to arrange disuse in the cabinet  Patient had fallen on her right hip, she denies hitting her head or loss of consciousness  Patient was unable to stand on her right leg which was shortened and externally rotated on exam in the emergency department  X-ray of right hip showed mildly displaced right femoral intertrochanteric fracture  At that time orthopedics was consulted and recommended that patient be nonweightbearing to right lower extremity and surgical intervention was performed  Patient went to the  OR on 12/14/2019 with Dr Dilma Mckinley for a right hip cephalomedullary IM nail with synthes TFN  Postoperatively orthopedics cleared her to be weight-bearing as tolerated  On arrival hemoglobin was 14 and was 10 6 on 12/17, patient did not require transfusion  Postoperatively patient was noted to have hallucinations  Family had reported that she sometimes has the symptoms from her Sinemet  Neurology was consulted and recommended changes in medication dosing  Delirium and hallucinations have improved during her hospital course  We are consulted for medical management:   DM type 2, hyperlipidemia, hypertension and Parkinson's disease status post deep brain stimulator    Review of Systems:    Review of Systems   Constitutional: Negative for activity change, appetite change, chills, diaphoresis and fever  Eyes: Negative for pain, discharge, itching and visual disturbance     Respiratory: Negative for cough, chest tightness, shortness of breath and wheezing  Cardiovascular: Negative for chest pain, palpitations and leg swelling  Gastrointestinal: Negative for abdominal pain, constipation, diarrhea, nausea and vomiting  Endocrine: Negative for polydipsia, polyphagia and polyuria  Genitourinary: Negative for difficulty urinating, dysuria and urgency  Musculoskeletal: Negative for arthralgias, back pain and neck pain  RLE pain in hip with movement, LUE limited range of motion from prior shoulder surgery   Skin: Negative for rash and wound  Neurological: Positive for weakness (RLE after surgery)  Negative for dizziness, facial asymmetry, speech difficulty, numbness and headaches  Psychiatric/Behavioral: Positive for hallucinations  Past Medical and Surgical History:     Past Medical History:   Diagnosis Date    Anxiety     Diabetes mellitus ([facility] )     Diabetes mellitus type 2, diet-controlled ([facility] )     Hyperlipidemia     Hypertension     Parkinson disease ([facility] )        Past Surgical History:   Procedure Laterality Date    ACHILLES TENDON SURGERY      DEEP BRAIN STIMULATOR PLACEMENT      DENTAL SURGERY      FRACTURE SURGERY      AZ IMP STIM,CRANIAL,SUBQ,1 ARRAY Right 12/18/2018    Procedure: REPLACEMENT IMPLANTABLE PULSE GENERATOR (IPG) DEEP BRAIN STIMULATION (DBS); Surgeon: More Dunlap MD;  Location: [facility];  Service: Neurosurgery    AZ OPEN RX FEMUR FX+INTRAMED ERIKA Right 12/14/2019    Procedure: INSERTION NAIL IM FEMUR ANTEGRADE (TROCHANTERIC); Surgeon: Edin Navarro DO;  Location: [facility];  Service: Orthopedics    REPLACEMENT TOTAL KNEE      REVERSE TOTAL SHOULDER ARTHROPLASTY Left 8/23/2018    Procedure: ARTHROPLASTY SHOULDER REVERSE;  Surgeon: Jabari Lees MD;  Location: [facility];  Service: Orthopedics    TONSILLECTOMY         Meds/Allergies:    all medications and allergies reviewed    Allergies:    Allergies   Allergen Reactions    Sulfa Antibiotics Hives       Social History:     Marital Status: /Civil Union    Substance Use History:   Social History     Substance and Sexual Activity   Alcohol Use Yes    Alcohol/week: 2 0 standard drinks    Types: 2 Glasses of wine per week    Frequency: Monthly or less    Drinks per session: 1 or 2    Comment: occasional     Social History     Tobacco Use   Smoking Status Never Smoker   Smokeless Tobacco Never Used     Social History     Substance and Sexual Activity   Drug Use No       Family History:    non-contributory    Physical Exam:     Vitals:   Blood Pressure: 114/58 (12/19/19 0838)  Pulse: 60 (12/19/19 0838)  Temperature: 99 °F (37 2 °C) (12/19/19 0838)  Temp Source: Tympanic (12/19/19 0838)  Respirations: 16 (12/19/19 0838)  Height: 5' 7" (170 2 cm) (12/18/19 1700)  Weight - Scale: 105 kg (231 lb 7 7 oz) (12/19/19 0600)  SpO2: 96 % (12/19/19 0834)    Physical Exam   Constitutional: She is oriented to person, place, and time  She appears well-developed and well-nourished  No distress  HENT:   Head: Normocephalic and atraumatic  Right Ear: External ear normal    Left Ear: External ear normal    Nose: Nose normal    Mouth/Throat: Oropharynx is clear and moist  No oropharyngeal exudate  Eyes: Pupils are equal, round, and reactive to light  Conjunctivae and EOM are normal  Right eye exhibits no discharge  Left eye exhibits no discharge  Neck: Normal range of motion  Neck supple  No thyromegaly present  Cardiovascular: Normal rate, regular rhythm, normal heart sounds and intact distal pulses  Exam reveals no gallop and no friction rub  No murmur heard  Pulmonary/Chest: Effort normal and breath sounds normal  No stridor  No respiratory distress  She has no wheezes  She has no rales  Abdominal: Soft  Bowel sounds are normal  She exhibits no distension  There is no tenderness  Lymphadenopathy:     She has no cervical adenopathy     Neurological: She is alert and oriented to person, place, and time  Skin: Skin is warm and dry  No rash noted  She is not diaphoretic  No erythema  Psychiatric: She has a normal mood and affect  Her behavior is normal  Judgment and thought content normal          Additional Data:     Lab Results: I have personally reviewed pertinent reports  Results from last 7 days   Lab Units 12/19/19  1048   WBC Thousand/uL 9 00   HEMOGLOBIN g/dL 11 7*   HEMATOCRIT % 34 4*   PLATELETS Thousands/uL 303   NEUTROS PCT % 67*   LYMPHS PCT % 22*   MONOS PCT % 8   EOS PCT % 3     Results from last 7 days   Lab Units 12/19/19  1048   POTASSIUM mmol/L 3 6   CHLORIDE mmol/L 104   CO2 mmol/L 26   BUN mg/dL 19   CREATININE mg/dL 0 67   CALCIUM mg/dL 9 1   ALK PHOS U/L 38*   ALT U/L 14   AST U/L 20     Results from last 7 days   Lab Units 12/13/19  1131   INR  0 99       Imaging: I have personally reviewed pertinent reports  Xr Hip/pelv 2-3 Vws Right If Performed    Result Date: 12/15/2019  Narrative: RIGHT HIP INDICATION: Follow-up right hip surgery COMPARISON:  Intraoperative exam same date VIEWS:  XR HIP/PELV 2-3 VWS RIGHT W PELVIS IF PERFORMED Images: 2 FINDINGS: Stable appearance of intramedullary beatriz and dynamic screw fixation of right femoral neck fracture There is no acute fracture or dislocation  Mild degenerative arthritis No lytic or blastic osseous lesions  Soft tissues are unremarkable  Impression: Radiographically satisfactory appearance of intramedullary beatriz and screw fixation right femoral neck fracture, unchanged Workstation performed: [id number]     Xr Hip/pelv 2-3 Vws Right If Performed    Result Date: 12/15/2019  Narrative: C-ARM - right intertrochanteric hip fracture fixation INDICATION: c-arm  Procedure guidance  COMPARISON:  Plain film dated 12/13/2019 TECHNIQUE: FLUOROSCOPY TIME:   1 min 2 seconds 6 FLUOROSCOPIC IMAGES FINDINGS: Fluoroscopic guidance provided for surgical procedure  Osseous and soft tissue detail limited by technique   Anatomic alignment status post ORIF for a right intertrochanteric hip fracture  Impression: Fluoroscopic guidance provided for surgical procedure  Please refer to the separate procedure notes for additional details  Workstation performed: [id number]     Xr Hip/pelv 2-3 Vws Right    Result Date: 12/13/2019  Narrative: RIGHT HIP INDICATION:  Fall, right hip pain  COMPARISON:  7/8/2019 VIEWS:  XR HIP/PELV 2-3 VWS RIGHT W PELVIS IF PERFORMED Images: 4 FINDINGS: Right femoral intertrochanteric fracture with coxa varus deformity  Mild right hip osteoarthritis is seen  No lytic or blastic osseous lesions  Soft tissues are unremarkable  Degenerative changes pubic symphysis and visualized lower lumbar spine  Impression: Mildly displaced right femoral intertrochanteric fracture  Workstation performed: [id number]     Xr Femur 2 Vw Right    Result Date: 12/14/2019  Narrative: RIGHT FEMUR INDICATION:   Preoperative planning  COMPARISON:  Radiograph of the right hip and December 13, 2019 at 11:31 AM  VIEWS:  XR FEMUR 2 VW RIGHT FINDINGS: Intertrochanteric fracture of the right femur  Osteoarthritis of the knee  Superior patellar pole enthesophyte  No lytic or blastic lesions are seen  Soft tissue calcification lateral to the iliac crest      Impression: Intertrochanteric fracture right femur  Osteoarthritis of the knee  Workstation performed: [id number]     Xr Chest 1 View    Result Date: 12/13/2019  Narrative: CHEST INDICATION:  Preoperative risk assessement  Right hip fracture  COMPARISON:  8/22/2018 EXAM PERFORMED/VIEWS:  XR CHEST 1 VIEW  AP supine FINDINGS: Cardiomediastinal silhouette appears unremarkable  Stimulator devices project over the upper chest bilaterally  The lungs are clear  Elevation of the right diaphragm as before  No discernible pneumothorax or layering pleural effusion on limited supine imaging  Reverse left total shoulder arthroplasty  Paravertebral ossifications thoracic spine       Impression: Stable chest with no acute cardiopulmonary disease  Workstation performed: [id number]           M*Modal software was used to dictate this note  It may contain errors with dictating incorrect words or incorrect spelling  Please contact the provider directly with any questions

## 2019-12-19 NOTE — PROGRESS NOTES
PT evaluation:       12/19/19 1030   Patient Data   Rehab Impairment Impairment of mobility, ADL, safety due to unilateral hip fracture   Etiologic Diagnosis Right intertrochanteric femur fx   Date of Onset 12/13/19   Support System   Name Keke Grider   Relationship Spouse   Able to provide 24 hour supervision Yes   Able to provide physical help? Yes   Home Setup   Type of Home Single Level   Method of Entry Stairs;Hand Rail Bilateral   Number of Stairs 5   Number of Stairs in Home 0   First Floor Bathroom Full   First Floor Setup Available Yes   Home Modifications Necessary? No   Available Equipment Shower Chair;Quad Cane;Rollator  (Grab bars)   Baseline Information   Transportation Family/friends drive   Prior Device(s) Used Rollator   Prior IADL Participation   Meal Preparation Partial Participation   Laundry Partial Participation   Home Cleaning Partial Participation   Prior Level of Function   Self-Care 2  Needed Some Help - Patient needed a partial assistance from another person to complete activities  Indoor-Mobility (Ambulation) 3  Independent - Patient completed the activities by him/herself, with or without an assistive device, with no assistance from a helper  Stairs 2  Needed Some Help - Patient needed a partial assistance from another person to complete activities  Functional Cognition 3  Independent - Patient completed the activities by him/herself, with or without an assistive device, with no assistance from a helper  Prior Assistance Needed for Driving; Shopping;Household Chores/Cleaning;Meal Preparation   Prior Device Used D   Walker  (Rollator)   Falls in the Last Year   Number of falls in the past 12 months 2  (Pt able to recall 2 falls in past 6 months )   Type of Injury Associated with Fall Injury   Psychosocial   Psychosocial (WDL) WDL   Restrictions/Precautions   Precautions Fall Risk;Pain;Cognitive   RLE Weight Bearing Per Order WBAT   Pain Assessment   Pain Assessment 0-10   Pain Score 7 Pain Type Acute pain;Surgical pain   Pain Location Hip   Pain Orientation Right   Pain Descriptors Aching;Discomfort   Pain Frequency Intermittent   Hospital Pain Intervention(s) Medication (See MAR); Repositioned;Distraction; Rest   Transfer Bed/Chair/Wheelchair   Limitations Noted In Balance;Confidence; Endurance;Pain Management; Sequencing;UE Strength;LE Strength   Adaptive Equipment Roller Walker   Type of Assistance Needed Physical assistance;Verbal cues; Adaptive equipment   Amount of Physical Assistance Provided 25%-49%   Chair/Bed-to-Chair Transfer CARE Score 3   Roll Left and Right   Type of Assistance Needed Physical assistance;Verbal cues; Adaptive equipment   Amount of Physical Assistance Provided 50%-74%   Comment Bedrails   Roll Left and Right CARE Score 2   Sit to Lying   Type of Assistance Needed Physical assistance;Verbal cues; Adaptive equipment   Amount of Physical Assistance Provided 50%-74%   Comment Assist to manage RLE   Sit to Lying CARE Score 2   Lying to Sitting on Side of Bed   Type of Assistance Needed Physical assistance;Verbal cues; Adaptive equipment   Amount of Physical Assistance Provided 50%-74%   Comment Assist to manage RLE   Lying to Sitting on Side of Bed CARE Score 2   Sit to Stand   Type of Assistance Needed Physical assistance;Verbal cues   Amount of Physical Assistance Provided 50%-74%   Sit to Stand CARE Score 2   Picking Up Object   Reason if not Attempted Safety concerns   Picking Up Object CARE Score 88   Car Transfer   Reason if not Attempted Environmental limitations   Car Transfer CARE Score 10   Ambulation   Primary Mode of Locomotion Prior to Admission Walk   Distance Walked (feet) 10 ft   Assist Device Roller Walker   Gait Pattern Antalgic; Inconsistant Oliva; Slow Oliva;Decreased foot clearance; Forward Flexion;R knee flako;L knee flako;Narrow NANCY; Decreased R stance; Improper weight shift; Step to   Limitations Noted In Balance; Coordination;Device Management; Endurance; Heel Strike;Posture; Safety; Sequencing;Speed;Strength;Swing   Provided Assistance with: Balance;Weight Shift   Walk Assist Level Minimum Assist   Walk 10 Feet   Type of Assistance Needed Physical assistance;Verbal cues; Adaptive equipment   Amount of Physical Assistance Provided 25%-49%   Comment RW   Walk 10 Feet CARE Score 3   Walk 50 Feet with Two Turns   Reason if not Attempted Safety concerns   Walk 50 Feet with Two Turns CARE Score 88   Walk 150 Feet   Reason if not Attempted Safety concerns   Walk 150 Feet CARE Score 88   Walking 10 Feet on Uneven Surfaces   Reason if not Attempted Safety concerns   Walking 10 Feet on Uneven Surfaces CARE Score 88   Wheel 50 Feet with Two Turns   Reason if not Attempted Activity not applicable   Wheel 50 Feet with Two Turns CARE Score 9   Wheel 150 Feet   Reason if not Attempted Activity not applicable   Wheel 278 Feet CARE Score 9   Curb or Single Stair   Reason if not Attempted Activity not applicable   1 Step (Curb) CARE Score 9   4 Steps   Reason if not Attempted Safety concerns   4 Steps CARE Score 88   12 Steps   Reason if not Attempted Activity not applicable   12 Steps CARE Score 9   Comprehension   QI: Comprehension 4  Undestands: Clear comprehension without cues or repetitions   Expression   QI: Expression 4  Express complex messages without difficulty and with speech that is clear and easy to [place]   RLE Assessment   RLE Assessment X   Strength RLE   R Hip Flexion 2-/5   R Hip Extension 2-/5   R Hip ADduction 1/5   R Hip ABduction 2-/5   R Knee Flexion 3-/5   R Knee Extension 2+/5   R Ankle Dorsiflexion 3+/5   R Ankle Plantar Flexion 3+/5   LLE Assessment   LLE Assessment WFL  (Grossly 4- to 4/5 throughout )   Coordination   Movements are Fluid and Coordinated 0   Coordination and Movement Description Limited by pain, edema, weakness  Sensation   Light Touch No apparent deficits   Cognition   Arousal/Participation Alert; Cooperative Attention Within functional limits   Orientation Level Oriented X4   Following Commands Follows one step commands without difficulty   Comments Pt reporting some episodes of hallucinations and delirium post-op, most recently as last PM     Discharge Information   Vocational Plan Retired/not working   Patient's Discharge Plan Home with spouse   Patient's Rehab Expectations "get stronger and go home"   Barriers to Discharge Home Decreased Strength;Decreased Cognitive Function;Decreased Endurance;Pain; Safety Considerations   Impressions Pt presents to [facility] s/p fall at home 12/13/2019 resulting in R intertrochanteric femur fx, no s/p IM nailing on 12/14/2019  Pts PMH significant for PD, +deep brain stimulator  Pt's post-op case complicated in part by delirium and hallucinations, ongoing  PTA pt was indep with mobility with rollator and reports spouse would assist "occasionally" and "as needed" with self care taks including bathing setup, dressing  Pt presents with dec strength/ROM at RLE, edema, pain and dec overall mobility requiring min-mod A for transfers, bed mobility, and ambulation with RW  Amb distance limited to 10ft due to pain, fatigue and dec ability WB through RLE  She will benefit from PT to address the above deficits and return to prior level of mobility and indep  Pt/family with benefit from FT prior to d/c  Anticipcating 2 week LOS with d/c to home and followup services      PT Therapy Minutes   PT Time In 1030   PT Time Out 1100   PT Total Time (minutes) 30   PT Mode of treatment - Individual (minutes) 30   PT Mode of treatment - Concurrent (minutes) 0   PT Mode of treatment - Group (minutes) 0   PT Mode of treatment - Co-treat (minutes) 0   PT Mode of Treatment - Total time(minutes) 30 minutes   PT Cumulative Minutes 30   Cumulative Minutes   Cumulative therapy minutes 30

## 2019-12-19 NOTE — PCC OCCUPATIONAL THERAPY
Pt has been making slow but steady progress since initial evaluation  Pt is currently in acute rehab s/p ORIF of R hip  Pt is WBAT and has no precautions  Pt's h/o Parkinson's impacts her rehab progression  Pt is noted to be limited by freezing, decreased coordination with transfers, rigidity at times, limited forward flexion, impaired dynamic standing balance, RLE weakness, difficulty advancing LLE, and poor weight bearing tolerance onto RLE  Therapist completed multiple FT sessions with  Ruchi Rodgers kandi Taylor to communicate recommendations  At this time therapist recommends 24hr supervision during all ADLs especially showers and functional mobility with RW 2* to pts decreased cognition, decreased safety awareness  A this time therapist recommends home health aid/private pay agency to A  and pt during functional tasks  Therapist recommend the following DME: commode, bed rail  In addition therapist recommend  to A with med mgnmtn 2* to pt requiring maxA during med mgnmt task   aware of A pt with medication and importance of taking carbidopa 3x/day and same time in order to decrease tremors/freezing   Pt with anticipated d/c of 12/2/19

## 2019-12-19 NOTE — TEAM CONFERENCE
Acute RehabilitationTeam Conference Note  Date: 12/19/2019   Time: 12:42 PM       Patient Name:  Nonah Severs       Medical Record Number: [id number]   YOB: 1945  Sex: Female          Room/Bed:  [facility] 269/[facility] 269-02  Payor Info:  Payor: Basim Elam / Plan: MEDICARE A AND B / Product Type: Medicare A & B Fee for Service /      Admitting Diagnosis: Femur fracture ([facility] ) Johnnie Lock   Admit Date/Time:  12/18/2019  2:44 PM  Admission Comments: No comment available     Primary Diagnosis:  Closed fracture of right hip ([facility] )  Principal Problem: Closed fracture of right hip [facility])    Patient Active Problem List    Diagnosis Date Noted    Constipation 12/16/2019    Closed right hip fracture, initial encounter ([facility] ) 12/13/2019    Essential hypertension 12/13/2019    Closed fracture of right hip ([facility] ) 12/13/2019    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 ([place] [address] ) 11/15/2018    Anxiety 09/12/2018    Other hyperlipidemia 09/10/2018    Type 2 diabetes mellitus without complication ([facility] ) 44/39/1756    S/P reverse total shoulder arthroplasty, left 09/07/2018    History of hypertension 08/23/2018    Fracture, shoulder, left, closed, initial encounter 08/22/2018    Parkinson's disease with use of electrical brain stimulation ([facility] ) 06/21/2018       Physical Therapy:         PT eval to be completed 12/19/19  Occupational Therapy:          OT IE to be completed on 12/19/19  Speech Therapy:           No notes on file    Nursing Notes:  Appetite: Fair  Diet Type: Diabetic                                                                     Pain Location: Hip  Pain Orientation: Right  Pain Score: 0                       Hospital Pain Intervention(s): Medication (See MAR), Repositioned, Distraction, Rest          Pt is a 75 y o  female who  fell at home, found to have R hip fracture, IM nailing on 12/14/19  Post-procedure patient with delirium and hallucinations   Neurologist reduced dose of Sinemet for Parkinson's and Seroquel is started  Pt on Atenolol for hypertension, Statin for hyperlipidemia  On Lovenox for DVT prophylaxis and Venodynes  Pt has deep brain stimulator  WBAT to RLE  Will continue to monitor lab results, safety for transfers, better healing of R hip incision and pain management  Case Management:     Discharge Planning  Living Arrangements: Spouse/significant other  Support Systems: Spouse/significant other  Assistance Needed: no  Type of Current Residence: Private residence  Current Home Care Services: No  Pt will have her therapy evaluations today  Pt will be educated on the potential for cont'd care, ie therapy and services  Following to assist with d/c planning needs  Is the patient actively participating in therapies? yes  List any modifications to the treatment plan:     Barriers Interventions   Decreased balance    Decreased strength    Decreased cognition    Activity tolerance Energy conservation education         Is the patient making expected progress toward goals? yes  List any update or changes to goals:     Medical Goals: Patient [facility] program and Patient will be able to manage medical conditions and comorbid conditions with medications and follow up upon completion of rehab program    Weekly Team Goals:   Rehab Team Goals  Transfer Team Goal: Patient will require supervision with transfers with least restrictive device upon completion of rehab program  Locomotion Team Goal: Patient will require supervision with locomotion with least restrictive device upon completion of rehab program    Discussion:  Pt presents with the above barriers  Pts evaluations will be completed today  Pts ELOS 2wks, with goals are supervision  Anticipated Discharge Date:  reteam [facility] Team Members Present: The following team members are supervising care for this patient and were present during this Weekly Team Conference      Physician: Dr West Dumont MD  : Wilma Porras, WALLY/ LCSW  Registered Nurse: Bruce Beck RN  Physical Therapist: BECKI BolesT  Occupational Therapist: Laura Christiansen MS, OTR/L  Speech Therapist:   Other: Faye Silva, RN, BSN  [address] [place] [address]

## 2019-12-19 NOTE — PLAN OF CARE
Problem: Potential for Falls  Goal: Patient will remain free of falls  Description  INTERVENTIONS:  - Assess patient frequently for physical needs  -  Identify cognitive and physical deficits and behaviors that affect risk of falls  -  Flasher fall precautions as indicated by assessment   - Educate patient/family on patient safety including physical limitations  - Instruct patient to call for assistance with activity based on assessment  - Modify environment to reduce risk of injury  - Consider OT/PT consult to assist with strengthening/mobility  Outcome: Progressing     Problem: NEUROSENSORY - ADULT  Goal: Achieves stable or improved neurological status  Description  INTERVENTIONS  - Monitor and report changes in neurological status  - Monitor vital signs such as temperature, blood pressure, glucose, and any other labs ordered   - Initiate measures to prevent increased intracranial pressure  - Monitor for seizure activity and implement precautions if appropriate      Outcome: Progressing  Goal: Achieves maximal functionality and self care  Description  INTERVENTIONS  - Monitor swallowing and airway patency with patient fatigue and changes in neurological status  - Encourage and assist patient to increase activity and self care     - Encourage visually impaired, hearing impaired and aphasic patients to use assistive/communication devices  Outcome: Progressing     Problem: GASTROINTESTINAL - ADULT  Goal: Maintains or returns to baseline bowel function  Description  INTERVENTIONS:  - Assess bowel function  - Encourage oral fluids to ensure adequate hydration  - Administer IV fluids if ordered to ensure adequate hydration  - Administer ordered medications as needed  - Encourage mobilization and activity  - Consider nutritional services referral to assist patient with adequate nutrition and appropriate food choices  Outcome: Progressing  Goal: Maintains adequate nutritional intake  Description  INTERVENTIONS:  - Monitor percentage of each meal consumed  - Identify factors contributing to decreased intake, treat as appropriate  - Assist with meals as needed  - Monitor I&O, weight, and lab values if indicated  - Obtain nutrition services referral as needed  Outcome: Progressing     Problem: GENITOURINARY - ADULT  Goal: Maintains or returns to baseline urinary function  Description  INTERVENTIONS:  - Assess urinary function  - Encourage oral fluids to ensure adequate hydration if ordered  - Administer IV fluids as ordered to ensure adequate hydration  - Administer ordered medications as needed  - Offer frequent toileting  - Follow urinary retention protocol if ordered  Outcome: Progressing     Problem: SKIN/TISSUE INTEGRITY - ADULT  Goal: Skin integrity remains intact  Description  INTERVENTIONS  - Identify patients at risk for skin breakdown  - Assess and monitor skin integrity  - Assess and monitor nutrition and hydration status  - Monitor labs (i e  albumin)  - Assess for incontinence   - Turn and reposition patient  - Assist with mobility/ambulation  - Relieve pressure over bony prominences  - Avoid friction and shearing  - Provide appropriate hygiene as needed including keeping skin clean and dry  - Evaluate need for skin moisturizer/barrier cream  - Collaborate with interdisciplinary team (i e  Nutrition, Rehabilitation, etc )   - Patient/family teaching  Outcome: Progressing  Goal: Incision(s), wounds(s) or drain site(s) healing without S/S of infection  Description  INTERVENTIONS  - Assess and document risk factors for skin impairment   - Assess and document dressing, incision, wound bed, drain sites and surrounding tissue  - Consider nutrition services referral as needed  - Oral mucous membranes remain intact  - Provide patient/ family education  Outcome: Progressing  Goal: Oral mucous membranes remain intact  Description  INTERVENTIONS  - Assess oral mucosa and hygiene practices  - Implement preventative oral hygiene ordered  - Teach and encourage coping skills  - Provide emotional support  - Assess patient/family for anxiety and ability to cope  Outcome: Progressing     Problem: CONFUSION/THOUGHT DISTURBANCE  Goal: Thought disturbances (confusion, delirium, depression, dementia or psychosis) are managed to maintain or return to baseline mental status and functional level  Description  INTERVENTIONS:  - Assess for possible contributors to  thought disturbance, including but not limited to medications, infection, impaired vision or hearing, underlying metabolic abnormalities, dehydration, respiratory compromise,  psychiatric diagnoses and notify attending PHYSICAN/AP  - Monitor and intervene to maintain adequate nutrition, hydration, elimination, sleep and activity  - Decrease environmental stimuli, including noise as appropriate  - Provide frequent contacts to provide refocusing, direction and reassurance as needed  Approach patient calmly with eye contact and at their level    - [place] high risk fall precautions, aspiration precautions and other safety measures, as indicated  - If delirium suspected, notify physician/AP of change in condition and request immediate in-person evaluation  - Pursue consults as appropriate including Geriatric (campus dependent), OT for cognitive evaluation/activity planning, psychiatric, pastoral care, etc   Outcome: Progressing     Problem: Prexisting or High Potential for Compromised Skin Integrity  Goal: Skin integrity is maintained or improved  Description  INTERVENTIONS:  - Identify patients at risk for skin breakdown  - Assess and monitor skin integrity  - Assess and monitor nutrition and hydration status  - Monitor labs   - Assess for incontinence   - Turn and reposition patient  - Assist with mobility/ambulation  - Relieve pressure over bony prominences  - Avoid friction and shearing  - Provide appropriate hygiene as needed including keeping skin clean and dry  - Evaluate need for skin moisturizer/barrier cream  - Collaborate with interdisciplinary team   - Patient/family teaching  - Consider wound care consult   Outcome: Progressing

## 2019-12-19 NOTE — PROGRESS NOTES
12/19/19 1215   Pain Assessment   Pain Assessment 0-10   Pain Score 5   Pain Type Acute pain;Surgical pain   Pain Location Hip   Pain Orientation Right   Pain Descriptors Aching;Discomfort; Sore   Pain Frequency Intermittent   Hospital Pain Intervention(s) Medication (See MAR); Repositioned; Rest   Restrictions/Precautions   Precautions Fall Risk;Pain   RLE Weight Bearing Per Order WBAT   Cognition   Arousal/Participation Alert; Cooperative   Attention Within functional limits   Orientation Level Oriented X4   Following Commands Follows one step commands without difficulty   Subjective   Subjective Agreeable to PT  Pt's spouse and oldest son present for session  Engaged in session, asked appropriate question and demonstrated/verbalized good understanding of pt's POC, goals, rehab schedule/routine and d/c planning  Sit to Stand   Type of Assistance Needed Physical assistance;Verbal cues   Amount of Physical Assistance Provided 50%-74%   Sit to Stand CARE Score 2   Bed-Chair Transfer   Type of Assistance Needed Physical assistance;Verbal cues; Adaptive equipment   Amount of Physical Assistance Provided 25%-49%   Chair/Bed-to-Chair Transfer CARE Score 3   Transfer Bed/Chair/Wheelchair   Limitations Noted In Balance;Confidence; Coordination; Endurance;Pain Management; Sequencing;UE Strength;LE Strength   Adaptive Equipment Roller Walker   Stand Pivot Minimal Assist   Sit to Stand Moderate Assist   Stand to Sit Moderate Assist   Findings Inc time to complete transfers  Sit to stands flutuate from min-mod A  SPT with RW require inc time and cue for posture, foot placement and RW management  Car Transfer   Reason if not Attempted Environmental limitations   Car Transfer CARE Score 10   Walk 10 Feet   Type of Assistance Needed Physical assistance;Verbal cues; Adaptive equipment   Amount of Physical Assistance Provided 25%-49%   Comment RW, 30ft   Walk 10 Feet CARE Score 3   Walk 50 Feet with Two Turns   Reason if not Attempted Safety concerns   Walk 50 Feet with Two Turns CARE Score 88   Walk 150 Feet   Reason if not Attempted Safety concerns   Walk 150 Feet CARE Score 88   Walking 10 Feet on Uneven Surfaces   Reason if not Attempted Safety concerns   Walking 10 Feet on Uneven Surfaces CARE Score 88   Ambulation   Primary Mode of Locomotion Prior to Admission Walk   Distance Walked (feet) 30 ft   Assist Device Roller Walker   Gait Pattern Antalgic; Inconsistant Oliva; Slow Oliva;Decreased foot clearance; Festination;Decreased L stance   Limitations Noted In Balance; Coordination;Device Management; Endurance; Heel Strike;Posture; Safety; Sequencing;Speed;Strength;Swing   Provided Assistance with: Balance;Weight Shift   Walk Assist Level Minimum Assist   Findings Inc time to complete  Occasional episodes of festination on LLE >RLE  Inc pain with WB on LLE  Curb or Single Stair   Style negotiated Single stair   Type of Assistance Needed Physical assistance;Verbal cues; Adaptive equipment   Amount of Physical Assistance Provided 25%-49%   Comment 1 step, up fwd/ down backwards, B HRs  Completed 2x wih brief standing rest between trials  1 Step (Curb) CARE Score 3   4 Steps   Reason if not Attempted Safety concerns   4 Steps CARE Score 88   12 Steps   Reason if not Attempted Activity not applicable   12 Steps CARE Score 9   Stairs   Type Stairs   # of Steps 1   Weight Bearing Precautions WBAT;RLE;Fall Risk   Assist Devices Bilateral Rail   Findings 1 step x2  Inc pain with WB on RLE  Therapeutic Interventions   Strengthening RLE 20x each: AA LAQ, AA hip flex, hip add sets, heel slides   Assessment   Treatment Assessment Pt participated in PT session with focus on LE strengthening/ROM, transfers, amb with RW and elevations with HRs  Pt remains limited by pain, stiffness, edema causing inc difficulty with WB on RLE during functional mobility  Pt motivated, cooperative, eager to participate   She did become tearful with discussion with spouse including not being home for [place]  Sp and son edu on POC, goals, d/c planning and recommendations as well as daily rehab schedule and routine  Pt will cont to benefit from PT to improve strength/ROM, mobility, endurance, balance and indep and safety prior to d/c  Family/Caregiver Present Son and Sp   Problem List Decreased strength;Decreased range of motion;Decreased endurance; Impaired balance;Decreased mobility; Decreased coordination;Decreased cognition;Decreased safety awareness;Decreased skin integrity;Obesity;Orthopedic restrictions;Pain   PT Barriers   Physical Impairment Decreased strength;Decreased range of motion;Decreased endurance; Impaired balance;Decreased mobility; Decreased coordination;Decreased cognition;Obesity; Decreased skin integrity;Orthopedic restrictions;Pain   Functional Limitation Car transfers; Ramp negotiation;Standing;Stair negotiation;Transfers; Walking   Plan   Treatment/Interventions Functional transfer training;LE strengthening/ROM; Elevations; Therapeutic exercise; Endurance training;Cognitive reorientation;Patient/family training;Equipment eval/education; Bed mobility;Gait training   Progress Progressing toward goals   Recommendation   Recommendation Home with family support; Outpatient PT; Home PT   PT Therapy Minutes   PT Time In 1215   PT Time Out 1315   PT Total Time (minutes) 60   PT Mode of treatment - Individual (minutes) 60   PT Mode of treatment - Concurrent (minutes) 0   PT Mode of treatment - Group (minutes) 0   PT Mode of treatment - Co-treat (minutes) 0   PT Mode of Treatment - Total time(minutes) 60 minutes   PT Cumulative Minutes 90   Therapy Time missed   Time missed?  No

## 2019-12-19 NOTE — ASSESSMENT & PLAN NOTE
Lab Results   Component Value Date    HGBA1C 6 3 12/08/2018       Recent Labs     12/18/19  1611 12/18/19  2052 12/19/19  0608 12/19/19  1103   POCGLU 111 159* 112 154*       Blood Sugar Average: Last 72 hrs:  (P) 134   Patient pre- diabetic, not on home insulin  DM   Will follow up with PCP outpatient  Will d/c SSI

## 2019-12-19 NOTE — PCC NURSING
Pt is a 76 y/o female who fell at home, found to have R hip fracture, IM nailing on 12/14/19  Post-procedure patient with delirium and hallucinations  Neurologist reduced dose of Sinemet for Parkinson's and Seroquel started  On Atenolol for hypertension, Pravacol for hyperlipidemia  On Lovenox for DVT prophylaxis and venodynes  Pt has deep brain stimulator for Parkinson's also takes Sinemet & Eldepryl  Pt is continent & incontinent of urine  Will continue to monitor lab results & vital signs  Pt will have safe transfers to keep pt free from falls  Pt's pain will be at an acceptable level within her goal range to fully participate in therapies  Pt will  have adequate hydration & nutrition

## 2019-12-19 NOTE — PROGRESS NOTES
Physical Medicine and Rehabilitation Progress Note  Brady Robb 75 y o  female MRN: [id number]  Unit/Bed#: [facility] 269-02 Encounter: [id number]    HPI: Brady Robb is a 75 y o  female who presented to the [facility] after fall at home  Found to have a right hip fracture, underwent IM nailing on 12/14/19  Post-procedure patient with delirium, hallucinations  Neurology service consulted, dose of sinemet was reduced  In addition, Seroquel started in evening  Patient does have DBS which  has controller for  Accepted to [facility] on 12/18/19  Chief Complaint: f/u fracture    Interval: No acute events overnight  Patient without complaint  Slept well overnight  Did report one incident of hallucination, which she now recognizes as not being real  Otherwise no other complaints  ROS: A 10 point ROS was performed; negative except as noted above       Assessment/Plan:      * Closed fracture of right hip (HCC)  Assessment & Plan  · Acute comprehensive interdisciplinary inpatient rehabilitation including PT, OT, and/or SLP, RN, CM, SW, dietary, psychology, etc   · Goal: supervision  · Patient not cleared to shower at this time  · WBAT  · S/p IM nailing on 12/14      Essential hypertension  Assessment & Plan  Temp:  [97 2 °F (36 2 °C)-99 2 °F (37 3 °C)] 99 °F (37 2 °C)  HR:  [60-76] 60  Resp:  [16-20] 16  BP: (106-131)/(53-76) 114/58    · Continue Atenolol  · IM service managing anti-hypertensives, adjusting as needed    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 ([facility] [address] )  Assessment & Plan  · Patient with delirium, hallucinations following her surgery  · Had 1 hallucination last night  · Evaluated by neurology, sinimet dose reduced  · Will continue seroquel for now, but will attempt to wean off by completion of rehab      Type 2 diabetes mellitus without complication [facility])  Assessment & Plan    Recent Labs     12/18/19  1611 12/18/19  2052 12/19/19  0608 12/19/19  1103   POCGLU 111 159* 112 154*       · Last A1C: 6 3  · Continue diabetic diet  · Continue SSI  · IM service monitoring blood sugars, adjusting regimen as needed    Other hyperlipidemia  Assessment & Plan  · Continue statin    Parkinson's disease with use of electrical brain stimulation (HCC)  Assessment & Plan  · Continue selegiline  · Continue sinemet - dose was decreased by neurology due to hallucinations  · F/u Dr Renata Feng as outpatient      # Skin  · Encourage regular turning as patient at risk for skin breakdown  · Staff to continue patient education on Q2h turning  · Rehabilitation team to perform skin checks regularly     # Bowel  · Patient reports no constipation     # Bladder  · Patient voiding spontaneously    # Pain  · Continue tylenol, for max of 3gm daily  # Other  - Diet/Nutrition:        Diet Orders   (From admission, onward)             Start     Ordered    12/18/19 1449  Diet Regular; Regular House; Consistent Carbohydrate Diet Level 1 (4 carb servings/60 grams CHO/meal)  Diet effective now     Question Answer Comment   Diet Type Regular    Regular Regular House    Other Restriction(s): Consistent Carbohydrate Diet Level 1 (4 carb servings/60 grams CHO/meal)    RD to adjust diet per protocol?  Yes        12/18/19 1449              - DVT prophy: Sequential compression device (Venodyne)  and Enoxaparin (Lovenox)  - GI ppx: None  - Nausea: None  - Supplements: None  - Sleep: None    Disposition: TBD    CODE: Level 3: DNAR and DNI Scheduled Meds:    Current Facility-Administered Medications:  acetaminophen 650 mg Oral Q6H PRN Viktoria Montez MD   aspirin 81 mg Oral Daily Ryan Gilliland MD   atenolol 50 mg Oral Daily Ryan Gilliland MD   carbidopa-levodopa 0 5 tablet Oral TID Ryan Gilliland MD   co-enzyme Q-10 400 mg Oral Daily Ryan Gilliland MD   enoxaparin 40 mg Subcutaneous Daily Ryan Gilliland MD   insulin lispro 1-6 Units Subcutaneous TID AC Ryan Gilliland MD   oxyCODONE 5 mg Oral Q4H PRN Viktoria Montez MD PARoxetine 20 mg Oral Daily Ryan Gilliland MD   polyethylene glycol 17 g Oral Daily Ryan Gilliland MD   pravastatin 40 mg Oral Every Other Day Ruby Durand MD   QUEtiapine 25 mg Oral HS Ruby Durand MD   selegiline 5 mg Oral Daily With Breakfast Ryan Gilliland MD   senna-docusate sodium 1 tablet Oral BID Ryan Gilliland MD        Objective:    Functional Update:  Mobility: reeval pending  Transfers: reeval pending  ADLs: reeval pending    Allergies per EMR    Physical Exam:  Temp:  [97 2 °F (36 2 °C)-99 2 °F (37 3 °C)] 99 °F (37 2 °C)  HR:  [60-76] 60  Resp:  [16-20] 16  BP: (106-131)/(53-76) 114/58  SpO2:  [94 %-97 %] 96 %    General: alert, no apparent distress, cooperative and comfortable  HEENT:  Head: Normocephalic, no lesions, without obvious abnormality  Eye: Normal external eye, conjunctiva, lids cornea, NORBERTO  Ears: normal external ears  Nose: Normal external nose, mucus membranes and septum  LUNGS:  no abnormal respiratory pattern, no retractions noted, non-labored breathing   ABDOMEN:  soft, non-tender  Bowel sounds normal  No masses, no organomegaly  EXTREMITIES:  extremities normal, warm and well-perfused; no cyanosis, clubbing, or edema  NEURO:   clear speech, following commands appropriately  PSYCH:  Alert and oriented, appropriate affect  Physical examination is otherwise unchanged from previous encounter, except as noted above      Diagnostic Studies: Reviewed, no new imaging  No orders to display       Laboratory: Reviewed  Results from last 7 days   Lab Units 12/17/19  0513 12/16/19  0545 12/15/19  0506   HEMOGLOBIN g/dL 10 6* 10 9* 11 3*   HEMATOCRIT % 32 9* 34 2* 35 3   WBC Thousand/uL 9 96 10 99* 12 03*     Results from last 7 days   Lab Units 12/17/19  0513 12/16/19  0545 12/15/19  0506   BUN mg/dL 19 15 14   SODIUM mmol/L 143 140 144   POTASSIUM mmol/L 3 5 3 6 4 2   CHLORIDE mmol/L 107 105 110*   CREATININE mg/dL 0 75 0 81 0 90     Results from last 7 days   Lab Units 12/13/19  1131   PROTIME seconds 13 2   INR  0 99        ** Please Note: Fluency Direct voice to text software may have been used in the creation of this document  **    Total time spent:  35 minutes, with more than 50% spent counseling/coordinating care  Counseling includes discussion with patient re: progress in therapies, functional issues observed by therapy staff, and discussion with patient his/her current medical state/wellbeing  Coordination of patient's care was performed in conjunction with Internal Medicine service to monitor patient's labs, vitals, and management of their comorbidities  In addition, this patient was discussed by the interdisciplinary team in weekly case conference today  The care of the patient was extensively discussed with all care providers and an appropriate rehabilitation plan was formulated unique for this patient  Barriers were identified preventing progression of therapy and appropriate interventions were discussed with each discipline  Please see the team note for input from all disciplines regarding barriers, intervention, and discharge planning

## 2019-12-19 NOTE — SOCIAL WORK
Met with Pt to review rehab routine, and CM role  Pt shared that she resides in a home with 5STE the garage, then it is all one floor  Pt has the following DME: rollator, shower chair, quad cane, grab bars, and a walker  Pt has dealt with [facility] in the past, but is unsure of the provider  Outpt services were through [facility] , and Pt was very pleased  PT and her  utilize [email or web address], and may use [facility] beds program   CM explained the team meeting process, as well as the recommendation to reteam again next week  Pt understands  Following to assist with d/c planning needs

## 2019-12-19 NOTE — PROGRESS NOTES
12/19/19 0900   Patient Data   Rehab Impairment  Impairment of mobility, safety and Activities of Daily Living (ADLs) due to Orthopedic Disorders:  08 11  Unilateral Hip Fracture    Etiologic Diagnosis  Right Intertrochanteric Femur Fracture   Date of Onset 12/13/19   Support System   Name Anastasia Lam   Relationship Spouse   Able to provide 24 hour supervision Yes   Able to provide physical help? Yes   Home Setup   Type of Home Single Level   Method of Entry Stairs;Hand Rail Bilateral   Number of Stairs 5   Number of Stairs in Home 0   First Floor Bathroom Full; Shower;Curtain;Grab Bars   First Floor Bathroom Accessibility Grab bars by toilet;Grab bars in tub/shower; Shower chair   First Floor Setup Available Yes   Home Modifications Necessary? No   Available Equipment Shower Chair;Quad Cane;Rollator  (grab bars)   Baseline Information   Outpatient Services Received Prior to Admission Physical Therapy   Outpatient Provider [facility] 2x/wk   Vocation Other  (retired)   Transportation Family/friends drive   Prior Device(s) Used Rollator   Prior IADL Participation   Money Management   ( completes)   Meal Preparation Partial Participation   Laundry Partial Participation   Home Cleaning Partial Participation  (has cleaning service every other week)   Prior Level of Function   Self-Care 2  Needed Some Help - Patient needed a partial assistance from another person to complete activities  Indoor-Mobility (Ambulation) 3  Independent - Patient completed the activities by him/herself, with or without an assistive device, with no assistance from a helper  Stairs 2  Needed Some Help - Patient needed a partial assistance from another person to complete activities  Functional Cognition 2  Needed Some Help - Patient needed a partial assistance from another person to complete activities  Prior Assistance Needed for Driving;Household Chores/Cleaning;Meal Preparation;Money Management;Medication Management; Shopping Prior Device Used   (rollator)   Falls in the Last Year   Number of falls in the past 12 months 2   Type of Injury Associated with Fall Injury  (current admission)   Patient Preference   Mack (Patient Preference) Hanna Mace   Psychosocial   Psychosocial (WDL) WDL   Length of Time/Family Visitation 2-4 hrs   Restrictions/Precautions   Precautions Bed/chair alarms;Cognitive; Fall Risk;Pain;THR;Supervision on toilet/commode   Weight Bearing Restrictions Yes   RLE Weight Bearing Per Order WBAT   ROM Restrictions Yes  (THP)   Pain Assessment   Pain Assessment 0-10   Pain Score 5   Pain Type Acute pain;Surgical pain   Pain Location Hip   Pain Orientation Right   Pain Descriptors Aching;Discomfort   Pain Frequency Intermittent   Pain Onset Ongoing   Clinical Progression Gradually improving   Effect of Pain on Daily Activities impacts functional transfers   Hospital Pain Intervention(s) Repositioned; Rest   Response to Interventions tolerated   Oral Hygiene   Type of Assistance Needed Supervision   Amount of Physical Assistance Provided No physical assistance   Comment completed seated in w/c in front of sink   Oral Hygiene CARE Score 4   Grooming   Able To Initiate Tasks;Comb/Brush Hair;Wash/Dry Face;Brush/Clean Teeth;Wash/Dry Hands   Limitation Noted In Coordination;Problem Solving; Safety;Strength;Timeliness   Tub/Shower Transfer   Reason Not Assessed Medical  (PT MAY NOT SHOWER ORDERS)   Shower/Bathe Self   Type of Assistance Needed Physical assistance   Amount of Physical Assistance Provided 50%-74%   Comment pt able to was 7/10 parts at this time  Pt requires A to wash buttocka kiah for thoroughness, and violet feet 2* to THP at this time   In addition, pt requiries modA to maintain balance while in stance for therapist to  wash buttock area   Shower/Bathe Self CARE Score 2   Bathing   Assessed Bath Style Sponge Bath   Anticipated D/C Bath Style Shower   Able to [place] Jack No   Able to [employer] Temperature No Able to Wash/Rinse/Dry (body part) Left Arm;Right Arm;R Upper Leg;L Upper Leg;Chest;Abdomen;Perineal Area   Limitations Noted in Balance; Coordination; Endurance; Safety;Strength;Timeliness   Positioning Seated;Standing   Dressing/Undressing Clothing   Remove UB Clothes Other  (hospital gown)   Cordelia Elliott Other  (hospital gown with simulated pullover shirt)   Type of Assistance Needed Physical assistance   Amount of Physical Assistance Provided 25%-49%   Comment 2* to limited BUE sahoudler ROM requiring Geri to pull shirt over head   Upper Body Dressing CARE Score 3   Remove LB Clothes Socks   Don LB Clothes Other;Pants;Socks  (pull ups)   Type of Assistance Needed Physical assistance   Amount of Physical Assistance Provided Total assistance   Comment 2* to THP pt requirng TA to don LB clothing  modA to maitnain balance while in stance to pull pants up   Lower Body Dressing CARE Score 1   Limitations Noted In Balance; Coordination; Endurance   Positioning Supported Sit;Standing   Putting On/Taking Off Footwear   Type of Assistance Needed Physical assistance   Amount of Physical Assistance Provided Total assistance   Comment TA to don socks   Putting On/Taking Off Footwear CARE Score 1   Toileting Hygiene   Type of Assistance Needed Physical assistance   Amount of Physical Assistance Provided 50%-74%   Comment requires modA to maintain balance while in stance, A for thouroughness  PT able to wipe buttock while seated on toilet with weight shifting technique   Toileting Hygiene CARE Score 2   Toilet Transfer   Surface Assessed Platform Commode   Transfer Technique Stand Pivot   Limitations Noted In Balance; Endurance;ROM;Safety;UE Strength;LE Strength   Adaptive Equipment Grab Bar   Positioning Concerns Cognition; Safety   Findings therapit provided modA x1 with vc for safe transfer techniques to perform stand pivot transfer   Type of Assistance Needed Physical assistance   Amount of Physical Assistance Provided 50%-74%   Comment see above   Toilet Transfer CARE Score 2   Toileting   Able to Pull Clothing down yes, up no  Able to Manage Clothing Closures Yes   Manage Hygiene Bladder   Limitations Noted In Balance; Coordination;ROM;UE Strength;LE Strength   Adaptive Equipment Grab Bar   Transfer Bed/Chair/Wheelchair   Positioning Concerns Skin Integrity   Limitations Noted In Balance; Coordination; Endurance;UE Strength;LE Strength   Adaptive Equipment Roller Walker   Type of Assistance Needed Physical assistance   Amount of Physical Assistance Provided 50%-74%   Comment modA to perform stand pivot transfer with RW from bed>w/c   Chair/Bed-to-Chair Transfer CARE Score 2   Lying to Sitting on Side of Bed   Type of Assistance Needed Physical assistance   Amount of Physical Assistance Provided 50%-74%   Comment A required for trunk mngt and RLE mngmnt   Lying to Sitting on Side of Bed CARE Score 2   Sit to Stand   Type of Assistance Needed Physical assistance   Amount of Physical Assistance Provided 50%-74%   Comment modA fluctuates based on fatigue   Sit to Stand CARE Score 2   Picking Up Object   Reason if not Attempted Safety concerns   Picking Up Object CARE Score 88   Comprehension   Assist Devices Glasses   Auditory Complex   Visual Complex   QI: Comprehension 3  Usually Understands: Understands most conversations, but misses some part/intent of message  Requires cues at times to understand  Comprehension (FIM) 6 - Has only MILD difficulty with complex/abstract info   Expression   Verbal Complex   Non-Verbal Complex   Intelligibility Sentence   QI: Expression 4   Express complex messages without difficulty and with speech that is clear and easy to [place]   Expression (FIM) 5 - Needs help/cues only RARELY (< 10% of the time)   RUE Assessment   RUE Assessment X   AROM - RUE   R Shoulder Flexion up to 90 degree flex   Strength - RUE   R Gross Arm Strength   (4-/5)   LUE Assessment   LUE Assessment X   AROM - LUE   L Shoulder Flexion up to 90 degree flex   Strength - RICKIEE   L Gross Arm Strength   (4-/5)   Coordination   Movements are Fluid and Coordinated 0   Coordination and Movement Description during functional transfers presents with "freezing" episodes 2* to Parkinson's   Sensation   Light Touch No apparent deficits   Cognition   Overall Cognitive Status Impaired   Arousal/Participation Alert; Cooperative   Attention Within functional limits   Orientation Level Oriented X4   Memory Decreased recall of precautions   Following Commands Follows one step commands with increased time or repetition   Objective Measure   OT Measure(s) MOCA   OT Findings see below   Discharge Information   Impressions Pt is a 75year old female with PMH of DM 2, HLD, HTN, and Parkinson's disease s/p deep brain stimulator  She presented to [address] [id number] on 12/13/19 by ambulance after a fall at home while trying to arrange dishes in a cabinet  She fell onto her right hip  She did not hit her head or lose consciousness  She was unable to stand and her right leg was shortened and externally rotated on exam in the ED  Right hip x-ray showed mildly displaced right femoral intertrochanteric fracture  She went to the OR on 12/14/19 with Dr Ely Bauman of orthopedics for a right hip cephalomedullary IM nail with synthes TFN  Post operatively orthopedics cleared her to be weight bearing as tolerated  Postoperatively the she was with hallucinations  Internal medicine felt that hallucinations/ delirium was due to her sleep wake schedule and recommended seroquel  PT reports she lives with  in single family home with 5 MARTINE with violet railing  Pt reports she has a walk in shower with grab bars, regular toilet with grab bars  Pt reports  A at time with ADLs prior to admission   able to provide supervisino upon d/c  Pt seen for 90mins of skilled OT services with emphasis on self-care, functional transfers and administration of [address] [place] [address]  MOCA scoring indicated mild cognitive deficits  Pt presents with the following performance component deficits impacting ADL/IADL skills: decreased cognition, decreased dynamic standing balance, decreased activity tolerance, decreased BUE ROM, decreased endurance  Pt to continue to benefit from continued acute rehab OT services during hospital stay to address defined deficits and to maximize level of functional independence in the following Occupational Performance areas: grooming, bathing/shower, toilet hygiene, dressing, medication management, functional mobility  Pt can benefit from 2 weeks of skilled OT services to address these areas and resume prior occupational roles to maximize independence to reduce risk of fall and decrease risk of readmission  OT Therapy Minutes   OT Time In 0900   OT Time Out 1030   OT Total Time (minutes) 90   OT Mode of treatment - Individual (minutes) 90   OT Mode of treatment - Concurrent (minutes) 0   OT Mode of treatment - Group (minutes) 0   OT Mode of treatment - Co-treat (minutes) 0   OT Mode of Treatment - Total time(minutes) 90 minutes   OT Cumulative Minutes 90   Cumulative Minutes   Cumulative therapy minutes 90     Pt completed James Cognitive Assessment (MOCA) version 8 1  Pt scored overall 18 / 30  indicating a mild cognitive deficit  Visuospatial/executive: 2 /5   Naming: 3/3   Memory:    (worth no points)   Attention:  3/ 6   Language: 0 / 3   Abstraction: 2 / 2   Delayed recall: 1 / 5   Orientation: 6 / 6       Total score 18/30, indicating a mild cognitive deficit

## 2019-12-19 NOTE — ASSESSMENT & PLAN NOTE
· Acute comprehensive interdisciplinary inpatient rehabilitation including PT, OT, and/or SLP, RN, CM, SW, dietary, psychology, etc   · Goal: supervision  · Patient not cleared to shower at this time  · WBAT  · S/p IM nailing on 12/14

## 2019-12-20 PROCEDURE — 99232 SBSQ HOSP IP/OBS MODERATE 35: CPT | Performed by: INTERNAL MEDICINE

## 2019-12-20 PROCEDURE — 97530 THERAPEUTIC ACTIVITIES: CPT

## 2019-12-20 PROCEDURE — 97116 GAIT TRAINING THERAPY: CPT

## 2019-12-20 PROCEDURE — 97110 THERAPEUTIC EXERCISES: CPT

## 2019-12-20 PROCEDURE — 97535 SELF CARE MNGMENT TRAINING: CPT

## 2019-12-20 PROCEDURE — 99232 SBSQ HOSP IP/OBS MODERATE 35: CPT | Performed by: PHYSICAL MEDICINE & REHABILITATION

## 2019-12-20 RX ORDER — TRAMADOL HYDROCHLORIDE 50 MG/1
25 TABLET ORAL EVERY 6 HOURS PRN
Status: DISCONTINUED | OUTPATIENT
Start: 2019-12-20 | End: 2020-01-02 | Stop reason: HOSPADM

## 2019-12-20 RX ORDER — OXYCODONE HYDROCHLORIDE 5 MG/1
2.5 TABLET ORAL EVERY 4 HOURS PRN
Status: DISCONTINUED | OUTPATIENT
Start: 2019-12-20 | End: 2019-12-20

## 2019-12-20 RX ORDER — TRAMADOL HYDROCHLORIDE 50 MG/1
50 TABLET ORAL EVERY 6 HOURS PRN
Status: DISCONTINUED | OUTPATIENT
Start: 2019-12-20 | End: 2020-01-02 | Stop reason: HOSPADM

## 2019-12-20 RX ADMIN — SELEGILINE HYDROCHLORIDE 5 MG: 5 TABLET ORAL at 08:47

## 2019-12-20 RX ADMIN — TRAMADOL HYDROCHLORIDE 50 MG: 50 TABLET, COATED ORAL at 20:46

## 2019-12-20 RX ADMIN — SENNOSIDES AND DOCUSATE SODIUM 1 TABLET: 8.6; 5 TABLET ORAL at 08:48

## 2019-12-20 RX ADMIN — CARBIDOPA AND LEVODOPA 0.5 TABLET: 25; 100 TABLET ORAL at 20:46

## 2019-12-20 RX ADMIN — CARBIDOPA AND LEVODOPA 0.5 TABLET: 25; 100 TABLET ORAL at 16:52

## 2019-12-20 RX ADMIN — TRAMADOL HYDROCHLORIDE 25 MG: 50 TABLET, COATED ORAL at 14:34

## 2019-12-20 RX ADMIN — ACETAMINOPHEN 650 MG: 325 TABLET ORAL at 08:47

## 2019-12-20 RX ADMIN — CARBIDOPA AND LEVODOPA 0.5 TABLET: 25; 100 TABLET ORAL at 08:48

## 2019-12-20 RX ADMIN — POLYETHYLENE GLYCOL 3350 17 G: 17 POWDER, FOR SOLUTION ORAL at 09:55

## 2019-12-20 RX ADMIN — ATENOLOL 50 MG: 25 TABLET ORAL at 08:48

## 2019-12-20 RX ADMIN — SENNOSIDES AND DOCUSATE SODIUM 1 TABLET: 8.6; 5 TABLET ORAL at 17:02

## 2019-12-20 RX ADMIN — ASPIRIN 81 MG: 81 TABLET, COATED ORAL at 08:48

## 2019-12-20 RX ADMIN — PRAVASTATIN SODIUM 40 MG: 40 TABLET ORAL at 08:47

## 2019-12-20 RX ADMIN — QUETIAPINE FUMARATE 25 MG: 25 TABLET, FILM COATED ORAL at 21:33

## 2019-12-20 RX ADMIN — ENOXAPARIN SODIUM 40 MG: 40 INJECTION SUBCUTANEOUS at 09:55

## 2019-12-20 NOTE — ASSESSMENT & PLAN NOTE
· Continue selegiline (patient is on rasagiline 1mg daily as outpatient, evidently not available here on formulary)   Will resume home rasagiline on discharge  · Continue sinemet - dose was decreased by neurology due to hallucinations  · F/u Dr Trenton Eisenmenger as outpatient  · promote sleep/wake cycle  · avoid CNS altering medications

## 2019-12-20 NOTE — PROGRESS NOTES
Physical Medicine and Rehabilitation Progress Note  Kamaljit Valle 75 y o  female MRN: [id number]  Unit/Bed#: [facility] 269-02 Encounter: [id number]    HPI: Kamaljit Valle is a 75 y o  female who presented to the [facility] after fall at home  Found to have a right hip fracture, underwent IM nailing on 12/14/19  Post-procedure patient with delirium, hallucinations  Neurology service consulted, dose of sinemet was reduced  In addition, Seroquel started in evening  Patient does have DBS which  has controller for  Accepted to [facility] on 12/18/19  Chief Complaint: f/u fracture    Interval: No acute events overnight  Patient without complaint  Slept well overnight  therapy staff reporting patient with pain to her incision site, but hesitant to take oxycodone due to fear of hallucinations  I discussed with patient, she is agreeable to trialing tramadol instead  ROS: A 10 point ROS was performed; negative except as noted above       Assessment/Plan:    * Closed fracture of right hip (HCC)  Assessment & Plan  · Acute comprehensive interdisciplinary inpatient rehabilitation including PT, OT, and/or SLP, RN, CM, SW, dietary, psychology, etc   · Goal: supervision  · Patient not cleared to shower at this time  · WBAT  · S/p IM nailing on 12/14      Essential hypertension  Assessment & Plan  Temp:  [97 4 °F (36 3 °C)-99 4 °F (37 4 °C)] 97 4 °F (36 3 °C)  HR:  [69-80] 80  Resp:  [18-19] 19  BP: (109-127)/(61-73) 123/73    · Continue Atenolol  · IM service managing anti-hypertensives, adjusting as needed    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 [facility])  Assessment & Plan  · Patient with delirium, hallucinations following her surgery  · Evaluated by neurology, sinimet dose reduced  · Will continue seroquel for now, but will attempt to wean off by completion of rehab      Type 2 diabetes mellitus without complication [facility])  Assessment & Plan    Recent Labs     12/19/19  0608 12/19/19  1103 12/19/19  1620 12/19/19 2039   POCGLU 112 154* 100 174*       · Last A1C: 6 3  · Continue diabetic diet  · Continue SSI  · IM service monitoring blood sugars, adjusting regimen as needed    Other hyperlipidemia  Assessment & Plan  · Continue statin    Parkinson's disease with use of electrical brain stimulation (HCC)  Assessment & Plan  · Continue selegiline (patient is on rasagiline 1mg daily as outpatient, evidently not available here on formulary)  Will resume home rasagiline on discharge  · Continue sinemet - dose was decreased by neurology due to hallucinations  · F/u Dr Gabriella Amador as outpatient      # Skin  · Encourage regular turning as patient at risk for skin breakdown  · Staff to continue patient education on Q2h turning  · Rehabilitation team to perform skin checks regularly     # Bowel  · Patient reports no constipation     # Bladder  · Patient voiding spontaneously    # Pain  · Continue tylenol, for max of 3gm daily  · Discontinue oxycodone   · Start tramadol    # Other  - Diet/Nutrition:        Diet Orders   (From admission, onward)             Start     Ordered    12/18/19 1449  Diet Regular; Regular House; Consistent Carbohydrate Diet Level 1 (4 carb servings/60 grams CHO/meal)  Diet effective now     Question Answer Comment   Diet Type Regular    Regular Regular House    Other Restriction(s): Consistent Carbohydrate Diet Level 1 (4 carb servings/60 grams CHO/meal)    RD to adjust diet per protocol?  Yes        12/18/19 1449              - DVT prophy: Sequential compression device (Venodyne)  and Enoxaparin (Lovenox)  - GI ppx: None  - Nausea: None  - Supplements: None  - Sleep: None    Disposition: TBD    CODE: Level 3: DNAR and DNI Scheduled Meds:    Current Facility-Administered Medications:  acetaminophen 650 mg Oral Q6H PRN Petra Neely MD   aspirin 81 mg Oral Daily Ryan Gilliland MD   atenolol 50 mg Oral Daily Ryan Gilliland MD   carbidopa-levodopa 0 5 tablet Oral TID Petra Neely MD Coenzyme Q10 400 mg Oral Daily Ryan Gilliland MD   enoxaparin 40 mg Subcutaneous Daily Ryan Gilliland MD   polyethylene glycol 17 g Oral Daily Ryan Gilliland MD   pravastatin 40 mg Oral Every Other Day Ruby Durand MD   QUEtiapine 25 mg Oral HS Ruby Durand MD   selegiline 5 mg Oral Daily With Breakfast Ruby Durand MD   senna-docusate sodium 1 tablet Oral BID Ryan Gilliland MD   traMADol 25 mg Oral Q6H PRN Ryan Gilliland MD   traMADol 50 mg Oral Q6H PRN Ryan Gilliland MD        Objective:    Functional Update:  Mobility: min assist  Transfers: min-mod assist  ADLs: total assist (especially for lowers)    Allergies per EMR    Physical Exam:  Temp:  [97 4 °F (36 3 °C)-99 4 °F (37 4 °C)] 97 4 °F (36 3 °C)  HR:  [69-80] 80  Resp:  [18-19] 19  BP: (109-127)/(61-73) 123/73  SpO2:  [95 %-99 %] 96 %    General: alert, no apparent distress, cooperative and comfortable  HEENT:  Head: Normocephalic, no lesions, without obvious abnormality  Eye: Normal external eye, conjunctiva, lids cornea, NORBERTO  Ears: normal external ears  Nose: Normal external nose, mucus membranes and septum  LUNGS:  no abnormal respiratory pattern, no retractions noted, non-labored breathing   ABDOMEN:  soft, non-tender  Bowel sounds normal  No masses, no organomegaly  EXTREMITIES:  extremities normal, warm and well-perfused; no cyanosis, clubbing, or edema  NEURO:   clear speech, following commands appropriately  PSYCH:  Alert and oriented, appropriate affect  INCISION:  dressings present, no strikethrough noted    Physical examination is otherwise unchanged from previous encounter, except as noted above      Diagnostic Studies: Reviewed, no new imaging  No orders to display     Laboratory: Reviewed  Results from last 7 days   Lab Units 12/19/19  1048 12/17/19  0513 12/16/19  0545   HEMOGLOBIN g/dL 11 7* 10 6* 10 9*   HEMATOCRIT % 34 4* 32 9* 34 2*   WBC Thousand/uL 9 00 9 96 10 99*     Results from last 7 days   Lab Units 12/19/19  1048 12/17/19  0513 12/16/19  0545   BUN mg/dL 19 19 15   SODIUM mmol/L 139 143 140   POTASSIUM mmol/L 3 6 3 5 3 6   CHLORIDE mmol/L 104 107 105   CREATININE mg/dL 0 67 0 75 0 81   AST U/L 20  --   --    ALT U/L 14  --   --             ** Please Note: Fluency Direct voice to text software may have been used in the creation of this document   **

## 2019-12-20 NOTE — ASSESSMENT & PLAN NOTE
Lab Results   Component Value Date    HGBA1C 5 7 12/19/2019       Recent Labs     12/19/19  0608 12/19/19  1103 12/19/19  1620 12/19/19 2039   POCGLU 112 154* 100 174*       Blood Sugar Average: Last 72 hrs:  (P) 135   Patient pre- diabetic, not on home insulin  DM   Will follow up with PCP outpatient  Will d/c SSI

## 2019-12-20 NOTE — PROGRESS NOTES
12/20/19 0830   Pain Assessment   Pain Assessment 0-10   Pain Score 8  (6/10 at beginning of session)   Pain Type Surgical pain   Pain Location Leg   Pain Orientation Right   Restrictions/Precautions   Precautions Fall Risk;Pain   Weight Bearing Restrictions Yes   RLE Weight Bearing Per Order WBAT   Cognition   Overall Cognitive Status Impaired   Arousal/Participation Alert; Cooperative   Attention Within functional limits   Orientation Level Oriented X4   Memory Decreased recall of precautions   Following Commands Follows one step commands without difficulty   Subjective   Subjective Pt agreeable to PT tx  C/o 6/10 pain as she had no received pain meds prior to PT session  Roll Left and Right   Type of Assistance Needed Physical assistance   Amount of Physical Assistance Provided 50%-74%   Roll Left and Right CARE Score 2   Sit to Lying   Type of Assistance Needed Physical assistance   Amount of Physical Assistance Provided 75% or more   Sit to Lying CARE Score 2   Lying to Sitting on Side of Bed   Type of Assistance Needed Physical assistance   Amount of Physical Assistance Provided 50%-74%   Lying to Sitting on Side of Bed CARE Score 2   Sit to Stand   Type of Assistance Needed Physical assistance   Amount of Physical Assistance Provided 50%-74%   Sit to Stand CARE Score 2   Bed-Chair Transfer   Type of Assistance Needed Physical assistance   Amount of Physical Assistance Provided 25%-49%   Chair/Bed-to-Chair Transfer CARE Score 3   Transfer Bed/Chair/Wheelchair   Limitations Noted In Balance;Confidence; Endurance;Problem Solving; Sequencing;UE Strength;LE Strength   Adaptive Equipment Roller Walker   Stand Pivot Minimal Assist   Sit to Stand Moderate Assist   Stand to Sit Moderate Assist   Supine to Sit Moderate Assist   Sit to Supine Maximum Assist   Findings Pt with slow movements to complete  Requires vc's for safety and proper technique      Walk 10 Feet   Type of Assistance Needed Physical assistance Amount of Physical Assistance Provided 25%-49%   Walk 10 Feet CARE Score 3   Walk 50 Feet with Two Turns   Reason if not Attempted Safety concerns   Walk 50 Feet with Two Turns CARE Score 88   Walk 150 Feet   Reason if not Attempted Safety concerns   Walk 150 Feet CARE Score 88   Walking 10 Feet on Uneven Surfaces   Reason if not Attempted Safety concerns   Walking 10 Feet on Uneven Surfaces CARE Score 88   Ambulation   Does the patient walk? 2  Yes   Primary Mode of Locomotion Prior to Admission Walk   Distance Walked (feet) 10 ft  (x2)   Assist Device Roller Walker   Gait Pattern Antalgic; Inconsistant Oliva; Slow Oliva;Decreased foot clearance;L foot drag;Narrow NANCY;Step to;Decreased R stance; Improper weight shift   Limitations Noted In Balance; Coordination;Device Management; Endurance; Heel Strike;Posture; Safety; Sequencing;Speed;Strength;Swing   Provided Assistance with: Balance;Trunk Support   Walk Assist Level Minimum Assist   Findings Pt fatigued and with increased pain, therefore, pt only wanted to perform short distance amb  Educated pt to take pain meds 1/2 hour prior to PM therapy session  Plan to perform gait training in PM session as wel  Therapeutic Interventions   Strengthening Seated TE: B LAQ's (25% assist from PT to perform on R LE), hip flex, and knee flex (red TB) all performed to fatigue (able to perform increased reps on L LE compared to R LE )  L sidelying R clamshells and R hip abduction ([id number]% assist from PT) performed to fatigue  Supine bridging performed with 3 second hold at top to fatigue  Therapeutic rest breaks needed throughout due to fatigue and pain in R LE  Flexibility Seated passive B hamstring/gastroc stretching x4 mins total    Assessment   Treatment Assessment Session focused on LE strengtheing this session as well as transfers and short distance amb  Pt with increased pain this session, therefore required increased therapeutic rest breaks to tolerate activity   Pt with decreased strength and ROM to R LE as well as decreased balance which appear to be her biggest barrier to home as well as 5 MARTINE  Educated pt on importanct of requesting pain meds 1/2 hour prior to therapy session to help her tolerate therapy tx  Plan to work on amb and stair trng in afternoon session  Pt reports prior to hospital admission, she was attending fitness program at [facility] for approx 3 weeks which is goal for her to return to  Family/Caregiver Present no   Problem List Decreased strength;Decreased range of motion;Decreased endurance; Impaired balance;Decreased mobility; Decreased coordination;Decreased cognition;Decreased safety awareness;Decreased skin integrity;Obesity;Orthopedic restrictions;Pain   Barriers to Discharge Decreased caregiver support; Inaccessible home environment   PT Barriers   Physical Impairment Decreased strength;Decreased range of motion;Decreased endurance; Impaired balance;Decreased mobility; Decreased coordination;Decreased cognition;Obesity; Decreased skin integrity;Orthopedic restrictions;Pain   Functional Limitation Car transfers; Ramp negotiation;Standing;Stair negotiation;Transfers; Walking   Plan   Treatment/Interventions Functional transfer training;LE strengthening/ROM; Therapeutic exercise; Endurance training;Gait training   Progress Progressing toward goals   Recommendation   Recommendation Home PT;Outpatient PT; Home with family support   PT - OK to Discharge No   PT Therapy Minutes   PT Time In 0830   PT Time Out 0930   PT Total Time (minutes) 60   PT Mode of treatment - Individual (minutes) 0   PT Mode of treatment - Concurrent (minutes) 0   PT Mode of treatment - Group (minutes) 0   PT Mode of treatment - Co-treat (minutes) 0   PT Mode of Treatment - Total time(minutes) 0 minutes   PT Cumulative Minutes 90   Therapy Time missed   Time missed?  No

## 2019-12-20 NOTE — PROGRESS NOTES
12/20/19 1230   Pain Assessment   Pain Assessment 0-10   Pain Score 6   Pain Type Surgical pain   Pain Location Leg   Pain Orientation Right   Pain Descriptors Aching;Discomfort   Pain Frequency Intermittent   Pain Onset Ongoing   Clinical Progression Gradually improving   Patient's Stated Pain Goal No pain   Hospital Pain Intervention(s) Distraction   Diversional Activities Other (Comment)  (OT)   Restrictions/Precautions   Precautions Fall Risk   Weight Bearing Restrictions Yes   RLE Weight Bearing Per Order WBAT   ROM Restrictions Yes  (THP's)   Tub/Shower Transfer   Limitations Noted In Balance; Coordination; Endurance;LE Strength   Adaptive Equipment Seat with Back;Grab Bars   Assessed Shower   Findings Pt completed dry shower xfer during OT Tx session  Pt's  stated that there a small 3-4" lip to step over to enter shower  OT had pt perform xfer without simulating lip  Pt required Mod A 2* to difficulty lifting LLE  Pt observed not putting full WB through RLE due to pain, making it difficult to lift LLE during xfer's  Pt discussed simulating shower set up with lip in upcoming Tx sessions once pain in decreased  Sit to Stand   Type of Assistance Needed Physical assistance   Amount of Physical Assistance Provided 50%-74%   Comment with RW  Cues for proper hand placement prior to completing xfer  Sit to Stand CARE Score 2   Toileting Hygiene   Type of Assistance Needed Physical assistance   Amount of Physical Assistance Provided 50%-74%   Comment Pt stood at RW managing underwear and pants up/down with CGA from OT  Pt required A to clean buttock while in stance  Pt communicated that her  will A with hygiene task at home  Toileting Hygiene CARE Score 2   Toilet Transfer   Type of Assistance Needed Physical assistance   Amount of Physical Assistance Provided 25%-49%   Comment Pt amb from w/c at bathroom door to toilet  Platform drop arm commode adjusted and placed over toilet   Pt required Min A for xfer  Toilet Transfer CARE Score 3   Functional Standing Tolerance   Time 7-8 mins; 6 mins   Activity static standing balance with unilateral support   Comments Pt stood at table top with RW present, engaging in game of Played  Pt verbalized that she used to be an avid Scrabble player  Pt tolerated standing for 7-8 mins until c/o fatigue  Pt then stood x 6 mins participating in game  No LOB  Cues for wide stance to A with balance during activity  Cognition   Overall Cognitive Status Impaired   Arousal/Participation Alert; Responsive; Cooperative   Attention Within functional limits   Orientation Level Oriented X4   Memory Decreased recall of precautions   Following Commands Follows one step commands with increased time or repetition   Activity Tolerance   Activity Tolerance Patient tolerated treatment well   Assessment   Treatment Assessment Pt participated in skilled OT Tx session focusing on static standing balance, activity tolerance, and performing functional xfer's  Pt's  present for OT Tx session  OT discussed AE reviewed in previous OT Tx session  Pt's  communicated that pt rarely wears socks at home so probably will not need a sock aide for D/C  Pt demonstrated motivation to participate in all OT activities  OT also discussed setting up FT with  in upcoming session  Pt's  agreeable and very supportive of pt  Pt would benefit from cont'd skilled OT to progress towards OT goals and prepare for D/C home with family support  Prognosis Fair   Problem List Decreased strength;Decreased range of motion;Decreased endurance;Decreased mobility; Decreased coordination;Orthopedic restrictions;Pain   Barriers to Discharge Inaccessible home environment   Plan   Treatment/Interventions ADL retraining;Functional transfer training; Therapeutic exercise; Endurance training;Patient/family training; Compensatory technique education   Progress Progressing toward goals   Recommendation   OT Discharge Recommendation Home with family support   OT Therapy Minutes   OT Time In 1230   OT Time Out 1400   OT Total Time (minutes) 90   OT Mode of treatment - Individual (minutes) 90   OT Mode of treatment - Concurrent (minutes) 0   OT Mode of treatment - Group (minutes) 0   OT Mode of treatment - Co-treat (minutes) 0   OT Mode of Treatment - Total time(minutes) 90 minutes   OT Cumulative Minutes 210   Therapy Time missed   Time missed?  No

## 2019-12-20 NOTE — ASSESSMENT & PLAN NOTE
· Acute comprehensive interdisciplinary inpatient rehabilitation including PT, OT, and/or SLP, RN, CM, SW, dietary, psychology, etc   · Goal: supervision  · Patient not cleared to shower at this time  · WBAT  · S/p IM nailing on 12/14  · Keep incision C+D+I, remove dressing on 12/21/19

## 2019-12-20 NOTE — PROGRESS NOTES
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 ([facility] [address] )  Assessment & Plan  · Patient with delirium, hallucinations following her surgery  · Had 1 hallucination last night  · Evaluated by neurology, sinimet dose reduced  · Will continue seroquel for now, but will attempt to wean off by completion of rehab      Type 2 diabetes mellitus without complication [facility])  Assessment & Plan  Lab Results   Component Value Date    HGBA1C 5 7 12/19/2019       Recent Labs     12/19/19  0608 12/19/19  1103 12/19/19  1620 12/19/19  2039   POCGLU 112 154* 100 174*       Blood Sugar Average: Last 72 hrs:  (P) 135   Patient pre- diabetic, not on home insulin  DM   Will follow up with PCP outpatient  Will d/c SSI    Other hyperlipidemia  Assessment & Plan  · Continue Pravastatin     Parkinson's disease with use of electrical brain stimulation (HCC)  Assessment & Plan  · Continue selegiline (patient is on rasagiline 1mg daily as outpatient, evidently not available here on formulary)   Will resume home rasagiline on discharge  · Continue sinemet - dose was decreased by neurology due to hallucinations  · F/u Dr Corinne Connolly as outpatient  · promote sleep/wake cycle  · avoid CNS altering medications      * Closed fracture of right hip [facility])  Assessment & Plan  · Acute comprehensive interdisciplinary inpatient rehabilitation including PT, OT, and/or SLP, RN, CM, SW, dietary, psychology, etc   · Goal: supervision  · Patient not cleared to shower at this time  · WBAT  · S/p IM nailing on 12/14  · Keep incision C+D+I, remove dressing on 19        VTE Pharmacologic Prophylaxis:   Pharmacologic: Enoxaparin (Lovenox)  Mechanical VTE Prophylaxis in Place: Yes    Current Length of Stay: 2 day(s)    Current Patient Status: Inpatient Rehab     Discharge Plan: As per treatment team      Code Status: Level 3 - DNAR and DNI    Subjective:   Nursing reports some overnight confusion which is patient's baseline  Patient denies any new concerns, patient denies chest pain, shortness of breath and palpitations  Patient reports no pain at this point time  Patient seated in wheelchair working with PT     Objective:     Vitals:   Temp (24hrs), Av 7 °F (37 1 °C), Min:97 4 °F (36 3 °C), Max:99 4 °F (37 4 °C)    Temp:  [97 4 °F (36 3 °C)-99 4 °F (37 4 °C)] 97 4 °F (36 3 °C)  HR:  [69-80] 80  Resp:  [18-19] 19  BP: (109-127)/(61-73) 123/73  SpO2:  [95 %-99 %] 96 %  Body mass index is 36 08 kg/m²  Review of Systems   Constitutional: Negative for activity change, appetite change, chills, diaphoresis and fever  Eyes: Negative for pain, discharge, itching and visual disturbance  Respiratory: Negative for cough, chest tightness, shortness of breath and wheezing  Cardiovascular: Negative for chest pain, palpitations and leg swelling  Gastrointestinal: Negative for abdominal pain, constipation, diarrhea, nausea and vomiting  Endocrine: Negative for polydipsia, polyphagia and polyuria  Genitourinary: Negative for difficulty urinating, dysuria and urgency  Musculoskeletal: Negative for arthralgias, back pain and neck pain  RLE pain in hip with movement, LUE limited range of motion from prior shoulder surgery   Skin: Negative for rash and wound  Neurological: Positive for weakness (RLE after surgery)  Negative for dizziness, facial asymmetry, speech difficulty, numbness and headaches  Psychiatric/Behavioral: Positive for hallucinations  Input and Output Summary (last 24 hours):        Intake/Output Summary (Last 24 hours) at 12/20/2019 1141  Last data filed at 12/20/2019 0900  Gross per 24 hour   Intake 820 ml   Output    Net 820 ml       Physical Exam:     Physical Exam   Constitutional: She is oriented to person, place, and time  She appears well-developed and well-nourished  No distress  HENT:   Head: Normocephalic and atraumatic  Right Ear: External ear normal    Left Ear: External ear normal    Nose: Nose normal    Mouth/Throat: Oropharynx is clear and moist  No oropharyngeal exudate  Eyes: Pupils are equal, round, and reactive to light  Conjunctivae and EOM are normal  Right eye exhibits no discharge  Left eye exhibits no discharge  Neck: Normal range of motion  Neck supple  No thyromegaly present  Cardiovascular: Normal rate, regular rhythm, normal heart sounds and intact distal pulses  Exam reveals no gallop and no friction rub  No murmur heard  Pulmonary/Chest: Effort normal and breath sounds normal  No stridor  No respiratory distress  She has no wheezes  She has no rales  Abdominal: Soft  Bowel sounds are normal  She exhibits no distension  There is no tenderness  Lymphadenopathy:     She has no cervical adenopathy  Neurological: She is alert and oriented to person, place, and time  Skin: Skin is warm and dry  No rash noted  She is not diaphoretic  No erythema  Psychiatric: She has a normal mood and affect   Her behavior is normal  Judgment and thought content normal        Additional Data:     Labs:    Results from last 7 days   Lab Units 12/19/19  1048   WBC Thousand/uL 9 00   HEMOGLOBIN g/dL 11 7*   HEMATOCRIT % 34 4*   PLATELETS Thousands/uL 303   NEUTROS PCT % 67*   LYMPHS PCT % 22*   MONOS PCT % 8   EOS PCT % 3     Results from last 7 days   Lab Units 12/19/19  1048   SODIUM mmol/L 139   POTASSIUM mmol/L 3 6   CHLORIDE mmol/L 104   CO2 mmol/L 26   BUN mg/dL 19   CREATININE mg/dL 0 67   ANION GAP mmol/L 9   CALCIUM mg/dL 9 1   ALBUMIN g/dL 3 5   TOTAL BILIRUBIN mg/dL 1 30*   ALK PHOS U/L 38*   ALT U/L 14   AST U/L 20   GLUCOSE RANDOM mg/dL 148*         Results from last 7 days   Lab Units 12/19/19  2039 12/19/19  1620 12/19/19  1103 12/19/19  0608 12/18/19  2052 12/18/19  1611 12/18/19  1110 12/18/19  0715 12/17/19  2048 12/17/19  1620 12/17/19  1108 12/17/19  0712   POC GLUCOSE mg/dl 174* 100 154* 112 159* 111 135 113 153* 117 117 111     Results from last 7 days   Lab Units 12/19/19  1048   HEMOGLOBIN A1C % 5 7           Labs reviewed    Imaging:    Imaging reviewed    Recent Cultures (last 7 days):           Last 24 Hours Medication List:     Current Facility-Administered Medications:  acetaminophen 650 mg Oral Q6H PRN Jerzy Esposito MD   aspirin 81 mg Oral Daily Ryan Gilliland MD   atenolol 50 mg Oral Daily Ryan Gilliland MD   carbidopa-levodopa 0 5 tablet Oral TID Ryan Gilliland MD   Coenzyme Q10 400 mg Oral Daily Ryan Gilliland MD   enoxaparin 40 mg Subcutaneous Daily Ryan Gilliland MD   oxyCODONE 5 mg Oral Q4H PRN Ryan Gilliland MD   polyethylene glycol 17 g Oral Daily Ryan Gilliland MD   pravastatin 40 mg Oral Every Other Day Ryan Gilliland MD   QUEtiapine 25 mg Oral HS Ryan Gilliland MD   selegiline 5 mg Oral Daily With Breakfast Ryan Gilliland MD   senna-docusate sodium 1 tablet Oral BID Jerzy Esposito MD        M*Modal software was used to dictate this note  It may contain errors with dictating incorrect words or incorrect spelling  Please contact the provider directly with any questions

## 2019-12-20 NOTE — PROGRESS NOTES
12/20/19 1000   Pain Assessment   Pain Assessment 0-10   Pain Score 6   Pain Type Surgical pain   Pain Location Leg   Pain Orientation Right   Pain Descriptors Aching;Discomfort   Pain Frequency Intermittent   Pain Onset Ongoing   Clinical Progression Gradually improving   Patient's Stated Pain Goal No pain   Hospital Pain Intervention(s) Distraction   Diversional Activities Other (Comment)  (OT)   Restrictions/Precautions   Precautions Fall Risk;Pain;THR   Weight Bearing Restrictions Yes   RLE Weight Bearing Per Order WBAT   ROM Restrictions Yes  (THP's)   Putting On/Taking Off Footwear   Type of Assistance Needed Physical assistance   Amount of Physical Assistance Provided 50%-74%   Comment OT reviewed LHAE with pt, demonstrating techniques to don/doff footwear  Pt able to don slipper socks on sock aide, required A to positioned sock aide on ground and for foot placement into sock aide  Pt able to doff B socks utilizing LHAE  Pt demonstrated difficulty donning shoes with LHAE 2* to swelling in BLE's  Overall, pt demo G carry over of techniques  Plan to discuss options for purchasing LHAE  Pt communicated having reacher, [address] shoe horn at home  Pt would benefit from sock aide and LH dressing stick for D/C home  Putting On/Taking Off Footwear CARE Score 2   Cognition   Overall Cognitive Status Impaired   Arousal/Participation Alert; Responsive; Cooperative   Attention Within functional limits   Orientation Level Oriented X4   Memory Decreased recall of precautions   Following Commands Follows one step commands with increased time or repetition   Activity Tolerance   Activity Tolerance Patient tolerated treatment well   Assessment   Treatment Assessment Short OT Tx session focusing on introduction to LHAE 2* to THP's  Pt demo G carryover with use of AE  OT plan to follow up regarding purchasing sock aide and LH dressing stick  See above for further Tx details   Pt would benefit from cont'd skilled OT to continue progressing towards OT goals to prepare for D/C home with faily support  Prognosis Fair   Problem List Decreased strength;Decreased range of motion;Decreased endurance;Decreased mobility; Decreased safety awareness;Pain;Orthopedic restrictions   Barriers to Discharge Decreased caregiver support; Inaccessible home environment   Plan   Treatment/Interventions ADL retraining;Functional transfer training; Therapeutic exercise;Cognitive reorientation;Patient/family training; Compensatory technique education   Progress Progressing toward goals   Recommendation   OT Discharge Recommendation Home with family support   Equipment Recommended Other (comment)  (sock aide, LH dressing stick)   OT Therapy Minutes   OT Time In 1000   OT Time Out 1030   OT Total Time (minutes) 30   OT Mode of treatment - Individual (minutes) 30   OT Mode of treatment - Concurrent (minutes) 0   OT Mode of treatment - Group (minutes) 0   OT Mode of treatment - Co-treat (minutes) 0   OT Mode of Treatment - Total time(minutes) 30 minutes   OT Cumulative Minutes 120   Therapy Time missed   Time missed?  No

## 2019-12-20 NOTE — PROGRESS NOTES
12/20/19 1500   Pain Assessment   Pain Assessment 0-10   Pain Score 7   Pain Type Surgical pain   Pain Location Groin;Leg   Pain Orientation Right   Pain Descriptors Aching   Pain Frequency Intermittent   Pain Onset Ongoing   Hospital Pain Intervention(s) Repositioned;Distraction   Response to Interventions Tolerated activities well even with 7/10 pain rating  Also reported 7/10 pain at end of session, however, denied wanting any ice  Restrictions/Precautions   Precautions Bed/chair alarms; Fall Risk;Pain;Supervision on toilet/commode   Weight Bearing Restrictions Yes   RLE Weight Bearing Per Order WBAT   Cognition   Overall Cognitive Status Impaired   Arousal/Participation Alert; Responsive; Cooperative   Attention Within functional limits   Orientation Level Oriented X4   Memory Decreased recall of precautions   Following Commands Follows one step commands with increased time or repetition   Subjective   Subjective Pt agreeable to PT tx  States she received pain meds approx 30 mins prior to therapy session  Also reports that she had ice pack after OT tx but did not think it provided any pain relief to R hip/thigh/groin region  Sit to Stand   Type of Assistance Needed Physical assistance   Amount of Physical Assistance Provided 75% or more   Comment with RW  For most of session, pt minAx1 for sit to stands, however, with fatigue, pt maxAx1 at one point  VC's for pt to slowly lower self into chair as well as for hand placement  Also provided vc's as pt will place feet out in front of her making it difficult for her to stand  Sit to Stand CARE Score 2   Bed-Chair Transfer   Type of Assistance Needed Physical assistance   Amount of Physical Assistance Provided 25%-49%   Chair/Bed-to-Chair Transfer CARE Score 3   Transfer Bed/Chair/Wheelchair   Limitations Noted In Balance;Confidence; Endurance;Pain Management;Problem Solving; Sequencing;UE Strength;LE Strength   Adaptive Equipment Roller Walker   Stand Pivot Minimal Assist   Sit to Stand Maximum Assist;Moderate Assist;Minimal Assist   Stand to Sit Maximum Assist;Moderate Assist;Minimal Assist   Findings Pt varies with assist needs during all transfers  Needs vc's for safety and sequencing  Walk 10 Feet   Type of Assistance Needed Physical assistance   Amount of Physical Assistance Provided 25%-49%   Walk 10 Feet CARE Score 3   Walk 50 Feet with Two Turns   Type of Assistance Needed Physical assistance   Amount of Physical Assistance Provided Total assistance   Comment w/c follow by second person   Walk 50 Feet with Two Turns CARE Score 1   Walk 150 Feet   Reason if not Attempted Safety concerns   Walk 150 Feet CARE Score 88   Walking 10 Feet on Uneven Surfaces   Reason if not Attempted Safety concerns   Walking 10 Feet on Uneven Surfaces CARE Score 88   Ambulation   Does the patient walk? 2  Yes   Primary Mode of Locomotion Prior to Admission Walk   Distance Walked (feet) 50 ft  (x1)   Assist Device Roller Walker   Gait Pattern Antalgic; Inconsistant Oliva; Slow Oliva;Decreased foot clearance; Festination;L foot drag; Forward Flexion;Narrow NANCY;Step to; Step through; Decreased R stance; Improper weight shift   Limitations Noted In Balance; Coordination;Device Management; Endurance; Heel Strike;Posture; Safety; Sequencing;Speed;Strength;Swing   Provided Assistance with: Balance;Trunk Support;Weight Shift   Walk Assist Level Chair Follow;Maximum Assist;Minimum Assist   Findings Performed with RW with w/c follow  For most of amb, pt Geri with vc's to keep feet apart, to take smaller step with R LE and larger step with L LE and to keep walker close  Pt with difficulty following verbal cues  At one point, pt had LOB requiring maxA from therapist to maintain balance  Pt began to fatigue demo'ing festinating gait pattern  Pt will not state she is fatigued, therefore requires cues from PT as of when to stop amb    Pt does report that she utilized Rollator prior to admission and would like to return to that if able  Wheelchair mobility   Does the patient use a wheelchair? 0  No  (passive transport only)   Curb or Single Stair   Style negotiated Single stair   Type of Assistance Needed Physical assistance   Amount of Physical Assistance Provided 25%-49%   1 Step (Curb) CARE Score 3   4 Steps   Type of Assistance Needed Physical assistance   Amount of Physical Assistance Provided Total assistance   4 Steps CARE Score 1   12 Steps   Reason if not Attempted Activity not applicable   12 Steps CARE Score 9   Stairs   Type Stairs   # of Steps 5   Weight Bearing Precautions WBAT;RLE;Fall Risk   Assist Devices Bilateral Rail   Findings 5 steps completed on 6"  steps with B HR's  First 4 steps descending forward, last step descent backwards  Pt with need for vc's on step for proper sequencing as well as hand placement  MinAx1 provided by PT with second person on SBA however, not needed  Pt with difficulty noted turning at bottom of step  Therapeutic Interventions   Balance Standing weight shifting utilizing RW to improve pt's tolerance to R single limb stance for 2 mins performed  Also performed 2" step taps with L LE in which pt required modA to maintain balance but was able to complete 10 reps  Assessment   Treatment Assessment Session focused on transfers, amb, stair trng, and improving weight bearing on pt's R LE  Pt with noted hesitation to weight bear through R LE during transfers and amb therefore focused on activities to increase weight bearing to R LE  Pt able to perform transfers varying from maxAx1-minAx1 with need for vc's throughout session for safety and proper setup  Pt able to increase amb distances today, however, had LOB requiring assist and also had difficulty following through with verbal commands to improve gait pattern  Pt able to perform 5 stairs this session but verbal cues needed for safety, technique, and sequencing   Pt able to complete weight shifting exercises, however hesitant due to pain in R LE but was able to complete with assist  Increased time required to complete all functional activities due to activity tolerance, pain, and dx of PD causing pt to be bradykinetic  Pt will cont to benefit from LE strengthening (especially R LE), ROM, balance, endurance trng as well as cont to work on stair trng, amb with RW, and transfers to achieve set LTGs  Pt has very supportive family who will provide S to pt upon d/c  Family/Caregiver Present no   Problem List Decreased strength;Decreased range of motion;Decreased endurance;Decreased mobility; Decreased coordination;Orthopedic restrictions;Pain   Barriers to Discharge Inaccessible home environment   PT Barriers   Physical Impairment Decreased strength;Decreased range of motion;Decreased endurance; Impaired balance;Decreased mobility; Decreased coordination;Decreased cognition;Obesity; Decreased skin integrity;Orthopedic restrictions;Pain   Functional Limitation Car transfers; Ramp negotiation;Standing;Stair negotiation;Transfers; Walking   Plan   Treatment/Interventions Functional transfer training;LE strengthening/ROM; Therapeutic exercise; Endurance training;Gait training   Progress Progressing toward goals   Recommendation   Recommendation Home PT;Outpatient PT; Home with family support   PT - OK to Discharge No   PT Therapy Minutes   PT Time In 1500   PT Time Out 1530   PT Total Time (minutes) 30   PT Mode of treatment - Individual (minutes) 30   PT Mode of treatment - Concurrent (minutes) 0   PT Mode of treatment - Group (minutes) 0   PT Mode of treatment - Co-treat (minutes) 0   PT Mode of Treatment - Total time(minutes) 30 minutes   PT Cumulative Minutes 120   Therapy Time missed   Time missed?  No

## 2019-12-20 NOTE — ASSESSMENT & PLAN NOTE
Vitals:    12/20/19 0700   BP: 123/73   Pulse: 80   Resp: 19   Temp: (!) 97 4 °F (36 3 °C)   SpO2: 96%      Continue with Atenolol 50mg

## 2019-12-20 NOTE — NURSING NOTE
Noted redness to R heel, blanches, applied 3m no sting  B heels kept elevated in pillow  Will continue to monitor

## 2019-12-20 NOTE — PLAN OF CARE
Problem: Potential for Falls  Goal: Patient will remain free of falls  Description  INTERVENTIONS:  - Assess patient frequently for physical needs  -  Identify cognitive and physical deficits and behaviors that affect risk of falls  -  [place] fall precautions as indicated by assessment   - Educate patient/family on patient safety including physical limitations  - Instruct patient to call for assistance with activity based on assessment  - Modify environment to reduce risk of injury  - Consider OT/PT consult to assist with strengthening/mobility  Outcome: Progressing     Problem: NEUROSENSORY - ADULT  Goal: Achieves stable or improved neurological status  Description  INTERVENTIONS  - Monitor and report changes in neurological status  - Monitor vital signs such as temperature, blood pressure, glucose, and any other labs ordered   - Initiate measures to prevent increased intracranial pressure  - Monitor for seizure activity and implement precautions if appropriate      Outcome: Progressing  Goal: Achieves maximal functionality and self care  Description  INTERVENTIONS  - Monitor swallowing and airway patency with patient fatigue and changes in neurological status  - Encourage and assist patient to increase activity and self care     - Encourage visually impaired, hearing impaired and aphasic patients to use assistive/communication devices  Outcome: Progressing     Problem: GASTROINTESTINAL - ADULT  Goal: Maintains or returns to baseline bowel function  Description  INTERVENTIONS:  - Assess bowel function  - Encourage oral fluids to ensure adequate hydration  - Administer IV fluids if ordered to ensure adequate hydration  - Administer ordered medications as needed  - Encourage mobilization and activity  - Consider nutritional services referral to assist patient with adequate nutrition and appropriate food choices  Outcome: Progressing  Goal: Maintains adequate nutritional intake  Description  INTERVENTIONS:  - Monitor percentage of each meal consumed  - Identify factors contributing to decreased intake, treat as appropriate  - Assist with meals as needed  - Monitor I&O, weight, and lab values if indicated  - Obtain nutrition services referral as needed  Outcome: Progressing     Problem: GENITOURINARY - ADULT  Goal: Maintains or returns to baseline urinary function  Description  INTERVENTIONS:  - Assess urinary function  - Encourage oral fluids to ensure adequate hydration if ordered  - Administer IV fluids as ordered to ensure adequate hydration  - Administer ordered medications as needed  - Offer frequent toileting  - Follow urinary retention protocol if ordered  Outcome: Progressing     Problem: SKIN/TISSUE INTEGRITY - ADULT  Goal: Skin integrity remains intact  Description  INTERVENTIONS  - Identify patients at risk for skin breakdown  - Assess and monitor skin integrity  - Assess and monitor nutrition and hydration status  - Monitor labs (i e  albumin)  - Assess for incontinence   - Turn and reposition patient  - Assist with mobility/ambulation  - Relieve pressure over bony prominences  - Avoid friction and shearing  - Provide appropriate hygiene as needed including keeping skin clean and dry  - Evaluate need for skin moisturizer/barrier cream  - Collaborate with interdisciplinary team (i e  Nutrition, Rehabilitation, etc )   - Patient/family teaching  Outcome: Progressing  Goal: Incision(s), wounds(s) or drain site(s) healing without S/S of infection  Description  INTERVENTIONS  - Assess and document risk factors for skin impairment   - Assess and document dressing, incision, wound bed, drain sites and surrounding tissue  - Consider nutrition services referral as needed  - Oral mucous membranes remain intact  - Provide patient/ family education  Outcome: Progressing  Goal: Oral mucous membranes remain intact  Description  INTERVENTIONS  - Assess oral mucosa and hygiene practices  - Implement preventative oral hygiene regimen  - Implement oral medicated treatments as ordered  - Initiate Nutrition services referral as needed  Outcome: Progressing     Problem: MUSCULOSKELETAL - ADULT  Goal: Maintain or return mobility to safest level of function  Description  INTERVENTIONS:  - Assess patient's ability to carry out ADLs; assess patient's baseline for ADL function and identify physical deficits which impact ability to perform ADLs (bathing, care of mouth/teeth, toileting, grooming, dressing, etc )  - Assess/evaluate cause of self-care deficits   - Assess range of motion  - Assess patient's mobility  - Assess patient's need for assistive devices and provide as appropriate  - Encourage maximum independence but intervene and supervise when necessary  - Involve family in performance of ADLs  - Assess for home care needs following discharge   - Consider OT consult to assist with ADL evaluation and planning for discharge  - Provide patient education as appropriate  Outcome: Progressing  Goal: Maintain proper alignment of affected body part  Description  INTERVENTIONS:  - Support, maintain and protect limb and body alignment  - Provide patient/ family with appropriate education  Outcome: Progressing     Problem: COPING  Goal: Pt/Family able to verbalize concerns and demonstrate effective coping strategies  Description  INTERVENTIONS:  - Assist patient/family to identify coping skills, available support systems and cultural and spiritual values  - Provide emotional support, including active listening and acknowledgement of concerns of patient and caregivers  - Reduce environmental stimuli, as able  - Provide patient education  - Assess for spiritual pain/suffering and initiate spiritual care, including notification of Pastoral Care or ranjit based community as needed  - Assess effectiveness of coping strategies  Outcome: Progressing  Goal: Will report anxiety at manageable levels  Description  INTERVENTIONS:  - Administer medication as ordered  - Teach and encourage coping skills  - Provide emotional support  - Assess patient/family for anxiety and ability to cope  Outcome: Progressing     Problem: CONFUSION/THOUGHT DISTURBANCE  Goal: Thought disturbances (confusion, delirium, depression, dementia or psychosis) are managed to maintain or return to baseline mental status and functional level  Description  INTERVENTIONS:  - Assess for possible contributors to  thought disturbance, including but not limited to medications, infection, impaired vision or hearing, underlying metabolic abnormalities, dehydration, respiratory compromise,  psychiatric diagnoses and notify attending PHYSICAN/AP  - Monitor and intervene to maintain adequate nutrition, hydration, elimination, sleep and activity  - Decrease environmental stimuli, including noise as appropriate  - Provide frequent contacts to provide refocusing, direction and reassurance as needed  Approach patient calmly with eye contact and at their level    - [place] high risk fall precautions, aspiration precautions and other safety measures, as indicated  - If delirium suspected, notify physician/AP of change in condition and request immediate in-person evaluation  - Pursue consults as appropriate including Geriatric (campus dependent), OT for cognitive evaluation/activity planning, psychiatric, pastoral care, etc   Outcome: Progressing     Problem: Prexisting or High Potential for Compromised Skin Integrity  Goal: Skin integrity is maintained or improved  Description  INTERVENTIONS:  - Identify patients at risk for skin breakdown  - Assess and monitor skin integrity  - Assess and monitor nutrition and hydration status  - Monitor labs   - Assess for incontinence   - Turn and reposition patient  - Assist with mobility/ambulation  - Relieve pressure over bony prominences  - Avoid friction and shearing  - Provide appropriate hygiene as needed including keeping skin clean and dry  - Evaluate need for skin moisturizer/barrier cream  - Collaborate with interdisciplinary team   - Patient/family teaching  - Consider wound care consult   Outcome: Progressing

## 2019-12-20 NOTE — NURSING NOTE
Saw patient trying to get out of the bed, asked what is she trying to do, pt is confused, said she will go home  Patient reoriented that she's in rehab unit, redirected to go back in bed  Bed alarm attached to pt's bed  Will continue to monitor

## 2019-12-21 PROCEDURE — 97110 THERAPEUTIC EXERCISES: CPT

## 2019-12-21 PROCEDURE — 97535 SELF CARE MNGMENT TRAINING: CPT

## 2019-12-21 PROCEDURE — 99232 SBSQ HOSP IP/OBS MODERATE 35: CPT | Performed by: INTERNAL MEDICINE

## 2019-12-21 PROCEDURE — 97530 THERAPEUTIC ACTIVITIES: CPT

## 2019-12-21 PROCEDURE — 97116 GAIT TRAINING THERAPY: CPT

## 2019-12-21 RX ADMIN — CARBIDOPA AND LEVODOPA 0.5 TABLET: 25; 100 TABLET ORAL at 21:14

## 2019-12-21 RX ADMIN — CARBIDOPA AND LEVODOPA 0.5 TABLET: 25; 100 TABLET ORAL at 08:07

## 2019-12-21 RX ADMIN — QUETIAPINE FUMARATE 25 MG: 25 TABLET, FILM COATED ORAL at 21:14

## 2019-12-21 RX ADMIN — TRAMADOL HYDROCHLORIDE 50 MG: 50 TABLET, COATED ORAL at 06:43

## 2019-12-21 RX ADMIN — ENOXAPARIN SODIUM 40 MG: 40 INJECTION SUBCUTANEOUS at 08:07

## 2019-12-21 RX ADMIN — ATENOLOL 50 MG: 25 TABLET ORAL at 08:09

## 2019-12-21 RX ADMIN — TRAMADOL HYDROCHLORIDE 50 MG: 50 TABLET, COATED ORAL at 14:17

## 2019-12-21 RX ADMIN — SENNOSIDES AND DOCUSATE SODIUM 1 TABLET: 8.6; 5 TABLET ORAL at 17:02

## 2019-12-21 RX ADMIN — ACETAMINOPHEN 650 MG: 325 TABLET ORAL at 08:09

## 2019-12-21 RX ADMIN — SELEGILINE HYDROCHLORIDE 5 MG: 5 TABLET ORAL at 08:07

## 2019-12-21 RX ADMIN — CARBIDOPA AND LEVODOPA 0.5 TABLET: 25; 100 TABLET ORAL at 17:04

## 2019-12-21 RX ADMIN — SENNOSIDES AND DOCUSATE SODIUM 1 TABLET: 8.6; 5 TABLET ORAL at 08:10

## 2019-12-21 RX ADMIN — ASPIRIN 81 MG: 81 TABLET, COATED ORAL at 08:07

## 2019-12-21 NOTE — PROGRESS NOTES
12/21/19 5716   Pain Assessment   Pain Assessment 0-10   Pain Score 6   Pain Type Acute pain   Pain Location Hip   Pain Orientation Left   Hospital Pain Intervention(s) Repositioned   Response to Interventions Pt tolerated therapy with rest breaks as needed   Restrictions/Precautions   Precautions Bed/chair alarms;Pain   ROM Restrictions   (as per orthopedic notes pt has no hip precautions  )   Lower Body Dressing   Type of Assistance Needed Physical assistance   Amount of Physical Assistance Provided 25%-49%   Comment Pt completed LB dressing technique with use of LHAE  Pt threaded pants over feet while simulating dressing in therapy gym  Pt trialed with socks on and with socks off  Pt required MOD A with socks on and min A iwth socks doffed  Pt required max verbal cues for problem solving positioning of reacher to assist with getting pants over feet  Pt will need repetitive practice to improve carryover  Pt has no hip precautions, as indicated by all surgical notes from orthopedics   Lower Body Dressing CARE Score 3   Putting On/Taking Off Footwear   Type of Assistance Needed Physical assistance   Amount of Physical Assistance Provided 50%-74%   Comment Pt required max cueing to problem solve how to position reacher to doff socks  Pt paty to setu p socks on sock aid correctly but requires assist to adjust sock on R foot  Pt utlized wide sock aid due to swelling  Spoke to RN who stated patient does not have orders for LUIS stockings however pt does have some swelling noted in RLE  Putting On/Taking Off Footwear CARE Score 2   Sit to Stand   Type of Assistance Needed Physical assistance   Amount of Physical Assistance Provided 75% or more   Comment Pt was mod A for sit to stand transfers with RW  Pt required cueing for positioning out ot edge of seat and for hand placement  As pt began to fatigue she required max A for sit to stand  Pt completed repetitive training for increased carryover with new learning  Sit to Stand CARE Score 2   Bed-Chair Transfer   Type of Assistance Needed Physical assistance   Amount of Physical Assistance Provided 50%-74%   Comment Pt was min-mod A for stand pivot trnasfers  Pt required cueing for sequencing task  Pt with poor tolerance for weight bearing through RLE  Chair/Bed-to-Chair Transfer CARE Score 2   Therapeutic Excerise-Strength   UE Strength Yes   Right Upper Extremity- Strength   R Weight/Reps/Sets Pt engaged in 2# dowel exercises for gentle ROM to prevent rigidity and stiffness from lack of mobility  Pt engaged in forward rows, backward rows, and lateral raises (less than 90)  Pt required manual cues to prevent shoulder elevation and promote proper form  Pt educated on completing big movements to improve coordination and fluidity of movement  Pt noted with decreased fludiity of movement when attempting to complete forward/backward rows to get arms fully extended   Neuromuscular Education   Trunk Control Pt noted with poor neutral sitting balance and leaning to L side to offload from pain on R side  Pt with decreased dynamic sitting balance unspported  Pt transferred to [facility] and completed core strengthening activities with 1# ball  Pt completed reaching in all planes for dynamic sitting balance  Pt noted with LOB 25% of time when leaning to the right  Pt encouraged to attempt to sit more midline while in w/c  Pt in agreement to try  Cognition   Overall Cognitive Status Impaired   Arousal/Participation Alert; Cooperative   Attention Within functional limits   Orientation Level Oriented X4   Memory Decreased recall of recent events;Decreased recall of precautions   Following Commands Follows one step commands with increased time or repetition   Activity Tolerance   Activity Tolerance Patient tolerated treatment well   Assessment   Treatment Assessment Pt engaged in OT treatment session with focus on coordination, fxnl transfers, dynamic sitting balance and endurance   Pt continues to demonstrate difficulty with tolerating weight bearing to RLE  Pt would benefit from activities focused on promoting weight bearing through RLE while seated and in stance  Pt continues to vary in transfers pending upon fatigue and pain  Pt would benefit from reptetitive training to increase carryover with techniques  Pt making progress with use of LHAE for LB dressing but still demonstrates difficulty with problem solving positioning of equipment when clothes get stuck  Pt would benefit from continued training  Prognosis Fair   Problem List Decreased strength;Decreased range of motion;Decreased endurance;Decreased mobility; Impaired balance;Decreased coordination;Decreased cognition   Plan   Treatment/Interventions ADL retraining;Functional transfer training;LE strengthening/ROM; Endurance training; Compensatory technique education;Gait training;Bed mobility; Equipment eval/education; Therapeutic exercise   Progress Progressing toward goals   Recommendation   OT Discharge Recommendation Home with family support   OT Therapy Minutes   OT Time In 0830   OT Time Out 1000   OT Total Time (minutes) 90   OT Mode of treatment - Individual (minutes) 90   OT Mode of treatment - Concurrent (minutes) 0   OT Mode of treatment - Group (minutes) 0   OT Mode of treatment - Co-treat (minutes) 0   OT Mode of Treatment - Total time(minutes) 90 minutes   OT Cumulative Minutes 300   Therapy Time missed   Time missed?  No

## 2019-12-21 NOTE — ASSESSMENT & PLAN NOTE
· Continue selegiline (patient is on rasagiline 1mg daily as outpatient, evidently not available here on formulary)   Will resume home rasagiline on discharge  · Continue sinemet - dose was decreased by neurology due to hallucinations  · F/u Dr Preston Pinto as outpatient  · promote sleep/wake cycle  · avoid CNS altering medications

## 2019-12-21 NOTE — PLAN OF CARE
Problem: Potential for Falls  Goal: Patient will remain free of falls  Description  INTERVENTIONS:  - Assess patient frequently for physical needs  -  Identify cognitive and physical deficits and behaviors that affect risk of falls  -  [place] fall precautions as indicated by assessment   - Educate patient/family on patient safety including physical limitations  - Instruct patient to call for assistance with activity based on assessment  - Modify environment to reduce risk of injury  - Consider OT/PT consult to assist with strengthening/mobility  Outcome: Progressing     Problem: NEUROSENSORY - ADULT  Goal: Achieves stable or improved neurological status  Description  INTERVENTIONS  - Monitor and report changes in neurological status  - Monitor vital signs such as temperature, blood pressure, glucose, and any other labs ordered   - Initiate measures to prevent increased intracranial pressure  - Monitor for seizure activity and implement precautions if appropriate      Outcome: Progressing  Goal: Achieves maximal functionality and self care  Description  INTERVENTIONS  - Monitor swallowing and airway patency with patient fatigue and changes in neurological status  - Encourage and assist patient to increase activity and self care     - Encourage visually impaired, hearing impaired and aphasic patients to use assistive/communication devices  Outcome: Progressing     Problem: GASTROINTESTINAL - ADULT  Goal: Maintains or returns to baseline bowel function  Description  INTERVENTIONS:  - Assess bowel function  - Encourage oral fluids to ensure adequate hydration  - Administer IV fluids if ordered to ensure adequate hydration  - Administer ordered medications as needed  - Encourage mobilization and activity  - Consider nutritional services referral to assist patient with adequate nutrition and appropriate food choices  Outcome: Progressing  Goal: Maintains adequate nutritional intake  Description  INTERVENTIONS:  - Monitor percentage of each meal consumed  - Identify factors contributing to decreased intake, treat as appropriate  - Assist with meals as needed  - Monitor I&O, weight, and lab values if indicated  - Obtain nutrition services referral as needed  Outcome: Progressing     Problem: GENITOURINARY - ADULT  Goal: Maintains or returns to baseline urinary function  Description  INTERVENTIONS:  - Assess urinary function  - Encourage oral fluids to ensure adequate hydration if ordered  - Administer IV fluids as ordered to ensure adequate hydration  - Administer ordered medications as needed  - Offer frequent toileting  - Follow urinary retention protocol if ordered  Outcome: Progressing     Problem: SKIN/TISSUE INTEGRITY - ADULT  Goal: Skin integrity remains intact  Description  INTERVENTIONS  - Identify patients at risk for skin breakdown  - Assess and monitor skin integrity  - Assess and monitor nutrition and hydration status  - Monitor labs (i e  albumin)  - Assess for incontinence   - Turn and reposition patient  - Assist with mobility/ambulation  - Relieve pressure over bony prominences  - Avoid friction and shearing  - Provide appropriate hygiene as needed including keeping skin clean and dry  - Evaluate need for skin moisturizer/barrier cream  - Collaborate with interdisciplinary team (i e  Nutrition, Rehabilitation, etc )   - Patient/family teaching  Outcome: Progressing  Goal: Incision(s), wounds(s) or drain site(s) healing without S/S of infection  Description  INTERVENTIONS  - Assess and document risk factors for skin impairment   - Assess and document dressing, incision, wound bed, drain sites and surrounding tissue  - Consider nutrition services referral as needed  - Oral mucous membranes remain intact  - Provide patient/ family education  Outcome: Progressing  Goal: Oral mucous membranes remain intact  Description  INTERVENTIONS  - Assess oral mucosa and hygiene practices  - Implement preventative oral hygiene regimen  - Implement oral medicated treatments as ordered  - Initiate Nutrition services referral as needed  Outcome: Progressing     Problem: MUSCULOSKELETAL - ADULT  Goal: Maintain or return mobility to safest level of function  Description  INTERVENTIONS:  - Assess patient's ability to carry out ADLs; assess patient's baseline for ADL function and identify physical deficits which impact ability to perform ADLs (bathing, care of mouth/teeth, toileting, grooming, dressing, etc )  - Assess/evaluate cause of self-care deficits   - Assess range of motion  - Assess patient's mobility  - Assess patient's need for assistive devices and provide as appropriate  - Encourage maximum independence but intervene and supervise when necessary  - Involve family in performance of ADLs  - Assess for home care needs following discharge   - Consider OT consult to assist with ADL evaluation and planning for discharge  - Provide patient education as appropriate  Outcome: Progressing  Goal: Maintain proper alignment of affected body part  Description  INTERVENTIONS:  - Support, maintain and protect limb and body alignment  - Provide patient/ family with appropriate education  Outcome: Progressing     Problem: COPING  Goal: Pt/Family able to verbalize concerns and demonstrate effective coping strategies  Description  INTERVENTIONS:  - Assist patient/family to identify coping skills, available support systems and cultural and spiritual values  - Provide emotional support, including active listening and acknowledgement of concerns of patient and caregivers  - Reduce environmental stimuli, as able  - Provide patient education  - Assess for spiritual pain/suffering and initiate spiritual care, including notification of Pastoral Care or ranjit based community as needed  - Assess effectiveness of coping strategies  Outcome: Progressing  Goal: Will report anxiety at manageable levels  Description  INTERVENTIONS:  - Administer medication as ordered  - Teach and encourage coping skills  - Provide emotional support  - Assess patient/family for anxiety and ability to cope  Outcome: Progressing     Problem: CONFUSION/THOUGHT DISTURBANCE  Goal: Thought disturbances (confusion, delirium, depression, dementia or psychosis) are managed to maintain or return to baseline mental status and functional level  Description  INTERVENTIONS:  - Assess for possible contributors to  thought disturbance, including but not limited to medications, infection, impaired vision or hearing, underlying metabolic abnormalities, dehydration, respiratory compromise,  psychiatric diagnoses and notify attending PHYSICAN/AP  - Monitor and intervene to maintain adequate nutrition, hydration, elimination, sleep and activity  - Decrease environmental stimuli, including noise as appropriate  - Provide frequent contacts to provide refocusing, direction and reassurance as needed  Approach patient calmly with eye contact and at their level    - [place] high risk fall precautions, aspiration precautions and other safety measures, as indicated  - If delirium suspected, notify physician/AP of change in condition and request immediate in-person evaluation  - Pursue consults as appropriate including Geriatric (campus dependent), OT for cognitive evaluation/activity planning, psychiatric, pastoral care, etc   Outcome: Progressing     Problem: Prexisting or High Potential for Compromised Skin Integrity  Goal: Skin integrity is maintained or improved  Description  INTERVENTIONS:  - Identify patients at risk for skin breakdown  - Assess and monitor skin integrity  - Assess and monitor nutrition and hydration status  - Monitor labs   - Assess for incontinence   - Turn and reposition patient  - Assist with mobility/ambulation  - Relieve pressure over bony prominences  - Avoid friction and shearing  - Provide appropriate hygiene as needed including keeping skin clean and dry  - Evaluate need for skin moisturizer/barrier cream  - Collaborate with interdisciplinary team   - Patient/family teaching  - Consider wound care consult   Outcome: Progressing

## 2019-12-21 NOTE — PROGRESS NOTES
12/21/19 1230   Pain Assessment   Pain Assessment 0-10   Pain Score 4   Hudson-Baker FACES Pain Rating 0   Pain Type Acute pain   Pain Location Leg;Hip   Pain Orientation Right   Hospital Pain Intervention(s) Distraction;Repositioned;Relaxation technique   Restrictions/Precautions   Precautions Fall Risk;Bed/chair alarms;Supervision on toilet/commode;Pain   RLE Weight Bearing Per Order WBAT   ROM Restrictions   (as per orthopedic notes pt has no hip precautions  )   Cognition   Overall Cognitive Status Impaired   Arousal/Participation Alert; Cooperative   Attention Within functional limits   Orientation Level Oriented X4   Memory Decreased recall of recent events;Decreased recall of precautions   Following Commands Follows one step commands with increased time or repetition   Subjective   Subjective pt denied pain at rest to 4/10 R LE pain during mobilities   Roll Left and Right   Type of Assistance Needed Physical assistance   Amount of Physical Assistance Provided 50%-74%   Comment flat bed with no rail -demo difficulty with bed rolling due to truncal rigidity    Roll Left and Right CARE Score 2   Sit to Lying   Type of Assistance Needed Physical assistance   Amount of Physical Assistance Provided 75% or more   Comment initial attempt required max A for trunk and L LE but mod A when used 6 and 8inches block for LE support  on a 25 inch then 27 inch height bed   Sit to Lying CARE Score 2   Lying to Sitting on Side of Bed   Type of Assistance Needed Physical assistance   Amount of Physical Assistance Provided 75% or more   Comment max on flat bed without bedrail but mod A if uses handle on 10" block to pull herself up with HOB slighly raised-  thinks their bed is higher than that and will  measure bed at home   Lying to Sitting on Side of Bed CARE Score 2   Sit to Stand   Type of Assistance Needed Physical assistance   Amount of Physical Assistance Provided 50%-74%   Comment CGA- min A most of the time to mod A x 1 episode due to impaired fwd weight shifting and feet were slightly far fwd   Sit to Stand CARE Score 2   Bed-Chair Transfer   Type of Assistance Needed Physical assistance   Amount of Physical Assistance Provided 25%-49%   Comment with cues to stay inside the walker especially with L LE   Chair/Bed-to-Chair Transfer CARE Score 3   Walk 10 Feet   Type of Assistance Needed Physical assistance   Amount of Physical Assistance Provided 25%-49%   Comment RW min A, PT performed CF   Walk 10 Feet CARE Score 3   Walk 50 Feet with Two Turns   Type of Assistance Needed Physical assistance   Amount of Physical Assistance Provided 25%-49%   Comment x 120' with first walk with RW    Walk 50 Feet with Two Turns CARE Score 3   Walk 150 Feet   Type of Assistance Needed Physical assistance   Amount of Physical Assistance Provided 75% or more   Comment was min A for the most part of 2nd walk x 150'   except when pt was approaching mat to sit and lost her balance as was not able to move L LE timely due to freezing as she was slightly turning to position herself in front of the mat before sitting down   Walk 150 Feet CARE Score 2   Therapeutic Interventions   Flexibility passive stretching of bilat hamstring and gastroc mm as tolerated x 30 SH x 4 reps each mm group, truncal mm stretching with diagonal reaching and truncal rotation exercise while holding red weighted ball, pushing blue ball fwd and holding it at end range for 5 SH x 10 reps   Other Comments   Comments also practiced scooting on EOM requiring mod-max A mostly to facilitate fwd weight shifting and push up on the mat with UE while keeping LE on the floor to complete task    Assessment   Treatment Assessment Skilled PT intervention initially focused on bed mobility training  Due to pt truncal rigidity, stature, habitus and mm weakness pt demo difficulty with bed scooting to facilitate positioning as well as difficulty with rolling and sup<> sit transfers     Initial trial with pt sitting EOB on a 25 inch high bed with bed in flat position without using bed rail, she demo difficulty controlling UB/trunk placement on the bed and required assist to bring L LE into the bed while able to bring R LE into the bed using pant leg  Using 8 then 6" block for LE support pt was able to bring in bilat LE into the bed using pant leg but required assist for trunk/UB placement on the bed so she does not hit her head on the opposite bedrail and ensure head will land on the pillows  Supine to sit is slightly better when pt allowed to use handle on the 10 inch block with HOB slightly raised  With repeated training pt demo frustration stating " that is enough" requiring education on rationale of training   confirmed multiple episodes of pt sliding off their high bed at home  During gait training noted increased freezing episodes of L LE especially when making turns which caused 1x LOB of pt requiring max A  Pt responded better to verbal cues of taking larger steps with L LE and shorter steps with R LE promoting step through pattern and even step lengths during gait training  Pt will benefit from additional bed transfer training with recommendation to perform flexibility exercises prior to performing task  Family/Caregiver Present yes   PT Family training done with:  dave was present during PT session and recommended to him regarding purchasing a bedrail to facilitate ease of getting in/out of bed - per  he helps most of the time to get pt out of the bed while only sometimes getting pt into the bed  He also reported pt have had PT interventions focusing on bed transfer training utilizing log roll technique on a flat bed as well and have participated in PD exercise programs    Problem List Decreased strength;Decreased range of motion;Decreased endurance;Decreased mobility; Impaired balance;Decreased coordination;Decreased cognition   PT Barriers   Functional Limitation Car transfers; Ramp negotiation;Stair negotiation;Standing;Transfers; Walking   Plan   Treatment/Interventions Functional transfer training;LE strengthening/ROM; Therapeutic exercise; Endurance training;Bed mobility;Gait training;OT;Spoke to nursing   Progress Progressing toward goals   Recommendation   Recommendation Home with family support; Outpatient PT   PT - OK to Discharge No   PT Therapy Minutes   PT Time In 1230   PT Time Out 1400   PT Total Time (minutes) 90   PT Mode of treatment - Individual (minutes) 90   PT Mode of treatment - Concurrent (minutes) 0   PT Mode of treatment - Group (minutes) 0   PT Mode of treatment - Co-treat (minutes) 0   PT Mode of Treatment - Total time(minutes) 90 minutes   PT Cumulative Minutes 210

## 2019-12-21 NOTE — PROGRESS NOTES
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 ([facility] [address] )  Assessment & Plan  · Patient with delirium, hallucinations following her surgery  · Had 1 hallucination last night  · Evaluated by neurology, sinimet dose reduced  · Will continue seroquel for now, but will attempt to wean off by completion of rehab      Type 2 diabetes mellitus without complication [facility])  Assessment & Plan  Lab Results   Component Value Date    HGBA1C 5 7 12/19/2019       Recent Labs     12/19/19  0608 12/19/19  1103 12/19/19  1620 12/19/19  2039   POCGLU 112 154* 100 174*       Blood Sugar Average: Last 72 hrs:  (P) 135   Patient pre- diabetic, not on home insulin  DM   Will follow up with PCP outpatient  Will d/c SSI    Other hyperlipidemia  Assessment & Plan  · Continue Pravastatin     Parkinson's disease with use of electrical brain stimulation (HCC)  Assessment & Plan  · Continue selegiline (patient is on rasagiline 1mg daily as outpatient, evidently not available here on formulary)   Will resume home rasagiline on discharge  · Continue sinemet - dose was decreased by neurology due to hallucinations  · F/u Dr Gabriella Amador as outpatient  · promote sleep/wake cycle  · avoid CNS altering medications      * Closed fracture of right hip [facility])  Assessment & Plan  · Acute comprehensive interdisciplinary inpatient rehabilitation including PT, OT, and/or SLP, RN, CM, SW, dietary, psychology, etc   · Goal: supervision  · Patient not cleared to shower at this time  · WBAT  · S/p IM nailing on 12/14  · Keep incision C+D+I, remove dressing on 19        VTE Pharmacologic Prophylaxis:   Pharmacologic: Enoxaparin (Lovenox)  Mechanical VTE Prophylaxis in Place: Yes    Current Length of Stay: 3 day(s)    Current Patient Status: Inpatient Rehab     Discharge Plan: As per treatment team      Code Status: Level 3 - DNAR and DNI    Subjective:   Nursing reports no overnight events  Patient reports some pain to B/L LE  Patient denies chest pain, shortness of breath and palpitations  Patient reports no pain at this point time  Patient seated in wheelchair working with PT     Objective:     Vitals:   Temp (24hrs), Av 9 °F (36 6 °C), Min:97 7 °F (36 5 °C), Max:98 2 °F (36 8 °C)    Temp:  [97 7 °F (36 5 °C)-98 2 °F (36 8 °C)] 97 7 °F (36 5 °C)  HR:  [69-80] 80  Resp:  [18] 18  BP: (116-142)/(55-64) 118/60  SpO2:  [98 %] 98 %  Body mass index is 36 08 kg/m²  Review of Systems   Constitutional: Negative for activity change, appetite change, chills, diaphoresis and fever  HENT: Negative for congestion, ear discharge, ear pain, postnasal drip, rhinorrhea, sinus pressure, sinus pain and sore throat  Eyes: Negative for pain, discharge, itching and visual disturbance  Respiratory: Negative for cough, chest tightness, shortness of breath and wheezing  Cardiovascular: Negative for chest pain, palpitations and leg swelling  Gastrointestinal: Negative for abdominal pain, constipation, diarrhea, nausea and vomiting  Endocrine: Negative for polydipsia, polyphagia and polyuria  Genitourinary: Negative for difficulty urinating, dysuria and urgency  Musculoskeletal: Positive for arthralgias (bilateral lower extremities)  Negative for back pain and neck pain  Skin: Negative for rash and wound  Neurological: Positive for weakness (RLE)  Negative for dizziness, numbness and headaches  Input and Output Summary (last 24 hours):        Intake/Output Summary (Last 24 hours) at 2019 1127  Last data filed at 2019 0900  Gross per 24 hour   Intake 720 ml   Output    Net 720 ml       Physical Exam:     Physical Exam   Constitutional: She is oriented to person, place, and time  She appears well-developed and well-nourished  No distress  HENT:   Head: Normocephalic and atraumatic  Right Ear: External ear normal    Left Ear: External ear normal    Nose: Nose normal    Mouth/Throat: Oropharynx is clear and moist  No oropharyngeal exudate  Eyes: Pupils are equal, round, and reactive to light  Conjunctivae and EOM are normal  Right eye exhibits no discharge  Left eye exhibits no discharge  Neck: Normal range of motion  Neck supple  No thyromegaly present  Cardiovascular: Normal rate, regular rhythm, normal heart sounds and intact distal pulses  Exam reveals no gallop and no friction rub  No murmur heard  Pulmonary/Chest: Effort normal and breath sounds normal  No stridor  No respiratory distress  She has no wheezes  She has no rales  Abdominal: Soft  Bowel sounds are normal  She exhibits no distension  There is no tenderness  Musculoskeletal: She exhibits edema (trace edema b/l LE)  Lymphadenopathy:     She has no cervical adenopathy  Neurological: She is alert and oriented to person, place, and time  Skin: Skin is warm and dry  No rash noted  She is not diaphoretic  No erythema  Psychiatric: She has a normal mood and affect   Her behavior is normal  Judgment and thought content normal        Additional Data:     Labs:    Results from last 7 days   Lab Units 12/19/19  1048   WBC Thousand/uL 9 00   HEMOGLOBIN g/dL 11 7*   HEMATOCRIT % 34 4*   PLATELETS Thousands/uL 303   NEUTROS PCT % 67*   LYMPHS PCT % 22*   MONOS PCT % 8   EOS PCT % 3     Results from last 7 days   Lab Units 12/19/19  1048   SODIUM mmol/L 139   POTASSIUM mmol/L 3 6   CHLORIDE mmol/L 104   CO2 mmol/L 26   BUN mg/dL 19   CREATININE mg/dL 0 67   ANION GAP mmol/L 9   CALCIUM mg/dL 9 1   ALBUMIN g/dL 3 5   TOTAL BILIRUBIN mg/dL 1 30*   ALK PHOS U/L 38*   ALT U/L 14 AST U/L 20   GLUCOSE RANDOM mg/dL 148*         Results from last 7 days   Lab Units 12/19/19  2039 12/19/19  1620 12/19/19  1103 12/19/19  0608 12/18/19  2052 12/18/19  1611 12/18/19  1110 12/18/19  0715 12/17/19  2048 12/17/19  1620 12/17/19  1108 12/17/19  0712   POC GLUCOSE mg/dl 174* 100 154* 112 159* 111 135 113 153* 117 117 111     Results from last 7 days   Lab Units 12/19/19  1048   HEMOGLOBIN A1C % 5 7           Labs reviewed    Imaging:    Imaging reviewed    Recent Cultures (last 7 days):           Last 24 Hours Medication List:     Current Facility-Administered Medications:  acetaminophen 650 mg Oral Q6H PRN Shaheen Shin MD   aspirin 81 mg Oral Daily Ryan Gilliland MD   atenolol 50 mg Oral Daily Ryan Gilliland MD   carbidopa-levodopa 0 5 tablet Oral TID Ryan Gilliland MD   Coenzyme Q10 400 mg Oral Daily Ryan Gilliland MD   enoxaparin 40 mg Subcutaneous Daily Ryan Gilliland MD   polyethylene glycol 17 g Oral Daily Ryan Gilliland MD   pravastatin 40 mg Oral Every Other Day Ryan Gilliland MD   QUEtiapine 25 mg Oral HS Shaheen Shin MD   selegiline 5 mg Oral Daily With Breakfast Ryan Gilliland MD   senna-docusate sodium 1 tablet Oral BID Ryan Gilliland MD   traMADol 25 mg Oral Q6H PRN Ryan Gilliland MD   traMADol 50 mg Oral Q6H PRN Shaheen Shin MD        M*Modal software was used to dictate this note  It may contain errors with dictating incorrect words or incorrect spelling  Please contact the provider directly with any questions

## 2019-12-21 NOTE — ASSESSMENT & PLAN NOTE
Vitals:    12/21/19 0710   BP: 118/60   Pulse: 80   Resp: 18   Temp: 97 7 °F (36 5 °C)   SpO2: 98%      Continue with Atenolol 50mg

## 2019-12-22 PROCEDURE — 97110 THERAPEUTIC EXERCISES: CPT

## 2019-12-22 PROCEDURE — 97530 THERAPEUTIC ACTIVITIES: CPT

## 2019-12-22 PROCEDURE — 99232 SBSQ HOSP IP/OBS MODERATE 35: CPT | Performed by: INTERNAL MEDICINE

## 2019-12-22 PROCEDURE — 97116 GAIT TRAINING THERAPY: CPT

## 2019-12-22 PROCEDURE — 97112 NEUROMUSCULAR REEDUCATION: CPT

## 2019-12-22 RX ADMIN — ENOXAPARIN SODIUM 40 MG: 40 INJECTION SUBCUTANEOUS at 08:03

## 2019-12-22 RX ADMIN — QUETIAPINE FUMARATE 25 MG: 25 TABLET, FILM COATED ORAL at 21:35

## 2019-12-22 RX ADMIN — SELEGILINE HYDROCHLORIDE 5 MG: 5 TABLET ORAL at 08:03

## 2019-12-22 RX ADMIN — PRAVASTATIN SODIUM 40 MG: 40 TABLET ORAL at 08:04

## 2019-12-22 RX ADMIN — CARBIDOPA AND LEVODOPA 0.5 TABLET: 25; 100 TABLET ORAL at 17:06

## 2019-12-22 RX ADMIN — ATENOLOL 50 MG: 25 TABLET ORAL at 08:03

## 2019-12-22 RX ADMIN — ASPIRIN 81 MG: 81 TABLET, COATED ORAL at 08:03

## 2019-12-22 RX ADMIN — CARBIDOPA AND LEVODOPA 0.5 TABLET: 25; 100 TABLET ORAL at 21:35

## 2019-12-22 RX ADMIN — CARBIDOPA AND LEVODOPA 0.5 TABLET: 25; 100 TABLET ORAL at 08:03

## 2019-12-22 NOTE — PROGRESS NOTES
12/22/19 0815   Pain Assessment   Pain Assessment 0-10   Pain Score 7   Hudson-Baker FACES Pain Rating 0   Pain Type Acute pain   Pain Location Back   Pain Orientation Lower   Hospital Pain Intervention(s) Medication (See MAR); Repositioned;Distraction  (premedicated by RN, declined cold pack )   Restrictions/Precautions   Precautions Bed/chair alarms;Cognitive; Fall Risk   RLE Weight Bearing Per Order WBAT   ROM Restrictions   (as per orthopedic notes pt has no hip precautions  )   Cognition   Overall Cognitive Status Impaired   Arousal/Participation Alert; Cooperative   Attention Within functional limits   Orientation Level Oriented X4   Memory Decreased recall of recent events;Decreased recall of precautions   Following Commands Follows one step commands with increased time or repetition   Subjective   Subjective pt reported pain as above and was agreeable to be seen early this AM  no reported pain at end of tx session   Sit to Stand   Type of Assistance Needed Physical assistance   Amount of Physical Assistance Provided Less than 25%   Comment min to CGA at best if pt observed fwd weight shifting with feet flat on floor and in 90 deg angle in relation to thigh and pushing up with R UE and L UE on walker   Sit to Stand CARE Score 3   Bed-Chair Transfer   Type of Assistance Needed Physical assistance   Amount of Physical Assistance Provided 50%-74%   Comment cues to step back until she feels chair at back of knees and to square walker in front prior to reaching back for the chair or mat before sitting down   Chair/Bed-to-Chair Transfer CARE Score 2   Walk 10 Feet   Type of Assistance Needed Physical assistance   Amount of Physical Assistance Provided Less than 25%   Comment CG-min with RW , cues to take longer steps with L LE to facilitate weight shifting on R LE and promote even step lengths - no L LE freezing noted this session even with turns    Walk 10 Feet CARE Score 3   Walk 50 Feet with Two Turns   Type of Assistance Needed Physical assistance   Amount of Physical Assistance Provided Less than 25%   Comment 70', [de-identified]' with RW, PT performed CF   Walk 50 Feet with Two Turns CARE Score 3   Curb or Single Stair   Style negotiated Single stair   Type of Assistance Needed Physical assistance   Amount of Physical Assistance Provided 50%-74%   Comment bilat HR , ascended fwd/descended bwd with min A  leading up with L LE and down with R LE - PT provided R knee blocking    1 Step (Curb) CARE Score 2   4 Steps   Type of Assistance Needed Physical assistance   Amount of Physical Assistance Provided Total assistance   Comment min-mod A of 1 using bilat HR ascended fwd/descended bwd  x 4 steps with SBA of 2nd person for safety but did not provide assist - had pt descended bwd this session as had difficulty turning around at top of steps and with noted fatigue   4 Steps CARE Score 1   Picking Up Object   Type of Assistance Needed Incidental touching;Verbal cues; Adaptive equipment   Comment reacher on R UE with L UE on walker picked up pen off the floor while standing   Picking Up Object CARE Score 4   Therapeutic Interventions   Strengthening ankle pumps x 15 reps x 2 sets with emphasis for pt to lift little toe up, ball squeezing x 30 reps   Flexibility truncal stretching before mobility training include fwd leaning stretch by having pt reach for walker with hands outstretch holding 5 SH at end range x 5 reps, diagonal hand reaching with feet flat on floor x 20 reps, gentle stretching of bilat hamstring and gastroc mm x 30 SH  3 reps    Neuromuscular Re-Education sitting EOM balance and weight shifting training with feet supported on blue foam include pushing blue theraball fwd and back to midline x 10 reps x 3 sets   catching small blue ball with bilat hands with reps as tolerated x 2 trials, with hands clasp together performed chop up/down pattern using ball as target  x 10 reps x 4 sets - pt required cues to weight shift fwd and to the right to attain/maintain midline postural alignment to which pt reported having poor position sense with inability to determined if she is leaning too far back and to the R    Equipment Use   NuStep L2 x 16 mins with SPM >30 mainly for ROM benjie on R knee , seat at 8   Assessment   Treatment Assessment Skilled PT intervention cont to focused on flexibility and strengthening exercises , functional mobility training using RW and NMR with emphasis on weight shifting, balance and postural alignment  Gait tolerance limited by fatigue this session with no LOB or L LE freezing noted with turns  Although R knee did not buckle during stair training but noted unsteadiness requiring blocking for safety  Pt will benefit from continued skilled PT intervention to maximize mobility indep especially with bed mobilities and reduce risk for falls  Family/Caregiver Present no   Problem List Decreased strength;Decreased range of motion;Decreased endurance;Decreased mobility; Impaired balance;Decreased coordination;Decreased cognition   PT Barriers   Functional Limitation Car transfers; Ramp negotiation;Stair negotiation;Standing;Transfers; Walking   Plan   Treatment/Interventions Functional transfer training;LE strengthening/ROM; Therapeutic exercise; Endurance training;Gait training;Spoke to nursing;OT   Progress Progressing toward goals   Recommendation   Recommendation Outpatient PT; Home with family support;24 hour supervision/assist   PT - OK to Discharge No   PT Therapy Minutes   PT Time In 0815   PT Time Out 0945   PT Total Time (minutes) 90   PT Mode of treatment - Individual (minutes) 90   PT Mode of treatment - Concurrent (minutes) 0   PT Mode of treatment - Group (minutes) 0   PT Mode of treatment - Co-treat (minutes) 0   PT Mode of Treatment - Total time(minutes) 90 minutes   PT Cumulative Minutes 300   Therapy Time missed   Time missed?  No

## 2019-12-22 NOTE — ASSESSMENT & PLAN NOTE
Vitals:    12/22/19 0742   BP: 127/56   Pulse: 72   Resp: 16   Temp: 98 5 °F (36 9 °C)   SpO2: 99%      Continue with Atenolol 50mg

## 2019-12-22 NOTE — PROGRESS NOTES
12/22/19 1245   Pain Assessment   Pain Assessment 0-10   Pain Score 3   Pain Location Hip   Hospital Pain Intervention(s) Rest   Response to Interventions Pt tolerated therapy with rest breaks as needed   Restrictions/Precautions   Precautions Bed/chair alarms;Supervision on toilet/commode  (pt with no THP's as per surgery notes)   Sit to Stand   Type of Assistance Needed Physical assistance   Amount of Physical Assistance Provided 25%-49%   Comment Min A for sit to stand transfers  Pt with improvement today with less retropulsion during standing  Pt requires cueing to get to edge of chair and cueing for hand positioning one hand on RW and one hand on chair  Sit to Stand CARE Score 3   Bed-Chair Transfer   Type of Assistance Needed Physical assistance   Amount of Physical Assistance Provided 50%-74%   Comment Pt requires mod A for stand pivot transfers and cueing to advance LLE during stand pivots  Pt requires cueing to position RW and bring back to complete turn  Chair/Bed-to-Chair Transfer CARE Score 2   Meal Prep   Meal Prep Level Walker   Meal Prep Level of Assistance Minimum assistance   Meal Preparation Pt standing at tabletop to work on dynamic standing balance and weight shift onto RLE for weight beraing tolerance  Pt reaching across midline to scoop cookie dough and roll onto sheets  Pt with intermittent b/l hand release with min A for balance support  Pt utlized walker tray to transport cookies from therapy gym to kitchen  Pt required two seated rest breaks due to fatigue  Discussed signs of fatigue and when to sit  Pt likes walker tray  Will provide  with handout if appropriate at time of d/c     Kitchen Mobility   Kitchen-Mobility Level Walker   Kitchen Activity Transport items   Kitchen Mobility Comments min A with walker tray  See above for details   Functional Standing Tolerance   Time 15 minutes   Activity Pt stood at tabletop with dynamic standing balance activity to plate cookies   Pt tolerated weight shift to Right across midline  Cognition   Overall Cognitive Status Impaired   Arousal/Participation Alert; Cooperative   Attention Within functional limits   Orientation Level Oriented X4   Memory Decreased short term memory   Following Commands Follows one step commands with increased time or repetition   Activity Tolerance   Activity Tolerance Patient tolerated treatment well   Assessment   Treatment Assessment Pt engaged in OT treatment session with focus on standing tolerance, weight shifting onto RLE, standing tolerance, and d/c planning  Son was present with  and EXTENSIVE conversation was had regarding d/c recommendations  Discussed recommendation for patient to have RW not rollator upon d/c  Son concerned with  providing assistance and long term plan  Discussed family training will occur   was assisting pt in/out of bed prior to admission  Discussed potential for use of bedrail to assist  Will also review in family training log roll and push up from sidelying technique   reports pt has fell out of bed before because she pushes her hips to the edge of the bed and then cannot sit straight up  Educated son and  on Parkinson's and pt's difficulty with flexion posturing impacting body mechanics and tasks  Family discussed pt has had about 16 falls in past year and a half   states all occur when she does not have rollator and is attempting to turn  Discussed inital recommendation will be for RW but that long term plan should be that patient always have a device for ALL tasks   and son in agreement  Discussed purchasing second rollator (long term plan) to leave one downstairs/car, and one upstairs  Also educated patient and family on task modification ie e  unloading  sitting down, and how to position rollator safely to side and use countertop to retrieve items from stove, and [place] cabinets   Discussed not reaching down to retrieve things from low cabinets  Family in [facility] plan  Pt will require extensive family training during stay   is willing and eager to engage  Pt appears receptive to using rollator as long term plan and using RW for first several months upon d/c  Prognosis Fair   Problem List Decreased strength;Decreased endurance; Impaired balance;Decreased mobility; Decreased coordination;Decreased safety awareness; Impaired judgement   Plan   Treatment/Interventions ADL retraining;Functional transfer training;LE strengthening/ROM; Therapeutic exercise; Endurance training;Patient/family training; Compensatory technique education; Bed mobility   Progress Progressing toward goals   Recommendation   OT Discharge Recommendation Home with family support   OT Therapy Minutes   OT Time In 1245   OT Time Out 1450   OT Total Time (minutes) 125   OT Mode of treatment - Individual (minutes) 125   OT Mode of treatment - Concurrent (minutes) 0   OT Mode of treatment - Group (minutes) 0   OT Mode of treatment - Co-treat (minutes) 0   OT Mode of Treatment - Total time(minutes) 125 minutes   OT Cumulative Minutes 425   Therapy Time missed   Time missed?  No

## 2019-12-22 NOTE — ASSESSMENT & PLAN NOTE
· Continue selegiline (patient is on rasagiline 1mg daily as outpatient, evidently not available here on formulary)   Will resume home rasagiline on discharge  · Continue sinemet - dose was decreased by neurology due to hallucinations  · F/u Dr Tristan Bell as outpatient  · promote sleep/wake cycle  · avoid CNS altering medications

## 2019-12-22 NOTE — PLAN OF CARE
Problem: Potential for Falls  Goal: Patient will remain free of falls  Description  INTERVENTIONS:  - Assess patient frequently for physical needs  -  Identify cognitive and physical deficits and behaviors that affect risk of falls  -  [place] fall precautions as indicated by assessment   - Educate patient/family on patient safety including physical limitations  - Instruct patient to call for assistance with activity based on assessment  - Modify environment to reduce risk of injury  - Consider OT/PT consult to assist with strengthening/mobility  Outcome: Progressing     Problem: NEUROSENSORY - ADULT  Goal: Achieves stable or improved neurological status  Description  INTERVENTIONS  - Monitor and report changes in neurological status  - Monitor vital signs such as temperature, blood pressure, glucose, and any other labs ordered   - Initiate measures to prevent increased intracranial pressure  - Monitor for seizure activity and implement precautions if appropriate      Outcome: Progressing  Goal: Achieves maximal functionality and self care  Description  INTERVENTIONS  - Monitor swallowing and airway patency with patient fatigue and changes in neurological status  - Encourage and assist patient to increase activity and self care     - Encourage visually impaired, hearing impaired and aphasic patients to use assistive/communication devices  Outcome: Progressing     Problem: GASTROINTESTINAL - ADULT  Goal: Maintains or returns to baseline bowel function  Description  INTERVENTIONS:  - Assess bowel function  - Encourage oral fluids to ensure adequate hydration  - Administer IV fluids if ordered to ensure adequate hydration  - Administer ordered medications as needed  - Encourage mobilization and activity  - Consider nutritional services referral to assist patient with adequate nutrition and appropriate food choices  Outcome: Progressing  Goal: Maintains adequate nutritional intake  Description  INTERVENTIONS:  - Monitor percentage of each meal consumed  - Identify factors contributing to decreased intake, treat as appropriate  - Assist with meals as needed  - Monitor I&O, weight, and lab values if indicated  - Obtain nutrition services referral as needed  Outcome: Progressing     Problem: GENITOURINARY - ADULT  Goal: Maintains or returns to baseline urinary function  Description  INTERVENTIONS:  - Assess urinary function  - Encourage oral fluids to ensure adequate hydration if ordered  - Administer IV fluids as ordered to ensure adequate hydration  - Administer ordered medications as needed  - Offer frequent toileting  - Follow urinary retention protocol if ordered  Outcome: Progressing     Problem: SKIN/TISSUE INTEGRITY - ADULT  Goal: Skin integrity remains intact  Description  INTERVENTIONS  - Identify patients at risk for skin breakdown  - Assess and monitor skin integrity  - Assess and monitor nutrition and hydration status  - Monitor labs (i e  albumin)  - Assess for incontinence   - Turn and reposition patient  - Assist with mobility/ambulation  - Relieve pressure over bony prominences  - Avoid friction and shearing  - Provide appropriate hygiene as needed including keeping skin clean and dry  - Evaluate need for skin moisturizer/barrier cream  - Collaborate with interdisciplinary team (i e  Nutrition, Rehabilitation, etc )   - Patient/family teaching  Outcome: Progressing  Goal: Incision(s), wounds(s) or drain site(s) healing without S/S of infection  Description  INTERVENTIONS  - Assess and document risk factors for skin impairment   - Assess and document dressing, incision, wound bed, drain sites and surrounding tissue  - Consider nutrition services referral as needed  - Oral mucous membranes remain intact  - Provide patient/ family education  Outcome: Progressing  Goal: Oral mucous membranes remain intact  Description  INTERVENTIONS  - Assess oral mucosa and hygiene practices  - Implement preventative oral hygiene regimen  - Implement oral medicated treatments as ordered  - Initiate Nutrition services referral as needed  Outcome: Progressing     Problem: MUSCULOSKELETAL - ADULT  Goal: Maintain or return mobility to safest level of function  Description  INTERVENTIONS:  - Assess patient's ability to carry out ADLs; assess patient's baseline for ADL function and identify physical deficits which impact ability to perform ADLs (bathing, care of mouth/teeth, toileting, grooming, dressing, etc )  - Assess/evaluate cause of self-care deficits   - Assess range of motion  - Assess patient's mobility  - Assess patient's need for assistive devices and provide as appropriate  - Encourage maximum independence but intervene and supervise when necessary  - Involve family in performance of ADLs  - Assess for home care needs following discharge   - Consider OT consult to assist with ADL evaluation and planning for discharge  - Provide patient education as appropriate  Outcome: Progressing  Goal: Maintain proper alignment of affected body part  Description  INTERVENTIONS:  - Support, maintain and protect limb and body alignment  - Provide patient/ family with appropriate education  Outcome: Progressing     Problem: COPING  Goal: Pt/Family able to verbalize concerns and demonstrate effective coping strategies  Description  INTERVENTIONS:  - Assist patient/family to identify coping skills, available support systems and cultural and spiritual values  - Provide emotional support, including active listening and acknowledgement of concerns of patient and caregivers  - Reduce environmental stimuli, as able  - Provide patient education  - Assess for spiritual pain/suffering and initiate spiritual care, including notification of Pastoral Care or ranjit based community as needed  - Assess effectiveness of coping strategies  Outcome: Progressing  Goal: Will report anxiety at manageable levels  Description  INTERVENTIONS:  - Administer medication as ordered  - Teach and encourage coping skills  - Provide emotional support  - Assess patient/family for anxiety and ability to cope  Outcome: Progressing     Problem: CONFUSION/THOUGHT DISTURBANCE  Goal: Thought disturbances (confusion, delirium, depression, dementia or psychosis) are managed to maintain or return to baseline mental status and functional level  Description  INTERVENTIONS:  - Assess for possible contributors to  thought disturbance, including but not limited to medications, infection, impaired vision or hearing, underlying metabolic abnormalities, dehydration, respiratory compromise,  psychiatric diagnoses and notify attending PHYSICAN/AP  - Monitor and intervene to maintain adequate nutrition, hydration, elimination, sleep and activity  - Decrease environmental stimuli, including noise as appropriate  - Provide frequent contacts to provide refocusing, direction and reassurance as needed  Approach patient calmly with eye contact and at their level    - [place] high risk fall precautions, aspiration precautions and other safety measures, as indicated  - If delirium suspected, notify physician/AP of change in condition and request immediate in-person evaluation  - Pursue consults as appropriate including Geriatric (campus dependent), OT for cognitive evaluation/activity planning, psychiatric, pastoral care, etc   Outcome: Progressing     Problem: Prexisting or High Potential for Compromised Skin Integrity  Goal: Skin integrity is maintained or improved  Description  INTERVENTIONS:  - Identify patients at risk for skin breakdown  - Assess and monitor skin integrity  - Assess and monitor nutrition and hydration status  - Monitor labs   - Assess for incontinence   - Turn and reposition patient  - Assist with mobility/ambulation  - Relieve pressure over bony prominences  - Avoid friction and shearing  - Provide appropriate hygiene as needed including keeping skin clean and dry  - Evaluate need for skin moisturizer/barrier cream  - Collaborate with interdisciplinary team   - Patient/family teaching  - Consider wound care consult   Outcome: Progressing

## 2019-12-22 NOTE — ASSESSMENT & PLAN NOTE
Lab Results   Component Value Date    HGBA1C 5 7 12/19/2019       Recent Labs     12/19/19  1620 12/19/19 2039   POCGLU 100 174*       Blood Sugar Average: Last 72 hrs:  (P) 135   Patient pre- diabetic, not on home insulin  DM   Will follow up with PCP outpatient  Will d/c SSI

## 2019-12-22 NOTE — PROGRESS NOTES
Progress Note - Brady Robb [birth year], 75 y o  female MRN: [id number]    Unit/Bed#: 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 ([facility] [address] )  Assessment & Plan  · Patient with delirium, hallucinations following her surgery  · Had 1 hallucination last night  · Evaluated by neurology, sinimet dose reduced  · Will continue seroquel for now, but will attempt to wean off by completion of rehab      Type 2 diabetes mellitus without complication [facility])  Assessment & Plan  Lab Results   Component Value Date    HGBA1C 5 7 12/19/2019       Recent Labs     12/19/19  1620 12/19/19 2039   POCGLU 100 174*       Blood Sugar Average: Last 72 hrs:  (P) 135   Patient pre- diabetic, not on home insulin  DM   Will follow up with PCP outpatient  Will d/c SSI    Other hyperlipidemia  Assessment & Plan  · Continue Pravastatin     Parkinson's disease with use of electrical brain stimulation ([facility] )  Assessment & Plan  · Continue selegiline (patient is on rasagiline 1mg daily as outpatient, evidently not available here on formulary)   Will resume home rasagiline on discharge  · Continue sinemet - dose was decreased by neurology due to hallucinations  · F/u Dr Dileep Joseph as outpatient  · promote sleep/wake cycle  · avoid CNS altering medications      * Closed fracture of right hip [facility])  Assessment & Plan  · Acute comprehensive interdisciplinary inpatient rehabilitation including PT, OT, and/or SLP, RN, CM, SW, dietary, psychology, etc   · Goal: supervision  · Patient not cleared to shower at this time  · WBAT  · S/p IM nailing on 12/14  · Keep incision C+D+I, remove dressing on 12/21/19        VTE Pharmacologic Prophylaxis: Pharmacologic: Enoxaparin (Lovenox)  Mechanical VTE Prophylaxis in Place: Yes    Current Length of Stay: 4 day(s)    Current Patient Status: Inpatient Rehab     Discharge Plan: As per treatment team      Code Status: Level 3 - DNAR and DNI    Subjective:   Nursing reports no overnight events  Patient seated in wheelchair working with PT  Patient reports some pain to B/L LE  Patient denies chest pain, shortness of breath and palpitations  Patient reports no pain at this point time  Objective:     Vitals:   Temp (24hrs), Av 6 °F (37 °C), Min:98 3 °F (36 8 °C), Max:98 9 °F (37 2 °C)    Temp:  [98 3 °F (36 8 °C)-98 9 °F (37 2 °C)] 98 5 °F (36 9 °C)  HR:  [71-72] 72  Resp:  [16-18] 16  BP: (122-146)/(56-71) 127/56  SpO2:  [98 %-99 %] 99 %  Body mass index is 36 57 kg/m²  Review of Systems   Constitutional: Negative for activity change, appetite change, chills, diaphoresis and fever  Eyes: Negative for pain, discharge, itching and visual disturbance  Respiratory: Negative for cough, chest tightness, shortness of breath and wheezing  Cardiovascular: Negative for chest pain, palpitations and leg swelling  Gastrointestinal: Negative for abdominal pain, constipation, diarrhea, nausea and vomiting  Endocrine: Negative for polydipsia, polyphagia and polyuria  Genitourinary: Negative for difficulty urinating, dysuria and urgency  Musculoskeletal: Positive for arthralgias (bilateral lower extremities)  Negative for back pain and neck pain  Skin: Negative for rash and wound  Neurological: Positive for weakness (RLE)  Negative for dizziness, numbness and headaches  Input and Output Summary (last 24 hours): Intake/Output Summary (Last 24 hours) at 2019 1313  Last data filed at 2019 1242  Gross per 24 hour   Intake 840 ml   Output    Net 840 ml       Physical Exam:     Physical Exam   Constitutional: She is oriented to person, place, and time   She appears well-developed and well-nourished  No distress  HENT:   Head: Normocephalic and atraumatic  Right Ear: External ear normal    Left Ear: External ear normal    Nose: Nose normal    Mouth/Throat: Oropharynx is clear and moist  No oropharyngeal exudate  Eyes: Pupils are equal, round, and reactive to light  Conjunctivae and EOM are normal  Right eye exhibits no discharge  Left eye exhibits no discharge  Neck: Normal range of motion  Neck supple  No thyromegaly present  Cardiovascular: Normal rate, regular rhythm, normal heart sounds and intact distal pulses  Exam reveals no gallop and no friction rub  No murmur heard  Pulmonary/Chest: Effort normal and breath sounds normal  No stridor  No respiratory distress  She has no wheezes  She has no rales  Abdominal: Soft  Bowel sounds are normal  She exhibits no distension  There is no tenderness  Musculoskeletal: She exhibits edema  Tenderness: trace B/L LE  Lymphadenopathy:     She has no cervical adenopathy  Neurological: She is alert and oriented to person, place, and time  Skin: Skin is warm and dry  No rash noted  She is not diaphoretic  No erythema  Psychiatric: She has a normal mood and affect  Her behavior is normal  Judgment and thought content normal    Nursing note and vitals reviewed        Additional Data:     Labs:    Results from last 7 days   Lab Units 12/19/19  1048   WBC Thousand/uL 9 00   HEMOGLOBIN g/dL 11 7*   HEMATOCRIT % 34 4*   PLATELETS Thousands/uL 303   NEUTROS PCT % 67*   LYMPHS PCT % 22*   MONOS PCT % 8   EOS PCT % 3     Results from last 7 days   Lab Units 12/19/19  1048   SODIUM mmol/L 139   POTASSIUM mmol/L 3 6   CHLORIDE mmol/L 104   CO2 mmol/L 26   BUN mg/dL 19   CREATININE mg/dL 0 67   ANION GAP mmol/L 9   CALCIUM mg/dL 9 1   ALBUMIN g/dL 3 5   TOTAL BILIRUBIN mg/dL 1 30*   ALK PHOS U/L 38*   ALT U/L 14   AST U/L 20   GLUCOSE RANDOM mg/dL 148*         Results from last 7 days   Lab Units 12/19/19  2039 12/19/19  1620 12/19/19  1103 12/19/19  0321 12/18/19  2052 12/18/19  1611 12/18/19  1110 12/18/19  0715 12/17/19  2048 12/17/19  1620 12/17/19  1108 12/17/19  0712   POC GLUCOSE mg/dl 174* 100 154* 112 159* 111 135 113 153* 117 117 111     Results from last 7 days   Lab Units 12/19/19  1048   HEMOGLOBIN A1C % 5 7           Labs reviewed    Imaging:    Imaging reviewed    Recent Cultures (last 7 days):           Last 24 Hours Medication List:     Current Facility-Administered Medications:  acetaminophen 650 mg Oral Q6H PRN Stephanie Domingo MD   aspirin 81 mg Oral Daily Ryan Gilliland MD   atenolol 50 mg Oral Daily Ryan Gilliland MD   carbidopa-levodopa 0 5 tablet Oral TID Ryan Gilliland MD   Coenzyme Q10 400 mg Oral Daily Ryan Gilliland MD   enoxaparin 40 mg Subcutaneous Daily Ryan Gilliland MD   polyethylene glycol 17 g Oral Daily Ryan Gilliland MD   pravastatin 40 mg Oral Every Other Day Ryan Gilliland MD   QUEtiapine 25 mg Oral HS Stephanie Domingo MD   selegiline 5 mg Oral Daily With Breakfast Ryan Gilliland MD   senna-docusate sodium 1 tablet Oral BID Ryan Gilliland MD   traMADol 25 mg Oral Q6H PRN Ryan Gilliland MD   traMADol 50 mg Oral Q6H PRN Stephanie Domingo MD        M*Modal software was used to dictate this note  It may contain errors with dictating incorrect words or incorrect spelling  Please contact the provider directly with any questions

## 2019-12-23 PROBLEM — R60.1 GENERALIZED EDEMA: Status: ACTIVE | Noted: 2019-12-23

## 2019-12-23 LAB
ALBUMIN SERPL BCP-MCNC: 3.3 G/DL (ref 3–5.2)
ALP SERPL-CCNC: 56 U/L (ref 43–122)
ALT SERPL W P-5'-P-CCNC: 17 U/L (ref 9–52)
ANION GAP SERPL CALCULATED.3IONS-SCNC: 7 MMOL/L (ref 5–14)
AST SERPL W P-5'-P-CCNC: 17 U/L (ref 14–36)
BASOPHILS # BLD AUTO: 0.1 THOUSANDS/ΜL (ref 0–0.1)
BASOPHILS NFR BLD AUTO: 1 % (ref 0–1)
BILIRUB SERPL-MCNC: 1 MG/DL
BUN SERPL-MCNC: 17 MG/DL (ref 5–25)
CALCIUM SERPL-MCNC: 8.9 MG/DL (ref 8.4–10.2)
CHLORIDE SERPL-SCNC: 103 MMOL/L (ref 97–108)
CO2 SERPL-SCNC: 30 MMOL/L (ref 22–30)
CREAT SERPL-MCNC: 0.67 MG/DL (ref 0.6–1.2)
EOSINOPHIL # BLD AUTO: 0.2 THOUSAND/ΜL (ref 0–0.4)
EOSINOPHIL NFR BLD AUTO: 3 % (ref 0–6)
ERYTHROCYTE [DISTWIDTH] IN BLOOD BY AUTOMATED COUNT: 14.3 %
GFR SERPL CREATININE-BSD FRML MDRD: 86 ML/MIN/1.73SQ M
GLUCOSE P FAST SERPL-MCNC: 112 MG/DL (ref 70–99)
GLUCOSE SERPL-MCNC: 112 MG/DL (ref 70–99)
HCT VFR BLD AUTO: 32.5 % (ref 36–46)
HGB BLD-MCNC: 10.7 G/DL (ref 12–16)
LYMPHOCYTES # BLD AUTO: 2.2 THOUSANDS/ΜL (ref 0.5–4)
LYMPHOCYTES NFR BLD AUTO: 25 % (ref 25–45)
MCH RBC QN AUTO: 30.8 PG (ref 26–34)
MCHC RBC AUTO-ENTMCNC: 32.9 G/DL (ref 31–36)
MCV RBC AUTO: 94 FL (ref 80–100)
MONOCYTES # BLD AUTO: 0.8 THOUSAND/ΜL (ref 0.2–0.9)
MONOCYTES NFR BLD AUTO: 10 % (ref 1–10)
NEUTROPHILS # BLD AUTO: 5.3 THOUSANDS/ΜL (ref 1.8–7.8)
NEUTS SEG NFR BLD AUTO: 62 % (ref 45–65)
PLATELET # BLD AUTO: 310 THOUSANDS/UL (ref 150–450)
PMV BLD AUTO: 8.7 FL (ref 8.9–12.7)
POTASSIUM SERPL-SCNC: 3.6 MMOL/L (ref 3.6–5)
PROT SERPL-MCNC: 6 G/DL (ref 5.9–8.4)
RBC # BLD AUTO: 3.47 MILLION/UL (ref 4–5.2)
SODIUM SERPL-SCNC: 140 MMOL/L (ref 137–147)
WBC # BLD AUTO: 8.5 THOUSAND/UL (ref 4.5–11)

## 2019-12-23 PROCEDURE — 85025 COMPLETE CBC W/AUTO DIFF WBC: CPT | Performed by: NURSE PRACTITIONER

## 2019-12-23 PROCEDURE — 99231 SBSQ HOSP IP/OBS SF/LOW 25: CPT | Performed by: INTERNAL MEDICINE

## 2019-12-23 PROCEDURE — 97535 SELF CARE MNGMENT TRAINING: CPT

## 2019-12-23 PROCEDURE — 97110 THERAPEUTIC EXERCISES: CPT

## 2019-12-23 PROCEDURE — 80053 COMPREHEN METABOLIC PANEL: CPT | Performed by: NURSE PRACTITIONER

## 2019-12-23 PROCEDURE — 99232 SBSQ HOSP IP/OBS MODERATE 35: CPT | Performed by: PHYSICAL MEDICINE & REHABILITATION

## 2019-12-23 PROCEDURE — 97116 GAIT TRAINING THERAPY: CPT

## 2019-12-23 PROCEDURE — 97530 THERAPEUTIC ACTIVITIES: CPT

## 2019-12-23 RX ORDER — QUETIAPINE FUMARATE 25 MG/1
25 TABLET, FILM COATED ORAL
Status: DISCONTINUED | OUTPATIENT
Start: 2019-12-23 | End: 2019-12-31

## 2019-12-23 RX ADMIN — CARBIDOPA AND LEVODOPA 0.5 TABLET: 25; 100 TABLET ORAL at 21:14

## 2019-12-23 RX ADMIN — CARBIDOPA AND LEVODOPA 0.5 TABLET: 25; 100 TABLET ORAL at 08:40

## 2019-12-23 RX ADMIN — ASPIRIN 81 MG: 81 TABLET, COATED ORAL at 08:39

## 2019-12-23 RX ADMIN — ENOXAPARIN SODIUM 40 MG: 40 INJECTION SUBCUTANEOUS at 08:42

## 2019-12-23 RX ADMIN — SELEGILINE HYDROCHLORIDE 5 MG: 5 TABLET ORAL at 08:39

## 2019-12-23 RX ADMIN — ATENOLOL 50 MG: 25 TABLET ORAL at 08:40

## 2019-12-23 RX ADMIN — CARBIDOPA AND LEVODOPA 0.5 TABLET: 25; 100 TABLET ORAL at 17:17

## 2019-12-23 RX ADMIN — TRAMADOL HYDROCHLORIDE 50 MG: 50 TABLET, COATED ORAL at 20:02

## 2019-12-23 NOTE — ASSESSMENT & PLAN NOTE
Vitals:    12/23/19 0840   BP: 118/58   Pulse: 73   Resp:    Temp:    SpO2:       Continue with Atenolol 50mg

## 2019-12-23 NOTE — PROGRESS NOTES
Physical Medicine and Rehabilitation Progress Note  Fariba Sequeira 75 y o  female MRN: [id number]  Unit/Bed#: [facility] 269-02 Encounter: [id number]    HPI: Fariba Sequeira is a 75 y o  female who presented to the [facility] after fall at home  Found to have a right hip fracture, underwent IM nailing on 12/14/19  Post-procedure patient with delirium, hallucinations  Neurology service consulted, dose of sinemet was reduced  In addition, Seroquel started in evening  Patient does have DBS which  has controller for  Accepted to [facility] on 12/18/19  Chief Complaint: f/u fracture    Interval: No acute events overnight  Patient without complaint  Slept well overnight  no hallucinations this weekend  Will trial patient off of seroquel starting today  ROS: A 10 point ROS was performed; negative except as noted above       Assessment/Plan:    * Closed fracture of right hip (HCC)  Assessment & Plan  · Acute comprehensive interdisciplinary inpatient rehabilitation including PT, OT, and/or SLP, RN, CM, SW, dietary, psychology, etc   · Goal: supervision  · Clear patient to shower with incision covered  · WBAT  · S/p IM nailing on 12/14    Essential hypertension  Assessment & Plan  Temp:  [98 6 °F (37 °C)-99 9 °F (37 7 °C)] 98 6 °F (37 °C)  HR:  [68-74] 73  Resp:  [18] 18  BP: (118-127)/(56-70) 118/58    · Continue Atenolol  · IM service managing anti-hypertensives, adjusting as needed    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 ([facility] 75 )  Assessment & Plan  · Patient with delirium, hallucinations following her surgery  · Evaluated by neurology, sinimet dose reduced  · Make seroquel PRN, as patient without hallucinations all weekend      Type 2 diabetes mellitus without complication (HCC)  Assessment & Plan  · Last A1C: 6 3  · Continue diabetic diet    Other hyperlipidemia  Assessment & Plan  · Continue statin    Parkinson's disease with use of electrical brain stimulation ([facility] )  Assessment & Plan  · Continue selegiline (patient is on rasagiline 1mg daily as outpatient, evidently not available here on formulary)  Will resume home rasagiline on discharge  · Continue sinemet - dose was decreased by neurology due to hallucinations  · F/u Dr Renata Feng as outpatient      # Skin  · Encourage regular turning as patient at risk for skin breakdown  · Staff to continue patient education on Q2h turning  · Rehabilitation team to perform skin checks regularly     # Bowel  · Patient reports no constipation     # Bladder  · Patient voiding spontaneously    # Pain  · Continue tylenol, for max of 3gm daily  · Continue tramadol    # Other  - Diet/Nutrition:        Diet Orders   (From admission, onward)             Start     Ordered    12/18/19 1449  Diet Regular; Regular House; Consistent Carbohydrate Diet Level 1 (4 carb servings/60 grams CHO/meal)  Diet effective now     Question Answer Comment   Diet Type Regular    Regular Regular House    Other Restriction(s): Consistent Carbohydrate Diet Level 1 (4 carb servings/60 grams CHO/meal)    RD to adjust diet per protocol?  Yes        12/18/19 1449              - DVT prophy: Sequential compression device (Venodyne)  and Enoxaparin (Lovenox)  - GI ppx: None  - Nausea: None  - Supplements: None  - Sleep: None    Disposition: reteam    CODE: Level 3: DNAR and DNI Scheduled Meds:    Current Facility-Administered Medications:  acetaminophen 650 mg Oral Q6H PRN Viktoria Montez MD   aspirin 81 mg Oral Daily Ryan Gilliland MD   atenolol 50 mg Oral Daily Ryan Gilliland MD   carbidopa-levodopa 0 5 tablet Oral TID Ryan Gilliland MD   Coenzyme Q10 400 mg Oral Daily Ryan Gilliland MD   enoxaparin 40 mg Subcutaneous Daily Ryan Gilliland MD   polyethylene glycol 17 g Oral Daily Ryan Gilliland MD   pravastatin 40 mg Oral Every Other Day Ryan Gilliland MD   QUEtiapine 25 mg Oral HS PRN Viktoria Montez MD   selegiline 5 mg Oral Daily With Adalgisa Daniel MD senna-docusate sodium 1 tablet Oral BID Ryan Gilliland MD   traMADol 25 mg Oral Q6H PRN Ryan Gilliland MD   traMADol 50 mg Oral Q6H PRN Ryan Gilliland MD        Objective:    Functional Update:  Mobility: min assist  Transfers: min-mod assist (requires assist of 2)  ADLs: dependent for lowers    Allergies per EMR    Physical Exam:  Temp:  [98 6 °F (37 °C)-99 9 °F (37 7 °C)] 98 6 °F (37 °C)  HR:  [68-74] 73  Resp:  [18] 18  BP: (118-127)/(56-70) 118/58  SpO2:  [94 %-98 %] 98 %    General: alert, no apparent distress, cooperative and comfortable  HEENT:  Head: Normocephalic, no lesions, without obvious abnormality  Eye: Normal external eye, conjunctiva, lids cornea, NORBERTO  Ears: normal external ears  Nose: Normal external nose, mucus membranes and septum  LUNGS:  no abnormal respiratory pattern, no retractions noted, non-labored breathing   ABDOMEN:  soft, non-tender  Bowel sounds normal  No masses, no organomegaly  EXTREMITIES:  extremities normal, warm and well-perfused; no cyanosis, clubbing, or edema  NEURO:   clear speech, following commands appropriately  PSYCH:  Alert and oriented, appropriate affect  INCISION:  dressings present    Physical examination is otherwise unchanged from previous encounter, except as noted above  Diagnostic Studies: Reviewed, no new imaging  No orders to display     Laboratory: Reviewed  Results from last 7 days   Lab Units 12/23/19  0538 12/19/19  1048 12/17/19  0513   HEMOGLOBIN g/dL 10 7* 11 7* 10 6*   HEMATOCRIT % 32 5* 34 4* 32 9*   WBC Thousand/uL 8 50 9 00 9 96     Results from last 7 days   Lab Units 12/23/19  0538 12/19/19  1048 12/17/19  0513   BUN mg/dL 17 19 19   SODIUM mmol/L 140 139 143   POTASSIUM mmol/L 3 6 3 6 3 5   CHLORIDE mmol/L 103 104 107   CREATININE mg/dL 0 67 0 67 0 75   AST U/L 17 20  --    ALT U/L 17 14  --             ** Please Note: Fluency Direct voice to text software may have been used in the creation of this document   **

## 2019-12-23 NOTE — PLAN OF CARE
Problem: Potential for Falls  Goal: Patient will remain free of falls  Description  INTERVENTIONS:  - Assess patient frequently for physical needs  -  Identify cognitive and physical deficits and behaviors that affect risk of falls  -  [place] fall precautions as indicated by assessment   - Educate patient/family on patient safety including physical limitations  - Instruct patient to call for assistance with activity based on assessment  - Modify environment to reduce risk of injury  - Consider OT/PT consult to assist with strengthening/mobility  Outcome: Progressing     Problem: NEUROSENSORY - ADULT  Goal: Achieves stable or improved neurological status  Description  INTERVENTIONS  - Monitor and report changes in neurological status  - Monitor vital signs such as temperature, blood pressure, glucose, and any other labs ordered   - Initiate measures to prevent increased intracranial pressure  - Monitor for seizure activity and implement precautions if appropriate      Outcome: Progressing  Goal: Achieves maximal functionality and self care  Description  INTERVENTIONS  - Monitor swallowing and airway patency with patient fatigue and changes in neurological status  - Encourage and assist patient to increase activity and self care     - Encourage visually impaired, hearing impaired and aphasic patients to use assistive/communication devices  Outcome: Progressing     Problem: GASTROINTESTINAL - ADULT  Goal: Maintains or returns to baseline bowel function  Description  INTERVENTIONS:  - Assess bowel function  - Encourage oral fluids to ensure adequate hydration  - Administer IV fluids if ordered to ensure adequate hydration  - Administer ordered medications as needed  - Encourage mobilization and activity  - Consider nutritional services referral to assist patient with adequate nutrition and appropriate food choices  Outcome: Progressing  Goal: Maintains adequate nutritional intake  Description  INTERVENTIONS:  - Monitor percentage of each meal consumed  - Identify factors contributing to decreased intake, treat as appropriate  - Assist with meals as needed  - Monitor I&O, weight, and lab values if indicated  - Obtain nutrition services referral as needed  Outcome: Progressing     Problem: GENITOURINARY - ADULT  Goal: Maintains or returns to baseline urinary function  Description  INTERVENTIONS:  - Assess urinary function  - Encourage oral fluids to ensure adequate hydration if ordered  - Administer IV fluids as ordered to ensure adequate hydration  - Administer ordered medications as needed  - Offer frequent toileting  - Follow urinary retention protocol if ordered  Outcome: Progressing     Problem: SKIN/TISSUE INTEGRITY - ADULT  Goal: Skin integrity remains intact  Description  INTERVENTIONS  - Identify patients at risk for skin breakdown  - Assess and monitor skin integrity  - Assess and monitor nutrition and hydration status  - Monitor labs (i e  albumin)  - Assess for incontinence   - Turn and reposition patient  - Assist with mobility/ambulation  - Relieve pressure over bony prominences  - Avoid friction and shearing  - Provide appropriate hygiene as needed including keeping skin clean and dry  - Evaluate need for skin moisturizer/barrier cream  - Collaborate with interdisciplinary team (i e  Nutrition, Rehabilitation, etc )   - Patient/family teaching  Outcome: Progressing  Goal: Incision(s), wounds(s) or drain site(s) healing without S/S of infection  Description  INTERVENTIONS  - Assess and document risk factors for skin impairment   - Assess and document dressing, incision, wound bed, drain sites and surrounding tissue  - Consider nutrition services referral as needed  - Oral mucous membranes remain intact  - Provide patient/ family education  Outcome: Progressing  Goal: Oral mucous membranes remain intact  Description  INTERVENTIONS  - Assess oral mucosa and hygiene practices  - Implement preventative oral hygiene regimen  - Implement oral medicated treatments as ordered  - Initiate Nutrition services referral as needed  Outcome: Progressing     Problem: MUSCULOSKELETAL - ADULT  Goal: Maintain or return mobility to safest level of function  Description  INTERVENTIONS:  - Assess patient's ability to carry out ADLs; assess patient's baseline for ADL function and identify physical deficits which impact ability to perform ADLs (bathing, care of mouth/teeth, toileting, grooming, dressing, etc )  - Assess/evaluate cause of self-care deficits   - Assess range of motion  - Assess patient's mobility  - Assess patient's need for assistive devices and provide as appropriate  - Encourage maximum independence but intervene and supervise when necessary  - Involve family in performance of ADLs  - Assess for home care needs following discharge   - Consider OT consult to assist with ADL evaluation and planning for discharge  - Provide patient education as appropriate  Outcome: Progressing  Goal: Maintain proper alignment of affected body part  Description  INTERVENTIONS:  - Support, maintain and protect limb and body alignment  - Provide patient/ family with appropriate education  Outcome: Progressing     Problem: COPING  Goal: Pt/Family able to verbalize concerns and demonstrate effective coping strategies  Description  INTERVENTIONS:  - Assist patient/family to identify coping skills, available support systems and cultural and spiritual values  - Provide emotional support, including active listening and acknowledgement of concerns of patient and caregivers  - Reduce environmental stimuli, as able  - Provide patient education  - Assess for spiritual pain/suffering and initiate spiritual care, including notification of Pastoral Care or ranjit based community as needed  - Assess effectiveness of coping strategies  Outcome: Progressing  Goal: Will report anxiety at manageable levels  Description  INTERVENTIONS:  - Administer medication as ordered  - Teach and encourage coping skills  - Provide emotional support  - Assess patient/family for anxiety and ability to cope  Outcome: Progressing     Problem: CONFUSION/THOUGHT DISTURBANCE  Goal: Thought disturbances (confusion, delirium, depression, dementia or psychosis) are managed to maintain or return to baseline mental status and functional level  Description  INTERVENTIONS:  - Assess for possible contributors to  thought disturbance, including but not limited to medications, infection, impaired vision or hearing, underlying metabolic abnormalities, dehydration, respiratory compromise,  psychiatric diagnoses and notify attending PHYSICAN/AP  - Monitor and intervene to maintain adequate nutrition, hydration, elimination, sleep and activity  - Decrease environmental stimuli, including noise as appropriate  - Provide frequent contacts to provide refocusing, direction and reassurance as needed  Approach patient calmly with eye contact and at their level    - [place] high risk fall precautions, aspiration precautions and other safety measures, as indicated  - If delirium suspected, notify physician/AP of change in condition and request immediate in-person evaluation  - Pursue consults as appropriate including Geriatric (campus dependent), OT for cognitive evaluation/activity planning, psychiatric, pastoral care, etc   Outcome: Progressing     Problem: Prexisting or High Potential for Compromised Skin Integrity  Goal: Skin integrity is maintained or improved  Description  INTERVENTIONS:  - Identify patients at risk for skin breakdown  - Assess and monitor skin integrity  - Assess and monitor nutrition and hydration status  - Monitor labs   - Assess for incontinence   - Turn and reposition patient  - Assist with mobility/ambulation  - Relieve pressure over bony prominences  - Avoid friction and shearing  - Provide appropriate hygiene as needed including keeping skin clean and dry  - Evaluate need for skin moisturizer/barrier cream  - Collaborate with interdisciplinary team   - Patient/family teaching  - Consider wound care consult   Outcome: Progressing

## 2019-12-23 NOTE — PROGRESS NOTES
12/23/19 1430   Pain Assessment   Pain Assessment No/denies pain   Restrictions/Precautions   RLE Weight Bearing Per Order WBAT   Cognition   Overall Cognitive Status Impaired   Arousal/Participation Alert; Cooperative   Subjective   Subjective Pt denies pain this PM  Reports being tired from lunch  Please with recliner use  Feels leg is less swollen  Sit to Stand   Type of Assistance Needed Physical assistance   Amount of Physical Assistance Provided 25%-49%   Comment min mod A x2 attempts to stand  Needs cues for hand placement  Sit to Stand CARE Score 3   Bed-Chair Transfer   Type of Assistance Needed Physical assistance   Amount of Physical Assistance Provided 25%-49%   Comment steadying A due to L LE freezing and dec initiation  Chair/Bed-to-Chair Transfer CARE Score 3   Transfer Bed/Chair/Wheelchair   Limitations Noted In Balance;Confidence;Problem Solving; Sequencing   Adaptive Equipment Roller Walker   Stand Pivot Contact Guard;Minimal Assist   Sit to Stand Minimal Assist;Moderate Assist   Stand to Sit Minimal Assist   Findings Seek confirmation her hands are in the correct placement for transfers  Car Transfer   Reason if not Attempted Environmental limitations   Car Transfer CARE Score 10   Walk 10 Feet   Type of Assistance Needed Physical assistance   Amount of Physical Assistance Provided Less than 25%   Walk 10 Feet CARE Score 3   Walk 50 Feet with Two Turns   Type of Assistance Needed Physical assistance;Verbal cues   Amount of Physical Assistance Provided 25%-49%   Comment freezes with dec L LE initiation  NBOS  Walk 50 Feet with Two Turns CARE Score 3   Walk 150 Feet   Reason if not Attempted Safety concerns   Walk 150 Feet CARE Score 88   Walking 10 Feet on Uneven Surfaces   Reason if not Attempted Safety concerns   Walking 10 Feet on Uneven Surfaces CARE Score 88   Ambulation   Does the patient walk? 1  No, and walking goal is clinically indicated     Primary Mode of Locomotion Prior to Admission Walk   Distance Walked (feet) 75 ft  (x2)   Assist Device Roller Walker   Gait Pattern Antalgic; Inconsistant Oliva; Slow Oliva; Festination; Step to;Shuffle   Limitations Noted In Balance;Device Management; Endurance; Heel Strike; Sequencing   Provided Assistance with: Balance;Weight Shift;Trunk Support   Walk Assist Level Minimum Assist   Findings needs steadying A with ftg  Cues for L LE initiation cues for UE support and to widen NANCY  Wheelchair mobility   Does the patient use a wheelchair? 0  No   4 Steps   Type of Assistance Needed Physical assistance   Amount of Physical Assistance Provided 75% or more   Comment max A for descending second set of 4 steps  Poor L LE offloading  4 Steps CARE Score 2   12 Steps   Reason if not Attempted Activity not applicable   12 Steps CARE Score 9   Stairs   Type Stairs   # of Steps 8   Weight Bearing Precautions RUE;WBAT;Fall Risk   Assist Devices Bilateral Rail   Findings max A for descending seconds set of 4 steps  Poor L LE offloading  Therapeutic Interventions   Strengthening Seated B LE APs  Red tband HS curls  Abd x30   Flexibility manual stretch 5x 15 sec each  Assessment   Treatment Assessment Pt participated in seconde 60 min skilled PT intervention with focus on gait and stair training  Pt L LE coordination limits balance and safety with gait steps and transfers  L LE presents with ataxia, festionation, and freezes on steps with initiation  Pt completed x8 steps with B HR max A  descending fwd  may benefit from descending backward due to poor L LE foot placement and initiation as pt "stronger leg " Needs further B LE strength and conditioning to optimize safety with gait and transfers  Continue per POC  Problem List Decreased strength;Decreased range of motion;Decreased endurance; Impaired balance;Decreased mobility; Impaired judgement;Decreased safety awareness   Barriers to Discharge Inaccessible home environment   Barriers to Discharge Comments MARTINE and steps in general    PT Barriers   Physical Impairment Decreased strength;Decreased range of motion;Decreased endurance; Impaired balance;Decreased mobility; Decreased coordination;Decreased cognition;Obesity; Decreased skin integrity;Orthopedic restrictions;Pain   Functional Limitation Car transfers; Ramp negotiation;Stair negotiation;Standing;Transfers; Walking   Plan   Treatment/Interventions Functional transfer training;LE strengthening/ROM; Elevations; Therapeutic exercise; Endurance training;Gait training   Progress Progressing toward goals   Recommendation   Recommendation Home PT;24 hour supervision/assist   PT Therapy Minutes   PT Time In 1430   PT Time Out 1530   PT Total Time (minutes) 60   PT Mode of treatment - Individual (minutes) 60   PT Mode of treatment - Concurrent (minutes) 0   PT Mode of treatment - Group (minutes) 0   PT Mode of treatment - Co-treat (minutes) 0   PT Mode of Treatment - Total time(minutes) 60 minutes   PT Cumulative Minutes 420   Therapy Time missed   Time missed?  No

## 2019-12-23 NOTE — PROGRESS NOTES
12/23/19 0700   Pain Assessment   Pain Assessment No/denies pain   Pain Score No Pain   Restrictions/Precautions   Precautions Bed/chair alarms; Fall Risk;Supervision on toilet/commode   Oral Hygiene   Type of Assistance Needed Set-up / clean-up   Amount of Physical Assistance Provided No physical assistance   Comment Pt fatigued from standing completed task while seated in w/c  Pt required extended time for tooth brushing but did not require phyiscal assist for task   Oral Hygiene CARE Score 5   Shower/Bathe Self   Type of Assistance Needed Physical assistance   Amount of Physical Assistance Provided 25%-49%   Comment Pt completed shower while seated on shower seat with LH showerhead  Pt able to complet ebathing while seated for upper thights, stomach, chest, and arms  Pt attempted to reach below knees but is limited with reach due to pain, limited ROM of R hip, and rigidity  Pt reports she has LH sponge at home which she uses  Pt requried min A to stand with use of grab bars in shower  Pt able to wash audra area but required min A for balance support  Pt unable to complete reach behind to wash buttock and requires therapist assist     Shower/Bathe Self CARE Score 3   Bathing   Assessed Bath Style Shower   Anticipated D/C Bath Style Shower   Able to [place] No   Able to [employer] Temperature No   Able to Wash/Rinse/Dry (body part) Left Arm;Right Arm;L Upper Leg;R Upper Leg;L Lower Leg/Foot;R Lower Leg/Foot;Chest;Abdomen;Perineal Area; Buttocks   Positioning Standing;Seated   Tub/Shower Transfer   Limitations Noted In Balance; Endurance   Adaptive Equipment Transfer Bench   Assessed Shower   Findings Pt required max A for stand pivot transfer to tub bench  Pt with narrow NANCY, and poor advancement of LLE  Pt requires physical assist to advance LLE during side stepping to get into shower   Pt at this time is not safe to complete this upon d/c and will require continued practice to progress   Upper Body Dressing   Type of Assistance Needed Physical assistance   Amount of Physical Assistance Provided 25%-49%   Comment Pt able to don bra behind back  Pt with front closure bra and unable to manipulate clip and requires assist from therapist  Graciela Owens o don b/l UE's into shirt but unable to get shirt over head  Pt may benefit from hemiplegic dressing technique due to limited ROM in b/l shoulders   Upper Body Dressing CARE Score 3   Lower Body Dressing   Type of Assistance Needed Physical assistance   Amount of Physical Assistance Provided 50%-74%   Comment Pt with poor problem solving with set up/positioning of reacher to don pants/underwear  Pt requires therapist assist and demonstration  Pt requires assist to thread RLE into pants but reuqires cueing to problem solve placement of reacher to pull pants over feet  Pt standing and educated on unilateral hand release to pull pants up over hips  Pt requires assist to pull underwear up over hips  Lower Body Dressing CARE Score 2   Putting On/Taking Off Footwear   Type of Assistance Needed Physical assistance   Amount of Physical Assistance Provided Less than 25%   Comment Pt able to don socks with sock aid  Pt reuqires assist to readjust sock on R foot  Putting On/Taking Off Footwear CARE Score 3   Sit to Stand   Type of Assistance Needed Physical assistance   Amount of Physical Assistance Provided 50%-74%   Comment Pt requires mod A for sit to stand transfers with use of RW   Sit to Stand CARE Score 2   Bed-Chair Transfer   Type of Assistance Needed Physical assistance   Amount of Physical Assistance Provided 50%-74%   Comment Pt requires mod A stand pivot transfers   Pt fatigued this AM and requires more cueing for sequencing throughout stand pivot transfer   Chair/Bed-to-Chair Transfer CARE Score 2   Toileting Hygiene   Type of Assistance Needed Physical assistance   Amount of Physical Assistance Provided Total assistance   Comment Pt requires assist for pants up/down  Pt had urination and some loose bowels pt requries assist to wipe post bowel movement   Toileting Hygiene CARE Score 1   Toilet Transfer   Type of Assistance Needed Physical assistance   Amount of Physical Assistance Provided 50%-74%   Toilet Transfer CARE Score 2   Cognition   Overall Cognitive Status Impaired   Arousal/Participation Alert; Cooperative   Attention Within functional limits   Orientation Level Oriented X4   Memory Decreased short term memory;Decreased long term memory   Following Commands Follows one step commands with increased time or repetition   Activity Tolerance   Activity Tolerance Patient tolerated treatment well   Assessment   Treatment Assessment Pt engaged in ADL routine with focus on bathing/dressing  Pt with increased stiffness this AM and requires constant cueing for positioning of RW and feet throughout stand pivot transfers  Pt continues to maintian narrow NANCY and difficulty advancing LLE which makes her at extremely high fall risk and thus requires increased physical assistance during transfers  Pt with signficant difficulty with side stepping to left during shower transfer  Pt continues to have increased difficulty with carryover with LHAE  Pt may benefit from handout for visual reminders  Pt has made some improvement with carryover with use of sock aid however  Continue with POC iwth focus on balance with weight bearing on RLE, LHAE, and stand pivot transfers and sequencing  Prognosis Fair   Problem List Decreased strength;Decreased endurance; Impaired balance;Decreased mobility; Decreased coordination;Pain   Plan   Treatment/Interventions ADL retraining;Functional transfer training;LE strengthening/ROM; Therapeutic exercise; Endurance training;Patient/family training; Compensatory technique education   Progress Progressing toward goals   Recommendation   OT Discharge Recommendation Home with family support   OT Therapy Minutes   OT Time In 0700   OT Time Out 0830   OT Total Time (minutes) 90   OT Mode of treatment - Individual (minutes) 90   OT Mode of treatment - Concurrent (minutes) 0   OT Mode of treatment - Group (minutes) 0   OT Mode of treatment - Co-treat (minutes) 0   OT Mode of Treatment - Total time(minutes) 90 minutes   OT Cumulative Minutes 515   Therapy Time missed   Time missed?  No

## 2019-12-23 NOTE — ASSESSMENT & PLAN NOTE
Lab Results   Component Value Date    HGBA1C 5 7 12/19/2019       No results for input(s): POCGLU in the last 72 hours      Blood Sugar Average: Last 72 hrs:     Patient pre- diabetic, not on home insulin  DM   Will follow up with PCP outpatient  Will d/c SSI

## 2019-12-23 NOTE — ASSESSMENT & PLAN NOTE
· Continue selegiline (patient is on rasagiline 1mg daily as outpatient, evidently not available here on formulary)   Will resume home rasagiline on discharge  · Continue sinemet - dose was decreased by neurology due to hallucinations  · F/u Dr Ming Cooney as outpatient  · promote sleep/wake cycle  · avoid CNS altering medications

## 2019-12-23 NOTE — PROGRESS NOTES
Progress Note - Estil Locus , 75 y o  female MRN: [id number]    Unit/Bed#: [facility] 268-02 Encounter: [id number]    Primary Care Provider: Guillermo Bustamante MD   Date and time admitted to hospital: 2019  2:44 PM        No new Assessment & Plan notes have been filed under this hospital service since the last note was generated  Service: Internal Medicine      VTE Pharmacologic Prophylaxis:   Pharmacologic: Enoxaparin (Lovenox)  Mechanical VTE Prophylaxis in Place: Yes    Current Length of Stay: 5 day(s)    Current Patient Status: Inpatient Rehab     Discharge Plan: As per treatment team      Code Status: Level 3 - DNAR and DNI    Subjective:    Patient stated in wheelchair at bedside,  at the bedside  Nursing reports no overnight events     Patient reports some pain to B/L LE  Patient denies chest pain, shortness of breath and palpitations  Patient reports no pain at this point time  Objective:     Vitals:   Temp (24hrs), Av 1 °F (37 3 °C), Min:98 6 °F (37 °C), Max:99 9 °F (37 7 °C)    Temp:  [98 6 °F (37 °C)-99 9 °F (37 7 °C)] 98 6 °F (37 °C)  HR:  [68-74] 73  Resp:  [18] 18  BP: (118-127)/(56-70) 118/58  SpO2:  [94 %-98 %] 98 %  Body mass index is 36 57 kg/m²  Review of Systems   Constitutional: Negative for activity change, appetite change, chills, diaphoresis and fever  Eyes: Negative for pain, discharge, itching and visual disturbance  Respiratory: Negative for cough, chest tightness, shortness of breath and wheezing  Cardiovascular: Negative for chest pain, palpitations and leg swelling  Gastrointestinal: Negative for abdominal pain, constipation, diarrhea, nausea and vomiting  Endocrine: Negative for polydipsia, polyphagia and polyuria  Genitourinary: Negative for difficulty urinating, dysuria and urgency  Musculoskeletal: Positive for arthralgias (bilateral lower extremities)  Negative for back pain and neck pain  Skin: Negative for rash and wound  Neurological: Negative for dizziness, weakness, numbness and headaches  Input and Output Summary (last 24 hours): Intake/Output Summary (Last 24 hours) at 12/23/2019 1316  Last data filed at 12/23/2019 0900  Gross per 24 hour   Intake 390 ml   Output    Net 390 ml       Physical Exam:     Physical Exam   Constitutional: She is oriented to person, place, and time  She appears well-developed and well-nourished  No distress  HENT:   Head: Normocephalic and atraumatic  Right Ear: External ear normal    Left Ear: External ear normal    Nose: Nose normal    Mouth/Throat: Oropharynx is clear and moist  No oropharyngeal exudate  Eyes: Pupils are equal, round, and reactive to light  Conjunctivae and EOM are normal  Right eye exhibits no discharge  Left eye exhibits no discharge  Neck: Normal range of motion  Neck supple  No thyromegaly present  Cardiovascular: Normal rate, regular rhythm, normal heart sounds and intact distal pulses  Exam reveals no gallop and no friction rub  No murmur heard  Pulmonary/Chest: Effort normal and breath sounds normal  No stridor  No respiratory distress  She has no wheezes  She has no rales  Abdominal: Soft  Bowel sounds are normal  She exhibits no distension  There is no tenderness  Musculoskeletal: She exhibits edema  Tenderness: trace B/L LE  Lymphadenopathy:     She has no cervical adenopathy  Neurological: She is alert and oriented to person, place, and time  Skin: Skin is warm and dry  No rash noted  She is not diaphoretic  No erythema  Psychiatric: She has a normal mood and affect  Her behavior is normal  Judgment and thought content normal    Nursing note and vitals reviewed        Additional Data:     Labs:    Results from last 7 days   Lab Units 12/23/19  0538   WBC Thousand/uL 8 50   HEMOGLOBIN g/dL 10 7*   HEMATOCRIT % 32 5*   PLATELETS Thousands/uL 310   NEUTROS PCT % 62   LYMPHS PCT % 25   MONOS PCT % 10   EOS PCT % 3     Results from last 7 days   Lab Units 12/23/19  0538   SODIUM mmol/L 140   POTASSIUM mmol/L 3 6   CHLORIDE mmol/L 103   CO2 mmol/L 30   BUN mg/dL 17   CREATININE mg/dL 0 67   ANION GAP mmol/L 7   CALCIUM mg/dL 8 9   ALBUMIN g/dL 3 3   TOTAL BILIRUBIN mg/dL 1 00   ALK PHOS U/L 56   ALT U/L 17   AST U/L 17   GLUCOSE RANDOM mg/dL 112*         Results from last 7 days   Lab Units 12/19/19  2039 12/19/19  1620 12/19/19  1103 12/19/19  0608 12/18/19  2052 12/18/19  1611 12/18/19  1110 12/18/19  0715 12/17/19  2048 12/17/19  1620 12/17/19  1108 12/17/19  0712   POC GLUCOSE mg/dl 174* 100 154* 112 159* 111 135 113 153* 117 117 111     Results from last 7 days   Lab Units 12/19/19  1048   HEMOGLOBIN A1C % 5 7           Labs reviewed    Imaging:    Imaging reviewed    Recent Cultures (last 7 days):           Last 24 Hours Medication List:     Current Facility-Administered Medications:  acetaminophen 650 mg Oral Q6H PRN Shaheen Shin MD   aspirin 81 mg Oral Daily Ryan Gilliland MD   atenolol 50 mg Oral Daily Ryan Gilliland MD   carbidopa-levodopa 0 5 tablet Oral TID Ryan Gilliland MD   Coenzyme Q10 400 mg Oral Daily Ryan Gilliland MD   enoxaparin 40 mg Subcutaneous Daily Ryan Gilliland MD   polyethylene glycol 17 g Oral Daily Ryan Gilliland MD   pravastatin 40 mg Oral Every Other Day Ryan Gilliland MD   QUEtiapine 25 mg Oral HS PRN Ryan Gilliland MD   selegiline 5 mg Oral Daily With Breakfast Ryan Gilliland MD   senna-docusate sodium 1 tablet Oral BID Ryan Gilliland MD   traMADol 25 mg Oral Q6H PRN Ryan Gilliland MD   traMADol 50 mg Oral Q6H PRN Shaheen Shin MD        M*Modal software was used to dictate this note  It may contain errors with dictating incorrect words or incorrect spelling  Please contact the provider directly with any questions

## 2019-12-23 NOTE — PROGRESS NOTES
12/23/19 1030   Pain Assessment   Pain Assessment No/denies pain   Pain Score No Pain   [place] FACES Pain Rating 2   Restrictions/Precautions   Precautions Bed/chair alarms; Fall Risk;Supervision on toilet/commode   RLE Weight Bearing Per Order WBAT   Cognition   Overall Cognitive Status Impaired   Arousal/Participation Alert; Cooperative   Attention Within functional limits   Orientation Level Oriented X4   Memory Decreased short term memory;Decreased long term memory   Following Commands Follows one step commands with increased time or repetition   Subjective   Subjective Pt denies pain to R hip however feels some discomfort to L low back  Unsure if maybe she hit it when she fell before  Sit to Lying   Type of Assistance Needed Physical assistance;Verbal cues   Amount of Physical Assistance Provided 50%-74%   Comment A for LE lifting and trunk positioning   Sit to Lying CARE Score 2   Lying to Sitting on Side of Bed   Type of Assistance Needed Physical assistance;Verbal cues   Amount of Physical Assistance Provided 75% or more   Comment Poor trunk support and A needed for LE mobility  Cues for technique   Lying to Sitting on Side of Bed CARE Score 2   Sit to Stand   Type of Assistance Needed Physical assistance;Verbal cues   Amount of Physical Assistance Provided 25%-49%   Comment min A with cues for hand placement  Sit to Stand CARE Score 3   Bed-Chair Transfer   Type of Assistance Needed Physical assistance;Verbal cues   Amount of Physical Assistance Provided 25%-49%   Comment Cues for moving B LE  L LE freezes with turning  Chair/Bed-to-Chair Transfer CARE Score 3   Transfer Bed/Chair/Wheelchair   Limitations Noted In Balance;Confidence; Endurance;Problem Solving;UE Strength;LE Strength; Sequencing   Adaptive Equipment Roller Walker   Sit to Stand Minimal Assist   Stand to Sit Minimal Assist   Supine to Sit Maximum Assist   Sit to Supine Moderate Assist;Maximum Assist   Car Transfer   Reason if not Attempted Environmental limitations   Car Transfer CARE Score 10   Walk 10 Feet   Type of Assistance Needed Physical assistance;Verbal cues   Amount of Physical Assistance Provided Less than 25%   Walk 10 Feet CARE Score 3   Walk 50 Feet with Two Turns   Type of Assistance Needed Physical assistance;Verbal cues   Amount of Physical Assistance Provided 25%-49%   Comment inc A with turning due to NBOS and L foot freezes  Walk 50 Feet with Two Turns CARE Score 3   Walk 150 Feet   Reason if not Attempted Safety concerns   Walk 150 Feet CARE Score 88   Walking 10 Feet on Uneven Surfaces   Reason if not Attempted Safety concerns   Walking 10 Feet on Uneven Surfaces CARE Score 88   Ambulation   Does the patient walk? 2  Yes   Primary Mode of Locomotion Prior to Admission Walk   Distance Walked (feet) 70 ft  (x2)   Assist Device Roller Walker   Gait Pattern Antalgic; Inconsistant Oliva; Slow Oliva;Decreased foot clearance;Narrow NANCY;R foot drag;Decreased R stance; Step to;Shuffle;Poor UE WB   Limitations Noted In Balance;Device Management; Endurance;Strength; Sequencing;Midline Orientation   Provided Assistance with: Balance;Weight Shift;Trunk Support;Direction   Walk Assist Level Minimum Assist   Findings cues to attend to midline  Veres R  NBOS and poor R wt shift to advance L LE  Wheelchair mobility   Does the patient use a wheelchair? 0  No   Stairs   Findings Steps TBA in PM   Therapeutic Interventions   Strengthening Seated B LE TE:Aps, LAQ x30 each  Supine R LE TE: SAQ, mod bridges, AA abd and heel slides with slide board  3x10  AA SLR x10  Assessment   Treatment Assessment Pt participated in 60 min skilled PT intervention for B LE strengthening, transfers and amb  C/o Some L hip/low back discomfort initially however felt beeter with mobility  R > L LE edema  Education for elevation throughout the day  Recliner placed in pts room  Very shuffled gait with NBOS, L LE ataxic foot placement and veres R with RW  Cues to attend to wt shift and foot placement  Needs inc A with sup<>sit  Continue per POC  To complete stair training in PM     Problem List Decreased strength;Decreased range of motion;Decreased endurance; Impaired balance;Decreased mobility; Impaired judgement;Decreased safety awareness   Barriers to Discharge Inaccessible home environment   PT Barriers   Physical Impairment Decreased strength;Decreased range of motion;Decreased endurance; Impaired balance;Decreased mobility; Decreased coordination;Decreased cognition;Obesity; Decreased skin integrity;Orthopedic restrictions;Pain   Functional Limitation Car transfers; Ramp negotiation;Stair negotiation;Standing;Transfers; Walking   Plan   Treatment/Interventions Functional transfer training;LE strengthening/ROM; Therapeutic exercise;Gait training   Progress Progressing toward goals   Recommendation   Recommendation Home PT; Home with family support   PT Therapy Minutes   PT Time In 1030   PT Time Out 1130   PT Total Time (minutes) 60   PT Mode of treatment - Individual (minutes) 60   PT Mode of treatment - Concurrent (minutes) 0   PT Mode of treatment - Group (minutes) 0   PT Mode of treatment - Co-treat (minutes) 0   PT Mode of Treatment - Total time(minutes) 60 minutes   PT Cumulative Minutes 360   Therapy Time missed   Time missed?  No

## 2019-12-24 PROCEDURE — 97116 GAIT TRAINING THERAPY: CPT

## 2019-12-24 PROCEDURE — 97535 SELF CARE MNGMENT TRAINING: CPT

## 2019-12-24 PROCEDURE — 97530 THERAPEUTIC ACTIVITIES: CPT

## 2019-12-24 PROCEDURE — 99232 SBSQ HOSP IP/OBS MODERATE 35: CPT | Performed by: PHYSICAL MEDICINE & REHABILITATION

## 2019-12-24 PROCEDURE — 97110 THERAPEUTIC EXERCISES: CPT

## 2019-12-24 PROCEDURE — 99232 SBSQ HOSP IP/OBS MODERATE 35: CPT | Performed by: INTERNAL MEDICINE

## 2019-12-24 RX ORDER — LIDOCAINE 50 MG/G
1 PATCH TOPICAL DAILY
Status: DISCONTINUED | OUTPATIENT
Start: 2019-12-24 | End: 2020-01-02 | Stop reason: HOSPADM

## 2019-12-24 RX ADMIN — LIDOCAINE 1 PATCH: 50 PATCH CUTANEOUS at 14:21

## 2019-12-24 RX ADMIN — TRAMADOL HYDROCHLORIDE 50 MG: 50 TABLET, COATED ORAL at 08:30

## 2019-12-24 RX ADMIN — ATENOLOL 50 MG: 25 TABLET ORAL at 08:27

## 2019-12-24 RX ADMIN — SENNOSIDES AND DOCUSATE SODIUM 1 TABLET: 8.6; 5 TABLET ORAL at 08:26

## 2019-12-24 RX ADMIN — ASPIRIN 81 MG: 81 TABLET, COATED ORAL at 08:27

## 2019-12-24 RX ADMIN — CARBIDOPA AND LEVODOPA 0.5 TABLET: 25; 100 TABLET ORAL at 08:26

## 2019-12-24 RX ADMIN — PRAVASTATIN SODIUM 40 MG: 40 TABLET ORAL at 08:29

## 2019-12-24 RX ADMIN — CARBIDOPA AND LEVODOPA 0.5 TABLET: 25; 100 TABLET ORAL at 21:11

## 2019-12-24 RX ADMIN — CARBIDOPA AND LEVODOPA 0.5 TABLET: 25; 100 TABLET ORAL at 16:31

## 2019-12-24 RX ADMIN — SELEGILINE HYDROCHLORIDE 5 MG: 5 TABLET ORAL at 08:27

## 2019-12-24 RX ADMIN — ENOXAPARIN SODIUM 40 MG: 40 INJECTION SUBCUTANEOUS at 08:26

## 2019-12-24 NOTE — PROGRESS NOTES
12/24/19 0831   Pain Assessment   Pain Assessment No/denies pain   Pain Score No Pain   Restrictions/Precautions   Precautions Bed/chair alarms;Cognitive; Fall Risk;Impulsive;Supervision on toilet/commode   Weight Bearing Restrictions Yes   RLE Weight Bearing Per Order WBAT   Cognition   Overall Cognitive Status Impaired   Arousal/Participation Alert; Cooperative   Attention Within functional limits   Orientation Level Oriented X4   Memory Decreased short term memory;Decreased long term memory   Following Commands Follows one step commands with increased time or repetition   Subjective   Subjective Pt agreeable to PT tx  States she didn't sleep well last night due to SCD pumps waking her up  Pt also asked if TEDS could be placed on B LE's  Spoke with CRISTHIAN Willis Buckner about speaking with Dr Liliya Quintanilla regarding getting order for TEDS stockings  Sit to Stand   Type of Assistance Needed Incidental touching   Amount of Physical Assistance Provided No physical assistance   Sit to Stand CARE Score 4   Bed-Chair Transfer   Type of Assistance Needed Incidental touching   Amount of Physical Assistance Provided No physical assistance   Chair/Bed-to-Chair Transfer CARE Score 4   Transfer Bed/Chair/Wheelchair   Limitations Noted In Balance;Confidence;Problem Solving; Sequencing;UE Strength;LE Strength   Adaptive Equipment Roller Walker   Stand Pivot Contact Guard   Sit to [employer]   Stand to [employer]   Findings Improvements noted with transfers this a m  session, however, does require vc's for hand placement  Performed multiple STS to varying surfaces to improve pt's hand placement and safety with transfers  Improvements noted in pt's ability to slowly lower self to chair  Pt also able to consistently reach back to chair w/o vc's after first attempt      Walk 10 Feet   Type of Assistance Needed Incidental touching   Amount of Physical Assistance Provided No physical assistance   Walk 10 Feet CARE Score 4   Walk 50 Feet with Two Turns   Type of Assistance Needed Incidental touching   Amount of Physical Assistance Provided No physical assistance   Walk 50 Feet with Two Turns CARE Score 4   Walk 150 Feet   Reason if not Attempted Safety concerns   Walk 150 Feet CARE Score 88   Walking 10 Feet on Uneven Surfaces   Reason if not Attempted Safety concerns   Walking 10 Feet on Uneven Surfaces CARE Score 88   Ambulation   Does the patient walk? 2  Yes   Primary Mode of Locomotion Prior to Admission Walk   Distance Walked (feet) 80 ft   Assist Device Roller Walker   Gait Pattern Antalgic; Inconsistant Oliva; Slow Oliva;Decreased foot clearance; Festination;Narrow NANCY;Step to; Step through; Improper weight shift   Limitations Noted In Balance; Coordination; Endurance; Sequencing;Speed;Strength;Swing   Provided Assistance with: Balance   Walk Assist Level Contact Guard   Findings performed with x2 5# weights on RW to improve pt's stride length  Pt able to complete with good tolerance and also able to increase amb distances this session  Focused on taking big steps, especially during turns to improve overall ability to complete with decreased risk for falls  Wheelchair mobility   Does the patient use a wheelchair? 0  No   Stairs   Findings due to pt receiving sinemet at beginning of session, planned to perform stair trng in later session   Therapeutic Interventions   Strengthening Seated B TE with 1 5# weight on L LE: hip flex, LAQ's, knee flex (red TB), hip add (5 sec hold), hip abd (red TB), ankle pumps all performed to pt's reported fatigue (approx 30-40 reps each)  Pt with good tolerance to added weight on L LE and able to improve ROM completing exercises on R LE   Flexibility Seated B passive gastroc/hamstring stretching 3x30 sec hold  Other amb in gym approx 50' going around cones in order to improve pt's ability to complete turns as pt will festinate and shuffle when approaching turn as well as cross feet  VC's for pt to take big steps with improvements noted  Also used external cueing "Look at this sink" when turning and improvements note din pt's ability to complete turns  Assessment   Treatment Assessment Session focused on amb, transfer trng, endurance trng, and LE strengthening this session  Pt had just received sinemet, therefore, very bradykinetic  Worked on LE strengthening in which pt demo's improvements in R LE ROM as well as tolerated addition of ankle weight to L LE well  Pt with less assistance required to complete STS this session with occ verbal cues for proper hand placement  Worked on stand pivot transfers to different surfaces as pt begins to shuffle/festinate as she approaches chair  With vc's to take bigger steps and to focus on external cues, pt able to improve gait pattern when turning  When distracted however and no vc's pt returns to festinating/shuffling pattern  Pt also with improvements noted in amb, however, requires increased time to complete  Continue to work on stair trng, gait trng, transfers, LE strengthening and endurance with pt  Also plan to focus on bed mobility with pt as she has increased difficulty with this task  Family/Caregiver Present no   Problem List Decreased strength;Decreased range of motion;Decreased endurance; Impaired balance;Decreased mobility; Impaired judgement;Decreased safety awareness   Plan   Treatment/Interventions Functional transfer training;LE strengthening/ROM; Therapeutic exercise; Endurance training;Gait training   Progress Progressing toward goals   Recommendation   Recommendation Home with family support; Outpatient PT; Home PT   PT Therapy Minutes   PT Time In 0831   PT Time Out 0931   PT Total Time (minutes) 60   PT Mode of treatment - Individual (minutes) 60   PT Mode of treatment - Concurrent (minutes) 0   PT Mode of treatment - Group (minutes) 0   PT Mode of treatment - Co-treat (minutes) 0   PT Mode of Treatment - Total time(minutes) 60 minutes PT Cumulative Minutes 480   Therapy Time missed   Time missed?  No

## 2019-12-24 NOTE — PROGRESS NOTES
12/24/19 1100   Pain Assessment   Pain Assessment No/denies pain   Restrictions/Precautions   Precautions Bed/chair alarms; Fall Risk;Supervision on toilet/commode   RLE Weight Bearing Per Order WBAT   Subjective   Subjective no complaints, tired at end of session   Sit to Stand   Type of Assistance Needed Physical assistance;Verbal cues   Amount of Physical Assistance Provided Less than 25%   Comment focus on "big" movemnt, incresaed amplitude getting out of chair, fair carryover but with cueing   Sit to Stand CARE Score 3   Bed-Chair Transfer   Type of Assistance Needed Physical assistance;Verbal cues; Adaptive equipment   Amount of Physical Assistance Provided Less than 25%   Comment cueing to "march" and focus on object in room to prevent freezing of feet   Chair/Bed-to-Chair Transfer CARE Score 3   Transfer Bed/Chair/Wheelchair   Adaptive Equipment Roller Walker   Walk 10 Feet   Type of Assistance Needed Physical assistance;Verbal cues; Adaptive equipment   Amount of Physical Assistance Provided Less than 25%   Walk 10 Feet CARE Score 3   Walk 50 Feet with Two Turns   Type of Assistance Needed Physical assistance;Verbal cues; Adaptive equipment   Amount of Physical Assistance Provided Less than 25%   Walk 50 Feet with Two Turns CARE Score 3   Walk 150 Feet   Reason if not Attempted Safety concerns   Walk 150 Feet CARE Score 88   Walking 10 Feet on Uneven Surfaces   Reason if not Attempted Safety concerns   Walking 10 Feet on Uneven Surfaces CARE Score 88   Ambulation   Does the patient walk? 2  Yes   Distance Walked (feet) 75 ft   Assist Device Roller Walker   Gait Pattern Step through   Findings using lines on floor as target to improve stride with minimal cueing   4 Steps   Type of Assistance Needed Physical assistance;Verbal cues; Adaptive equipment   Amount of Physical Assistance Provided Less than 25%   Comment cueing for sequencing and hand placement   4 Steps CARE Score 3   12 Steps   Reason if not Attempted Activity not applicable   12 Steps CARE Score 9   Stairs   Type Stairs   # of Steps 4  (performed twice with seated rest break)   Therapeutic Interventions   Flexibility passive stretch B HS and calves   Assessment   Treatment Assessment Pt participating well  Session spent focusing on stairs and continued gait training w/o weights on RW  Pt instructed to use lines on floor from tiles as reference to improve stride and was able to while walking 75ft with continued step through gait pattern  Pt reporting afterwards that lines where helpful  Recommend possible purchase of laser light to attach to RW for continued use at home  Recommendation   Recommendation Home with family support;Home PT;Outpatient PT   Equipment Recommended Walker   PT Therapy Minutes   PT Time In 1100   PT Time Out 1130   PT Total Time (minutes) 30   PT Mode of treatment - Individual (minutes) 30   PT Mode of treatment - Concurrent (minutes) 0   PT Mode of treatment - Group (minutes) 0   PT Mode of treatment - Co-treat (minutes) 0   PT Mode of Treatment - Total time(minutes) 30 minutes   PT Cumulative Minutes 450   Therapy Time missed   Time missed?  No

## 2019-12-24 NOTE — PROGRESS NOTES
12/24/19 1315   Pain Assessment   Pain Assessment No/denies pain   Pain Score No Pain   Restrictions/Precautions   Precautions Bed/chair alarms; Fall Risk;Supervision on toilet/commode   Putting On/Taking Off Footwear   Type of Assistance Needed Physical assistance   Amount of Physical Assistance Provided 50%-74%   Comment Pt able to don shoe on LLE with LH shoe horn however pt was limited wih donning shoe on lDUE TO EDEMA  Pt had LUIS stockings donned but shoe are snug fit   Putting On/Taking Off Footwear CARE Score 2   Sit to Stand   Type of Assistance Needed Physical assistance   Amount of Physical Assistance Provided 25%-49%   Comment Pt fatigued in PM and required assist to rise from chair   Sit to Stand CARE Score 3   Bed-Chair Transfer   Type of Assistance Needed Physical assistance   Amount of Physical Assistance Provided Less than 25%   Chair/Bed-to-Chair Transfer CARE Score 3   Neuromuscular Education   Trunk Control Pt completed core strengthening with focus on forward flexion and weight shift to R side to promote weight bearing through RLE to promote midline sitting balance  Pt completed forward flexion with large therapy ball with noted improveent in flexibility  Cognition   Overall Cognitive Status Impaired   Arousal/Participation Alert; Cooperative   Attention Within functional limits   Orientation Level Oriented X4   Memory Decreased short term memory   Following Commands Follows one step commands with increased time or repetition   Activity Tolerance   Activity Tolerance Patient tolerated treatment well   Assessment   Treatment Assessment Pt engaged in OT treatment session with focus on sitting balance, weight bearing on RLE and standing tolerance  Pt stood t tabletop for 10 minutes to engage in leisure activity  Pt reaching across midline to right side to promote weight shift  Pt with increased fatigue with sit to stands and stand pivots transfers    Continue with POC with focus on ndurance, standing balance, trunk support, and LHAE training  Prognosis Fair   Problem List Decreased strength;Decreased endurance; Impaired balance;Decreased mobility   Plan   Treatment/Interventions ADL retraining;Functional transfer training;LE strengthening/ROM; Therapeutic exercise; Endurance training;Patient/family training; Compensatory technique education   Progress Progressing toward goals   Recommendation   OT Discharge Recommendation Home with family support   OT Therapy Minutes   OT Time In 1315   OT Time Out 1415   OT Total Time (minutes) 60   OT Mode of treatment - Individual (minutes) 60   OT Mode of treatment - Concurrent (minutes) 0   OT Mode of treatment - Group (minutes) 0   OT Mode of treatment - Co-treat (minutes) 0   OT Mode of Treatment - Total time(minutes) 60 minutes   OT Cumulative Minutes 605   Therapy Time missed   Time missed?  No

## 2019-12-24 NOTE — PROGRESS NOTES
12/24/19 1030   Pain Assessment   Pain Assessment No/denies pain   Pain Score No Pain   Restrictions/Precautions   Precautions Bed/chair alarms; Fall Risk;Supervision on toilet/commode   Sit to Stand   Type of Assistance Needed Physical assistance   Amount of Physical Assistance Provided Less than 25%   Comment Pt with cueing for forward flexion, wide NANCY wih feet nd using momentum for sit to stand tranfers  Sit to Stand CARE Score 3   Bed-Chair Transfer   Type of Assistance Needed Physical assistance   Amount of Physical Assistance Provided Less than 25%   Comment Cueing for big tepsto promote coordination and decrease freezin during ranfers   Chair/Bed-to-Chair Transfer CARE Score 3   Right Upper Extremity- Strength   R Weight/Reps/Sets Pt engaged in w/c push up to improve UE strength and forward flexion for sit to stand transfers  Pt completed 1 set of 10; 2nd set of  5 (then rest break),3rd set of 5, and 4th set of 10  Cognition   Overall Cognitive Status Impaired   Arousal/Participation Alert; Cooperative   Attention Within functional limits   Orientation Level Oriented X4   Memory Decreased short term memory   Following Commands Follows one step commands with increased time or repetition   Assessment   Treatment Assessment Pt engaged in fxnl transfers and mobiliy to improve carryover of technique  Pt with good verbalizatio for positioning for sit to tand transfers  Pt continues to demonsrate diffuclty with transition of hand from chair to RW during sit to stand transfers  Pt did benefit from reptitive training  Continue with POC with focus on core strengtheing, standing balance/tolerance  Prognosis Fair   Problem List Decreased strength;Decreased endurance; Impaired balance;Decreased mobility; Decreased coordination   Plan   Treatment/Interventions ADL retraining;Functional transfer training;LE strengthening/ROM; Endurance training; Therapeutic exercise;Patient/family training   Progress Progressing toward goals   Recommendation   OT Discharge Recommendation Home with family support   OT Therapy Minutes   OT Time In 1030   OT Time Out 1100   OT Total Time (minutes) 30   OT Mode of treatment - Individual (minutes) 30   OT Mode of treatment - Concurrent (minutes) 0   OT Mode of treatment - Group (minutes) 0   OT Mode of treatment - Co-treat (minutes) 0   OT Mode of Treatment - Total time(minutes) 30 minutes   OT Cumulative Minutes 545   Therapy Time missed   Time missed?  No

## 2019-12-24 NOTE — ASSESSMENT & PLAN NOTE
Vitals:    12/24/19 0800   BP: 132/64   Pulse: 71   Resp: 18   Temp: 98 8 °F (37 1 °C)   SpO2: 98%      Continue with Atenolol 50mg

## 2019-12-24 NOTE — ASSESSMENT & PLAN NOTE
· Continue selegiline (patient is on rasagiline 1mg daily as outpatient, evidently not available here on formulary)   Will resume home rasagiline on discharge  · Continue sinemet - dose was decreased by neurology due to hallucinations  · F/u Dr Newton Nageotte as outpatient  · promote sleep/wake cycle  · avoid CNS altering medications

## 2019-12-24 NOTE — PROGRESS NOTES
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 ([facility] )  Assessment & Plan  · Patient with delirium, hallucinations following her surgery  · Had 1 hallucination last night  · Evaluated by neurology, sinimet dose reduced  · Will continue seroquel for now, but will attempt to wean off by completion of rehab      Type 2 diabetes mellitus without complication ([facility] )  Assessment & Plan  Lab Results   Component Value Date    HGBA1C 5 7 12/19/2019       No results for input(s): POCGLU in the last 72 hours  Blood Sugar Average: Last 72 hrs:     Patient pre- diabetic, not on home insulin  DM   Will follow up with PCP outpatient  Will d/c SSI    Other hyperlipidemia  Assessment & Plan  · Continue Pravastatin     Parkinson's disease with use of electrical brain stimulation ([facility] )  Assessment & Plan  · Continue selegiline (patient is on rasagiline 1mg daily as outpatient, evidently not available here on formulary)   Will resume home rasagiline on discharge  · Continue sinemet - dose was decreased by neurology due to hallucinations  · F/u Dr Ming Cooney as outpatient  · promote sleep/wake cycle  · avoid CNS altering medications      * Closed fracture of right hip [facility])  Assessment & Plan  · Acute comprehensive interdisciplinary inpatient rehabilitation including PT, OT, and/or SLP, RN, CM, SW, dietary, psychology, etc   · Goal: supervision  · Patient not cleared to shower at this time  · WBAT  · S/p IM nailing on 12/14  · Keep incision C+D+I, remove dressing on 19        VTE Pharmacologic Prophylaxis:   Pharmacologic: Enoxaparin (Lovenox)  Mechanical VTE Prophylaxis in Place: Yes    Current Length of Stay: 6 day(s)    Current Patient Status: Inpatient Rehab     Discharge Plan: As per treatment team      Code Status: Level 3 - DNAR and DNI    Subjective:    Patient stated in wheelchair working with PT  Patient reports that she did not sleep well last night  Patient denies chest pain, shortness of breath and palpitations  Patient reports no pain at this point time  Objective:     Vitals:   Temp (24hrs), Av 1 °F (36 7 °C), Min:97 8 °F (36 6 °C), Max:98 8 °F (37 1 °C)    Temp:  [97 8 °F (36 6 °C)-98 8 °F (37 1 °C)] 98 8 °F (37 1 °C)  HR:  [69-77] 71  Resp:  [18] 18  BP: (101-132)/(62-64) 132/64  SpO2:  [96 %-98 %] 98 %  Body mass index is 36 84 kg/m²  Review of Systems   Constitutional: Negative for activity change, appetite change, chills, diaphoresis and fever  Eyes: Negative for pain, discharge, itching and visual disturbance  Respiratory: Negative for cough, chest tightness, shortness of breath and wheezing  Cardiovascular: Negative for chest pain, palpitations and leg swelling  Gastrointestinal: Negative for abdominal pain, constipation, diarrhea, nausea and vomiting  Endocrine: Negative for polydipsia, polyphagia and polyuria  Genitourinary: Negative for difficulty urinating, dysuria and urgency  Musculoskeletal: Negative for arthralgias, back pain and neck pain  Skin: Negative for rash and wound  Neurological: Negative for dizziness, weakness, numbness and headaches  Input and Output Summary (last 24 hours): Intake/Output Summary (Last 24 hours) at 2019 1445  Last data filed at 2019 1249  Gross per 24 hour   Intake 1040 ml   Output    Net 1040 ml       Physical Exam:     Physical Exam   Constitutional: She is oriented to person, place, and time   She appears well-developed and well-nourished  No distress  HENT:   Head: Normocephalic and atraumatic  Right Ear: External ear normal    Left Ear: External ear normal    Nose: Nose normal    Mouth/Throat: Oropharynx is clear and moist  No oropharyngeal exudate  Eyes: Pupils are equal, round, and reactive to light  Conjunctivae and EOM are normal  Right eye exhibits no discharge  Left eye exhibits no discharge  Neck: Normal range of motion  Neck supple  No thyromegaly present  Cardiovascular: Normal rate, regular rhythm, normal heart sounds and intact distal pulses  Exam reveals no gallop and no friction rub  No murmur heard  Pulmonary/Chest: Effort normal and breath sounds normal  No stridor  No respiratory distress  She has no wheezes  She has no rales  Abdominal: Soft  Bowel sounds are normal  She exhibits no distension  There is no tenderness  Musculoskeletal: She exhibits edema (+1 pitting edema B/L LE)  Lymphadenopathy:     She has no cervical adenopathy  Neurological: She is alert and oriented to person, place, and time  Skin: Skin is warm and dry  No rash noted  She is not diaphoretic  No erythema  Psychiatric: She has a normal mood and affect   Her behavior is normal  Judgment and thought content normal        Additional Data:     Labs:    Results from last 7 days   Lab Units 12/23/19  0538   WBC Thousand/uL 8 50   HEMOGLOBIN g/dL 10 7*   HEMATOCRIT % 32 5*   PLATELETS Thousands/uL 310   NEUTROS PCT % 62   LYMPHS PCT % 25   MONOS PCT % 10   EOS PCT % 3     Results from last 7 days   Lab Units 12/23/19  0538   SODIUM mmol/L 140   POTASSIUM mmol/L 3 6   CHLORIDE mmol/L 103   CO2 mmol/L 30   BUN mg/dL 17   CREATININE mg/dL 0 67   ANION GAP mmol/L 7   CALCIUM mg/dL 8 9   ALBUMIN g/dL 3 3   TOTAL BILIRUBIN mg/dL 1 00   ALK PHOS U/L 56   ALT U/L 17   AST U/L 17   GLUCOSE RANDOM mg/dL 112*         Results from last 7 days   Lab Units 12/19/19  2039 12/19/19  1620 12/19/19  1103 12/19/19  2860 12/18/19  2052 12/18/19  1611 12/18/19  1110 12/18/19  0715 12/17/19  2048 12/17/19  1620   POC GLUCOSE mg/dl 174* 100 154* 112 159* 111 135 113 153* 117     Results from last 7 days   Lab Units 12/19/19  1048   HEMOGLOBIN A1C % 5 7           Labs reviewed    Imaging:    Imaging reviewed    Recent Cultures (last 7 days):           Last 24 Hours Medication List:     Current Facility-Administered Medications:  acetaminophen 650 mg Oral Q6H PRN Shanti Draper MD   aspirin 81 mg Oral Daily Ryan Gilliland MD   atenolol 50 mg Oral Daily Ryan Gilliland MD   carbidopa-levodopa 0 5 tablet Oral TID Ryan Gilliland MD   Coenzyme Q10 400 mg Oral Daily Ryan Gilliland MD   enoxaparin 40 mg Subcutaneous Daily Ryan Gilliland MD   lidocaine 1 patch Topical Daily Ryan Gilliland MD   pravastatin 40 mg Oral Every Other Day Ryan Gilliland MD   QUEtiapine 25 mg Oral HS PRN Ryan Gilliland MD   selegiline 5 mg Oral Daily With Breakfast Ryan Gilliland MD   senna-docusate sodium 1 tablet Oral BID Ryan Gilliland MD   traMADol 25 mg Oral Q6H PRN Ryan Gilliland MD   traMADol 50 mg Oral Q6H PRN Shanti Draper MD        M*Modal software was used to dictate this note  It may contain errors with dictating incorrect words or incorrect spelling  Please contact the provider directly with any questions

## 2019-12-24 NOTE — PLAN OF CARE
Problem: Potential for Falls  Goal: Patient will remain free of falls  Description  INTERVENTIONS:  - Assess patient frequently for physical needs  -  Identify cognitive and physical deficits and behaviors that affect risk of falls  -  [place] fall precautions as indicated by assessment   - Educate patient/family on patient safety including physical limitations  - Instruct patient to call for assistance with activity based on assessment  - Modify environment to reduce risk of injury  - Consider OT/PT consult to assist with strengthening/mobility  Outcome: Progressing     Problem: NEUROSENSORY - ADULT  Goal: Achieves stable or improved neurological status  Description  INTERVENTIONS  - Monitor and report changes in neurological status  - Monitor vital signs such as temperature, blood pressure, glucose, and any other labs ordered   - Initiate measures to prevent increased intracranial pressure  - Monitor for seizure activity and implement precautions if appropriate      Outcome: Progressing  Goal: Achieves maximal functionality and self care  Description  INTERVENTIONS  - Monitor swallowing and airway patency with patient fatigue and changes in neurological status  - Encourage and assist patient to increase activity and self care     - Encourage visually impaired, hearing impaired and aphasic patients to use assistive/communication devices  Outcome: Progressing     Problem: GASTROINTESTINAL - ADULT  Goal: Maintains or returns to baseline bowel function  Description  INTERVENTIONS:  - Assess bowel function  - Encourage oral fluids to ensure adequate hydration  - Administer IV fluids if ordered to ensure adequate hydration  - Administer ordered medications as needed  - Encourage mobilization and activity  - Consider nutritional services referral to assist patient with adequate nutrition and appropriate food choices  Outcome: Progressing  Goal: Maintains adequate nutritional intake  Description  INTERVENTIONS:  - Monitor percentage of each meal consumed  - Identify factors contributing to decreased intake, treat as appropriate  - Assist with meals as needed  - Monitor I&O, weight, and lab values if indicated  - Obtain nutrition services referral as needed  Outcome: Progressing     Problem: GENITOURINARY - ADULT  Goal: Maintains or returns to baseline urinary function  Description  INTERVENTIONS:  - Assess urinary function  - Encourage oral fluids to ensure adequate hydration if ordered  - Administer IV fluids as ordered to ensure adequate hydration  - Administer ordered medications as needed  - Offer frequent toileting  - Follow urinary retention protocol if ordered  Outcome: Progressing     Problem: MUSCULOSKELETAL - ADULT  Goal: Maintain or return mobility to safest level of function  Description  INTERVENTIONS:  - Assess patient's ability to carry out ADLs; assess patient's baseline for ADL function and identify physical deficits which impact ability to perform ADLs (bathing, care of mouth/teeth, toileting, grooming, dressing, etc )  - Assess/evaluate cause of self-care deficits   - Assess range of motion  - Assess patient's mobility  - Assess patient's need for assistive devices and provide as appropriate  - Encourage maximum independence but intervene and supervise when necessary  - Involve family in performance of ADLs  - Assess for home care needs following discharge   - Consider OT consult to assist with ADL evaluation and planning for discharge  - Provide patient education as appropriate  Outcome: Progressing  Goal: Maintain proper alignment of affected body part  Description  INTERVENTIONS:  - Support, maintain and protect limb and body alignment  - Provide patient/ family with appropriate education  Outcome: Progressing     Problem: COPING  Goal: Pt/Family able to verbalize concerns and demonstrate effective coping strategies  Description  INTERVENTIONS:  - Assist patient/family to identify coping skills, available support systems and cultural and spiritual values  - Provide emotional support, including active listening and acknowledgement of concerns of patient and caregivers  - Reduce environmental stimuli, as able  - Provide patient education  - Assess for spiritual pain/suffering and initiate spiritual care, including notification of Pastoral Care or ranjit based community as needed  - Assess effectiveness of coping strategies  Outcome: Progressing  Goal: Will report anxiety at manageable levels  Description  INTERVENTIONS:  - Administer medication as ordered  - Teach and encourage coping skills  - Provide emotional support  - Assess patient/family for anxiety and ability to cope  Outcome: Progressing     Problem: Prexisting or High Potential for Compromised Skin Integrity  Goal: Skin integrity is maintained or improved  Description  INTERVENTIONS:  - Identify patients at risk for skin breakdown  - Assess and monitor skin integrity  - Assess and monitor nutrition and hydration status  - Monitor labs   - Assess for incontinence   - Turn and reposition patient  - Assist with mobility/ambulation  - Relieve pressure over bony prominences  - Avoid friction and shearing  - Provide appropriate hygiene as needed including keeping skin clean and dry  - Evaluate need for skin moisturizer/barrier cream  - Collaborate with interdisciplinary team   - Patient/family teaching  - Consider wound care consult   Outcome: Progressing     Problem: CONFUSION/THOUGHT DISTURBANCE  Goal: Thought disturbances (confusion, delirium, depression, dementia or psychosis) are managed to maintain or return to baseline mental status and functional level  Description  INTERVENTIONS:  - Assess for possible contributors to  thought disturbance, including but not limited to medications, infection, impaired vision or hearing, underlying metabolic abnormalities, dehydration, respiratory compromise,  psychiatric diagnoses and notify attending PHYSICAN/AP  - Monitor and intervene to maintain adequate nutrition, hydration, elimination, sleep and activity  - Decrease environmental stimuli, including noise as appropriate  - Provide frequent contacts to provide refocusing, direction and reassurance as needed  Approach patient calmly with eye contact and at their level    - [place] high risk fall precautions, aspiration precautions and other safety measures, as indicated  - If delirium suspected, notify physician/AP of change in condition and request immediate in-person evaluation  - Pursue consults as appropriate including Geriatric (campus dependent), OT for cognitive evaluation/activity planning, psychiatric, pastoral care, etc   Outcome: Progressing

## 2019-12-24 NOTE — PROGRESS NOTES
Physical Medicine and Rehabilitation Progress Note  Carlus Bence 75 y o  female MRN: [id number]  Unit/Bed#: [facility] 268-02 Encounter: [id number]    HPI: Carlus Bence is a 75 y o  female who presented to the [facility] [address] after fall at home  Found to have a right hip fracture, underwent IM nailing on 12/14/19  Post-procedure patient with delirium, hallucinations  Neurology service consulted, dose of sinemet was reduced  In addition, Seroquel started in evening  Patient does have DBS which  has controller for  Accepted to [facility] on 12/18/19  Chief Complaint: f/u fracture    Interval: No acute events overnight  Patient without complaint  no hallucinations last night  Reports not sleeping well last night as SCDs were uncomfortable; educated patient that as long as she has them on majority of the day ie at least 14 hours, she can have them off at night to help her sleep   she is agreeable to this  ROS: A 10 point ROS was performed; negative except as noted above  Assessment/Plan:    * Closed fracture of right hip (HCC)  Assessment & Plan  · Acute comprehensive interdisciplinary inpatient rehabilitation including PT, OT, and/or SLP, RN, CM, SW, dietary, psychology, etc   · Goal: supervision  · Clear patient to shower with incision covered  · WBAT  · S/p IM nailing on 12/14    Generalized edema  Assessment & Plan  · R>L  · Apply compression garments    Essential hypertension  Assessment & Plan  Temp:  [97 8 °F (36 6 °C)-98 8 °F (37 1 °C)] 98 8 °F (37 1 °C)  HR:  [69-77] 71  Resp:  [18] 18  BP: (101-132)/(62-64) 132/64    · Continue Atenolol  · IM service managing anti-hypertensives, adjusting as needed    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 ([facility] [address] )  Assessment & Plan  · Patient with delirium, hallucinations following her surgery  · Evaluated by neurology, sinimet dose reduced  · Make seroquel PRN, as patient without hallucinations   Discontinue entirely on discharge      Type 2 diabetes mellitus without complication (HCC)  Assessment & Plan  · Last A1C: 6 3  · Continue diabetic diet    Other hyperlipidemia  Assessment & Plan  · Continue statin    Parkinson's disease with use of electrical brain stimulation ([facility] [address] )  Assessment & Plan  · Continue selegiline (patient is on rasagiline 1mg daily as outpatient, evidently not available here on formulary)  Will resume home rasagiline on discharge  · Continue sinemet - dose was decreased by neurology due to hallucinations  · F/u Dr Monico Fisher as outpatient      # Skin  · Encourage regular turning as patient at risk for skin breakdown  · Staff to continue patient education on Q2h turning  · Rehabilitation team to perform skin checks regularly     # Bowel  · Patient reports no constipation     # Bladder  · Patient voiding spontaneously    # Pain  · Continue tylenol, for max of 3gm daily  · Continue tramadol    # Other  - Diet/Nutrition:        Diet Orders   (From admission, onward)             Start     Ordered    12/18/19 1449  Diet Regular; Regular House; Consistent Carbohydrate Diet Level 1 (4 carb servings/60 grams CHO/meal)  Diet effective now     Question Answer Comment   Diet Type Regular    Regular Regular House    Other Restriction(s): Consistent Carbohydrate Diet Level 1 (4 carb servings/60 grams CHO/meal)    RD to adjust diet per protocol?  Yes        12/18/19 1449              - DVT prophy: Sequential compression device (Venodyne)  and Enoxaparin (Lovenox)  - GI ppx: None  - Nausea: None  - Supplements: None  - Sleep: None    Disposition: reteam    CODE: Level 3: DNAR and DNI Scheduled Meds:    Current Facility-Administered Medications:  acetaminophen 650 mg Oral Q6H PRN Belkys Newsome MD   aspirin 81 mg Oral Daily Ryan Gilliland MD   atenolol 50 mg Oral Daily Ryan Gilliland MD   carbidopa-levodopa 0 5 tablet Oral TID Ryan Gilliland MD   Coenzyme Q10 400 mg Oral Daily Ryan Gilliland MD   enoxaparin 40 mg Subcutaneous Daily Ryan SMITH MD Darshana   pravastatin 40 mg Oral Every Other Day Ryan Gilliland MD   QUEtiapine 25 mg Oral HS PRN Manuel Barba MD   selegiline 5 mg Oral Daily With Breakfast Ryan Gilliland MD   senna-docusate sodium 1 tablet Oral BID Ryan Gilliland MD   traMADol 25 mg Oral Q6H PRN Ryan Gilliland MD   traMADol 50 mg Oral Q6H PRN Ryan Gilliland MD        Objective:    Functional Update:  Mobility: min assist  Transfers: min-mod assist (requires assist of 2)  ADLs: dependent for lowers    Allergies per EMR    Physical Exam:  Temp:  [97 8 °F (36 6 °C)-98 8 °F (37 1 °C)] 98 8 °F (37 1 °C)  HR:  [69-77] 71  Resp:  [18] 18  BP: (101-132)/(62-64) 132/64  SpO2:  [96 %-98 %] 98 %    General: alert, no apparent distress, cooperative and comfortable  HEENT:  Head: Normocephalic, no lesions, without obvious abnormality  Eye: Normal external eye, conjunctiva, lids cornea, NORBERTO  Ears: normal external ears  Nose: Normal external nose, mucus membranes and septum  LUNGS:  no abnormal respiratory pattern, no retractions noted, non-labored breathing   ABDOMEN:  soft, non-tender  Bowel sounds normal  No masses, no organomegaly  EXTREMITIES:  extremities normal, warm and well-perfused; no cyanosis, clubbing, or edema  NEURO:   clear speech, following commands appropriately  PSYCH:  Alert and oriented, appropriate affect  INCISION:  dressings present    Physical examination is otherwise unchanged from previous encounter, except as noted above      Diagnostic Studies: Reviewed, no new imaging  No orders to display     Laboratory: Reviewed  Results from last 7 days   Lab Units 12/23/19  0538 12/19/19  1048   HEMOGLOBIN g/dL 10 7* 11 7*   HEMATOCRIT % 32 5* 34 4*   WBC Thousand/uL 8 50 9 00     Results from last 7 days   Lab Units 12/23/19  0538 12/19/19  1048   BUN mg/dL 17 19   SODIUM mmol/L 140 139   POTASSIUM mmol/L 3 6 3 6   CHLORIDE mmol/L 103 104   CREATININE mg/dL 0 67 0 67   AST U/L 17 20   ALT U/L 17 14            ** Please Note: Fluency Direct voice to text software may have been used in the creation of this document   **

## 2019-12-25 RX ADMIN — SELEGILINE HYDROCHLORIDE 5 MG: 5 TABLET ORAL at 07:56

## 2019-12-25 RX ADMIN — ATENOLOL 50 MG: 25 TABLET ORAL at 08:00

## 2019-12-25 RX ADMIN — CARBIDOPA AND LEVODOPA 0.5 TABLET: 25; 100 TABLET ORAL at 16:54

## 2019-12-25 RX ADMIN — QUETIAPINE FUMARATE 25 MG: 25 TABLET, FILM COATED ORAL at 21:10

## 2019-12-25 RX ADMIN — LIDOCAINE 1 PATCH: 50 PATCH CUTANEOUS at 08:01

## 2019-12-25 RX ADMIN — CARBIDOPA AND LEVODOPA 0.5 TABLET: 25; 100 TABLET ORAL at 07:55

## 2019-12-25 RX ADMIN — SENNOSIDES AND DOCUSATE SODIUM 1 TABLET: 8.6; 5 TABLET ORAL at 08:00

## 2019-12-25 RX ADMIN — CARBIDOPA AND LEVODOPA 0.5 TABLET: 25; 100 TABLET ORAL at 21:09

## 2019-12-25 RX ADMIN — ENOXAPARIN SODIUM 40 MG: 40 INJECTION SUBCUTANEOUS at 08:01

## 2019-12-25 RX ADMIN — ASPIRIN 81 MG: 81 TABLET, COATED ORAL at 08:01

## 2019-12-25 NOTE — NURSING NOTE
Pt alert and oriented x4  OOB in recliner chair for most of day  Chair alarm maintained for pt safety, but pt rings for assistance  Appetites are 75% or better  Multiple visitors in to see patient

## 2019-12-26 ENCOUNTER — PATIENT OUTREACH (OUTPATIENT)
Dept: CASE MANAGEMENT | Facility: OTHER | Age: 74
End: 2019-12-26

## 2019-12-26 LAB
ALBUMIN SERPL BCP-MCNC: 3.4 G/DL (ref 3–5.2)
ALP SERPL-CCNC: 81 U/L (ref 43–122)
ALT SERPL W P-5'-P-CCNC: 14 U/L (ref 9–52)
ANION GAP SERPL CALCULATED.3IONS-SCNC: 8 MMOL/L (ref 5–14)
AST SERPL W P-5'-P-CCNC: 19 U/L (ref 14–36)
BASOPHILS # BLD AUTO: 0 THOUSANDS/ΜL (ref 0–0.1)
BASOPHILS NFR BLD AUTO: 1 % (ref 0–1)
BILIRUB SERPL-MCNC: 0.6 MG/DL
BUN SERPL-MCNC: 15 MG/DL (ref 5–25)
CALCIUM SERPL-MCNC: 8.7 MG/DL (ref 8.4–10.2)
CHLORIDE SERPL-SCNC: 104 MMOL/L (ref 97–108)
CO2 SERPL-SCNC: 27 MMOL/L (ref 22–30)
CREAT SERPL-MCNC: 0.63 MG/DL (ref 0.6–1.2)
EOSINOPHIL # BLD AUTO: 0.2 THOUSAND/ΜL (ref 0–0.4)
EOSINOPHIL NFR BLD AUTO: 3 % (ref 0–6)
ERYTHROCYTE [DISTWIDTH] IN BLOOD BY AUTOMATED COUNT: 14.4 %
GFR SERPL CREATININE-BSD FRML MDRD: 88 ML/MIN/1.73SQ M
GLUCOSE P FAST SERPL-MCNC: 103 MG/DL (ref 70–99)
GLUCOSE SERPL-MCNC: 103 MG/DL (ref 70–99)
HCT VFR BLD AUTO: 31.6 % (ref 36–46)
HGB BLD-MCNC: 10.5 G/DL (ref 12–16)
LYMPHOCYTES # BLD AUTO: 2.1 THOUSANDS/ΜL (ref 0.5–4)
LYMPHOCYTES NFR BLD AUTO: 26 % (ref 25–45)
MCH RBC QN AUTO: 31.4 PG (ref 26–34)
MCHC RBC AUTO-ENTMCNC: 33.4 G/DL (ref 31–36)
MCV RBC AUTO: 94 FL (ref 80–100)
MONOCYTES # BLD AUTO: 0.7 THOUSAND/ΜL (ref 0.2–0.9)
MONOCYTES NFR BLD AUTO: 8 % (ref 1–10)
NEUTROPHILS # BLD AUTO: 5.2 THOUSANDS/ΜL (ref 1.8–7.8)
NEUTS SEG NFR BLD AUTO: 63 % (ref 45–65)
PLATELET # BLD AUTO: 301 THOUSANDS/UL (ref 150–450)
PMV BLD AUTO: 8.5 FL (ref 8.9–12.7)
POTASSIUM SERPL-SCNC: 3.6 MMOL/L (ref 3.6–5)
PROT SERPL-MCNC: 6 G/DL (ref 5.9–8.4)
RBC # BLD AUTO: 3.35 MILLION/UL (ref 4–5.2)
SODIUM SERPL-SCNC: 139 MMOL/L (ref 137–147)
WBC # BLD AUTO: 8.3 THOUSAND/UL (ref 4.5–11)

## 2019-12-26 PROCEDURE — 99232 SBSQ HOSP IP/OBS MODERATE 35: CPT | Performed by: INTERNAL MEDICINE

## 2019-12-26 PROCEDURE — 80053 COMPREHEN METABOLIC PANEL: CPT | Performed by: NURSE PRACTITIONER

## 2019-12-26 PROCEDURE — 99233 SBSQ HOSP IP/OBS HIGH 50: CPT | Performed by: PHYSICAL MEDICINE & REHABILITATION

## 2019-12-26 PROCEDURE — 97535 SELF CARE MNGMENT TRAINING: CPT

## 2019-12-26 PROCEDURE — 97530 THERAPEUTIC ACTIVITIES: CPT

## 2019-12-26 PROCEDURE — 97110 THERAPEUTIC EXERCISES: CPT

## 2019-12-26 PROCEDURE — 97116 GAIT TRAINING THERAPY: CPT

## 2019-12-26 PROCEDURE — 85025 COMPLETE CBC W/AUTO DIFF WBC: CPT | Performed by: NURSE PRACTITIONER

## 2019-12-26 RX ADMIN — LIDOCAINE 1 PATCH: 50 PATCH CUTANEOUS at 08:07

## 2019-12-26 RX ADMIN — SELEGILINE HYDROCHLORIDE 5 MG: 5 TABLET ORAL at 07:22

## 2019-12-26 RX ADMIN — PRAVASTATIN SODIUM 40 MG: 40 TABLET ORAL at 08:08

## 2019-12-26 RX ADMIN — CARBIDOPA AND LEVODOPA 0.5 TABLET: 25; 100 TABLET ORAL at 21:01

## 2019-12-26 RX ADMIN — CARBIDOPA AND LEVODOPA 0.5 TABLET: 25; 100 TABLET ORAL at 16:13

## 2019-12-26 RX ADMIN — CARBIDOPA AND LEVODOPA 0.5 TABLET: 25; 100 TABLET ORAL at 07:22

## 2019-12-26 RX ADMIN — ACETAMINOPHEN 650 MG: 325 TABLET ORAL at 16:39

## 2019-12-26 RX ADMIN — ASPIRIN 81 MG: 81 TABLET, COATED ORAL at 08:06

## 2019-12-26 RX ADMIN — TRAMADOL HYDROCHLORIDE 50 MG: 50 TABLET, COATED ORAL at 05:15

## 2019-12-26 RX ADMIN — ATENOLOL 50 MG: 25 TABLET ORAL at 08:06

## 2019-12-26 RX ADMIN — ENOXAPARIN SODIUM 40 MG: 40 INJECTION SUBCUTANEOUS at 08:06

## 2019-12-26 RX ADMIN — ACETAMINOPHEN 650 MG: 325 TABLET ORAL at 08:19

## 2019-12-26 RX ADMIN — SENNOSIDES AND DOCUSATE SODIUM 1 TABLET: 8.6; 5 TABLET ORAL at 08:06

## 2019-12-26 NOTE — PROGRESS NOTES
12/26/19 1400   Pain Assessment   Pain Assessment 0-10   Pain Score No Pain   Restrictions/Precautions   Precautions Bed/chair alarms; Fall Risk;Supervision on toilet/commode   Roll Left and Right   Type of Assistance Needed Physical assistance   Amount of Physical Assistance Provided 25%-49%   Comment Bending b/l knees pt able to complte log roll elias right side wiht use of bedrail  Roll Left and Right CARE Score 3   Lying to Sitting on Side of Bed   Type of Assistance Needed Physical assistance   Amount of Physical Assistance Provided 50%-74%   Comment  Pt required assist at trunk to complete supine to sit   present and educted on technique including hand placement   shown bedrail and reports son Josefa Pickett can purchase on [employer]  However  reports that bed at home can elevate HOB and feet   needs o check to see if just the matress elevates or the whole frame  Ifit is the whole frame, the bedrail may not work  Lee provide handout once  checks how bed at home elevates  Lying to Sitting on Side of Bed CARE Score 2   Sit to Stand   Type of Assistance Needed Incidental touching   Amount of Physical Assistance Provided No physical assistance   Sit to Stand CARE Score 4   Bed-Chair Transfer   Type of Assistance Needed Incidental touching   Amount of Physical Assistance Provided No physical assistance   Chair/Bed-to-Chair Transfer CARE Score 4   Cognition   Overall Cognitive Status Impaired   Arousal/Participation Alert; Cooperative   Attention Within functional limits   Orientation Level Oriented X4   Memory Decreased recall of precautions;Decreased short term memory   Following Commands Follows multistep commands with increased time or repetition   Activity Tolerance   Activity Tolerance Patient tolerated treatment well   Assessment   Treatment Assessment Pt  present for 30 minute session   educated on d/c dateof Thursday Jan 2nd   Spent time seting up family training   coming in Sat 1230-2pm and Sunday 1230-3pm  Began training with bed mobility and stand pivot transfers  Educated  on hand placement for patient during supine to sit transfers, p reported he used topull patient by arm to readjust her in bed  Educaed and demonstrated to  pt bending feet and bridging hips to adjust while supine in bed  Pt  educated on hand placement for sit to stand transfers and using hand under her butock to assist to boost   will require additional training for appropriate  positioning on Left side during stand pivot transfers with RW   needs cueing to stand closer to patient during transfers  Pt overall olerated session well  CONTINUE with family training sessions  Prognosis Fair   Problem List Decreased strength;Decreased endurance; Impaired balance;Decreased mobility   Plan   Treatment/Interventions ADL retraining;Functional transfer training;LE strengthening/ROM; Therapeutic exercise; Endurance training;Patient/family training; Compensatory technique education   Progress Progressing toward goals   Recommendation   OT Discharge Recommendation Home with family support   OT Therapy Minutes   OT Time In 1400   OT Time Out 1430   OT Total Time (minutes) 30   OT Mode of treatment - Individual (minutes) 30   OT Mode of treatment - Concurrent (minutes) 0   OT Mode of treatment - Group (minutes) 0   OT Mode of treatment - Co-treat (minutes) 0   OT Mode of Treatment - Total time(minutes) 30 minutes   OT Cumulative Minutes 695   Therapy Time missed   Time missed?  No

## 2019-12-26 NOTE — ASSESSMENT & PLAN NOTE
· Continue selegiline (patient is on rasagiline 1mg daily as outpatient, evidently not available here on formulary)   Will resume home rasagiline on discharge  · Continue sinemet - dose was decreased by neurology due to hallucinations  · F/u Dr Dileep Joseph as outpatient  · promote sleep/wake cycle  · avoid CNS altering medications

## 2019-12-26 NOTE — PROGRESS NOTES
12/26/19 1230   Pain Assessment   Pain Assessment 0-10   Pain Score No Pain   Restrictions/Precautions   Precautions Bed/chair alarms; Fall Risk;Supervision on toilet/commode   Shower/Bathe Self   Type of Assistance Needed Physical assistance   Amount of Physical Assistance Provided Less than 25%   Comment Pt able to wash hair shantelle with improvement in reach  Pt able to stand using support of grab bar to wash audra and buttock with CS from therapist  Pt required assist to wash b/l feet only  Continu to recommend LH sponge   Shower/Bathe Self CARE Score 3   Bathing   Assessed Bath Style Shower   Anticipated D/C Bath Style Shower   Positioning Seated;Standing   Tub/Shower Transfer   Findings Pt unable to step over ledge of shower stall due to limied tolerance for weight bearing through RLE  Pt may have to trial sit swivel technique  will have  take pictures of shower to determine if this technique is appropriate  Pt completed stand pivot with zero entry at min Al level   Upper Body Dressing   Type of Assistance Needed Physical assistance   Amount of Physical Assistance Provided 25%-49%   Comment Trialed hemiplegic dressing technique for UB dressing having her thread RUE into shirt then lifting shirtover head  Pt freezing when shirt at top of head, thus requiring assist to pull over head  Pt able to complete all other steps without assistance   Upper Body Dressing CARE Score 3   Lower Body Dressing   Type of Assistance Needed Physical assistance   Amount of Physical Assistance Provided Less than 25%   Comment Pt utlizes reacher to don underwear and pants with only min cues for technique  Pt unable to coordinate R foot to thread galen underwear and did require assist  PT able to use reacher to complete all other steps of LB dressing with underwear and pants  Pt stood to pul pants up over hips at CGA level      Lower Body Dressing CARE Score 3   Putting On/Taking Off Footwear   Type of Assistance Needed Physical assistance   Amount of Physical Assistance Provided 50%-74%   Comment Pt required assist to don/doff LUIS stockings  Pt able to don socks with sock aid and doff with dressing stick  Putting On/Taking Off Footwear CARE Score 2   Sit to Stand   Type of Assistance Needed Incidental touching   Amount of Physical Assistance Provided No physical assistance   Comment CGA  R hand on chair and L hand on RW   Sit to Stand CARE Score 4   Bed-Chair Transfer   Type of Assistance Needed Incidental touching   Amount of Physical Assistance Provided No physical assistance   Comment Cueing to advance LLE during transfers however noted improvement since Tuesday   Chair/Bed-to-Chair Transfer CARE Score 4   Toileting Hygiene   Type of Assistance Needed Physical assistance   Amount of Physical Assistance Provided 25%-49%   Comment Pt able to complete audra hygiene and pull pants up/down  Pt required MIN A for balance support  Toileting Hygiene CARE Score 3   Toileting   Able to [address] down yes, up yes  Manage Hygiene Bladder   Toilet Transfer   Type of Assistance Needed Incidental touching   Amount of Physical Assistance Provided No physical assistance   Comment CGA   Toilet Transfer CARE Score 4   Cognition   Overall Cognitive Status Impaired   Arousal/Participation Alert; Cooperative   Attention Within functional limits   Orientation Level Oriented X4   Memory Decreased short term memory   Following Commands Follows multistep commands with increased time or repetition   Activity Tolerance   Activity Tolerance Patient tolerated treatment well   Assessment   Treatment Assessment Pt engaged in ADL session  Pt noted with improvement during PM ADL routine  Pt may benefit from later ADL sessions due to timing of medications  Pt noted with improved carryover with use of LHAE requiring only 25% cueing for technique  PT AGREEABLE to purchase LHAE for LB dressing  Pt with noted improved sequencing for sit to stand transfers   Continue with POC will set up family training with  who will be in this afternoon  Will attempt to schedule therapies to coincide with medications  Prognosis Fair   Problem List Decreased strength; Impaired balance;Decreased mobility; Decreased coordination   Barriers to Discharge Inaccessible home environment   Plan   Treatment/Interventions ADL retraining;LE strengthening/ROM; Functional transfer training; Therapeutic exercise; Endurance training;Patient/family training; Compensatory technique education   Progress Progressing toward goals   Recommendation   OT Discharge Recommendation Home with family support   OT Therapy Minutes   OT Time In 1230   OT Time Out 1330   OT Total Time (minutes) 60   OT Mode of treatment - Individual (minutes) 60   OT Mode of treatment - Concurrent (minutes) 0   OT Mode of treatment - Group (minutes) 0   OT Mode of treatment - Co-treat (minutes) 0   OT Mode of Treatment - Total time(minutes) 60 minutes   OT Cumulative Minutes 665   Therapy Time missed   Time missed?  No

## 2019-12-26 NOTE — PROGRESS NOTES
Email with bundle communication tool sent to [facility]  with update on new bundle, DRG, and ELOS

## 2019-12-26 NOTE — ASSESSMENT & PLAN NOTE
Vitals:    12/26/19 0723   BP: 127/78   Pulse: 75   Resp: 18   Temp: 98 5 °F (36 9 °C)   SpO2: 99%      Continue with Atenolol 50mg

## 2019-12-26 NOTE — PLAN OF CARE
Problem: Potential for Falls  Goal: Patient will remain free of falls  Description  INTERVENTIONS:  - Assess patient frequently for physical needs  -  Identify cognitive and physical deficits and behaviors that affect risk of falls  -  [place] fall precautions as indicated by assessment   - Educate patient/family on patient safety including physical limitations  - Instruct patient to call for assistance with activity based on assessment  - Modify environment to reduce risk of injury  - Consider OT/PT consult to assist with strengthening/mobility  Outcome: Progressing     Problem: NEUROSENSORY - ADULT  Goal: Achieves stable or improved neurological status  Description  INTERVENTIONS  - Monitor and report changes in neurological status  - Monitor vital signs such as temperature, blood pressure, glucose, and any other labs ordered   - Initiate measures to prevent increased intracranial pressure  - Monitor for seizure activity and implement precautions if appropriate      Outcome: Progressing  Goal: Achieves maximal functionality and self care  Description  INTERVENTIONS  - Monitor swallowing and airway patency with patient fatigue and changes in neurological status  - Encourage and assist patient to increase activity and self care     - Encourage visually impaired, hearing impaired and aphasic patients to use assistive/communication devices  Outcome: Progressing     Problem: GASTROINTESTINAL - ADULT  Goal: Maintains or returns to baseline bowel function  Description  INTERVENTIONS:  - Assess bowel function  - Encourage oral fluids to ensure adequate hydration  - Administer IV fluids if ordered to ensure adequate hydration  - Administer ordered medications as needed  - Encourage mobilization and activity  - Consider nutritional services referral to assist patient with adequate nutrition and appropriate food choices  Outcome: Progressing  Goal: Maintains adequate nutritional intake  Description  INTERVENTIONS:  - Monitor percentage of each meal consumed  - Identify factors contributing to decreased intake, treat as appropriate  - Assist with meals as needed  - Monitor I&O, weight, and lab values if indicated  - Obtain nutrition services referral as needed  Outcome: Progressing     Problem: GENITOURINARY - ADULT  Goal: Maintains or returns to baseline urinary function  Description  INTERVENTIONS:  - Assess urinary function  - Encourage oral fluids to ensure adequate hydration if ordered  - Administer IV fluids as ordered to ensure adequate hydration  - Administer ordered medications as needed  - Offer frequent toileting  - Follow urinary retention protocol if ordered  Outcome: Progressing     Problem: SKIN/TISSUE INTEGRITY - ADULT  Goal: Skin integrity remains intact  Description  INTERVENTIONS  - Identify patients at risk for skin breakdown  - Assess and monitor skin integrity  - Assess and monitor nutrition and hydration status  - Monitor labs (i e  albumin)  - Assess for incontinence   - Turn and reposition patient  - Assist with mobility/ambulation  - Relieve pressure over bony prominences  - Avoid friction and shearing  - Provide appropriate hygiene as needed including keeping skin clean and dry  - Evaluate need for skin moisturizer/barrier cream  - Collaborate with interdisciplinary team (i e  Nutrition, Rehabilitation, etc )   - Patient/family teaching  Outcome: Progressing  Goal: Incision(s), wounds(s) or drain site(s) healing without S/S of infection  Description  INTERVENTIONS  - Assess and document risk factors for skin impairment   - Assess and document dressing, incision, wound bed, drain sites and surrounding tissue  - Consider nutrition services referral as needed  - Oral mucous membranes remain intact  - Provide patient/ family education  Outcome: Progressing  Goal: Oral mucous membranes remain intact  Description  INTERVENTIONS  - Assess oral mucosa and hygiene practices  - Implement preventative oral hygiene regimen  - Implement oral medicated treatments as ordered  - Initiate Nutrition services referral as needed  Outcome: Progressing     Problem: MUSCULOSKELETAL - ADULT  Goal: Maintain or return mobility to safest level of function  Description  INTERVENTIONS:  - Assess patient's ability to carry out ADLs; assess patient's baseline for ADL function and identify physical deficits which impact ability to perform ADLs (bathing, care of mouth/teeth, toileting, grooming, dressing, etc )  - Assess/evaluate cause of self-care deficits   - Assess range of motion  - Assess patient's mobility  - Assess patient's need for assistive devices and provide as appropriate  - Encourage maximum independence but intervene and supervise when necessary  - Involve family in performance of ADLs  - Assess for home care needs following discharge   - Consider OT consult to assist with ADL evaluation and planning for discharge  - Provide patient education as appropriate  Outcome: Progressing  Goal: Maintain proper alignment of affected body part  Description  INTERVENTIONS:  - Support, maintain and protect limb and body alignment  - Provide patient/ family with appropriate education  Outcome: Progressing     Problem: COPING  Goal: Pt/Family able to verbalize concerns and demonstrate effective coping strategies  Description  INTERVENTIONS:  - Assist patient/family to identify coping skills, available support systems and cultural and spiritual values  - Provide emotional support, including active listening and acknowledgement of concerns of patient and caregivers  - Reduce environmental stimuli, as able  - Provide patient education  - Assess for spiritual pain/suffering and initiate spiritual care, including notification of Pastoral Care or ranjit based community as needed  - Assess effectiveness of coping strategies  Outcome: Progressing  Goal: Will report anxiety at manageable levels  Description  INTERVENTIONS:  - Administer medication as ordered  - Teach and encourage coping skills  - Provide emotional support  - Assess patient/family for anxiety and ability to cope  Outcome: Progressing     Problem: CONFUSION/THOUGHT DISTURBANCE  Goal: Thought disturbances (confusion, delirium, depression, dementia or psychosis) are managed to maintain or return to baseline mental status and functional level  Description  INTERVENTIONS:  - Assess for possible contributors to  thought disturbance, including but not limited to medications, infection, impaired vision or hearing, underlying metabolic abnormalities, dehydration, respiratory compromise,  psychiatric diagnoses and notify attending PHYSICAN/AP  - Monitor and intervene to maintain adequate nutrition, hydration, elimination, sleep and activity  - Decrease environmental stimuli, including noise as appropriate  - Provide frequent contacts to provide refocusing, direction and reassurance as needed  Approach patient calmly with eye contact and at their level    - [place] high risk fall precautions, aspiration precautions and other safety measures, as indicated  - If delirium suspected, notify physician/AP of change in condition and request immediate in-person evaluation  - Pursue consults as appropriate including Geriatric (campus dependent), OT for cognitive evaluation/activity planning, psychiatric, pastoral care, etc   Outcome: Progressing     Problem: Prexisting or High Potential for Compromised Skin Integrity  Goal: Skin integrity is maintained or improved  Description  INTERVENTIONS:  - Identify patients at risk for skin breakdown  - Assess and monitor skin integrity  - Assess and monitor nutrition and hydration status  - Monitor labs   - Assess for incontinence   - Turn and reposition patient  - Assist with mobility/ambulation  - Relieve pressure over bony prominences  - Avoid friction and shearing  - Provide appropriate hygiene as needed including keeping skin clean and dry  - Evaluate need for skin moisturizer/barrier cream  - Collaborate with interdisciplinary team   - Patient/family teaching  - Consider wound care consult   Outcome: Progressing

## 2019-12-26 NOTE — SOCIAL WORK
Met with Pt to review team meeting  Pt is in agreement with the d/c plan for 1/2, as well as the recommendations for outpt PT/OT at a [facility] location  CM offered to schedule Pts initial evaluations, and Pt states that would be helpful, and chose the [address] [place] [address] location  [place] [id number] [facility], [phone], to schedule Pts initial evaluation appts    OT is booked for a time, so Pt will follow up and schedule after her PT evaluation  PT: 1/6, at 11:30am    Information placed on d/c instructions

## 2019-12-26 NOTE — PROGRESS NOTES
12/26/19 0840   Pain Assessment   Pain Assessment 0-10   Pain Score Worst Possible Pain   Pain Type Acute pain   Pain Location Shoulder   Pain Orientation Right   Response to Interventions Pt able to tolerate as RN had just given pt pain meds, however, increased diff noted weight bearing through R UE onto RW during amb  Restrictions/Precautions   Precautions Bed/chair alarms;Cognitive; Fall Risk;Impulsive;Supervision on toilet/commode;Pain   Weight Bearing Restrictions Yes   RLE Weight Bearing Per Order WBAT   Cognition   Overall Cognitive Status Impaired   Arousal/Participation Alert; Cooperative   Attention Within functional limits   Orientation Level Oriented X4   Memory Decreased short term memory   Following Commands Follows one step commands with increased time or repetition   Comments Difficulty recalling previous discussions with PT regarding increasing power when performing sit to stand, marching when turning, and turning body fully to look for person behind her  Subjective   Subjective C/o R shoulder pain this session, however, able to tolerate all activities  No other complaints, however, became tearful during session when pt stated "I'm worried about how my house looks  I normally tidy up every day "    Sit to Stand   Type of Assistance Needed Incidental touching;Verbal cues   Amount of Physical Assistance Provided No physical assistance   Sit to Stand CARE Score 4   Bed-Chair Transfer   Type of Assistance Needed Physical assistance   Amount of Physical Assistance Provided Less than 25%   Chair/Bed-to-Chair Transfer CARE Score 3   Transfer Bed/Chair/Wheelchair   Limitations Noted In Balance;Confidence; Coordination; Endurance;Pain Management;Problem Solving; Sequencing;UE Strength;LE Strength   Adaptive Equipment Roller Walker   Stand Pivot Contact Guard;Minimal Assist   Sit to [employer]   Stand to [facility]   Findings Without vc's pt would require more assist to perform transfers  However, when pt given verbal cues to count to 3 to prepare self for standing as well as to "rocket forward", pt able to perform transfers well at [facility]  Still requires Geri at times during stand pivot transfers, due to freezing episodes  Cued pt to turn and look at person behind her when turning as well as "march", however pt with increased difficulty performing this session  Car Transfer   Reason if not Attempted Environmental limitations   Car Transfer CARE Score 10   Walk 10 Feet   Type of Assistance Needed Physical assistance   Amount of Physical Assistance Provided Less than 25%   Walk 10 Feet CARE Score 3   Walk 50 Feet with Two Turns   Type of Assistance Needed Physical assistance   Amount of Physical Assistance Provided Less than 25%   Walk 50 Feet with Two Turns CARE Score 3   Ambulation   Does the patient walk? 2  Yes   Primary Mode of Locomotion Prior to Admission Walk   Distance Walked (feet) 60 ft  (x1, 90'x1)   Assist Device Roller Walker   Gait Pattern Step to; Step through; Antalgic; Inconsistant Dayday; Slow Dayday;Decreased foot clearance; Festination;L foot drag;Lateral deviation;Narrow NANCY;Shuffle;Decreased R stance; Improper weight shift   Limitations Noted In Balance; Coordination;Device Management; Endurance; Heel Strike;Posture; Safety; Sequencing;Speed;Strength;Swing   Provided Assistance with: Balance   Walk Assist Level Contact Guard;Minimum Assist   Findings cont to use lines on floor as visual cue for pt to step past line, however, then pt began taking too big of step on R LE and required vc's to take smaller step to maintain balance  Pt's dayday very slow this session requiring increased time to complete all amb  Pt even stating "I feel like this is harder today" and PT noted that pt did not have skilled PT yesterday which could be reasoning why pt with increased slowness this session  Pt also noted to be pushing more with L UE as RW would veer to R side, and pt required vc's to correct  Deviation most likely due to R shoulder pain this session  Wheelchair mobility   Does the patient use a wheelchair? 0  No   Curb or Single Stair   Style negotiated Single stair   Type of Assistance Needed Physical assistance   Amount of Physical Assistance Provided Less than 25%   1 Step (Curb) CARE Score 3   4 Steps   Type of Assistance Needed Physical assistance   Amount of Physical Assistance Provided Less than 25%   4 Steps CARE Score 3   12 Steps   Reason if not Attempted Activity not applicable   12 Steps CARE Score 9   Stairs   Type Stairs   # of Steps 8   Weight Bearing Precautions RLE;WBAT;Fall Risk   Assist Devices Bilateral Rail   Findings performed x8 steps using BHR's and asecnding/descending facing forwards this session  VC's required approx 60% of time for hand placement especially during descent as well as for proper foot sequencing as pt would attempt to step up with R LE even after reviewing proper sequencing prior to performing  However, able to perform with CGA  mostly, Geri needed at one point to maintain balance  Therapeutic Interventions   Strengthening Seated alt hip flex, LAQ's, and knee flex (red TB)  2# weight on LLE with good tolerance noted to weight  No c/o pain, although R thigh "tightness" noted at end of reps  All exercises performed to fatigue (approx 30 reps) with vc's to perform exercises slowly  Flexibility seated passive B hamstring/gastroc stretching performed 3x30 sec hold   Assessment   Treatment Assessment Session focused on amb, transfers, stair trng, and LE strengthening  Pt able to perform all interventions today, however, very bradykinetic and difficulty with following through with vc's this session which pt noted as well  Pt reports she received PD meds around approx 0730 this a m  Even with increased slow movement today, pt able to perform transfers, stair trng, and amb at Geri-CGA  Still requires eGri at times for balance   Also vc's required during all functional mobilities due to decreased recall of previous discussions regarding proper technique to perform sit to stands/turns/amb safely and effectively  Plan to assess bed mobility next session as it cont to be barrier to home  Also cont to work on all functional mobilities with pt in order to decrease her risk for falls as well as decrease caregiver burden upon d/c  Family/Caregiver Present no   Problem List Decreased strength;Decreased range of motion;Decreased endurance; Impaired balance;Decreased mobility; Decreased coordination;Decreased cognition; Impaired judgement;Decreased safety awareness; Obesity;Pain   Barriers to Discharge Inaccessible home environment   PT Barriers   Physical Impairment Decreased strength;Decreased range of motion;Decreased endurance; Impaired balance;Decreased mobility; Decreased coordination;Decreased cognition;Obesity; Decreased skin integrity;Orthopedic restrictions;Pain   Functional Limitation Car transfers; Ramp negotiation;Stair negotiation;Standing;Transfers; Walking   Plan   Treatment/Interventions Functional transfer training;LE strengthening/ROM; Therapeutic exercise; Endurance training;Gait training   Progress Progressing toward goals   Recommendation   Recommendation Home with family support;Home PT;Outpatient PT   Equipment Recommended Walker   PT - OK to Discharge No   PT Therapy Minutes   PT Time In 0840   PT Time Out 0935   PT Total Time (minutes) 55   PT Mode of treatment - Individual (minutes) 55   PT Mode of treatment - Concurrent (minutes) 0   PT Mode of treatment - Group (minutes) 0   PT Mode of treatment - Co-treat (minutes) 0   PT Mode of Treatment - Total time(minutes) 55 minutes   PT Cumulative Minutes 565   Therapy Time missed   Time missed?  No

## 2019-12-26 NOTE — PROGRESS NOTES
Progress Note - Sunita Kruse [birth year], 75 y o  female MRN: [id number]    Unit/Bed#: [facility] 268-02 Encounter: [id number]    Primary Care Provider: Lady Simin MD   Date and time admitted to hospital: 12/18/2019  2:44 PM        Generalized edema  Assessment & Plan    Advised patient to keep elevated while in chair,  Eloy stockings  12/26/19-noted improvement    Essential hypertension  Assessment & Plan  Vitals:    12/26/19 0723   BP: 127/78   Pulse: 75   Resp: 18   Temp: 98 5 °F (36 9 °C)   SpO2: 99%      Continue with Atenolol 50mg       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 ([facility] )  Assessment & Plan  · Patient with delirium, hallucinations following her surgery  · Had 1 hallucination last night  · Evaluated by neurology, sinimet dose reduced  · Will continue seroquel for now, but will attempt to wean off by completion of rehab      Type 2 diabetes mellitus without complication ([facility] )  Assessment & Plan  Lab Results   Component Value Date    HGBA1C 5 7 12/19/2019       No results for input(s): POCGLU in the last 72 hours  Blood Sugar Average: Last 72 hrs:     Patient pre- diabetic, not on home insulin  DM   Will follow up with PCP outpatient  Will d/c SSI    Other hyperlipidemia  Assessment & Plan  · Continue Pravastatin     Parkinson's disease with use of electrical brain stimulation ([facility] )  Assessment & Plan  · Continue selegiline (patient is on rasagiline 1mg daily as outpatient, evidently not available here on formulary)   Will resume home rasagiline on discharge  · Continue sinemet - dose was decreased by neurology due to hallucinations  · F/u Dr Christian Miguel as outpatient  · promote sleep/wake cycle  · avoid CNS altering medications      * Closed fracture of right hip [facility])  Assessment & Plan  · Acute comprehensive interdisciplinary inpatient rehabilitation including PT, OT, and/or SLP, RN, CM, SW, dietary, psychology, etc   · Goal: supervision  · Patient not cleared to shower at this time  · WBAT  · S/p IM nailing on   · Keep incision C+D+I, remove dressing on 19        VTE Pharmacologic Prophylaxis:   Pharmacologic: Enoxaparin (Lovenox)  Mechanical VTE Prophylaxis in Place: Yes    Current Length of Stay: 8 day(s)    Current Patient Status: Inpatient Rehab     Discharge Plan: As per treatment team      Code Status: Level 3 - DNAR and DNI    Subjective:     Patient seated in chair while working with physical therapy  Patient reports that she did have some shoulder pain this morning however has resolved with Tylenol  Patient has been wearing Eloy stockings and edema to bilateral lower extremity has improved  Patient reports that she did sleep well last night  Patient denies chest pain, shortness of breath and palpitations  Patient reports no pain at this point time  Objective:     Vitals:   Temp (24hrs), Av 5 °F (36 9 °C), Min:98 5 °F (36 9 °C), Max:98 5 °F (36 9 °C)    Temp:  [98 5 °F (36 9 °C)] 98 5 °F (36 9 °C)  HR:  [60-75] 75  Resp:  [18] 18  BP: (127-135)/(63-78) 127/78  SpO2:  [99 %-100 %] 99 %  Body mass index is 36 58 kg/m²  Review of Systems   Constitutional: Negative for activity change, appetite change, chills, diaphoresis and fever  Eyes: Negative for pain, discharge, itching and visual disturbance  Respiratory: Negative for cough, chest tightness, shortness of breath and wheezing  Cardiovascular: Negative for chest pain, palpitations and leg swelling  Gastrointestinal: Negative for abdominal pain, constipation, diarrhea, nausea and vomiting  Endocrine: Negative for polydipsia, polyphagia and polyuria  Genitourinary: Negative for difficulty urinating, dysuria and urgency  Musculoskeletal: Positive for arthralgias (shoulder pain-resolved with tylenol)  Negative for back pain and neck pain  Skin: Negative for rash and wound  Neurological: Negative for dizziness, weakness, numbness and headaches          Input and Output Summary (last 24 hours): Intake/Output Summary (Last 24 hours) at 12/26/2019 1123  Last data filed at 12/25/2019 1701  Gross per 24 hour   Intake 480 ml   Output    Net 480 ml       Physical Exam:     Physical Exam   Constitutional: She is oriented to person, place, and time  She appears well-developed and well-nourished  No distress  HENT:   Head: Normocephalic and atraumatic  Right Ear: External ear normal    Left Ear: External ear normal    Nose: Nose normal    Mouth/Throat: Oropharynx is clear and moist  No oropharyngeal exudate  Eyes: Pupils are equal, round, and reactive to light  Conjunctivae and EOM are normal  Right eye exhibits no discharge  Left eye exhibits no discharge  Neck: Normal range of motion  Neck supple  No thyromegaly present  Cardiovascular: Normal rate, regular rhythm, normal heart sounds and intact distal pulses  Exam reveals no gallop and no friction rub  No murmur heard  Pulmonary/Chest: Effort normal and breath sounds normal  No stridor  No respiratory distress  She has no wheezes  She has no rales  Abdominal: Soft  Bowel sounds are normal  She exhibits no distension  There is no tenderness  Musculoskeletal: She exhibits edema (trace edema- improved)  Lymphadenopathy:     She has no cervical adenopathy  Neurological: She is alert and oriented to person, place, and time  Skin: Skin is warm and dry  No rash noted  She is not diaphoretic  No erythema  Psychiatric: She has a normal mood and affect   Her behavior is normal  Judgment and thought content normal        Additional Data:     Labs:    Results from last 7 days   Lab Units 12/26/19  0513   WBC Thousand/uL 8 30   HEMOGLOBIN g/dL 10 5*   HEMATOCRIT % 31 6*   PLATELETS Thousands/uL 301   NEUTROS PCT % 63   LYMPHS PCT % 26   MONOS PCT % 8   EOS PCT % 3     Results from last 7 days   Lab Units 12/26/19  0513   SODIUM mmol/L 139   POTASSIUM mmol/L 3 6   CHLORIDE mmol/L 104   CO2 mmol/L 27   BUN mg/dL 15   CREATININE mg/dL 0 63   ANION GAP mmol/L 8   CALCIUM mg/dL 8 7   ALBUMIN g/dL 3 4   TOTAL BILIRUBIN mg/dL 0 60   ALK PHOS U/L 81   ALT U/L 14   AST U/L 19   GLUCOSE RANDOM mg/dL 103*         Results from last 7 days   Lab Units 12/19/19 2039 12/19/19  1620   POC GLUCOSE mg/dl 174* 100               Labs reviewed    Imaging:    Imaging reviewed    Recent Cultures (last 7 days):           Last 24 Hours Medication List:     Current Facility-Administered Medications:  acetaminophen 650 mg Oral Q6H PRN Rod Mcqueen MD   aspirin 81 mg Oral Daily Ryan Gilliland MD   atenolol 50 mg Oral Daily Ryan Gilliland MD   carbidopa-levodopa 0 5 tablet Oral TID Ryan Gilliland MD   Coenzyme Q10 400 mg Oral Daily Ryan Gilliland MD   enoxaparin 40 mg Subcutaneous Daily Ryan Gilliland MD   lidocaine 1 patch Topical Daily Ryan Gilliland MD   pravastatin 40 mg Oral Every Other Day Ryan Gilliland MD   QUEtiapine 25 mg Oral HS PRN Ryan Gilliland MD   selegiline 5 mg Oral Daily With Breakfast Ryan Gilliland MD   senna-docusate sodium 1 tablet Oral BID Ryan Gilliland MD   traMADol 25 mg Oral Q6H PRN Ryan Gilliland MD   traMADol 50 mg Oral Q6H PRN Rod Mcqueen MD        M*Modal software was used to dictate this note  It may contain errors with dictating incorrect words or incorrect spelling  Please contact the provider directly with any questions

## 2019-12-26 NOTE — PROGRESS NOTES
Physical Medicine and Rehabilitation Progress Note  Keri Watters 75 y o  female MRN: [id number]  Unit/Bed#: [facility] 268-02 Encounter: [id number]    HPI: Keri Watters is a 75 y o  female who presented [facility] the [facility] [address] after fall at home  Found to have a right hip fracture, underwent IM nailing on 12/14/19  Post-procedure patient with delirium, hallucinations  Neurology service consulted, dose of sinemet was reduced  In addition, Seroquel started in evening  Patient does have DBS which  has controller for  Accepted to [facility] on 12/18/19  Chief Complaint: f/u fracture    Interval: No acute events overnight  Patient without complaint  Pain is controlled  ROS: A 10 point ROS was performed; negative except as noted above  Assessment/Plan:    * Closed fracture of right hip (HCC)  Assessment & Plan  · Acute comprehensive interdisciplinary inpatient rehabilitation including PT, OT, and/or SLP, RN, CM, SW, dietary, psychology, etc   · Goal: supervision  · Clear patient to shower with incision covered  · WBAT  · S/p IM nailing on 12/14    Generalized edema  Assessment & Plan  · R>L  · Apply compression garments    Essential hypertension  Assessment & Plan  Temp:  [98 5 °F (36 9 °C)] 98 5 °F (36 9 °C)  HR:  [60-75] 75  Resp:  [18] 18  BP: (127-135)/(63-78) 127/78    · Continue Atenolol  · IM service managing anti-hypertensives, adjusting as needed    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 ([facility] )  Assessment & Plan  · Patient with delirium, hallucinations following her surgery  · Evaluated by neurology, sinimet dose reduced  · Make seroquel PRN, as patient without hallucinations   Discontinue entirely on discharge      Type 2 diabetes mellitus without complication (HCC)  Assessment & Plan  · Last A1C: 6 3  · Continue diabetic diet    Other hyperlipidemia  Assessment & Plan  · Continue statin    Parkinson's disease with use of [facility] brain [facility] ([facility] )  Assessment & Plan  · Continue selegiline (patient is on rasagiline 1mg daily as outpatient, evidently not available here on formulary)  Will resume home rasagiline on discharge  · Continue sinemet - dose was decreased by neurology due to hallucinations  · F/u Dr Steven Elizondo as outpatient      # Skin  · Encourage regular turning as patient at risk for skin breakdown  · Staff to continue patient education on Q2h turning  · Rehabilitation team to perform skin checks regularly     # Bowel  · Patient reports no constipation     # Bladder  · Patient voiding spontaneously    # Pain  · Continue tylenol, for max of 3gm daily  · Continue tramadol    # Other  - Diet/Nutrition:        Diet Orders   (From admission, onward)             Start     Ordered    12/18/19 1449  Diet Regular; Regular House; Consistent Carbohydrate Diet Level 1 (4 carb servings/60 grams CHO/meal)  Diet effective now     Question Answer Comment   Diet Type Regular    Regular Regular House    Other Restriction(s): Consistent Carbohydrate Diet Level 1 (4 carb servings/60 grams CHO/meal)    RD to adjust diet per protocol?  Yes        12/18/19 1449              - DVT prophy: Sequential compression device (Venodyne)  and Enoxaparin (Lovenox)  - GI ppx: None  - Nausea: None  - Supplements: None  - Sleep: None    Disposition: reteam    CODE: Level 3: DNAR and DNI Scheduled Meds:    Current Facility-Administered Medications:  acetaminophen 650 mg Oral Q6H PRN Jerzy Esposito MD   aspirin 81 mg Oral Daily Ryan Gilliland MD   atenolol 50 mg Oral Daily Ryan Gilliland MD   carbidopa-levodopa 0 5 tablet Oral TID Ryan Gilliland MD   Coenzyme Q10 400 mg Oral Daily Ryan Gilliland MD   enoxaparin 40 mg Subcutaneous Daily Ryan Gilliland MD   lidocaine 1 patch Topical Daily Ryan Gilliland MD   pravastatin 40 mg Oral Every Other Day Ryan Gilliland MD   QUEtiapine 25 mg Oral HS PRN Jerzy Esposito MD   selegiline 5 mg Oral Daily With Tyrese uL MD senna-docusate sodium 1 tablet Oral BID Fatmata Brewer MD   traMADol 25 mg Oral Q6H PRN Fatmata Brewer MD   traMADol 50 mg Oral Q6H PRN Fatmata Brewer MD        Objective:    Functional Update:  Physical Therapy Occupational Therapy Speech Therapy   Transfers: Minimal Assistance, Moderate Assistance  Bed Mobility: Maximum Assistance  Amulation Distance (ft): 75 feet  Ambulation: Minimal Assistance  Assistive Device for Ambulation: Roller Walker  Number of Stairs: 5  Assistive Device for Stairs: Bilateral [employer]  Stair Assistance: Maximum Assistance  Discharge Recommendations: Home with:  [address] with[de-identified] 24 Hour Supervision, Family Support, Outpatient Physical Therapy   Eating: Independent  Grooming: Supervision  Bathing: Minimal Assistance  Bathing: Minimal Assistance  Upper Body Dressing: Minimal Assistance  Lower Body Dressing: Maximum Assistance  Toileting: Total Assistance  Tub/Shower Transfer: Maximum Assistance  Toilet Transfer: Moderate Assistance  Cognition: Within Defined Limits  Orientation: Person, Place, Time, Situation               Allergies per EMR    Physical Exam:  Temp:  [98 5 °F (36 9 °C)] 98 5 °F (36 9 °C)  HR:  [60-75] 75  Resp:  [18] 18  BP: (127-135)/(63-78) 127/78  SpO2:  [99 %-100 %] 99 %    General: alert, no apparent distress, cooperative and comfortable  HEENT:  Head: Normocephalic, no lesions, without obvious abnormality  Eye: Normal external eye, conjunctiva, lids cornea, NORBERTO  Ears: normal external ears  Nose: Normal external nose, mucus membranes and septum  LUNGS:  no abnormal respiratory pattern, no retractions noted, non-labored breathing   ABDOMEN:  soft, non-tender  Bowel sounds normal  No masses, no organomegaly  EXTREMITIES:  extremities normal, warm and well-perfused; no cyanosis, clubbing, or edema  NEURO:   clear speech, following commands appropriately  PSYCH:  Alert and oriented, appropriate affect    INCISION:  dressings present    Physical examination is otherwise unchanged from previous encounter, except as noted above  Diagnostic Studies: Reviewed, no new imaging  No orders to display     Laboratory: Reviewed  Results from last 7 days   Lab Units 12/26/19  0513 12/23/19  0538 12/19/19  1048   HEMOGLOBIN g/dL 10 5* 10 7* 11 7*   HEMATOCRIT % 31 6* 32 5* 34 4*   WBC Thousand/uL 8 30 8 50 9 00     Results from last 7 days   Lab Units 12/26/19  0513 12/23/19  0538 12/19/19  1048   BUN mg/dL 15 17 19   SODIUM mmol/L 139 140 139   POTASSIUM mmol/L 3 6 3 6 3 6   CHLORIDE mmol/L 104 103 104   CREATININE mg/dL 0 63 0 67 0 67   AST U/L 19 17 20   ALT U/L 14 17 14            ** Please Note: Fluency Direct voice to text software may have been used in the creation of this document  **    Total time spent:  35 minutes, with more than 50% spent counseling/coordinating care  Counseling includes discussion with patient re: progress in therapies, functional issues observed by therapy staff, and discussion with patient his/her current medical state/wellbeing  Coordination of patient's care was performed in conjunction with Internal Medicine service to monitor patient's labs, vitals, and management of their comorbidities  In addition, this patient was discussed by the interdisciplinary team in weekly case conference today  The care of the patient was extensively discussed with all care providers and an appropriate rehabilitation plan was formulated unique for this patient  Barriers were identified preventing progression of therapy and appropriate interventions were discussed with each discipline  Please see the team note for input from all disciplines regarding barriers, intervention, and discharge planning

## 2019-12-26 NOTE — PROGRESS NOTES
12/26/19 1030   Pain Assessment   Pain Assessment 0-10   Pain Score No Pain   Response to Interventions Pt reports no pain in R shoulder this session during activities  Restrictions/Precautions   Precautions Bed/chair alarms;Cognitive; Fall Risk;Impulsive;Supervision on toilet/commode;Pain   Weight Bearing Restrictions Yes   RLE Weight Bearing Per Order WBAT   Cognition   Overall Cognitive Status Impaired   Arousal/Participation Alert; Cooperative   Attention Within functional limits   Orientation Level Oriented X4   Memory Decreased short term memory   Following Commands Follows one step commands with increased time or repetition   Subjective   Subjective no complaints this session of pain  Pt states "I think the medication really helped " Pt agreeable to PT tx      Roll Left and Right   Type of Assistance Needed Physical assistance   Amount of Physical Assistance Provided 75% or more   Comment pt reports she does not roll in bed when home; trialed with HOB flat w/o bed rails with pt requiring maxA   Roll Left and Right CARE Score 2   Sit to Lying   Type of Assistance Needed Physical assistance   Amount of Physical Assistance Provided Less than 25%   Comment with HOB flat, w/o bedrails, able to perform at Geri-CGA   Sit to Lying CARE Score 3   Lying to Sitting on Side of Bed   Type of Assistance Needed Physical assistance   Amount of Physical Assistance Provided 75% or more   Comment even with HOB elevated, maxA due to rigidity to get trunk forward; pt reports  would help her with this task at home prior to current hospitalization; CGA with use of bedrail and HOB elevated   Lying to Sitting on Side of Bed CARE Score 2   Sit to Stand   Type of Assistance Needed Incidental touching   Amount of Physical Assistance Provided No physical assistance   Comment CG-CS   Sit to Stand CARE Score 4   Bed-Chair Transfer   Type of Assistance Needed Incidental touching   Amount of Physical Assistance Provided No physical assistance   Chair/Bed-to-Chair Transfer CARE Score 4   Transfer Bed/Chair/Wheelchair   Limitations Noted In Balance;Confidence; Coordination; Endurance;Problem Solving; Sequencing;UE Strength;LE Strength   Adaptive Equipment Roller Walker   Stand Pivot Contact Guard   Sit to [employer]   Stand to [facility] Guard;Supervision   Supine to Sit Maximum Assist;Contact Guard  (maxA w/o bedrail; CG with bedrail)   Sit to Supine Contact Guard;Minimal Assist   Findings pt reports bed at home has elevating head and feet in which she utilized to help her get out of bed at home  Reports that  did have to help her during supine to sit prior to hospitalization but did report that she was interested in bed cane if available to assist with bed mobilities  PT to provide handouts regarding bedcanes that would be available for pt to purchase in order to assist pt and  with bed mobilities  During transfers, vc's required for proper setup for pt to be able to complete  Pt still with difficulties turning to chair before sitting as she freezes and even with vc's to march as well as take big steps, has difficulty  Walk 10 Feet   Type of Assistance Needed Incidental touching   Amount of Physical Assistance Provided No physical assistance   Walk 10 Feet CARE Score 4   Walk 50 Feet with Two Turns   Type of Assistance Needed Incidental touching   Amount of Physical Assistance Provided No physical assistance   Walk 50 Feet with Two Turns CARE Score 4   Walk 150 Feet   Reason if not Attempted Safety concerns   Walk 150 Feet CARE Score 88   Walking 10 Feet on Uneven Surfaces   Reason if not Attempted Safety concerns   Walking 10 Feet on Uneven Surfaces CARE Score 88   Ambulation   Does the patient walk? 2  Yes   Primary Mode of Locomotion Prior to Admission Walk   Distance Walked (feet) 90 ft  (x2)   Assist Device Roller Walker   Gait Pattern Improper weight shift;Decreased R stance; Step through; Shuffle;Narrow NANCY;Festination; Slow Oliva;Decreased foot clearance; Inconsistant Oliva   Limitations Noted In Balance; Coordination;Device Management; Endurance; Heel Strike;Posture; Safety; Sequencing;Speed;Strength;Swing   Provided Assistance with: Balance   Walk Assist Level Contact Guard;Close Supervision   Findings mostly CGA, however, CS for approx 30' while pt was on straight path; pt with improvements noted in gait compared to previous session and required less vc's for stride length and well as to increase NANCY width  Pt also able to perform step through pattern this session consistently with no LOB  Improvements noted in gait speed as well with pt not appearing so rigid  Improvements noted during turns when amb as pt able to  feet and clear without freezing  Wheelchair mobility   Does the patient use a wheelchair? 0  No   Therapeutic Interventions   Other Repeated sit to stands performed from standard chair to improve pt's ability to complete  Focused on hand placement as pt consistently went to bring L hand onto walker, however, will tip walker and not be able to stand if hand is on walker  Educated pt to push from chair with both hands  Also educated pt to flex knees and get "nose over toes" while rocketing forward  At first, pt with difficulty completing, appearing to get frustrated  Had pt take deep breaths and calm self, and then with vc's pt able to perform STS at [facility]  Performed x3 more occassions with vc's for hand placement (especially getting hands back when sitting down) in which pt able to follow vc's and perform all at [facility]  Assessment   Treatment Assessment Session focused on bed mobility, transfers, and amb with RW  Pt reports that at home, she has bed where [facility] and feet elevate, therefore decided to trial bed mobility as pt would perform at home  Pt performs sit to supine at home with [facility] flat and required only Geri-CGA which is improvement since initial evaluation   Pt with difficulty rolling w/o use of bedrail (maxA), however, with bed rail is able to perform at [facility], however, pt reports she does not have bedrails at home  Pt then performed supine to sit with HOB elevated as she would perform at home  Pt maxA due to trunk rigidity and retropulsion, but when allowed to use bedrail, pt CGA  Pt reports  would help her at home for supine to sit  PT recommended to pt about purchasing bed cane in which pt stated she was interested in purchasing to decrease caregiver burden upon d/c  PT to provide pt with written information regarding bed rail to be purchased  Pt with improvements noted in transfers this session after performing multiple repetitions to help improve pt's technique as well as confidence with transfer  Continued with cues to "rocket up" in which pt able to complete at [facility]  Pt with improvements in amb this session as gait speed was faster and pt had better stride length and pt was able to perform at [facility]  Improvements suspected due to effectiveness of medications  Plan to continue to work on amb, transfers, bed mobility, LE strengthening, ramps, and endurance trng with pt in order to decrease risk for falls as well as to allow pt to achieve set LTGs upon d/c  Family/Caregiver Present no   Problem List Decreased strength;Decreased range of motion;Decreased endurance; Impaired balance;Decreased mobility; Decreased coordination;Decreased cognition; Impaired judgement;Decreased safety awareness; Obesity;Pain   Barriers to Discharge Inaccessible home environment   PT Barriers   Physical Impairment Decreased strength;Decreased range of motion;Decreased endurance; Impaired balance;Decreased mobility; Decreased coordination;Decreased cognition;Obesity; Decreased skin integrity;Orthopedic restrictions;Pain   Functional Limitation Car transfers; Ramp negotiation;Stair negotiation;Standing;Transfers; Walking   Plan   Treatment/Interventions Functional transfer training;LE strengthening/ROM; Therapeutic exercise; Endurance training;Bed mobility;Gait training   Progress Progressing toward goals   Recommendation   Recommendation Home with family support;Home PT;Outpatient PT   Equipment Recommended Walker  (pt has RW at home)   PT - OK to Discharge No   PT Therapy Minutes   PT Time In 1030   PT Time Out 1105   PT Total Time (minutes) 35   PT Mode of treatment - Individual (minutes) 35   PT Mode of treatment - Concurrent (minutes) 0   PT Mode of treatment - Group (minutes) 0   PT Mode of treatment - Co-treat (minutes) 0   PT Mode of Treatment - Total time(minutes) 35 minutes   PT Cumulative Minutes 600   Therapy Time missed   Time missed?  No

## 2019-12-27 ENCOUNTER — PATIENT OUTREACH (OUTPATIENT)
Dept: CASE MANAGEMENT | Facility: OTHER | Age: 74
End: 2019-12-27

## 2019-12-27 ENCOUNTER — APPOINTMENT (INPATIENT)
Dept: RADIOLOGY | Facility: HOSPITAL | Age: 74
DRG: 560 | End: 2019-12-27
Payer: MEDICARE

## 2019-12-27 PROCEDURE — 97535 SELF CARE MNGMENT TRAINING: CPT

## 2019-12-27 PROCEDURE — 73502 X-RAY EXAM HIP UNI 2-3 VIEWS: CPT

## 2019-12-27 PROCEDURE — 99024 POSTOP FOLLOW-UP VISIT: CPT | Performed by: ORTHOPAEDIC SURGERY

## 2019-12-27 PROCEDURE — 97116 GAIT TRAINING THERAPY: CPT

## 2019-12-27 PROCEDURE — 99232 SBSQ HOSP IP/OBS MODERATE 35: CPT | Performed by: PHYSICAL MEDICINE & REHABILITATION

## 2019-12-27 PROCEDURE — 97110 THERAPEUTIC EXERCISES: CPT

## 2019-12-27 PROCEDURE — 99232 SBSQ HOSP IP/OBS MODERATE 35: CPT | Performed by: INTERNAL MEDICINE

## 2019-12-27 PROCEDURE — 97530 THERAPEUTIC ACTIVITIES: CPT

## 2019-12-27 RX ADMIN — CARBIDOPA AND LEVODOPA 0.5 TABLET: 25; 100 TABLET ORAL at 16:02

## 2019-12-27 RX ADMIN — CARBIDOPA AND LEVODOPA 0.5 TABLET: 25; 100 TABLET ORAL at 21:22

## 2019-12-27 RX ADMIN — TRAMADOL HYDROCHLORIDE 50 MG: 50 TABLET, COATED ORAL at 05:08

## 2019-12-27 RX ADMIN — SENNOSIDES AND DOCUSATE SODIUM 1 TABLET: 8.6; 5 TABLET ORAL at 08:01

## 2019-12-27 RX ADMIN — ENOXAPARIN SODIUM 40 MG: 40 INJECTION SUBCUTANEOUS at 08:01

## 2019-12-27 RX ADMIN — TRAMADOL HYDROCHLORIDE 50 MG: 50 TABLET, COATED ORAL at 12:01

## 2019-12-27 RX ADMIN — ASPIRIN 81 MG: 81 TABLET, COATED ORAL at 08:01

## 2019-12-27 RX ADMIN — SENNOSIDES AND DOCUSATE SODIUM 1 TABLET: 8.6; 5 TABLET ORAL at 17:13

## 2019-12-27 RX ADMIN — ACETAMINOPHEN 650 MG: 325 TABLET ORAL at 04:30

## 2019-12-27 RX ADMIN — LIDOCAINE 1 PATCH: 50 PATCH CUTANEOUS at 08:01

## 2019-12-27 RX ADMIN — ATENOLOL 50 MG: 25 TABLET ORAL at 08:02

## 2019-12-27 RX ADMIN — ACETAMINOPHEN 650 MG: 325 TABLET ORAL at 10:39

## 2019-12-27 RX ADMIN — CARBIDOPA AND LEVODOPA 0.5 TABLET: 25; 100 TABLET ORAL at 07:46

## 2019-12-27 RX ADMIN — SELEGILINE HYDROCHLORIDE 5 MG: 5 TABLET ORAL at 07:46

## 2019-12-27 NOTE — PROGRESS NOTES
12/27/19 8854   Pain Assessment   Pain Assessment 0-10   Pain Score 7   Pain Type Acute pain   Pain Location Leg   Pain Orientation Right; Inner   Pain Descriptors Aching; Sore   Pain Frequency Constant/continuous   Pain Onset Gradual   Clinical Progression Rapidly worsening   Effect of Pain on Daily Activities impacts functional transfers   Patient's Stated Pain Goal No pain   Hospital Pain Intervention(s) Repositioned; Rest  (communicated with CRISTHIAN ferrell)   Restrictions/Precautions   Precautions Bed/chair alarms;Cognitive; Fall Risk;Spinal precautions   Weight Bearing Restrictions Yes   RLE Weight Bearing Per Order WBAT   Tub/Shower Transfer   Limitations Noted In Balance; Endurance;ROM;Problem Solving;UE Strength;LE Strength   Adaptive Equipment Seat with Back   Assessed Shower  (dry transfer)   Findings Therapit trialled stepping over 2in lip in orer to enter shower  However, pt unable to WB on RLE and lift LLE  Pt reporting more pain than usual on this da on inner R leg  Therapist communicated with CRISTHIAN ferrell   Putting On/Taking Off Footwear   Type of Assistance Needed Physical assistance   Amount of Physical Assistance Provided 50%-74%   Comment Therapist provided Carmen Ragland  PT with poor carryover of technique of LHAE  Pt able to don R sock, however, pt don 2nd sock but attempted to don on R foot again  Pt requiring vc to don L sock  Pt reporting "Oh i didn't notice"  In addition, pt requiring A for use of shoe horn  THerapsit educated pt on proper placement of shoe horn however, pt with poor carryover requiring modA to don shoes   Putting On/Taking Off Footwear CARE Score 2   Dressing/Undressing Clothing   Remove LB Clothes Socks; Shoes   Don LB [place]; Shoes   Adaptive Equipment Reacher;Dressing Stick;LH Shoehorn;Sock Aide   Positioning Supported Sit   Sit to Stand   Type of Assistance Needed Verbal cues; Supervision   Amount of Physical Assistance Provided No physical assistance   Comment CS with vcs   Sit to Stand CARE Score 4   Bed-Chair Transfer   Type of Assistance Needed Physical assistance   Amount of Physical Assistance Provided 25%-49%   Comment 2* to increased pain in RLE pt requirng mnA for functional transfer from w/c>recliner and vc for proper hand/foot palcement   Chair/Bed-to-Chair Transfer CARE Score 3   Transfer Bed/Chair/Wheelchair   Positioning Concerns Skin Integrity   Limitations Noted In Balance;Confidence; Endurance;UE Strength;LE Strength   Adaptive Equipment Roller Walker   Therapeutic Excerise-Strength   UE Strength No   Exercise Tools   Exercise Tools Yes   Other Exercise Tool 1 Pt engaged in BUE strengthening wiht 2lb weigth 3x10 in all avaialbel hsoudler planes, limtied in shodelder ROM  Pt able to compete 3x10 with rest breaks inbetween 2* to pt reporting increased fatigue and requiring vc for optimal performance   Cognition   Overall Cognitive Status Impaired   Arousal/Participation Alert; Cooperative   Attention Within functional limits   Orientation Level Oriented X4   Memory Decreased short term memory;Decreased recall of precautions   Following Commands Follows multistep commands with increased time or repetition   Activity Tolerance   Activity Tolerance Patient limited by fatigue   Medical Staff Made Aware Communicated with RN 7/10 pain on R inner thigh, RN verbalized udnerstanding   Assessment   Treatment Assessment Pt seen for 60mins of skilled TO services with focus on LB dressing and shower transfer , BUE strengthening  PT observe don this day with increased fatigue and decreased safety awareness  Pt reporting increased pain 7/10 R inner thigh impacting functional transfers  Pt demosntrates poor carryover of education with LHAE requiring constant vc especialyl for use of shoe horn  Pt can continue to benefit ofrm skilled OT services with focus on LB dressing, dynamic standing balance, shower transfer, BUE strengthening for increased particiaption and to decrease caregiver burden  Prognosis Fair   Problem List Decreased strength;Decreased range of motion;Decreased endurance; Impaired balance;Decreased mobility; Impaired judgement;Decreased safety awareness;Pain   Barriers to Discharge Inaccessible home environment   Plan   Treatment/Interventions ADL retraining;Functional transfer training; Therapeutic exercise; Endurance training;Patient/family training;Bed mobility; Compensatory technique education   Progress Progressing toward goals   Recommendation   OT Discharge Recommendation Home with family support   Equipment Recommended Other (comment)  (LHAE)   OT Therapy Minutes   OT Time In 0930   OT Time Out 1030   OT Total Time (minutes) 60   OT Mode of treatment - Individual (minutes) 60   OT Mode of treatment - Concurrent (minutes) 0   OT Mode of treatment - Group (minutes) 0   OT Mode of treatment - Co-treat (minutes) 0   OT Mode of Treatment - Total time(minutes) 60 minutes   OT Cumulative Minutes 755   Therapy Time missed   Time missed?  No

## 2019-12-27 NOTE — NURSING NOTE
Pt very restless, assisted out of bed with R/W to W/C taken to bathroom voiding qs  Assisted back to bed  Pt alert & oriented x 4 @ present  C/o 8/10 pain in R thigh  Refused Ultram only requested Tylenol  Will continue to monitor

## 2019-12-27 NOTE — PROGRESS NOTES
Physical Medicine and Rehabilitation Progress Note  Brady Robb 75 y o  female MRN: [id number]  Unit/Bed#: [facility] 268-02 Encounter: [id number]    HPI: Brady Robb is a 75 y o  female who presented to the [facility] after fall at home  Found to have a right hip fracture, underwent IM nailing on 12/14/19  Post-procedure patient with delirium, hallucinations  Neurology service consulted, dose of sinemet was reduced  In addition, Seroquel started in evening  Patient does have DBS which  has controller for  Accepted to [facility] on 12/18/19  Chief Complaint: f/u fracture    Interval: No acute events overnight  Patient without complaint  Slept well overnight  ROS: A 10 point ROS was performed; negative except as noted above  Assessment/Plan:    * Closed fracture of right hip (HCC)  Assessment & Plan  · Acute comprehensive interdisciplinary inpatient rehabilitation including PT, OT, and/or SLP, RN, CM, SW, dietary, psychology, etc   · Goal: supervision  · Clear patient to shower with incision covered  · WBAT  · S/p IM nailing on 12/14    Generalized edema  Assessment & Plan  · R>L  · Apply compression garments    Essential hypertension  Assessment & Plan  Temp:  [97 4 °F (36 3 °C)-99 3 °F (37 4 °C)] 97 4 °F (36 3 °C)  HR:  [68-72] 72  Resp:  [18] 18  BP: (123-155)/(60-80) 155/80    · Continue Atenolol  · IM service managing anti-hypertensives, adjusting as needed    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 ([facility] [address] )  Assessment & Plan  · Patient with delirium, hallucinations following her surgery  · Evaluated by neurology, sinimet dose reduced  · Make seroquel PRN, as patient without hallucinations   Discontinue entirely on discharge      Type 2 diabetes mellitus without complication (HCC)  Assessment & Plan  · Last A1C: 6 3  · Continue diabetic diet    Other hyperlipidemia  Assessment & Plan  · Continue statin    Parkinson's disease with use of electrical brain stimulation [facility])  Assessment & Plan  · Continue selegiline (patient is on rasagiline 1mg daily as outpatient, evidently not available here on formulary)  Will resume home rasagiline on discharge  · Continue sinemet - dose was decreased by neurology due to hallucinations  · F/u Dr Kay Zuñiga as outpatient      # Skin  · Encourage regular turning as patient at risk for skin breakdown  · Staff to continue patient education on Q2h turning  · Rehabilitation team to perform skin checks regularly     # Bowel  · Patient reports no constipation     # Bladder  · Patient voiding spontaneously    # Pain  · Continue tylenol, for max of 3gm daily  · Continue tramadol    # Other  - Diet/Nutrition:        Diet Orders   (From admission, onward)             Start     Ordered    12/18/19 1449  Diet Regular; Regular House; Consistent Carbohydrate Diet Level 1 (4 carb servings/60 grams CHO/meal)  Diet effective now     Question Answer Comment   Diet Type Regular    Regular Regular House    Other Restriction(s): Consistent Carbohydrate Diet Level 1 (4 carb servings/60 grams CHO/meal)    RD to adjust diet per protocol?  Yes        12/18/19 1449              - DVT prophy: Sequential compression device (Venodyne)  and Enoxaparin (Lovenox)  - GI ppx: None  - Nausea: None  - Supplements: None  - Sleep: None    Disposition: 1/2/20    CODE: Level 3: DNAR and DNI Scheduled Meds:    Current Facility-Administered Medications:  acetaminophen 650 mg Oral Q6H PRN Valerie Kirkland MD   aspirin 81 mg Oral Daily Ryan Gilliland MD   atenolol 50 mg Oral Daily Ryan Gilliland MD   carbidopa-levodopa 0 5 tablet Oral TID Ryan Gilliland MD   Coenzyme Q10 400 mg Oral Daily Ryan Gilliland MD   enoxaparin 40 mg Subcutaneous Daily Ryan Gilliland MD   lidocaine 1 patch Topical Daily Ryan Gilliland MD   pravastatin 40 mg Oral Every Other Day Ryan Gilliland MD   QUEtiapine 25 mg Oral HS PRN Valerie Kirkland MD   selegiline 5 mg Oral Daily With Breakfast Ryan SMITH MD Darshana   senna-docusate sodium 1 tablet Oral BID Valerie Kirkland MD   traMADol 25 mg Oral Q6H PRN Ryan Gilliland MD   traMADol 50 mg Oral Q6H PRN Valerie Kirkland MD        Objective:    Functional Update:  Physical Therapy Occupational Therapy Speech Therapy   Transfers: Minimal Assistance, Moderate Assistance  Bed Mobility: Maximum Assistance  Amulation Distance (ft): 75 feet  Ambulation: Minimal Assistance  Assistive Device for Ambulation: Roller Walker  Number of Stairs: 5  Assistive Device for Stairs: Bilateral [employer]  Stair Assistance: Maximum Assistance  Discharge Recommendations: Home with:  [address] with[de-identified] 24 Hour Supervision, Family Support, Outpatient Physical Therapy   Eating: Independent  Grooming: Supervision  Bathing: Minimal Assistance  Bathing: Minimal Assistance  Upper Body Dressing: Minimal Assistance  Lower Body Dressing: Maximum Assistance  Toileting: Total Assistance  Tub/Shower Transfer: Maximum Assistance  Toilet Transfer: Moderate Assistance  Cognition: Within Defined Limits  Orientation: Person, Place, Time, Situation               Allergies per EMR    Physical Exam:  Temp:  [97 4 °F (36 3 °C)-99 3 °F (37 4 °C)] 97 4 °F (36 3 °C)  HR:  [68-72] 72  Resp:  [18] 18  BP: (123-155)/(60-80) 155/80  SpO2:  [98 %-100 %] 100 %    General: alert, no apparent distress, cooperative and comfortable  HEENT:  Head: Normocephalic, no lesions, without obvious abnormality  Eye: Normal external eye, conjunctiva, lids cornea, NORBERTO  Ears: normal external ears  Nose: Normal external nose, mucus membranes and septum  LUNGS:  no abnormal respiratory pattern, no retractions noted, non-labored breathing   ABDOMEN:  soft, non-tender  Bowel sounds normal  No masses, no organomegaly  EXTREMITIES:  extremities normal, warm and well-perfused; no cyanosis, clubbing, or edema  NEURO:   clear speech, following commands appropriately  PSYCH:  Alert and oriented, appropriate affect    INCISION:  dressings present    Physical examination is otherwise unchanged from previous encounter, except as noted above  Diagnostic Studies: Reviewed, no new imaging  No orders to display     Laboratory: Reviewed  Results from last 7 days   Lab Units 12/26/19  0513 12/23/19  0538   HEMOGLOBIN g/dL 10 5* 10 7*   HEMATOCRIT % 31 6* 32 5*   WBC Thousand/uL 8 30 8 50     Results from last 7 days   Lab Units 12/26/19  0513 12/23/19  0538   BUN mg/dL 15 17   SODIUM mmol/L 139 140   POTASSIUM mmol/L 3 6 3 6   CHLORIDE mmol/L 104 103   CREATININE mg/dL 0 63 0 67   AST U/L 19 17   ALT U/L 14 17            ** Please Note: Fluency Direct voice to text software may have been used in the creation of this document   **

## 2019-12-27 NOTE — PROGRESS NOTES
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 ([facility] )  Assessment & Plan  · Patient with delirium, hallucinations following her surgery  · Had 1 hallucination last night  · Evaluated by neurology, sinimet dose reduced  · Will continue seroquel for now, but will attempt to wean off by completion of rehab      Type 2 diabetes mellitus without complication ([facility] )  Assessment & Plan  Lab Results   Component Value Date    HGBA1C 5 7 12/19/2019       No results for input(s): POCGLU in the last 72 hours  Blood Sugar Average: Last 72 hrs:     Patient pre- diabetic, not on home insulin  DM   Will follow up with PCP outpatient  Will d/c SSI    Other hyperlipidemia  Assessment & Plan  · Continue Pravastatin     Parkinson's disease with use of electrical brain stimulation ([facility] )  Assessment & Plan  · Continue selegiline (patient is on rasagiline 1mg daily as outpatient, evidently not available here on formulary)   Will resume home rasagiline on discharge  · Continue sinemet - dose was decreased by neurology due to hallucinations  · F/u Dr Augusto Kevin as outpatient  · promote sleep/wake cycle  · avoid CNS altering medications      * Closed fracture of right hip [facility])  Assessment & Plan  · Acute comprehensive interdisciplinary inpatient rehabilitation including PT, OT, and/or SLP, RN, CM, SW, dietary, psychology, etc   · Goal: supervision  · Patient not cleared to shower at this time  · WBAT  · S/p IM nailing on   · Will consult ortho, for possible staple removal        VTE Pharmacologic Prophylaxis:   Pharmacologic: Enoxaparin (Lovenox)  Mechanical VTE Prophylaxis in Place: Yes    Current Length of Stay: 9 day(s)    Current Patient Status: Inpatient Rehab     Discharge Plan: As per treatment team      Code Status: Level 3 - DNAR and DNI    Subjective:     Patient seated in chair while working with physical therapy  Patient does report that she has some mild bilateral lower extremity pain, patient is currently wearing Eloy stockings and edema to bilateral lower extremities has improved  Patient reports that she did not sleep well last night due to bad dreams  Patient denies chest pain, shortness of breath and palpitations  Patient reports no pain at this point time  Objective:     Vitals:   Temp (24hrs), Av 6 °F (37 °C), Min:97 4 °F (36 3 °C), Max:99 3 °F (37 4 °C)    Temp:  [97 4 °F (36 3 °C)-99 3 °F (37 4 °C)] 97 4 °F (36 3 °C)  HR:  [68-72] 72  Resp:  [18] 18  BP: (123-155)/(60-80) 155/80  SpO2:  [98 %-100 %] 100 %  Body mass index is 36 11 kg/m²  Review of Systems   Constitutional: Negative for activity change, appetite change, chills, diaphoresis and fever  Eyes: Negative for pain, discharge, itching and visual disturbance  Respiratory: Negative for cough, chest tightness, shortness of breath and wheezing  Cardiovascular: Negative for chest pain, palpitations and leg swelling  Gastrointestinal: Negative for abdominal pain, constipation, diarrhea, nausea and vomiting  Endocrine: Negative for polydipsia, polyphagia and polyuria  Genitourinary: Negative for difficulty urinating, dysuria and urgency  Musculoskeletal: Negative for arthralgias, back pain and neck pain  Skin: Negative for rash and wound  Neurological: Negative for dizziness, weakness, numbness and headaches  Input and Output Summary (last 24 hours):        Intake/Output Summary (Last 24 hours) at 12/27/2019 1100  Last data filed at 12/27/2019 0801  Gross per 24 hour   Intake 200 ml   Output    Net 200 ml       Physical Exam:     Physical Exam   Constitutional: She is oriented to person, place, and time  She appears well-developed and well-nourished  No distress  HENT:   Head: Normocephalic and atraumatic  Right Ear: External ear normal    Left Ear: External ear normal    Nose: Nose normal    Mouth/Throat: Oropharynx is clear and moist  No oropharyngeal exudate  Eyes: Pupils are equal, round, and reactive to light  Conjunctivae and EOM are normal  Right eye exhibits no discharge  Left eye exhibits no discharge  Neck: Normal range of motion  Neck supple  No thyromegaly present  Cardiovascular: Normal rate, regular rhythm, normal heart sounds and intact distal pulses  Exam reveals no gallop and no friction rub  No murmur heard  Pulmonary/Chest: Effort normal and breath sounds normal  No stridor  No respiratory distress  She has no wheezes  She has no rales  Abdominal: Soft  Bowel sounds are normal  She exhibits no distension  There is no tenderness  Musculoskeletal: She exhibits edema (trace to B/L LE)  Lymphadenopathy:     She has no cervical adenopathy  Neurological: She is alert and oriented to person, place, and time  Skin: Skin is warm and dry  No rash noted  She is not diaphoretic  No erythema  Psychiatric: She has a normal mood and affect   Her behavior is normal  Judgment and thought content normal        Additional Data:     Labs:    Results from last 7 days   Lab Units 12/26/19  0513   WBC Thousand/uL 8 30   HEMOGLOBIN g/dL 10 5*   HEMATOCRIT % 31 6*   PLATELETS Thousands/uL 301   NEUTROS PCT % 63   LYMPHS PCT % 26   MONOS PCT % 8   EOS PCT % 3     Results from last 7 days   Lab Units 12/26/19  0513   SODIUM mmol/L 139   POTASSIUM mmol/L 3 6   CHLORIDE mmol/L 104   CO2 mmol/L 27   BUN mg/dL 15   CREATININE mg/dL 0 63   ANION GAP mmol/L 8   CALCIUM mg/dL 8 7 ALBUMIN g/dL 3 4   TOTAL BILIRUBIN mg/dL 0 60   ALK PHOS U/L 81   ALT U/L 14   AST U/L 19   GLUCOSE RANDOM mg/dL 103*                       Labs reviewed    Imaging:    Imaging reviewed    Recent Cultures (last 7 days):           Last 24 Hours Medication List:     Current Facility-Administered Medications:  acetaminophen 650 mg Oral Q6H PRN Kiana Choe MD   aspirin 81 mg Oral Daily Ryan Gilliland MD   atenolol 50 mg Oral Daily Ryan Gilliland MD   carbidopa-levodopa 0 5 tablet Oral TID Ryan Gilliland MD   Coenzyme Q10 400 mg Oral Daily Ryan Gilliland MD   enoxaparin 40 mg Subcutaneous Daily Ryan Gilliland MD   lidocaine 1 patch Topical Daily Ryan Gilliland MD   pravastatin 40 mg Oral Every Other Day Ryan Gilliland MD   QUEtiapine 25 mg Oral HS PRN Ryan Gilliland MD   selegiline 5 mg Oral Daily With Breakfast Ryan Gilliland MD   senna-docusate sodium 1 tablet Oral BID Ryan Gilliland MD   traMADol 25 mg Oral Q6H PRN Ryan Gilliland MD   traMADol 50 mg Oral Q6H PRN Kiana Choe MD        M*Modal software was used to dictate this note  It may contain errors with dictating incorrect words or incorrect spelling  Please contact the provider directly with any questions

## 2019-12-27 NOTE — NURSING NOTE
Last 2 voids in toilet no incontinence  Ultram given for pain in R thigh & R groin 8/10 - 4/10  Resting comfortably in bed @ present  Remains refusing SCD's  Call bell within reach &  encouraged to use, pt verbalized understanding  Bed alarm on, oriented x 4

## 2019-12-27 NOTE — ASSESSMENT & PLAN NOTE
Vitals:    12/27/19 0715   BP: 155/80   Pulse: 72   Resp: 18   Temp: (!) 97 4 °F (36 3 °C)   SpO2: 100%      Continue with Atenolol 50mg

## 2019-12-27 NOTE — PROGRESS NOTES
12/27/19 0830   Restrictions/Precautions   Precautions Bed/chair alarms;Cognitive; Fall Risk;Supervision on toilet/commode   RLE Weight Bearing Per Order WBAT   Subjective   Subjective Pt did not sleep well but still able to participate    Sit to Stand   Type of Assistance Needed Verbal cues; Supervision   Comment CS with VCs   Sit to Stand CARE Score 4   Bed-Chair Transfer   Type of Assistance Needed Incidental touching   Comment CG with RW   Chair/Bed-to-Chair Transfer CARE Score 4   Transfer Bed/Chair/Wheelchair   Limitations Noted In Balance   Adaptive Equipment Roller Walker   Sit to Stand Supervision   Stand to Sit Supervision   Findings Continue cues for hand placement,  blast up when standing,  March and face (object) when turning to control freezing   Walk 10 Feet   Type of Assistance Needed Incidental touching   Comment CG with RW   Walk 10 Feet CARE Score 4   Walk 50 Feet with Two Turns   Type of Assistance Needed Incidental touching   Comment CG with RW   Walk 50 Feet with Two Turns CARE Score 4   Walking 10 Feet on Uneven Surfaces   Type of Assistance Needed Incidental touching   Comment up down ramp with RW  CG   Walking 10 Feet on Uneven Surfaces CARE Score 4   Ambulation   Primary Mode of Locomotion Prior to Admission Walk   Distance Walked (feet) 50 ft   Assist Device Roller Walker   Gait Pattern Slow Oliva;Decreased foot clearance;L foot drag;Narrow NANCY;Retropulsion; Shuffle   Limitations Noted In Balance   Findings CG but times of Min A when pt randomly steps too big,  delayed motor control with left DF after toe off and pronation,  continues freezing and festination with turning but decreases with cues to march t/o   Wheelchair mobility   Does the patient use a wheelchair? 0   No   4 Steps   Type of Assistance Needed Incidental touching   Comment CG with cues to advance hands and not to move hands when foot is in the air advancing    4 Steps CARE Score 4   Stairs   Type Stairs   # of Steps 8 Assist Devices Bilateral Rail   Findings step to pattern,  poor safety  pt was advancing hands at times when feet where on opposite steps or when foot was in the air    Therapeutic Interventions   Balance Amb to cabinet with cup gather and setting on counter,  open and closing cabinet doors,  Pt needed Min A for stability at times unsteady with reaching   Assessment   Treatment Assessment Note t/o session times of decreased safety awareness which is a concern  Pt was slightly unsteady with reaching items out of cabinet  Also amb is inconsitant can be supervision most of trial and then 1 time step too big and get unsteady during single limb stance  Trialed AFO to control significant left foot pronation and delayed DF after toe off which it did control but did not improve balance with ambulation  Performed multiple laps in ll bars focus on gt quality but not much change noted  pt was able to perform ramp today at  due to reports going to eat at many restaraunts  Pt also noted wants to use rollator which we havent assessed,  Ed pt on importance of using RW at current status due to WB through UEs and rollator is more unstable  Will benefit from trial just to assess  Cont skilled PT toward LTGs and D/C next week  PT Barriers   Physical Impairment Decreased strength;Decreased range of motion;Decreased endurance; Impaired balance;Decreased mobility; Decreased coordination;Decreased cognition;Obesity; Decreased skin integrity;Orthopedic restrictions;Pain   Functional Limitation Car transfers; Ramp negotiation;Stair negotiation;Standing;Transfers; Walking   Plan   Treatment/Interventions Functional transfer training;LE strengthening/ROM; Elevations; Endurance training; Therapeutic exercise; Bed mobility;Gait training   Progress Progressing toward goals   Recommendation   Recommendation Home with family support;Home PT;Outpatient PT   Equipment Recommended Walker   PT Therapy Minutes   PT Time In 0830   PT Time Out 0930 PT Total Time (minutes) 60   PT Mode of treatment - Individual (minutes) 60   PT Mode of treatment - Concurrent (minutes) 0   PT Mode of treatment - Group (minutes) 0   PT Mode of treatment - Co-treat (minutes) 0   PT Mode of Treatment - Total time(minutes) 60 minutes   PT Cumulative Minutes 660   Therapy Time missed   Time missed?  No

## 2019-12-27 NOTE — PROGRESS NOTES
Email reply from facility FUNMILAYO Flores 12/27/19  08:24 reads:  Ms Marcelino Alexander d/c date is scheduled for the end of next week  This care manager will continue to monitor via chart review throughout bundle episode and outreach next week for discharge disposition information

## 2019-12-27 NOTE — CONSULTS
Orthopedics   Woody Pearce 75 y o  female MRN: [id number]  Unit/Bed#: 31 Just[facility] Apple XRMELISSA      Chief Complaint:   Right hip follow-up    HPI:  75 y o  female seen in consultation, ordered by GRETA Costa for right hip post-operative follow-up  Patient is 2 weeks s/p right short IMN performed on 12/14/19 by Dr Jose Bedoya  The patient was admitted to [facility] from [facility] on 12/18/19  Overall, the patient is doing well  She has been working with PT/OT since her arrival at the [facility] and feels she's making gains  She is able to ambulate with a walker  She has experiences some pain with WB in the RLE but reports it's manageable  She denies numbness and tingling  Her  was present in the room during the visit  Review Of Systems:   · Skin: Normal  · Neuro: See HPI  · Musculoskeletal: See HPI  · 14 point review of systems negative except as stated above     Past Medical History:   Past Medical History:   Diagnosis Date    Anxiety     Diabetes mellitus ([facility] )     Diabetes mellitus type 2, diet-controlled ([facility] )     Hyperlipidemia     Hypertension     Parkinson disease ([facility] )        Past Surgical History:   Past Surgical History:   Procedure Laterality Date    ACHILLES TENDON SURGERY      DEEP BRAIN STIMULATOR PLACEMENT      DENTAL SURGERY      FRACTURE SURGERY      IN IMP STIM,CRANIAL,SUBQ,1 ARRAY Right 12/18/2018    Procedure: REPLACEMENT IMPLANTABLE PULSE GENERATOR (IPG) DEEP BRAIN STIMULATION (DBS); Surgeon: Narciso Hernández MD;  Location: [facility];  Service: Neurosurgery    IN OPEN RX FEMUR FX+INTRAMED ERIKA Right 12/14/2019    Procedure: INSERTION NAIL IM FEMUR ANTEGRADE (TROCHANTERIC);   Surgeon: Georgette Diggs DO;  Location: [facility];  Service: Orthopedics    REPLACEMENT TOTAL KNEE      REVERSE TOTAL SHOULDER ARTHROPLASTY Left 8/23/2018    Procedure: ARTHROPLASTY SHOULDER REVERSE;  Surgeon: Josiah Rivera MD;  Location: [facility];  Service: Orthopedics    TONSILLECTOMY         Family History:  Family history reviewed and non-contributory  Family History   Problem Relation Age of Onset    Arthritis Mother     No Known Problems Father     No Known Problems Maternal Grandmother     No Known Problems Maternal Grandfather     No Known Problems Paternal Grandmother     No Known Problems Paternal Grandfather     No Known Problems Son     No Known Problems Son        Social History:  Social History     Socioeconomic History    Marital status: /Civil Union     Spouse name: Not on file    Number of children: Not on file    Years of education: Not on file    Highest education level: Not on file   Occupational History    Not on file   Social Needs    Financial resource strain: Not on file    Food insecurity:     Worry: Not on file     Inability: Not on file    Transportation needs:     Medical: Not on file     Non-medical: Not on file   Tobacco Use    Smoking status: Never Smoker    Smokeless tobacco: Never Used   Substance and Sexual Activity    Alcohol use: Yes     Alcohol/week: 2 0 standard drinks     Types: 2 Glasses of wine per week     Frequency: Monthly or less     Drinks per session: 1 or 2     Comment: occasional    Drug use: No    Sexual activity: Not on file   Lifestyle    Physical activity:     Days per week: Not on file     Minutes per session: Not on file    Stress: Not on file   Relationships    Social connections:     Talks on phone: Not on file     Gets together: Not on file     Attends Latter day service: Not on file     Active member of club or organization: Not on file     Attends meetings of clubs or organizations: Not on file     Relationship status: Not on file    Intimate partner violence:     Fear of current or ex partner: Not on file     Emotionally abused: Not on file     Physically abused: Not on file     Forced sexual activity: Not on file   Other Topics Concern    Not on file   Social History Narrative    Not on file       Allergies:    Allergies Allergen Reactions    Sulfa Antibiotics Hives           Labs:  0   Lab Value Date/Time    HCT 31 6 (L) 12/26/2019 0513    HCT 32 5 (L) 12/23/2019 0538    HCT 34 4 (L) 12/19/2019 1048    HCT 39 1 11/06/2014 0557    HCT 42 8 10/22/2014 1030    HGB 10 5 (L) 12/26/2019 0513    HGB 10 7 (L) 12/23/2019 0538    HGB 11 7 (L) 12/19/2019 1048    HGB 13 0 11/06/2014 0557    HGB 14 0 10/22/2014 1030    INR 0 99 12/13/2019 1131    INR 1 07 11/06/2014 0557    WBC 8 30 12/26/2019 0513    WBC 8 50 12/23/2019 0538    WBC 9 00 12/19/2019 1048    WBC 11 45 (H) 11/06/2014 0557    WBC 8 42 10/22/2014 1030       Meds:    Current Facility-Administered Medications:     acetaminophen (TYLENOL) tablet 650 mg, 650 mg, Oral, Q6H PRN, Ryan Gilliland MD, 650 mg at 12/27/19 1039    aspirin (ECOTRIN LOW STRENGTH) EC tablet 81 mg, 81 mg, Oral, Daily, Ryan Gilliland MD, 81 mg at 12/27/19 0801    atenolol (TENORMIN) tablet 50 mg, 50 mg, Oral, Daily, Ryan Gilliland MD, 50 mg at 12/27/19 0802    carbidopa-levodopa (SINEMET)  mg per tablet 0 5 tablet, 0 5 tablet, Oral, TID, Ryan Gilliland MD, 0 5 tablet at 12/27/19 0746    Coenzyme Q10 CAPS 400 mg, 400 mg, Oral, Daily, Ryan Gilliland MD, 400 mg at 12/27/19 0803    enoxaparin (LOVENOX) subcutaneous injection 40 mg, 40 mg, Subcutaneous, Daily, Ryan Gilliland MD, 40 mg at 12/27/19 0801    lidocaine (LIDODERM) 5 % patch 1 patch, 1 patch, Topical, Daily, Rayn Gilliland MD, 1 patch at 12/27/19 0801    pravastatin (PRAVACHOL) tablet 40 mg, 40 mg, Oral, Every Other Day, Ryan Gilliland MD, 40 mg at 12/26/19 0808    QUEtiapine (SEROquel) tablet 25 mg, 25 mg, Oral, HS PRN, Lino Gilliland MD, 25 mg at 12/25/19 2110    selegiline (ELDEPRYL) tablet 5 mg, 5 mg, Oral, Daily With Breakfast, Ryan Gilliland MD, 5 mg at 12/27/19 0746    senna-docusate sodium (SENOKOT S) 8 6-50 mg per tablet 1 tablet, 1 tablet, Oral, BID, Rashaun Lui MD, 1 tablet at 12/27/19 0801    traMADol (ULTRAM) tablet 25 mg, 25 mg, Oral, Q6H PRN, Ryan Gilliland MD, 25 mg at 12/20/19 1434    traMADol (ULTRAM) tablet 50 mg, 50 mg, Oral, Q6H PRN, Carmen Gilliland MD, 50 mg at 12/27/19 1201    Blood Culture:   No results found for: BLOODCX    Wound Culture:   No results found for: WOUNDCULT    Ins and Outs:  I/O last 24 hours: In: 260 [P O :260]  Out: -           Physical Exam:   /80 (BP Location: Left arm)   Pulse 72   Temp (!) 97 4 °F (36 3 °C) (Tympanic)   Resp 18   Ht 5' 7" (1 702 m)   Wt 105 kg (230 lb 9 oz)   LMP  (LMP Unknown)   SpO2 100%   BMI 36 11 kg/m²   Gen: Alert and oriented to person, place, time  HEENT: EOMI, eyes clear, moist mucus membranes, hearing intact  Respiratory: Bilateral chest rise  No audible wheezing found  Cardiovascular: Regular Rate and Rhythm  Abdomen: soft nontender/nondistended    Musculoskeletal: right lower extremity  · Skin: incisions are well approximated with staples, which were removed without difficulty  No erythema, no ecchymosis, no drainage  · TTP: minimal audra-incisional tenderness  · Right thigh swelling as to be expected post-op  The patient does exhibit bilateral lower extremity edema   · SILT s/s/sp/dp/t  · Motor intact 5/5 strength with hip flexion/extension, knee flexion/extension, ankle dorsi/plantar flexion, EHL/FHL  · 2+ DP pulse, limb is warm and well perfused  Tertiary: no tenderness over all other joints/long bones as except already stated  Radiology:   I personally reviewed the films  XRs of the right hip performed today demonstrate short femoral IMN with hardware in appropriate alignment  Assessment:  75 y  o female POD 13 s/p right hip short IMN  Plan:   · WBAT RLE with assistive devices prn for stability  · PT/OT  · Pain control per primary team  · DVT ppx: Lovenox for a total of 30 days post-operatively  · Staples removed today  Steri-strips applied   Patient and her  were informed to allow steri-strips to fall off on their own  Patient may shower and allow water to run over incision, but should not submerge incision  · Per patient and her  expected d/c is on 1/1/20  · Patient will require outpatient follow up with Dr Augustine Cruz in 2 weeks       Monae Thomas PA-C

## 2019-12-27 NOTE — ASSESSMENT & PLAN NOTE
· Acute comprehensive interdisciplinary inpatient rehabilitation including PT, OT, and/or SLP, RN, CM, SW, dietary, psychology, etc   · Goal: supervision  · Patient not cleared to shower at this time  · WBAT  · S/p IM nailing on 12/14  · Will consult ortho, for possible staple removal

## 2019-12-27 NOTE — NURSING NOTE
Pt very confused tonight, trying to get out of bed  Explained to pt it is nighttime instructed to look out window & see it is dark  Reoriented pt to place, time & situation  Asking for , "I want to see him right now"  Explained to pt she was in the hospital & he is at home  Pt removed SCD's, brief & pad  Seroquel not given tonight because of confusion last night  Will continue to monitor closely  Bed alarm on & call bell within reach

## 2019-12-27 NOTE — PROGRESS NOTES
12/27/19 1230   Pain Assessment   Pain Assessment 0-10   Pain Score 6   Pain Type Surgical pain   Pain Location Leg   Pain Orientation Right   Hospital Pain Intervention(s)   (Pt pre medicated)   Restrictions/Precautions   Precautions Bed/chair alarms;Cognitive; Fall Risk;Supervision on toilet/commode;Pain   RLE Weight Bearing Per Order WBAT   Subjective   Subjective Pt reports she is ready for the session    Sit to Stand   Type of Assistance Needed Verbal cues; Incidental touching   Comment more times of post LOB in afternoon   Sit to Stand CARE Score 4   Bed-Chair Transfer   Type of Assistance Needed Incidental touching   Comment freezing and motor planning issues, uses rollator or RW   Chair/Bed-to-Chair Transfer CARE Score 4   Transfer Bed/Chair/Wheelchair   Limitations Noted In Balance; Coordination;Problem Solving   Adaptive Equipment Rollator;Roller Walker   Sit to Stand Minimal Assist   Stand to Sit Minimal Assist   Findings fluctuates but more post bias this afternoon than this morning, needs cues for hand placement    Walk 150 Feet   Type of Assistance Needed Physical assistance   Amount of Physical Assistance Provided Less than 25%   Comment Min A with Rollator   Walk 150 Feet CARE Score 3   Ambulation   Primary Mode of Locomotion Prior to Admission Walk   Distance Walked (feet) 150 ft  (50x1 75x1 )   Assist Device Rollator   Gait Pattern Decreased foot clearance;Retropulsion;Narrow NANCY; Decreased R stance; Improper weight shift; Step through   Limitations Noted In Balance; Endurance; Sequencing   Provided Assistance with: Balance   Findings CG to min A mostly with turning and tighter spaces    Wheelchair mobility   Does the patient use a wheelchair? 0   No   Curb or Single Stair   Style negotiated Curb   Type of Assistance Needed Physical assistance   Amount of Physical Assistance Provided 25%-49%   Comment step by step cues for technique, performed with RW   1 Step (Curb) CARE Score 3   Therapeutic Interventions   Balance Figure 8 amb course with Rollator to assess turning/safety/balance   Assessment   Treatment Assessment Within discussion of this morning, pt notes using Rollator which initially was concerned for pts balance  Trialed Rollator and pt was able to amb much further distance at Min A level, noted inc speed and slightly more fluid  Using Rollator has potential and will benefit from continued trials  Will need to determine Rollator vs RW,  it appears RW may increase parkinsons symptoms  Barriers to Discharge Inaccessible home environment   PT Barriers   Physical Impairment Decreased strength;Decreased range of motion;Decreased endurance; Impaired balance;Decreased mobility; Decreased coordination;Decreased cognition;Obesity; Decreased skin integrity;Orthopedic restrictions;Pain   Functional Limitation Car transfers; Ramp negotiation;Stair negotiation;Standing;Transfers; Walking   Plan   Treatment/Interventions Functional transfer training;LE strengthening/ROM; Elevations; Therapeutic exercise;Gait training;Bed mobility   Recommendation   Recommendation Home with family support; Outpatient PT   Equipment Recommended Walker  (possible Rollator )   PT Therapy Minutes   PT Time In 1230   PT Time Out 1300   PT Total Time (minutes) 30   PT Mode of treatment - Individual (minutes) 30   PT Mode of treatment - Concurrent (minutes) 0   PT Mode of treatment - Group (minutes) 0   PT Mode of treatment - Co-treat (minutes) 0   PT Mode of Treatment - Total time(minutes) 30 minutes   PT Cumulative Minutes 690   Therapy Time missed   Time missed?  No

## 2019-12-27 NOTE — PROGRESS NOTES
12/27/19 1400   Pain Assessment   Pain Assessment No/denies pain   Pain Score No Pain   Restrictions/Precautions   Precautions Bed/chair alarms;Cognitive; Fall Risk;Supervision on toilet/commode   Weight Bearing Restrictions Yes   RLE Weight Bearing Per Order WBAT   Tub/Shower Transfer   Limitations Noted In Balance; Endurance;Problem Solving; Safety;UE Strength;LE Strength   Adaptive Equipment Seat with Back   Assessed Shower  (dry transfer)   Findings Pt  dave carmona during OT session  Therapit trialled placing shower chair close to simulated lip  THerapist discussed with  to have pt utilize L hand to grasp l grab bar and keep R hand on RW for safe descent to chair, pt requiring modA for transfer 2* to fatigue  One sitting on side of chair pt requiring Geri to lift BLE over simulated 2in lip into shower  THerapist observed safer to transfer into shower when sitting and then swingin legs over 2* to pts decreased ability to evenly distribte weight thorugh BLE   agreeable to shower transfer   reporting he will be able to A for shower transfer upon d/c  In addition,  reporting he will be able to bring pictures of shower s/u for therpaist to assess  Sit to Stand   Type of Assistance Needed Physical assistance;Verbal cues   Amount of Physical Assistance Provided 50%-74%   Comment pt requiring modA to perofrm funcitonal sit<>stand from shower chair   Sit to Stand CARE Score 2   Bed-Chair Transfer   Type of Assistance Needed Physical assistance   Amount of Physical Assistance Provided 25%-49%   Comment Geri for stand pivot tranfer from w/c>recliner 2* to tremors and freezing   Chair/Bed-to-Chair Transfer CARE Score 3   Transfer Bed/Chair/Wheelchair   Positioning Concerns Skin Integrity   Limitations Noted In Balance; Coordination; Endurance;UE Strength;LE Strength   Adaptive Equipment Roller Walker   Cognition   Overall Cognitive Status Impaired   Arousal/Participation Alert; Cooperative Attention Within functional limits   Orientation Level Oriented X4   Memory Decreased short term memory;Decreased recall of precautions   Following Commands Follows multistep commands with increased time or repetition   Activity Tolerance   Activity Tolerance Patient tolerated treatment well   Assessment   Treatment Assessment Pt seen for quick 30mins of skilled OT services iwth focus on shower transfer with ahving pt sit and swing legs over 2in lip  Pt at this time requiring modA to perform shower transfer 2* to decreased safety awareness and increased freeizing  Pt  reports he will prvide pictures of shower and shower chair s/u at home  Pt can contineut ot benefit from skilled OT services iwth focus on shower transfers, self-care, BUE strengthening to increase independence and decrease caregiver burden  pt is makig steady gains toward STG/LTG, pt with anticiapted d/c for 12/2/19  Prognosis Fair   Problem List Decreased strength;Decreased range of motion;Decreased endurance; Impaired balance;Decreased mobility; Decreased coordination; Impaired judgement;Decreased safety awareness;Decreased cognition   Barriers to Discharge Inaccessible home environment   Plan   Treatment/Interventions ADL retraining;Functional transfer training; Therapeutic exercise; Endurance training;Patient/family training;Bed mobility; Compensatory technique education   Progress Progressing toward goals   Recommendation   OT Discharge Recommendation Home with family support   OT Therapy Minutes   OT Time In 1400   OT Time Out 1430   OT Total Time (minutes) 30   OT Mode of treatment - Individual (minutes) 30   OT Mode of treatment - Concurrent (minutes) 0   OT Mode of treatment - Group (minutes) 0   OT Mode of treatment - Co-treat (minutes) 0   OT Mode of Treatment - Total time(minutes) 30 minutes   OT Cumulative Minutes 785   Therapy Time missed   Time missed?  No

## 2019-12-27 NOTE — PLAN OF CARE
Problem: SKIN/TISSUE INTEGRITY - ADULT  Goal: Incision(s), wounds(s) or drain site(s) healing without S/S of infection  Description  INTERVENTIONS  - Assess and document risk factors for skin impairment   - Assess and document dressing, incision, wound bed, drain sites and surrounding tissue  - Consider nutrition services referral as needed  - Oral mucous membranes remain intact  - Provide patient/ family education  Outcome: Progressing     Problem: SKIN/TISSUE INTEGRITY - ADULT  Goal: Oral mucous membranes remain intact  Description  INTERVENTIONS  - Assess oral mucosa and hygiene practices  - Implement preventative oral hygiene regimen  - Implement oral medicated treatments as ordered  - Initiate Nutrition services referral as needed  Outcome: Progressing

## 2019-12-27 NOTE — ASSESSMENT & PLAN NOTE
· Continue selegiline (patient is on rasagiline 1mg daily as outpatient, evidently not available here on formulary)   Will resume home rasagiline on discharge  · Continue sinemet - dose was decreased by neurology due to hallucinations  · F/u Dr Ave Francois as outpatient  · promote sleep/wake cycle  · avoid CNS altering medications

## 2019-12-28 PROCEDURE — 97110 THERAPEUTIC EXERCISES: CPT

## 2019-12-28 PROCEDURE — 97116 GAIT TRAINING THERAPY: CPT

## 2019-12-28 PROCEDURE — 99232 SBSQ HOSP IP/OBS MODERATE 35: CPT | Performed by: INTERNAL MEDICINE

## 2019-12-28 PROCEDURE — 97530 THERAPEUTIC ACTIVITIES: CPT

## 2019-12-28 PROCEDURE — 97535 SELF CARE MNGMENT TRAINING: CPT

## 2019-12-28 RX ADMIN — LIDOCAINE 1 PATCH: 50 PATCH CUTANEOUS at 09:11

## 2019-12-28 RX ADMIN — CARBIDOPA AND LEVODOPA 0.5 TABLET: 25; 100 TABLET ORAL at 21:02

## 2019-12-28 RX ADMIN — CARBIDOPA AND LEVODOPA 0.5 TABLET: 25; 100 TABLET ORAL at 07:48

## 2019-12-28 RX ADMIN — CARBIDOPA AND LEVODOPA 0.5 TABLET: 25; 100 TABLET ORAL at 16:09

## 2019-12-28 RX ADMIN — TRAMADOL HYDROCHLORIDE 50 MG: 50 TABLET, COATED ORAL at 15:10

## 2019-12-28 RX ADMIN — PRAVASTATIN SODIUM 40 MG: 40 TABLET ORAL at 09:11

## 2019-12-28 RX ADMIN — ACETAMINOPHEN 650 MG: 325 TABLET ORAL at 21:03

## 2019-12-28 RX ADMIN — SELEGILINE HYDROCHLORIDE 5 MG: 5 TABLET ORAL at 07:48

## 2019-12-28 RX ADMIN — TRAMADOL HYDROCHLORIDE 50 MG: 50 TABLET, COATED ORAL at 03:55

## 2019-12-28 RX ADMIN — ENOXAPARIN SODIUM 40 MG: 40 INJECTION SUBCUTANEOUS at 09:11

## 2019-12-28 RX ADMIN — ATENOLOL 50 MG: 25 TABLET ORAL at 09:10

## 2019-12-28 RX ADMIN — ASPIRIN 81 MG: 81 TABLET, COATED ORAL at 09:11

## 2019-12-28 RX ADMIN — SENNOSIDES AND DOCUSATE SODIUM 1 TABLET: 8.6; 5 TABLET ORAL at 09:11

## 2019-12-28 NOTE — PROGRESS NOTES
12/28/19 1230   Pain Assessment   Pain Assessment No/denies pain   Pain Score No Pain   Restrictions/Precautions   Precautions Bed/chair alarms;Cognitive; Fall Risk;Impulsive;Supervision on toilet/commode   Weight Bearing Restrictions Yes   RLE Weight Bearing Per Order WBAT   Tub/Shower Transfer   Limitations Noted In Balance; Endurance;Problem Solving; Safety;UE Strength;LE Strength   Adaptive Equipment Other  (sqaure seat with back and simulated 7in lip)   Assessed Shower  (dry transfer)   Findings  Eduardo Pimentel presenf ofr FT on this day   showed therapist pictures of bathroom s/u at home   reports there is a 7in lip into shower with shower chair and 1grab bar getting into shower and one on the far wall  Therapist simulated same s/u in pts shower with use of square shower seat and 7in bolster  Therapist educated  on positioning of himself however, requiring vc to stay clsoe to pt  Pt has a vertical grab abr which she will be able to hold, however therapist had pt use L hand to grasp rollator and R hand on back of shower cair, pt requiring max vc for proper hand/foot placement and requiring modA to descend down to chair  At this time  can benefit from more FT for shower transfers, therapist recommend pt to sponge bath at home until [facility] asseses 2* to sfety  Putting On/Taking Off Footwear   Type of Assistance Needed Physical assistance   Amount of Physical Assistance Provided Total assistance   Comment Therapist instructed pts  on don of TEDS, tp with good caryrover of tehcnique with good demosntration   Putting On/Taking Off Footwear CARE Score 1   Dressing/Undressing Clothing   Remove LB Clothes TEDs; Shoes   Don LB Clothes TEDs; Shoes   Limitations Noted In ROM   Sit to Lying   Type of Assistance Needed Physical assistance   Amount of Physical Assistance Provided 50%-74%   Comment PT  reporting bed at home both the head and feet elevate however, frame stays in place  Therapist educated pt on log rolling technique 2* to pts placin gtrunk to back R andlge making it difficulty for  to reporisiotn her  Husban reporting OT (Flako Mirnalamin) mendtion bed rail, next session therapist to provide handout  Pt requiring mod vc for proper hand placementt with  A for BLE  THerapist educated Consuella Ore on not pulling on limbs in order to rpevent injury  THerapist educated and demonstrrated to place hands on shoudler blades to readjust pt, maya verbalized udnerstanding and demonstrted understanding  in addition, therapist observed pt having tendancy of pulling on therapist and , therpaist educated pt on not pulling in roder to prevent injury to  at d/c, pt vebrlaized udnerstanding   Sit to Lying CARE Score 2   Lying to Sitting on Side of Bed   Type of Assistance Needed Physical assistance   Amount of Physical Assistance Provided 50%-74%   Comment Therapist continued to enducate  on placing hands on shoudler blades for trunk mngmnt   Lying to Sitting on Side of Bed CARE Score 2   Sit to Stand   Type of Assistance Needed Physical assistance   Amount of Physical Assistance Provided 25%-49%   Comment on this day pt requirng Geri to perform functiomal sit<>stand with rollator  Pt requiring constant vc to lock rollator prior to standing, pt with poor carryover of education   Sit to Stand CARE Score 3   Bed-Chair Transfer   Type of Assistance Needed Incidental touching   Amount of Physical Assistance Provided No physical assistance   Comment Therapist educated  on plaicng self to side where pt is transering to, however,  lightly touching pt  THerapist educated  that pt requires hands on 2* to decreased balance and increased freezing   can benefit from continued transfer training   THerapist communicated with PT gris   Chair/Bed-to-Chair Transfer CARE Score 4   Transfer Bed/Chair/Wheelchair   Positioning Concerns Skin Integrity   Limitations Noted In Balance; Coordination; Endurance   Adaptive Equipment Rollator   Toilet Transfer   Type of Assistance Needed Physical assistance   Amount of Physical Assistance Provided 25%-49%   Comment Therapist educated  on proper positioning of self   shila pictures  where pt has raised toilet with grab bars on side  THerapist trialled toilet transfer w/o platform commode  Pt requiring more to perform sit<>stand  At this time therapist recommnd pt to have commode to elevate seat   and pt in agreement, Chirag Millan reports he will have side grab bars connected to toilet removed in order to fit commode   Toilet Transfer CARE Score 3   Toilet Transfer   Surface Assessed Raised Toilet  (can benefit from platofrm commode)   Transfer Technique Stand Pivot   Limitations Noted In Balance; Endurance;Problem Solving; Safety; Sequencing;UE Strength;LE Strength   Adaptive Equipment Grab Bar  (rollator)   Positioning Concerns Grab Bars;Cognition; Safety   Findings see above   Meal Prep   Meal Preparation Therapist mo with  riccardo reports he mainly takes care of kithvcen tasks  He reports pt previously completed light tasks sucha s grilled cheese  At this time therapist recommend  to compelte task 2* to pts decreased safety awareness and balance with increased ability to stand and have BUE unsupported   verbalzed understanding   Money Management   Money Management  reports pt takes care of few checks for ex donating to [place]  However, he takes care of the main finances   Health Management   Health Management  reports pt previously compelted med mngmnt with pill box for the week  Pt can continue to benefit from continued to practive to determine level of A for d/c   Cognition   Overall Cognitive Status Impaired   Arousal/Participation Alert; Cooperative   Attention Within functional limits   Orientation Level Oriented X4   Memory Decreased short term memory   Following Commands Follows multistep commands with increased time or repetition   Activity Tolerance   Activity Tolerance Patient tolerated treatment well   Medical Staff Made Aware Therapist reported to pt she will have OT next day for an ADL  Pt reporting if she could have pain meds prior to OT session on 12/29 in case of pain  Therapist communicated with CRISTHIAN Mejia  Assessment   Treatment Assessment Pt seen for 90mins of skilled OT services with focus on functional transfers including toilet transfer, shower transfer and bed mobility with  Anastasiia Colon present for FT on this day  Therapist educated  dave on donning of TEDS, staying close to pt during functional transfers  However,  can continue to benefit from further training  2* to  continuing to demonstrate decreased amount of A therapist recommend pt to sponge bathe at home for safety  In addition, therapist completed toilet transfer raised toilet vs platform commode   brought in pictures where pt has raised toilet with grab bars on toilet  At this time therapist recommend platform commode to increase I in toileting,  and pt in agreement   reports he was mainly completing cooking, finances and laundry prior to pts admission   reported, pt at times completed light meal prep such as grilled cheese  At this time 2* to pts decreased safety awareness, poor carryover of breaks with rollator recommend  to complete cooking  Both in agreement  Pt can continue to benefit form skilled OT services with focus on toilet transfer, shower transfer vs sponge bath at d/c, BUE strengthening, dynamic standing balance  Pt can cont to benefit from skilled OT services to address these areas and resume prior occupational roles to maximize independence to reduce risk of fall and decrease risk of readmission      OT Family training done with:  Frieda Rae of family training see above   Prognosis Fair   Problem List Decreased strength;Decreased range of motion;Decreased endurance; Impaired balance;Decreased mobility; Decreased cognition;Decreased coordination; Impaired judgement;Decreased safety awareness   Barriers to Discharge Inaccessible home environment   Plan   Treatment/Interventions ADL retraining;Functional transfer training; Therapeutic exercise; Endurance training;Patient/family training;Bed mobility; Compensatory technique education   Progress Progressing toward goals   Recommendation   OT Discharge Recommendation Home with family support   Equipment Recommended   (LHAE, commode, shower chair (pending progress))   Discharge Summary anticipated d/c 12/2/19   OT Therapy Minutes   OT Time In 1230   OT Time Out 1400   OT Total Time (minutes) 90   OT Mode of treatment - Individual (minutes) 90   OT Mode of treatment - Concurrent (minutes) 0   OT Mode of treatment - Group (minutes) 0   OT Mode of treatment - Co-treat (minutes) 0   OT Mode of Treatment - Total time(minutes) 90 minutes   OT Cumulative Minutes 875   Therapy Time missed   Time missed?  No

## 2019-12-28 NOTE — NURSING NOTE
Pt is confused, tried to get out of the bed  Pt said she is supposed to be discharged today  Explained to the pt that it's night time and still dark outside  Pt redirected to go back to sleep  Pt refused her SCD's  Bed alarm maintained

## 2019-12-28 NOTE — PROGRESS NOTES
12/28/19 0830   Pain Assessment   Pain Assessment No/denies pain   Pain Score No Pain   Restrictions/Precautions   Precautions Bed/chair alarms;Cognitive; Fall Risk;Supervision on toilet/commode  (body habitus)   Weight Bearing Restrictions Yes   RLE Weight Bearing Per Order WBAT   Cognition   Overall Cognitive Status Impaired   Arousal/Participation Alert; Cooperative   Attention Within functional limits   Orientation Level Oriented X4   Memory Decreased short term memory;Decreased recall of precautions   Following Commands Follows multistep commands with increased time or repetition   Comments cont to requiring cueing for handplacment during sit<>stands  Also with cont use of vc's for brake usage with Rollator as pt unable to recall during session  Subjective   Subjective Pt in bed upon arrival to her room  Agreeable to perform PT tx but asked if she could get washed up first     Roll Left and Right   Type of Assistance Needed Incidental touching   Amount of Physical Assistance Provided No physical assistance   Comment with R bed rail   Roll Left and Right CARE Score 4   Sit to Lying   Type of Assistance Needed Physical assistance   Amount of Physical Assistance Provided 50%-74%   Comment performed on mat table with height set to 26" to mimic pt's bed at home  Pt requiring increased LE assist   Sit to Lying CARE Score 2   Lying to Sitting on Side of Bed   Type of Assistance Needed Physical assistance   Amount of Physical Assistance Provided 50%-74%   Comment performed in pt's bed utilizing R HR pt able to perform with Geri with HOB flat  When attempted on mat table with mat height set at 26" to mimic home setup, pt requiring modA   Lying to Sitting on Side of Bed CARE Score 2   Sit to Stand   Type of Assistance Needed Incidental touching;Verbal cues   Amount of Physical Assistance Provided No physical assistance   Comment CGA-CS with vc's to "rocket forward"   If pt not given vc's to use power when standing as well as to have both hands back to chair, pt with increased assist required however pt given 100% vc's this session  Sit to Stand CARE Score 4   Bed-Chair Transfer   Type of Assistance Needed Incidental touching   Amount of Physical Assistance Provided No physical assistance   Comment pt still begins to shuffle and freeze during turns  cont to work on focusing on external point in order to distract pt from task  Chair/Bed-to-Chair Transfer CARE Score 4   Transfer Bed/Chair/Wheelchair   Limitations Noted In Balance; Coordination; Endurance;Problem Solving; Sequencing;UE Strength;LE Strength   Adaptive Equipment Rollator   Stand Pivot Contact Guard   Sit to Stand Contact Guard;Supervision   Stand to Sit Contact Guard;Supervision   Supine to Sit Moderate Assist   Sit to Supine Moderate Assist   Walk 10 Feet   Type of Assistance Needed Incidental touching   Amount of Physical Assistance Provided No physical assistance   Walk 10 Feet CARE Score 4   Walk 50 Feet with Two Turns   Type of Assistance Needed Incidental touching   Amount of Physical Assistance Provided Less than 25%   Walk 50 Feet with Two Turns CARE Score 3   Walk 150 Feet   Type of Assistance Needed Physical assistance   Amount of Physical Assistance Provided Less than 25%   Walk 150 Feet CARE Score 3   Ambulation   Primary Mode of Locomotion Prior to Admission Walk   Distance Walked (feet) 150 ft  (x1, 90'x2)   Assist Device Rollator   Gait Pattern Inconsistant [place];Decreased foot clearance;L foot drag;Narrow NANCY;Step through; Decreased R stance; Improper weight shift   Limitations Noted In Coordination;Balance;Device Management; Heel Strike; Safety;Posture; Sequencing;Speed;Strength;Swing   Provided Assistance with: Balance   Walk Assist Level Contact Guard;Minimum Assist   Findings utilized rollator this session for all transfers and amb  Pt requriing Geri-CGA and verbal cues for proper use of rollator brakes   During amb, did stop at one point so PT could move obstacle in which pt locked brakes appropriately without verbal cues  Pt cont to demo L LE internal rotation during toe off on L side  Plan to ask pt's  if this was present prior to current hospitalization  Pt with no c/o pain during functional activities but did states after therapies yesterday she had increased B LE pain  Improvements noted in gait speed and stride length with use of rollator  Cont with trialing rollator as pt had used previously prior to admission  Wheelchair mobility   Does the patient use a wheelchair? 0  No   Therapeutic Interventions   Strengthening Pt given written HEP including ankle pumps, glute and quad sets, and knee flex all to be performed in supine  Pt able to demo all exercises for 10 reps with good technique and form  Pt instructed when d/c'd to complete 1-2 times per day 5 days per week  Balance Performed ADLs in unsupported sitting as well as in standing with and without UE support  Pt able to maintain standing approx 6 mins x2 trials in order to perform LB bathing  Pt also performed standing trial to brush teeth with no LOB with 1 UE support on counter  Pt able to perform combing hair and UB dressing in unsupported sitting safely  Pt very bradykinetic though, therefore required increased time to complete all tasks  Assessment   Treatment Assessment Session focused on performing ADLs with pt in standing and in unsupported sitting, bed mobility, and transfers and amb with use of rollator this session  Pt demo'd good balance during static standing and with sitting unsupported with no LOB or c/o feeling off balance  PT provided CS during all tasks in standing and did provide assistance for lower body dressing, however, once pants and brief put on B LE, pt able to stand and pull up and grazyna without assistance and without LOB  Due to pt being bradykinetic, required increased time to complete all ADL activities   Performed bed mobility in pt's bed with improvements noted, however, when trialed on mat table with height of bed at 26" pt with increased difficulty completing supine<>sit  Pt then performed transfers and amb at CGA-Geri with rollator this session with improvements noted in gait speed and stride length  However, pt with increased L LE internal rotation during L toe off noted which pt states she didn't notice  Pt still with difficulties during turning as she will freeze and begin to shuffle feet even with vc's to focus on object further away and to march  Barriers to home at this time include FT that is to occur with  Jesse Coronado (setup for upcoming sessions) so that he can provide support to pt as well as stairs (5 MARTINE, B rails), transfers, and amb with RW  Pt's safety and balance decreased therefore pt at increased risk for falls  Plan to cont to work towards meeting all LTGs set to decrease caregiver burden as well as decrease risk for falls upon d/c  Family/Caregiver Present no   Problem List Decreased strength;Decreased range of motion;Decreased endurance; Impaired balance;Decreased mobility; Decreased coordination; Impaired judgement;Decreased safety awareness;Decreased cognition   Barriers to Discharge Inaccessible home environment   PT Barriers   Physical Impairment Decreased strength;Decreased range of motion;Decreased endurance; Impaired balance;Decreased mobility; Decreased coordination;Decreased cognition;Obesity; Decreased skin integrity;Orthopedic restrictions;Pain   Functional Limitation Car transfers; Ramp negotiation;Stair negotiation;Standing;Transfers; Walking   Comment d/c set for 1/2/20   Plan   Treatment/Interventions ADL retraining;Functional transfer training;LE strengthening/ROM; Therapeutic exercise; Endurance training;Bed mobility;Gait training   Progress Progressing toward goals   Recommendation   Recommendation Home with family support; Outpatient PT   PT - OK to Discharge No   PT Therapy Minutes   PT Time In 0830   PT Time Out 1000 PT Total Time (minutes) 90   PT Mode of treatment - Individual (minutes) 90   PT Mode of treatment - Concurrent (minutes) 0   PT Mode of treatment - Group (minutes) 0   PT Mode of treatment - Co-treat (minutes) 0   PT Mode of Treatment - Total time(minutes) 90 minutes   PT Cumulative Minutes 780   Therapy Time missed   Time missed?  No

## 2019-12-28 NOTE — PLAN OF CARE
Problem: Potential for Falls  Goal: Patient will remain free of falls  Description  INTERVENTIONS:  - Assess patient frequently for physical needs  -  Identify cognitive and physical deficits and behaviors that affect risk of falls  -  [place] fall precautions as indicated by assessment   - Educate patient/family on patient safety including physical limitations  - Instruct patient to call for assistance with activity based on assessment  - Modify environment to reduce risk of injury  - Consider OT/PT consult to assist with strengthening/mobility  Outcome: Progressing     Problem: NEUROSENSORY - ADULT  Goal: Achieves stable or improved neurological status  Description  INTERVENTIONS  - Monitor and report changes in neurological status  - Monitor vital signs such as temperature, blood pressure, glucose, and any other labs ordered   - Initiate measures to prevent increased intracranial pressure  - Monitor for seizure activity and implement precautions if appropriate      Outcome: Progressing  Goal: Achieves maximal functionality and self care  Description  INTERVENTIONS  - Monitor swallowing and airway patency with patient fatigue and changes in neurological status  - Encourage and assist patient to increase activity and self care     - Encourage visually impaired, hearing impaired and aphasic patients to use assistive/communication devices  Outcome: Progressing     Problem: GASTROINTESTINAL - ADULT  Goal: Maintains or returns to baseline bowel function  Description  INTERVENTIONS:  - Assess bowel function  - Encourage oral fluids to ensure adequate hydration  - Administer IV fluids if ordered to ensure adequate hydration  - Administer ordered medications as needed  - Encourage mobilization and activity  - Consider nutritional services referral to assist patient with adequate nutrition and appropriate food choices  Outcome: Progressing  Goal: Maintains adequate nutritional intake  Description  INTERVENTIONS:  - Monitor percentage of each meal consumed  - Identify factors contributing to decreased intake, treat as appropriate  - Assist with meals as needed  - Monitor I&O, weight, and lab values if indicated  - Obtain nutrition services referral as needed  Outcome: Progressing     Problem: GENITOURINARY - ADULT  Goal: Maintains or returns to baseline urinary function  Description  INTERVENTIONS:  - Assess urinary function  - Encourage oral fluids to ensure adequate hydration if ordered  - Administer IV fluids as ordered to ensure adequate hydration  - Administer ordered medications as needed  - Offer frequent toileting  - Follow urinary retention protocol if ordered  Outcome: Progressing     Problem: SKIN/TISSUE INTEGRITY - ADULT  Goal: Skin integrity remains intact  Description  INTERVENTIONS  - Identify patients at risk for skin breakdown  - Assess and monitor skin integrity  - Assess and monitor nutrition and hydration status  - Monitor labs (i e  albumin)  - Assess for incontinence   - Turn and reposition patient  - Assist with mobility/ambulation  - Relieve pressure over bony prominences  - Avoid friction and shearing  - Provide appropriate hygiene as needed including keeping skin clean and dry  - Evaluate need for skin moisturizer/barrier cream  - Collaborate with interdisciplinary team (i e  Nutrition, Rehabilitation, etc )   - Patient/family teaching  Outcome: Progressing  Goal: Incision(s), wounds(s) or drain site(s) healing without S/S of infection  Description  INTERVENTIONS  - Assess and document risk factors for skin impairment   - Assess and document dressing, incision, wound bed, drain sites and surrounding tissue  - Consider nutrition services referral as needed  - Oral mucous membranes remain intact  - Provide patient/ family education  Outcome: Progressing  Goal: Oral mucous membranes remain intact  Description  INTERVENTIONS  - Assess oral mucosa and hygiene practices  - Implement preventative oral hygiene regimen  - Implement oral medicated treatments as ordered  - Initiate Nutrition services referral as needed  Outcome: Progressing     Problem: MUSCULOSKELETAL - ADULT  Goal: Maintain or return mobility to safest level of function  Description  INTERVENTIONS:  - Assess patient's ability to carry out ADLs; assess patient's baseline for ADL function and identify physical deficits which impact ability to perform ADLs (bathing, care of mouth/teeth, toileting, grooming, dressing, etc )  - Assess/evaluate cause of self-care deficits   - Assess range of motion  - Assess patient's mobility  - Assess patient's need for assistive devices and provide as appropriate  - Encourage maximum independence but intervene and supervise when necessary  - Involve family in performance of ADLs  - Assess for home care needs following discharge   - Consider OT consult to assist with ADL evaluation and planning for discharge  - Provide patient education as appropriate  Outcome: Progressing  Goal: Maintain proper alignment of affected body part  Description  INTERVENTIONS:  - Support, maintain and protect limb and body alignment  - Provide patient/ family with appropriate education  Outcome: Progressing     Problem: COPING  Goal: Pt/Family able to verbalize concerns and demonstrate effective coping strategies  Description  INTERVENTIONS:  - Assist patient/family to identify coping skills, available support systems and cultural and spiritual values  - Provide emotional support, including active listening and acknowledgement of concerns of patient and caregivers  - Reduce environmental stimuli, as able  - Provide patient education  - Assess for spiritual pain/suffering and initiate spiritual care, including notification of Pastoral Care or ranjit based community as needed  - Assess effectiveness of coping strategies  Outcome: Progressing  Goal: Will report anxiety at manageable levels  Description  INTERVENTIONS:  - Administer medication as ordered  - Teach and encourage coping skills  - Provide emotional support  - Assess patient/family for anxiety and ability to cope  Outcome: Progressing     Problem: CONFUSION/THOUGHT DISTURBANCE  Goal: Thought disturbances (confusion, delirium, depression, dementia or psychosis) are managed to maintain or return to baseline mental status and functional level  Description  INTERVENTIONS:  - Assess for possible contributors to  thought disturbance, including but not limited to medications, infection, impaired vision or hearing, underlying metabolic abnormalities, dehydration, respiratory compromise,  psychiatric diagnoses and notify attending PHYSICAN/AP  - Monitor and intervene to maintain adequate nutrition, hydration, elimination, sleep and activity  - Decrease environmental stimuli, including noise as appropriate  - Provide frequent contacts to provide refocusing, direction and reassurance as needed  Approach patient calmly with eye contact and at their level    - [place] high risk fall precautions, aspiration precautions and other safety measures, as indicated  - If delirium suspected, notify physician/AP of change in condition and request immediate in-person evaluation  - Pursue consults as appropriate including Geriatric (campus dependent), OT for cognitive evaluation/activity planning, psychiatric, pastoral care, etc   Outcome: Progressing     Problem: Prexisting or High Potential for Compromised Skin Integrity  Goal: Skin integrity is maintained or improved  Description  INTERVENTIONS:  - Identify patients at risk for skin breakdown  - Assess and monitor skin integrity  - Assess and monitor nutrition and hydration status  - Monitor labs   - Assess for incontinence   - Turn and reposition patient  - Assist with mobility/ambulation  - Relieve pressure over bony prominences  - Avoid friction and shearing  - Provide appropriate hygiene as needed including keeping skin clean and dry  - Evaluate need for skin moisturizer/barrier cream  - Collaborate with interdisciplinary team   - Patient/family teaching  - Consider wound care consult   Outcome: Progressing

## 2019-12-28 NOTE — NURSING NOTE
Reassessed pt pain level  Pt asleep in recliner when entering room  States her pain is 6/10  Pain level was unchanged  Placed call bell within reach  Will continue to monitor

## 2019-12-28 NOTE — NURSING NOTE
Pt had complaints of pain in right leg after therapy  Gave Tramadol 50mg at 15:10  Will reassess pain level in an hour

## 2019-12-28 NOTE — ASSESSMENT & PLAN NOTE
· Continue selegiline (patient is on rasagiline 1mg daily as outpatient, evidently not available here on formulary)   Will resume home rasagiline on discharge  · Continue sinemet - dose was decreased by neurology due to hallucinations  · F/u Dr Criselda Tillman as outpatient  · promote sleep/wake cycle  · avoid CNS altering medications

## 2019-12-28 NOTE — ASSESSMENT & PLAN NOTE
· Acute comprehensive interdisciplinary inpatient rehabilitation including PT, OT, and/or SLP, RN, CM, SW, dietary, psychology, etc   · Goal: supervision  · Patient not cleared to shower at this time  · WBAT  · S/p IM nailing on 12/14  ·   Patient was seen by Ortho on 12/27, staples removed, and Steri-Strips in place, patient is to follow up with Orthopedics in 2 weeks

## 2019-12-28 NOTE — PROGRESS NOTES
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)  Assessment & Plan  Lab Results   Component Value Date    HGBA1C 5 7 12/19/2019       No results for input(s): POCGLU in the last 72 hours  Blood Sugar Average: Last 72 hrs:     Patient pre- diabetic, not on home insulin  DM   Will follow up with PCP outpatient  Will d/c SSI    Other hyperlipidemia  Assessment & Plan  · Continue Pravastatin     Parkinson's disease with use of electrical brain stimulation ([facility] )  Assessment & Plan  · Continue selegiline (patient is on rasagiline 1mg daily as outpatient, evidently not available here on formulary)   Will resume home rasagiline on discharge  · Continue sinemet - dose was decreased by neurology due to hallucinations  · F/u Dr Surjit Hill as outpatient  · promote sleep/wake cycle  · avoid CNS altering medications      * Closed fracture of right hip [facility])  Assessment & Plan  · Acute comprehensive interdisciplinary inpatient rehabilitation including PT, OT, and/or SLP, RN, CM, SW, dietary, psychology, etc   · Goal: supervision  · Patient not cleared to shower at this time  · WBAT  · S/p IM nailing on   ·   Patient was seen by Ortho on , staples removed, and Steri-Strips in place, patient is to follow up with Orthopedics in 2 weeks        VTE Pharmacologic Prophylaxis:   Pharmacologic: Enoxaparin (Lovenox)  Mechanical VTE Prophylaxis in Place: Yes    Current Length of Stay: 10 day(s)    Current Patient Status: Inpatient Rehab     Discharge Plan: As per treatment team      Code Status: Level 3 - DNAR and DNI    Subjective:     Patient seated in chair,  Patient reports that she slept okay last night except for some body aches, I did recommend that patient ask for Tylenol prior to bedtime to prevent this  Patient denies chest pain, shortness of breath and palpitations  Patient reports no pain at this point time  Objective:     Vitals:   Temp (24hrs), Av 6 °F (36 4 °C), Min:96 8 °F (36 °C), Max:98 °F (36 7 °C)    Temp:  [96 8 °F (36 °C)-98 °F (36 7 °C)] 96 8 °F (36 °C)  HR:  [65-84] 84  Resp:  [16-18] 16  BP: (124-141)/(62-65) 124/65  SpO2:  [98 %-100 %] 98 %  Body mass index is 36 35 kg/m²  Review of Systems   Constitutional: Negative for activity change, appetite change, chills, diaphoresis and fever  Eyes: Negative for pain, discharge, itching and visual disturbance  Respiratory: Negative for cough, chest tightness, shortness of breath and wheezing  Cardiovascular: Negative for chest pain, palpitations and leg swelling  Gastrointestinal: Negative for abdominal pain, constipation, diarrhea, nausea and vomiting  Endocrine: Negative for polydipsia, polyphagia and polyuria  Genitourinary: Negative for difficulty urinating, dysuria and urgency  Musculoskeletal: Negative for arthralgias, back pain and neck pain  Skin: Negative for rash and wound  Neurological: Negative for dizziness, weakness, numbness and headaches  Input and Output Summary (last 24 hours):        Intake/Output Summary (Last 24 hours) at 2019 1131  Last data filed at 2019 1201  Gross per 24 hour   Intake 60 ml   Output    Net 60 ml       Physical Exam:     Physical Exam   Constitutional: She is oriented to person, place, and time  She appears well-developed and well-nourished  No distress  HENT:   Head: Normocephalic and atraumatic  Right Ear: External ear normal    Left Ear: External ear normal    Nose: Nose normal    Mouth/Throat: Oropharynx is clear and moist  No oropharyngeal exudate  Eyes: Pupils are equal, round, and reactive to light  Conjunctivae and EOM are normal  Right eye exhibits no discharge  Left eye exhibits no discharge  Neck: Normal range of motion  Neck supple  No thyromegaly present  Cardiovascular: Normal rate, regular rhythm, normal heart sounds and intact distal pulses  Exam reveals no gallop and no friction rub  No murmur heard  Pulmonary/Chest: Effort normal and breath sounds normal  No stridor  No respiratory distress  She has no wheezes  She has no rales  Abdominal: Soft  Bowel sounds are normal  She exhibits no distension  There is no tenderness  Musculoskeletal: She exhibits edema  Steri-Strips intact to right hip   Lymphadenopathy:     She has no cervical adenopathy  Neurological: She is alert and oriented to person, place, and time  Skin: Skin is warm and dry  No rash noted  She is not diaphoretic  No erythema  Psychiatric: She has a normal mood and affect   Her behavior is normal  Judgment and thought content normal        Additional Data:     Labs:    Results from last 7 days   Lab Units 12/26/19  0513   WBC Thousand/uL 8 30   HEMOGLOBIN g/dL 10 5*   HEMATOCRIT % 31 6*   PLATELETS Thousands/uL 301   NEUTROS PCT % 63   LYMPHS PCT % 26   MONOS PCT % 8   EOS PCT % 3     Results from last 7 days   Lab Units 12/26/19  0513   SODIUM mmol/L 139   POTASSIUM mmol/L 3 6   CHLORIDE mmol/L 104   CO2 mmol/L 27   BUN mg/dL 15   CREATININE mg/dL 0 63   ANION GAP mmol/L 8   CALCIUM mg/dL 8 7   ALBUMIN g/dL 3 4   TOTAL BILIRUBIN mg/dL 0 60   ALK PHOS U/L 81 ALT U/L 14   AST U/L 19   GLUCOSE RANDOM mg/dL 103*                       Labs reviewed    Imaging:    Imaging reviewed    Recent Cultures (last 7 days):           Last 24 Hours Medication List:     Current Facility-Administered Medications:  acetaminophen 650 mg Oral Q6H PRN Natalia Feldman MD   aspirin 81 mg Oral Daily Ryan Gilliland MD   atenolol 50 mg Oral Daily Ryan Gilliland MD   carbidopa-levodopa 0 5 tablet Oral TID Ryan Gilliland MD   Coenzyme Q10 400 mg Oral Daily Ryan Gilliland MD   enoxaparin 40 mg Subcutaneous Daily Ryan Gilliland MD   lidocaine 1 patch Topical Daily Ryan Gilliland MD   pravastatin 40 mg Oral Every Other Day Ryan Gilliland MD   QUEtiapine 25 mg Oral HS PRN Ryan Gilliland MD   selegiline 5 mg Oral Daily With Breakfast Ryan Gilliland MD   senna-docusate sodium 1 tablet Oral BID Ryan Gilliland MD   traMADol 25 mg Oral Q6H PRN Ryan Gilliland MD   traMADol 50 mg Oral Q6H PRN Natalia Feldman MD        M*Modal software was used to dictate this note  It may contain errors with dictating incorrect words or incorrect spelling  Please contact the provider directly with any questions

## 2019-12-28 NOTE — ASSESSMENT & PLAN NOTE
Vitals:    12/28/19 0900   BP: 124/65   Pulse: 84   Resp:    Temp:    SpO2: 98%      Continue with Atenolol 50mg

## 2019-12-28 NOTE — NURSING NOTE
Pt denies any pain  States pain is a 0/10  Refused evening Senna due to two large bowel movement this evening  Pt resting comfortably in bed  Placed call bell within reach

## 2019-12-29 PROCEDURE — 99232 SBSQ HOSP IP/OBS MODERATE 35: CPT | Performed by: INTERNAL MEDICINE

## 2019-12-29 PROCEDURE — 97535 SELF CARE MNGMENT TRAINING: CPT

## 2019-12-29 PROCEDURE — 97530 THERAPEUTIC ACTIVITIES: CPT

## 2019-12-29 PROCEDURE — 97110 THERAPEUTIC EXERCISES: CPT

## 2019-12-29 PROCEDURE — 97116 GAIT TRAINING THERAPY: CPT

## 2019-12-29 RX ADMIN — ENOXAPARIN SODIUM 40 MG: 40 INJECTION SUBCUTANEOUS at 08:18

## 2019-12-29 RX ADMIN — ACETAMINOPHEN 650 MG: 325 TABLET ORAL at 21:21

## 2019-12-29 RX ADMIN — LIDOCAINE 1 PATCH: 50 PATCH CUTANEOUS at 08:18

## 2019-12-29 RX ADMIN — SELEGILINE HYDROCHLORIDE 5 MG: 5 TABLET ORAL at 08:18

## 2019-12-29 RX ADMIN — ASPIRIN 81 MG: 81 TABLET, COATED ORAL at 08:18

## 2019-12-29 RX ADMIN — CARBIDOPA AND LEVODOPA 0.5 TABLET: 25; 100 TABLET ORAL at 08:20

## 2019-12-29 RX ADMIN — CARBIDOPA AND LEVODOPA 0.5 TABLET: 25; 100 TABLET ORAL at 16:53

## 2019-12-29 RX ADMIN — CARBIDOPA AND LEVODOPA 0.5 TABLET: 25; 100 TABLET ORAL at 21:21

## 2019-12-29 NOTE — PROGRESS NOTES
12/29/19 0830   Pain Assessment   Pain Assessment No/denies pain   Pain Score No Pain   Restrictions/Precautions   Precautions Bed/chair alarms;Cognitive; Fall Risk;Impulsive;Supervision on toilet/commode   Weight Bearing Restrictions Yes   RLE Weight Bearing Per Order WBAT   Oral Hygiene   Type of Assistance Needed Set-up / clean-up   Amount of Physical Assistance Provided No physical assistance   Comment complted w/c level   Oral Hygiene CARE Score 5   Grooming   Able To Initiate Tasks;Comb/Brush Hair;Wash/Dry Face;Brush/Clean Teeth;Wash/Dry Hands   Limitation Noted In Strength;Timeliness  (BUE shoulder ROM)   Findings completed seated w/c level   Shower/Bathe Self   Type of Assistance Needed Physical assistance   Amount of Physical Assistance Provided 50%-74%   Comment Pt able to wash 7/10 parts  Pt requiring A for thorughnessfor buttock area, A for violet feet and equiring Geri to maintain balance while in stance ot wash audra/buttock   Shower/Bathe Self CARE Score 2   Bathing   Assessed Bath Style Shower   Anticipated D/C Bath Style Shower   Able to [place] No   Able to [employer] Temperature No   Able to Wash/Rinse/Dry (body part) Left Arm;Right Arm;L Upper Leg;R Upper Leg;Chest;Abdomen;Perineal Area; Buttocks   Limitations Noted in Balance; Coordination; Endurance;ROM;Safety; Sequencing;Strength;Timeliness   Positioning Seated;Standing   Adaptive Equipment Tub Bench;Hand Held Shower; Shower Bars   Tub/Shower Transfer   Limitations Noted In Balance; Coordination; Endurance;Problem Solving;ROM;Safety; Sequencing;UE Strength;LE Strength   Adaptive Equipment Transfer Bench   Assessed Shower   Findings Therapist trialed tub transfer bench over simulated lip to increase I in showering   Pt reports increased safety and comfort when completing shower transfer with tub bench vs square shower chair   Upper Body Dressing   Type of Assistance Needed Physical assistance   Amount of Physical Assistance Provided Less than 25%   Comment Pt requiring A to pull shirt over head, however, once over head pt able to adjust shirt from front and back   Upper Body Dressing CARE Score 3   Lower Body Dressing   Type of Assistance Needed Physical assistance; Adaptive equipment   Amount of Physical Assistance Provided 25%-49%   Comment Pt utilized LHAE to complte lB dressing  Pt requiring extra time to complete and vc for problem solcing  Once pants donned pt requiring Geri /CGA for balance while in stance to pull pants up   Lower Body Dressing CARE Score 3   Putting On/Taking Off Footwear   Type of Assistance Needed Physical assistance   Amount of Physical Assistance Provided Total assistance   Comment Pt requiring TA to don TEDS and sneakers  Pt with poor carryove rof use of shoe horn  At this time therapsti reccomend pt to allow  to A    Putting On/Taking Off Footwear CARE Score 1   Dressing/Undressing Clothing   Remove UB Clothes Other  (hospital gown)   Don UB Clothes Bra;Pullover Shirt   Remove LB Clothes Socks  (with dressing stick)   Don LB Clothes Other;Pants;TEDs; Shoes  (pull ups)   Limitations Noted In Balance; Coordination; Endurance; Safety; Sequencing;Strength;ROM   Adaptive Equipment Reacher;Dressing Stick;LH Shoehorn   Positioning Supported Sit;Standing   Lying to Sitting on Side of Bed   Type of Assistance Needed Supervision   Amount of Physical Assistance Provided No physical assistance   Comment On this day pt observed to perform bed mobility rolling to R side with HOB elevated and use of bed rail, no A requiring, requiring extra time to complete   Lying to Sitting on Side of Bed CARE Score 4   Sit to Stand   Type of Assistance Needed Incidental touching   Amount of Physical Assistance Provided No physical assistance   Comment ptrequiring CGA ot perform sit<>stnad on this day  However, continues to demosntrate decreased safety awareness and carryover of proper hand placement   Requires vc to lock breaks of rollator prior to standing   Sit to Stand CARE Score 4   Bed-Chair Transfer   Type of Assistance Needed Incidental touching   Amount of Physical Assistance Provided No physical assistance   Comment Pt fluctuates between CGA/Geri for transfers  Pt continues to requires extensive vc for proper hand/anne tplacement and sequncing of performing transfer safely   Chair/Bed-to-Chair Transfer CARE Score 4   Transfer Bed/Chair/Wheelchair   Positioning Concerns Skin Integrity   Limitations Noted In Balance; Coordination; Endurance;Problem Solving; Sequencing;UE Strength;LE Strength   Adaptive Equipment   (rollator)   Toileting Hygiene   Type of Assistance Needed Physical assistance   Amount of Physical Assistance Provided 25%-49%   Comment pt able to reach audra/buttock, Geri for balance support   Toileting Hygiene CARE Score 3   Toileting   Able to [address] down yes, up yes  Able to Manage Clothing Closures Yes   Manage Hygiene Bladder   Limitations Noted In Balance; Coordination;ROM;Safety; Sequencing;UE Strength;LE Strength   Adaptive Equipment Grab Bar   Toilet Transfer   Type of Assistance Needed Incidental touching   Amount of Physical Assistance Provided No physical assistance   Comment CGA with vc for proper hand/anne tplacement and rolaltort mngmnt   Toilet Transfer CARE Score 4   Toilet Transfer   Surface Assessed Platform Commode   Transfer Technique Stand Pivot   Limitations Noted In Balance; Endurance;Problem Solving;ROM;Safety; Sequencing;UE Strength;LE Strength   Adaptive Equipment Grab Bar  (rollator)   Positioning Concerns Cognition; Safety   Therapeutic Excerise-Strength   UE Strength Yes   Exercise Tools   Other Exercise Tool 1 Pt engaged in BUE strengthening wiht 2lb weight 3x10 in all avaialble shoulder planes  P limited in shoulder ROM   Pt requiring vc for optimal positioning to increase functional strnegth in order to A with fuctional transfers   Cognition   Overall Cognitive Status Impaired   Arousal/Participation Alert; Cooperative   Attention Within functional limits   Orientation Level Oriented X4   Memory Decreased short term memory;Decreased recall of precautions   Activity Tolerance   Activity Tolerance Patient tolerated treatment well   Assessment   Treatment Assessment Pt seen for 90mins of skilled OT services with focus on self-care, functional transfer and BUE strengthening  Pt is mkaing steady gains toward STG/LTG> Pt at this time requires A for pulling shirt over head 2* to limited shoudler ROM which pt reports  will be able to A  In addition pt demonstrated increased performance during LB dressing with use of LH reacher with vc for proper solving  Pt requiring CGA to maintain balance while in stance to pull pants up  Pt continues to require TA for TEDS and sneakers at this time which husban to A at home  In additin therapist trialed tub bench in order to have pt enter shower  Pt requirng Geri to eprform transfer with 7in simulated lip  THerapst recommend pt to have husban com ine again for 2nd FT in order for him to observed tub transfer and see if tub bench possible for home  Pt vebralized udnerstanding  THerapist to s/u FT with  Jigar Ramos  Towards end of session pt becoming upset and crying reporting "I feel like I'm not getting better" Therapist discussed with pts functional gains she has made sine IE and some of the A she will require at home  After education pt less upset and verblaizing understanding  Pt with anticpated d/c for 12/2/19 with husban support at home  Pt ocntinues to demonstrate decreased safety awareness, sequencing and problem solving during functional transfers requiring vc for proper hand/foot placement and locking of breaks  Prognosis Fair   Problem List Decreased strength;Decreased range of motion;Decreased endurance; Impaired balance;Decreased mobility; Decreased coordination;Decreased cognition; Impaired judgement;Decreased safety awareness   Barriers to Discharge Inaccessible home environment   Plan   Treatment/Interventions ADL retraining;Functional transfer training; Therapeutic exercise; Endurance training;Patient/family training;Bed mobility; Compensatory technique education   Progress Progressing toward goals   Recommendation   OT Discharge Recommendation Home with family support   Equipment Recommended Tub seat with back  (reacher)   OT Therapy Minutes   OT Time In 0830   OT Time Out 1000   OT Total Time (minutes) 90   OT Mode of treatment - Individual (minutes) 90   OT Mode of treatment - Concurrent (minutes) 0   OT Mode of treatment - Group (minutes) 0   OT Mode of treatment - Co-treat (minutes) 0   OT Mode of Treatment - Total time(minutes) 90 minutes   OT Cumulative Minutes 965   Therapy Time missed   Time missed?  No

## 2019-12-29 NOTE — PROGRESS NOTES
12/29/19 1230   Pain Assessment   Pain Assessment No/denies pain   Pain Score No Pain   Restrictions/Precautions   Precautions Cognitive;Bed/chair alarms; Fall Risk;Supervision on toilet/commode   Weight Bearing Restrictions Yes   RLE Weight Bearing Per Order WBAT   Cognition   Overall Cognitive Status Impaired   Arousal/Participation Alert; Cooperative   Attention Within functional limits   Orientation Level Oriented X4   Memory Decreased short term memory;Decreased recall of precautions   Following Commands Follows multistep commands with increased time or repetition   Comments poor carryover of directions provided  Subjective   Subjective spouse and son present for family training  Pt denies pain and feels anxious about training  Roll Left and Right   Type of Assistance Needed Physical assistance;Verbal cues   Amount of Physical Assistance Provided 25%-49%   Comment without bed rail  Education to family for benefits of bed rail  Roll Left and Right CARE Score 3   Sit to Lying   Type of Assistance Needed Physical assistance;Verbal cues   Amount of Physical Assistance Provided 25%-49%   Comment A for LE lifting and trunk repositioning  Education to spouse to allow pt to do as much as possible  Sit to Lying CARE Score 3   Lying to Sitting on Side of Bed   Type of Assistance Needed Physical assistance;Verbal cues   Amount of Physical Assistance Provided 50%-74%   Comment from flat bed without railing  Cues for trunk lifting and then LE mobilty  Lying to Sitting on Side of Bed CARE Score 2   Sit to Stand   Type of Assistance Needed Incidental touching;Verbal cues   Amount of Physical Assistance Provided No physical assistance   Sit to Stand CARE Score 4   Bed-Chair Transfer   Type of Assistance Needed Incidental touching;Verbal cues   Amount of Physical Assistance Provided No physical assistance   Comment needs inc time and cues to L foot freezing with turning to sit      Chair/Bed-to-Chair Transfer CARE Score 4   Transfer Bed/Chair/Wheelchair   Limitations Noted In Balance;Confidence;LE Strength;UE Strength; Sequencing   Adaptive Equipment Roller Walker;Rollator   Stand Pivot Contact Guard;Supervision   Sit to Stand [employer]   Stand to [employer] Guard   Supine to Sit Moderate Assist   Sit to Supine Minimal Assist;Moderate Assist   Car Transfer Minimal Assist   Findings car transfer to spouses car min A for stability and extensive cues for technique as pt tries to step into car prior to sitting  Car Transfer   Type of Assistance Needed Physical assistance;Verbal cues   Amount of Physical Assistance Provided 25%-49%   Comment min A for LE lifting  Cues for technique   Car Transfer CARE Score 3   Walk 10 Feet   Type of Assistance Needed Supervision   Amount of Physical Assistance Provided No physical assistance   Walk 10 Feet CARE Score 4   Walk 50 Feet with Two Turns   Type of Assistance Needed Incidental touching;Verbal cues; Physical assistance   Amount of Physical Assistance Provided Less than 25%   Comment CG RW  Min A for inc steadying A with turns due to L LE freezing  Walk 50 Feet with Two Turns CARE Score 3   Walk 150 Feet   Type of Assistance Needed Physical assistance;Verbal cues   Amount of Physical Assistance Provided Less than 25%   Comment min A with RW and rollator for balance and safety  Walk 150 Feet CARE Score 3   Walking 10 Feet on Uneven Surfaces   Reason if not Attempted Safety concerns   Walking 10 Feet on Uneven Surfaces CARE Score 88   Ambulation   Does the patient walk? 2  Yes   Primary Mode of Locomotion Prior to Admission Walk   Distance Walked (feet) 150 ft  (x3 (x2 with RW x1 with rollator))   Assist Device Roller Walker;Rollator   Gait Pattern Inconsistant Oliva;Decreased foot clearance;L foot drag;Ataxic;Narrow NANCY; Improper weight shift   Limitations Noted In Coordination;Device Management; Heel Strike;Posture; Safety   Provided Assistance with: Balance   Walk Assist Level Contact Guard;Close Supervision;Minimum Assist   Findings Pt demonstrates inc safetywith RW use vs rollator due to poor balance and safety with turning  Gait on flat smooth surface with Rollator is good however with turns and for household mobility RW is more appropriate for balance and safety  Wheelchair mobility   Does the patient use a wheelchair? 0  No   Findings spouse asking about WC reports someone claimed they were ordering one  Curb or Single Stair   Style negotiated Curb   Type of Assistance Needed Physical assistance;Verbal cues   Amount of Physical Assistance Provided 25%-49%   Comment cues for technique  Poor RW safety and placement  Ataxic L foot placement placed L foot infront of R and pt went to step up unsafely as she was not aware of foot placment, MIn A for balance and reattempt  1 Step (Curb) CARE Score 3   4 Steps   Type of Assistance Needed Incidental touching;Verbal cues; Physical assistance   Amount of Physical Assistance Provided Less than 25%   Comment cues for L foot flat and full placement  Varied sequence as pt previously would go up with R LE     4 Steps CARE Score 3   12 Steps   Reason if not Attempted Activity not applicable   12 Steps CARE Score 9   Stairs   Type Stairs   # of Steps 8   Weight Bearing Precautions Fall Risk   Assist Devices Bilateral Rail   Findings see above   Assessment   Treatment Assessment Pt spouse and son participated in 80 min family training with focus on transfers gait and DME needs  Car transfer to spouses car min A for LE lifiting  Cues to turn to sit first as pt previously stepped into car  Bed mobility on 26 in bed ht  min mod A with cues to spouse to allow pt to do as much as possible with SBA if needed as pt requires A for LE lifitng sit to sup and mod A for trunk lifing for sup to sit  Discussed bed rail and info given to spouse to order, States they will try it as box spring and mattress adjust together  Discussed at length use of RW vs rollator  Pt on straight flat surface deos well with rollator however with turns and negoitiating narrow spaces as at home, pt unsafe with rollator mgmt  HIGHLY suggest RW use for DC home  Pts RW examined in spouses car and fit well for pt  Does have basket  Stair training pt with dec carryover of cues and safety  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 however they see improvement  Poor L foot placement and pts dec awareness of placement makes pt unsafe on steps or curb step  education to spouse and son they must be with her and guarding closely down steps of pt  Family verbalizes understanding however did not provide much physical A throughout training  Distracted by each other and discussion  Cues to spouse to attend to pt and be near for all transfers and mobility to A and provide cues if needed  Spouse asked about WC for home as someone previously told him they would order one  Pt currently only using WC for transport  may benefit from transport chair as WC mobility is not a goal at this time  Discussed training with OT  Suggest if spouse present for treatment continue training and having him engage in pt tasks for carryover of both of them  Pt also seeking BSC for over toilet  Continue RW use and balance training to optimize safety for DC home 1 2 19  HOME PT suggested  Pt not ready for out pt PT  Family/Caregiver Present yes   PT Family training done with: spouse and son   Problem List Decreased strength; Impaired balance;Decreased coordination;Decreased cognition; Impaired judgement;Decreased safety awareness   Barriers to Discharge Inaccessible home environment   Plan   Treatment/Interventions Patient/family training;Functional transfer training   Progress Progressing toward goals   Recommendation   Recommendation Home PT;24 hour supervision/assist   PT Therapy Minutes   PT Time In 1230   PT Time Out 1400   PT Total Time (minutes) 90   PT Mode of treatment - Individual (minutes) 90   PT Mode of treatment - Concurrent (minutes) 0   PT Mode of treatment - Group (minutes) 0   PT Mode of treatment - Co-treat (minutes) 0   PT Mode of Treatment - Total time(minutes) 90 minutes   PT Cumulative Minutes 870   Therapy Time missed   Time missed?  No

## 2019-12-29 NOTE — ASSESSMENT & PLAN NOTE
Vitals:    12/29/19 0700   BP: 108/56   Pulse: 64   Resp: 18   Temp: (!) 97 °F (36 1 °C)   SpO2: 100%      Continue with Atenolol 50mg

## 2019-12-29 NOTE — PROGRESS NOTES
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 ([facility] )  Assessment & Plan  · Patient with delirium, hallucinations following her surgery  · Had 1 hallucination last night  · Evaluated by neurology, sinimet dose reduced  · Will continue seroquel for now, but will attempt to wean off by completion of rehab      Type 2 diabetes mellitus without complication ([facility] )  Assessment & Plan  Lab Results   Component Value Date    HGBA1C 5 7 12/19/2019       No results for input(s): POCGLU in the last 72 hours  Blood Sugar Average: Last 72 hrs:     Patient pre- diabetic, not on home insulin  DM   Will follow up with PCP outpatient  Will d/c SSI    Other hyperlipidemia  Assessment & Plan  · Continue Pravastatin     Parkinson's disease with use of electrical brain stimulation ([facility] )  Assessment & Plan  · Continue selegiline (patient is on rasagiline 1mg daily as outpatient, evidently not available here on formulary)   Will resume home rasagiline on discharge  · Continue sinemet - dose was decreased by neurology due to hallucinations  · F/u Dr Renata Feng as outpatient  · promote sleep/wake cycle  · avoid CNS altering medications      * Closed fracture of right hip [facility])  Assessment & Plan  · Acute comprehensive interdisciplinary inpatient rehabilitation including PT, OT, and/or SLP, RN, CM, SW, dietary, psychology, etc   · Goal: supervision  · Patient not cleared to shower at this time  · WBAT  · S/p IM nailing on   ·   Patient was seen by Ortho on , staples removed, and Steri-Strips in place, patient is to follow up with Orthopedics in 2 weeks        VTE Pharmacologic Prophylaxis:   Pharmacologic: Enoxaparin (Lovenox)  Mechanical VTE Prophylaxis in Place: Yes    Current Length of Stay: 11 day(s)    Current Patient Status: Inpatient Rehab     Discharge Plan: As per treatment team      Code Status: Level 3 - DNAR and DNI    Subjective:     Patient seated in chair,   Patient reports she slept well last evening  Patient denies chest pain, shortness of breath and palpitations  Patient reports no pain at this point time  Objective:     Vitals:   Temp (24hrs), Av 1 °F (36 7 °C), Min:97 °F (36 1 °C), Max:98 7 °F (37 1 °C)    Temp:  [97 °F (36 1 °C)-98 7 °F (37 1 °C)] 97 °F (36 1 °C)  HR:  [62-65] 64  Resp:  [15-18] 18  BP: (108-123)/(56-57) 108/56  SpO2:  [96 %-100 %] 100 %  Body mass index is 36 67 kg/m²  Review of Systems   Constitutional: Negative for activity change, appetite change, chills, diaphoresis and fever  HENT: Negative for congestion, ear discharge, ear pain, postnasal drip, rhinorrhea, sinus pressure, sinus pain and sore throat  Eyes: Negative for pain, discharge, itching and visual disturbance  Respiratory: Negative for cough, chest tightness, shortness of breath and wheezing  Cardiovascular: Negative for chest pain, palpitations and leg swelling  Gastrointestinal: Negative for abdominal pain, constipation, diarrhea, nausea and vomiting  Endocrine: Negative for polydipsia, polyphagia and polyuria  Genitourinary: Negative for difficulty urinating, dysuria and urgency  Musculoskeletal: Negative for arthralgias, back pain and neck pain  Skin: Negative for rash and wound  Neurological: Negative for dizziness, weakness, numbness and headaches  Input and Output Summary (last 24 hours):        Intake/Output Summary (Last 24 hours) at 12/29/2019 1100  Last data filed at 12/29/2019 0901  Gross per 24 hour   Intake 80 ml   Output    Net 80 ml       Physical Exam:     Physical Exam   Constitutional: She is oriented to person, place, and time  She appears well-developed and well-nourished  No distress  HENT:   Head: Normocephalic and atraumatic  Right Ear: External ear normal    Left Ear: External ear normal    Nose: Nose normal    Mouth/Throat: Oropharynx is clear and moist  No oropharyngeal exudate  Eyes: Pupils are equal, round, and reactive to light  Conjunctivae and EOM are normal  Right eye exhibits no discharge  Left eye exhibits no discharge  Neck: Normal range of motion  Neck supple  No thyromegaly present  Cardiovascular: Normal rate, regular rhythm, normal heart sounds and intact distal pulses  Exam reveals no gallop and no friction rub  No murmur heard  Pulmonary/Chest: Effort normal and breath sounds normal  No stridor  No respiratory distress  She has no wheezes  She has no rales  Abdominal: Soft  Bowel sounds are normal  She exhibits no distension  There is no tenderness  Musculoskeletal: She exhibits edema (trace, teds in place)  Lymphadenopathy:     She has no cervical adenopathy  Neurological: She is alert and oriented to person, place, and time  Skin: Skin is warm and dry  No rash noted  She is not diaphoretic  No erythema  Psychiatric: She has a normal mood and affect   Her behavior is normal  Judgment and thought content normal        Additional Data:     Labs:    Results from last 7 days   Lab Units 12/26/19  0513   WBC Thousand/uL 8 30   HEMOGLOBIN g/dL 10 5*   HEMATOCRIT % 31 6*   PLATELETS Thousands/uL 301   NEUTROS PCT % 63   LYMPHS PCT % 26   MONOS PCT % 8   EOS PCT % 3     Results from last 7 days   Lab Units 12/26/19  0513   SODIUM mmol/L 139   POTASSIUM mmol/L 3 6   CHLORIDE mmol/L 104   CO2 mmol/L 27   BUN mg/dL 15   CREATININE mg/dL 0 63   ANION GAP mmol/L 8   CALCIUM mg/dL 8 7   ALBUMIN g/dL 3  4   TOTAL BILIRUBIN mg/dL 0 60   ALK PHOS U/L 81   ALT U/L 14   AST U/L 19   GLUCOSE RANDOM mg/dL 103*                       Labs reviewed    Imaging:    Imaging reviewed    Recent Cultures (last 7 days):           Last 24 Hours Medication List:     Current Facility-Administered Medications:  acetaminophen 650 mg Oral Q6H PRN Dave Valadez MD   aspirin 81 mg Oral Daily Ryan Gilliland MD   atenolol 50 mg Oral Daily Ryan Gilliland MD   carbidopa-levodopa 0 5 tablet Oral TID Ryan Gilliland MD   Coenzyme Q10 400 mg Oral Daily Ryan Gilliland MD   enoxaparin 40 mg Subcutaneous Daily Ryan Gilliland MD   lidocaine 1 patch Topical Daily Ryan Gilliland MD   pravastatin 40 mg Oral Every Other Day Ryan Gilliland MD   QUEtiapine 25 mg Oral HS PRN Ryan Gilliland MD   selegiline 5 mg Oral Daily With Breakfast Ryan Gilliland MD   senna-docusate sodium 1 tablet Oral BID Ryan Gilliland MD   traMADol 25 mg Oral Q6H PRN Ryan Gilliland MD   traMADol 50 mg Oral Q6H PRN Dave Valadez MD        M*Modal software was used to dictate this note  It may contain errors with dictating incorrect words or incorrect spelling  Please contact the provider directly with any questions

## 2019-12-29 NOTE — PLAN OF CARE
Problem: Potential for Falls  Goal: Patient will remain free of falls  Description  INTERVENTIONS:  - Assess patient frequently for physical needs  -  Identify cognitive and physical deficits and behaviors that affect risk of falls  -  Peak fall precautions as indicated by assessment   - Educate patient/family on patient safety including physical limitations  - Instruct patient to call for assistance with activity based on assessment  - Modify environment to reduce risk of injury  - Consider OT/PT consult to assist with strengthening/mobility  Outcome: Progressing     Problem: NEUROSENSORY - ADULT  Goal: Achieves stable or improved neurological status  Description  INTERVENTIONS  - Monitor and report changes in neurological status  - Monitor vital signs such as temperature, blood pressure, glucose, and any other labs ordered   - Initiate measures to prevent increased intracranial pressure  - Monitor for seizure activity and implement precautions if appropriate      Outcome: Progressing  Goal: Achieves maximal functionality and self care  Description  INTERVENTIONS  - Monitor swallowing and airway patency with patient fatigue and changes in neurological status  - Encourage and assist patient to increase activity and self care     - Encourage visually impaired, hearing impaired and aphasic patients to use assistive/communication devices  Outcome: Progressing     Problem: GASTROINTESTINAL - ADULT  Goal: Maintains or returns to baseline bowel function  Description  INTERVENTIONS:  - Assess bowel function  - Encourage oral fluids to ensure adequate hydration  - Administer IV fluids if ordered to ensure adequate hydration  - Administer ordered medications as needed  - Encourage mobilization and activity  - Consider nutritional services referral to assist patient with adequate nutrition and appropriate food choices  Outcome: Progressing  Goal: Maintains adequate nutritional intake  Description  INTERVENTIONS:  - Monitor percentage of each meal consumed  - Identify factors contributing to decreased intake, treat as appropriate  - Assist with meals as needed  - Monitor I&O, weight, and lab values if indicated  - Obtain nutrition services referral as needed  Outcome: Progressing     Problem: GENITOURINARY - ADULT  Goal: Maintains or returns to baseline urinary function  Description  INTERVENTIONS:  - Assess urinary function  - Encourage oral fluids to ensure adequate hydration if ordered  - Administer IV fluids as ordered to ensure adequate hydration  - Administer ordered medications as needed  - Offer frequent toileting  - Follow urinary retention protocol if ordered  Outcome: Progressing     Problem: SKIN/TISSUE INTEGRITY - ADULT  Goal: Skin integrity remains intact  Description  INTERVENTIONS  - Identify patients at risk for skin breakdown  - Assess and monitor skin integrity  - Assess and monitor nutrition and hydration status  - Monitor labs (i e  albumin)  - Assess for incontinence   - Turn and reposition patient  - Assist with mobility/ambulation  - Relieve pressure over bony prominences  - Avoid friction and shearing  - Provide appropriate hygiene as needed including keeping skin clean and dry  - Evaluate need for skin moisturizer/barrier cream  - Collaborate with interdisciplinary team (i e  Nutrition, Rehabilitation, etc )   - Patient/family teaching  Outcome: Progressing  Goal: Incision(s), wounds(s) or drain site(s) healing without S/S of infection  Description  INTERVENTIONS  - Assess and document risk factors for skin impairment   - Assess and document dressing, incision, wound bed, drain sites and surrounding tissue  - Consider nutrition services referral as needed  - Oral mucous membranes remain intact  - Provide patient/ family education  Outcome: Progressing  Goal: Oral mucous membranes remain intact  Description  INTERVENTIONS  - Assess oral mucosa and hygiene practices  - Implement preventative oral hygiene regimen  - Implement oral medicated treatments as ordered  - Initiate Nutrition services referral as needed  Outcome: Progressing     Problem: MUSCULOSKELETAL - ADULT  Goal: Maintain or return mobility to safest level of function  Description  INTERVENTIONS:  - Assess patient's ability to carry out ADLs; assess patient's baseline for ADL function and identify physical deficits which impact ability to perform ADLs (bathing, care of mouth/teeth, toileting, grooming, dressing, etc )  - Assess/evaluate cause of self-care deficits   - Assess range of motion  - Assess patient's mobility  - Assess patient's need for assistive devices and provide as appropriate  - Encourage maximum independence but intervene and supervise when necessary  - Involve family in performance of ADLs  - Assess for home care needs following discharge   - Consider OT consult to assist with ADL evaluation and planning for discharge  - Provide patient education as appropriate  Outcome: Progressing  Goal: Maintain proper alignment of affected body part  Description  INTERVENTIONS:  - Support, maintain and protect limb and body alignment  - Provide patient/ family with appropriate education  Outcome: Progressing     Problem: COPING  Goal: Pt/Family able to verbalize concerns and demonstrate effective coping strategies  Description  INTERVENTIONS:  - Assist patient/family to identify coping skills, available support systems and cultural and spiritual values  - Provide emotional support, including active listening and acknowledgement of concerns of patient and caregivers  - Reduce environmental stimuli, as able  - Provide patient education  - Assess for spiritual pain/suffering and initiate spiritual care, including notification of Pastoral Care or rajnit based community as needed  - Assess effectiveness of coping strategies  Outcome: Progressing  Goal: Will report anxiety at manageable levels  Description  INTERVENTIONS:  - Administer medication as ordered  - Teach and encourage coping skills  - Provide emotional support  - Assess patient/family for anxiety and ability to cope  Outcome: Progressing     Problem: CONFUSION/THOUGHT DISTURBANCE  Goal: Thought disturbances (confusion, delirium, depression, dementia or psychosis) are managed to maintain or return to baseline mental status and functional level  Description  INTERVENTIONS:  - Assess for possible contributors to  thought disturbance, including but not limited to medications, infection, impaired vision or hearing, underlying metabolic abnormalities, dehydration, respiratory compromise,  psychiatric diagnoses and notify attending PHYSICAN/AP  - Monitor and intervene to maintain adequate nutrition, hydration, elimination, sleep and activity  - Decrease environmental stimuli, including noise as appropriate  - Provide frequent contacts to provide refocusing, direction and reassurance as needed  Approach patient calmly with eye contact and at their level    - [place] high risk fall precautions, aspiration precautions and other safety measures, as indicated  - If delirium suspected, notify physician/AP of change in condition and request immediate in-person evaluation  - Pursue consults as appropriate including Geriatric (campus dependent), OT for cognitive evaluation/activity planning, psychiatric, pastoral care, etc   Outcome: Progressing     Problem: Prexisting or High Potential for Compromised Skin Integrity  Goal: Skin integrity is maintained or improved  Description  INTERVENTIONS:  - Identify patients at risk for skin breakdown  - Assess and monitor skin integrity  - Assess and monitor nutrition and hydration status  - Monitor labs   - Assess for incontinence   - Turn and reposition patient  - Assist with mobility/ambulation  - Relieve pressure over bony prominences  - Avoid friction and shearing  - Provide appropriate hygiene as needed including keeping skin clean and dry  - Evaluate need for skin moisturizer/barrier cream  - Collaborate with interdisciplinary team   - Patient/family teaching  - Consider wound care consult   Outcome: Progressing

## 2019-12-29 NOTE — ASSESSMENT & PLAN NOTE
· Continue selegiline (patient is on rasagiline 1mg daily as outpatient, evidently not available here on formulary)   Will resume home rasagiline on discharge  · Continue sinemet - dose was decreased by neurology due to hallucinations  · F/u Dr Justino Brady as outpatient  · promote sleep/wake cycle  · avoid CNS altering medications

## 2019-12-30 LAB
ALBUMIN SERPL BCP-MCNC: 3.6 G/DL (ref 3–5.2)
ALP SERPL-CCNC: 127 U/L (ref 43–122)
ALT SERPL W P-5'-P-CCNC: 21 U/L (ref 9–52)
ANION GAP SERPL CALCULATED.3IONS-SCNC: 10 MMOL/L (ref 5–14)
AST SERPL W P-5'-P-CCNC: 20 U/L (ref 14–36)
BASOPHILS # BLD AUTO: 0.1 THOUSANDS/ΜL (ref 0–0.1)
BASOPHILS NFR BLD AUTO: 1 % (ref 0–1)
BILIRUB SERPL-MCNC: 0.5 MG/DL
BUN SERPL-MCNC: 19 MG/DL (ref 5–25)
CALCIUM SERPL-MCNC: 9 MG/DL (ref 8.4–10.2)
CHLORIDE SERPL-SCNC: 104 MMOL/L (ref 97–108)
CO2 SERPL-SCNC: 27 MMOL/L (ref 22–30)
CREAT SERPL-MCNC: 0.68 MG/DL (ref 0.6–1.2)
EOSINOPHIL # BLD AUTO: 0.2 THOUSAND/ΜL (ref 0–0.4)
EOSINOPHIL NFR BLD AUTO: 2 % (ref 0–6)
ERYTHROCYTE [DISTWIDTH] IN BLOOD BY AUTOMATED COUNT: 14.6 %
GFR SERPL CREATININE-BSD FRML MDRD: 86 ML/MIN/1.73SQ M
GLUCOSE P FAST SERPL-MCNC: 107 MG/DL (ref 70–99)
GLUCOSE SERPL-MCNC: 107 MG/DL (ref 70–99)
HCT VFR BLD AUTO: 34.6 % (ref 36–46)
HGB BLD-MCNC: 11.5 G/DL (ref 12–16)
LYMPHOCYTES # BLD AUTO: 1.9 THOUSANDS/ΜL (ref 0.5–4)
LYMPHOCYTES NFR BLD AUTO: 23 % (ref 25–45)
MCH RBC QN AUTO: 31.5 PG (ref 26–34)
MCHC RBC AUTO-ENTMCNC: 33.2 G/DL (ref 31–36)
MCV RBC AUTO: 95 FL (ref 80–100)
MONOCYTES # BLD AUTO: 0.6 THOUSAND/ΜL (ref 0.2–0.9)
MONOCYTES NFR BLD AUTO: 7 % (ref 1–10)
NEUTROPHILS # BLD AUTO: 5.5 THOUSANDS/ΜL (ref 1.8–7.8)
NEUTS SEG NFR BLD AUTO: 67 % (ref 45–65)
PLATELET # BLD AUTO: 299 THOUSANDS/UL (ref 150–450)
PMV BLD AUTO: 8.7 FL (ref 8.9–12.7)
POTASSIUM SERPL-SCNC: 3.8 MMOL/L (ref 3.6–5)
PROT SERPL-MCNC: 6.5 G/DL (ref 5.9–8.4)
RBC # BLD AUTO: 3.64 MILLION/UL (ref 4–5.2)
SODIUM SERPL-SCNC: 141 MMOL/L (ref 137–147)
WBC # BLD AUTO: 8.3 THOUSAND/UL (ref 4.5–11)

## 2019-12-30 PROCEDURE — 97535 SELF CARE MNGMENT TRAINING: CPT

## 2019-12-30 PROCEDURE — 97110 THERAPEUTIC EXERCISES: CPT

## 2019-12-30 PROCEDURE — 97530 THERAPEUTIC ACTIVITIES: CPT

## 2019-12-30 PROCEDURE — 85025 COMPLETE CBC W/AUTO DIFF WBC: CPT | Performed by: NURSE PRACTITIONER

## 2019-12-30 PROCEDURE — 99232 SBSQ HOSP IP/OBS MODERATE 35: CPT | Performed by: NURSE PRACTITIONER

## 2019-12-30 PROCEDURE — 97116 GAIT TRAINING THERAPY: CPT

## 2019-12-30 PROCEDURE — NC001 PR NO CHARGE: Performed by: NURSE PRACTITIONER

## 2019-12-30 PROCEDURE — 80053 COMPREHEN METABOLIC PANEL: CPT | Performed by: NURSE PRACTITIONER

## 2019-12-30 PROCEDURE — 99232 SBSQ HOSP IP/OBS MODERATE 35: CPT | Performed by: PHYSICAL MEDICINE & REHABILITATION

## 2019-12-30 RX ORDER — PAROXETINE HYDROCHLORIDE 20 MG/1
20 TABLET, FILM COATED ORAL DAILY
Status: DISCONTINUED | OUTPATIENT
Start: 2019-12-30 | End: 2020-01-02 | Stop reason: HOSPADM

## 2019-12-30 RX ADMIN — ATENOLOL 50 MG: 25 TABLET ORAL at 08:37

## 2019-12-30 RX ADMIN — CARBIDOPA AND LEVODOPA 0.5 TABLET: 25; 100 TABLET ORAL at 07:47

## 2019-12-30 RX ADMIN — ENOXAPARIN SODIUM 40 MG: 40 INJECTION SUBCUTANEOUS at 08:38

## 2019-12-30 RX ADMIN — SENNOSIDES AND DOCUSATE SODIUM 1 TABLET: 8.6; 5 TABLET ORAL at 08:38

## 2019-12-30 RX ADMIN — CARBIDOPA AND LEVODOPA 0.5 TABLET: 25; 100 TABLET ORAL at 20:47

## 2019-12-30 RX ADMIN — ASPIRIN 81 MG: 81 TABLET, COATED ORAL at 08:38

## 2019-12-30 RX ADMIN — TRAMADOL HYDROCHLORIDE 50 MG: 50 TABLET, COATED ORAL at 04:58

## 2019-12-30 RX ADMIN — PAROXETINE HYDROCHLORIDE 20 MG: 20 TABLET, FILM COATED ORAL at 14:58

## 2019-12-30 RX ADMIN — QUETIAPINE FUMARATE 25 MG: 25 TABLET, FILM COATED ORAL at 22:35

## 2019-12-30 RX ADMIN — LIDOCAINE 1 PATCH: 50 PATCH CUTANEOUS at 08:38

## 2019-12-30 RX ADMIN — CARBIDOPA AND LEVODOPA 0.5 TABLET: 25; 100 TABLET ORAL at 16:26

## 2019-12-30 RX ADMIN — ACETAMINOPHEN 650 MG: 325 TABLET ORAL at 20:47

## 2019-12-30 RX ADMIN — PRAVASTATIN SODIUM 40 MG: 40 TABLET ORAL at 08:38

## 2019-12-30 RX ADMIN — ACETAMINOPHEN 650 MG: 325 TABLET ORAL at 07:45

## 2019-12-30 RX ADMIN — SELEGILINE HYDROCHLORIDE 5 MG: 5 TABLET ORAL at 07:47

## 2019-12-30 NOTE — PROGRESS NOTES
Progress Note - Vladimir Stevenson [birth year], 75 y o  female MRN: [id number]    Unit/Bed#: [facility] 268-02 Encounter: [id number]    Primary Care Provider: Harshal Nichole MD   Date and time admitted to hospital: 12/18/2019  2:44 PM    * Closed fracture of right hip [facility])  Assessment & Plan  · PT, OT per primary  · WBAT RLE with assistive devices prn for stability  · Lovenox total of 30 days post-op for DVT ppx:  D/w pt and nsg for d/c teaching  · S/p IM nailing on 12/14  · Patient was seen by Ortho on 12/27, staples removed, and Steri-Strips placed  · Reviewed ortho consult:  Patient may shower and allow water to run over incision, but should not submerge incision  · Patient will require outpatient follow up with Dr Daysi Cooper in 2 weeks  Generalized edema  Assessment & Plan  · Advised patient to keep elevated while in chair  Eloy stockings  Improving     Essential hypertension  Assessment & Plan  Vitals:    12/30/19 0751   BP: 144/67   Pulse: 92   Resp: 18   Temp: (!) 97 °F (36 1 °C)   SpO2: 98%      · Controlled  Continue with Atenolol 50mg       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 [facility])  Assessment & Plan  · Patient with delirium, hallucinations following her surgery  · Had 1 hallucination last night  · Evaluated by neurology, sinimet dose reduced  · Will continue seroquel for now, but will attempt to wean off by completion of rehab    Type 2 diabetes mellitus without complication [facility])  Assessment & Plan  Lab Results   Component Value Date    HGBA1C 5 7 12/19/2019       No results for input(s): POCGLU in the last 72 hours  Blood Sugar Average: Last 72 hrs:     Pre-DM  Will follow up with PCP outpatient    Other hyperlipidemia  Assessment & Plan  · Continue Pravastatin     Parkinson's disease with use of electrical brain stimulation (HCC)  Assessment & Plan  · Continue selegiline (patient is on rasagiline 1mg daily as outpatient, evidently not available here on formulary)   Will resume home rasagiline on discharge  · Continue sinemet - dose was decreased by neurology due to hallucinations  · F/u Dr Chantale Montemayor as outpatient  · promote sleep/wake cycle  · Avoid CNS altering medications        VTE Pharmacologic Prophylaxis:   Pharmacologic: Enoxaparin (Lovenox)  Mechanical VTE Prophylaxis in Place: Yes    Current Length of Stay: 12 day(s)    Current Patient Status: Inpatient Rehab     Discharge Plan: As per treatment team:  Possible d/c on thursday    Code Status: Level 3 - DNAR and DNI    Subjective:   No overnight events  Pt reports R hip pain is controlled  She used an ice pack earlier for 5/10 pain that brought it down  Denies neuropathy  Also c/o R shoulder pain, lidoderm patch in place  Denies fever, chills, SOB, CP, palpitations, nausea, vomiting  Appetite good - does not care for hospital food however  Reports sleeping well  Objective:     Vitals:   Temp (24hrs), Av 7 °F (36 5 °C), Min:97 °F (36 1 °C), Max:98 6 °F (37 °C)    Temp:  [97 °F (36 1 °C)-98 6 °F (37 °C)] 97 °F (36 1 °C)  HR:  [78-92] 92  Resp:  [16-18] 18  BP: (129-144)/(60-67) 144/67  SpO2:  [96 %-98 %] 98 %  Body mass index is 36 67 kg/m²  Review of Systems   Constitutional: Negative for activity change, appetite change, chills, fatigue and fever  Respiratory: Negative for cough, chest tightness, shortness of breath and wheezing  Cardiovascular: Negative for chest pain, palpitations and leg swelling  Gastrointestinal: Negative for abdominal pain, constipation, diarrhea, nausea and vomiting  Genitourinary: Negative for dysuria and frequency  Musculoskeletal: Positive for arthralgias (R hip and R shoulder) and gait problem  Negative for back pain  Skin: Positive for wound  Neurological: Negative for dizziness, light-headedness, numbness and headaches  Psychiatric/Behavioral: Negative for sleep disturbance  Input and Output Summary (last 24 hours):        Intake/Output Summary (Last 24 hours) at 2019 1120  Last data filed at 12/29/2019 1801  Gross per 24 hour   Intake 480 ml   Output    Net 480 ml       Physical Exam:     Physical Exam   Constitutional: She is oriented to person, place, and time  She appears well-developed and well-nourished  No distress  Cardiovascular: Normal rate, regular rhythm and normal heart sounds  No pedal edema   Pulmonary/Chest: Effort normal and breath sounds normal  No respiratory distress  She has no wheezes  Abdominal: Soft  Bowel sounds are normal  She exhibits no distension  Musculoskeletal:   Sitting comfortably in room   Neurological: She is alert and oriented to person, place, and time  No focal deficits   Skin: Skin is warm and dry  Psychiatric: She has a normal mood and affect  Her behavior is normal  Judgment and thought content normal    Nursing note and vitals reviewed        Additional Data:     Labs:    Results from last 7 days   Lab Units 12/30/19  0458   WBC Thousand/uL 8 30   HEMOGLOBIN g/dL 11 5*   HEMATOCRIT % 34 6*   PLATELETS Thousands/uL 299   NEUTROS PCT % 67*   LYMPHS PCT % 23*   MONOS PCT % 7   EOS PCT % 2     Results from last 7 days   Lab Units 12/30/19  0458   SODIUM mmol/L 141   POTASSIUM mmol/L 3 8   CHLORIDE mmol/L 104   CO2 mmol/L 27   BUN mg/dL 19   CREATININE mg/dL 0 68   ANION GAP mmol/L 10   CALCIUM mg/dL 9 0   ALBUMIN g/dL 3 6   TOTAL BILIRUBIN mg/dL 0 50   ALK PHOS U/L 127*   ALT U/L 21   AST U/L 20   GLUCOSE RANDOM mg/dL 107*                       Labs reviewed    Imaging:    Imaging reviewed    Recent Cultures (last 7 days):           Last 24 Hours Medication List:     Current Facility-Administered Medications:  acetaminophen 650 mg Oral Q6H PRN Ryan Gilliland MD   aspirin 81 mg Oral Daily Ryan Gilliland MD   atenolol 50 mg Oral Daily Ryan Gilliland MD   carbidopa-levodopa 0 5 tablet Oral TID Ryan Gilliland MD   Coenzyme Q10 400 mg Oral Daily Ryan Gilliland MD   enoxaparin 40 mg Subcutaneous Daily Petra Neely MD lidocaine 1 patch Topical Daily Ryan Gilliland MD   pravastatin 40 mg Oral Every Other Day Ryan Gilliland MD   QUEtiapine 25 mg Oral HS PRN Ryan Gilliland MD   selegiline 5 mg Oral Daily With Breakfast Dave Valadez MD   senna-docusate sodium 1 tablet Oral BID Ryan Gilliland MD   traMADol 25 mg Oral Q6H PRN Ryan Gilliland MD   traMADol 50 mg Oral Q6H PRN Dave Valadez MD        M*Modal software was used to dictate this note  It may contain errors with dictating incorrect words or incorrect spelling  Please contact the provider directly with any questions

## 2019-12-30 NOTE — H&P
spk with nsg who states pt noticed she has not been taking her paxil since surgery  Will re-start 20mg paxil - home dosing  All other meds reviewed with pt and correct    Noted substitution with selegiline due to formulary status

## 2019-12-30 NOTE — ASSESSMENT & PLAN NOTE
Lab Results   Component Value Date    HGBA1C 5 7 12/19/2019       No results for input(s): POCGLU in the last 72 hours  Blood Sugar Average: Last 72 hrs:     Pre-DM    Will follow up with PCP outpatient

## 2019-12-30 NOTE — NURSING NOTE
Patient restless throughout the night  Up into chair at HS then back into bed with multiple attempts to transfer self  Redirection and reassurance ineffective  Patient slept on and off through out the night  PRN tylenol given at HS with no c/o pain, but given for comfort for night time  When asking patient where she was going she would answer "I don't know " Patient then stated she's been having pain in her right leg all night, but never mentioned it because she didn't want to seem like a "baby " Unable to use the pain rating scale and FLACC score of 7/10 given and medicated with 50 mg Tramadol  Will monitor effectiveness

## 2019-12-30 NOTE — ASSESSMENT & PLAN NOTE
· Continue selegiline (patient is on rasagiline 1mg daily as outpatient, evidently not available here on formulary)   Will resume home rasagiline on discharge  · Continue sinemet - dose was decreased by neurology due to hallucinations  · F/u Dr Az Benitez as outpatient  · promote sleep/wake cycle  · Avoid CNS altering medications

## 2019-12-30 NOTE — ASSESSMENT & PLAN NOTE
· PT, OT per primary  · WBAT RLE with assistive devices prn for stability  · Lovenox total of 30 days post-op for DVT ppx:  D/w pt and nsg for d/c teaching  · S/p IM nailing on 12/14  · Patient was seen by Ortho on 12/27, staples removed, and Steri-Strips placed  · Reviewed ortho consult:  Patient may shower and allow water to run over incision, but should not submerge incision  · Patient will require outpatient follow up with Dr Cleo Mendieta in 2 weeks

## 2019-12-30 NOTE — ASSESSMENT & PLAN NOTE
Vitals:    12/30/19 0751   BP: 144/67   Pulse: 92   Resp: 18   Temp: (!) 97 °F (36 1 °C)   SpO2: 98%      · Controlled    Continue with Atenolol 50mg

## 2019-12-30 NOTE — SOCIAL WORK
CM was informed therapy would prefer [facility] (PT/OT/HHA) for Pt  PCT CORTEZ Jones, to cancel Pts initial PT eval      Referral for [facility] sent via [facility] for PT/OT/HHA  Information placed on d/c instructions, and Pt informed

## 2019-12-30 NOTE — PROGRESS NOTES
12/30/19 1230   Pain Assessment   Pain Assessment No/denies pain   Pain Score No Pain   Restrictions/Precautions   Precautions Cognitive; Fall Risk;Impulsive;Supervision on toilet/commode   Weight Bearing Restrictions Yes   RLE Weight Bearing Per Order WBAT   Tub/Shower Transfer   Limitations Noted In Balance; Coordination; Endurance;Problem Solving; Safety; Sequencing;UE Strength;LE Strength   Adaptive Equipment Grab Bars;Transfer Bench   Assessed Shower   Findings Therapist completed shower transfer with use of tub bench in order to clear 5in lip   present for family trainign for shower transfer   continuing to not demonstrate Trever Kwon with providing Geri for transfers 2* to pts decreased balance  Pt requiring modA to perform funcitonla stand pivot tranfer with RW and requiring mod vc for proper RW placement  At this time  unsafe to perform shower transfer with use of tub bench 2* to decrease carryover and decreased safety t/o transfer  THerapist recommend pt to sponge bath [facility] assese  Pt reporting "I don't agree with that, I want to shower" Therapist educated pt and  that it is unsafe to perform shower transfer 2* to decreased balance and decrease safety awareenss  At this time pt continuin to reports she want to shower upon d/c   Picking Up Object   Type of Assistance Needed Physical assistance   Amount of Physical Assistance Provided 25%-49%   Comment Therapit placed various cups on flr and allowed pt to  with reach and use of basket  Pt with poor safety awareness as evidenced by picking cup up with reacher, however, walking with L hand on walker and continuin to grasp cup w/o placing on RW  PT with 1 LOB requiring modA to regain balance  Therapist educating  dave on provfing Geri during funcitonla transfers 2* to decreased balance,  verbalizing understanding     Picking Up Object CARE Score 3   Sit to Stand   Type of Assistance Needed Incidental touching   Amount of Physical Assistance Provided No physical assistance   Comment continued vc for safe transfer tehcniques including pushing from chair and reaching back when sitting  Pt with poor carryover of instrucitons   Sit to Stand CARE Score 4   Bed-Chair Transfer   Type of Assistance Needed Physical assistance   Amount of Physical Assistance Provided 25%-49%   Comment pt fluctuates between functional transfers   Chair/Bed-to-Chair Transfer CARE Score 3   Transfer Bed/Chair/Wheelchair   Positioning Concerns Skin Integrity   Limitations Noted In Balance; Coordination; Endurance;Problem Solving; Sequencing;UE Strength;LE Strength   Adaptive Equipment [address] [place] [address] Management Level Other (Comment)  (med mgnmngt)   Health Management Level of Assistance Moderate assistance   Health Management Therapist printed pts current medication in order to practice med mngmnt with use of pill boxes as that is what pt uses at home  Pt reporting at home there were various moment s she forgot to take medication  Therapist read each medication pt was on and pt becoming anxious reporting she does not know what each medication is for  Therapist asked CRISTHIAN Erwin to briefly explain what each med was for  Even after explanation pt continuing to report she does not know what it is for requiring therapist to provide vc  In addition pt reporting at home she was on plavex which she is not receiving currently  Pt fixated on not taking plavex in hospital and repotin g"This is a waste of time if I don't have a medication" Therapist explained to pt reason for completing med mgnmt in order to ensure she is taking medication at home at scheduled times  Pt requiring A for carbidopa 3x/day  2* to medication being 3x/day therapist explained it can be taken at 5am, 1pm, 9pm, however, pt reporting she does not wake up that early to take meds   Therapist reported also she can amyb take at 6am, 2pm, 10pm however, pt reporting then it is too late at 10pm  Therapist reported she iwll speak to CRISTHIAN Castillo to determine what can be done with meds and scheduling  Therapist communicated with CRISTHIAN Castillo pts concerns of not knowing what certain med are for and when to take, Aminata Castillo reporting she will speak with pt and   Therapist attempted to educate pt on writing down each date and writing which medication she takes in order to keep track of scheduling, pt attempting  However, when therapist asked if pt understood pt reporting g"I'm just jitendra along with it, I can't do this w/o the At this time pt will require A for taking meds at scheduled times which  reporting he will be able to A  Therapist to re-educate  on importance of taking med at schedule times especially for cabidopa  Pt demonstrating decreased understanding of med mngmnt and increased perseveration of plavex which CRISTHIAN Castillo reported she will get more information  Cognition   Overall Cognitive Status Impaired   Arousal/Participation Alert; Cooperative   Attention Within functional limits   Orientation Level Oriented X4   Memory Decreased short term memory;Decreased recall of precautions   Following Commands Follows one step commands with increased time or repetition   Activity Tolerance   Activity Tolerance Patient tolerated treatment well   Assessment   Treatment Assessment Pt engaged in 90mins of skilled TO services gisel diego on med mngmnt, item retrieval with RW and reacher, and shower transfer  Please see above for med mngmnt performance  At this time pt to require supervision and requiring reminders to take meds at home  Pt reporting prior to admission she owuld forget to take meds  CRISTHIAN Castillo reporting cabidopa important medication to take becaue it is for parkinson's symptoms  THerapist exaplined iportance of taking medication and how it may affect functional perofrmance can increase tremors and freezing and can increase risk of falls   Pt with poor judgement and increased perseveration on plavex  In addition at this time therapist recoomed pt to complete sponge bathing at home 2* to decreased balance and safety awareness during shower transfer with tub bench placing pt at risk for falls  Pt not in agreement with sponge bathing  THerapist to sppeak with pt and  pt may shower if home health aid avaialbe  However, if only pt and  sponge bathing is recommended  PT can continue to benefit form skilled OT services gisel diego on dynamic standing balance  shower tranfer with tub bench, med mngmnt to increase functional performance and decrease caregiver burden  OT Family training done with:  dave   Assessment of family training see above for med mngmtn and shower transfer   Prognosis Fair   Problem List Decreased strength;Decreased range of motion;Decreased endurance; Impaired balance;Decreased mobility; Decreased coordination;Decreased cognition; Impaired judgement;Decreased safety awareness   Barriers to Discharge Inaccessible home environment   Plan   Treatment/Interventions ADL retraining;Functional transfer training; Therapeutic exercise; Endurance training;Patient/family training;Gait training; Compensatory technique education   Progress Slow progress, medical status limitations   Recommendation   OT Discharge Recommendation Home with family support  (and home health agency)   Equipment Recommended   (tub bench if HH aid available)   OT Therapy Minutes   OT Time In 1230   OT Time Out 1400   OT Total Time (minutes) 90   OT Mode of treatment - Individual (minutes) 90   OT Mode of treatment - Concurrent (minutes) 0   OT Mode of treatment - Group (minutes) 0   OT Mode of treatment - Co-treat (minutes) 0   OT Mode of Treatment - Total time(minutes) 90 minutes   OT Cumulative Minutes 1055   Therapy Time missed   Time missed?  No

## 2019-12-30 NOTE — PROGRESS NOTES
Physical Medicine and Rehabilitation Progress Note  Dm Roberts 75 y o  female MRN: [id number]  Unit/Bed#: [facility] 268-02 Encounter: [id number]    HPI: Dm Roberts is a 75 y o  female who presented to the [facility] [address] after fall at home  Found to have a right hip fracture, underwent IM nailing on 12/14/19  Post-procedure patient with delirium, hallucinations  Neurology service consulted, dose of sinemet was reduced  In addition, Seroquel started in evening  Patient does have DBS which  has controller for  Accepted to [facility] on 12/18/19  Chief Complaint: f/u fracture    Interval: No acute events overnight  Patient without complaint  therapies are going well  ROS: A 10 point ROS was performed; negative except as noted above  Assessment/Plan:    * Closed fracture of right hip (HCC)  Assessment & Plan  · Acute comprehensive interdisciplinary inpatient rehabilitation including PT, OT, and/or SLP, RN, CM, SW, dietary, psychology, etc   · Goal: supervision  · Clear patient to shower with incision covered  · WBAT  · S/p IM nailing on 12/14  · 2 week f/u performed by ortho, f/u as outpatient    Generalized edema  Assessment & Plan  · Bilateral LEs, R>L  · Apply compression garments    Essential hypertension  Assessment & Plan  Temp:  [97 °F (36 1 °C)-98 6 °F (37 °C)] 97 °F (36 1 °C)  HR:  [78-92] 92  Resp:  [16-18] 18  BP: (129-144)/(60-67) 144/67    · Continue Atenolol  · IM service managing anti-hypertensives, adjusting as needed    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 ([facility] [address] )  Assessment & Plan  · Patient with delirium, hallucinations following her surgery  · Evaluated by neurology, sinimet dose reduced  · Make seroquel PRN, as patient without hallucinations   Discontinue entirely on discharge      Type 2 diabetes mellitus without complication (HCC)  Assessment & Plan  · Last A1C: 6 3  · Continue diabetic diet    Other hyperlipidemia  Assessment & Plan  · Continue statin    Parkinson's disease with use of electrical brain stimulation [facility])  Assessment & Plan  · Continue selegiline (patient is on rasagiline 1mg daily as outpatient, evidently not available here on formulary)  Will resume home rasagiline on discharge  · Continue sinemet - dose was decreased by neurology due to hallucinations  · F/u Dr Christian Miguel as outpatient      # Skin  · Encourage regular turning as patient at risk for skin breakdown  · Staff to continue patient education on Q2h turning  · Rehabilitation team to perform skin checks regularly     # Bowel  · Patient reports no constipation     # Bladder  · Patient voiding spontaneously    # Pain  · Continue tylenol, for max of 3gm daily  · Continue tramadol    # Other  - Diet/Nutrition:        Diet Orders   (From admission, onward)             Start     Ordered    12/18/19 1449  Diet Regular; Regular House; Consistent Carbohydrate Diet Level 1 (4 carb servings/60 grams CHO/meal)  Diet effective now     Question Answer Comment   Diet Type Regular    Regular Regular House    Other Restriction(s): Consistent Carbohydrate Diet Level 1 (4 carb servings/60 grams CHO/meal)    RD to adjust diet per protocol?  Yes        12/18/19 1449              - DVT prophy: Sequential compression device (Venodyne)  and Enoxaparin (Lovenox)  - GI ppx: None  - Nausea: None  - Supplements: None  - Sleep: None    Disposition: 1/2/20    CODE: Level 3: DNAR and DNI Scheduled Meds:    Current Facility-Administered Medications:  acetaminophen 650 mg Oral Q6H PRN Natalia Feldman MD   aspirin 81 mg Oral Daily Ryan Gilliland MD   atenolol 50 mg Oral Daily Ryan Gilliland MD   carbidopa-levodopa 0 5 tablet Oral TID Ryan Gilliland MD   Coenzyme Q10 400 mg Oral Daily Ryan Gilliland MD   enoxaparin 40 mg Subcutaneous Daily Ryan Gilliland MD   lidocaine 1 patch Topical Daily Ryan Gilliland MD   pravastatin 40 mg Oral Every Other Day Ryan Gilliland MD   QUEtiapine 25 mg Oral HS PRN Kiana Choe MD   selegiline 5 mg Oral Daily With Breakfast Kiana Choe MD   senna-docusate sodium 1 tablet Oral BID Ryan Gilliland MD   traMADol 25 mg Oral Q6H PRN Ryan Gilliland MD   traMADol 50 mg Oral Q6H PRN Kiana Choe MD        Objective:    Functional Update:  Physical Therapy Occupational Therapy Speech Therapy   Transfers: Minimal Assistance, Moderate Assistance  Bed Mobility: Maximum Assistance  Amulation Distance (ft): 75 feet  Ambulation: Minimal Assistance  Assistive Device for Ambulation: Roller Walker  Number of Stairs: 5  Assistive Device for Stairs: Bilateral [employer] [place]  Stair Assistance: Maximum Assistance  Discharge Recommendations: Home with:  [address] with[de-identified] 24 Hour Supervision, Family Support, Outpatient Physical Therapy   Eating: Independent  Grooming: Supervision  Bathing: Minimal Assistance  Bathing: Minimal Assistance  Upper Body Dressing: Minimal Assistance  Lower Body Dressing: Maximum Assistance  Toileting: Total Assistance  Tub/Shower Transfer: Maximum Assistance  Toilet Transfer: Moderate Assistance  Cognition: Within Defined Limits  Orientation: Person, Place, Time, Situation               Allergies per EMR    Physical Exam:  Temp:  [97 °F (36 1 °C)-98 6 °F (37 °C)] 97 °F (36 1 °C)  HR:  [78-92] 92  Resp:  [16-18] 18  BP: (129-144)/(60-67) 144/67  SpO2:  [96 %-98 %] 98 %    General: alert, no apparent distress, cooperative and comfortable  HEENT:  Head: Normocephalic, no lesions, without obvious abnormality  Eye: Normal external eye, conjunctiva, lids cornea, NORBERTO  Ears: normal external ears  Nose: Normal external nose, mucus membranes and septum  LUNGS:  no abnormal respiratory pattern, no retractions noted, non-labored breathing   ABDOMEN:  soft, non-tender   Bowel sounds normal  No masses, no organomegaly  EXTREMITIES:  extremities normal, warm and well-perfused; no cyanosis, clubbing, or edema  NEURO:   clear speech, following commands appropriately  PSYCH: Alert and oriented, appropriate affect  INCISION:  dressings present no strikethrough noted    Physical examination is otherwise unchanged from previous encounter, except as noted above  Diagnostic Studies: Reviewed, no new imaging  XR hip/pelv 2-3 vws right if performed   Final Result by Billie Rivera MD (12/27 0634)      Anatomic alignment status post internal fixation of right intertrochanteric fracture  Workstation performed: [id number]           Laboratory: Reviewed  Results from last 7 days   Lab Units 12/30/19  0458 12/26/19  0513   HEMOGLOBIN g/dL 11 5* 10 5*   HEMATOCRIT % 34 6* 31 6*   WBC Thousand/uL 8 30 8 30     Results from last 7 days   Lab Units 12/30/19  0458 12/26/19  0513   BUN mg/dL 19 15   SODIUM mmol/L 141 139   POTASSIUM mmol/L 3 8 3 6   CHLORIDE mmol/L 104 104   CREATININE mg/dL 0 68 0 63   AST U/L 20 19   ALT U/L 21 14            ** Please Note: Fluency Direct voice to text software may have been used in the creation of this document   **

## 2019-12-30 NOTE — PROGRESS NOTES
12/30/19 0830   Pain Assessment   Pain Assessment 0-10   Pain Score 8   Pain Type Acute pain   Pain Location Groin; Shoulder;Leg   Pain Orientation Right   Pain Descriptors Aching   Pain Frequency Intermittent   Response to Interventions Pt required cold pack to be placed on R groin towards end of tx to R groin pain after completing supine TE  Left on pt x20 mins in which when removed pt stated it helped to take pain away  Restrictions/Precautions   Precautions Cognitive;Bed/chair alarms; Fall Risk;Supervision on toilet/commode   Weight Bearing Restrictions Yes   RLE Weight Bearing Per Order WBAT   Cognition   Overall Cognitive Status Impaired   Arousal/Participation Alert; Cooperative   Attention Within functional limits   Orientation Level Oriented X4   Memory Decreased short term memory;Decreased recall of precautions   Following Commands Follows one step commands with increased time or repetition   Comments cont to require constant vc's during transfers for safety and for hand placement  Subjective   Subjective Pt agreeable to PT tx  States she has ongoing R shoulder/leg pain rated 7/10 which increased by end of session to 8/10  Pt also stated at end of session she had R groin pain which decreased with use of coldpack  Sit to Lying   Type of Assistance Needed Physical assistance   Amount of Physical Assistance Provided 50%-74%   Comment assist for LE lifting   Sit to Lying CARE Score 2   Lying to Sitting on Side of Bed   Type of Assistance Needed Physical assistance   Amount of Physical Assistance Provided 50%-74%   Comment for trunk support on mat table utilizing moving trunk first then LE  Also emphasized utilizing quick motion to come from supine to sit which seemed to help improve pt's ability to complete      Lying to Sitting on Side of Bed CARE Score 2   Sit to Stand   Type of Assistance Needed Incidental touching   Amount of Physical Assistance Provided No physical assistance   Comment Cont to utilize vc's for B hands back on chair and then "rocket forward" in which pt able to complete transfers at [facility]  Sit to Stand CARE Score 4   Bed-Chair Transfer   Type of Assistance Needed Incidental touching   Amount of Physical Assistance Provided No physical assistance   Comment pt still with freezing episodes while turning but no LOB   Chair/Bed-to-Chair Transfer CARE Score 4   Transfer Bed/Chair/Wheelchair   Limitations Noted In Balance;Confidence; Coordination; Endurance;Problem Solving; Sequencing;UE Strength;LE Strength   Adaptive Equipment Roller Walker   Stand Pivot Contact Guard   Sit to [facility]   Stand to [facility]   Supine to Sit Moderate Assist   Sit to Supine Moderate Assist   Findings utilized RW this session as pt appears safer with RW vs rollator  Still requires vc's though as she is unable to recall previous discussions regarding safety and techniques to help improve transfers  Walk 10 Feet   Type of Assistance Needed Incidental touching   Amount of Physical Assistance Provided No physical assistance   Walk 10 Feet CARE Score 4   Walk 50 Feet with Two Turns   Type of Assistance Needed Incidental touching   Amount of Physical Assistance Provided No physical assistance   Walk 50 Feet with Two Turns CARE Score 4   Walk 150 Feet   Type of Assistance Needed Physical assistance   Amount of Physical Assistance Provided 50%-74%   Comment at one point during amb with RW, pt with LOB requiring ModA  Still recommend pt to cont with use of RW as well as have family member present providing CGA upon d/c due to pt's unsteadiness  Walk 150 Feet CARE Score 2   Ambulation   Does the patient walk? 2  Yes   Primary Mode of Locomotion Prior to Admission Walk   Distance Walked (feet) 150 ft  (x1, 90'x2, 20'x1, 10'x1)   Assist Device Roller Walker   Gait Pattern Inconsistant Oliva; Slow Oliva;Decreased foot clearance;L foot drag;Narrow NANCY;Shuffle;Step to; Step through; Decreased R stance; Improper weight shift   Limitations Noted In Balance; Coordination;Device Management; Endurance; Heel Strike;Posture; Safety; Sequencing;Speed;Strength;Swing   Provided Assistance with: Balance   Walk Assist Level Contact Guard; Moderate Assist   Findings pt mostly CGA, however, as pt fatigues, she begins to slow gait speed and had LOB requiring assist from PT  Focused on improving L foot clearance this session  L foot IR still noted during L toe off, however, pt declined wanting to try AFO in shoe again to help correct this  Wheelchair mobility   Does the patient use a wheelchair? 0  No   Curb or Single Stair   Style negotiated Single stair   Type of Assistance Needed Physical assistance   Amount of Physical Assistance Provided Less than 25%   1 Step (Curb) CARE Score 3   4 Steps   Type of Assistance Needed Physical assistance   Amount of Physical Assistance Provided Less than 25%   4 Steps CARE Score 3   12 Steps   Reason if not Attempted Activity not applicable   12 Steps CARE Score 9   Stairs   Type Stairs   # of Steps 8   Weight Bearing Precautions Fall Risk   Assist Devices Bilateral Rail   Findings vc's provided for foot sequencing and even after vc's and explanation given regarding to lead with L foot when ascending, pt attempted to ascend with R foot forward  VC's given approx 50% of time while performing steps for correct technique  Picking Up Object   Type of Assistance Needed Incidental touching   Amount of Physical Assistance Provided No physical assistance   Comment with reacher   Picking Up Object CARE Score 4   Therapeutic Interventions   Strengthening Gave pt 4 more exercises to include in HEP  Pt unable to perform SLRs on R side, therefore discontinued  However, added SAQs, LAQs and hip adduction in which pt was able to perform x30 reps on R LE  Will educated pt's  Jesse Coronado during FT in proper technique to perform all HEP so that he can supervise while pt performs      Flexibility Seated passive B hamstring/gastroc stretching performed for total of 5 mins  Other Education provided to pt regarding decreasing distractions while pt is performing funcitonal mobilities once home  Pt educated that when she is distracted, benjie during amb, pt tends to lose balance  Educated pt to cease conversation, perform task and then continue conversation once functional mobility is complete  Pt understanding  Education also provided on importance of always squaring self to chair prior to sitting as pt would continually try to sit before her bottom fully faced the chair  Pt educated on her increased risk for fall and reinjury if she does not perform transfers correctly  Also cont to educate pt on importance of performing explosive movements during transfers, bed mobility, and whenever pt feels she is freezing to help pt be able to complete task with increased ease  Pt acceptive to all education  Equipment Use   NuStep L1, 5 mins, spm>50 for LE ROM and endurance   Assessment   Treatment Assessment Session focused on bed mobility, transfers, ambulation, LE strengthening, LE ROM, endurance training and stair training in order to decrease pt's risk for falls as well as decrease caregiver burden upon d/c  Pt cont with need for vc's during session for all functional mobilities due to decreased recall  Family training to occur with  Triston Duggan, tomorrow, 12/31/19, to cont to educate him on how to provide physical assist to pt when she is home  Gave pt supine and seated HEP to complete when home and will educate  in proper technique for performing HEP  Pt with decreased L foot IR noted during amb this session with use of RW, however still present  Pt did not wish to trial AFO however stating "It didn't feel good last time I tried it and I don't want it " Cont to work on all functional mobilities with pt including transfers, amb, stairs, and bed mobility as these continue to be barrier to home      Family/Caregiver Present no   Problem List Decreased strength; Impaired balance;Decreased coordination;Decreased cognition; Impaired judgement;Decreased safety awareness   Barriers to Discharge Inaccessible home environment   PT Barriers   Physical Impairment Decreased strength;Decreased range of motion;Decreased endurance; Impaired balance;Decreased mobility; Decreased coordination;Decreased cognition;Obesity; Decreased skin integrity;Orthopedic restrictions;Pain   Functional Limitation Car transfers; Ramp negotiation;Stair negotiation;Standing;Transfers; Walking   Plan   Treatment/Interventions Functional transfer training;LE strengthening/ROM; Therapeutic exercise; Endurance training;Bed mobility;Gait training   Progress Slow progress, medical status limitations   Recommendation   Recommendation Home with family support;24 hour supervision/assist;Home PT   Equipment Recommended Walker   PT - OK to Discharge No   PT Therapy Minutes   PT Time In 0830   PT Time Out 1000   PT Total Time (minutes) 90   PT Mode of treatment - Individual (minutes) 90   PT Mode of treatment - Concurrent (minutes) 0   PT Mode of treatment - Group (minutes) 0   PT Mode of treatment - Co-treat (minutes) 0   PT Mode of Treatment - Total time(minutes) 90 minutes   PT Cumulative Minutes 960   Therapy Time missed   Time missed?  No

## 2019-12-31 PROCEDURE — 97530 THERAPEUTIC ACTIVITIES: CPT

## 2019-12-31 PROCEDURE — 99232 SBSQ HOSP IP/OBS MODERATE 35: CPT | Performed by: PHYSICAL MEDICINE & REHABILITATION

## 2019-12-31 PROCEDURE — 97535 SELF CARE MNGMENT TRAINING: CPT

## 2019-12-31 PROCEDURE — 99232 SBSQ HOSP IP/OBS MODERATE 35: CPT | Performed by: NURSE PRACTITIONER

## 2019-12-31 PROCEDURE — 97110 THERAPEUTIC EXERCISES: CPT

## 2019-12-31 RX ORDER — TRAMADOL HYDROCHLORIDE 50 MG/1
25 TABLET ORAL 2 TIMES DAILY PRN
Qty: 10 TABLET | Refills: 0 | Status: SHIPPED | OUTPATIENT
Start: 2020-01-02 | End: 2020-01-12

## 2019-12-31 RX ADMIN — ATENOLOL 50 MG: 25 TABLET ORAL at 08:10

## 2019-12-31 RX ADMIN — TRAMADOL HYDROCHLORIDE 25 MG: 50 TABLET, COATED ORAL at 04:11

## 2019-12-31 RX ADMIN — LIDOCAINE 1 PATCH: 50 PATCH CUTANEOUS at 08:11

## 2019-12-31 RX ADMIN — PAROXETINE HYDROCHLORIDE 20 MG: 20 TABLET, FILM COATED ORAL at 08:10

## 2019-12-31 RX ADMIN — SENNOSIDES AND DOCUSATE SODIUM 1 TABLET: 8.6; 5 TABLET ORAL at 08:12

## 2019-12-31 RX ADMIN — SELEGILINE HYDROCHLORIDE 5 MG: 5 TABLET ORAL at 07:39

## 2019-12-31 RX ADMIN — ASPIRIN 81 MG: 81 TABLET, COATED ORAL at 08:10

## 2019-12-31 RX ADMIN — CARBIDOPA AND LEVODOPA 0.5 TABLET: 25; 100 TABLET ORAL at 21:14

## 2019-12-31 RX ADMIN — CARBIDOPA AND LEVODOPA 0.5 TABLET: 25; 100 TABLET ORAL at 07:39

## 2019-12-31 RX ADMIN — CARBIDOPA AND LEVODOPA 0.5 TABLET: 25; 100 TABLET ORAL at 15:56

## 2019-12-31 RX ADMIN — ENOXAPARIN SODIUM 40 MG: 40 INJECTION SUBCUTANEOUS at 08:11

## 2019-12-31 NOTE — PROGRESS NOTES
12/31/19 1100   Pain Assessment   Pain Assessment No/denies pain   Pain Score No Pain   Restrictions/Precautions   Precautions Cognitive; Fall Risk;Supervision on toilet/commode;Bed/chair alarms   Weight Bearing Restrictions Yes   RLE Weight Bearing Per Order WBAT   Cognition   Overall Cognitive Status Impaired   Arousal/Participation Alert; Cooperative   Attention Within functional limits   Orientation Level Oriented X4   Memory Decreased short term memory;Decreased recall of precautions   Following Commands Follows one step commands with increased time or repetition   Subjective   Subjective Pt agreeable to PT tx  When asked about comfort level on d/c'ing on thurs 1/2/20, pt states she is excited but nervous  No c/o pain this session  Sit to Stand   Type of Assistance Needed Physical assistance   Amount of Physical Assistance Provided 50%-74%   Comment with vc's pt CGA  Without vc's pt mod assist with retropulsion noted  Cont to educate pt on importance of performing motion quickly and shifting weight anteriorly as if performed slowly, pt freezes and falls posteriorly  Will educate  in PM session on importance of providing proper verbal cues to decrease risk of falls as well as decrease amount of physical assistance needed  Sit to Stand CARE Score 2   Bed-Chair Transfer   Type of Assistance Needed Physical assistance   Amount of Physical Assistance Provided 50%-74%   Comment at one point, pt lost balance while transferring to nu step requiring modA from therapist  Otherwise, pt CGA with RW   Chair/Bed-to-Chair Transfer CARE Score 2   Transfer Bed/Chair/Wheelchair   Limitations Noted In Balance;Confidence; Coordination; Endurance;Problem Solving; Sequencing;UE Strength;LE Strength   Adaptive Equipment Roller Walker   Stand Pivot Contact Guard; Moderate Assist   Sit to [facility]; Moderate Assist   Stand to [facility]; Moderate Assist   Findings cont to utilize RW for all transfers  Education to pt provided that recommended pt utilize RW upon d/c home and not rollator for safety and to decrease pt's risk for falls  Pt receptive and stated she will ask  to bring in for therapies to evaluate   Walk 10 Feet   Type of Assistance Needed Incidental touching   Amount of Physical Assistance Provided No physical assistance   Walk 10 Feet CARE Score 4   Ambulation   Does the patient walk? 2  Yes   Primary Mode of Locomotion Prior to Admission Walk   Distance Walked (feet) 15 ft  (x2)   Assist Device Roller Walker   Gait Pattern Inconsistant Oliva; Slow Oliva;Decreased foot clearance;L foot drag;Narrow NANCY   Limitations Noted In Balance; Coordination;Device Management; Endurance; Heel Strike;Posture; Safety; Sequencing;Speed;Strength;Swing   Provided Assistance with: Balance   Walk Assist Level Contact Guard   Findings plan to perform longer distance amb this afternoon with  during FT   Therapeutic Interventions   Strengthening Standing hip flex/ext performed on B LE's, with LOB while performing on L LE  Educated pt to find balance first before resuming exercise  Then had pt perform standing marching with RW  LOB during exercise and again educated pt on importance of finding balance before attempting to cont with exercise  Flexibility Seated passive B hamstring/gastroc stretching x5 mins total     Equipment Use   NuStep L2, 10 mins spm>50 for endurance, LE ROM, and strength  Assessment   Treatment Assessment Session focused on transfers, short distance amb, endurance trng, and LE strengthening in order to decrease pt's risk for falls upon d/c  Pt cont to be variable with transfers requiring modA-CGA this session with RW as at times she becomes distracted and will lose balance  Pt with poor ability to correct self when she is losing her balance   Plan to perform FT this PM session with pt's  emphasizing need for  to be hands on with pt at all times in order to decrease risk for falls  Barriers to home at this time include bed mobility, transfers, and amb and pt would benefit from cont skilled therapy interventions to address these barriers  Family/Caregiver Present no   Problem List Decreased strength;Decreased range of motion;Decreased endurance; Impaired balance;Decreased mobility; Decreased coordination;Decreased cognition; Impaired judgement;Decreased safety awareness   Barriers to Discharge Inaccessible home environment   PT Barriers   Physical Impairment Decreased strength;Decreased range of motion;Decreased endurance; Impaired balance;Decreased mobility; Decreased coordination;Decreased cognition;Obesity; Decreased skin integrity;Orthopedic restrictions;Pain   Functional Limitation Car transfers; Ramp negotiation;Stair negotiation;Standing;Transfers; Walking   Plan   Treatment/Interventions Functional transfer training;LE strengthening/ROM; Therapeutic exercise; Endurance training;Gait training   Progress Slow progress, medical status limitations   Recommendation   Recommendation Home with family support;24 hour supervision/assist;Home PT   Equipment Recommended RW   PT - OK to Discharge No   PT Therapy Minutes   PT Time In 1100   PT Time Out 1130   PT Total Time (minutes) 30   PT Mode of treatment - Individual (minutes) 30   PT Mode of treatment - Concurrent (minutes) 0   PT Mode of treatment - Group (minutes) 0   PT Mode of treatment - Co-treat (minutes) 0   PT Mode of Treatment - Total time(minutes) 30 minutes   PT Cumulative Minutes 990   Therapy Time missed   Time missed?  No

## 2019-12-31 NOTE — PROGRESS NOTES
12/31/19 0830   Pain Assessment   Pain Assessment No/denies pain   Pain Score No Pain   Restrictions/Precautions   Precautions Cognitive; Fall Risk;Impulsive;Supervision on toilet/commode;Bed/chair alarms   Weight Bearing Restrictions Yes   RLE Weight Bearing Per Order WBAT   Oral Hygiene   Type of Assistance Needed Set-up / clean-up   Amount of Physical Assistance Provided No physical assistance   Comment compelted in sitting   Oral Hygiene CARE Score 5   Grooming   Able To Initiate Tasks;Comb/Brush Hair;Wash/Dry Face;Brush/Clean Teeth;Wash/Dry Hands   Limitation Noted In Strength;Timeliness   Findings compelted seated in w/c in front of sink   Shower/Bathe Self   Type of Assistance Needed Physical assistance   Amount of Physical Assistance Provided 50%-74%   Comment Pt able to wash 6/10 parts  Pt requires Geri/modA for balance while in stance to wash audra/buttock area  in addition pt requiring A to wash feet  Shower/Bathe Self CARE Score 2   Bathing   Assessed Bath Style Shower   Anticipated D/C Bath Style Sponge Bath  (unless private pay aid present)   Able to Gather/Transport No   Able to [employer] Temperature No   Able to Wash/Rinse/Dry (body part) Left Arm;Right Arm;L Upper Leg;R Upper Leg;Chest;Abdomen;Buttocks; Perineal Area   Limitations Noted in Balance; Endurance;ROM; Sequencing;Strength;Timeliness   Positioning Standing;Seated   Adaptive Equipment Tub Bench;Hand Held [employer]; Shower Bars   Tub/Shower Transfer   Limitations Noted In Balance; Endurance;Problem Solving; Safety; Sequencing;UE Strength;LE Strength   Adaptive Equipment Grab Bars;Transfer Bench   Assessed Shower   Findings Pt requiring modA to eprfrm side stepping into shower with mod vc for proper hand/foot placement and sequencing of task   A requiring A time sfor LLE 2* to freezing and tremors   Upper Body Dressing   Type of Assistance Needed Physical assistance   Amount of Physical Assistance Provided 25%-49%   Comment Pt requiring A to readjust bra 2* to twisting in back, in addition pt requiring A to pull shirt over head 2* to decreaseed BUE shoulder ROM   Upper Body Dressing CARE Score 3   Lower Body Dressing   Type of Assistance Needed Physical assistance   Amount of Physical Assistance Provided 50%-74%   Comment PT requiring A to use LH reacher to don pull ups, pt with poor carryover of techiue and requiring A to don RLE, pt able to don LLE  Pt able to [facility] for pants  Geri/modA for balance while in stance to pull pants up   Lower Body Dressing CARE Score 2   Putting On/Taking Off Footwear   Type of Assistance Needed Physical assistance   Amount of Physical Assistance Provided No physical assistance   Comment TA for socks and shoes   Putting On/Taking Off Footwear CARE Score -   Dressing/Undressing Clothing   Remove UB Clothes Other  (hospital gown)   Don UB Clothes Bra;Pullover Shirt   Remove LB Clothes Undergarment;Socks   Don LB Clothes Undergarment;Pants;TEDs; Shoes   Limitations Noted In Balance; Endurance;Problem Solving; Safety; Sequencing;Strength;ROM; Timeliness   Adaptive Equipment Reacher   Positioning Supported Sit;Standing   Sit to Stand   Type of Assistance Needed Physical assistance   Amount of Physical Assistance Provided 25%-49%   Comment Geri with RW with vc for safety t/o   Sit to Stand CARE Score 3   Bed-Chair Transfer   Type of Assistance Needed Physical assistance   Amount of Physical Assistance Provided 50%-74%   Comment modA with RW with mod vc for safety   Chair/Bed-to-Chair Transfer CARE Score 2   Transfer Bed/Chair/Wheelchair   Positioning Concerns Skin Integrity   Limitations Noted In Balance; Coordination; Endurance;Problem Solving; Sequencing;UE Strength;LE Strength   Adaptive Equipment Roller Walker   Cognition   Overall Cognitive Status Impaired   Arousal/Participation Alert; Cooperative   Attention Within functional limits   Orientation Level Oriented X4   Memory Decreased short term memory;Decreased recall of precautions   Following Commands Follows one step commands with increased time or repetition   Activity Tolerance   Activity Tolerance Patient tolerated treatment well   Assessment   Treatment Assessment Pt engaged in 60mins of sklled OT servies with foucs on self-care  Pt currently requirnig Geri for UB dressing 2* to limited shoudler ROM and A requirign for overhead  Pt with poor carryover of use of LHAE for lB dressing requiring A to don pullups  In addition, 2* to pts decreased safety awareness and decreased balance requiring Geri/modA for balance while in stance to pull pants up  THerapist spoke with pt again on sponge bathing at home at d/c for safety, pt continued to be in agreement  Therapist also recommended pt may shower at home IF there was a home health aid/private pay aid to A pt and  around home 2* to pts decreased safety, decreased balance and poor judgement placing pt at risk for falls, pt verbalized understanding and reported she would feel better having an aid vs sponge bahting  Pt can contiue to benefit form skilled OT services iwth foucs on funcitonal transfers, safety awareness, FT to increase I and funcitonal perofrmance ot ensure safe d/c home   Triston Duggan to attend this PM For FT  Pt with anticipated d/c for 12/2/219   Prognosis Fair   Problem List Decreased strength;Decreased range of motion;Decreased endurance; Impaired balance;Decreased mobility; Decreased coordination;Decreased cognition; Impaired judgement;Decreased safety awareness   Plan   Treatment/Interventions ADL retraining;Functional transfer training; Therapeutic exercise; Endurance training;Patient/family training;Bed mobility; Compensatory technique education   Progress Slow progress, decreased activity tolerance   Recommendation   OT Discharge Recommendation Home Health Agency  (especially for showers)   OT Therapy Minutes   OT Time In 0830   OT Time Out 0930   OT Total Time (minutes) 60   OT Mode of treatment - Individual (minutes) 60   OT Mode of treatment - Concurrent (minutes) 0   OT Mode of treatment - Group (minutes) 0   OT Mode of treatment - Co-treat (minutes) 0   OT Mode of Treatment - Total time(minutes) 60 minutes   OT Cumulative Minutes 1115   Therapy Time missed   Time missed?  No

## 2019-12-31 NOTE — NURSING NOTE
Started Lovenox teaching with patient today  Patient needs more teaching and instruction  May need to have  also learn with patient so she has another resource at home if she is unable to do self injection  Will speak with  today

## 2019-12-31 NOTE — ASSESSMENT & PLAN NOTE
· Continue selegiline (patient is on rasagiline 1mg daily as outpatient, evidently not available here on formulary)   Will resume home rasagiline on discharge  · Continue sinemet - dose was decreased by neurology due to hallucinations  · F/u Dr Zaid Brice as outpatient  · Promote sleep/wake cycle  · Avoid CNS altering medications

## 2019-12-31 NOTE — ASSESSMENT & PLAN NOTE
Vitals:    12/31/19 0722   BP: 131/59   Pulse: 71   Resp: 16   Temp: 97 9 °F (36 6 °C)   SpO2: 97%      · Overall controlled    Continue with Atenolol 50mg

## 2019-12-31 NOTE — PROGRESS NOTES
12/31/19 1310   Pain Assessment   Pain Assessment No/denies pain   Pain Score No Pain   Restrictions/Precautions   Precautions Bed/chair alarms;Cognitive; Fall Risk;Supervision on toilet/commode   Weight Bearing Restrictions Yes   RLE Weight Bearing Per Order WBAT   Cognition   Overall Cognitive Status Impaired   Arousal/Participation Alert; Cooperative   Attention Within functional limits   Orientation Level Oriented to person;Oriented to time;Oriented to situation   Memory Decreased short term memory   Following Commands Follows one step commands with increased time or repetition   Comments At end of session, pt attempted to stand up when PT and pt's  were standing near her in room  RW was not in front of pt and PT and  asked pt to return to sitting  When pt asked by PT "why are you trying to stand" pt stated "I need to go back outside now " When asked by  "where outside are you trying to go" pt responded "outside of this house " PT further questioned pt on where she thought she was and pt replied "I'm in a rental house of [place] [address] and and her husbands, I think " PT then further asked pt her name, date, and reason for receiving rehab in which pt required increased time to respond but able to come up with acceptable answers  CRISTHIAN Monroe notified of pt's response  Pt placed on chair alarm upon PT leaving pt and  in pt's room with pt seated in w/c  Subjective   Subjective Pt, , and pt's son present for FT  Pt with no c/o      Roll Left and Right   Type of Assistance Needed Physical assistance   Amount of Physical Assistance Provided 25%-49%   Roll Left and Right CARE Score 3   Sit to Lying   Type of Assistance Needed Physical assistance   Amount of Physical Assistance Provided 50%-74%   Sit to Lying CARE Score 2   Lying to Sitting on Side of Bed   Type of Assistance Needed Physical assistance   Amount of Physical Assistance Provided 50%-74%   Lying to Sitting on Side of Bed CARE Score 2 Sit to Stand   Type of Assistance Needed Physical assistance   Amount of Physical Assistance Provided 50%-74%   Comment during one stand to sit at end of session, pt modA  All other transfers with vc's pt CGA  Sit to Stand CARE Score 2   Bed-Chair Transfer   Type of Assistance Needed Verbal cues; Incidental touching   Amount of Physical Assistance Provided No physical assistance   Comment with RW, CGA   Chair/Bed-to-Chair Transfer CARE Score 4   Transfer Bed/Chair/Wheelchair   Limitations Noted In Balance;Confidence; Coordination; Endurance;Problem Solving; Sequencing;UE Strength;LE Strength   Adaptive Equipment Roller Walker   Stand Pivot Contact Guard   Sit to [employer]   Stand to Sit Moderate Assist;Contact Guard   Supine to Sit Moderate Assist   Sit to Supine Moderate Assist   Findings Had pt and  perform bed mobility for PT to observe   with good technique now noted being sure to support pt under trunk rather than pulling at her shoulder   states he feels very comfortable assisting pt with bed mobilities  Pt had 1 LOB during stand to sit in which PT provided modA   present when transfer occurred and observed and stressed to  that his is reasoning why  must provide CGA to pt at all times for all transfers  Walk 10 Feet   Type of Assistance Needed Incidental touching   Amount of Physical Assistance Provided No physical assistance   Comment CGA with RW only short distances this session as education provided to  and son through much of session as well as education regarding HEP   Walk 10 Feet CARE Score 4   Ambulation   Does the patient walk? 2  Yes   Primary Mode of Locomotion Prior to Admission Walk   Distance Walked (feet) 10 ft  (x2)   Assist Device Roller Walker   Gait Pattern Inconsistant Oliva; Slow Oliva;Decreased foot clearance; Festination;R foot drag;L foot drag; Forward Flexion;Narrow NANCY;Shuffle;Step to; Step through   Limitations Noted In Balance; Coordination;Device Management; Endurance; Heel Strike;Posture; Safety; Sequencing;Speed;Strength;Swing   Provided Assistance with: Balance   Walk Assist Level Contact Guard   Assessment   Treatment Assessment Session focused on FT with son and pt's   Son requested that PT go through pt's HEP with  so that he can provide proper vc's and assist pt as needed as well as give distinct amount of times pt should walk around house in one day  Ran through al 7 exercises that pt has been given with pt performing each one and wrote notes for pt's  on proper cues to provide when performing HEP   asked if he should assist pt in completing exercises and stressed to  not to physically help pt as she can perform these exercises but give her proper cues to complete in correct manner   very receptive and able to give cues to pt for all exercises  PT then educated to son and  that pt's tolerance will need to be evaluated once home  Recommended pt start with completing 3 of 7 HEP exercises daily and increasing from there as tolerable  Also recommended exercises to be performed 1-2 times daily  Educated  and son that pt should amb approx once every 2 hours around "loop in house" which  stated is approx 79'  PT educated  that he must provide CGA to pt due to unsteadiness with gait as well as decreased balance   understanding and PT offered to provide information regarding purchasing gait belt, however,  stated he felt comfortable holding onto pt's pants  Again educated  that hand should be around to pt's contralateral pelvis to help stabilize her if she goes to lose balance   Also educated  and son on pt's need for vc's during STS as she does not need physical assistance but rather vc's for hand placement (both hands back to chair), to "rocket forward" as this technique works best for pt and to then get weight shifted forward and reach for walker  Also educated  to cue pt to reach back with both hands to transferring surface in which to reduce overall risk for falls  When PT asked  if he felt confident providing assist for bed mobilities he replied yes and was able to demo sit<>supine with good technique noted   stated to PT that he use to only provide S on pt to steps, but PT cont to state that for all mobilities pt will require CGA for safety   and son receptive to PT recommendations and agreed to come in tomorrow for further education on amb, transfers and stairs in anticipation for d/c home on 1/2/20  Family/Caregiver Present yes   PT Family training done with: spouse and son   Problem List Decreased strength;Decreased range of motion;Decreased endurance; Impaired balance;Decreased mobility; Decreased coordination;Decreased cognition; Impaired judgement;Decreased safety awareness   Barriers to Discharge Inaccessible home environment   Barriers to Discharge Comments Further family training to occur with PT on 1/1/20  PT Barriers   Physical Impairment Decreased strength;Decreased range of motion;Decreased endurance; Impaired balance;Decreased mobility; Decreased coordination;Decreased cognition;Obesity; Decreased skin integrity;Orthopedic restrictions;Pain   Functional Limitation Car transfers; Ramp negotiation;Stair negotiation;Standing;Transfers; Walking   Plan   Treatment/Interventions Functional transfer training;LE strengthening/ROM; Therapeutic exercise; Bed mobility   Progress Slow progress, cognitive deficits   Recommendation   Recommendation Home with family support;24 hour supervision/assist;Home PT   Equipment Recommended Walker   PT - OK to Discharge No   PT Therapy Minutes   PT Time In 1310   PT Time Out 1410   PT Total Time (minutes) 60   PT Mode of treatment - Individual (minutes) 60   PT Mode of treatment - Concurrent (minutes) 0   PT Mode of treatment - Group (minutes) 0   PT Mode of treatment - Co-treat (minutes) 0   PT Mode of Treatment - Total time(minutes) 60 minutes   PT Cumulative Minutes 1050   Therapy Time missed   Time missed?  No

## 2019-12-31 NOTE — PROGRESS NOTES
12/31/19 1230   Pain Assessment   Pain Assessment No/denies pain   Pain Score No Pain   Restrictions/Precautions   Precautions Bed/chair alarms;Cognitive; Fall Risk;Impulsive;Supervision on toilet/commode   Weight Bearing Restrictions Yes   RLE Weight Bearing Per Order WBAT   Cognition   Overall Cognitive Status Impaired   Arousal/Participation Alert; Cooperative   Attention Within functional limits   Orientation Level Oriented X4   Memory Decreased short term memory   Following Commands Follows one step commands with increased time or repetition   Activity Tolerance   Activity Tolerance Patient tolerated treatment well   Assessment   Treatment Assessment FT completed on this day to kobe d/c plan with  Vazquez Roberts) and Son Alek Kennedy)  Please see below for further details   OT Family training done with:  Marty Pittman and Son Liat Carreno   Assessment of family training Therapist complete FT on this day with  Marty Pittman and son Liat Carreno  Therapist discussed with family OT recommendations for d/c 12/2/19  At this time therapist recommend that pt and  do not complete shower transfer 2* to pts decreased safety, LOB at times and decreased cognition and judgement placing pt and  at risk for falls  Therefore, therapist recommend home health aid or private pay in order to A  and pt with morning routing including washing, dressing, toielting , etc  Therapist also provided family with list of private pay agencies where therapist recommended son to call on this day to be aware of agency availability/rate to hat would fit there budget  Son reporting he will contact some agencies today  In addition, therapist to email other son Александр Proctor) DME recommendations including bed rail and tub bench to increase functional performance during bed mobility and shower  2* to requiring time to hire home health agency/private pay, therapist recommend pt to sponge bath until there is someone to A at home   Pt,  and son in agreement  In addition, therapist discussed importance of A pt with med mngt 2* to pts requiring maxA during OT session  Pt during previous session demonstrating increased confusion of when meds should be taken especially carbidopa which is 3x/day  There fore, therapist recommend son and  to allow  to A   reporting he will A at home  At first,  reporting if carbidopa is 3x/day pt would be sleeping at one point  Therapist recommend importance of waking up for medication and provided exmaples of setting alarm to wake up to take meds,  and son verbalized understanding  By end of therapy session  and son in agreement with d/c recommendations including Geri/CGA for functional ADL mobility with RW only, able to shower if A provided by home health aid/private pay, A with finances and med mngmnt, both son and  in agreement  Prognosis Fair   Problem List Decreased strength;Decreased range of motion;Decreased endurance; Impaired balance;Decreased mobility; Decreased coordination;Decreased cognition; Impaired judgement;Decreased safety awareness   Plan   Treatment/Interventions ADL retraining;Functional transfer training; Therapeutic exercise; Endurance training;Patient/family training;Bed mobility; Compensatory technique education   Progress Progressing toward goals   Recommendation   OT Discharge Recommendation Home OT  (home health aid vs private pay for shower)   Equipment Recommended Tub seat with back  (bed rail (handout provided during previous sessions))   OT Equipment ordered commode   Date ordered 12/31/19   OT Therapy Minutes   OT Time In 1230   OT Time Out 1310   OT Total Time (minutes) 40   OT Mode of treatment - Individual (minutes) 40   OT Mode of treatment - Concurrent (minutes) 0   OT Mode of treatment - Group (minutes) 0   OT Mode of treatment - Co-treat (minutes) 0   OT Mode of Treatment - Total time(minutes) 40 minutes   OT Cumulative Minutes 1155   Therapy Time missed   Time missed?  No

## 2019-12-31 NOTE — DISCHARGE INSTR - APPOINTMENTS
89 y 89  [address] [place] [address]    PT/OT/HHA  Provider will contact Pt directly

## 2019-12-31 NOTE — PROGRESS NOTES
Progress Note - Jazmin Given [birth year], 75 y o  female MRN: [id number]    Unit/Bed#: [facility] 268-02 Encounter: [id number]    Primary Care Provider: Trista Deleon MD   Date and time admitted to hospital: 12/18/2019  2:44 PM    * Closed fracture of right hip [facility])  Assessment & Plan  · PT, OT per primary  · WBAT RLE with assistive devices prn for stability  · Lovenox total of 30 days post-op for DVT ppx:  D/w pt and nsg for d/c teaching  · S/p IM nailing on 12/14  · Patient was seen by Ortho on 12/27, staples removed, and Steri-Strips placed  · Reviewed ortho consult:  Patient may shower and allow water to run over incision, but should not submerge incision  · Patient will require outpatient follow up with Dr Raghu Terrazas in 2 weeks  Generalized edema  Assessment & Plan  · Advised patient to keep elevated while in chair  Eloy stockings  Improving     Essential hypertension  Assessment & Plan  Vitals:    12/31/19 0722   BP: 131/59   Pulse: 71   Resp: 16   Temp: 97 9 °F (36 6 °C)   SpO2: 97%      · Overall controlled  Continue with Atenolol 50mg       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 [facility])  Assessment & Plan  · Patient with delirium, hallucinations following her surgery  · Had 1 hallucination last night  · Evaluated by neurology, sinimet dose reduced  · Will continue seroquel for now, but will attempt to wean off by completion of rehab    Anxiety  Assessment & Plan  · Pt was off her home paxil since surgery  This was restarted yesterday  Her anxiety is stable at this time    Type 2 diabetes mellitus without complication [facility])  Assessment & Plan  Lab Results   Component Value Date    HGBA1C 5 7 12/19/2019       No results for input(s): POCGLU in the last 72 hours  Blood Sugar Average: Last 72 hrs:     Pre-DM    Will follow up with PCP outpatient    Other hyperlipidemia  Assessment & Plan  · Continue Pravastatin     Parkinson's disease with use of [facility] ([facility]  Assessment & Plan  · Continue selegiline (patient is on rasagiline 1mg daily as outpatient, evidently not available here on formulary)  Will resume home rasagiline on discharge  · Continue sinemet - dose was decreased by neurology due to hallucinations  · F/u Dr Rogelio Olsen as outpatient  · Promote sleep/wake cycle  · Avoid CNS altering medications      VTE Pharmacologic Prophylaxis:   Pharmacologic: Enoxaparin (Lovenox)    Current Length of Stay: 13 day(s)    Current Patient Status: Inpatient Rehab     Discharge Plan: As per treatment team    Code Status: Level 3 - DNAR and DNI    Subjective:   No overnight events  Pt with no new complaints  Reports that her R hip pain is well controlled at this time - currently rates a 5/10  Declines ice pack at this time    Denies fever, chills, SOB, CP, palpations, nausea, vomiting, constipation    Objective:     Vitals:   Temp (24hrs), Av 8 °F (36 6 °C), Min:97 2 °F (36 2 °C), Max:98 3 °F (36 8 °C)    Temp:  [97 2 °F (36 2 °C)-98 3 °F (36 8 °C)] 97 9 °F (36 6 °C)  HR:  [64-71] 71  Resp:  [16-20] 16  BP: (131-146)/(59-64) 131/59  SpO2:  [96 %-99 %] 97 %  Body mass index is 36 67 kg/m²  Review of Systems   Constitutional: Negative for activity change, appetite change, chills, fatigue and fever  Respiratory: Negative for cough, chest tightness, shortness of breath and wheezing  Cardiovascular: Negative for chest pain  Gastrointestinal: Negative for abdominal pain, constipation, diarrhea, nausea and vomiting  Musculoskeletal: Positive for arthralgias and gait problem  Neurological: Negative for dizziness, light-headedness, numbness and headaches  Psychiatric/Behavioral: Negative for sleep disturbance  The patient is not nervous/anxious  Input and Output Summary (last 24 hours):     No intake or output data in the 24 hours ending 19 0943    Physical Exam:     Physical Exam   Constitutional: She is oriented to person, place, and time   She appears well-developed and well-nourished  No distress  Cardiovascular: Normal rate, regular rhythm and normal heart sounds  No pedal edema  TEDs in place   Pulmonary/Chest: Effort normal and breath sounds normal  No respiratory distress  She has no wheezes  Abdominal: Soft  Bowel sounds are normal  She exhibits no distension  Musculoskeletal:   Sitting comfortably in WC in room   Neurological: She is alert and oriented to person, place, and time  No focal deficits   Skin: Skin is warm and dry  Psychiatric: She has a normal mood and affect  Her behavior is normal  Judgment and thought content normal  Her mood appears not anxious  pleasant   Nursing note and vitals reviewed        Additional Data:     Labs:    Results from last 7 days   Lab Units 12/30/19  0458   WBC Thousand/uL 8 30   HEMOGLOBIN g/dL 11 5*   HEMATOCRIT % 34 6*   PLATELETS Thousands/uL 299   NEUTROS PCT % 67*   LYMPHS PCT % 23*   MONOS PCT % 7   EOS PCT % 2     Results from last 7 days   Lab Units 12/30/19  0458   SODIUM mmol/L 141   POTASSIUM mmol/L 3 8   CHLORIDE mmol/L 104   CO2 mmol/L 27   BUN mg/dL 19   CREATININE mg/dL 0 68   ANION GAP mmol/L 10   CALCIUM mg/dL 9 0   ALBUMIN g/dL 3 6   TOTAL BILIRUBIN mg/dL 0 50   ALK PHOS U/L 127*   ALT U/L 21   AST U/L 20   GLUCOSE RANDOM mg/dL 107*     Labs reviewed    Imaging:    Imaging reviewed    Recent Cultures (last 7 days):     Last 24 Hours Medication List:     Current Facility-Administered Medications:  acetaminophen 650 mg Oral Q6H PRN Manuel Barba MD   aspirin 81 mg Oral Daily Ryan Gilliland MD   atenolol 50 mg Oral Daily Ryan Gilliland MD   carbidopa-levodopa 0 5 tablet Oral TID Ryan Gilliland MD   Coenzyme Q10 400 mg Oral Daily Ryan Gilliland MD   enoxaparin 40 mg Subcutaneous Daily Ryan Gilliland MD   lidocaine 1 patch Topical Daily Ryan Gilliland MD   PARoxetine 20 mg Oral Daily GRETA Gant   pravastatin 40 mg Oral Every Other Day Manuel Barba MD   QUEtiapine 25 mg Oral HS PRN Miri Powell MD   selegiline 5 mg Oral Daily With Breakfast Ryan Gilliland MD   senna-docusate sodium 1 tablet Oral BID Ryan Gilliland MD   traMADol 25 mg Oral Q6H PRN Ryan Gilliland MD   traMADol 50 mg Oral Q6H PRN Miri Powell MD      M*Modal software was used to dictate this note  It may contain errors with dictating incorrect words or incorrect spelling  Please contact the provider directly with any questions

## 2019-12-31 NOTE — ASSESSMENT & PLAN NOTE
· PT, OT per primary  · WBAT RLE with assistive devices prn for stability  · Lovenox total of 30 days post-op for DVT ppx:  D/w pt and nsg for d/c teaching  · S/p IM nailing on 12/14  · Patient was seen by Ortho on 12/27, staples removed, and Steri-Strips placed  · Reviewed ortho consult:  Patient may shower and allow water to run over incision, but should not submerge incision  · Patient will require outpatient follow up with Dr Shari Vick in 2 weeks

## 2019-12-31 NOTE — PLAN OF CARE
Problem: Potential for Falls  Goal: Patient will remain free of falls  Description  INTERVENTIONS:  - Assess patient frequently for physical needs  -  Identify cognitive and physical deficits and behaviors that affect risk of falls  -  [place] fall precautions as indicated by assessment   - Educate patient/family on patient safety including physical limitations  - Instruct patient to call for assistance with activity based on assessment  - Modify environment to reduce risk of injury  - Consider OT/PT consult to assist with strengthening/mobility  Outcome: Progressing     Problem: NEUROSENSORY - ADULT  Goal: Achieves stable or improved neurological status  Description  INTERVENTIONS  - Monitor and report changes in neurological status  - Monitor vital signs such as temperature, blood pressure, glucose, and any other labs ordered   - Initiate measures to prevent increased intracranial pressure  - Monitor for seizure activity and implement precautions if appropriate      Outcome: Progressing  Goal: Achieves maximal functionality and self care  Description  INTERVENTIONS  - Monitor swallowing and airway patency with patient fatigue and changes in neurological status  - Encourage and assist patient to increase activity and self care     - Encourage visually impaired, hearing impaired and aphasic patients to use assistive/communication devices  Outcome: Progressing     Problem: GASTROINTESTINAL - ADULT  Goal: Maintains or returns to baseline bowel function  Description  INTERVENTIONS:  - Assess bowel function  - Encourage oral fluids to ensure adequate hydration  - Administer IV fluids if ordered to ensure adequate hydration  - Administer ordered medications as needed  - Encourage mobilization and activity  - Consider nutritional services referral to assist patient with adequate nutrition and appropriate food choices  Outcome: Progressing  Goal: Maintains adequate nutritional intake  Description  INTERVENTIONS:  - Monitor percentage of each meal consumed  - Identify factors contributing to decreased intake, treat as appropriate  - Assist with meals as needed  - Monitor I&O, weight, and lab values if indicated  - Obtain nutrition services referral as needed  Outcome: Progressing     Problem: GENITOURINARY - ADULT  Goal: Maintains or returns to baseline urinary function  Description  INTERVENTIONS:  - Assess urinary function  - Encourage oral fluids to ensure adequate hydration if ordered  - Administer IV fluids as ordered to ensure adequate hydration  - Administer ordered medications as needed  - Offer frequent toileting  - Follow urinary retention protocol if ordered  Outcome: Progressing     Problem: SKIN/TISSUE INTEGRITY - ADULT  Goal: Skin integrity remains intact  Description  INTERVENTIONS  - Identify patients at risk for skin breakdown  - Assess and monitor skin integrity  - Assess and monitor nutrition and hydration status  - Monitor labs (i e  albumin)  - Assess for incontinence   - Turn and reposition patient  - Assist with mobility/ambulation  - Relieve pressure over bony prominences  - Avoid friction and shearing  - Provide appropriate hygiene as needed including keeping skin clean and dry  - Evaluate need for skin moisturizer/barrier cream  - Collaborate with interdisciplinary team (i e  Nutrition, Rehabilitation, etc )   - Patient/family teaching  Outcome: Progressing  Goal: Incision(s), wounds(s) or drain site(s) healing without S/S of infection  Description  INTERVENTIONS  - Assess and document risk factors for skin impairment   - Assess and document dressing, incision, wound bed, drain sites and surrounding tissue  - Consider nutrition services referral as needed  - Oral mucous membranes remain intact  - Provide patient/ family education  Outcome: Progressing  Goal: Oral mucous membranes remain intact  Description  INTERVENTIONS  - Assess oral mucosa and hygiene practices  - Implement preventative oral hygiene regimen  - Implement oral medicated treatments as ordered  - Initiate Nutrition services referral as needed  Outcome: Progressing     Problem: MUSCULOSKELETAL - ADULT  Goal: Maintain or return mobility to safest level of function  Description  INTERVENTIONS:  - Assess patient's ability to carry out ADLs; assess patient's baseline for ADL function and identify physical deficits which impact ability to perform ADLs (bathing, care of mouth/teeth, toileting, grooming, dressing, etc )  - Assess/evaluate cause of self-care deficits   - Assess range of motion  - Assess patient's mobility  - Assess patient's need for assistive devices and provide as appropriate  - Encourage maximum independence but intervene and supervise when necessary  - Involve family in performance of ADLs  - Assess for home care needs following discharge   - Consider OT consult to assist with ADL evaluation and planning for discharge  - Provide patient education as appropriate  Outcome: Progressing  Goal: Maintain proper alignment of affected body part  Description  INTERVENTIONS:  - Support, maintain and protect limb and body alignment  - Provide patient/ family with appropriate education  Outcome: Progressing     Problem: COPING  Goal: Pt/Family able to verbalize concerns and demonstrate effective coping strategies  Description  INTERVENTIONS:  - Assist patient/family to identify coping skills, available support systems and cultural and spiritual values  - Provide emotional support, including active listening and acknowledgement of concerns of patient and caregivers  - Reduce environmental stimuli, as able  - Provide patient education  - Assess for spiritual pain/suffering and initiate spiritual care, including notification of Pastoral Care or ranjit based community as needed  - Assess effectiveness of coping strategies  Outcome: Progressing  Goal: Will report anxiety at manageable levels  Description  INTERVENTIONS:  - Administer medication as ordered  - Teach and encourage coping skills  - Provide emotional support  - Assess patient/family for anxiety and ability to cope  Outcome: Progressing     Problem: CONFUSION/THOUGHT DISTURBANCE  Goal: Thought disturbances (confusion, delirium, depression, dementia or psychosis) are managed to maintain or return to baseline mental status and functional level  Description  INTERVENTIONS:  - Assess for possible contributors to  thought disturbance, including but not limited to medications, infection, impaired vision or hearing, underlying metabolic abnormalities, dehydration, respiratory compromise,  psychiatric diagnoses and notify attending PHYSICAN/AP  - Monitor and intervene to maintain adequate nutrition, hydration, elimination, sleep and activity  - Decrease environmental stimuli, including noise as appropriate  - Provide frequent contacts to provide refocusing, direction and reassurance as needed  Approach patient calmly with eye contact and at their level    - [place] high risk fall precautions, aspiration precautions and other safety measures, as indicated  - If delirium suspected, notify physician/AP of change in condition and request immediate in-person evaluation  - Pursue consults as appropriate including Geriatric (campus dependent), OT for cognitive evaluation/activity planning, psychiatric, pastoral care, etc   Outcome: Progressing     Problem: Prexisting or High Potential for Compromised Skin Integrity  Goal: Skin integrity is maintained or improved  Description  INTERVENTIONS:  - Identify patients at risk for skin breakdown  - Assess and monitor skin integrity  - Assess and monitor nutrition and hydration status  - Monitor labs   - Assess for incontinence   - Turn and reposition patient  - Assist with mobility/ambulation  - Relieve pressure over bony prominences  - Avoid friction and shearing  - Provide appropriate hygiene as needed including keeping skin clean and dry  - Evaluate need for skin moisturizer/barrier cream  - Collaborate with interdisciplinary team   - Patient/family teaching  - Consider wound care consult   Outcome: Progressing

## 2019-12-31 NOTE — PROGRESS NOTES
Physical Medicine and Rehabilitation Progress Note  Fariba Sequeira 75 y o  female MRN: [id number]  Unit/Bed#: [facility] 268-02 Encounter: [id number]    HPI: Fariba Sequeira is a 75 y o  female who presented to the [facility] [address] after fall at home  Found to have a right hip fracture, underwent IM nailing on 12/14/19  Post-procedure patient with delirium, hallucinations  Neurology service consulted, dose of sinemet was reduced  In addition, Seroquel started in evening  Patient does have DBS which  has controller for  Accepted to [facility] on 12/18/19  Chief Complaint: f/u fracture    Interval: No acute events overnight  Patient without complaint  Looking forward to d/c this Thursday  Asked to send any new medications to her home pharmacy  ROS: A 10 point ROS was performed; negative except as noted above  Assessment/Plan:    * Closed fracture of right hip (HCC)  Assessment & Plan  · Acute comprehensive interdisciplinary inpatient rehabilitation including PT, OT, and/or SLP, RN, CM, SW, dietary, psychology, etc   · Goal: supervision  · Clear patient to shower with incision covered  · WBAT  · S/p IM nailing on 12/14  · 2 week f/u performed by ortho, f/u as outpatient    Generalized edema  Assessment & Plan  · Bilateral LEs, R>L  · Apply compression garments    Essential hypertension  Assessment & Plan  Temp:  [97 2 °F (36 2 °C)-98 3 °F (36 8 °C)] 97 9 °F (36 6 °C)  HR:  [64-71] 71  Resp:  [16-20] 16  BP: (131-146)/(59-64) 131/59    · Continue Atenolol  · IM service managing anti-hypertensives, adjusting as needed    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 ([facility] [address] )  Assessment & Plan  · Patient with delirium, hallucinations following her surgery  · Evaluated by neurology, sinimet dose reduced  · Make seroquel PRN, as patient without hallucinations   Discontinue entirely on discharge      Type 2 diabetes mellitus without complication ([facility])  Assessment & Plan  · Last A1C: 6 3  · Continue diabetic diet    Other hyperlipidemia  Assessment & Plan  · Continue statin    Parkinson's disease with use of electrical brain stimulation ([facility] [address] )  Assessment & Plan  · Continue selegiline (patient is on rasagiline 1mg daily as outpatient, evidently not available here on formulary)  Will resume home rasagiline on discharge  · Continue sinemet - dose was decreased by neurology due to hallucinations  · F/u Dr Gabriella Amador as outpatient      # Skin  · Encourage regular turning as patient at risk for skin breakdown  · Staff to continue patient education on Q2h turning  · Rehabilitation team to perform skin checks regularly     # Bowel  · Patient reports no constipation     # Bladder  · Patient voiding spontaneously    # Pain  · Continue tylenol, for max of 3gm daily  · Continue tramadol    # Other  - Diet/Nutrition:        Diet Orders   (From admission, onward)             Start     Ordered    12/18/19 1449  Diet Regular; Regular House; Consistent Carbohydrate Diet Level 1 (4 carb servings/60 grams CHO/meal)  Diet effective now     Question Answer Comment   Diet Type Regular    Regular Regular House    Other Restriction(s): Consistent Carbohydrate Diet Level 1 (4 carb servings/60 grams CHO/meal)    RD to adjust diet per protocol?  Yes        12/18/19 1449              - DVT prophy: Sequential compression device (Venodyne)  and Enoxaparin (Lovenox)  - GI ppx: None  - Nausea: None  - Supplements: None  - Sleep: None    Disposition: 1/2/20    CODE: Level 3: DNAR and DNI Scheduled Meds:    Current Facility-Administered Medications:  acetaminophen 650 mg Oral Q6H PRN Petra Neely MD   aspirin 81 mg Oral Daily Ryan Gilliland MD   atenolol 50 mg Oral Daily Ryan Gilliland MD   carbidopa-levodopa 0 5 tablet Oral TID Ryan Gilliland MD   Coenzyme Q10 400 mg Oral Daily Ryan Gilliland MD   enoxaparin 40 mg Subcutaneous Daily Ryan Gilliland MD   lidocaine 1 patch Topical Daily Ryan Gilliland MD   PARoxetine 20 mg Oral Daily GRETA Gant   pravastatin 40 mg Oral Every Other Day Kiana Choe MD   QUEtiapine 25 mg Oral HS PRN Kiana Choe MD   selegiline 5 mg Oral Daily With Breakfast Kiana Choe MD   senna-docusate sodium 1 tablet Oral BID Ryan Gilliland MD   traMADol 25 mg Oral Q6H PRN Kiana Choe MD   traMADol 50 mg Oral Q6H PRN Kiana Choe MD        Objective:    Functional Update:  Physical Therapy Occupational Therapy Speech Therapy   Transfers: Minimal Assistance, Moderate Assistance  Bed Mobility: Maximum Assistance  Amulation Distance (ft): 75 feet  Ambulation: Minimal Assistance  Assistive Device for Ambulation: Roller Walker  Number of Stairs: 5  Assistive Device for Stairs: Bilateral [place]  Stair Assistance: Maximum Assistance  Discharge Recommendations: Home with:  [address] with[de-identified] 24 Hour Supervision, Family Support, Outpatient Physical Therapy   Eating: Independent  Grooming: Supervision  Bathing: Minimal Assistance  Bathing: Minimal Assistance  Upper Body Dressing: Minimal Assistance  Lower Body Dressing: Maximum Assistance  Toileting: Total Assistance  Tub/Shower Transfer: Maximum Assistance  Toilet Transfer: Moderate Assistance  Cognition: Within Defined Limits  Orientation: Person, Place, Time, Situation               Allergies per EMR    Physical Exam:  Temp:  [97 2 °F (36 2 °C)-98 3 °F (36 8 °C)] 97 9 °F (36 6 °C)  HR:  [64-71] 71  Resp:  [16-20] 16  BP: (131-146)/(59-64) 131/59  SpO2:  [96 %-99 %] 97 %    General: alert, no apparent distress, cooperative and comfortable  HEENT:  Head: Normocephalic, no lesions, without obvious abnormality  Eye: Normal external eye, conjunctiva, lids cornea, NORBERTO  Ears: normal external ears  Nose: Normal external nose, mucus membranes and septum  LUNGS:  no abnormal respiratory pattern, no retractions noted, non-labored breathing   ABDOMEN:  soft, non-tender   Bowel sounds normal  No masses, no organomegaly  EXTREMITIES:  extremities normal, warm and well-perfused; no cyanosis, clubbing, or edema  NEURO:   clear speech, following commands appropriately  PSYCH:  Alert and oriented, appropriate affect  INCISION:  C/D/I     Physical examination is otherwise unchanged from previous encounter, except as noted above  Diagnostic Studies: Reviewed, no new imaging  XR hip/pelv 2-3 vws right if performed   Final Result by Christine Ramirez MD (12/27 1604)      Anatomic alignment status post internal fixation of right intertrochanteric fracture  Workstation performed: [id number]           Laboratory: Reviewed  Results from last 7 days   Lab Units 12/30/19  0458 12/26/19  0513   HEMOGLOBIN g/dL 11 5* 10 5*   HEMATOCRIT % 34 6* 31 6*   WBC Thousand/uL 8 30 8 30     Results from last 7 days   Lab Units 12/30/19  0458 12/26/19  0513   BUN mg/dL 19 15   SODIUM mmol/L 141 139   POTASSIUM mmol/L 3 8 3 6   CHLORIDE mmol/L 104 104   CREATININE mg/dL 0 68 0 63   AST U/L 20 19   ALT U/L 21 14            ** Please Note: Fluency Direct voice to text software may have been used in the creation of this document   **

## 2019-12-31 NOTE — QUICK NOTE
Nurse reports that patient is having some increased confusion today  Reports that she thought she saw a mouse on the bed as well as "Inna Triana"  Patient did receive her Seroquel last night which is the 1st time in some time  She only slept approximately 1 hour per nursing staff  Will discontinue her Seroquel and re-evaluate    If no improvement or worsening symptoms will need Neurology to we see patient for adjustment of her Parkinson's medications

## 2019-12-31 NOTE — ASSESSMENT & PLAN NOTE
· Pt was off her home paxil since surgery  This was restarted yesterday    Her anxiety is stable at this time

## 2019-12-31 NOTE — SOCIAL WORK
Referral for DME sent to [place] [id number]  for Pt to obtain a commode  VM left for Pts son, Enrique Crowe , as CM understood he had some questions  CM explained that she would be out of the office until Thursday  PCF Pts son, Liat Carreno, CM explained that the referral was made to [facility] for PT/OT/HHA  CM stated that due to staffing constraints, the HHA piece will be performed by OT  Liat Carreno stated he understood, but then asked if he could have HHA from [facility], [facility]  CM stated that he would have to ask [facility], but again reiterated that they don't currently have the staffing

## 2020-01-01 VITALS
TEMPERATURE: 98.1 F | HEART RATE: 61 BPM | SYSTOLIC BLOOD PRESSURE: 118 MMHG | WEIGHT: 234.1 LBS | RESPIRATION RATE: 18 BRPM | DIASTOLIC BLOOD PRESSURE: 62 MMHG | HEIGHT: 67 IN | OXYGEN SATURATION: 96 % | BODY MASS INDEX: 36.74 KG/M2

## 2020-01-01 PROCEDURE — 97535 SELF CARE MNGMENT TRAINING: CPT

## 2020-01-01 PROCEDURE — 97530 THERAPEUTIC ACTIVITIES: CPT

## 2020-01-01 PROCEDURE — 97110 THERAPEUTIC EXERCISES: CPT

## 2020-01-01 PROCEDURE — 97116 GAIT TRAINING THERAPY: CPT

## 2020-01-01 RX ADMIN — ASPIRIN 81 MG: 81 TABLET, COATED ORAL at 08:30

## 2020-01-01 RX ADMIN — PRAVASTATIN SODIUM 40 MG: 40 TABLET ORAL at 08:30

## 2020-01-01 RX ADMIN — ENOXAPARIN SODIUM 40 MG: 40 INJECTION SUBCUTANEOUS at 14:45

## 2020-01-01 RX ADMIN — SENNOSIDES AND DOCUSATE SODIUM 1 TABLET: 8.6; 5 TABLET ORAL at 16:59

## 2020-01-01 RX ADMIN — CARBIDOPA AND LEVODOPA 0.5 TABLET: 25; 100 TABLET ORAL at 15:52

## 2020-01-01 RX ADMIN — CARBIDOPA AND LEVODOPA 0.5 TABLET: 25; 100 TABLET ORAL at 08:31

## 2020-01-01 RX ADMIN — CARBIDOPA AND LEVODOPA 0.5 TABLET: 25; 100 TABLET ORAL at 20:42

## 2020-01-01 RX ADMIN — SELEGILINE HYDROCHLORIDE 5 MG: 5 TABLET ORAL at 08:30

## 2020-01-01 RX ADMIN — SENNOSIDES AND DOCUSATE SODIUM 1 TABLET: 8.6; 5 TABLET ORAL at 08:30

## 2020-01-01 RX ADMIN — ATENOLOL 50 MG: 25 TABLET ORAL at 08:30

## 2020-01-01 RX ADMIN — LIDOCAINE 1 PATCH: 50 PATCH CUTANEOUS at 08:31

## 2020-01-01 RX ADMIN — PAROXETINE HYDROCHLORIDE 20 MG: 20 TABLET, FILM COATED ORAL at 08:30

## 2020-01-01 NOTE — PROGRESS NOTES
01/01/20 0930   Pain Assessment   Pain Assessment 0-10   Pain Score 8  (end of session, 5/10 after CP removal)   Pain Type Acute pain   Pain Location Groin   Pain Orientation Right   Pain Descriptors Sore   Hospital Pain Intervention(s) Cold applied   Response to Interventions At end of session, pt requested cold pack to R groin area due to muscle soreness at end of therapy tx rated as 8/10  Left on pt for 20 mins and when removed pt stated pain had decreased to 5/10  Restrictions/Precautions   Precautions Bed/chair alarms;Cognitive; Fall Risk;Impulsive;Supervision on toilet/commode;Pain   Weight Bearing Restrictions Yes   RLE Weight Bearing Per Order WBAT   Cognition   Overall Cognitive Status Impaired   Arousal/Participation Alert; Cooperative   Attention Within functional limits   Orientation Level Oriented to person   Memory Decreased short term memory;Decreased recall of recent events   Following Commands Follows one step commands with increased time or repetition   Comments Pt again stating she's unsure why  left her here overnight  Began questioning pt where she was and she stated she is now in hospital but cont to report "I was in a rental house yesterday that belonged to [place]  Marino Acosta wanted to go to [facility] and just left me  I felt abandoned " Explained to pt location as well as reason for being here in which pt then understood  Alerted CRISTHIAN Mejia of pt's ongoing reports  Pt also reports she slept well last night however after speaking with CRISTHIAN Mejia, she reported pt only slept 1 5 hours last night  Subjective   Subjective Pt agreeable to PT tx this session  States she is ready to go home      Sit to Stand   Type of Assistance Needed Incidental touching   Amount of Physical Assistance Provided No physical assistance   Sit to Stand CARE Score 4   Bed-Chair Transfer   Type of Assistance Needed Incidental touching   Amount of Physical Assistance Provided No physical assistance Chair/Bed-to-Chair Transfer CARE Score 4   Transfer Bed/Chair/Wheelchair   Limitations Noted In Balance;Confidence; Coordination; Endurance;Problem Solving; Sequencing;UE Strength;LE Strength   Adaptive Equipment Roller Walker   Stand Pivot Contact Guard   Sit to [employer]   Stand to [employer] pt with improvements noted in transfers this session, however, cont to require vc's for hand placement but not for using power to get up  Walk 10 Feet   Type of Assistance Needed Incidental touching   Amount of Physical Assistance Provided No physical assistance   Walk 10 Feet CARE Score 4   Walk 50 Feet with Two Turns   Type of Assistance Needed Incidental touching   Amount of Physical Assistance Provided No physical assistance   Walk 50 Feet with Two Turns CARE Score 4   Walk 150 Feet   Type of Assistance Needed Incidental touching   Amount of Physical Assistance Provided No physical assistance   Walk 150 Feet CARE Score 4   Walking 10 Feet on Uneven Surfaces   Type of Assistance Needed Physical assistance   Amount of Physical Assistance Provided 25%-49%   Comment on inside ramp with RW, LOB to pt's L side while ascending ramp requiring Geri from therapist to correct as pt has decreased righting reactions   Walking 10 Feet on Uneven Surfaces CARE Score 3   Ambulation   Does the patient walk? 2  Yes   Primary Mode of Locomotion Prior to Admission Walk   Distance Walked (feet) 150 ft  (x1, 90'x1)   Assist Device Roller Walker   Gait Pattern Inconsistant Oliva; Slow Oliva;Decreased foot clearance; Forward Flexion;Narrow NANCY;Step through; Step to; Improper weight shift; Antalgic   Limitations Noted In Balance; Coordination;Device Management; Endurance; Heel Strike;Posture; Safety; Sequencing;Speed;Strength;Swing   Provided Assistance with: Balance   Walk Assist Level Contact Guard   Findings Pt with no LOB this session during transfers or amb, besides when on ramp   Plan to have  perform amb with pt in afternoon session   Wheelchair mobility   Does the patient use a wheelchair? 0  No   Curb or Single Stair   Style negotiated Single stair   Type of Assistance Needed Incidental touching   Amount of Physical Assistance Provided No physical assistance   1 Step (Curb) CARE Score 4   4 Steps   Type of Assistance Needed Incidental touching;Verbal cues   Amount of Physical Assistance Provided No physical assistance   4 Steps CARE Score 4   12 Steps   Reason if not Attempted Activity not applicable   12 Steps CARE Score 9   Stairs   Type Stairs   # of Steps 8   Weight Bearing Precautions Fall Risk   Assist Devices Bilateral Rail   Findings pt performed in nonreciprocal pattern w/o vc's ascending for proper sequencing  Then when descending, pt required vc's twice, once to descend with R LE leading, the second time for hand placement on B HRs as pt attempts to keep hands back and does not move them forward  Otherwise, much improvement noted with stairs  Picking Up Object   Type of Assistance Needed Incidental touching   Amount of Physical Assistance Provided No physical assistance   Picking Up Object CARE Score 4   Therapeutic Interventions   Flexibility Seated passive B hamstring/gastroc stretching x3 mins total    Balance TUG with RW performed and pt able to complete in 56 seconds  This indicates that pt would benefit from cont skilled therapy services upon d/c to address decreased balance and increased risk for falls  Equipment Use   NuStep L3, 10 minutes B LE's only for strengthening, endurance, and ROM  SPM >45  Assessment   Treatment Assessment Session focused on performing all functional mobilities with pt in anticipation for d/c home with CGA to be provided by /family member for all functional mobilities  Pt with improvements noted this session as she had no LOB with PT when amb on even surfaces or during transfers   Pt even able to perform stairs and transfers with minimal cueing required this session  Pt did however have LOB on ramp requiring assist from therapist  Plan for family training in afternoon session with  Keke Grider in order to further address all functional mobilities with him and educate him in providing pt with proper assist upon d/c home  Pt would benefit from cont skilled therapy intervention once she is d/c'd in order to improve upon her LE strength, endurance, balance, safety, transfers, and amb to help decrease her overall risk for falls  Family/Caregiver Present no   Problem List Decreased strength;Decreased range of motion;Decreased mobility; Decreased coordination;Decreased cognition;Decreased safety awareness; Impaired balance   Barriers to Discharge Other (Comment)   Barriers to Discharge Comments family training in afternoon session with  Keke Grider   PT Barriers   Physical Impairment Decreased strength;Decreased range of motion;Decreased endurance; Impaired balance;Decreased mobility; Decreased coordination;Decreased cognition;Obesity; Decreased skin integrity;Orthopedic restrictions;Pain   Functional Limitation Ramp negotiation;Car transfers;Stair negotiation;Transfers; Walking   Plan   Treatment/Interventions Functional transfer training;LE strengthening/ROM; Therapeutic exercise; Endurance training;Gait training   Progress Slow progress, cognitive deficits   Recommendation   Recommendation Home with family support;24 hour supervision/assist;Home PT   Equipment Recommended Walker   PT - OK to Discharge No   PT Therapy Minutes   PT Time In 0930   PT Time Out 1030   PT Total Time (minutes) 60   PT Mode of treatment - Individual (minutes) 60   PT Mode of treatment - Concurrent (minutes) 0   PT Mode of treatment - Group (minutes) 0   PT Mode of treatment - Co-treat (minutes) 0   PT Mode of Treatment - Total time(minutes) 60 minutes   PT Cumulative Minutes 1110   Therapy Time missed   Time missed?  No

## 2020-01-01 NOTE — PLAN OF CARE
Problem: Potential for Falls  Goal: Patient will remain free of falls  Description  INTERVENTIONS:  - Assess patient frequently for physical needs  -  Identify cognitive and physical deficits and behaviors that affect risk of falls  -  [place] fall precautions as indicated by assessment   - Educate patient/family on patient safety including physical limitations  - Instruct patient to call for assistance with activity based on assessment  - Modify environment to reduce risk of injury  - Consider OT/PT consult to assist with strengthening/mobility  Outcome: Progressing     Problem: NEUROSENSORY - ADULT  Goal: Achieves stable or improved neurological status  Description  INTERVENTIONS  - Monitor and report changes in neurological status  - Monitor vital signs such as temperature, blood pressure, glucose, and any other labs ordered   - Initiate measures to prevent increased intracranial pressure  - Monitor for seizure activity and implement precautions if appropriate      Outcome: Progressing  Goal: Achieves maximal functionality and self care  Description  INTERVENTIONS  - Monitor swallowing and airway patency with patient fatigue and changes in neurological status  - Encourage and assist patient to increase activity and self care     - Encourage visually impaired, hearing impaired and aphasic patients to use assistive/communication devices  Outcome: Progressing     Problem: GASTROINTESTINAL - ADULT  Goal: Maintains or returns to baseline bowel function  Description  INTERVENTIONS:  - Assess bowel function  - Encourage oral fluids to ensure adequate hydration  - Administer IV fluids if ordered to ensure adequate hydration  - Administer ordered medications as needed  - Encourage mobilization and activity  - Consider nutritional services referral to assist patient with adequate nutrition and appropriate food choices  Outcome: Progressing  Goal: Maintains adequate nutritional intake  Description  INTERVENTIONS:  - Monitor percentage of each meal consumed  - Identify factors contributing to decreased intake, treat as appropriate  - Assist with meals as needed  - Monitor I&O, weight, and lab values if indicated  - Obtain nutrition services referral as needed  Outcome: Progressing     Problem: GENITOURINARY - ADULT  Goal: Maintains or returns to baseline urinary function  Description  INTERVENTIONS:  - Assess urinary function  - Encourage oral fluids to ensure adequate hydration if ordered  - Administer IV fluids as ordered to ensure adequate hydration  - Administer ordered medications as needed  - Offer frequent toileting  - Follow urinary retention protocol if ordered  Outcome: Progressing     Problem: SKIN/TISSUE INTEGRITY - ADULT  Goal: Skin integrity remains intact  Description  INTERVENTIONS  - Identify patients at risk for skin breakdown  - Assess and monitor skin integrity  - Assess and monitor nutrition and hydration status  - Monitor labs (i e  albumin)  - Assess for incontinence   - Turn and reposition patient  - Assist with mobility/ambulation  - Relieve pressure over bony prominences  - Avoid friction and shearing  - Provide appropriate hygiene as needed including keeping skin clean and dry  - Evaluate need for skin moisturizer/barrier cream  - Collaborate with interdisciplinary team (i e  Nutrition, Rehabilitation, etc )   - Patient/family teaching  Outcome: Progressing  Goal: Incision(s), wounds(s) or drain site(s) healing without S/S of infection  Description  INTERVENTIONS  - Assess and document risk factors for skin impairment   - Assess and document dressing, incision, wound bed, drain sites and surrounding tissue  - Consider nutrition services referral as needed  - Oral mucous membranes remain intact  - Provide patient/ family education  Outcome: Progressing  Goal: Oral mucous membranes remain intact  Description  INTERVENTIONS  - Assess oral mucosa and hygiene practices  - Implement preventative oral hygiene regimen  - Implement oral medicated treatments as ordered  - Initiate Nutrition services referral as needed  Outcome: Progressing     Problem: MUSCULOSKELETAL - ADULT  Goal: Maintain or return mobility to safest level of function  Description  INTERVENTIONS:  - Assess patient's ability to carry out ADLs; assess patient's baseline for ADL function and identify physical deficits which impact ability to perform ADLs (bathing, care of mouth/teeth, toileting, grooming, dressing, etc )  - Assess/evaluate cause of self-care deficits   - Assess range of motion  - Assess patient's mobility  - Assess patient's need for assistive devices and provide as appropriate  - Encourage maximum independence but intervene and supervise when necessary  - Involve family in performance of ADLs  - Assess for home care needs following discharge   - Consider OT consult to assist with ADL evaluation and planning for discharge  - Provide patient education as appropriate  Outcome: Progressing  Goal: Maintain proper alignment of affected body part  Description  INTERVENTIONS:  - Support, maintain and protect limb and body alignment  - Provide patient/ family with appropriate education  Outcome: Progressing     Problem: COPING  Goal: Pt/Family able to verbalize concerns and demonstrate effective coping strategies  Description  INTERVENTIONS:  - Assist patient/family to identify coping skills, available support systems and cultural and spiritual values  - Provide emotional support, including active listening and acknowledgement of concerns of patient and caregivers  - Reduce environmental stimuli, as able  - Provide patient education  - Assess for spiritual pain/suffering and initiate spiritual care, including notification of Pastoral Care or ranjit based community as needed  - Assess effectiveness of coping strategies  Outcome: Progressing  Goal: Will report anxiety at manageable levels  Description  INTERVENTIONS:  - Administer medication as ordered  - Teach and encourage coping skills  - Provide emotional support  - Assess patient/family for anxiety and ability to cope  Outcome: Progressing     Problem: CONFUSION/THOUGHT DISTURBANCE  Goal: Thought disturbances (confusion, delirium, depression, dementia or psychosis) are managed to maintain or return to baseline mental status and functional level  Description  INTERVENTIONS:  - Assess for possible contributors to  thought disturbance, including but not limited to medications, infection, impaired vision or hearing, underlying metabolic abnormalities, dehydration, respiratory compromise,  psychiatric diagnoses and notify attending PHYSICAN/AP  - Monitor and intervene to maintain adequate nutrition, hydration, elimination, sleep and activity  - Decrease environmental stimuli, including noise as appropriate  - Provide frequent contacts to provide refocusing, direction and reassurance as needed  Approach patient calmly with eye contact and at their level    - [place] high risk fall precautions, aspiration precautions and other safety measures, as indicated  - If delirium suspected, notify physician/AP of change in condition and request immediate in-person evaluation  - Pursue consults as appropriate including Geriatric (campus dependent), OT for cognitive evaluation/activity planning, psychiatric, pastoral care, etc   Outcome: Progressing     Problem: Prexisting or High Potential for Compromised Skin Integrity  Goal: Skin integrity is maintained or improved  Description  INTERVENTIONS:  - Identify patients at risk for skin breakdown  - Assess and monitor skin integrity  - Assess and monitor nutrition and hydration status  - Monitor labs   - Assess for incontinence   - Turn and reposition patient  - Assist with mobility/ambulation  - Relieve pressure over bony prominences  - Avoid friction and shearing  - Provide appropriate hygiene as needed including keeping skin clean and dry  - Evaluate need for skin moisturizer/barrier cream  - Collaborate with interdisciplinary team   - Patient/family teaching  - Consider wound care consult   Outcome: Progressing

## 2020-01-01 NOTE — PROGRESS NOTES
01/01/20 0700   Pain Assessment   Pain Assessment No/denies pain   Pain Score No Pain   Restrictions/Precautions   Precautions Bed/chair alarms;Cognitive; Fall Risk;Supervision on toilet/commode   Weight Bearing Restrictions Yes   RLE Weight Bearing Per Order WBAT   Oral Hygiene   Type of Assistance Needed Supervision   Amount of Physical Assistance Provided No physical assistance   Comment Pt s/u hygiene items while seated, then stood at sink with CS to perform hygiene routine  No LOB during task  Oral Hygiene CARE Score 4   Shower/Bathe Self   Type of Assistance Needed Physical assistance   Amount of Physical Assistance Provided 25%-49%   Comment Pt completed shower with OT  Pt sat on transfer bench to bathe UB down to below knees  Pt utilized LHAE to to bathe B feet while seated in shower  Pt stood in shower utilizing grab bars for support to bathe audra/buttock area  required Min A for steadying while in stance  OT recommended in previous sessions for Pt to have A and S while performing shower routine  Family was receptive to recommendation  Shower/Bathe Self CARE Score 3   Tub/Shower Transfer   Limitations Noted In Balance; Coordination; Safety;LE Strength   Adaptive Equipment Grab Bars;Transfer Bench   Assessed Shower   Findings Pt initially required Mod A to xfer into shower  Observed with difficulty lifting LLE while performing xfer causing LOB to L side  Pt  completed xfer out of shower with Min A for steadying  Slipper socks donned for xfer  Upper Body Dressing   Type of Assistance Needed Physical assistance   Amount of Physical Assistance Provided Less than 25%   Comment Pt completed UB drsg routine while seated on EOB  Pt able to don bra and thread BUE's into shirt sleeves  Required A to pull shirt over head  Upper Body Dressing CARE Score 3   Lower Body Dressing   Type of Assistance Needed Physical assistance; Adaptive equipment;Verbal cues   Amount of Physical Assistance Provided 25%-49% Comment Pt completed LB drsg routine seated on EOB utilizing LHAE  Required vc's to recall LHAE technique for donning pull-up  Pt required able to don RLE into pull up utilizing reacher, required A to manage LLE through hole of pull up  Pt able to thread BLE's into pants utilizing reacher and vc's for technique  OT provided A to steady pt while in stance managing pull up/pants up over hips  Lower Body Dressing CARE Score 3   Putting On/Taking Off Footwear   Type of Assistance Needed Physical assistance; Adaptive equipment   Amount of Physical Assistance Provided 75% or more   Comment Required TA to don Teds  Pt encouraged to utilize LHAE to A with donning shoes  Pt able to position B feet into shoes, but required A to place B heels into shoe  Unable to complete task utilizing [facility]  Putting On/Taking Off Footwear CARE Score 2   Lying to Sitting on Side of Bed   Type of Assistance Needed Physical assistance   Amount of Physical Assistance Provided 25%-49%   Comment with HOB elevated and utilizing bed assist rail  Lying to Sitting on Side of Bed CARE Score 3   Sit to Stand   Type of Assistance Needed Physical assistance   Amount of Physical Assistance Provided 50%-74%   Comment Pt initially Mod A with RW to complete sit to stand OOB  Sit to Stand CARE Score 2   Bed-Chair Transfer   Type of Assistance Needed Physical assistance   Amount of Physical Assistance Provided 50%-74%   Comment Pt observed difficulty picking LLE from floor when completing bed to w/c xfer, causing LOB into w/c  Nursing reported that pt did not sleep previous night, affecting ADL performance during OT session  Chair/Bed-to-Chair Transfer CARE Score 2   Toileting Hygiene   Type of Assistance Needed Physical assistance   Amount of Physical Assistance Provided 25%-49%   Comment Pt stood at RW to manage pull-up up/down for toileting routine  Pt complete toilet hygiene in partial stand position, leaning fwd to wipe   OT provided A for steadying during task to prevent risk of fall  Toileting Hygiene CARE Score 3   Toilet Transfer   Type of Assistance Needed Physical assistance   Amount of Physical Assistance Provided 25%-49%   Comment with Sherif puga  Min A provided for steadying while completing xfer  Toilet Transfer CARE Score 3   Cognition   Overall Cognitive Status Impaired   Arousal/Participation Alert; Cooperative   Attention Within functional limits   Orientation Level Oriented to person   Memory Decreased short term memory;Decreased recall of recent events   Following Commands Follows one step commands with increased time or repetition   Activity Tolerance   Activity Tolerance Patient limited by fatigue   Medical Staff Made Aware Nursing reports that pt had difficulty sleeping previous night  Assessment   Treatment Assessment Pt participated in skilled OT Tx session focusing on performing ADL routine within shower environment  Pt initially demonstrated increased difficulty with performing xfer OOB, requiring Mod A from OT for steadying  See above for further Tx details  Pt observed with episodes of delirium, unable to recall where she was and stating "Are we going to be infront of the camera's today?" OT re-oriented pt to place and explained that she was participating in OT  Nursing communicated that they are aware of the episodes of delirium and that it's been occuring the past 2 days  Pt to D/C home on 1 2 20 with family support, Home OT, and A for showers  Prognosis Fair   Problem List Decreased strength;Decreased range of motion;Decreased mobility; Decreased coordination;Decreased cognition;Decreased safety awareness; Impaired balance   Plan   Progress Slow progress, decreased activity tolerance   Recommendation   OT Discharge Recommendation Home OT  ([facility] private pay for showers)   Equipment Recommended Tub seat with back   OT Therapy Minutes   OT Time In 0700   OT Time Out 0830   OT Total Time (minutes) 90   OT Mode of treatment - Individual (minutes) 90   OT Mode of treatment - Concurrent (minutes) 0   OT Mode of treatment - Group (minutes) 0   OT Mode of treatment - Co-treat (minutes) 0   OT Mode of Treatment - Total time(minutes) 90 minutes   OT Cumulative Minutes 1245   Therapy Time missed   Time missed?  No

## 2020-01-01 NOTE — PLAN OF CARE
Problem: SKIN/TISSUE INTEGRITY - ADULT  Goal: Skin integrity remains intact  Description  INTERVENTIONS  - Identify patients at risk for skin breakdown  - Assess and monitor skin integrity  - Assess and monitor nutrition and hydration status  - Monitor labs (i e  albumin)  - Assess for incontinence   - Turn and reposition patient  - Assist with mobility/ambulation  - Relieve pressure over bony prominences  - Avoid friction and shearing  - Provide appropriate hygiene as needed including keeping skin clean and dry  - Evaluate need for skin moisturizer/barrier cream  - Collaborate with interdisciplinary team (i e  Nutrition, Rehabilitation, etc )   - Patient/family teaching  Outcome: Progressing     Problem: Potential for Falls  Goal: Patient will remain free of falls  Description  INTERVENTIONS:  - Assess patient frequently for physical needs  -  Identify cognitive and physical deficits and behaviors that affect risk of falls    -  [place] fall precautions as indicated by assessment   - Educate patient/family on patient safety including physical limitations  - Instruct patient to call for assistance with activity based on assessment  - Modify environment to reduce risk of injury  - Consider OT/PT consult to assist with strengthening/mobility  Outcome: Progressing

## 2020-01-01 NOTE — PROGRESS NOTES
01/01/20 1308   Pain Assessment   Pain Assessment No/denies pain   Pain Score No Pain   Restrictions/Precautions   Precautions Bed/chair alarms;Cognitive; Fall Risk;Impulsive;Supervision on toilet/commode   Weight Bearing Restrictions Yes   RLE Weight Bearing Per Order WBAT   Cognition   Overall Cognitive Status Impaired   Arousal/Participation Alert; Cooperative   Attention Within functional limits   Orientation Level Oriented to person   Memory Decreased short term memory;Decreased recall of recent events   Following Commands Follows one step commands with increased time or repetition   Subjective   Subjective Pt's  present for shorted PT session to focus on FT prior to d/c on 1/2/20  Sit to Stand   Type of Assistance Needed Incidental touching   Amount of Physical Assistance Provided No physical assistance   Sit to Stand CARE Score 4   Bed-Chair Transfer   Type of Assistance Needed Incidental touching   Amount of Physical Assistance Provided No physical assistance   Chair/Bed-to-Chair Transfer CARE Score 4   Transfer Bed/Chair/Wheelchair   Stand Pivot Contact Guard   Sit to Stand Contact Guard   Stand to [facility]   Findings had  perform transfers with pt  Educated  on proper vc's to give to pt in order to improve pt's safety with transfers including cues to march and lift feet, cues to  weight bear evenly through B UE's, to have pt push from chair rather than reach for walker  to cue pt to feel chair behind her prior to sitting and to reach back with both hands and sit slowly   receptive to education and able to provide proper cues to pt      Walk 10 Feet   Type of Assistance Needed Incidental touching   Amount of Physical Assistance Provided No physical assistance   Walk 10 Feet CARE Score 4   Walk 50 Feet with Two Turns   Type of Assistance Needed Incidental touching   Amount of Physical Assistance Provided No physical assistance   Walk 50 Feet with Two Turns CARE Score 4 Ambulation   Does the patient walk? 2  Yes   Primary Mode of Locomotion Prior to Admission Walk   Distance Walked (feet) 90 ft  (x1)   Assist Device Roller Walker   Gait Pattern Inconsistant Oliva; Slow Oliva;Decreased foot clearance; Forward Flexion;Narrow NANCY;Step through; Step to; Improper weight shift; Antalgic   Limitations Noted In Balance; Coordination;Device Management; Endurance; Heel Strike;Posture; Safety; Sequencing;Speed;Strength;Swing   Provided Assistance with: Balance   Walk Assist Level Contact Guard   Findings  performed with pt with proper hand placement providing CGA on pt's hips   stated he felt comfortable providing CGA to pt  Educated  to provide vc's to pt during amb to  L foot and not drag foot as well as to keep walker close   receptive and then able to provide proper vc's  Wheelchair mobility   Does the patient use a wheelchair? 0  No   Curb or Single Stair   Style negotiated Single stair   Type of Assistance Needed Incidental touching   Amount of Physical Assistance Provided No physical assistance   1 Step (Curb) CARE Score 4   4 Steps   Type of Assistance Needed Incidental touching;Verbal cues   Amount of Physical Assistance Provided No physical assistance   4 Steps CARE Score 4   12 Steps   Reason if not Attempted Activity not applicable   12 Steps CARE Score 9   Stairs   Type Stairs   # of Steps 8   Weight Bearing Precautions Fall Risk   Assist Devices Bilateral Rail   Findings Had  perform with pt while providing CGA  At first,  attempting to stand at bottom of steps while holding onto pt  Educated  that he must be right behind pt at least 1 step or less between them so he can provide support   able to perform properly once cued   Then educated  that he will have to cue pt for proper ascending/descending sequence as pt had forgotten when performing last 4 steps which foot to lead with when ascending as well as descending   able to provide vc's to pt, however, PT did intervene at one point as  was not stopping pt from descending with LLE  Educated  that he should tell pt to stop if she is performing in incorrect manner   then asked if he should perform stairs with pt as exercise upon d/c  Educated  and pt that at time time, only recommended to do stairs when necessary (go to doctor's appointments, emergencies) and not to perform as therapeutic exercise as pt is still at increased risk for falls  Pt and  verbalized that they had full understanding to only perform steps when needing to get in/out of house  Assessment   Treatment Assessment Session focused on FT with  Christiano Rhodes  Had Christiano Rhodes perform all functional mobilities with pt including amb, transfers, stair trng, and amb on uneven surfaces   Christiano Rhodes required education regarding always have CGA with pt during all mobilities due to increased risk for falls   able to recognize need for CGA as he states "when she loses her balance she can't correct it herself so I understand I have to be right there with her  Discussed when pt d/c's that  provide 24/7 S to pt due to cognitive deficts in which  recognizes and states "she tried to stand up yesterday when I was leaving by herself without her walker  She needs someone with her "  did ask what he should do if he needs to use restroom and leave pt for a few minutes  PT educated  that sign could be put on pt's RW stating "[facility], do not get up without Christiano Rhodes next to you " Also educated that  should make pt aware that he is going to restroom and will be back in a few minutes and also if pt is in recliner to put feet of recliner up so that he could hear if pt would put feet down   Also educated to leave bathroom door open so that  could hear pt but utlimately still recommend 24/7 S due to decreased balance, decreased righting reactions, decreased safety awareness, decreased insight into deficits, decreased LE strenghth and ROM, and decreased overall endurance   and pt with no further questions regarding functional mobilities and PT offerred that if any questions do arise they could contact therapy dept  Pt anticipated to d/c home tomorrow 1/2/20 with recommendations for home PT as well as to use RW at all times for all functional mobilities  Family/Caregiver Present yes   PT Family training done with: spouse, Colby Brice   Problem List Decreased strength;Decreased range of motion;Decreased mobility; Decreased coordination;Decreased cognition;Decreased safety awareness; Impaired balance   PT Barriers   Physical Impairment Decreased strength;Decreased range of motion;Decreased endurance; Impaired balance;Decreased mobility; Decreased coordination;Decreased cognition;Obesity; Decreased skin integrity;Orthopedic restrictions;Pain   Functional Limitation Ramp negotiation;Car transfers;Stair negotiation;Transfers; Walking   Plan   Treatment/Interventions Functional transfer training; Therapeutic exercise;Patient/family training;Gait training   Progress Slow progress, cognitive deficits   Recommendation   Recommendation Home with family support;24 hour supervision/assist;Home PT   Equipment Recommended Walker   PT - OK to Discharge Yes   PT Therapy Minutes   PT Time In 1308   PT Time Out 1338   PT Total Time (minutes) 30   PT Mode of treatment - Individual (minutes) 30   PT Mode of treatment - Concurrent (minutes) 0   PT Mode of treatment - Group (minutes) 0   PT Mode of treatment - Co-treat (minutes) 0   PT Mode of Treatment - Total time(minutes) 30 minutes   PT Cumulative Minutes 1080   Therapy Time missed   Time missed?  No

## 2020-01-02 LAB
ALBUMIN SERPL BCP-MCNC: 3.9 G/DL (ref 3–5.2)
ALP SERPL-CCNC: 153 U/L (ref 43–122)
ALT SERPL W P-5'-P-CCNC: 17 U/L (ref 9–52)
ANION GAP SERPL CALCULATED.3IONS-SCNC: 9 MMOL/L (ref 5–14)
AST SERPL W P-5'-P-CCNC: 16 U/L (ref 14–36)
BASOPHILS # BLD AUTO: 0.1 THOUSANDS/ΜL (ref 0–0.1)
BASOPHILS NFR BLD AUTO: 1 % (ref 0–1)
BILIRUB SERPL-MCNC: 0.4 MG/DL
BUN SERPL-MCNC: 20 MG/DL (ref 5–25)
CALCIUM SERPL-MCNC: 9.4 MG/DL (ref 8.4–10.2)
CHLORIDE SERPL-SCNC: 104 MMOL/L (ref 97–108)
CO2 SERPL-SCNC: 28 MMOL/L (ref 22–30)
CREAT SERPL-MCNC: 0.69 MG/DL (ref 0.6–1.2)
EOSINOPHIL # BLD AUTO: 0.1 THOUSAND/ΜL (ref 0–0.4)
EOSINOPHIL NFR BLD AUTO: 2 % (ref 0–6)
ERYTHROCYTE [DISTWIDTH] IN BLOOD BY AUTOMATED COUNT: 14.6 %
GFR SERPL CREATININE-BSD FRML MDRD: 85 ML/MIN/1.73SQ M
GLUCOSE P FAST SERPL-MCNC: 117 MG/DL (ref 70–99)
GLUCOSE SERPL-MCNC: 117 MG/DL (ref 70–99)
HCT VFR BLD AUTO: 35.9 % (ref 36–46)
HGB BLD-MCNC: 12.1 G/DL (ref 12–16)
LYMPHOCYTES # BLD AUTO: 1.2 THOUSANDS/ΜL (ref 0.5–4)
LYMPHOCYTES NFR BLD AUTO: 16 % (ref 25–45)
MCH RBC QN AUTO: 32 PG (ref 26–34)
MCHC RBC AUTO-ENTMCNC: 33.7 G/DL (ref 31–36)
MCV RBC AUTO: 95 FL (ref 80–100)
MONOCYTES # BLD AUTO: 0.4 THOUSAND/ΜL (ref 0.2–0.9)
MONOCYTES NFR BLD AUTO: 6 % (ref 1–10)
NEUTROPHILS # BLD AUTO: 5.7 THOUSANDS/ΜL (ref 1.8–7.8)
NEUTS SEG NFR BLD AUTO: 76 % (ref 45–65)
PLATELET # BLD AUTO: 294 THOUSANDS/UL (ref 150–450)
PMV BLD AUTO: 8.8 FL (ref 8.9–12.7)
POTASSIUM SERPL-SCNC: 3.9 MMOL/L (ref 3.6–5)
PROT SERPL-MCNC: 6.8 G/DL (ref 5.9–8.4)
RBC # BLD AUTO: 3.78 MILLION/UL (ref 4–5.2)
SODIUM SERPL-SCNC: 141 MMOL/L (ref 137–147)
WBC # BLD AUTO: 7.4 THOUSAND/UL (ref 4.5–11)

## 2020-01-02 PROCEDURE — 99232 SBSQ HOSP IP/OBS MODERATE 35: CPT | Performed by: NURSE PRACTITIONER

## 2020-01-02 PROCEDURE — 80053 COMPREHEN METABOLIC PANEL: CPT | Performed by: NURSE PRACTITIONER

## 2020-01-02 PROCEDURE — 97530 THERAPEUTIC ACTIVITIES: CPT

## 2020-01-02 PROCEDURE — 99239 HOSP IP/OBS DSCHRG MGMT >30: CPT | Performed by: PHYSICAL MEDICINE & REHABILITATION

## 2020-01-02 PROCEDURE — 85025 COMPLETE CBC W/AUTO DIFF WBC: CPT | Performed by: NURSE PRACTITIONER

## 2020-01-02 RX ADMIN — CARBIDOPA AND LEVODOPA 0.5 TABLET: 25; 100 TABLET ORAL at 08:02

## 2020-01-02 RX ADMIN — ACETAMINOPHEN 650 MG: 325 TABLET ORAL at 01:55

## 2020-01-02 RX ADMIN — ENOXAPARIN SODIUM 40 MG: 40 INJECTION SUBCUTANEOUS at 08:03

## 2020-01-02 RX ADMIN — PAROXETINE HYDROCHLORIDE 20 MG: 20 TABLET, FILM COATED ORAL at 08:03

## 2020-01-02 RX ADMIN — ATENOLOL 50 MG: 25 TABLET ORAL at 08:01

## 2020-01-02 RX ADMIN — SELEGILINE HYDROCHLORIDE 5 MG: 5 TABLET ORAL at 08:00

## 2020-01-02 RX ADMIN — ASPIRIN 81 MG: 81 TABLET, COATED ORAL at 08:03

## 2020-01-02 NOTE — ASSESSMENT & PLAN NOTE
Vitals:    01/01/20 1552   BP: 118/62   Pulse: 61   Resp: 18   Temp: 98 1 °F (36 7 °C)   SpO2: 96%      · Overall controlled    Continue with Atenolol 50mg

## 2020-01-02 NOTE — PROGRESS NOTES
01/02/20 1222   Pain Assessment   Pain Assessment No/denies pain   Pain Score No Pain   Sit to Stand   Type of Assistance Needed Incidental touching   Amount of Physical Assistance Provided No physical assistance   Sit to Stand CARE Score 4   Car Transfer   Type of Assistance Needed Physical assistance   Amount of Physical Assistance Provided Less than 25%   Comment Geri-CGA with RW to perform car transfer   Car Transfer CARE Score 3   Assessment   Treatment Assessment Car transfer performed with pt as pt d/c home today with   Pt and  performed car xfer with PT observing  PT had to intervene to provide vc's for safety including backing up all the way to car as pt attempted to sit before she was back far enough and  Anastasiia Colon was not providing vc's  Educated Anastasiia Colon and pt that vc's may be required and pt should always back up fully to vehicle before attempting to sit  Anastasiia Colon receptive to education  Pt d/c'd home with recommendations for 24/7 S, RW for all functional mobilities as well as CGA to be provided at all times  Family/Caregiver Present no   PT Family training done with: spouse, Anastasiia Colon   Recommendation   PT - OK to Discharge Yes   PT Therapy Minutes   PT Time In 1222   PT Time Out 1240   PT Total Time (minutes) 18   PT Mode of treatment - Individual (minutes) 18   PT Mode of treatment - Concurrent (minutes) 0   PT Mode of treatment - Group (minutes) 0   PT Mode of treatment - Co-treat (minutes) 0   PT Mode of Treatment - Total time(minutes) 18 minutes   PT Cumulative Minutes 1158   Therapy Time missed   Time missed?  No

## 2020-01-02 NOTE — CASE MANAGEMENT
Team Discharge Summary  Pt made good progress, and d/c home with family support/cont'd service  Pt will continue with [facility] for PT/OT and some HHA services  Pt obtained a commode from [address] [place] [address] prior to d/c, and had medications delivered from [facility] meds to beds program   Pts spouse was present for d/c instructions, and aware of Pts functional ability

## 2020-01-02 NOTE — ASSESSMENT & PLAN NOTE
· PT, OT per primary  · WBAT RLE with assistive devices prn for stability  · Lovenox total of 30 days post-op for DVT ppx:  D/w pt and nsg for d/c teaching  · S/p IM nailing on 12/14  · Patient was seen by Ortho on 12/27, staples removed, and Steri-Strips placed  · Reviewed ortho consult:  Patient may shower and allow water to run over incision, but should not submerge incision  · Patient will require outpatient follow up with Dr Yudith Langston in 2 weeks

## 2020-01-02 NOTE — PHYSICAL THERAPY NOTE
PT DISCHARGE SUMMARY    [facility] [address] [place], [place] [address] with dx of R intertrochanteric femur fx  Initially pt required min-modA for transfers, modA for bed mobilities, and Geri for amb but only able to amb approx 10'  Pt has made fair progress and upon d/c pt is able to perform transfers and amb 150' with RW and CGA  Pt is also able to ascend/descend x8 steps with B HR's at [facility]  Bed mobilities still remain limited upon d/c as pt requires modAx1 even with bedrail and HOB elevated, although  reports he had been helping pt get in/out of bed prior to admission  Goals were set for pt at S level, however, due to comorbidities including PD, pt requiring increased assist upon d/c than initially anticipated  In order to prepare pt for home d/c, family training with pt's  and son occurred over multiple sessions focusing on providing CGA for all transfers, amb and stairs, as well as proper cueing to provide pt to help with freezing episodes and overall safety and sequencing   and son receptive to education and able to demo functional mobilities with pt providing proper guarding technique  Also provided pt with written HEP focusing on supine and seated LE strengthening exercises  Reviewed exercises with  as well providing education on vc's he could provide to pt while she performs exercises with  stating he had full understanding  Also instructed  and pt to walk small loop in house with RW approx once every 2 hours or as tolerated by pt with education that  or caregiver must be with pt 24/7 and for all functional mobilities must have hands on pt as she is still at increased risk for falling   and son verbalized that they understood this  Provided family with handouts for purchasing bed rail and offered to provide information on gait belts, however, family did not wish to purchase gait belt at this time but did state they would purchase bed rail   Provided written discharge instructions as well to pt and  regarding proper guarding technique during transfers, amb, stairs, and for bed mobility   stated he felt confident in providing assist to pt at home  Recommended that pt cont with use of RW and not rollator as pt previously was using rollator in house  Pt and  state that they have personal RW at home, therefore not ordered  Pt not able to have good control with rollator at this time, therefore RW to be utilized  Also recommend for cont home therapy services in order to cont to improve upon pt's LE strength and ROM, endurance, balance, transfers, amb, and overall safety to decrease pt's risk for falls

## 2020-01-02 NOTE — PLAN OF CARE
Problem: Potential for Falls  Goal: Patient will remain free of falls  Description  INTERVENTIONS:  - Assess patient frequently for physical needs  -  Identify cognitive and physical deficits and behaviors that affect risk of falls  -  [place] fall precautions as indicated by assessment   - Educate patient/family on patient safety including physical limitations  - Instruct patient to call for assistance with activity based on assessment  - Modify environment to reduce risk of injury  - Consider OT/PT consult to assist with strengthening/mobility  1/2/2020 1250 by Claudean Arnt, RN  Outcome: Adequate for Discharge  1/2/2020 1013 by Claudean Arnt, RN  Outcome: Progressing     Problem: NEUROSENSORY - ADULT  Goal: Achieves stable or improved neurological status  Description  INTERVENTIONS  - Monitor and report changes in neurological status  - Monitor vital signs such as temperature, blood pressure, glucose, and any other labs ordered   - Initiate measures to prevent increased intracranial pressure  - Monitor for seizure activity and implement precautions if appropriate      1/2/2020 1250 by Claudean Arnt, RN  Outcome: Adequate for Discharge  1/2/2020 1013 by Claudean Arnt, RN  Outcome: Progressing  Goal: Achieves maximal functionality and self care  Description  INTERVENTIONS  - Monitor swallowing and airway patency with patient fatigue and changes in neurological status  - Encourage and assist patient to increase activity and self care     - Encourage visually impaired, hearing impaired and aphasic patients to use assistive/communication devices  1/2/2020 1250 by Claudean Arnt, RN  Outcome: Adequate for Discharge  1/2/2020 1013 by Claudean Arnt, RN  Outcome: Progressing     Problem: GASTROINTESTINAL - ADULT  Goal: Maintains or returns to baseline bowel function  Description  INTERVENTIONS:  - Assess bowel function  - Encourage oral fluids to ensure adequate hydration  - Administer IV fluids if ordered to ensure adequate hydration  - Administer ordered medications as needed  - Encourage mobilization and activity  - Consider nutritional services referral to assist patient with adequate nutrition and appropriate food choices  1/2/2020 1250 by Suzanne Martinez RN  Outcome: Adequate for Discharge  1/2/2020 1013 by Suzanne Martinez RN  Outcome: Progressing  Goal: Maintains adequate nutritional intake  Description  INTERVENTIONS:  - Monitor percentage of each meal consumed  - Identify factors contributing to decreased intake, treat as appropriate  - Assist with meals as needed  - Monitor I&O, weight, and lab values if indicated  - Obtain nutrition services referral as needed  1/2/2020 1250 by Suzanne Martinez RN  Outcome: Adequate for Discharge  1/2/2020 1013 by Suzanne Martinez RN  Outcome: Progressing     Problem: GENITOURINARY - ADULT  Goal: Maintains or returns to baseline urinary function  Description  INTERVENTIONS:  - Assess urinary function  - Encourage oral fluids to ensure adequate hydration if ordered  - Administer IV fluids as ordered to ensure adequate hydration  - Administer ordered medications as needed  - Offer frequent toileting  - Follow urinary retention protocol if ordered  1/2/2020 1250 by Suzanne Martinez RN  Outcome: Adequate for Discharge  1/2/2020 1013 by Suzanne Martinez RN  Outcome: Progressing     Problem: SKIN/TISSUE INTEGRITY - ADULT  Goal: Skin integrity remains intact  Description  INTERVENTIONS  - Identify patients at risk for skin breakdown  - Assess and monitor skin integrity  - Assess and monitor nutrition and hydration status  - Monitor labs (i e  albumin)  - Assess for incontinence   - Turn and reposition patient  - Assist with mobility/ambulation  - Relieve pressure over bony prominences  - Avoid friction and shearing  - Provide appropriate hygiene as needed including keeping skin clean and dry  - Evaluate need for skin moisturizer/barrier cream  - Collaborate with interdisciplinary team (i e  Nutrition, Rehabilitation, etc )   - Patient/family teaching  1/2/2020 1250 by Liliane Hui RN  Outcome: Adequate for Discharge  1/2/2020 1013 by Liliane Hui RN  Outcome: Progressing  Goal: Incision(s), wounds(s) or drain site(s) healing without S/S of infection  Description  INTERVENTIONS  - Assess and document risk factors for skin impairment   - Assess and document dressing, incision, wound bed, drain sites and surrounding tissue  - Consider nutrition services referral as needed  - Oral mucous membranes remain intact  - Provide patient/ family education  1/2/2020 1250 by Liliane Hui RN  Outcome: Adequate for Discharge  1/2/2020 1013 by Liliane Hui RN  Outcome: Progressing  Goal: Oral mucous membranes remain intact  Description  INTERVENTIONS  - Assess oral mucosa and hygiene practices  - Implement preventative oral hygiene regimen  - Implement oral medicated treatments as ordered  - Initiate Nutrition services referral as needed  1/2/2020 1250 by Liliane Hui RN  Outcome: Adequate for Discharge  1/2/2020 1013 by Liliane Hui RN  Outcome: Progressing     Problem: MUSCULOSKELETAL - ADULT  Goal: Maintain or return mobility to safest level of function  Description  INTERVENTIONS:  - Assess patient's ability to carry out ADLs; assess patient's baseline for ADL function and identify physical deficits which impact ability to perform ADLs (bathing, care of mouth/teeth, toileting, grooming, dressing, etc )  - Assess/evaluate cause of self-care deficits   - Assess range of motion  - Assess patient's mobility  - Assess patient's need for assistive devices and provide as appropriate  - Encourage maximum independence but intervene and supervise when necessary  - Involve family in performance of ADLs  - Assess for home care needs following discharge   - Consider OT consult to assist with ADL evaluation and planning for discharge  - Provide patient education as appropriate  1/2/2020 1250 by Cece Gallardo Oh [place] RN  Outcome: Adequate for Discharge  1/2/2020 1013 by Irene Sampson RN  Outcome: Progressing  Goal: Maintain proper alignment of affected body part  Description  INTERVENTIONS:  - Support, maintain and protect limb and body alignment  - Provide patient/ family with appropriate education  1/2/2020 1250 by Irene Sampson RN  Outcome: Adequate for Discharge  1/2/2020 1013 by Irene Sampson RN  Outcome: Progressing     Problem: COPING  Goal: Pt/Family able to verbalize concerns and demonstrate effective coping strategies  Description  INTERVENTIONS:  - Assist patient/family to identify coping skills, available support systems and cultural and spiritual values  - Provide emotional support, including active listening and acknowledgement of concerns of patient and caregivers  - Reduce environmental stimuli, as able  - Provide patient education  - Assess for spiritual pain/suffering and initiate spiritual care, including notification of Pastoral Care or ranjit based community as needed  - Assess effectiveness of coping strategies  1/2/2020 1250 by Irene Sampson RN  Outcome: Adequate for Discharge  1/2/2020 1013 by Irene Sampson RN  Outcome: Progressing  Goal: Will report anxiety at manageable levels  Description  INTERVENTIONS:  - Administer medication as ordered  - Teach and encourage coping skills  - Provide emotional support  - Assess patient/family for anxiety and ability to cope  1/2/2020 1250 by Irene Sampson RN  Outcome: Adequate for Discharge  1/2/2020 1013 by Irene Sampson RN  Outcome: Progressing     Problem: CONFUSION/THOUGHT DISTURBANCE  Goal: Thought disturbances (confusion, delirium, depression, dementia or psychosis) are managed to maintain or return to baseline mental status and functional level  Description  INTERVENTIONS:  - Assess for possible contributors to  thought disturbance, including but not limited to medications, infection, impaired vision or hearing, underlying metabolic abnormalities, dehydration, respiratory compromise,  psychiatric diagnoses and notify attending PHYSICAN/AP  - Monitor and intervene to maintain adequate nutrition, hydration, elimination, sleep and activity  - Decrease environmental stimuli, including noise as appropriate  - Provide frequent contacts to provide refocusing, direction and reassurance as needed  Approach patient calmly with eye contact and at their level    - [place] high risk fall precautions, aspiration precautions and other safety measures, as indicated  - If delirium suspected, notify physician/AP of change in condition and request immediate in-person evaluation  - Pursue consults as appropriate including Geriatric (campus dependent), OT for cognitive evaluation/activity planning, psychiatric, pastoral care, etc   1/2/2020 1250 by Adeel White RN  Outcome: Adequate for Discharge  1/2/2020 1013 by Adeel White RN  Outcome: Progressing     Problem: Prexisting or High Potential for Compromised Skin Integrity  Goal: Skin integrity is maintained or improved  Description  INTERVENTIONS:  - Identify patients at risk for skin breakdown  - Assess and monitor skin integrity  - Assess and monitor nutrition and hydration status  - Monitor labs   - Assess for incontinence   - Turn and reposition patient  - Assist with mobility/ambulation  - Relieve pressure over bony prominences  - Avoid friction and shearing  - Provide appropriate hygiene as needed including keeping skin clean and dry  - Evaluate need for skin moisturizer/barrier cream  - Collaborate with interdisciplinary team   - Patient/family teaching  - Consider wound care consult   1/2/2020 1250 by Adeel White RN  Outcome: Adequate for Discharge  1/2/2020 1013 by Adeel White RN  Outcome: Progressing

## 2020-01-02 NOTE — NURSING NOTE
Patient was discharged today at approximately 200 with   All discharge instructions, medications, and follow up appointments were reviewed with patient and  prior to discharge  Patient  [facility] [address] [facility] morning 1/3/2020 as they only had one Lovenox syringe and the rest will be available tomorrow  Pharmacy was made aware  Patient was escorted downstairs with all belongings with therapy and transferred safely into the car for transport to home

## 2020-01-02 NOTE — PLAN OF CARE
Problem: Potential for Falls  Goal: Patient will remain free of falls  Description  INTERVENTIONS:  - Assess patient frequently for physical needs  -  Identify cognitive and physical deficits and behaviors that affect risk of falls  -  [place] fall precautions as indicated by assessment   - Educate patient/family on patient safety including physical limitations  - Instruct patient to call for assistance with activity based on assessment  - Modify environment to reduce risk of injury  - Consider OT/PT consult to assist with strengthening/mobility  Outcome: Progressing     Problem: NEUROSENSORY - ADULT  Goal: Achieves stable or improved neurological status  Description  INTERVENTIONS  - Monitor and report changes in neurological status  - Monitor vital signs such as temperature, blood pressure, glucose, and any other labs ordered   - Initiate measures to prevent increased intracranial pressure  - Monitor for seizure activity and implement precautions if appropriate      Outcome: Progressing  Goal: Achieves maximal functionality and self care  Description  INTERVENTIONS  - Monitor swallowing and airway patency with patient fatigue and changes in neurological status  - Encourage and assist patient to increase activity and self care     - Encourage visually impaired, hearing impaired and aphasic patients to use assistive/communication devices  Outcome: Progressing     Problem: GASTROINTESTINAL - ADULT  Goal: Maintains or returns to baseline bowel function  Description  INTERVENTIONS:  - Assess bowel function  - Encourage oral fluids to ensure adequate hydration  - Administer IV fluids if ordered to ensure adequate hydration  - Administer ordered medications as needed  - Encourage mobilization and activity  - Consider nutritional services referral to assist patient with adequate nutrition and appropriate food choices  Outcome: Progressing  Goal: Maintains adequate nutritional intake  Description  INTERVENTIONS:  - Monitor percentage of each meal consumed  - Identify factors contributing to decreased intake, treat as appropriate  - Assist with meals as needed  - Monitor I&O, weight, and lab values if indicated  - Obtain nutrition services referral as needed  Outcome: Progressing     Problem: GENITOURINARY - ADULT  Goal: Maintains or returns to baseline urinary function  Description  INTERVENTIONS:  - Assess urinary function  - Encourage oral fluids to ensure adequate hydration if ordered  - Administer IV fluids as ordered to ensure adequate hydration  - Administer ordered medications as needed  - Offer frequent toileting  - Follow urinary retention protocol if ordered  Outcome: Progressing     Problem: SKIN/TISSUE INTEGRITY - ADULT  Goal: Skin integrity remains intact  Description  INTERVENTIONS  - Identify patients at risk for skin breakdown  - Assess and monitor skin integrity  - Assess and monitor nutrition and hydration status  - Monitor labs (i e  albumin)  - Assess for incontinence   - Turn and reposition patient  - Assist with mobility/ambulation  - Relieve pressure over bony prominences  - Avoid friction and shearing  - Provide appropriate hygiene as needed including keeping skin clean and dry  - Evaluate need for skin moisturizer/barrier cream  - Collaborate with interdisciplinary team (i e  Nutrition, Rehabilitation, etc )   - Patient/family teaching  Outcome: Progressing  Goal: Incision(s), wounds(s) or drain site(s) healing without S/S of infection  Description  INTERVENTIONS  - Assess and document risk factors for skin impairment   - Assess and document dressing, incision, wound bed, drain sites and surrounding tissue  - Consider nutrition services referral as needed  - Oral mucous membranes remain intact  - Provide patient/ family education  Outcome: Progressing  Goal: Oral mucous membranes remain intact  Description  INTERVENTIONS  - Assess oral mucosa and hygiene practices  - Implement preventative oral hygiene regimen  - Implement oral medicated treatments as ordered  - Initiate Nutrition services referral as needed  Outcome: Progressing     Problem: MUSCULOSKELETAL - ADULT  Goal: Maintain or return mobility to safest level of function  Description  INTERVENTIONS:  - Assess patient's ability to carry out ADLs; assess patient's baseline for ADL function and identify physical deficits which impact ability to perform ADLs (bathing, care of mouth/teeth, toileting, grooming, dressing, etc )  - Assess/evaluate cause of self-care deficits   - Assess range of motion  - Assess patient's mobility  - Assess patient's need for assistive devices and provide as appropriate  - Encourage maximum independence but intervene and supervise when necessary  - Involve family in performance of ADLs  - Assess for home care needs following discharge   - Consider OT consult to assist with ADL evaluation and planning for discharge  - Provide patient education as appropriate  Outcome: Progressing  Goal: Maintain proper alignment of affected body part  Description  INTERVENTIONS:  - Support, maintain and protect limb and body alignment  - Provide patient/ family with appropriate education  Outcome: Progressing     Problem: COPING  Goal: Pt/Family able to verbalize concerns and demonstrate effective coping strategies  Description  INTERVENTIONS:  - Assist patient/family to identify coping skills, available support systems and cultural and spiritual values  - Provide emotional support, including active listening and acknowledgement of concerns of patient and caregivers  - Reduce environmental stimuli, as able  - Provide patient education  - Assess for spiritual pain/suffering and initiate spiritual care, including notification of Pastoral Care or ranjit based community as needed  - Assess effectiveness of coping strategies  Outcome: Progressing  Goal: Will report anxiety at manageable levels  Description  INTERVENTIONS:  - Administer medication as ordered  - Teach and encourage coping skills  - Provide emotional support  - Assess patient/family for anxiety and ability to cope  Outcome: Progressing     Problem: CONFUSION/THOUGHT DISTURBANCE  Goal: Thought disturbances (confusion, delirium, depression, dementia or psychosis) are managed to maintain or return to baseline mental status and functional level  Description  INTERVENTIONS:  - Assess for possible contributors to  thought disturbance, including but not limited to medications, infection, impaired vision or hearing, underlying metabolic abnormalities, dehydration, respiratory compromise,  psychiatric diagnoses and notify attending PHYSICAN/AP  - Monitor and intervene to maintain adequate nutrition, hydration, elimination, sleep and activity  - Decrease environmental stimuli, including noise as appropriate  - Provide frequent contacts to provide refocusing, direction and reassurance as needed  Approach patient calmly with eye contact and at their level    - [place] high risk fall precautions, aspiration precautions and other safety measures, as indicated  - If delirium suspected, notify physician/AP of change in condition and request immediate in-person evaluation  - Pursue consults as appropriate including Geriatric (campus dependent), OT for cognitive evaluation/activity planning, psychiatric, pastoral care, etc   Outcome: Progressing     Problem: Prexisting or High Potential for Compromised Skin Integrity  Goal: Skin integrity is maintained or improved  Description  INTERVENTIONS:  - Identify patients at risk for skin breakdown  - Assess and monitor skin integrity  - Assess and monitor nutrition and hydration status  - Monitor labs   - Assess for incontinence   - Turn and reposition patient  - Assist with mobility/ambulation  - Relieve pressure over bony prominences  - Avoid friction and shearing  - Provide appropriate hygiene as needed including keeping skin clean and dry  - Evaluate need for skin moisturizer/barrier cream  - Collaborate with interdisciplinary team   - Patient/family teaching  - Consider wound care consult   Outcome: Progressing

## 2020-01-02 NOTE — PROGRESS NOTES
Progress Note - Cipriano Hogue [birth year], 75 y o  female MRN: [id number]    Unit/Bed#: [facility] 268-02 Encounter: [id number]    Primary Care Provider: Oma Kiran MD   Date and time admitted to hospital: 12/18/2019  2:44 PM    Plan for d/c home today    * Closed fracture of right hip [facility])  Assessment & Plan  · PT, OT per primary  · WBAT RLE with assistive devices prn for stability  · Lovenox total of 30 days post-op for DVT ppx:  D/w pt and nsg for d/c teaching  · S/p IM nailing on 12/14  · Patient was seen by Ortho on 12/27, staples removed, and Steri-Strips placed  · Reviewed ortho consult:  Patient may shower and allow water to run over incision, but should not submerge incision  · Patient will require outpatient follow up with Dr Alisha Kirk in 2 weeks  Generalized edema  Assessment & Plan  · Advised patient to keep elevated while in chair  Eloy stockings  Improving     Essential hypertension  Assessment & Plan  Vitals:    01/01/20 1552   BP: 118/62   Pulse: 61   Resp: 18   Temp: 98 1 °F (36 7 °C)   SpO2: 96%      · Overall controlled  Continue with Atenolol 50mg       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 [facility])  Assessment & Plan  · Patient with delirium, hallucinations following her surgery  · Overall has been improving  · Evaluated by neurology, sinimet dose reduced  · Seroquel was discontinued on 12/31    Anxiety  Assessment & Plan  · Pt was off her home paxil since surgery  This was restarted 12/30  Her anxiety is stable at this time    Type 2 diabetes mellitus without complication [facility])  Assessment & Plan  Lab Results   Component Value Date    HGBA1C 5 7 12/19/2019       No results for input(s): POCGLU in the last 72 hours  Blood Sugar Average: Last 72 hrs:     Pre-DM    Will follow up with PCP outpatient    Other hyperlipidemia  Assessment & Plan  · Continue Pravastatin     Parkinson's disease with use of electrical brain stimulation [facility])  Assessment & Plan  · Continue selegiline (patient is on rasagiline 1mg daily as outpatient, evidently not available here on formulary)  Will resume home rasagiline on discharge  · Continue sinemet - dose was decreased by neurology due to hallucinations  · F/u Dr Martin Abad as outpatient  · Promote sleep/wake cycle  · Avoid CNS altering medications        VTE Pharmacologic Prophylaxis:   Pharmacologic: Enoxaparin (Lovenox)    Current Length of Stay: 15 day(s)    Current Patient Status: Inpatient Rehab     Discharge Plan: As per treatment team:  today    Code Status: Level 3 - DNAR and DNI    Subjective:   No overnight events  Discussed with nursing an reports the patient has been sleeping well  Patient reports that she got approximately 8 hours of sleep  Nursing does state that it was interrupted at times but doing well off of Seroquel  Discussed with Nursing and patient had a good day yesterday with no confusion  Patient denies pain at this point in time  She is excited for discharge today  Denies chest pain, shortness of breath, dizziness, lightheadedness, constipation, nausea, urinary concerns    Objective:     Vitals:   Temp (24hrs), Av 1 °F (36 7 °C), Min:98 1 °F (36 7 °C), Max:98 1 °F (36 7 °C)    Temp:  [98 1 °F (36 7 °C)] 98 1 °F (36 7 °C)  HR:  [61] 61  Resp:  [18] 18  BP: (118)/(62) 118/62  SpO2:  [96 %] 96 %  Body mass index is 36 67 kg/m²  Review of Systems   Constitutional: Negative for activity change, appetite change, chills, fatigue and fever  Respiratory: Negative for cough, chest tightness, shortness of breath and wheezing  Cardiovascular: Negative for chest pain  Gastrointestinal: Negative for abdominal pain, constipation, diarrhea, nausea and vomiting  Genitourinary: Negative for dysuria and frequency  Musculoskeletal: Positive for arthralgias and gait problem  Negative for back pain  Neurological: Negative for dizziness, light-headedness, numbness and headaches  Psychiatric/Behavioral: Positive for sleep disturbance  The patient is not nervous/anxious  Input and Output Summary (last 24 hours): Intake/Output Summary (Last 24 hours) at 1/2/2020 0102  Last data filed at 1/1/2020 1601  Gross per 24 hour   Intake 320 ml   Output    Net 320 ml       Physical Exam:     Physical Exam   Constitutional: She is oriented to person, place, and time  She appears well-developed and well-nourished  No distress  Cardiovascular: Normal rate and regular rhythm  No pedal edema  Bilateral Eloy stockings in place   Pulmonary/Chest: Effort normal and breath sounds normal  No respiratory distress  She has no wheezes  Abdominal: Soft  Bowel sounds are normal  She exhibits no distension  Neurological: She is alert and oriented to person, place, and time  No cranial nerve deficit  Skin: Skin is warm and dry  Psychiatric: She has a normal mood and affect  Her behavior is normal  Judgment and thought content normal    Nursing note and vitals reviewed        Additional Data:     Labs:    Results from last 7 days   Lab Units 12/30/19  0458   WBC Thousand/uL 8 30   HEMOGLOBIN g/dL 11 5*   HEMATOCRIT % 34 6*   PLATELETS Thousands/uL 299   NEUTROS PCT % 67*   LYMPHS PCT % 23*   MONOS PCT % 7   EOS PCT % 2     Results from last 7 days   Lab Units 01/02/20  0539   SODIUM mmol/L 141   POTASSIUM mmol/L 3 9   CHLORIDE mmol/L 104   CO2 mmol/L 28   BUN mg/dL 20   CREATININE mg/dL 0 69   ANION GAP mmol/L 9   CALCIUM mg/dL 9 4   ALBUMIN g/dL 3 9   TOTAL BILIRUBIN mg/dL 0 40   ALK PHOS U/L 153*   ALT U/L 17   AST U/L 16   GLUCOSE RANDOM mg/dL 117*       Labs reviewed    Imaging:    Imaging reviewed    Recent Cultures (last 7 days):     Last 24 Hours Medication List:     Current Facility-Administered Medications:  acetaminophen 650 mg Oral Q6H PRN Ryan Gilliland MD   aspirin 81 mg Oral Daily Ryan Gilliland MD   atenolol 50 mg Oral Daily Ryan Gilliland MD   carbidopa-levodopa 0 5 tablet Oral TID Santos Huffman MD   Coenzyme Q10 400 mg Oral Daily Ryan Gilliland MD   enoxaparin 40 mg Subcutaneous Daily Ryan Gilliland MD   lidocaine 1 patch Topical Daily Ryan Gilliland MD   PARoxetine 20 mg Oral Daily GRETA Gant   pravastatin 40 mg Oral Every Other Day Morelia Shin MD   selegiline 5 mg Oral Daily With Breakfast Morelia Shin MD   senna-docusate sodium 1 tablet Oral BID Ryan Gilliland MD   traMADol 25 mg Oral Q6H PRN Ryan Gilliland MD   traMADol 50 mg Oral Q6H PRN Morelia Shin MD        M*Modal software was used to dictate this note  It may contain errors with dictating incorrect words or incorrect spelling  Please contact the provider directly with any questions

## 2020-01-02 NOTE — TEAM CONFERENCE
Acute RehabilitationTeam Conference Note  Date: 1/2/2020   Time: 12:36 PM       Patient Name:  Emi Inman       Medical Record Number: [id number]   YOB: 1945  Sex: Female          Room/Bed:  [facility] 268/[facility] 268-02  Payor Info:  Payor: Trever Manuel / Plan: MEDICARE A AND B / Product Type: Medicare A & B Fee for Service /      Admitting Diagnosis: Femur fracture ([facility] ) Edakira Zain   Admit Date/Time:  12/18/2019  2:44 PM  Admission Comments: No comment available     Primary Diagnosis:  Closed fracture of right hip ([facility] )  Principal Problem: Closed fracture of right hip [facility])    Patient Active Problem List    Diagnosis Date Noted    Generalized edema 12/23/2019    Constipation 12/16/2019    Closed right hip fracture, initial encounter ([place] [address] ) 12/13/2019    Essential hypertension 12/13/2019    Closed fracture of right hip ([place] [address] ) 12/13/2019    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 ([place] [address] ) 11/15/2018    Anxiety 09/12/2018    Other hyperlipidemia 09/10/2018    Type 2 diabetes mellitus without complication ([place] [address] ) 02/96/9535    S/P reverse total shoulder arthroplasty, left 09/07/2018    History of hypertension 08/23/2018    Fracture, shoulder, left, closed, initial encounter 08/22/2018    Parkinson's disease with use of electrical brain stimulation ([place] [address] ) 06/21/2018       Physical Therapy:    Weight Bearing Status: Weight Bearing as Tolerated  Transfers: Incidental Touching  Bed Mobility: Moderate Assistance, Maximum Assistance  Amulation Distance (ft): 150 feet  Ambulation: Incidental Touching, Moderate Assistance, Minimal Assistance  Assistive Device for Ambulation: Roller Walker  Number of Stairs: 5  Assistive Device for Stairs: Bilateral [employer]  Stair Assistance: Minimal Assistance  Ramp: Incidental Touching  Assistive Device for Ramp: Roller Walker  Discharge Recommendations: Home with:  [address] with[de-identified] 24 Hour Supervision, Home Physical Therapy    12/31/9  Pt continues to show improvements with transfers, amb, and steps with RW  At times however, pt still loses balance, therefore recommending 24/7 S upon d/c and CG assist to be provided with all functional mobilities  Family training with pt's  occurred on 12/31/19  And 1/1/20 educating  in providing CGA during tranfsers, amb, and assistance for bed mobilities   states he is to order bedrail for home to help decrease assist required to pt during bed mobilities  Pt is still at increased risk for fall due to PD, decreased balance, decreased righting reactions, and decreased safety awareness, therefore cont to recommend home therapies to improve pt's overall safety and decrease risk for falls as well as cont to work on pt's weakness, coordination, and endurance  Current goals to help pt reach d/c home on Thursday 1/2/19 are contact guard assist      12/24/19  Pt has shown improvements in past week with transfers, amb, and stairs  Pt's biggest barriers to d/c home at this time include bed mobility as pt requires increased time and assist to complete as well as amb as pt is variable and at times requires increased assist to maintain balance  Barriers to home also include stairs, decreased balance and increased fall risk, transfers, bed mobility and walking  PT POC to include stairs, walking, balance, endurance, education , family training and DME training  , with focus on walking with step through pattern, and stairs with nonreciprocal pattern and transfers to include stand pivots, supine to sit, sit to supine and car transfers   Plan to provide information to  regarding purchasing red line laser pointer to mount on pt's walker to improve her gait pattern and decrease overall risk for falls  Current goals to help pt reach d/c home are for supervision level  Pt is progressing well towards goals   Pt will need further improvement of weakness, balance,  AROM, coordination , righting reactions and pain to make more progress towards goals  Occupational Therapy:  Eating: Independent  Grooming: Supervision  Bathing: Minimal Assistance  Bathing: Minimal Assistance  Upper Body Dressing: Minimal Assistance  Lower Body Dressing: Maximum Assistance  Toileting: Total Assistance  Tub/Shower Transfer: Maximum Assistance  Toilet Transfer: Moderate Assistance  Cognition: Within Defined Limits  Orientation: Person, Place, Time, Situation  Discharge Recommendations: Home with:  [address] with[de-identified] Family Support       Pt has been making slow but steady progress since initial evaluation  Pt is currently in acute rehab s/p ORIF of R hip  Pt is WBAT and has no precautions  Pt's h/o Parkinson's impacts her rehab progression  Pt is noted to be limited by freezing, decreased coordination with transfers, rigidity at times, limited forward flexion, impaired dynamic standing balance, RLE weakness, difficulty advancing LLE, and poor weight bearing tolerance onto RLE  Therapist completed multiple FT sessions with  Eduardo Pimentel kandi High to communicate recommendations  At this time therapist recommends 24hr supervision during all ADLs especially showers and functional mobility with RW 2* to pts decreased cognition, decreased safety awareness  A this time therapist recommends home health aid/private pay agency to A  and pt during functional tasks  Therapist recommend the following DME: commode, bed rail  In addition therapist recommend  to A with med mgnmtn 2* to pt requiring maxA during med mgnmt task   aware of A pt with medication and importance of taking carbidopa 3x/day and same time in order to decrease tremors/freezing  Pt with anticipated d/c of 12/2/19    Speech Therapy:           No notes on file    Nursing Notes:  Appetite: Fair  Diet Type: Regular/House                           Type of Wound (LDA):  Wound                       Bladder: 1 - Total Assistance        Bowel: 1 - Total Assistance        Pain Location: Groin  Pain Orientation: Right  Pain Score: 0                       Hospital Pain Intervention(s): Medication (See MAR), Distraction, Cold applied, Repositioned          Pt is a 76 y/o female who  fell at home, found to have R hip fracture, IM nailing on 12/14/19  Post-procedure patient with delirium and hallucinations  Neurologist reduced dose of Sinemet for Parkinson's and Seroquel started  On Atenolol for hypertension, Pravacol for hyperlipidemia  On Lovenox for DVT prophylaxis and venodynes  Pt has deep brain stimulator for Parkinson's also takes Sinemet & Eldepryl  Pt is continent & incontinent of urine  Will continue to monitor lab results & vital signs  Pt will have safe transfers to keep pt free from falls  Pt's pain will be at an acceptable level within her goal range to fully participate in therapies  Pt will  have adequate hydration & nutrition  Case Management:     Discharge Planning  Living Arrangements: Spouse/significant other  Support Systems: Spouse/significant other  Assistance Needed: no  Type of Current Residence: Private residence  Current Home Care Services: No  Pt has participated well with therapy, and will d/c home today  Pt will have cont'd service from [facility] for PT/OT and HHA  Following to assist with any additional d/c planning needs  Is the patient actively participating in therapies? yes  List any modifications to the treatment plan:     Barriers Interventions   All functional barriers have been resolved  Is the patient making expected progress toward goals?  yes  List any update or changes to goals:     Medical Goals: Patient will be medically stable for discharge to [facility] upon completion of rehab program and Patient will be able to manage medical conditions and comorbid conditions with medications and follow up upon completion of rehab program    Weekly Team Goals:   Rehab Team Goals  ADL Team Goal: Patient will require supervision with ADLs with least restrictive device upon completion of rehab program  Transfer Team Goal: Patient will require supervision with transfers with least restrictive device upon completion of rehab program  Locomotion Team Goal: Patient will require supervision with locomotion with least restrictive device upon completion of rehab program    Discussion:  Pt presents with the above barriers  Pt is incidental touching for transfers, mod to max a for bed mobility  Pt has ambulated 150ft, min a, with RW  ADLs are min a for bathing/upper body dressing, total for toileting, max a for lower body dressing/tub transfer, mod a for toilet transfer  Pt reached her goals of supervision, and Pts  participated in family training  Recommendations for outpt PT/OT at [facility]  Anticipated Discharge Date:  1/2/20   [facility] Team Members Present: The following team members are supervising care for this patient and were present during this Weekly Team Conference      Physician: Dr Manuela Teague MD  : Jazmine Dyer WALLY/ LCSW  Registered Nurse: Marc Rodas RN  Physical Therapist: Alek Still DPT  Occupational Therapist: Kathryn Carter MS, OTR/L  Speech Therapist:   Other: Royal Mc, RN, BSN  [address] [place] [address] and Hospice

## 2020-01-02 NOTE — ASSESSMENT & PLAN NOTE
· Patient with delirium, hallucinations following her surgery  · Overall has been improving  · Evaluated by neurology, sinimet dose reduced  · Seroquel was discontinued on 12/31

## 2020-01-02 NOTE — DISCHARGE INSTR - OTHER ORDERS
PHYSICAL THERAPY- DISCHARGE INSTRUCTIONS  Transfer Instructions: Tj Huynh should have rolling walker with her at all times during all transfers  Verbal cues are needed for Tj Huynh to push up from the chair she is sitting on rather than pushing from the walker  Verbal cues also needed for Tj Huynh to "rocket forward" and use power as she can freeze and shift her weight posteriorly causing her to fall backwards  Also make sure her knees are bent and feet are flat on the floor before she goes to stand  Hands should be on Sally's hips before she performs the transfer and during the transfer so that assistance can be provided if she loses her balance  When sitting, Tj Huynh should always reach back to the surface with both of her hands  Also be sure that she feels the chair behind her legs before she goes to sit  Walking Instructions: When walking, Tj Huynh should use the rolling walker at all times  She is to have someone walking with her with their hands on her hips in order to provide assistance if needed  At times, she may need verbal cues to  her feet, especially the left foot  She will also need cues when turning to "march and  her feet" to help avoid freezing episodes  Be sure to have good hold of her when on uneven surfaces as at times she will lose her balance and is not able to correct herself  Bed Mobility: Tj Huynh will need assistance and use of the bed rail in order to come from lying down to sitting  Again encourage Tj Huynh to "rocket forward" as these big movements allow for less physical assistance needed  Be sure when providing physical assistance that your hands are behind Sally's back/shoulder blades and do not pull on her arm or shoulder as this could result in injury  Stairs: Tj Huynh is to use both handrails when performing the stairs  When ascending the steps, she will lead with the left foot forward (good foot) and then follow with the right foot  Each step should be performed 1 step at a time   She will need someone standing behind her when ascending the steps and holding onto her on the steps at all times providing the proper cueing for foot placement  When coming down the steps,  front of Darrion Álvarez  Cue her to lead with her right foot first (bad leg) and then follow with the left foot  She is to perform 1 step at a time  She may also need cues to bring her hands down in front of her first before descending the step

## 2020-01-02 NOTE — DISCHARGE INSTRUCTIONS
[address] Discharge Instructions    Should you develop fevers, chills, sweats, rigors, or any drainage from your surgical site please contact your family doctor or surgeon immediately or go to the ER immediately as these are indicators of possible infection   Please note you are restricted from driving/operating a motorized vehicle/operating heavy machinery/etc until you are cleared by your doctors or through a formal driving evaluation  This service is offered through [facility] driving evaluation program on [address] however this evaluation can be done at a site of your choosing  Please contact your family doctor for a referral when your family doctor clears you to perform this evaluation once your neurologic/physical deficits have stabilized  Please see your doctors listed in the follow up providers section of your discharge paperwork, and take the discharge paperwork with you to your appointments  Please note changes may have been made to your medications please refer to your discharge paperwork for your current medications and take this list with you to all your doctors appointments for your doctors to review    Please do not resume a home medication unless the medication reconciliation sheet indicates to do so, please do not assume that a medication that you were given a prescription for is the same as a medication you have at home based on both medications having the same name as dosages and frequency may have changed  Prior to your discharge from the hospital, your nurse has reviewed: your medications, when to take them, how to take them, what they are for, how much to take, and when your next dose is due; please follow these instructions as directed       Unless listed to do so on your medication reconciliation in your discharge paperwork please do not combine muscle relaxants (including but not limited to zanaflex, flexaril, soma, robaxin, etc), pain medications (including but not limited to tramadol, vicodin, norco, percocet, oxycodone, oxycontin, morphine, hydropmorphone, oxymorphone, fentanyl, hydrocodone, etc)  or sedatives/sleep aids/anti-anxiety medications (including but not limited to [place], trazodone, valium, xanax, klonipin, clonazepam, ativan, lorazepam, restoril, temazepam etc) that you may have been prescribed prior to admission with the pain medication you are being prescribed upon discharge from the rehab unit  Please do not drive or operate heavy machinery while using these medications  Do not drink alcohol while using these medications  Unless listed to do so on your medication reconciliation in your discharge paperwork please do not combine pain medications, muscle relaxants, or sedatives/sleep aids/anti-anxiety medications that you may have been prescribed prior to admission with each other  Please do not drive or operate heavy machinery while using these medications  Do not drink alcohol while using these medications  Unless listed to do so on your medication reconciliation in your discharge paperwork in general it is advisable to limit/avoid the use of pain medications, sedatives/sleep aids/anti-anxiety medications, and muscle relaxants as they may become habit forming  Please do not drive or operate heavy machinery while using these medications  Do not drink alcohol while using these medications    You will be given a limited supply of pain medications on discharge; should you require additional refills/medications, your PCP and/or surgeon may prescribe at his/her discretion       Please check your blood pressure & heart rate/pulse prior to taking your blood pressure/heart rate medications and 1 hour after, please contact your family doctor or cardiologist immediately for a blood pressure below 100/60 and do not take the medications until speaking with them, please contact your family doctor or cardiologist immediately for blood pressure greater than 160/100; please contact your family doctor or cardiologist immediately for a heart rate/pulse lower than 60 and do not take the medications until speaking with them, please contact your family doctor or cardiologist immediately for heart rate/pulse greater than 100     Please avoid NSAID (including but not limited to advil, aleve, motrin, naproxen, ibuprofen, mobic, meloxicam, diclofenac etc) medications as NSAID medications may delay bone healing    Please avoid NSAID (including but not limited to advil, aleve, motrin, naproxen, ibuprofen, mobic, meloxicam diclofenac etc) medications, anti platelet medications (including but not limited to plavix, aspirin and aspirin containing products), and any prescription blood thinners due to your already being on Lovenox for a total of a 28 day course (you have been provided with a prescription for the remaining days on your 28 day course)  You may be cleared by your doctors to use these medications after you have completed your 28 day course however please do not use them unless your are advised to do so by your doctors as the use of these medications in combination with Lovenox can increase bleeding risk  Unless specifically noted in your medication list provided to you in your discharge paper work, please discuss with your family doctor prior to resuming any vitamins/minerals/supplements you may have been taking prior to your hospitalization     Please note a summary of your hospital stay with relevant information for your doctors has been sent to them, please confirm with your doctors at your follow up visits that they have received this summary and have them contact [address] [place] [address] if they have not received them along with any other medical records they may require     Anxiety   WHAT YOU SHOULD KNOW:   Anxiety is a condition that causes you to feel excessive worry, uneasiness, or fear   Family or work stress, smoking, caffeine, and alcohol can increase your risk for anxiety  Certain medicines or health conditions can also increase your risk  Anxiety may begin gradually, and can become a long-term condition if it is not managed or treated  AFTER YOU LEAVE:   Medicines:   · Medicines  can help you feel more calm and relaxed, and decrease your symptoms  · Take your medicine as directed  Contact your healthcare provider if you think your medicine is not helping or if you have side effects  Tell him if you are allergic to any medicine  Keep a list of the medicines, vitamins, and herbs you take  Include the amounts, and when and why you take them  Bring the list or the pill bottles to follow-up visits  Carry your medicine list with you in case of an emergency  Follow up with your healthcare provider within 2 weeks or as directed:  Write down your questions so you remember to ask them during your visits  Manage anxiety:   · Go to counseling as directed  Cognitive behavioral therapy can help you understand and change how you react to events that trigger your symptoms  · Find ways to manage your symptoms  Activities such as exercise, meditation, or listening to music can help you relax  · Practice deep breathing  Breathing can change how your body reacts to stress  Focus on taking slow, deep breaths several times a day, or during an anxiety attack  Breathe in through your nose, and out through your mouth  · Avoid caffeine  Caffeine can make your symptoms worse  Avoid foods or drinks that are meant to increase your energy level  · Limit or avoid alcohol  Ask your healthcare provider if alcohol is safe for you  You may not be able to drink alcohol if you take certain anxiety or depression medicines  Limit alcohol to 1 drink per day if you are a woman  Limit alcohol to 2 drinks per day if you are a man  A drink of alcohol is 12 ounces of beer, 5 ounces of wine, or 1½ ounces of liquor    Contact your healthcare provider if:   · Your symptoms get worse or do not get better with treatment  · You think your medicine may be causing side effects  · Your anxiety keeps you from doing your regular daily activities  · You have new symptoms since your last visit  · You have questions or concerns about your condition or care  Seek care immediately or call 911 if:   · You have chest pain, tightness, or heaviness that may spread to your shoulders, arms, jaw, neck, or back  · You feel like hurting yourself or someone else  · You feel dizzy, lightheaded, or faint  © 2014 [address] is for End User's use only and may not be sold, redistributed or otherwise used for commercial purposes  All illustrations and images included in CareNotes® are the copyrighted property of [employer]  or [employer]  The above information is an  only  It is not intended as medical advice for individual conditions or treatments  Talk to your doctor, nurse or pharmacist before following any medical regimen to see if it is safe and effective for you  Diabetes in the Older Adult   WHAT YOU NEED TO KNOW:   Older adults with diabetes are at risk for heart disease, stroke, kidney disease, blindness, and nerve damage   You may also be at risk for any of the following:  · Poor nutrition or low blood sugar levels    · Confusion or problems with memory, attention, or learning new things    · Trouble controlling urination or frequent urinary tract infections    · Trouble with coordination or balance    · Falls and injuries    · Pain    · Depression    · Open sores on your legs or feet  DISCHARGE INSTRUCTIONS:   Call 911 for any of the following:   · You have any of the following signs of a stroke:      ¨ Numbness or drooping on one side of your face     ¨ Weakness in an arm or leg    ¨ Confusion or difficulty speaking    ¨ Dizziness, a severe headache, or vision loss    · You have any of the following signs of a heart attack: ¨ Squeezing, pressure, or pain in your chest that lasts longer than 5 minutes or returns    ¨ Discomfort or pain in your back, neck, jaw, stomach, or arm     ¨ Trouble breathing    ¨ Nausea or vomiting    ¨ Lightheadedness or a sudden cold sweat, especially with chest pain or trouble breathing  Seek care immediately if:   · You have severe abdominal pain, or the pain spreads to your back  You may also be vomiting  · You have trouble staying awake or focusing  · You are shaking or sweating  · You have blurred or double vision  · Your breath has a fruity, sweet smell  · Your breathing is deep and labored, or rapid and shallow  · Your heartbeat is fast and weak  · You fall and get hurt  Contact your healthcare provider if:   · You are vomiting or have diarrhea  · You have an upset stomach and cannot eat the foods on your meal plan  · You feel weak or more tired than usual      · You feel dizzy, have headaches, or are easily irritated  · Your skin is red, warm, dry, or swollen  · You have a wound that does not heal      · You have numbness in your arms or legs  · You have trouble coping with your illness, or you feel anxious or depressed  · You have problems with your memory  · You have changes in your vision  · You have questions or concerns about your condition or care  Medicines  may be given to decrease the amount of sugar in your blood  You may also need medicine to lower your blood pressure or cholesterol, or medicine to prevent blood clots  Manage the ABCs and prevent problems caused by diabetes:   · Check your blood sugar levels as directed  Your healthcare provider will tell you when and how often to check during the day  Your healthcare provider will also tell you what your blood sugar levels should be before and after a meal  You may need to check for ketones in your urine or blood if your level is higher than directed   Write down your results and show them to your healthcare provider  Your provider may use the results to make changes to your medicine, food, or exercise schedules  Ask your healthcare provider for more information about how to treat a high or low blood sugar level  · Follow your meal plan as directed  A dietitian will help you make a meal plan to keep your blood sugar level steady and make sure you get enough nutrition  Do not skip meals  Your blood sugar level may drop too low if you have taken diabetes medicine and do not eat  Ask your healthcare provider about programs in your community that can deliver the meals to your home  · Try to be active for 30 to 60 minutes most days of the week  Exercise can help keep your blood sugar level steady, decrease your risk of heart disease, and help you lose weight  It can also help improve your balance and decrease your risk for falls  Work with your healthcare provider to create an exercise plan  Ask a family member or friend to exercise with you  Start slow and exercise for 5 to 10 minutes at a time  Examples of activities include walking or swimming  Include muscle strengthening activities 2 to 3 days each week  Include balance training 2 to 3 times each week  Activities that help increase balance include yoga and ifeanyi chi      · Maintain a healthy weight  Ask your healthcare provider how much you should weigh  A healthy weight can help you control your diabetes and prevent heart disease  Ask your provider to help you create a weight loss plan if you are overweight  Together you can set manageable weight loss goals  · Do not smoke  Ask your healthcare provider for information if you currently smoke and need help to quit  Do not use e-cigarettes or smokeless tobacco in place of cigarettes or to help you quit  They still contain nicotine  · Manage stress  Stress may increase your blood sugar level  Deep breathing, muscle relaxation, and music may help you relax  Ask your healthcare provider for more information about these practices  Other ways to manage your diabetes:   · Check your feet every day for sores  Look at your whole foot, including the bottom, and between and under your toes  Check for wounds, corns, and calluses  Use a mirror to see the bottom of your feet  The skin on your feet may be shiny, tight, dry, or darker than normal  Your feet may also be cold and pale  Feel your feet by running your hands along the tops, bottoms, sides, and between your toes  Redness, swelling, and warmth are signs of blood flow problems that can lead to a foot ulcer  Do not try to remove corns or calluses yourself  · Wear medical alert identification  Wear medical alert jewelry or carry a card that says you have diabetes  Ask your healthcare provider where to get these items  · Ask about vaccines  You have a higher risk for serious illness if you get the flu, pneumonia, or hepatitis  Ask your healthcare provider if you should get a flu, pneumonia, shingles, or hepatitis B vaccine, and when to get the vaccine  · Keep all appointments  You may need to return to have your A1c checked every 3 months  You will need to return at least once each year to have your feet checked  You will need an eye exam once a year to check for retinopathy  You will also need urine tests every year to check for kidney problems  You may need tests to monitor for heart disease  Write down your questions so you remember to ask them during your visits  · Get help from family and friends  You may need help checking your blood sugar level, giving insulin injections, or preparing your meals  Ask your family and friends to help you with these tasks  Talk to your healthcare provider if you do not have someone at home to help you  A healthcare provider can come to your home to help you with these tasks  Follow up with your healthcare provider as directed:   You may need to return to have your A1c checked every 3 months  You will need to return at least once each year to have your feet checked  You will need an eye exam once a year to check for retinopathy  You will also need urine tests every year to check for kidney problems  You may need tests to monitor for heart disease  Write down your questions so you remember to ask them during your visits  © 2017 [address] Information is for End User's use only and may not be sold, redistributed or otherwise used for commercial purposes  All illustrations and images included in CareNotes® are the copyrighted property of [employer] Inc  or [employer]  The above information is an  only  It is not intended as medical advice for individual conditions or treatments  Talk to your doctor, nurse or pharmacist before following any medical regimen to see if it is safe and effective for you  Hip Fracture   WHAT YOU NEED TO KNOW:   What is a hip fracture? A hip fracture is a break in the upper part of your femur (thigh bone)  The upper part of your femur includes the femoral head and the femoral neck  A hip fracture is often caused by a fall or injury on the side of your hip  What increases my risk for a hip fracture? Medical conditions such as osteoporosis can weaken your bones and increase your risk for a hip fracture  Your risk increases as you get older and bone mass decreases   Elderly people are at increased risk for a hip fracture because of balance problems, loss of vision, or other diseases  What are the signs and symptoms of a hip fracture? · Pain in your upper thigh, groin, or buttock    · Pain when you flex or rotate your hip    · Difficulty or inability to place weight on your leg and walk    · One leg looks shorter than the other  How is a hip fracture diagnosed? X-rays, an MRI, or CT  of your hip and femur may show a fracture   You may be given contrast liquid to help the bones show up better  Tell the healthcare provider if you have ever had an allergic reaction to contrast liquid  Do not enter the MRI room with anything metal  Metal can cause serious injury  Tell the healthcare provider if you have any metal in or on your body  How is a hip fracture treated? · Medicines:      ¨ Acetaminophen  decreases pain and fever  It is available without a doctor's order  Ask how much to take and how often to take it  Follow directions  Acetaminophen can cause liver damage if not taken correctly  ¨ Prescription pain medicine  may be given  Ask how to take this medicine safely  ¨ Blood thinners    help prevent blood clots  Examples of blood thinners include heparin and warfarin  Clots can cause strokes, heart attacks, and death  The following are general safety guidelines to follow while you are taking a blood thinner:    § Watch for bleeding and bruising while you take blood thinners  Watch for bleeding from your gums or nose  Watch for blood in your urine and bowel movements  Use a soft washcloth on your skin, and a soft toothbrush to brush your teeth  This can keep your skin and gums from bleeding  If you shave, use an electric shaver  Do not play contact sports  § Tell your dentist and other healthcare providers that you take anticoagulants  Wear a bracelet or necklace that says you take this medicine  § Do not start or stop any medicines unless your healthcare provider tells you to  Many medicines cannot be used with blood thinners  § Tell your healthcare provider right away if you forget to take the medicine, or if you take too much  § Warfarin  is a blood thinner that you may need to take  The following are things you should be aware of if you take warfarin  § Foods and medicines can affect the amount of warfarin in your blood  Do not make major changes to your diet while you take warfarin  Warfarin works best when you eat about the same amount of vitamin K every day   Vitamin K is found in green leafy vegetables and certain other foods  Ask for more information about what to eat when you are taking warfarin  § You will need to see your healthcare provider for follow-up visits when you are on warfarin  You will need regular blood tests  These tests are used to decide how much medicine you need  · Surgery  is usually needed  The type of surgery you need depends on the type of fracture you have  The broken parts of your femur may be put back together with metal hardware  All or part of your hip joint may need to be replaced  What can I do to prevent falls? · Get regular exercise  Include exercises that strengthen your legs and improve your balance  Ask about the best exercise plan for you  · Talk to your healthcare provider about all of the medicines you take  Some medicines can cause dizziness or drowsiness and increase your risk for falls  · Have your vision checked regularly  Your vision may worsen over time and increase your risk for falls  · Use walking devices , such as canes or walkers, if you have trouble keeping your balance  · Make your home safe:      ¨ Improve the lighting in your home so that you can see where you are walking better  ¨ Add grab bars to the inside and outside of your tub or shower and next to the toilet  ¨ Add railings to both sides of your stairways  ¨ Remove throw rugs and other objects that can cause you to trip and fall  What can I do to manage my hip fracture? · Eat foods that are high in calcium and vitamin D  Your healthcare provider may tell you to eat more dairy products, such as milk and cheese, for calcium  Spinach, salmon, and dried beans are also good sources of calcium  Cereal, bread, and orange juice may be fortified with vitamin D  You also get vitamin D from exposure to sunlight  Your healthcare provider may also suggest a calcium or vitamin D supplement  Do not take supplements unless directed       · Rest as directed  You may need a brace or pillow between your legs while your fracture heals  · Go to physical therapy as directed  A physical therapist will teach you exercises to help improve movement and strength, and to decrease pain during recovery  Call 911 for any of the following:   · Your leg feels warm, tender, and painful  It may look swollen and red  · You feel lightheaded, short of breath, and have chest pain  · You cough up blood  When should I seek immediate care? · You have severe pain, even after you take pain medicine  · Your legs are numb  · You cannot move your leg or foot  When should I contact my healthcare provider? · You have a fever  · You have a blister or open sore  · You have a sore that is red, swollen, or draining pus  · You have increased pain, numbness, tingling, or leg swelling  · You have worsening function or deformity  · You have questions or concerns about your condition or care  CARE AGREEMENT:   You have the right to help plan your care  Learn about your health condition and how it may be treated  Discuss treatment options with your caregivers to decide what care you want to receive  You always have the right to refuse treatment  The above information is an  only  It is not intended as medical advice for individual conditions or treatments  Talk to your doctor, nurse or pharmacist before following any medical regimen to see if it is safe and effective for you  © 2017 [address] Information is for End User's use only and may not be sold, redistributed or otherwise used for commercial purposes  All illustrations and images included in CareNotes® are the copyrighted property of [employer]  or [employer]  Parkinson Disease   WHAT YOU NEED TO KNOW:   Parkinson disease (PD) is a long-term movement disorder   The brain cells that control movement start to die and cause changes in how you move, feel, and act  Even though PD may progress and have a severe impact on your daily life, it is not a life-threatening disease  DISCHARGE INSTRUCTIONS:   Seek care immediately if:   · You feel like hurting or killing yourself or others  · You feel lightheaded, dizzy, or faint  · You have chest pain or shortness of breath  · You have weakness in an arm or leg  · You become confused, or you have difficulty speaking  · You have dizziness, a severe headache, or vision changes  Contact your healthcare provider or neurologist if:   · You have a fever  · You are not sleeping well or you sleep more than usual     · You cannot eat or are eating more than usual     · You feel that your condition is getting worse  · You have new symptoms since your last appointment  · Your sad feelings or thoughts change the way you function during the day  · You have questions or concerns about your condition or care  Medicines:   · Anti-Parkinson medicines  are used to improve movement problems, such as muscle stiffness, twitches, and restlessness  Your healthcare provider may use several types of this medicine to help manage your symptoms  · Take your medicine as directed  Contact your healthcare provider if you think your medicine is not helping or if you have side effects  Tell him or her if you are allergic to any medicine  Keep a list of the medicines, vitamins, and herbs you take  Include the amounts, and when and why you take them  Bring the list or the pill bottles to follow-up visits  Carry your medicine list with you in case of an emergency  Follow up with your healthcare provider or neurologist as directed:  Write down your questions so you remember to ask them during your visits  Manage your PD:   · Do not eat foods that are high in protein or dairy  They can cause problems with how some of your medicine works  Ask your healthcare provider how much protein and dairy is safe to eat   He may tell you to eat foods high in fiber to make it easier to have a bowel movement  Examples are cereals, beans, vegetables, and whole-grain breads  Ask if you need to be on a special diet  · Do not drive  unless your healthcare provider says it is okay  · Exercise regularly  A physical therapist teaches you exercises to help improve movement and strength, and to decrease pain  This may help you control your body movements, and keep your balance  · Go to occupational therapy  An occupational therapist teaches you skills to help with your daily activities  Your occupational therapist may help you choose equipment to help you at home and work  He can also suggest ways to keep your home and workplace safe  · Go to speech therapy  A speech therapist may work with you to help you improve your ability to talk or swallow  · Go to counseling  A mental health counselor can help you talk about your feelings about PD  Your family may attend meetings to learn new ways to take better care of both you and themselves  For support and more information:   · [employer]  [address]  [place] , [address]  Phone: 9- 615 - [phone]  Web Address: [employer]  org   · [address] [place] [address] / [address]  [place], [place] , [address] [place] [address],  [address]  [address] [place] [address] , [address]   Phone: [phone]  Phone: 2- [phone]  Web Address: http://[employer]/  org  © 2017 [address]  Information is for End User's use only and may not be sold, redistributed or otherwise used for commercial purposes  All illustrations and images included in CareNotes® are the copyrighted property of [employer]  or Yonis Cummins  The above information is an  only  It is not intended as medical advice for individual conditions or treatments   Talk to your doctor, nurse or pharmacist before following any medical regimen to see if it is safe and effective for you

## 2020-01-02 NOTE — ASSESSMENT & PLAN NOTE
· Continue selegiline (patient is on rasagiline 1mg daily as outpatient, evidently not available here on formulary)   Will resume home rasagiline on discharge  · Continue sinemet - dose was decreased by neurology due to hallucinations  · F/u Dr Yuliya Fortune as outpatient  · Promote sleep/wake cycle  · Avoid CNS altering medications

## 2020-01-03 NOTE — DISCHARGE SUMMARY
Discharge Summary - PMR   Sapna Davison 75 y o  female MRN: [id number]  Unit/Bed#: [facility] 633-04 Encounter: [id number]    Admission Date: 12/18/2019     Discharge Date: 1/2/2020    Etiologic/Rehabilitation Diagnosis: Impairment of mobility, safety and Activities of Daily Living (ADLs) due to Orthopedic Disorders:  08 11  Unilateral Hip Fracture    HPI: Sapna Davison is a 75 y o  female who presented to the [facility] after fall at home  Found to have a right hip fracture, underwent IM nailing on 12/14/19  Post-procedure patient with delirium, hallucinations  Neurology service consulted, dose of sinemet was reduced  In addition, Seroquel started in evening  Patient does have DBS which  has controller for  Accepted to [facility] on 12/18/19  Procedures Performed During ARC Admission: None    Acute Rehabilitation Center Course: Patient participated in a comprehensive interdisciplinary inpatient rehabilitation program which included involvment of MD, therapies (PT, OT, and/or SLP), RN, CM, SW, dietary, and psychology services  She was able to be advanced to an overall supervision level of assist and considered safe for discharge home with family  Please see below for patient's day to day management of medical needs        * Closed fracture of right hip (HCC)  Assessment & Plan  · Acute comprehensive interdisciplinary inpatient rehabilitation including PT, OT, and/or SLP, RN, CM, SW, dietary, psychology, etc   · Goal: supervision  · Clear patient to shower with incision covered  · WBAT  · S/p IM nailing on 12/14  · 2 week f/u performed by ortho, f/u as outpatient    Generalized edema  Assessment & Plan  · Bilateral LEs, R>L  · Apply compression garments    Essential hypertension  Assessment & Plan  Temp:  [97 2 °F (36 2 °C)-98 3 °F (36 8 °C)] 97 9 °F (36 6 °C)  HR:  [64-71] 71  Resp:  [16-20] 16  BP: (131-146)/(59-64) 131/59    · Continue Atenolol  · IM service managing anti-hypertensives, adjusting as needed    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 [facility])  Assessment & Plan  · Patient with delirium, hallucinations following her surgery  · Evaluated by neurology, sinimet dose reduced  · Make seroquel PRN, as patient without hallucinations  Discontinue entirely on discharge      Type 2 diabetes mellitus without complication (HCC)  Assessment & Plan  · Last A1C: 6 3  · Continue diabetic diet    Other hyperlipidemia  Assessment & Plan  · Continue statin    Parkinson's disease with use of electrical brain stimulation ([facility] [address] )  Assessment & Plan  · Continue selegiline (patient is on rasagiline 1mg daily as outpatient, evidently not available here on formulary)  Will resume home rasagiline on discharge  · Continue sinemet - dose was decreased by neurology due to hallucinations  · F/u Dr Justino Brady as outpatient        Discharge Physical Examination:  General: alert, no apparent distress, cooperative and comfortable  HEENT:  Head: Normocephalic, no lesions, without obvious abnormality  Eye: Normal external eye, conjunctiva, lids cornea, NORBERTO  Ears: normal external ears  Nose: Normal external nose, mucus membranes and septum  LUNGS:  no abnormal respiratory pattern, no retractions noted, non-labored breathing   ABDOMEN:  soft, non-tender  Bowel sounds normal  No masses, no organomegaly  EXTREMITIES:  extremities normal, warm and well-perfused; no cyanosis, clubbing, or edema  NEURO:   clear speech, following commands appropriately  PSYCH:  Alert and oriented, appropriate affect    INCISION:  C/D/I     Significant Findings, Care, Treatment and Services Provided: Acute comprehensive interdisciplinary inpatient rehabilitation including PT, OT, SLP, RN, CM, SW, dietary, psychology, etc     Complications: none    Functional Status Upon Admission to ARC:  Mobility: mod  Transfers: mod  ADLs: max-total, particularly with lowers    Functional Status Upon Discharge from [facility]:   Physical Therapy Occupational Therapy Speech Therapy   Weight Bearing Status: Weight Bearing as Tolerated  Transfers: Incidental Touching  Bed Mobility: Moderate Assistance, Maximum Assistance  Amulation Distance (ft): 150 feet  Ambulation: Incidental Touching, Moderate Assistance, Minimal Assistance  Assistive Device for Ambulation: Roller Walker  Number of Stairs: 5  Assistive Device for Stairs: Bilateral Hand Rails  Stair Assistance: Minimal Assistance  Ramp: Incidental Touching  Assistive Device for Ramp: Roller Walker  Discharge Recommendations: Home with:  [address] with[de-identified] 24 Hour Supervision, Home Physical Therapy   Eating: Independent  Grooming: Supervision  Bathing: Minimal Assistance  Bathing: Minimal Assistance  Upper Body Dressing: Minimal Assistance  Lower Body Dressing: Maximum Assistance  Toileting: Total Assistance  Tub/Shower Transfer: Maximum Assistance  Toilet Transfer: Moderate Assistance  Cognition: Within Defined Limits  Orientation: Person, Place, Time, Situation               Discharge Diagnosis: Impairment of mobility, safety and Activities of Daily Living (ADLs) due to Orthopedic Disorders:  08 11  Unilateral Hip Fracture    Discharge Medications:   See after visit summary for reconciled discharge medications provided to patient and family  Condition at Discharge: stable     Discharge instructions/Information to patient and family:   See after visit summary for information provided to patient and family  Provisions for Follow-Up Care:  See after visit summary for information related to follow-up care and any pertinent home health orders  Future Appointments   Date Time Provider Alejandra Vazquez   1/16/2020  1:00 PM DO KELVIN Haro [facility]   2/6/2020 11:00 AM Hallie Caraballo MD [facility]       Disposition: Home    Planned Readmission: No    Discharge Statement   I spent 45 minutes discharging the patient  This time was spent on the day of discharge   I had direct contact with the patient on the day of discharge  Greater than 50% of the total time was spent examining patient, answering all patient questions, arranging and discussing plan of care with patient as well as directly providing post-discharge instructions  Additional time then spent on discharge activities  Discharge Medications:  See after visit summary for reconciled discharge medications provided to patient and family

## 2020-01-07 ENCOUNTER — PATIENT OUTREACH (OUTPATIENT)
Dept: CASE MANAGEMENT | Facility: OTHER | Age: 75
End: 2020-01-07

## 2020-01-07 NOTE — PROGRESS NOTES
OT DISCHARGE SUMMARY    Pt was admitted to [facility] on 12/18/19 s/p R intertrochanteric femur fx and s/p ORIF  Pt initially required total A for all LB ADLs and toileting  Pt was mod A for fxnl transfers with RW  Pt made fair progress during acute rehab stay  Pt's transfers during stay fluctuated between CGA-mod A depending upon timing of day and medication  Pt,  and son educated repetitively upon pt's varying function and importance of maintaining medication schedule/timing for management of symptoms and consistency with mobility  Pt  and son participated in multiple family training sessions and discussions of d/c recommendations  Son was given list of home health companies for private pay services to assist with ADLs  Pt recommended to sponge bathe with  but can get in shower with home health aid  At time of d/c pt was close to baseline and LB dressing was at min A with max A for management of shoes/socks  Pt was min A for toileting and showers  Pt  engaged in hands on training for all ADL tasks and is competent to provide care for exception of transfers in/out of shower  Pt continue sto be limited by PD and freezing  Family in understanding that pt requires physical assist for all tasks  Recommend home OT to continue with family training with  on showers and shower transfers, strengthening and balance training during ADLs/IADLs and dynamic reach for LB dressing       Jenelle Almonte, OT

## 2020-01-07 NOTE — PROGRESS NOTES
Chart review completed  Patient discharged from [address] [place] [address] 1/2/20 to home with [facility] for PT/OT and some HHA services  SNF form completed with known information  BPCI form and Care Coordination note updated  Removed self from care team and sent Inbasket message to BRIAN Olivier regarding patient handoff

## 2020-01-13 ENCOUNTER — PATIENT OUTREACH (OUTPATIENT)
Dept: CASE MANAGEMENT | Facility: OTHER | Age: 75
End: 2020-01-13

## 2020-01-13 NOTE — PROGRESS NOTES
MINNA contacted patient and Jaime Harm reports that her  had seizure 2 weeks ago and he normally takes her to her doctor appointments  She states "they took away his  license "  Patient reports that she is not driving right now and she wants to know why the  license was revoked  States he had surgery last week to remove what was causing his seizure  Patient repeated she just doesn't understand why they have to take away his license  Reports she has 2 sons but each are employed and would not be able to assist with transporting her to the hospital  MINNA offered to research transportation options for patient such as Lyft and get back to her tomorrow

## 2020-01-13 NOTE — PROGRESS NOTES
Inbasket message received from [facility] staff Member Tanna Morataya asking this CM to contact patient re:  Tanna Guadalupe called into the phone room today to cancel her Post Op appt for 1/16/20  She said she is having some complications and can not leave the house  She has questions concerning Home Health care  She said she was given information in a packet in reference to an [place]? She would like someone to call her since she has questions  Are you able to direct her in the right direction? Please advise   Thank You

## 2020-01-14 ENCOUNTER — PATIENT OUTREACH (OUTPATIENT)
Dept: CASE MANAGEMENT | Facility: OTHER | Age: 75
End: 2020-01-14

## 2020-01-14 NOTE — PROGRESS NOTES
In-basket message sent to lead OP CM regarding this patient and her need for transportation assistance to post op appointment

## 2020-01-15 ENCOUNTER — PATIENT OUTREACH (OUTPATIENT)
Dept: CASE MANAGEMENT | Facility: OTHER | Age: 75
End: 2020-01-15

## 2020-01-15 DIAGNOSIS — Z78.9 NEED FOR FOLLOW-UP BY SOCIAL WORKER: Primary | ICD-10-CM

## 2020-01-15 NOTE — PROGRESS NOTES
OPCM SW reached out to [facility]   Spoke to Orlando Gallagher to find out what services she might be receiving  Stated patient was getting PT, OT, and Home health assistance  They have been coming out into the home for a while  Glennjace Araujo stated that her  Tracie Méndez was receiving services from [facility] as well and that their first visit with him was on Tuesday

## 2020-01-15 NOTE — PROGRESS NOTES
OPCM SW received referral from CRISTHIAN BUITRAGO and [facility] in regards to patient having transportation barriers  However upon discussion with patient,  patient is not interested in [facility], she would like to wait to find out if her  would be losing his license since his recent seizure  Patient had to cancel both her post op appointment and husbands because of transporation barriers  I did advised her that [facility] would help her get to these appointments, and at this time patient was not interested in other transportation options  Patient wanted to know if I knew if he was losing his license  I advised her that this would be something that she would have to talk to her PCP about  Also, patient was unsure if she was receiving home health services  Patient did not know if [facility] was actually coming into the home   Will reach out to [employer] to find out what services they might be receiving

## 2020-01-16 ENCOUNTER — PATIENT OUTREACH (OUTPATIENT)
Dept: CASE MANAGEMENT | Facility: OTHER | Age: 75
End: 2020-01-16

## 2020-01-16 NOTE — PROGRESS NOTES
Contacted patient's son Liat Carreno to discuss transportation concerns and if additional services were needed in the home  Liat Carreno states his mother currently has PT/OT services through [facility]  She did have SN services but was recently discharged  The family has private caregivers 24/7 in the home  His father was recently hospitalized and is now unable to drive  I asked about transportation to medical appointments and he stated they are not planning on taking his mother out to appointments at the current time  Once she is able to be transitioned to outpatient therapy, they will then schedule her f/u appointments  He feels they should be able to arrange transportation when the time comes but will call if assistance is needed  Provided him with my contact information for any questions or concerns  He feels they have everything handled at the present time

## 2020-01-16 NOTE — PROGRESS NOTES
OPCM SW received incoming call from [facility]  Discussed patient and concerns  Juventino Thurston will reach out to son at [phone]

## 2020-01-24 ENCOUNTER — TELEPHONE (OUTPATIENT)
Dept: OBGYN CLINIC | Facility: HOSPITAL | Age: 75
End: 2020-01-24

## 2020-01-24 NOTE — TELEPHONE ENCOUNTER
Patient's son James Ibrahim calling to state that the patient has been seeing PT for her R hip and everything is going great but PT is recommending the patient get an orthodic shoe or brace for the L ankle because of the way she is walking, her ankle is now turning in  Patient has not been seen by us yet in the office PO, and does not see us for her ankle at this point  Advised that she needs to be brought in for an evaluation of the surgical hip since we have not seen her but he wants her L ankle addressed as well  Convinced him to bring her in for her PO appointment on Monday, but would her ankle be addressed then as well? Please advise

## 2020-01-27 ENCOUNTER — APPOINTMENT (OUTPATIENT)
Dept: RADIOLOGY | Facility: MEDICAL CENTER | Age: 75
End: 2020-01-27
Payer: MEDICARE

## 2020-01-27 ENCOUNTER — OFFICE VISIT (OUTPATIENT)
Dept: OBGYN CLINIC | Facility: MEDICAL CENTER | Age: 75
End: 2020-01-27

## 2020-01-27 VITALS
HEART RATE: 74 BPM | WEIGHT: 234 LBS | BODY MASS INDEX: 36.73 KG/M2 | HEIGHT: 67 IN | DIASTOLIC BLOOD PRESSURE: 79 MMHG | SYSTOLIC BLOOD PRESSURE: 131 MMHG

## 2020-01-27 DIAGNOSIS — S72.001A CLOSED RIGHT HIP FRACTURE, INITIAL ENCOUNTER (HCC): Primary | ICD-10-CM

## 2020-01-27 DIAGNOSIS — Z96.612 S/P REVERSE TOTAL SHOULDER ARTHROPLASTY, LEFT: ICD-10-CM

## 2020-01-27 PROCEDURE — 73502 X-RAY EXAM HIP UNI 2-3 VIEWS: CPT

## 2020-01-27 PROCEDURE — 99024 POSTOP FOLLOW-UP VISIT: CPT | Performed by: ORTHOPAEDIC SURGERY

## 2020-01-27 NOTE — PROGRESS NOTES
Ortho Sports Medicine Hip- Post-op Visit    Assesment:   75 y o  female status post 6 weeks right hip IM antegrade nail  DOS:12/14/19  Patient is doing well at this time and is ambulating with a walker    Plan:    Post-Operative treatment:    Ice to knee for 20 minutes at least 1-2 times daily  PT for ROM/strengthening for right hip knee and core  Patient was advised for outpatient PT to continue to work on range of motion and strength  OTC NSAIDS prn for pain  AFO for left ankle ordered per PT request    Imaging: All imaging from today was reviewed by myself and explained to the patient  Weight bearing:  as tolerated    ROM:  Full    Brace:  No brace needed    DVT Prophylaxis:  Ambulation    Follow up:   Return in about 8 weeks (around 3/23/2020)  and repeat x-rays     Patient was advised that if they have any fevers, chills, chest pain, shortness of breath, redness or drainage from the incision, please let our office know immediately  Chief Complaint   Patient presents with    Right Hip - Post-op       History of Present Illness: The patient is a 75 y o  female who is being evaluated post operatively 6 weeks  status post right hip IM antegrade nail       Since the prior visit, She reports significant improvement  Pain is well controlled  The only pain she is having is on the inner thigh distally by the knee  The patient is using ice to control swelling  They have started physical therapy  The patient has been ambulating with a walker  The patient has been ambulating without a brace  The patient denies any fevers, chills, calf pain, chest pain/shortness of breath, redness or drainage from the incision  I have reviewed the past medical, surgical, social and family history, medications and allergies as documented in the EMR  Review of systems: ROS is negative other than that noted in the HPI  Constitutional: Negative for fatigue and fever        Physical Exam:    Blood pressure 131/79, pulse 74, height 5' 7" (1 702 m), weight 106 kg (234 lb)  General/Constitutional: NAD, well developed, well nourished  HENT: Normocephalic, atraumatic  CV: Intact distal pulses, regular rate  Resp: No respiratory distress or labored breathing  Abd: No abdominal distention  Lymphatic: No lymphadenopathy palpated  Neuro: Alert and Oriented x 3, no focal deficits noted  Psych: Normal mood, normal affect, normal judgement, normal behavior  Skin: Warm, dry, no rashes, no erythema     Hip Exam (focused):    right hip:     No dislocation/deformity  ROM: Full  Greater troch:non-tender   SI joint: non-tender  Gurvinder test: negative  Tree's test:  negative  Abduction: 5/5  AT/GS intact  No tenderness over trochanteric region or hip in general or pain with any range of motion including flexion and adduction and internal rotation      No calf tenderness to palpation bilaterally    Incisions show no erythema, no drainage    LE NV Exam: +2 DP/PT pulses bilaterally  Sensation intact to light touch L2-S1 bilaterally      Hip Imaging:    X-rays of the right hip were reviewed, which demonstrate appropriate callus formation and alignment of intertrochanteric hip fracture  There is some mild helical blade back out but it is stable since the prior x-ray and she is healing appropriately   I have reviewed the radiology report anddo not currently have a radiology reading from [facility], but will check the result once the reading is performed      Scribe Attestation    I,:   Becky Gao am acting as a scribe while in the presence of the attending physician :        I,:   Katerine Dawkins,  personally performed the services described in this documentation    as scribed in my presence :

## 2020-02-04 ENCOUNTER — TELEPHONE (OUTPATIENT)
Dept: NEUROLOGY | Facility: CLINIC | Age: 75
End: 2020-02-04

## 2020-02-06 ENCOUNTER — PROCEDURE VISIT (OUTPATIENT)
Dept: NEUROLOGY | Facility: CLINIC | Age: 75
End: 2020-02-06
Payer: MEDICARE

## 2020-02-06 VITALS
HEART RATE: 71 BPM | HEIGHT: 67 IN | DIASTOLIC BLOOD PRESSURE: 68 MMHG | SYSTOLIC BLOOD PRESSURE: 102 MMHG | BODY MASS INDEX: 36.65 KG/M2

## 2020-02-06 DIAGNOSIS — Z96.612 S/P REVERSE TOTAL SHOULDER ARTHROPLASTY, LEFT: ICD-10-CM

## 2020-02-06 DIAGNOSIS — G47.52 REM BEHAVIORAL DISORDER: Primary | ICD-10-CM

## 2020-02-06 DIAGNOSIS — G20 COGNITIVE DEFICIT DUE TO PARKINSON'S DISEASE (HCC): ICD-10-CM

## 2020-02-06 DIAGNOSIS — G20 PARKINSON'S DISEASE WITH USE OF ELECTRICAL BRAIN STIMULATION (HCC): ICD-10-CM

## 2020-02-06 PROCEDURE — 99214 OFFICE O/P EST MOD 30 MIN: CPT | Performed by: PSYCHIATRY & NEUROLOGY

## 2020-02-06 PROCEDURE — 95983 ALYS BRN NPGT PRGRMG 15 MIN: CPT | Performed by: PSYCHIATRY & NEUROLOGY

## 2020-02-06 PROCEDURE — 95984 ALYS BRN NPGT PRGRMG ADDL 15: CPT | Performed by: PSYCHIATRY & NEUROLOGY

## 2020-02-06 RX ORDER — CIPROFLOXACIN 250 MG/1
250 TABLET, FILM COATED ORAL EVERY 12 HOURS
COMMUNITY
Start: 2020-01-28 | End: 2020-08-17 | Stop reason: HOSPADM

## 2020-02-06 NOTE — PATIENT INSTRUCTIONS
PD with progression  DBS adjusted slightly  We will slowly increase Sinemet 25/100  Week 1: 1 tab in am and 1/2 tab midday and dinner  Week 2: 1 tab in am and midday and 1/2 tab at dinner  Week 3 and on 1 tab 3 times daily  Try melatonin qhs  Can start with 5mg and if not effective 10mg

## 2020-02-06 NOTE — ASSESSMENT & PLAN NOTE
Parkinson's complicated by postural instability and gait  Gait has worsened in part due to her recovery from right hip fracture  Per son she had been ambulating better on Sinemet prior to the hospitalization  This was lowered given hallucinations and delirium following surgery  She has an increase in bradykinesia today  For this reason deep brain stimulator was interrogated and amplitude increased  See attached clinical sheets for details  We will slowly increase Sinemet 25/100  Week 1: 1 tab in am and 1/2 tab midday and dinner  Week 2: 1 tab in am and midday and 1/2 tab at dinner  Week 3 and on 1 tab 3 times daily  They will call if she develops side effects such as hallucinations or lightheadedness  Lincoln Phelan

## 2020-02-06 NOTE — ASSESSMENT & PLAN NOTE
Recent changes in the setting of infection and stressors  MOCA 22  At this point I would not start any medication  They are to reorient when she gets bouts of confusion  Will reevaluate and further discuss on follow up

## 2020-02-06 NOTE — PROGRESS NOTES
Patient ID: Danish Rosen is a 75 y o  female  Assessment/Plan:    Parkinson's disease with use of electrical brain stimulation ([facility] )  Parkinson's complicated by postural instability and gait  Gait has worsened in part due to her recovery from right hip fracture  Per son she had been ambulating better on Sinemet prior to the hospitalization  This was lowered given hallucinations and delirium following surgery  She has an increase in bradykinesia today  For this reason deep brain stimulator was interrogated and amplitude increased  See attached clinical sheets for details  We will slowly increase Sinemet 25/100  Week 1: 1 tab in am and 1/2 tab midday and dinner  Week 2: 1 tab in am and midday and 1/2 tab at dinner  Week 3 and on 1 tab 3 times daily  They will call if she develops side effects such as hallucinations or lightheadedness  Zaida Juarez REM behavioral disorder  Try melatonin qhs  Can start with 5mg and if not effective 10mg  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 ([facility] )  Recent changes in the setting of infection and stressors  MOCA 22  At this point I would not start any medication  They are to reorient when she gets bouts of confusion  Will reevaluate and further discuss on follow up  Diagnoses and all orders for this visit:    REM behavioral disorder    Parkinson's disease with use of electrical brain stimulation (HCC)  -     carbidopa-levodopa (SINEMET)  mg per tablet; Take 1 tablet by mouth 3 (three) times a day Follow titration schedule given at appt  Adding 1/2 tab weekly until 1 tab tid    S/P reverse total shoulder arthroplasty, left    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 ([facility] )    Other orders  -     ciprofloxacin (CIPRO) 250 mg tablet; Take 250 mg by mouth every 12 (twelve) hours  -     Chromium-Cinnamon (CINNAMON PLUS CHROMIUM) 200-1000 MCG-MG CAPS;  Take 1,000 mg by mouth 2 (two) times a day           Subjective:    Ms Danish Rosen is a pleasant female s/p bilateral CTS s/p surgery, significant arthritis (psoriatic), Parkinson's disease since about 2009 now s/p bilateral STN DBS placement 11/5/14 with ACTIVA SC 11/11/14, s/p right IPG replacement 12/18/18, here for follow up  Prior to surgery she was on Sinemet 25/100 q 4 hours tid and amantadine bid  She remains on Azilect  Previously she tried ropinirole, Requip XL (highest dose 8mg) and Mirapex ER 1 5 with no effect  Insurance would not cover Neupro and she found it a nuisance  Discussion about moving the IPG had with Dr Katharina Jackson but she decided to wait until the next needed replacement  She continues to have postural instability and trouble with gait  We tried restarting Sinemet 25/100 1 po tid q 4 hours part to see if this provides more energy and helps with intermittent gait issues but she stopped it after 3 weeks as she noticed no difference and was associated with hallucinations  PT for gait and balance with modest degree of success  Amantadine trial in rhe past with no improvement  Last seen in October and Sinemet was retried with a slow 6 weeks titration to 1 tab 3 times daily  She fell in December and fractured her right hip  This was pinned and during hospitalization she has post-procedure delurium with hallucinations  Sinemet decreased to 0 5 tabs 3 times daily and she was on selegiline 5mg daily  She was home and within days her  passed out  She obtained help and he was diagnosed with a hemangioma behind his eye which has since been removed and he is recovering well  They are now home but have 24 caregivers for now given Ms Oliva needs much assistance at this point  She was undergoing home PT for her hip and now will be transitioning to outpatient PT  She returns today accompanied by her son and caregiver  She is on Sinemet 0 5mg with breakfast, mid-day and dinner and rasagiline 1mg daily  She does not have and hallucinations  She has vivid dreams where she talks in sleep   These can disturb her   She sleeps well  She denies any daytime sedation or fatigue  She is dressing and showering and performing hygiene acts with assistance now  She uses a shower chair and bars  She needs assistance rising from a seated position  She wears a gait belt  She denies any issues with swallowing and drooling  There are bouts of confusion  She can be forgetful with the dates  She has short term memory issues forgetting recent conversations  Long term memory is good  There other day she spent much of the day thinking she was not in her home  Her caregiver would reorient her but she still felt like it was not home and she should be packing  This occurs on occasion  She just finished treatment for a UTI  Objective:    Blood pressure 102/68, pulse 71, height 5' 7" (1 702 m)  Physical Exam   Constitutional: She appears well-developed  Eyes: Pupils are equal, round, and reactive to light  Neurological: She is alert  She has normal strength  Vitals reviewed  Neurological Exam  Mental Status  Alert  Oriented only to person and situation  Memory: Recalled 3/5  Unable to copy figure  Clock drawing is abnormal  Speech: hypophonia  Language is fluent with no aphasia  Unable to perform serial calculations  MOCA 22  Cranial Nerves  CN II: Visual fields full to confrontation  CN III, IV, VI: Extraocular movements intact bilaterally  Pupils equal round and reactive to light bilaterally  CN V: Facial sensation is normal   CN VII: Full and symmetric facial movement  CN VIII: Hearing is normal   CN IX, X: Palate elevates symmetrically  CN XI: Shoulder shrug strength is normal   CN XII: Tongue midline without atrophy or fasciculations  Motor   Normal muscle tone  Strength is 5/5 throughout all four extremities  Sensory  Light touch is normal in upper and lower extremities       Coordination  Right: Finger-to-nose normal  Rapid alternating movement abnormality:  Left: Finger-to-nose normal  Rapid alternating movement abnormality:  See MDS UPDRS III  Gait  Casual gait: Normal pull test  Able to rise from chair without using arms  Arose with assist on second attempt  Walks slowly       MDS UPDRS III                             Time since last dose:   10/3/19 2/6/20   Speech   1 2   Facial Expression   2 2   Rigidity - Neck   3 3   Rigidity - Upper Extremity (Right)   1 2   Rigidity - Upper Extremity (Left)    0 2   Rigidity - Lower Extremity (Right)   2 2   Rigidity - Lower Extremity (Left)    1 2   Finger Taps (Right)    1 2   Finger Taps (Left)    2 2   Hand Movement (Right)   0 2   Hand Movement (Left)    0 3   Pronation/Supination (Right)   1 2   Pronation/Supination (Left)   3 3   Toe Tapping (Right)  3 3   Toe Tapping (Left)  1 3   Leg Agility (Right)   1 2   Leg Agility (Left)    0 2   Arising from Chair    2 3   Gait     3 walker 3   Freezing of Gait 0 1 on turn   Postural Stability     3   Posture  1 2   Global spontaneity of movement  2 3   Postural Tremor (Right)  0 0   Postural Tremor (Left)  0 0   Kinetic Tremor (Right)   0 0   Kinetic Tremor (Left)   0 0   Rest tremor amplitude RUE  0 0   Rest tremor amplitude LUE  0 0   Rest tremor amplitude RLE  0 0   Reset tremor amplitude LLE  0 0   Lip/Jaw Tremor   0 0          Motor Exam Total:              ROS:    Review of Systems   Constitutional: Negative  Negative for appetite change and fever  HENT: Positive for voice change (Doesnt talk as loud)  Negative for hearing loss, tinnitus and trouble swallowing  Eyes: Negative  Negative for photophobia and pain  Respiratory: Negative  Negative for shortness of breath  Cardiovascular: Negative  Negative for palpitations  Gastrointestinal: Negative  Negative for nausea and vomiting  Endocrine: Negative  Negative for cold intolerance and heat intolerance  Genitourinary: Negative  Negative for dysuria, frequency and urgency     Musculoskeletal: Positive for gait problem (Doesnt walk as well as she has)  Negative for myalgias and neck pain  Balance issues occasional     Skin: Negative  Negative for rash  Allergic/Immunologic: Negative  Neurological: Positive for weakness (Right bicep has had trouble eating - has been doing exercises for the biceps)  Negative for dizziness, tremors, seizures, syncope, facial asymmetry, speech difficulty, light-headedness, numbness and headaches  Hematological: Negative  Does not bruise/bleed easily  Psychiatric/Behavioral: Positive for confusion (Every so often - She keeps saying she wants to go home even though she was home)  Negative for hallucinations and sleep disturbance  All other systems reviewed and are negative  Review of system was personally reviewed

## 2020-02-24 ENCOUNTER — TELEPHONE (OUTPATIENT)
Dept: NEUROLOGY | Facility: CLINIC | Age: 75
End: 2020-02-24

## 2020-02-24 NOTE — TELEPHONE ENCOUNTER
Would she be willing to take a medication to try to block hallucinations so that we can continue to slowly try to increase Sinemet? I would start Nuplazid  Would inform her of the blackbox warning associated with this class of medication stating increased mortality risk in the elderly  I always speak to patients about pros: it may decrease hallucinations, allowing us to increase sinemet so that her mobility may improve vs cons: side effects such as nausea or swelling and blackbox warning  If she does not wish to start Nuplazid another option would be to start a medications for memory/ dementia such as donepezil to see if confusion may be a bit better

## 2020-02-24 NOTE — TELEPHONE ENCOUNTER
pt's son marlene called and states that pt is having   confusion, thinks she's not home but pt is home  also having minor hallucinations  thinks someone is sitting on the couch  Hallucinations started with in the last 3-4 days  they recently increased sinemet  she is currently taking 25/100 1 tab in am and midday and 1/2 tab dinner  Was not having hallucinations on sinemet 1 tab in and and 1/2 tab midday and 1/2 tab dinner  Otherwise pt is tolerating higher dose of sinemet   Difficult to tell if increased dose is helping due to recent hip sx    please advise  [phone] to leave a detailed message

## 2020-02-28 NOTE — TELEPHONE ENCOUNTER
Son Sonia Lopez returned call  Verbalized understanding with recommendations  Will discuss with patient and  and give the office a call back

## 2020-03-16 ENCOUNTER — PATIENT OUTREACH (OUTPATIENT)
Dept: CASE MANAGEMENT | Facility: OTHER | Age: 75
End: 2020-03-16

## 2020-03-23 ENCOUNTER — TELEMEDICINE (OUTPATIENT)
Dept: OBGYN CLINIC | Facility: MEDICAL CENTER | Age: 75
End: 2020-03-23
Payer: MEDICARE

## 2020-03-23 DIAGNOSIS — Z98.890 STATUS POST HIP SURGERY: ICD-10-CM

## 2020-03-23 DIAGNOSIS — S72.001D CLOSED FRACTURE OF RIGHT HIP WITH ROUTINE HEALING, SUBSEQUENT ENCOUNTER: Primary | ICD-10-CM

## 2020-03-23 PROCEDURE — G2012 BRIEF CHECK IN BY MD/QHP: HCPCS | Performed by: ORTHOPAEDIC SURGERY

## 2020-03-23 NOTE — PROGRESS NOTES
Ortho Sports Medicine Hip- Post-op Visit    Assesment:   75 y o  female 3 5 months status post right hip antegrade IM nail, DOS: 12/14/19    Plan:    Post-Operative treatment:    Ice to knee for 20 minutes at least 1-2 times daily  PT for ROM/strengthening to knee, hip and core  OTC NSAIDS prn for pain  Imaging:    No imaging was available for review today  Weight bearing:  as tolerated     ROM:  Full    DVT Prophylaxis:  Ambulation    Follow up:   Return in about 12 weeks (around 6/15/2020) for Post-op  New xray right hip     Patient was advised that if they have any fevers, chills, chest pain, shortness of breath, redness or drainage from the incision, please let our office know immediately  Chief Complaint   Patient presents with    Virtual Brief Visit       History of Present Illness: The patient is a 75 y o  female who is being evaluated post operatively 3 5 months  status post  right hip antegrade IM nail, DOS: 12/14/19  Since the prior visit, She reports improvement  She denies having the pain in her hip  She states that she continues to work with therapy and is getting stronger  She is still ambulating with the use of a walker  Has been was also participating in phone call and he states that he has seen improvement with her ambulation  At her last visit she was given an ankle brace for the left ankle  Patient states that she dislikes the brace but has been states that this has been helping her ambulate to control the left ankle  He states that the left ankle has more stability  Patient denies pain in the left ankle   also reports that she has had falls since her last appointment but this has not affected her right hip or left ankle  Overall the patient is doing well    Pain is well controlled  The patient is using ice to control swelling  The patient denies any fevers, chills, calf pain, chest pain/shortness of breath, redness or drainage from the incision  I have reviewed the past medical, surgical, social and family history, medications and allergies as documented in the EMR  Review of systems: ROS is negative other than that noted in the HPI  Constitutional: Negative for fatigue and fever  Physical Exam:   Hip Exam (focused):  Patient's reported physical exam is as follows:  No obvious deformity, no bruising, incision is well healed    Virtual Brief Visit    Reason for visit is postop right hip      Encounter provider Kami Crooks DO    Provider located at [address] [place] [address]      Recent Visits  No visits were found meeting these conditions  Showing recent visits within past 7 days and meeting all other requirements     Future Appointments  No visits were found meeting these conditions  Showing future appointments within next 150 days and meeting all other requirements        Patient agrees to participate in a virtual check in via telephone or video visit instead of presenting to the office to address urgent/immediate medical needs  Patient is aware this is a billable service  After connecting through telephone, the patient was identified by name and date of birth  Vivi Naidu was informed that this was a telemedicine visit and that the visit is being conducted through telephone which may not be secure and therefore might not be HIPAA-compliant  My office door was closed  The following individuals were in the room with me and the patient informed patient's  Mr Oliva  He was present for the visit and this was cleared by the patient's request   She acknowledged consent and understanding of privacy and security of the virtual check-in visit  I informed the patient that I have reviewed her record in Epic and presented the opportunity for her to ask any questions regarding the visit today   The patient initiated communication and agreed to participate  Subjective    The patient is a 75 y o  female who is being evaluated post operatively 3 5 months  status post  right hip antegrade IM nail, DOS: 12/14/19  Since the prior visit, She reports improvement  She denies having the pain in her hip  She states that she continues to work with therapy and is getting stronger  She is still ambulating with the use of a walker  Has been was also participating in phone call and he states that he has seen improvement with her ambulation  At her last visit she was given an ankle brace for the left ankle  Patient states that she dislikes the brace but has been states that this has been helping her ambulate to control the left ankle  He states that the left ankle has more stability  Patient denies pain in the left ankle   also reports that she has had falls since her last appointment but this has not affected her right hip or left ankle  Overall the patient is doing well    Pain is well controlled  The patient is using ice to control swelling  The patient denies any fevers, chills, calf pain, chest pain/shortness of breath, redness or drainage from the incision  Past Medical History:   Diagnosis Date    Anxiety     Diabetes mellitus [facility]     Diabetes mellitus type 2, diet-controlled ([facility] )     Hyperlipidemia     Hypertension     Parkinson disease ([facility] )        Past Surgical History:   Procedure Laterality Date    ACHILLES TENDON SURGERY      DEEP BRAIN STIMULATOR PLACEMENT      DENTAL SURGERY      FRACTURE SURGERY      VT IMP STIM,CRANIAL,SUBQ,1 ARRAY Right 12/18/2018    Procedure: REPLACEMENT IMPLANTABLE PULSE GENERATOR (IPG) DEEP BRAIN STIMULATION (DBS); Surgeon: Riccardo Marin MD;  Location: [facility];  Service: Neurosurgery    VT OPEN RX FEMUR FX+INTRAMED ERIKA Right 12/14/2019    Procedure: INSERTION NAIL IM FEMUR ANTEGRADE (TROCHANTERIC);   Surgeon: Patience Holloway DO;  Location: [facility];  Service: Orthopedics    REPLACEMENT TOTAL KNEE      REVERSE TOTAL SHOULDER ARTHROPLASTY Left 8/23/2018    Procedure: ARTHROPLASTY SHOULDER REVERSE;  Surgeon: Kaylee Arana MD;  Location: [facility];  Service: Orthopedics    TONSILLECTOMY         Current Outpatient Medications   Medication Sig Dispense Refill    acetaminophen (TYLENOL) 325 mg tablet Take 2 tablets (650 mg total) by mouth every 6 (six) hours as needed for mild pain, headaches or fever 30 tablet 0    aspirin 81 MG tablet Take 1 tablet by mouth daily      atenolol (TENORMIN) 50 mg tablet Take 1 tablet (50 mg total) by mouth daily 30 tablet 0    carbidopa-levodopa (SINEMET)  mg per tablet Take 1 tablet by mouth 3 (three) times a day [facility]  Adding 1/2 tab weekly until 1 tab tid 270 tablet 1    Chromium-Cinnamon (CINNAMON PLUS CHROMIUM) 200-1000 MCG-MG CAPS Take 1,000 mg by mouth 2 (two) times a day      ciprofloxacin (CIPRO) 250 mg tablet Take 250 mg by mouth every 12 (twelve) hours      Coenzyme Q10 400 MG CAPS Take 1 capsule (400 mg total) by mouth daily 30 capsule 0    enoxaparin (LOVENOX) 40 mg/0 4 mL Inject 0 4 mL (40 mg total) under the skin daily for 8 days 8 Syringe 0    Omega-3 Fatty Acids (FISH OIL) 1,000 mg Take 1 capsule (1,000 mg total) by mouth daily 30 capsule 0    PARoxetine (PAXIL) 20 mg tablet Take 1 tablet (20 mg total) by mouth daily 30 tablet 0    rasagiline (AZILECT) 1 MG TAKE 1 TABLET BY MOUTH  DAILY 90 tablet 2    rosuvastatin (CRESTOR) 5 mg tablet Take 1 tablet (5 mg total) by mouth every other day 30 tablet 0     No current facility-administered medications for this visit  Allergies   Allergen Reactions    Sulfa Antibiotics Hives       Assessment    Sally telephone assessment is 3 5 months status post right hip antegrade IM nail, DOS: 12/14/19          Disposition:    -ice and analgesics as needed for pain  -continue physical therapy  -continue to wear ankle brace as needed  -continue to ambulate with the use of walker with full weight-bearing status      I spent greater than 5 minutes with the patient during this virtual check-in visit

## 2020-04-14 ENCOUNTER — TELEPHONE (OUTPATIENT)
Dept: NEUROLOGY | Facility: CLINIC | Age: 75
End: 2020-04-14

## 2020-05-07 ENCOUNTER — PROCEDURE VISIT (OUTPATIENT)
Dept: NEUROLOGY | Facility: CLINIC | Age: 75
End: 2020-05-07
Payer: MEDICARE

## 2020-05-07 VITALS
BODY MASS INDEX: 36.57 KG/M2 | DIASTOLIC BLOOD PRESSURE: 71 MMHG | HEART RATE: 72 BPM | WEIGHT: 233 LBS | HEIGHT: 67 IN | SYSTOLIC BLOOD PRESSURE: 135 MMHG

## 2020-05-07 DIAGNOSIS — Z96.89 S/P DEEP BRAIN STIMULATOR PLACEMENT: ICD-10-CM

## 2020-05-07 DIAGNOSIS — G47.52 REM BEHAVIORAL DISORDER: ICD-10-CM

## 2020-05-07 DIAGNOSIS — G20 COGNITIVE DEFICIT DUE TO PARKINSON'S DISEASE (HCC): ICD-10-CM

## 2020-05-07 DIAGNOSIS — G20 PARKINSON'S DISEASE WITH USE OF ELECTRICAL BRAIN STIMULATION (HCC): Primary | ICD-10-CM

## 2020-05-07 PROCEDURE — 99215 OFFICE O/P EST HI 40 MIN: CPT | Performed by: PSYCHIATRY & NEUROLOGY

## 2020-05-07 PROCEDURE — 95970 ALYS NPGT W/O PRGRMG: CPT | Performed by: PSYCHIATRY & NEUROLOGY

## 2020-05-07 RX ORDER — RIVASTIGMINE TARTRATE 1.5 MG/1
1.5 CAPSULE ORAL 2 TIMES DAILY
Qty: 60 CAPSULE | Refills: 0 | Status: SHIPPED | OUTPATIENT
Start: 2020-05-07 | End: 2020-06-10 | Stop reason: SDUPTHER

## 2020-06-09 DIAGNOSIS — G20 COGNITIVE DEFICIT DUE TO PARKINSON'S DISEASE (HCC): ICD-10-CM

## 2020-06-10 RX ORDER — RIVASTIGMINE TARTRATE 1.5 MG/1
1.5 CAPSULE ORAL 2 TIMES DAILY
Qty: 60 CAPSULE | Refills: 0 | Status: SHIPPED | OUTPATIENT
Start: 2020-06-10 | End: 2020-07-13 | Stop reason: SDUPTHER

## 2020-07-13 DIAGNOSIS — G20 COGNITIVE DEFICIT DUE TO PARKINSON'S DISEASE (HCC): ICD-10-CM

## 2020-07-14 RX ORDER — RIVASTIGMINE TARTRATE 3 MG/1
3 CAPSULE ORAL 2 TIMES DAILY
Qty: 180 CAPSULE | Refills: 3 | Status: SHIPPED | OUTPATIENT
Start: 2020-07-14 | End: 2021-03-18 | Stop reason: DRUGHIGH

## 2020-08-16 ENCOUNTER — ANESTHESIA (OUTPATIENT)
Dept: PERIOP | Facility: HOSPITAL | Age: 75
End: 2020-08-16
Payer: MEDICARE

## 2020-08-16 ENCOUNTER — ANESTHESIA EVENT (OUTPATIENT)
Dept: PERIOP | Facility: HOSPITAL | Age: 75
End: 2020-08-16
Payer: MEDICARE

## 2020-08-16 ENCOUNTER — APPOINTMENT (EMERGENCY)
Dept: CT IMAGING | Facility: HOSPITAL | Age: 75
End: 2020-08-16
Payer: MEDICARE

## 2020-08-16 ENCOUNTER — HOSPITAL ENCOUNTER (OUTPATIENT)
Facility: HOSPITAL | Age: 75
Setting detail: OBSERVATION
Discharge: HOME | End: 2020-08-17
Attending: EMERGENCY MEDICINE | Admitting: SURGERY
Payer: MEDICARE

## 2020-08-16 DIAGNOSIS — S72.001A CLOSED FRACTURE OF RIGHT HIP, INITIAL ENCOUNTER (HCC): ICD-10-CM

## 2020-08-16 DIAGNOSIS — K35.80 ACUTE APPENDICITIS: Primary | ICD-10-CM

## 2020-08-16 DIAGNOSIS — R73.9 HYPERGLYCEMIA: ICD-10-CM

## 2020-08-16 DIAGNOSIS — N39.0 UTI (URINARY TRACT INFECTION): ICD-10-CM

## 2020-08-16 DIAGNOSIS — K57.90 DIVERTICULOSIS: ICD-10-CM

## 2020-08-16 DIAGNOSIS — K35.32 ACUTE APPENDICITIS WITH PERFORATION AND LOCALIZED PERITONITIS, UNSPECIFIED WHETHER ABSCESS PRESENT, UNSPECIFIED WHETHER GANGRENE PRESENT: ICD-10-CM

## 2020-08-16 DIAGNOSIS — N83.201 RIGHT OVARIAN CYST: ICD-10-CM

## 2020-08-16 PROBLEM — K35.30 ACUTE APPENDICITIS WITH LOCALIZED PERITONITIS: Status: ACTIVE | Noted: 2020-08-16

## 2020-08-16 LAB
ALBUMIN SERPL BCP-MCNC: 3.3 G/DL (ref 3.5–5)
ALP SERPL-CCNC: 53 U/L (ref 46–116)
ALT SERPL W P-5'-P-CCNC: 14 U/L (ref 12–78)
ANION GAP SERPL CALCULATED.3IONS-SCNC: 9 MMOL/L (ref 4–13)
AST SERPL W P-5'-P-CCNC: 7 U/L (ref 5–45)
BACTERIA UR QL AUTO: ABNORMAL /HPF
BASOPHILS # BLD AUTO: 0.04 THOUSANDS/ΜL (ref 0–0.1)
BASOPHILS NFR BLD AUTO: 0 % (ref 0–1)
BILIRUB SERPL-MCNC: 0.94 MG/DL (ref 0.2–1)
BILIRUB UR QL STRIP: NEGATIVE
BUN SERPL-MCNC: 18 MG/DL (ref 5–25)
CALCIUM SERPL-MCNC: 9 MG/DL (ref 8.3–10.1)
CHLORIDE SERPL-SCNC: 104 MMOL/L (ref 100–108)
CLARITY UR: ABNORMAL
CO2 SERPL-SCNC: 27 MMOL/L (ref 21–32)
COLOR UR: ABNORMAL
CREAT SERPL-MCNC: 0.92 MG/DL (ref 0.6–1.3)
EOSINOPHIL # BLD AUTO: 0.02 THOUSAND/ΜL (ref 0–0.61)
EOSINOPHIL NFR BLD AUTO: 0 % (ref 0–6)
ERYTHROCYTE [DISTWIDTH] IN BLOOD BY AUTOMATED COUNT: 13.2 % (ref 11.6–15.1)
GFR SERPL CREATININE-BSD FRML MDRD: 61 ML/MIN/1.73SQ M
GLUCOSE SERPL-MCNC: 133 MG/DL (ref 65–140)
GLUCOSE SERPL-MCNC: 147 MG/DL (ref 65–140)
GLUCOSE SERPL-MCNC: 151 MG/DL (ref 65–140)
GLUCOSE UR STRIP-MCNC: NEGATIVE MG/DL
HCT VFR BLD AUTO: 44.7 % (ref 34.8–46.1)
HGB BLD-MCNC: 14.6 G/DL (ref 11.5–15.4)
HGB UR QL STRIP.AUTO: ABNORMAL
IMM GRANULOCYTES # BLD AUTO: 0.1 THOUSAND/UL (ref 0–0.2)
IMM GRANULOCYTES NFR BLD AUTO: 1 % (ref 0–2)
KETONES UR STRIP-MCNC: NEGATIVE MG/DL
LEUKOCYTE ESTERASE UR QL STRIP: ABNORMAL
LIPASE SERPL-CCNC: 84 U/L (ref 73–393)
LYMPHOCYTES # BLD AUTO: 1.26 THOUSANDS/ΜL (ref 0.6–4.47)
LYMPHOCYTES NFR BLD AUTO: 7 % (ref 14–44)
MCH RBC QN AUTO: 30.9 PG (ref 26.8–34.3)
MCHC RBC AUTO-ENTMCNC: 32.7 G/DL (ref 31.4–37.4)
MCV RBC AUTO: 95 FL (ref 82–98)
MONOCYTES # BLD AUTO: 1.36 THOUSAND/ΜL (ref 0.17–1.22)
MONOCYTES NFR BLD AUTO: 8 % (ref 4–12)
NEUTROPHILS # BLD AUTO: 14.15 THOUSANDS/ΜL (ref 1.85–7.62)
NEUTS SEG NFR BLD AUTO: 84 % (ref 43–75)
NITRITE UR QL STRIP: POSITIVE
NON-SQ EPI CELLS URNS QL MICRO: ABNORMAL /HPF
NRBC BLD AUTO-RTO: 0 /100 WBCS
PH UR STRIP.AUTO: 6 [PH] (ref 4.5–8)
PLATELET # BLD AUTO: 205 THOUSANDS/UL (ref 149–390)
PMV BLD AUTO: 10.8 FL (ref 8.9–12.7)
POTASSIUM SERPL-SCNC: 3.8 MMOL/L (ref 3.5–5.3)
PROT SERPL-MCNC: 7.4 G/DL (ref 6.4–8.2)
PROT UR STRIP-MCNC: NEGATIVE MG/DL
RBC # BLD AUTO: 4.72 MILLION/UL (ref 3.81–5.12)
RBC #/AREA URNS AUTO: ABNORMAL /HPF
SARS-COV-2 RNA RESP QL NAA+PROBE: NEGATIVE
SODIUM SERPL-SCNC: 140 MMOL/L (ref 136–145)
SP GR UR STRIP.AUTO: 1.02 (ref 1–1.03)
UROBILINOGEN UR QL STRIP.AUTO: 2 E.U./DL
WBC # BLD AUTO: 16.93 THOUSAND/UL (ref 4.31–10.16)
WBC #/AREA URNS AUTO: ABNORMAL /HPF

## 2020-08-16 PROCEDURE — 36415 COLL VENOUS BLD VENIPUNCTURE: CPT | Performed by: PHYSICIAN ASSISTANT

## 2020-08-16 PROCEDURE — 82948 REAGENT STRIP/BLOOD GLUCOSE: CPT

## 2020-08-16 PROCEDURE — 87635 SARS-COV-2 COVID-19 AMP PRB: CPT | Performed by: SURGERY

## 2020-08-16 PROCEDURE — 74177 CT ABD & PELVIS W/CONTRAST: CPT

## 2020-08-16 PROCEDURE — 85025 COMPLETE CBC W/AUTO DIFF WBC: CPT | Performed by: PHYSICIAN ASSISTANT

## 2020-08-16 PROCEDURE — 99219 PR INITIAL OBSERVATION CARE/DAY 50 MINUTES: CPT | Performed by: SURGERY

## 2020-08-16 PROCEDURE — 81001 URINALYSIS AUTO W/SCOPE: CPT

## 2020-08-16 PROCEDURE — 88304 TISSUE EXAM BY PATHOLOGIST: CPT | Performed by: PATHOLOGY

## 2020-08-16 PROCEDURE — 44970 LAPAROSCOPY APPENDECTOMY: CPT | Performed by: SURGERY

## 2020-08-16 PROCEDURE — 99285 EMERGENCY DEPT VISIT HI MDM: CPT

## 2020-08-16 PROCEDURE — 87186 SC STD MICRODIL/AGAR DIL: CPT

## 2020-08-16 PROCEDURE — 87086 URINE CULTURE/COLONY COUNT: CPT

## 2020-08-16 PROCEDURE — 83690 ASSAY OF LIPASE: CPT | Performed by: PHYSICIAN ASSISTANT

## 2020-08-16 PROCEDURE — 99285 EMERGENCY DEPT VISIT HI MDM: CPT | Performed by: PHYSICIAN ASSISTANT

## 2020-08-16 PROCEDURE — 80053 COMPREHEN METABOLIC PANEL: CPT | Performed by: PHYSICIAN ASSISTANT

## 2020-08-16 PROCEDURE — G1004 CDSM NDSC: HCPCS

## 2020-08-16 PROCEDURE — 87077 CULTURE AEROBIC IDENTIFY: CPT

## 2020-08-16 RX ORDER — FENTANYL CITRATE/PF 50 MCG/ML
25 SYRINGE (ML) INJECTION
Status: DISCONTINUED | OUTPATIENT
Start: 2020-08-16 | End: 2020-08-16 | Stop reason: HOSPADM

## 2020-08-16 RX ORDER — CEFAZOLIN SODIUM 2 G/50ML
2000 SOLUTION INTRAVENOUS EVERY 8 HOURS
Status: DISCONTINUED | OUTPATIENT
Start: 2020-08-16 | End: 2020-08-17 | Stop reason: HOSPADM

## 2020-08-16 RX ORDER — DEXAMETHASONE SODIUM PHOSPHATE 10 MG/ML
INJECTION, SOLUTION INTRAMUSCULAR; INTRAVENOUS AS NEEDED
Status: DISCONTINUED | OUTPATIENT
Start: 2020-08-16 | End: 2020-08-16

## 2020-08-16 RX ORDER — ONDANSETRON 2 MG/ML
4 INJECTION INTRAMUSCULAR; INTRAVENOUS EVERY 6 HOURS PRN
Status: DISCONTINUED | OUTPATIENT
Start: 2020-08-16 | End: 2020-08-17 | Stop reason: HOSPADM

## 2020-08-16 RX ORDER — LIDOCAINE HYDROCHLORIDE 10 MG/ML
INJECTION, SOLUTION EPIDURAL; INFILTRATION; INTRACAUDAL; PERINEURAL AS NEEDED
Status: DISCONTINUED | OUTPATIENT
Start: 2020-08-16 | End: 2020-08-16

## 2020-08-16 RX ORDER — MAGNESIUM HYDROXIDE 1200 MG/15ML
LIQUID ORAL AS NEEDED
Status: DISCONTINUED | OUTPATIENT
Start: 2020-08-16 | End: 2020-08-16 | Stop reason: HOSPADM

## 2020-08-16 RX ORDER — ROCURONIUM BROMIDE 10 MG/ML
INJECTION, SOLUTION INTRAVENOUS AS NEEDED
Status: DISCONTINUED | OUTPATIENT
Start: 2020-08-16 | End: 2020-08-16

## 2020-08-16 RX ORDER — SODIUM CHLORIDE, SODIUM LACTATE, POTASSIUM CHLORIDE, CALCIUM CHLORIDE 600; 310; 30; 20 MG/100ML; MG/100ML; MG/100ML; MG/100ML
125 INJECTION, SOLUTION INTRAVENOUS CONTINUOUS
Status: DISCONTINUED | OUTPATIENT
Start: 2020-08-16 | End: 2020-08-17

## 2020-08-16 RX ORDER — FENTANYL CITRATE 50 UG/ML
INJECTION, SOLUTION INTRAMUSCULAR; INTRAVENOUS AS NEEDED
Status: DISCONTINUED | OUTPATIENT
Start: 2020-08-16 | End: 2020-08-16

## 2020-08-16 RX ORDER — PROPOFOL 10 MG/ML
INJECTION, EMULSION INTRAVENOUS AS NEEDED
Status: DISCONTINUED | OUTPATIENT
Start: 2020-08-16 | End: 2020-08-16

## 2020-08-16 RX ORDER — ACETAMINOPHEN 325 MG/1
650 TABLET ORAL EVERY 6 HOURS PRN
Status: DISCONTINUED | OUTPATIENT
Start: 2020-08-16 | End: 2020-08-17 | Stop reason: HOSPADM

## 2020-08-16 RX ORDER — HEPARIN SODIUM 5000 [USP'U]/ML
5000 INJECTION, SOLUTION INTRAVENOUS; SUBCUTANEOUS EVERY 8 HOURS SCHEDULED
Status: DISCONTINUED | OUTPATIENT
Start: 2020-08-16 | End: 2020-08-17 | Stop reason: HOSPADM

## 2020-08-16 RX ORDER — EPHEDRINE SULFATE 50 MG/ML
INJECTION INTRAVENOUS AS NEEDED
Status: DISCONTINUED | OUTPATIENT
Start: 2020-08-16 | End: 2020-08-16

## 2020-08-16 RX ADMIN — INSULIN LISPRO 1 UNITS: 100 INJECTION, SOLUTION INTRAVENOUS; SUBCUTANEOUS at 21:28

## 2020-08-16 RX ADMIN — DEXAMETHASONE SODIUM PHOSPHATE 4 MG: 10 INJECTION, SOLUTION INTRAMUSCULAR; INTRAVENOUS at 15:39

## 2020-08-16 RX ADMIN — SODIUM CHLORIDE, SODIUM LACTATE, POTASSIUM CHLORIDE, AND CALCIUM CHLORIDE: .6; .31; .03; .02 INJECTION, SOLUTION INTRAVENOUS at 15:36

## 2020-08-16 RX ADMIN — ROCURONIUM BROMIDE 40 MG: 10 INJECTION, SOLUTION INTRAVENOUS at 15:38

## 2020-08-16 RX ADMIN — PROPOFOL 40 MG: 10 INJECTION, EMULSION INTRAVENOUS at 16:12

## 2020-08-16 RX ADMIN — SUGAMMADEX 200 MG: 100 INJECTION, SOLUTION INTRAVENOUS at 16:56

## 2020-08-16 RX ADMIN — EPHEDRINE SULFATE 10 MG: 50 INJECTION, SOLUTION INTRAVENOUS at 15:53

## 2020-08-16 RX ADMIN — METRONIDAZOLE 500 MG: 500 INJECTION, SOLUTION INTRAVENOUS at 21:25

## 2020-08-16 RX ADMIN — LIDOCAINE HYDROCHLORIDE 100 MG: 10 INJECTION, SOLUTION EPIDURAL; INFILTRATION; INTRACAUDAL; PERINEURAL at 15:38

## 2020-08-16 RX ADMIN — ONDANSETRON 4 MG: 2 INJECTION INTRAMUSCULAR; INTRAVENOUS at 15:39

## 2020-08-16 RX ADMIN — PROPOFOL 110 MG: 10 INJECTION, EMULSION INTRAVENOUS at 15:38

## 2020-08-16 RX ADMIN — SODIUM CHLORIDE, SODIUM LACTATE, POTASSIUM CHLORIDE, AND CALCIUM CHLORIDE: .6; .31; .03; .02 INJECTION, SOLUTION INTRAVENOUS at 16:38

## 2020-08-16 RX ADMIN — PHENYLEPHRINE HYDROCHLORIDE 100 MCG: 10 INJECTION INTRAVENOUS at 15:52

## 2020-08-16 RX ADMIN — METRONIDAZOLE 500 MG: 500 INJECTION, SOLUTION INTRAVENOUS at 15:45

## 2020-08-16 RX ADMIN — IOHEXOL 100 ML: 350 INJECTION, SOLUTION INTRAVENOUS at 12:16

## 2020-08-16 RX ADMIN — FENTANYL CITRATE 100 MCG: 50 INJECTION, SOLUTION INTRAMUSCULAR; INTRAVENOUS at 15:38

## 2020-08-16 RX ADMIN — CEFAZOLIN SODIUM 2000 MG: 2 SOLUTION INTRAVENOUS at 15:33

## 2020-08-16 RX ADMIN — CEFAZOLIN SODIUM 2000 MG: 2 SOLUTION INTRAVENOUS at 21:25

## 2020-08-16 RX ADMIN — HEPARIN SODIUM 5000 UNITS: 5000 INJECTION INTRAVENOUS; SUBCUTANEOUS at 21:25

## 2020-08-16 RX ADMIN — PHENYLEPHRINE HYDROCHLORIDE 100 MCG: 10 INJECTION INTRAVENOUS at 16:05

## 2020-08-16 NOTE — ANESTHESIA POSTPROCEDURE EVALUATION
Post-Op Assessment Note    CV Status:  Stable    Pain management: adequate     Mental Status:  Alert and awake   Hydration Status:  Euvolemic   PONV Controlled:  Controlled   Airway Patency:  Patent      Post Op Vitals Reviewed: Yes      Staff: Anesthesiologist         No complications documented      BP 99/50 (08/16/20 1804)    Temp      Pulse 84 (08/16/20 1804)   Resp 22 (08/16/20 1804)    SpO2 91 % (08/16/20 1804)

## 2020-08-16 NOTE — PLAN OF CARE
Problem: Prexisting or High Potential for Compromised Skin Integrity  Goal: Skin integrity is maintained or improved  Description: INTERVENTIONS:  - Identify patients at risk for skin breakdown  - Assess and monitor skin integrity  - Assess and monitor nutrition and hydration status  - Monitor labs   - Assess for incontinence   - Turn and reposition patient  - Assist with mobility/ambulation  - Relieve pressure over bony prominences  - Avoid friction and shearing  - Provide appropriate hygiene as needed including keeping skin clean and dry  - Evaluate need for skin moisturizer/barrier cream  - Collaborate with interdisciplinary team   - Patient/family teaching  - Consider wound care consult   Outcome: Progressing     Problem: Potential for Falls  Goal: Patient will remain free of falls  Description: INTERVENTIONS:  - Assess patient frequently for physical needs  -  Identify cognitive and physical deficits and behaviors that affect risk of falls    -  [place] fall precautions as indicated by assessment   - Educate patient/family on patient safety including physical limitations  - Instruct patient to call for assistance with activity based on assessment  - Modify environment to reduce risk of injury  - Consider OT/PT consult to assist with strengthening/mobility  Outcome: Progressing     Problem: NEUROSENSORY - ADULT  Goal: Achieves stable or improved neurological status  Description: INTERVENTIONS  - Monitor and report changes in neurological status  - Monitor vital signs such as temperature, blood pressure, glucose, and any other labs ordered   - Initiate measures to prevent increased intracranial pressure  - Monitor for seizure activity and implement precautions if appropriate      Outcome: Progressing  Goal: Remains free of injury related to seizures activity  Description: INTERVENTIONS  - Maintain airway, patient safety  and administer oxygen as ordered  - Monitor patient for seizure activity, document and report duration and description of seizure to physician/advanced practitioner  - If seizure occurs,  ensure patient safety during seizure  - Reorient patient post seizure  - Seizure pads on all 4 side rails  - Instruct patient/family to notify RN of any seizure activity including if an aura is experienced  - Instruct patient/family to call for assistance with activity based on nursing assessment  - Administer anti-seizure medications if ordered    Outcome: Progressing  Goal: Achieves maximal functionality and self care  Description: INTERVENTIONS  - Monitor swallowing and airway patency with patient fatigue and changes in neurological status  - Encourage and assist patient to increase activity and self care     - Encourage visually impaired, hearing impaired and aphasic patients to use assistive/communication devices  Outcome: Progressing     Problem: CARDIOVASCULAR - ADULT  Goal: Maintains optimal cardiac output and hemodynamic stability  Description: INTERVENTIONS:  - Monitor I/O, vital signs and rhythm  - Monitor for S/S and trends of decreased cardiac output  - Administer and titrate ordered vasoactive medications to optimize hemodynamic stability  - Assess quality of pulses, skin color and temperature  - Assess for signs of decreased coronary artery perfusion  - Instruct patient to report change in severity of symptoms  Outcome: Progressing  Goal: Absence of cardiac dysrhythmias or at baseline rhythm  Description: INTERVENTIONS:  - Continuous cardiac monitoring, vital signs, obtain 12 lead EKG if ordered  - Administer antiarrhythmic and heart rate control medications as ordered  - Monitor electrolytes and administer replacement therapy as ordered  Outcome: Progressing     Problem: RESPIRATORY - ADULT  Goal: Achieves optimal ventilation and oxygenation  Description: INTERVENTIONS:  - Assess for changes in respiratory status  - Assess for changes in mentation and behavior  - Position to facilitate oxygenation and minimize respiratory effort  - Oxygen administered by appropriate delivery if ordered  - Initiate smoking cessation education as indicated  - Encourage broncho-pulmonary hygiene including cough, deep breathe, Incentive Spirometry  - Assess the need for suctioning and aspirate as needed  - Assess and instruct to report SOB or any respiratory difficulty  - Respiratory Therapy support as indicated  Outcome: Progressing     Problem: GASTROINTESTINAL - ADULT  Goal: Minimal or absence of nausea and/or vomiting  Description: INTERVENTIONS:  - Administer IV fluids if ordered to ensure adequate hydration  - Maintain NPO status until nausea and vomiting are resolved  - Nasogastric tube if ordered  - Administer ordered antiemetic medications as needed  - Provide nonpharmacologic comfort measures as appropriate  - Advance diet as tolerated, if ordered  - Consider nutrition services referral to assist patient with adequate nutrition and appropriate food choices  Outcome: Progressing  Goal: Maintains or returns to baseline bowel function  Description: INTERVENTIONS:  - Assess bowel function  - Encourage oral fluids to ensure adequate hydration  - Administer IV fluids if ordered to ensure adequate hydration  - Administer ordered medications as needed  - Encourage mobilization and activity  - Consider nutritional services referral to assist patient with adequate nutrition and appropriate food choices  Outcome: Progressing  Goal: Maintains adequate nutritional intake  Description: INTERVENTIONS:  - Monitor percentage of each meal consumed  - Identify factors contributing to decreased intake, treat as appropriate  - Assist with meals as needed  - Monitor I&O, weight, and lab values if indicated  - Obtain nutrition services referral as needed  Outcome: Progressing     Problem: GENITOURINARY - ADULT  Goal: Maintains or returns to baseline urinary function  Description: INTERVENTIONS:  - Assess urinary function  - Encourage oral fluids to ensure adequate hydration if ordered  - Administer IV fluids as ordered to ensure adequate hydration  - Administer ordered medications as needed  - Offer frequent toileting  - Follow urinary retention protocol if ordered  Outcome: Progressing  Goal: Absence of urinary retention  Description: INTERVENTIONS:  - Assess patients ability to void and empty bladder  - Monitor I/O  - Bladder scan as needed  - Discuss with physician/AP medications to alleviate retention as needed  - Discuss catheterization for long term situations as appropriate  Outcome: Progressing     Problem: METABOLIC, FLUID AND ELECTROLYTES - ADULT  Goal: Electrolytes maintained within normal limits  Description: INTERVENTIONS:  - Monitor labs and assess patient for signs and symptoms of electrolyte imbalances  - Administer electrolyte replacement as ordered  - Monitor response to electrolyte replacements, including repeat lab results as appropriate  - Instruct patient on fluid and nutrition as appropriate  Outcome: Progressing  Goal: Fluid balance maintained  Description: INTERVENTIONS:  - Monitor labs   - Monitor I/O and WT  - Instruct patient on fluid and nutrition as appropriate  - Assess for signs & symptoms of volume excess or deficit  Outcome: Progressing  Goal: Glucose maintained within target range  Description: INTERVENTIONS:  - Monitor Blood Glucose as ordered  - Assess for signs and symptoms of hyperglycemia and hypoglycemia  - Administer ordered medications to maintain glucose within target range  - Assess nutritional intake and initiate nutrition service referral as needed  Outcome: Progressing     Problem: SKIN/TISSUE INTEGRITY - ADULT  Goal: Skin integrity remains intact  Description: INTERVENTIONS  - Identify patients at risk for skin breakdown  - Assess and monitor skin integrity  - Assess and monitor nutrition and hydration status  - Monitor labs (i e  albumin)  - Assess for incontinence   - Turn and reposition patient  - Assist with mobility/ambulation  - Relieve pressure over bony prominences  - Avoid friction and shearing  - Provide appropriate hygiene as needed including keeping skin clean and dry  - Evaluate need for skin moisturizer/barrier cream  - Collaborate with interdisciplinary team (i e  Nutrition, Rehabilitation, etc )   - Patient/family teaching  Outcome: Progressing  Goal: Incision(s), wounds(s) or drain site(s) healing without S/S of infection  Description: INTERVENTIONS  - Assess and document risk factors for skin impairment   - Assess and document dressing, incision, wound bed, drain sites and surrounding tissue  - Consider nutrition services referral as needed  - Oral mucous membranes remain intact  - Provide patient/ family education  Outcome: Progressing  Goal: Oral mucous membranes remain intact  Description: INTERVENTIONS  - Assess oral mucosa and hygiene practices  - Implement preventative oral hygiene regimen  - Implement oral medicated treatments as ordered  - Initiate Nutrition services referral as needed  Outcome: Progressing     Problem: HEMATOLOGIC - ADULT  Goal: Maintains hematologic stability  Description: INTERVENTIONS  - Assess for signs and symptoms of bleeding or hemorrhage  - Monitor labs  - Administer supportive blood products/factors as ordered and appropriate  Outcome: Progressing     Problem: MUSCULOSKELETAL - ADULT  Goal: Maintain or return mobility to safest level of function  Description: INTERVENTIONS:  - Assess patient's ability to carry out ADLs; assess patient's baseline for ADL function and identify physical deficits which impact ability to perform ADLs (bathing, care of mouth/teeth, toileting, grooming, dressing, etc )  - Assess/evaluate cause of self-care deficits   - Assess range of motion  - Assess patient's mobility  - Assess patient's need for assistive devices and provide as appropriate  - Encourage maximum independence but intervene and supervise when necessary  - Involve family in performance of ADLs  - Assess for home care needs following discharge   - Consider OT consult to assist with ADL evaluation and planning for discharge  - Provide patient education as appropriate  Outcome: Progressing  Goal: Maintain proper alignment of affected body part  Description: INTERVENTIONS:  - Support, maintain and protect limb and body alignment  - Provide patient/ family with appropriate education  Outcome: Progressing

## 2020-08-16 NOTE — ANESTHESIA PREPROCEDURE EVALUATION
Procedure:  APPENDECTOMY LAPAROSCOPIC (N/A Abdomen)    Relevant Problems   CARDIO   (+) Essential hypertension   (+) Other hyperlipidemia      ENDO   (+) Type 2 diabetes mellitus without complication (HCC)      NEURO/PSYCH   (+) Anxiety   (+) History of hypertension
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)   (+) Parkinson's disease with use of electrical brain stimulation (HCC)        Physical Exam    Airway    Mallampati score: III  TM Distance: >3 FB       Dental       Cardiovascular  Rhythm: regular, Rate: normal,     Pulmonary  Breath sounds clear to auscultation,     Other Findings  Upper teeth permanent implant      Anesthesia Plan  ASA Score- 3 Emergent    Anesthesia Type- general with ASA Monitors  Additional Monitors:   Airway Plan: ETT  Plan Factors-Exercise tolerance (METS): <4 METS  Chart reviewed  Patient is not a current smoker  Patient did not smoke on day of surgery  Induction- intravenous  Postoperative Plan- Plan for postoperative opioid use  Informed Consent- Anesthetic plan and risks discussed with patient and spouse 
4 = completely dependent

## 2020-08-16 NOTE — H&P
H&P Exam - General Surgery   Lana Gupta 75 y o  female MRN: [id number]  Unit/Bed#: ED 20 Encounter: [id number]    Assessment/Plan     Assessment:  74yo F with acute appendicitis    Plan:  · Admit to general surgery for observation  · To OR for laparoscopic appendectomy  · NPO  · IVF while NPO  · Ancef/flagyl  · PRN analgesia  · Holding home meds while NPO  · Will start DVT PPx post-operatively    History of Present Illness     HPI:  Lana Gupta is a 75 y o  female with PMHx of DM, Parkinson's, urinary incontinence, HTN, HLD, who presents with abdominal pain  She states the pain began about 2 days ago, is constant and sharp in character, and is located primarily in the right lower quadrant  Denies any associated fevers/chills, nausea/vomiting, or other symptoms  No previous abdominal surgeries  Last PO intake was yesterday evening  Review of Systems   Constitutional: Negative for chills and fever  Gastrointestinal: Positive for abdominal pain (RLQ)  Negative for constipation, diarrhea, nausea and vomiting  Genitourinary: Negative for dysuria  All other systems reviewed and are negative  Historical Information   Past Medical History:   Diagnosis Date    Anxiety     Broken hip ([address] )     [facility] mellitus [facility] [address] [facility]     Diabetes mellitus type 2, diet-controlled [facility] [address] [facility]     Hyperlipidemia     Hypertension     Parkinson disease ([facility] [address] )      Past Surgical History:   Procedure Laterality Date    ACHILLES TENDON SURGERY      DEEP BRAIN STIMULATOR PLACEMENT      DENTAL SURGERY      FRACTURE SURGERY      MO IMP STIM,CRANIAL,SUBQ,1 ARRAY Right 12/18/2018    Procedure: REPLACEMENT IMPLANTABLE PULSE GENERATOR (IPG) DEEP BRAIN STIMULATION (DBS); Surgeon: Marie Springer MD;  Location: [facility];  Service: Neurosurgery    MO OPEN RX FEMUR FX+INTRAMED ERIKA Right 12/14/2019    Procedure: INSERTION NAIL IM FEMUR ANTEGRADE (TROCHANTERIC);   Surgeon: Gabe Yan DO;  Location: [facility];  Service: Orthopedics    REPLACEMENT TOTAL KNEE      REVERSE TOTAL SHOULDER ARTHROPLASTY Left 8/23/2018    Procedure: ARTHROPLASTY SHOULDER REVERSE;  Surgeon: Roel Lemon MD;  Location[facility];  Service: Orthopedics    TONSILLECTOMY       Social History   Social History     Substance and Sexual Activity   Alcohol Use Yes    Alcohol/week: 2 0 standard drinks    Types: 2 Glasses of wine per week    Frequency: Monthly or less    Drinks per session: 1 or 2    Comment: occasional     Social History     Substance and Sexual Activity   Drug Use No     Social History     Tobacco Use   Smoking Status Never Smoker   Smokeless Tobacco Never Used     E-Cigarette/Vaping    E-Cigarette Use Never User      E-Cigarette/Vaping Substances    Nicotine No     THC No     CBD No     Flavoring No     Other No     Unknown No      Family History:   Family History   Problem Relation Age of Onset    Arthritis Mother     No Known Problems Father     No Known Problems Maternal Grandmother     No Known Problems Maternal Grandfather     No Known Problems Paternal Grandmother     No Known Problems Paternal Grandfather     No Known Problems Son     No Known Problems Son        Meds/Allergies   PTA meds:   Prior to Admission Medications   Prescriptions Last Dose Informant Patient Reported? Taking?    Chromium-Cinnamon (CINNAMON PLUS CHROMIUM) 200-1000 MCG-MG CAPS   Yes No   Sig: Take 1,000 mg by mouth 2 (two) times a day   Coenzyme Q10 400 MG CAPS  Self No Yes   Sig: Take 1 capsule (400 mg total) by mouth daily   Omega-3 Fatty Acids (FISH OIL) 1,000 mg  Self No Yes   Sig: Take 1 capsule (1,000 mg total) by mouth daily   PARoxetine (PAXIL) 20 mg tablet  Self No Yes   Sig: Take 1 tablet (20 mg total) by mouth daily   acetaminophen (TYLENOL) 325 mg tablet   No No   Sig: Take 2 tablets (650 mg total) by mouth every 6 (six) hours as needed for mild pain, headaches or fever   aspirin 81 MG tablet  Self Yes Yes   Sig: Take 1 tablet by mouth daily   atenolol (TENORMIN) 50 mg tablet  Self No Yes   Sig: Take 1 tablet (50 mg total) by mouth daily   ciprofloxacin (CIPRO) 250 mg tablet   Yes No   Sig: Take 250 mg by mouth every 12 (twelve) hours   enoxaparin (LOVENOX) 40 mg/0 4 mL   No No   Sig: Inject 0 4 mL (40 mg total) under the skin daily for 8 days   rasagiline (AZILECT) 1 MG   No Yes   Sig: TAKE 1 TABLET BY MOUTH  DAILY   rivastigmine (EXELON) 3 mg capsule   No No   Sig: Take 1 capsule (3 mg total) by mouth 2 (two) times a day   rosuvastatin (CRESTOR) 5 mg tablet  Self No Yes   Sig: Take 1 tablet (5 mg total) by mouth every other day      Facility-Administered Medications: None     Allergies   Allergen Reactions    Sulfa Antibiotics Hives       Objective   First Vitals:   Blood Pressure: 112/65 (08/16/20 1049)  Pulse: 76 (08/16/20 1049)  Temperature: 98 2 °F (36 8 °C) (08/16/20 1049)  Temp Source: Oral (08/16/20 1049)  Respirations: 18 (08/16/20 1049)  Weight - Scale: 104 kg (229 lb 4 5 oz) (08/16/20 1049)  SpO2: 91 % (08/16/20 1049)    Current Vitals:   Blood Pressure: 112/65 (08/16/20 1049)  Pulse: 76 (08/16/20 1049)  Temperature: 98 2 °F (36 8 °C) (08/16/20 1049)  Temp Source: Oral (08/16/20 1049)  Respirations: 18 (08/16/20 1049)  Weight - Scale: 104 kg (229 lb 4 5 oz) (08/16/20 1049)  SpO2: 91 % (08/16/20 1049)    No intake or output data in the 24 hours ending 08/16/20 1300    Invasive Devices     Peripheral Intravenous Line            Peripheral IV 08/16/20 Left Forearm less than 1 day                Physical Exam  Constitutional:       General: She is not in acute distress  Appearance: She is well-developed  She is not diaphoretic  HENT:      Head: Normocephalic and atraumatic  Eyes:      Pupils: Pupils are equal, round, and reactive to light  Neck:      Musculoskeletal: Normal range of motion and neck supple  Vascular: No JVD  Cardiovascular:      Rate and Rhythm: Normal rate and regular rhythm     Pulmonary: Effort: Pulmonary effort is normal  No respiratory distress  Abdominal:      General: There is no distension  Palpations: Abdomen is soft  Tenderness: There is abdominal tenderness in the right lower quadrant and periumbilical area  There is no guarding or rebound  Comments: Obese abdomen with large overlying pannus  RLQ tender without rebound/guarding   Musculoskeletal: Normal range of motion  General: No deformity  Skin:     General: Skin is warm and dry  Coloration: Skin is not pale  Findings: No erythema or rash  Neurological:      Mental Status: She is alert and oriented to person, place, and time  Lab Results:   CBC:   Lab Results   Component Value Date    WBC 16 93 (H) 08/16/2020    HGB 14 6 08/16/2020    HCT 44 7 08/16/2020    MCV 95 08/16/2020     08/16/2020    MCH 30 9 08/16/2020    MCHC 32 7 08/16/2020    RDW 13 2 08/16/2020    MPV 10 8 08/16/2020    NRBC 0 08/16/2020   CMP:   Lab Results   Component Value Date    SODIUM 140 08/16/2020    K 3 8 08/16/2020     08/16/2020    CO2 27 08/16/2020    BUN 18 08/16/2020    CREATININE 0 92 08/16/2020    CALCIUM 9 0 08/16/2020    AST 7 08/16/2020    ALT 14 08/16/2020    ALKPHOS 53 08/16/2020    EGFR 61 08/16/2020   Urinalysis:   Lab Results   Component Value Date    COLORU Leti 08/16/2020    CLARITYU Cloudy 08/16/2020    SPECGRAV 1 025 08/16/2020    PHUR 6 0 08/16/2020    LEUKOCYTESUR Trace (A) 08/16/2020    NITRITE Positive (A) 08/16/2020    GLUCOSEU Negative 08/16/2020    KETONESU Negative 08/16/2020    BILIRUBINUR Negative 08/16/2020    BLOODU Moderate (A) 08/16/2020   Lipase:   Lab Results   Component Value Date    LIPASE 84 08/16/2020     Imaging: I have personally reviewed pertinent reports  and I have personally reviewed pertinent films in PACS  EKG, Pathology, and Other Studies: I have personally reviewed pertinent reports        Code Status: Prior  Advance Directive and Living Will:      Power of :    POLST:

## 2020-08-16 NOTE — ED PROVIDER NOTES
History  Chief Complaint   Patient presents with    Abdominal Pain     Pt reports RLQ abd pain that began yesterday  Pt denies vomiting or diarrhea  No fevers  Pt incontinent of urine       74y  o female with PMH of anxiety, DM, HLD, HTN and parkinsons presents to the ER for RLQ pain for 2 days  Patient denies taking any medication for pain  She describes her pain as sharp and non-radiating  Pain is constant  She denies fever, chills, URI symptoms, chest pain, dyspnea, N/V/D, abdominal pain, weakness or paresthesias  Patient was incontinent of urine when she was brought in by EMS but patient states that is her baseline  History provided by:  Patient   used: No        Prior to Admission Medications   Prescriptions Last Dose Informant Patient Reported? Taking?    Chromium-Cinnamon (CINNAMON PLUS CHROMIUM) 200-1000 MCG-MG CAPS   Yes No   Sig: Take 1,000 mg by mouth 2 (two) times a day   Coenzyme Q10 400 MG CAPS  Self No Yes   Sig: Take 1 capsule (400 mg total) by mouth daily   Omega-3 Fatty Acids (FISH OIL) 1,000 mg  Self No Yes   Sig: Take 1 capsule (1,000 mg total) by mouth daily   PARoxetine (PAXIL) 20 mg tablet  Self No Yes   Sig: Take 1 tablet (20 mg total) by mouth daily   acetaminophen (TYLENOL) 325 mg tablet   No No   Sig: Take 2 tablets (650 mg total) by mouth every 6 (six) hours as needed for mild pain, headaches or fever   aspirin 81 MG tablet  Self Yes Yes   Sig: Take 1 tablet by mouth daily   atenolol (TENORMIN) 50 mg tablet  Self No Yes   Sig: Take 1 tablet (50 mg total) by mouth daily   ciprofloxacin (CIPRO) 250 mg tablet   Yes No   Sig: Take 250 mg by mouth every 12 (twelve) hours   enoxaparin (LOVENOX) 40 mg/0 4 mL   No No   Sig: Inject 0 4 mL (40 mg total) under the skin daily for 8 days   rasagiline (AZILECT) 1 MG   No Yes   Sig: TAKE 1 TABLET BY MOUTH  DAILY   rivastigmine (EXELON) 3 mg capsule   No No   Sig: Take 1 capsule (3 mg total) by mouth 2 (two) times a day rosuvastatin (CRESTOR) 5 mg tablet  Self No Yes   Sig: Take 1 tablet (5 mg total) by mouth every other day      Facility-Administered Medications: None       Past Medical History:   Diagnosis Date    Anxiety     Broken hip ([facility] [address] )     Diabetes mellitus ([place] [address] )     Diabetes mellitus type 2, diet-controlled ([place] [address] )     Hyperlipidemia     Hypertension     Parkinson disease ([place] [address] )        Past Surgical History:   Procedure Laterality Date    ACHILLES TENDON SURGERY      DEEP BRAIN STIMULATOR PLACEMENT      DENTAL SURGERY      FRACTURE SURGERY      MT IMP STIM,CRANIAL,SUBQ,1 ARRAY Right 12/18/2018    Procedure: REPLACEMENT IMPLANTABLE PULSE GENERATOR (IPG) DEEP BRAIN STIMULATION (DBS); Surgeon: Linus Jerome MD;  Location:  MAIN OR;  Service: Neurosurgery    MT OPEN RX FEMUR FX+INTRAMED ERIKA Right 12/14/2019    Procedure: INSERTION NAIL IM FEMUR ANTEGRADE (TROCHANTERIC); Surgeon: Eevlyn Rivera DO;  Location: [facility];  Service: Orthopedics    REPLACEMENT TOTAL KNEE      REVERSE TOTAL SHOULDER ARTHROPLASTY Left 8/23/2018    Procedure: ARTHROPLASTY SHOULDER REVERSE;  Surgeon: Missy Martins MD;  [facility];  Service: Orthopedics    TONSILLECTOMY         Family History   Problem Relation Age of Onset    Arthritis Mother     No Known Problems Father     No Known Problems Maternal Grandmother     No Known Problems Maternal Grandfather     No Known Problems Paternal Grandmother     No Known Problems Paternal Grandfather     No Known Problems Son     No Known Problems Son      I have reviewed and agree with the history as documented  E-Cigarette/Vaping    E-Cigarette Use Never User      E-Cigarette/Vaping Substances    Nicotine No     THC No     CBD No     Flavoring No     Other No     Unknown No      Social History     Tobacco Use    Smoking status: Never Smoker    Smokeless tobacco: Never Used   Substance Use Topics    Alcohol use:  Yes     Alcohol/week: 2 0 standard drinks Types: 2 Glasses of wine per week     Frequency: Monthly or less     Drinks per session: 1 or 2     Comment: occasional    Drug use: No       Review of Systems   Constitutional: Negative for activity change, appetite change, chills and fever  HENT: Negative for congestion, drooling, ear discharge, ear pain, facial swelling, rhinorrhea and sore throat  Eyes: Negative for redness  Respiratory: Negative for cough and shortness of breath  Cardiovascular: Negative for chest pain  Gastrointestinal: Positive for abdominal pain  Negative for diarrhea, nausea and vomiting  Genitourinary: Negative for dysuria, frequency, hematuria and urgency  Musculoskeletal: Negative for neck stiffness  Skin: Negative for rash  Allergic/Immunologic: Negative for food allergies  Neurological: Negative for weakness and numbness  Physical Exam  Physical Exam  Vitals signs and nursing note reviewed  Constitutional:       General: She is not in acute distress  Appearance: She is not toxic-appearing  HENT:      Head: Normocephalic and atraumatic  Eyes:      Conjunctiva/sclera: Conjunctivae normal    Neck:      Musculoskeletal: Normal range of motion and neck supple  Trachea: No tracheal deviation  Cardiovascular:      Rate and Rhythm: Normal rate and regular rhythm  Heart sounds: Normal heart sounds, S1 normal and S2 normal  No murmur  No friction rub  No gallop  Pulmonary:      Effort: Pulmonary effort is normal  No respiratory distress  Breath sounds: Normal breath sounds  No decreased breath sounds, wheezing, rhonchi or rales  Chest:      Chest wall: No tenderness  Abdominal:      General: Bowel sounds are normal  There is no distension  Palpations: Abdomen is soft  Tenderness: There is abdominal tenderness in the right lower quadrant  There is no guarding or rebound  Positive signs include Rovsing's sign and McBurney's sign  Skin:     General: Skin is warm and dry  Findings: No rash  Neurological:      Mental Status: She is alert  GCS: GCS eye subscore is 4  GCS verbal subscore is 5  GCS motor subscore is 6  Vital Signs  ED Triage Vitals [08/16/20 1049]   Temperature Pulse Respirations Blood Pressure SpO2   98 2 °F (36 8 °C) 76 18 112/65 91 %      Temp Source Heart Rate Source Patient Position - Orthostatic VS BP Location FiO2 (%)   Oral Monitor Lying Left arm --      Pain Score       --           Vitals:    08/16/20 1049 08/16/20 1331   BP: 112/65 121/58   Pulse: 76 71   Patient Position - Orthostatic VS: Lying Lying         Visual Acuity      ED Medications  Medications   lactated ringers infusion (has no administration in time range)   ondansetron (ZOFRAN) injection 4 mg (has no administration in time range)   heparin (porcine) subcutaneous injection 5,000 Units (has no administration in time range)   ceFAZolin (ANCEF) IVPB (premix) 2,000 mg 50 mL (has no administration in time range)   metroNIDAZOLE (FLAGYL) IVPB (premix) 500 mg 100 mL (has no administration in time range)   insulin lispro (HumaLOG) 100 units/mL subcutaneous injection 1-6 Units (has no administration in time range)   acetaminophen (TYLENOL) tablet 650 mg (has no administration in time range)   iohexol (OMNIPAQUE) 350 MG/ML injection (MULTI-DOSE) 100 mL (100 mL Intravenous Given 8/16/20 1216)       Diagnostic Studies  Results Reviewed     Procedure Component Value Units Date/Time    Platelet count [[id number]]     Lab Status:  No result Specimen:  Blood     Urine Microscopic [[id number]]  (Abnormal) Collected:  08/16/20 1207    Lab Status:  Final result Specimen:  Urine, Clean Catch Updated:  08/16/20 1245     RBC, UA 0-1 /hpf      WBC, UA 10-20 /hpf      Epithelial Cells Occasional /hpf      Bacteria, UA Innumerable /hpf     Urine culture [[id number]] Collected:  08/16/20 1207    Lab Status:   In process Specimen:  Urine, Clean Catch Updated:  08/16/20 1244    POCT urinalysis dipstick [[id number]]  (Abnormal) Resulted:  08/16/20 1210    Lab Status:  Final result Specimen:  Urine Updated:  08/16/20 1210    Urine Macroscopic, POC [[id number]]  (Abnormal) Collected:  08/16/20 1207    Lab Status:  Final result Specimen:  Urine Updated:  08/16/20 1209     Color, UA Leti     Clarity, UA Cloudy     pH, UA 6 0     Leukocytes, UA Trace     Nitrite, UA Positive     Protein, UA Negative mg/dl      Glucose, UA Negative mg/dl      Ketones, UA Negative mg/dl      Urobilinogen, UA 2 0 E U /dl      Bilirubin, UA Negative     Blood, UA Moderate     Specific Gravity, UA 1 025    Narrative:       CLINITEK RESULT    Comprehensive metabolic panel [[id number]]  (Abnormal) Collected:  08/16/20 1127    Lab Status:  Final result Specimen:  Blood from Arm, Left Updated:  08/16/20 1150     Sodium 140 mmol/L      Potassium 3 8 mmol/L      Chloride 104 mmol/L      CO2 27 mmol/L      ANION GAP 9 mmol/L      BUN 18 mg/dL      Creatinine 0 92 mg/dL      Glucose 147 mg/dL      Calcium 9 0 mg/dL      AST 7 U/L      ALT 14 U/L      Alkaline Phosphatase 53 U/L      Total Protein 7 4 g/dL      Albumin 3 3 g/dL      Total Bilirubin 0 94 mg/dL      eGFR 61 ml/min/1 73sq m     Narrative:       Angélica guidelines for Chronic Kidney Disease (CKD):     Stage 1 with normal or high GFR (GFR > 90 mL/min/1 73 square meters)    Stage 2 Mild CKD (GFR = 60-89 mL/min/1 73 square meters)    Stage 3A Moderate CKD (GFR = 45-59 mL/min/1 73 square meters)    Stage 3B Moderate CKD (GFR = 30-44 mL/min/1 73 square meters)    Stage 4 Severe CKD (GFR = 15-29 mL/min/1 73 square meters)    Stage 5 End Stage CKD (GFR <15 mL/min/1 73 square meters)  Note: GFR calculation is accurate only with a steady state creatinine    Lipase [[id number]]  (Normal) Collected:  08/16/20 1127    Lab Status:  Final result Specimen:  Blood from Arm, Left Updated:  08/16/20 1150     Lipase 84 u/L     CBC and differential [[id number]]  (Abnormal) Collected:  08/16/20 1127    Lab Status:  Final result Specimen:  Blood from Arm, Left Updated:  08/16/20 1137     WBC 16 93 Thousand/uL      RBC 4 72 Million/uL      Hemoglobin 14 6 g/dL      Hematocrit 44 7 %      MCV 95 fL      MCH 30 9 pg      MCHC 32 7 g/dL      RDW 13 2 %      MPV 10 8 fL      Platelets 204 Thousands/uL      nRBC 0 /100 WBCs      Neutrophils Relative 84 %      Immat GRANS % 1 %      Lymphocytes Relative 7 %      Monocytes Relative 8 %      Eosinophils Relative 0 %      Basophils Relative 0 %      Neutrophils Absolute 14 15 Thousands/µL      Immature Grans Absolute 0 10 Thousand/uL      Lymphocytes Absolute 1 26 Thousands/µL      Monocytes Absolute 1 36 Thousand/µL      Eosinophils Absolute 0 02 Thousand/µL      Basophils Absolute 0 04 Thousands/µL                  CT abdomen pelvis with contrast   Final Result by Martin Tracy MD (08/16 1240)         1  Acute appendicitis  No periappendiceal abscess or pneumoperitoneum  2   Minimal & pelvic ascites concerning for peritonitis  3   Pancolonic diverticulosis  4   Incidental 3 8 cm right ovarian simple-appearing cyst   According to current guidelines [facility] Radiol 2020; 95:950-246) in this postmenopausal woman, this should be followed up in 6 to 12 months by pelvic ultrasound  I personally discussed this study with Conor Borjas on 8/16/2020 at 12:38 PM                            Workstation performed: [id number]                    Procedures  Procedures         ED Course  ED Course as of Aug 16 1408   Sun Aug 16, 2020   1138 WBC(!): 16 93   1210 Show signs of UTI   Urine Macroscopic, POC(!)   1210 Awaiting CT abdomen        [facility] text sent to general surgery                                                MDM  Number of Diagnoses or Management Options  Acute appendicitis: new and requires workup  Diverticulosis: new and requires workup  Hyperglycemia: new and requires workup  Right ovarian cyst: new and requires workup  UTI (urinary tract infection): new and requires workup  Diagnosis management comments: DDX consists of but not limited to: appendicitis, UTI, kidney stone, abdominal pathology    Will check labs and imaging  Offered pain medication but patient declined  1138 WBC(!): 16 93    1210 Show signs of UTI - Urine Macroscopic, POC(!)    1210 Awaiting CT abdomen  CT shows acute appendicitis  Informed patient of CT findings  [address] text sent to general surgery     Spoke with Dr Gabo Núñez from general surgery  He will come see the patient  Patient seen by general surgery  Admission orders placed by general surgery  Patient agreeable to plan for admission  Patient stable at time of transfer to the floor  Amount and/or Complexity of Data Reviewed  Clinical lab tests: ordered and reviewed  Tests in the radiology section of CPT®: ordered and reviewed  Discuss the patient with other providers: yes    Patient Progress  Patient progress: stable        Disposition  Final diagnoses:   Acute appendicitis   UTI (urinary tract infection)   Hyperglycemia   Right ovarian cyst   Diverticulosis     Time reflects when diagnosis was documented in both MDM as applicable and the Disposition within this note     Time User Action Codes Description Comment    8/16/2020  1:11 PM Mabel MICHAELS Add [K35 80] Acute appendicitis     8/16/2020  1:11 PM Mabel Brown A Add [N39 0] UTI (urinary tract infection)     8/16/2020  1:11 PM Mabel MICHAELS Add [R73 9] Hyperglycemia     8/16/2020  1:12 PM Talisha Loja Add [N83 201] Right ovarian cyst     8/16/2020  1:12 PM Talisha Loja Add [K57 90] Diverticulosis       ED Disposition     ED Disposition Condition Date/Time Comment    Admit Stable Sun Aug 16, 2020  1:46 PM Case was discussed with Dr Sandhya Muller from general surgery and the patient's admission status was agreed to be Admission Status: observation status to the service of Dr Fawn Bocanegra           Follow-up Information    None         Current Discharge Medication List      CONTINUE these medications which have NOT CHANGED    Details   aspirin 81 MG tablet Take 1 tablet by mouth daily      atenolol (TENORMIN) 50 mg tablet Take 1 tablet (50 mg total) by mouth daily  Qty: 30 tablet, Refills: 0    Associated Diagnoses: History of hypertension      Coenzyme Q10 400 MG CAPS Take 1 capsule (400 mg total) by mouth daily  Qty: 30 capsule, Refills: 0    Associated Diagnoses: Type 2 diabetes mellitus without complication ([facility])      Omega-3 Fatty Acids (FISH OIL) 1,000 mg Take 1 capsule (1,000 mg total) by mouth daily  Qty: 30 capsule, Refills: 0    Associated Diagnoses: Other hyperlipidemia      PARoxetine (PAXIL) 20 mg tablet Take 1 tablet (20 mg total) by mouth daily  Qty: 30 tablet, Refills: 0    Associated Diagnoses: Anxiety      rasagiline (AZILECT) 1 MG TAKE 1 TABLET BY MOUTH  DAILY  Qty: 90 tablet, Refills: 2    Associated Diagnoses: Parkinson's disease with use of electrical brain stimulation ([facility])      rosuvastatin (CRESTOR) 5 mg tablet Take 1 tablet (5 mg total) by mouth every other day  Qty: 30 tablet, Refills: 0    Associated Diagnoses: Other hyperlipidemia      acetaminophen (TYLENOL) 325 mg tablet Take 2 tablets (650 mg total) by mouth every 6 (six) hours as needed for mild pain, headaches or fever  Qty: 30 tablet, Refills: 0    Associated Diagnoses: Closed fracture of right hip, initial encounter ([facility])      Chromium-Cinnamon (CINNAMON PLUS CHROMIUM) 200-1000 MCG-MG CAPS Take 1,000 mg by mouth 2 (two) times a day      ciprofloxacin (CIPRO) 250 mg tablet Take 250 mg by mouth every 12 (twelve) hours      enoxaparin (LOVENOX) 40 mg/0 4 mL Inject 0 4 mL (40 mg total) under the skin daily for 8 days  Qty: 8 Syringe, Refills: 0    Associated Diagnoses: Closed fracture of right hip, initial encounter ([facility])      rivastigmine (EXELON) 3 mg capsule Take 1 capsule (3 mg total) by mouth 2 (two) times a day  Qty: 180 capsule, Refills: 3    Associated Diagnoses: 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

## 2020-08-17 VITALS
HEART RATE: 73 BPM | TEMPERATURE: 98.2 F | WEIGHT: 229.28 LBS | SYSTOLIC BLOOD PRESSURE: 99 MMHG | DIASTOLIC BLOOD PRESSURE: 60 MMHG | RESPIRATION RATE: 18 BRPM | OXYGEN SATURATION: 93 % | BODY MASS INDEX: 35.91 KG/M2

## 2020-08-17 PROBLEM — K35.30 ACUTE APPENDICITIS WITH LOCALIZED PERITONITIS: Status: RESOLVED | Noted: 2020-08-16 | Resolved: 2020-08-17

## 2020-08-17 LAB
GLUCOSE SERPL-MCNC: 140 MG/DL (ref 65–140)
GLUCOSE SERPL-MCNC: 156 MG/DL (ref 65–140)
GLUCOSE SERPL-MCNC: 162 MG/DL (ref 65–140)
GLUCOSE SERPL-MCNC: 176 MG/DL (ref 65–140)

## 2020-08-17 PROCEDURE — 99024 POSTOP FOLLOW-UP VISIT: CPT | Performed by: SURGERY

## 2020-08-17 PROCEDURE — NC001 PR NO CHARGE: Performed by: SURGERY

## 2020-08-17 PROCEDURE — 82948 REAGENT STRIP/BLOOD GLUCOSE: CPT

## 2020-08-17 RX ORDER — AMOXICILLIN AND CLAVULANATE POTASSIUM 875; 125 MG/1; MG/1
1 TABLET, FILM COATED ORAL EVERY 12 HOURS SCHEDULED
Qty: 14 TABLET | Refills: 0 | Status: SHIPPED | OUTPATIENT
Start: 2020-08-17 | End: 2020-08-24

## 2020-08-17 RX ORDER — ACETAMINOPHEN 325 MG/1
650 TABLET ORAL EVERY 6 HOURS PRN
Qty: 30 TABLET | Refills: 0 | Status: SHIPPED | OUTPATIENT
Start: 2020-08-17 | End: 2022-06-27

## 2020-08-17 RX ORDER — HYDROCODONE BITARTRATE AND ACETAMINOPHEN 5; 325 MG/1; MG/1
1 TABLET ORAL EVERY 4 HOURS PRN
Qty: 10 TABLET | Refills: 0 | Status: SHIPPED | OUTPATIENT
Start: 2020-08-17 | End: 2021-03-29 | Stop reason: ALTCHOICE

## 2020-08-17 RX ORDER — AMOXICILLIN AND CLAVULANATE POTASSIUM 875; 125 MG/1; MG/1
1 TABLET, FILM COATED ORAL EVERY 12 HOURS SCHEDULED
Qty: 14 TABLET | Refills: 0 | Status: SHIPPED | OUTPATIENT
Start: 2020-08-17 | End: 2020-08-17 | Stop reason: SDUPTHER

## 2020-08-17 RX ADMIN — SODIUM CHLORIDE, SODIUM LACTATE, POTASSIUM CHLORIDE, AND CALCIUM CHLORIDE 125 ML/HR: .6; .31; .03; .02 INJECTION, SOLUTION INTRAVENOUS at 01:29

## 2020-08-17 RX ADMIN — METRONIDAZOLE 500 MG: 500 INJECTION, SOLUTION INTRAVENOUS at 05:17

## 2020-08-17 RX ADMIN — HEPARIN SODIUM 5000 UNITS: 5000 INJECTION INTRAVENOUS; SUBCUTANEOUS at 05:17

## 2020-08-17 RX ADMIN — INSULIN LISPRO 1 UNITS: 100 INJECTION, SOLUTION INTRAVENOUS; SUBCUTANEOUS at 06:36

## 2020-08-17 RX ADMIN — CEFAZOLIN SODIUM 2000 MG: 2 SOLUTION INTRAVENOUS at 05:17

## 2020-08-17 NOTE — DISCHARGE SUMMARY
Discharge Summary - General Surgery   Cordelia Erazo 75 y o  female MRN: [id number]  Unit/Bed#: E5 -01 Encounter: [id number]    Admission Date: 8/16/2020     Discharge Date: 8/17/2020     Admitting Diagnosis: Diverticulosis [K57 90]  UTI (urinary tract infection) [N39 0]  Abdominal pain [R10 9]  Acute appendicitis [K35 80]  Hyperglycemia [R73 9]  Right ovarian cyst [N83 201]    Discharge Diagnosis: Principal Problem (Resolved):    Acute appendicitis with localized peritonitis  Active Problems:    * No active hospital problems  *      Attending and Service:   Raleigh Trinidad MD; General Surgery    Consulting Physician(s):   None    Procedures Performed:   8/16 Laparoscopic Appendectomy    Imaging:  Procedure: Ct Abdomen Pelvis With Contrast    Result Date: 8/16/2020  Narrative: CT ABDOMEN AND PELVIS WITH IV CONTRAST INDICATION:   rlq pain  COMPARISON:  None  TECHNIQUE:  CT examination of the abdomen and pelvis was performed  Axial, sagittal, and coronal 2D reformatted images were created from the source data and submitted for interpretation  Radiation dose length product (DLP) for this visit:  959 mGy-cm   This examination, like all CT scans performed in the [facility], was performed utilizing techniques to minimize radiation dose exposure, including the use of iterative reconstruction and automated exposure control  IV Contrast:  100 mL of iohexol (OMNIPAQUE) Enteric Contrast:  Enteric contrast was not administered  FINDINGS: ABDOMEN LOWER CHEST:  No clinically significant abnormality identified in the visualized lower chest  LIVER/BILIARY TREE:  Unremarkable  GALLBLADDER:  No calcified gallstones  No pericholecystic inflammatory change  SPLEEN:  Unremarkable  PANCREAS:  Unremarkable  ADRENAL GLANDS:  Unremarkable  KIDNEYS/URETERS:  Unremarkable  No hydronephrosis  STOMACH AND BOWEL:  There is colonic diverticulosis without evidence of acute diverticulitis   APPENDIX:  Enhancement of the enlarged appendix noted which measures up to 1 2 cm in greatest diameter  There are inflammatory changes in the periappendiceal fat consistent with acute appendicitis  ABDOMINOPELVIC CAVITY:  Small amount of pelvic ascites noted which does demonstrate enhancement which may be indicative of peritonitis  No pneumoperitoneum or abdominal pelvic lymphadenopathy  VESSELS:  Atherosclerotic changes are present  No evidence of aneurysm  PELVIS REPRODUCTIVE ORGANS:  Uterus and left adnexa are normal   There is a nonenhancing 3 8 cm right ovarian cyst  URINARY BLADDER:  Unremarkable  ABDOMINAL WALL/INGUINAL REGIONS:  Unremarkable  OSSEOUS STRUCTURES:  Right hip ORIF  No destructive lytic or blastic lesions  Impression: 1  Acute appendicitis  No periappendiceal abscess or pneumoperitoneum  2   Minimal & pelvic ascites concerning for peritonitis  3   Pancolonic diverticulosis  4   Incidental 3 8 cm right ovarian simple-appearing cyst   According to current guidelines [facility] Bri[facility] Radiol 2020; 11:718-195) in this postmenopausal woman, this should be followed up in 6 to 12 months by pelvic ultrasound  I personally discussed this study with Maude David on 8/16/2020 at 12:38 PM  Workstation performed: [id number]         Hospital Course:   HPI, per Luis Enrique Simons MD: "Nay Dela Cruz is a 75 y o  female with PMHx of DM, Parkinson's, urinary incontinence, HTN, HLD, who presents with abdominal pain  She states the pain began about 2 days ago, is constant and sharp in character, and is located primarily in the right lower quadrant  Denies any associated fevers/chills, nausea/vomiting, or other symptoms  No previous abdominal surgeries  Last PO intake was yesterday evening  "    The patient was taken to the operating room on 8/17/20 for performance of laparoscopic appendectomy  Intraoperative findings included:    Perforated appendicitis    Patients post-operative recovery was uneventful  Patient was discharged on POD1  On the day of discharge, the patient was voiding spontaneously, ambulating at baseline, and pain was well controlled  The patient was sent home with antibiotics  She understood all instructions for discharge  She was also given the names and numbers of the providers as well as instructions for follow up appointments  Condition at Discharge: good     Discharge instructions/Information to patient and family:   See after visit summary for information provided to patient and family  Provisions for Follow-Up Care:  See after visit summary for information related to follow-up care and any pertinent home health orders  Disposition: Home    Planned Readmission: No    Discharge Statement   I spent 30 minutes discharging the patient  This time was spent on the day of discharge  I had direct contact with the patient on the day of discharge  Additional documentation is required if more than 30 minutes were spent on discharge  Discharge Medications:  See after visit summary for reconciled discharge medications provided to patient and family        Leelee Hendrix MD  8/17/2020  12:40 PM

## 2020-08-17 NOTE — PROGRESS NOTES
Progress Note - General Surgery   Vincent Rogel 75 y o  female MRN: [id number]  Unit/Bed#: E5 -01 Encounter: [id number]    Assessment:  76yo F POD#1 s/p laparoscopic appendectomy for perforated appendicitis    Plan:   Diet as tolerated    D/c IVF   Continue IV abx, transition to Augmentin for 5 day course upon discharge   Pain control   OOB/ambulate   DVT PPx   Dispo: anticipate d/c home today if tolerating diet and urinating spontaneously    Subjective/Objective     Subjective:   No acute events overnight  Feels better this morning  Pain controlled  Has not taken in any PO or voided since surgery  Objective:    Blood pressure 127/56, pulse 88, temperature 98 3 °F (36 8 °C), temperature source Temporal, resp  rate 18, weight 104 kg (229 lb 4 5 oz), SpO2 92 %  ,Body mass index is 35 91 kg/m²        Intake/Output Summary (Last 24 hours) at 8/17/2020 0559  Last data filed at 8/17/2020 0129  Gross per 24 hour   Intake 1150 ml   Output 910 ml   Net 240 ml       Invasive Devices     Peripheral Intravenous Line            Peripheral IV 08/16/20 Left Forearm less than 1 day                Physical Exam:   Gen:  NAD  CV:  warm, well-perfused  Lungs: nl effort  Abd:  soft, ND, appropriately tender, port site incisions C/D/I   Ext:  no CCE  Neuro: A&Ox3     Results from last 7 days   Lab Units 08/16/20  1127   WBC Thousand/uL 16 93*   HEMOGLOBIN g/dL 14 6   HEMATOCRIT % 44 7   PLATELETS Thousands/uL 205     Results from last 7 days   Lab Units 08/16/20  1127   POTASSIUM mmol/L 3 8   CHLORIDE mmol/L 104   CO2 mmol/L 27   BUN mg/dL 18   CREATININE mg/dL 0 92   CALCIUM mg/dL 9 0

## 2020-08-17 NOTE — PLAN OF CARE
Problem: Prexisting or High Potential for Compromised Skin Integrity  Goal: Skin integrity is maintained or improved  Description: INTERVENTIONS:  - Identify patients at risk for skin breakdown  - Assess and monitor skin integrity  - Assess and monitor nutrition and hydration status  - Monitor labs   - Assess for incontinence   - Turn and reposition patient  - Assist with mobility/ambulation  - Relieve pressure over bony prominences  - Avoid friction and shearing  - Provide appropriate hygiene as needed including keeping skin clean and dry  - Evaluate need for skin moisturizer/barrier cream  - Collaborate with interdisciplinary team   - Patient/family teaching  - Consider wound care consult   Outcome: Progressing     Problem: Potential for Falls  Goal: Patient will remain free of falls  Description: INTERVENTIONS:  - Assess patient frequently for physical needs  -  Identify cognitive and physical deficits and behaviors that affect risk of falls    -  Clarence fall precautions as indicated by assessment   - Educate patient/family on patient safety including physical limitations  - Instruct patient to call for assistance with activity based on assessment  - Modify environment to reduce risk of injury  - Consider OT/PT consult to assist with strengthening/mobility  Outcome: Progressing     Problem: NEUROSENSORY - ADULT  Goal: Achieves stable or improved neurological status  Description: INTERVENTIONS  - Monitor and report changes in neurological status  - Monitor vital signs such as temperature, blood pressure, glucose, and any other labs ordered   - Initiate measures to prevent increased intracranial pressure  - Monitor for seizure activity and implement precautions if appropriate      Outcome: Progressing  Goal: Remains free of injury related to seizures activity  Description: INTERVENTIONS  - Maintain airway, patient safety  and administer oxygen as ordered  - Monitor patient for seizure activity, document and report duration and description of seizure to physician/advanced practitioner  - If seizure occurs,  ensure patient safety during seizure  - Reorient patient post seizure  - Seizure pads on all 4 side rails  - Instruct patient/family to notify RN of any seizure activity including if an aura is experienced  - Instruct patient/family to call for assistance with activity based on nursing assessment  - Administer anti-seizure medications if ordered    Outcome: Progressing  Goal: Achieves maximal functionality and self care  Description: INTERVENTIONS  - Monitor swallowing and airway patency with patient fatigue and changes in neurological status  - Encourage and assist patient to increase activity and self care     - Encourage visually impaired, hearing impaired and aphasic patients to use assistive/communication devices  Outcome: Progressing     Problem: CARDIOVASCULAR - ADULT  Goal: Maintains optimal cardiac output and hemodynamic stability  Description: INTERVENTIONS:  - Monitor I/O, vital signs and rhythm  - Monitor for S/S and trends of decreased cardiac output  - Administer and titrate ordered vasoactive medications to optimize hemodynamic stability  - Assess quality of pulses, skin color and temperature  - Assess for signs of decreased coronary artery perfusion  - Instruct patient to report change in severity of symptoms  Outcome: Progressing  Goal: Absence of cardiac dysrhythmias or at baseline rhythm  Description: INTERVENTIONS:  - Continuous cardiac monitoring, vital signs, obtain 12 lead EKG if ordered  - Administer antiarrhythmic and heart rate control medications as ordered  - Monitor electrolytes and administer replacement therapy as ordered  Outcome: Progressing     Problem: RESPIRATORY - ADULT  Goal: Achieves optimal ventilation and oxygenation  Description: INTERVENTIONS:  - Assess for changes in respiratory status  - Assess for changes in mentation and behavior  - Position to facilitate oxygenation and minimize respiratory effort  - Oxygen administered by appropriate delivery if ordered  - Initiate smoking cessation education as indicated  - Encourage broncho-pulmonary hygiene including cough, deep breathe, Incentive Spirometry  - Assess the need for suctioning and aspirate as needed  - Assess and instruct to report SOB or any respiratory difficulty  - Respiratory Therapy support as indicated  Outcome: Progressing     Problem: GASTROINTESTINAL - ADULT  Goal: Minimal or absence of nausea and/or vomiting  Description: INTERVENTIONS:  - Administer IV fluids if ordered to ensure adequate hydration  - Maintain NPO status until nausea and vomiting are resolved  - Nasogastric tube if ordered  - Administer ordered antiemetic medications as needed  - Provide nonpharmacologic comfort measures as appropriate  - Advance diet as tolerated, if ordered  - Consider nutrition services referral to assist patient with adequate nutrition and appropriate food choices  Outcome: Progressing  Goal: Maintains or returns to baseline bowel function  Description: INTERVENTIONS:  - Assess bowel function  - Encourage oral fluids to ensure adequate hydration  - Administer IV fluids if ordered to ensure adequate hydration  - Administer ordered medications as needed  - Encourage mobilization and activity  - Consider nutritional services referral to assist patient with adequate nutrition and appropriate food choices  Outcome: Progressing  Goal: Maintains adequate nutritional intake  Description: INTERVENTIONS:  - Monitor percentage of each meal consumed  - Identify factors contributing to decreased intake, treat as appropriate  - Assist with meals as needed  - Monitor I&O, weight, and lab values if indicated  - Obtain nutrition services referral as needed  Outcome: Progressing     Problem: GENITOURINARY - ADULT  Goal: Maintains or returns to baseline urinary function  Description: INTERVENTIONS:  - Assess urinary function  - Encourage oral fluids to ensure adequate hydration if ordered  - Administer IV fluids as ordered to ensure adequate hydration  - Administer ordered medications as needed  - Offer frequent toileting  - Follow urinary retention protocol if ordered  Outcome: Progressing  Goal: Absence of urinary retention  Description: INTERVENTIONS:  - Assess patients ability to void and empty bladder  - Monitor I/O  - Bladder scan as needed  - Discuss with physician/AP medications to alleviate retention as needed  - Discuss catheterization for long term situations as appropriate  Outcome: Progressing     Problem: METABOLIC, FLUID AND ELECTROLYTES - ADULT  Goal: Electrolytes maintained within normal limits  Description: INTERVENTIONS:  - Monitor labs and assess patient for signs and symptoms of electrolyte imbalances  - Administer electrolyte replacement as ordered  - Monitor response to electrolyte replacements, including repeat lab results as appropriate  - Instruct patient on fluid and nutrition as appropriate  Outcome: Progressing  Goal: Fluid balance maintained  Description: INTERVENTIONS:  - Monitor labs   - Monitor I/O and WT  - Instruct patient on fluid and nutrition as appropriate  - Assess for signs & symptoms of volume excess or deficit  Outcome: Progressing  Goal: Glucose maintained within target range  Description: INTERVENTIONS:  - Monitor Blood Glucose as ordered  - Assess for signs and symptoms of hyperglycemia and hypoglycemia  - Administer ordered medications to maintain glucose within target range  - Assess nutritional intake and initiate nutrition service referral as needed  Outcome: Progressing     Problem: SKIN/TISSUE INTEGRITY - ADULT  Goal: Skin integrity remains intact  Description: INTERVENTIONS  - Identify patients at risk for skin breakdown  - Assess and monitor skin integrity  - Assess and monitor nutrition and hydration status  - Monitor labs (i e  albumin)  - Assess for incontinence   - Turn and reposition patient  - Assist with mobility/ambulation  - Relieve pressure over bony prominences  - Avoid friction and shearing  - Provide appropriate hygiene as needed including keeping skin clean and dry  - Evaluate need for skin moisturizer/barrier cream  - Collaborate with interdisciplinary team (i e  Nutrition, Rehabilitation, etc )   - Patient/family teaching  Outcome: Progressing  Goal: Incision(s), wounds(s) or drain site(s) healing without S/S of infection  Description: INTERVENTIONS  - Assess and document risk factors for skin impairment   - Assess and document dressing, incision, wound bed, drain sites and surrounding tissue  - Consider nutrition services referral as needed  - Oral mucous membranes remain intact  - Provide patient/ family education  Outcome: Progressing  Goal: Oral mucous membranes remain intact  Description: INTERVENTIONS  - Assess oral mucosa and hygiene practices  - Implement preventative oral hygiene regimen  - Implement oral medicated treatments as ordered  - Initiate Nutrition services referral as needed  Outcome: Progressing     Problem: HEMATOLOGIC - ADULT  Goal: Maintains hematologic stability  Description: INTERVENTIONS  - Assess for signs and symptoms of bleeding or hemorrhage  - Monitor labs  - Administer supportive blood products/factors as ordered and appropriate  Outcome: Progressing     Problem: MUSCULOSKELETAL - ADULT  Goal: Maintain or return mobility to safest level of function  Description: INTERVENTIONS:  - Assess patient's ability to carry out ADLs; assess patient's baseline for ADL function and identify physical deficits which impact ability to perform ADLs (bathing, care of mouth/teeth, toileting, grooming, dressing, etc )  - Assess/evaluate cause of self-care deficits   - Assess range of motion  - Assess patient's mobility  - Assess patient's need for assistive devices and provide as appropriate  - Encourage maximum independence but intervene and supervise when necessary  - Involve family in performance of ADLs  - Assess for home care needs following discharge   - Consider OT consult to assist with ADL evaluation and planning for discharge  - Provide patient education as appropriate  Outcome: Progressing  Goal: Maintain proper alignment of affected body part  Description: INTERVENTIONS:  - Support, maintain and protect limb and body alignment  - Provide patient/ family with appropriate education  Outcome: Progressing

## 2020-08-17 NOTE — DISCHARGE INSTRUCTIONS
Laparoscopic Appendectomy   WHAT YOU NEED TO KNOW:   Laparoscopic appendectomy is surgery to remove your appendix  During this surgery, small incisions are made in your abdomen  A small scope and special tools are inserted through these incisions  A scope is a flexible tube with a light and camera on the end  DISCHARGE INSTRUCTIONS:   Medicines:   · Pain medicine: You may need medicine to take away or decrease pain  ¨ Learn how to take your medicine  Ask what medicine and how much you should take  Be sure you know how, when, and how often to take it  ¨ Do not wait until the pain is severe before you take your medicine  Tell caregivers if your pain does not decrease  ¨ Pain medicine can make you dizzy or sleepy  Prevent falls by calling someone when you get out of bed or if you need help  · Antibiotics: This medicine is given to fight or prevent an infection caused by bacteria  Always take your antibiotics exactly as ordered by your healthcare provider  Do not stop taking your medicine unless directed by your healthcare provider  Never save antibiotics or take leftover antibiotics that were given to you for another illness  · Take your medicine as directed  Contact your healthcare provider if you think your medicine is not helping or if you have side effects  Tell him or her if you are allergic to any medicine  Keep a list of the medicines, vitamins, and herbs you take  Include the amounts, and when and why you take them  Bring the list or the pill bottles to follow-up visits  Carry your medicine list with you in case of an emergency  Follow up with your healthcare provider as directed:  Write down your questions so you remember to ask them during your visits  Eat healthy foods:  Choose healthy foods from all the food groups every day  Include whole-grain bread, cereal, rice, and pasta  Eat a variety of fruits and vegetables, including dark green and orange vegetables   Include dairy products such as low-fat milk, yogurt, and cheese  Choose protein sources, such as lean beef and chicken, fish, beans, eggs, and nuts  Ask how many servings of fats, oils, and sweets you should have each day, and if you need to be on a special diet  Wound care:  When you are allowed to bathe or shower, carefully wash the incisions with soap and water  If you have bandages, change them any time they get wet or dirty  Ask your healthcare provider for more information about wound care  Contact your healthcare provider if:   · You are not able to have a bowel movement  · You have a fever  · You have chills, a cough, or feel weak and achy  · You have nausea or vomiting  · You have questions or concerns about your surgery, condition, or care  Seek care immediately or call 911 if:   · Blood soaks through your bandage  · You feel full and cannot burp or vomit  · You have pus or a foul-smelling odor coming from your wound  · Your vomit is greenish, looks like coffee grounds, or has blood in it  © 2017 [facility] Information is for End User's use only and may not be sold, redistributed or otherwise used for commercial purposes  All illustrations and images included in CareNotes® are the copyrighted property of [employer]  or Yonis [employer]  The above information is an  only  It is not intended as medical advice for individual conditions or treatments  Talk to your doctor, nurse or pharmacist before following any medical regimen to see if it is safe and effective for you

## 2020-08-17 NOTE — CASE MANAGEMENT
This case management assistant (CMA) met with the patient and her  and went over a medicare observation notice with her  The patient signed for this CMA

## 2020-08-17 NOTE — PLAN OF CARE
Problem: Prexisting or High Potential for Compromised Skin Integrity  Goal: Skin integrity is maintained or improved  Description: INTERVENTIONS:  - Identify patients at risk for skin breakdown  - Assess and monitor skin integrity  - Assess and monitor nutrition and hydration status  - Monitor labs   - Assess for incontinence   - Turn and reposition patient  - Assist with mobility/ambulation  - Relieve pressure over bony prominences  - Avoid friction and shearing  - Provide appropriate hygiene as needed including keeping skin clean and dry  - Evaluate need for skin moisturizer/barrier cream  - Collaborate with interdisciplinary team   - Patient/family teaching  - Consider wound care consult   Outcome: Progressing     Problem: Potential for Falls  Goal: Patient will remain free of falls  Description: INTERVENTIONS:  - Assess patient frequently for physical needs  -  Identify cognitive and physical deficits and behaviors that affect risk of falls    -  Disney fall precautions as indicated by assessment   - Educate patient/family on patient safety including physical limitations  - Instruct patient to call for assistance with activity based on assessment  - Modify environment to reduce risk of injury  - Consider OT/PT consult to assist with strengthening/mobility  Outcome: Progressing     Problem: NEUROSENSORY - ADULT  Goal: Achieves stable or improved neurological status  Description: INTERVENTIONS  - Monitor and report changes in neurological status  - Monitor vital signs such as temperature, blood pressure, glucose, and any other labs ordered   - Initiate measures to prevent increased intracranial pressure  - Monitor for seizure activity and implement precautions if appropriate      Outcome: Progressing  Goal: Remains free of injury related to seizures activity  Description: INTERVENTIONS  - Maintain airway, patient safety  and administer oxygen as ordered  - Monitor patient for seizure activity, document and report duration and description of seizure to physician/advanced practitioner  - If seizure occurs,  ensure patient safety during seizure  - Reorient patient post seizure  - Seizure pads on all 4 side rails  - Instruct patient/family to notify RN of any seizure activity including if an aura is experienced  - Instruct patient/family to call for assistance with activity based on nursing assessment  - Administer anti-seizure medications if ordered    Outcome: Progressing  Goal: Achieves maximal functionality and self care  Description: INTERVENTIONS  - Monitor swallowing and airway patency with patient fatigue and changes in neurological status  - Encourage and assist patient to increase activity and self care     - Encourage visually impaired, hearing impaired and aphasic patients to use assistive/communication devices  Outcome: Progressing     Problem: CARDIOVASCULAR - ADULT  Goal: Maintains optimal cardiac output and hemodynamic stability  Description: INTERVENTIONS:  - Monitor I/O, vital signs and rhythm  - Monitor for S/S and trends of decreased cardiac output  - Administer and titrate ordered vasoactive medications to optimize hemodynamic stability  - Assess quality of pulses, skin color and temperature  - Assess for signs of decreased coronary artery perfusion  - Instruct patient to report change in severity of symptoms  Outcome: Progressing  Goal: Absence of cardiac dysrhythmias or at baseline rhythm  Description: INTERVENTIONS:  - Continuous cardiac monitoring, vital signs, obtain 12 lead EKG if ordered  - Administer antiarrhythmic and heart rate control medications as ordered  - Monitor electrolytes and administer replacement therapy as ordered  Outcome: Progressing     Problem: RESPIRATORY - ADULT  Goal: Achieves optimal ventilation and oxygenation  Description: INTERVENTIONS:  - Assess for changes in respiratory status  - Assess for changes in mentation and behavior  - Position to facilitate oxygenation and minimize respiratory effort  - Oxygen administered by appropriate delivery if ordered  - Initiate smoking cessation education as indicated  - Encourage broncho-pulmonary hygiene including cough, deep breathe, Incentive Spirometry  - Assess the need for suctioning and aspirate as needed  - Assess and instruct to report SOB or any respiratory difficulty  - Respiratory Therapy support as indicated  Outcome: Progressing     Problem: GASTROINTESTINAL - ADULT  Goal: Minimal or absence of nausea and/or vomiting  Description: INTERVENTIONS:  - Administer IV fluids if ordered to ensure adequate hydration  - Maintain NPO status until nausea and vomiting are resolved  - Nasogastric tube if ordered  - Administer ordered antiemetic medications as needed  - Provide nonpharmacologic comfort measures as appropriate  - Advance diet as tolerated, if ordered  - Consider nutrition services referral to assist patient with adequate nutrition and appropriate food choices  Outcome: Progressing  Goal: Maintains or returns to baseline bowel function  Description: INTERVENTIONS:  - Assess bowel function  - Encourage oral fluids to ensure adequate hydration  - Administer IV fluids if ordered to ensure adequate hydration  - Administer ordered medications as needed  - Encourage mobilization and activity  - Consider nutritional services referral to assist patient with adequate nutrition and appropriate food choices  Outcome: Progressing  Goal: Maintains adequate nutritional intake  Description: INTERVENTIONS:  - Monitor percentage of each meal consumed  - Identify factors contributing to decreased intake, treat as appropriate  - Assist with meals as needed  - Monitor I&O, weight, and lab values if indicated  - Obtain nutrition services referral as needed  Outcome: Progressing     Problem: GENITOURINARY - ADULT  Goal: Maintains or returns to baseline urinary function  Description: INTERVENTIONS:  - Assess urinary function  - Encourage oral fluids to ensure adequate hydration if ordered  - Administer IV fluids as ordered to ensure adequate hydration  - Administer ordered medications as needed  - Offer frequent toileting  - Follow urinary retention protocol if ordered  Outcome: Progressing  Goal: Absence of urinary retention  Description: INTERVENTIONS:  - Assess patients ability to void and empty bladder  - Monitor I/O  - Bladder scan as needed  - Discuss with physician/AP medications to alleviate retention as needed  - Discuss catheterization for long term situations as appropriate  Outcome: Progressing     Problem: METABOLIC, FLUID AND ELECTROLYTES - ADULT  Goal: Electrolytes maintained within normal limits  Description: INTERVENTIONS:  - Monitor labs and assess patient for signs and symptoms of electrolyte imbalances  - Administer electrolyte replacement as ordered  - Monitor response to electrolyte replacements, including repeat lab results as appropriate  - Instruct patient on fluid and nutrition as appropriate  Outcome: Progressing  Goal: Fluid balance maintained  Description: INTERVENTIONS:  - Monitor labs   - Monitor I/O and WT  - Instruct patient on fluid and nutrition as appropriate  - Assess for signs & symptoms of volume excess or deficit  Outcome: Progressing  Goal: Glucose maintained within target range  Description: INTERVENTIONS:  - Monitor Blood Glucose as ordered  - Assess for signs and symptoms of hyperglycemia and hypoglycemia  - Administer ordered medications to maintain glucose within target range  - Assess nutritional intake and initiate nutrition service referral as needed  Outcome: Progressing     Problem: SKIN/TISSUE INTEGRITY - ADULT  Goal: Skin integrity remains intact  Description: INTERVENTIONS  - Identify patients at risk for skin breakdown  - Assess and monitor skin integrity  - Assess and monitor nutrition and hydration status  - Monitor labs (i e  albumin)  - Assess for incontinence   - Turn and reposition patient  - Assist with mobility/ambulation  - Relieve pressure over bony prominences  - Avoid friction and shearing  - Provide appropriate hygiene as needed including keeping skin clean and dry  - Evaluate need for skin moisturizer/barrier cream  - Collaborate with interdisciplinary team (i e  Nutrition, Rehabilitation, etc )   - Patient/family teaching  Outcome: Progressing  Goal: Incision(s), wounds(s) or drain site(s) healing without S/S of infection  Description: INTERVENTIONS  - Assess and document risk factors for skin impairment   - Assess and document dressing, incision, wound bed, drain sites and surrounding tissue  - Consider nutrition services referral as needed  - Oral mucous membranes remain intact  - Provide patient/ family education  Outcome: Progressing  Goal: Oral mucous membranes remain intact  Description: INTERVENTIONS  - Assess oral mucosa and hygiene practices  - Implement preventative oral hygiene regimen  - Implement oral medicated treatments as ordered  - Initiate Nutrition services referral as needed  Outcome: Progressing     Problem: HEMATOLOGIC - ADULT  Goal: Maintains hematologic stability  Description: INTERVENTIONS  - Assess for signs and symptoms of bleeding or hemorrhage  - Monitor labs  - Administer supportive blood products/factors as ordered and appropriate  Outcome: Progressing     Problem: MUSCULOSKELETAL - ADULT  Goal: Maintain or return mobility to safest level of function  Description: INTERVENTIONS:  - Assess patient's ability to carry out ADLs; assess patient's baseline for ADL function and identify physical deficits which impact ability to perform ADLs (bathing, care of mouth/teeth, toileting, grooming, dressing, etc )  - Assess/evaluate cause of self-care deficits   - Assess range of motion  - Assess patient's mobility  - Assess patient's need for assistive devices and provide as appropriate  - Encourage maximum independence but intervene and supervise when necessary  - Involve family in performance of ADLs  - Assess for home care needs following discharge   - Consider OT consult to assist with ADL evaluation and planning for discharge  - Provide patient education as appropriate  Outcome: Progressing  Goal: Maintain proper alignment of affected body part  Description: INTERVENTIONS:  - Support, maintain and protect limb and body alignment  - Provide patient/ family with appropriate education  Outcome: Progressing

## 2020-08-17 NOTE — UTILIZATION REVIEW
Initial Clinical Review    Admission: Date/Time/Statement:   Admission Orders (From admission, onward)     Ordered        08/16/20 1335  Place in Observation  Once                   Orders Placed This Encounter   Procedures    Place in Observation     Standing Status:   Standing     Number of Occurrences:   1     Order Specific Question:   Admitting Physician     Answer:   Dimas Curd     Order Specific Question:   Level of Care     Answer:   Med Surg [16]     ED Arrival Information     Expected Arrival Acuity Means of Arrival Escorted By Service Admission Type    - 8/16/2020 10:43 Urgent Ambulance [facility] ([address]) Surgery-General Urgent    Arrival Complaint    weakness        Chief Complaint   Patient presents with    Abdominal Pain     Pt reports RLQ abd pain that began yesterday  Pt denies vomiting or diarrhea  No fevers  Pt incontinent of urine  Assessment/Plan:  Niya Ocampo is a 75 y o  female with PMHx of DM, Parkinson's, urinary incontinence, HTN, HLD, who presents with abdominal pain  She states the pain began about 2 days ago, is constant and sharp in character, and is located primarily in the right lower quadrant  Denies any associated fevers/chills, nausea/vomiting, or other symptoms  No previous abdominal surgeries   Last PO intake was yesterday evening    73yo F with acute appendicitis     Plan:  · Admit to general surgery for observation  · To OR for laparoscopic appendectomy  · NPO  · IVF while NPO  · Ancef/flagyl  · PRN analgesia  · Holding home meds while NPO  · Will start DVT PPx post-operatively         ED Triage Vitals   Temperature Pulse Respirations Blood Pressure SpO2   08/16/20 1049 08/16/20 1049 08/16/20 1049 08/16/20 1049 08/16/20 1049   98 2 °F (36 8 °C) 76 18 112/65 91 %      Temp Source Heart Rate Source Patient Position - Orthostatic VS BP Location FiO2 (%)   08/16/20 1049 08/16/20 1049 08/16/20 1049 08/16/20 1049 --   Oral Monitor Lying Left arm Pain Score       08/16/20 1425       No Pain          Wt Readings from Last 1 Encounters:   08/16/20 104 kg (229 lb 4 5 oz)     Additional Vital Signs:   08/16/20 1824   --   84   20   --   92 %   32   3 L/min   Nasal cannula   --   --    08/16/20 1820   --   83   22   --   94 %   32   3 L/min   Nasal cannula   --   --    08/16/20 1818   --   81   18   121/88   90 %   32   3 L/min   Nasal cannula   --   --    08/16/20 1815   --   85   22   125/52   90 %   32   3 L/min   Nasal cannula   --   --    08/16/20 1810   --   --   --   --   92 %   36   4 L/min   Nasal cannula   --   --    08/16/20 1804   --   84   22   99/50   91 %   36   4 L/min   Nasal cannula   --   --    08/16/20 1758   --   84   18   96/45Abnormal     94 %   44   6 L/min   Simple mask   --   --    08/16/20 1757   --   --   --   --   95 %   52   8 L/min   Simple mask   --   --    08/16/20 1753   --   --   --   --   96 %   68   12 L/min   Simple mask   --   --    08/16/20 1750   --   --   --   --   95 %   68   12 L/min   Simple mask   --   --    08/16/20 1748   --   87   18   128/54   94 %   68   12 L/min   Simple mask   --   --    08/16/20 1737   --   88   20   111/59   93 %   68   12 L/min   Simple mask   --   --    08/16/20 1735   --   88   20   --   93 %   68   12 L/min   Simple mask   --   --    08/16/20 1734   --   90   20   --   89 %Abnormal     52   8 L/min   Simple mask   --   --    08/16/20 1717   98 7 °F (37 1 °C)   89   16   144/50   93 %   52   8 L/min   Simple mask   WDL   --    08/16/20 1425   97 1 °F (36 2 °C)Abnormal     65   18   142/62   97 %   --   --   None (Room air)   --   Lying    08/16/20 1331   --   71   18   121/58   94 %   --   --   None (Room air)   --   Lying    08/16/20 1049   98 2 °F (36 8 °C)   76   18   112/65   91 %   --   --   None (Room air)   --   Lying          Pertinent Labs/Diagnostic Test Results:   Ct  Abd/pelvis   ( 8/16)      Acute appendicitis   No periappendiceal abscess or pneumoperitoneum     2   Minimal & pelvic ascites concerning for peritonitis  3   Pancolonic diverticulosis  4   Incidental 3 8 cm right ovarian simple-appearing cyst   According to current guidelines Tod Doreen Brianna Radiol 2020; 45:883-065) in this postmenopausal woman, this should be followed up in 6 to 12 months by pelvic ultrasound     Results from last 7 days   Lab Units 08/16/20  1453   SARS-COV-2  Negative     Results from last 7 days   Lab Units 08/16/20  1127   WBC Thousand/uL 16 93*   HEMOGLOBIN g/dL 14 6   HEMATOCRIT % 44 7   PLATELETS Thousands/uL 205   NEUTROS ABS Thousands/µL 14 15*         Results from last 7 days   Lab Units 08/16/20  1127   SODIUM mmol/L 140   POTASSIUM mmol/L 3 8   CHLORIDE mmol/L 104   CO2 mmol/L 27   ANION GAP mmol/L 9   BUN mg/dL 18   CREATININE mg/dL 0 92   EGFR ml/min/1 73sq m 61   CALCIUM mg/dL 9 0     Results from last 7 days   Lab Units 08/16/20  1127   AST U/L 7   ALT U/L 14   ALK PHOS U/L 53   TOTAL PROTEIN g/dL 7 4   ALBUMIN g/dL 3 3*   TOTAL BILIRUBIN mg/dL 0 94     Results from last 7 days   Lab Units 08/17/20  0803 08/17/20  0558 08/17/20  0516 08/17/20  0026 08/16/20  2101 08/16/20  1747   POC GLUCOSE mg/dl 140 156* 162* 176* 151* 133     Results from last 7 days   Lab Units 08/16/20  1127   GLUCOSE RANDOM mg/dL 147*           Results from last 7 days   Lab Units 08/16/20  1127   LIPASE u/L 84             Results from last 7 days   Lab Units 08/16/20  1207   CLARITY UA  Cloudy   COLOR UA  Leti   SPEC GRAV UA  1 025   PH UA  6 0   GLUCOSE UA mg/dl Negative   KETONES UA mg/dl Negative   BLOOD UA  Moderate*   PROTEIN UA mg/dl Negative   NITRITE UA  Positive*   BILIRUBIN UA  Negative   UROBILINOGEN UA E U /dl 2 0*   LEUKOCYTES UA  Trace*   WBC UA /hpf 10-20*   RBC UA /hpf 0-1*   BACTERIA UA /hpf Innumerable*   EPITHELIAL CELLS WET PREP /hpf Occasional         ED Treatment:   Medication Administration from 08/16/2020 1043 to 08/16/2020 1408       Date/Time Order Dose Route Action Action by Comments     08/16/2020 1216 iohexol (OMNIPAQUE) 350 MG/ML injection (MULTI-DOSE) 100 mL 100 mL Intravenous Given Carolyn Palumbo         Past Medical History:   Diagnosis Date    Anxiety     Broken hip ([facility] )     Diabetes mellitus ([facility] )     Diabetes mellitus type 2, diet-controlled ([facility] )     Hyperlipidemia     Hypertension     Parkinson disease ([facility] )      Present on Admission:  **None**      Admitting Diagnosis: Diverticulosis [K57 90]  UTI (urinary tract infection) [N39 0]  Abdominal pain [R10 9]  Acute appendicitis [K35 80]  Hyperglycemia [R73 9]  Right ovarian cyst [N83 201]  Age/Sex: 75 y o  female  Admission Orders:  Scheduled Medications:  cefazolin, 2,000 mg, Intravenous, Q8H  heparin (porcine), 5,000 Units, Subcutaneous, Q8H [facility]  insulin lispro, 1-6 Units, Subcutaneous, Q6H MANUEL  metroNIDAZOLE, 500 mg, Intravenous, Q8H      Continuous IV Infusions:     PRN Meds:  acetaminophen, 650 mg, Oral, Q6H PRN  ondansetron, 4 mg, Intravenous, Q6H PRN        None    Network Utilization Review Department  [email or web address]  ATTENTION: Please call with any questions or concerns to [phone] and carefully listen to the prompts so that you are directed to the right person  All voicemails are confidential   [facility] all requests for admission clinical reviews, approved or denied determinations and any other requests to dedicated fax number below belonging to the campus where the patient is receiving treatment   List of dedicated fax numbers for the Facilities:  [address] DENIALS (Administrative/Medical Necessity) [phone]   [address] (Maternity/NICU/Pediatrics) [phone]   [facility] [phone]   Alejandro Talbert [phone]   Jericho Bibmercedes [phone]   Zion Fernandezis [address] [facility] [phone]   220 [facility]  [phone]   [address] [place] [address] [phone]

## 2020-08-18 ENCOUNTER — TELEPHONE (OUTPATIENT)
Dept: SURGERY | Facility: CLINIC | Age: 75
End: 2020-08-18

## 2020-08-18 LAB — BACTERIA UR CULT: ABNORMAL

## 2020-08-18 NOTE — TELEPHONE ENCOUNTER
Two day post op call  Patient had called to set up post op appointment  Returned call to set up appointment  Spoke to patient  Patient is doing well  Pain well controlled, patient ambulating well  No questions or concerns  Reminded patient of post op appointment and to call if there are any questions or concerns prior to appointment

## 2020-08-19 NOTE — OP NOTE
Appendectomy, Lap, Procedure Note    Name: Nay Dela Cruz   :   MRN: [id number]  Date: 2020    Indications: The patient presented with a history , symptoms, and signs of acute appendicitis    A radiographic study revealed findings consistent with acute appendicitis  Pre-operative Diagnosis: Acute appendicitis without mention of peritonitis    Post-operative Diagnosis: Acute appendicitis perforated    Procedure: Laparoscopic Appendectomy    Surgeon: Marlin Paul MD    Assistants:   Dayo Martin MD - Assisting    Anesthesia: General endotracheal anesthesia       Note: No qualified resident available  Physician Assistant medically necessary for surgical safety of case and for suturing, retraction, and hemostasis  Procedure Details: The patient was seen again in the Holding Room  The risks, benefits, complications, treatment options, and expected outcomes were discussed with the patient and/or family  The possibilities of reaction to medication, pulmonary aspiration, perforation of viscus, bleeding, recurrent infection, finding a normal appendix, the need for additional procedures, failure to diagnose a condition, and creating a complication requiring transfusion or operation were discussed  There was concurrence with the proposed plan and informed consent was obtained  The site of surgery was properly noted/marked  The patient was taken to Operating Room, identified as Nay Dela Cruz and the procedure verified as Appendectomy  A Time Out was held after the prep and draping,  and the above information confirmed  The patient was placed in the supine position and general anesthesia was induced, along with placement of orogastric tube, Venodyne boots, and a Almonte catheter  The abdomen was prepped and draped in a sterile fashion  An infraumbilical incision was made and an open technique was used to enter the abdomen  A Connor port was placed and a pneumoperitoneum created  Additional ports were placed under direct vision in the routine positions  A careful evaluation of the entire abdomen was carried out  The patient was placed in Trendelenburg and left lateral decubitus position  The small intestines were retracted in the cephalad and left lateral direction away from the pelvis and right lower quadrant  There was visualized an  inflamed appendix that was extending into the pelvis  There was evidence of perforation at the base of the appendix  The appendix was carefully dissected  A window was made in the mesoappendix at the base of the appendix using a [place]  The mesoappendix was divided with a harmonic scalpel  A endo-LANDON stapler with a blue load was fire at the base of the appendix at the level of the cecum although the tissue was friable and the tissue tore  The base of the appendix was closed with interrupted PDS sutures and the imbricated with silk lembert sutures  There was no evidence of bleeding, leakage, or complication after division of the appendix and mesoappendix  Irrigation was also performed and irrigate suctioned from the abdomen as well  The Connor port site fascia was closed using a 0-Vicryl figure of eight and well as tying down the stay sutures  All skin incisions were closed using MonocryI suture in a subcuticular fashion  The instrument, sponge, and needle counts were correct at the conclusion of the case  Findings: The appendix was found to be inflamed  There were signs of necrosis  There was perforation  There was not abscess formation  Estimated Blood Loss:  Minimal           Drains: No                  Specimens: Appendix  Order Name Source Comment Collection Info Order Time   TISSUE EXAM Appendix  Collected By: Donna Cox MD 8/16/2020  4:17 PM              Complications:  None; patient tolerated the procedure well             Disposition: PACU            Condition: Stable    Attending Attestation: I was present for the entire procedure and qualified resident physician was not available      Signature:   Gardiner Sicard, MD  Date: 8/19/2020 Time: 2:53 PM

## 2020-09-02 ENCOUNTER — OFFICE VISIT (OUTPATIENT)
Dept: SURGERY | Facility: CLINIC | Age: 75
End: 2020-09-02

## 2020-09-02 VITALS
WEIGHT: 228.9 LBS | TEMPERATURE: 97.4 F | BODY MASS INDEX: 35.93 KG/M2 | DIASTOLIC BLOOD PRESSURE: 80 MMHG | SYSTOLIC BLOOD PRESSURE: 122 MMHG | HEART RATE: 60 BPM | HEIGHT: 67 IN

## 2020-09-02 DIAGNOSIS — Z98.890 POST-OPERATIVE STATE: Primary | ICD-10-CM

## 2020-09-02 PROCEDURE — 99024 POSTOP FOLLOW-UP VISIT: CPT | Performed by: PHYSICIAN ASSISTANT

## 2020-09-02 NOTE — PROGRESS NOTES
Assessment/Plan:   Salina Galvez is a 75 y  o female who comes in today for postoperative check after  on laparoscopic appendectomy done for acute appendicitis on 08/16/2020  Appendicitis was uncomplicated without perforation  The patient was discharged home on postoperative day 1  Since discharge patient has done well  She did not require narcotic pain medication  She is eating well and moving her bowels  She has not had any nausea, vomiting, fevers or chills  She is following lifting activity restrictions  She would like to return to physical therapy  On exam the patient's abdomen is benign  Her incisions are healing well  Pathology:  Acute appendicitis with periappendicitis  Reviewed with patient    At this point patient does not require further surgical follow-up  She should continue to follow lifting and activity restrictions  She may return to physical therapy with light activity and lifting restriction of 20 lb  She may return without restrictions as of 09/16/2020  Note provided for return to physical therapy  All questions answered  Patient should continue to call with any changes or concerns  HPI:  Salina Galvez is a 75 y  o female who comes in today for postoperative check after recent laparoscopic appendectomy as above    Currently doing well without problems, no fever or chills,no nausea and no vomiting  Patient reports they have resumed their normal diet  Reports she feels well  Her appetite has returned to normal   She is moving her bowels  She is following lifting and activity restrictions  Prior to her surgery she was doing a physical therapy program for history of hip fracture and Parkinson's disease  She would like to return to physical therapy      ROS:  General ROS: negative for - chills, fatigue, fever or night sweats, weight loss  Respiratory ROS: no cough, shortness of breath, or wheezing  Cardiovascular ROS: no chest pain or dyspnea on exertion  Abdomen: as per HPI  Genito-Urinary ROS: no dysuria, trouble voiding, or hematuria  Musculoskeletal ROS: negative for - gait disturbance, joint pain or muscle pain  Neurological ROS: no TIA or stroke symptoms  Skin: surgical incisions    ALLERGIES  Sulfa antibiotics    Current Outpatient Medications:     acetaminophen (TYLENOL) 325 mg tablet, Take 2 tablets (650 mg total) by mouth every 6 (six) hours as needed for mild pain, headaches or fever, Disp: 30 tablet, Rfl: 0    aspirin 81 MG tablet, Take 1 tablet by mouth daily, Disp: , Rfl:     atenolol (TENORMIN) 50 mg tablet, Take 1 tablet (50 mg total) by mouth daily, Disp: 30 tablet, Rfl: 0    Chromium-Cinnamon (CINNAMON PLUS CHROMIUM) 200-1000 MCG-MG CAPS, Take 1,000 mg by mouth 2 (two) times a day, Disp: , Rfl:     Coenzyme Q10 400 MG CAPS, Take 1 capsule (400 mg total) by mouth daily, Disp: 30 capsule, Rfl: 0    Omega-3 Fatty Acids (FISH OIL) 1,000 mg, Take 1 capsule (1,000 mg total) by mouth daily, Disp: 30 capsule, Rfl: 0    PARoxetine (PAXIL) 20 mg tablet, Take 1 tablet (20 mg total) by mouth daily, Disp: 30 tablet, Rfl: 0    rasagiline (AZILECT) 1 MG, TAKE 1 TABLET BY MOUTH  DAILY, Disp: 90 tablet, Rfl: 2    rivastigmine (EXELON) 3 mg capsule, Take 1 capsule (3 mg total) by mouth 2 (two) times a day, Disp: 180 capsule, Rfl: 3    rosuvastatin (CRESTOR) 5 mg tablet, Take 1 tablet (5 mg total) by mouth every other day, Disp: 30 tablet, Rfl: 0    HYDROcodone-acetaminophen (NORCO) 5-325 mg per tablet, Take 1 tablet by mouth every 4 (four) hours as needed for pain for up to 10 dosesMax Daily Amount: 6 tablets (Patient not taking: Reported on 9/2/2020), Disp: 10 tablet, Rfl: 0  Past Medical History:   Diagnosis Date    Anxiety     [facility]     Diabetes mellitus ([facility] )     [facility]     Hyperlipidemia     Hypertension     Parkinson disease ([facility] )      Past Surgical History:   Procedure Laterality Date    ACHILLES TENDON SURGERY      DEEP BRAIN STIMULATOR PLACEMENT      DENTAL SURGERY      FRACTURE SURGERY      SC IMP STIM,CRANIAL,SUBQ,1 ARRAY Right 12/18/2018    Procedure: REPLACEMENT IMPLANTABLE PULSE GENERATOR (IPG) DEEP BRAIN STIMULATION (DBS); Surgeon: Jodi Camacho MD;  [facility];  Service: Neurosurgery    SC LAP,APPENDECTOMY N/A 8/16/2020    Procedure: Toma Look;  Surgeon: Andrew Agrawal MD;  [facility];  Service: General    SC OPEN RX FEMUR FX+INTRAMED ERIKA Right 12/14/2019    Procedure: INSERTION NAIL IM FEMUR ANTEGRADE (TROCHANTERIC); Surgeon: Jerzy Salinas DO;  [facility];  Service: Orthopedics    REPLACEMENT TOTAL KNEE      REVERSE TOTAL SHOULDER ARTHROPLASTY Left 8/23/2018    Procedure: ARTHROPLASTY SHOULDER REVERSE;  Surgeon: Sharyn Boudreaux MD;  [facility];  Service: Orthopedics    TONSILLECTOMY       Family History   Problem Relation Age of Onset    Arthritis Mother     No Known Problems Father     No Known Problems Maternal Grandmother     No Known Problems Maternal Grandfather     No Known Problems Paternal Grandmother     No Known Problems Paternal Grandfather     No Known Problems Son     No Known Problems Son       reports that she has never smoked  She has never used smokeless tobacco  She reports current alcohol use of about 2 0 standard drinks of alcohol per week  She reports that she does not use drugs      PHYSICAL EXAM    Vitals:    09/02/20 1344   BP: 122/80   Pulse: 60   Temp: (!) 97 4 °F (36 3 °C)       General: normal, cooperative, no distress, elderly female, slow movements  Abdominal: soft, nondistended or nontender, active BS  Incision: clean, dry, and intact and healing well    Marla Rogers PA-C

## 2020-09-02 NOTE — PATIENT INSTRUCTIONS
Your recovering as expected  Continue to follow lifting activity restrictions  You may continue physical therapy with restrictions as noted    Please call with any questions or concerns

## 2020-09-02 NOTE — LETTER
September 2, 2020     Patient: Viola Kessler   YOB: 1945   Date of Visit: 9/2/2020       To Whom it May Concern:    oHmer Abrams is under my professional care  She was seen in my office on 9/2/2020  She may return to physical therapy with restrictions  She may perform light aerobic activity with lifting restriction of 20 lbs  She may return to physical therapy without restrictions as of 09/16/2020  If you have any questions or concerns, please don't hesitate to call           Sincerely,          Tobey Lefort Astl-Reimer, PA-C        CC: No Recipients

## 2020-09-17 ENCOUNTER — TRANSCRIBE ORDERS (OUTPATIENT)
Dept: ADMINISTRATIVE | Facility: HOSPITAL | Age: 75
End: 2020-09-17

## 2020-09-17 DIAGNOSIS — M81.0 AGE-RELATED OSTEOPOROSIS WITHOUT CURRENT PATHOLOGICAL FRACTURE: Primary | ICD-10-CM

## 2020-09-24 ENCOUNTER — HOSPITAL ENCOUNTER (OUTPATIENT)
Dept: BONE DENSITY | Facility: MEDICAL CENTER | Age: 75
Discharge: HOME | End: 2020-09-24
Payer: MEDICARE

## 2020-09-24 DIAGNOSIS — Z13.820 SCREENING FOR OSTEOPOROSIS: ICD-10-CM

## 2020-09-24 DIAGNOSIS — M81.0 AGE-RELATED OSTEOPOROSIS WITHOUT CURRENT PATHOLOGICAL FRACTURE: ICD-10-CM

## 2020-09-24 PROCEDURE — 77080 DXA BONE DENSITY AXIAL: CPT

## 2020-09-25 DIAGNOSIS — G20 PARKINSON'S DISEASE WITH USE OF ELECTRICAL BRAIN STIMULATION (HCC): ICD-10-CM

## 2020-09-26 RX ORDER — RASAGILINE 1 MG/1
TABLET ORAL
Qty: 90 TABLET | Refills: 3 | Status: SHIPPED | OUTPATIENT
Start: 2020-09-26 | End: 2021-08-16

## 2020-09-30 ENCOUNTER — TELEPHONE (OUTPATIENT)
Dept: NEUROLOGY | Facility: CLINIC | Age: 75
End: 2020-09-30

## 2020-09-30 NOTE — TELEPHONE ENCOUNTER
Dr Sarath Tong office called and he would like to speak with you re: this patient  He's not in the office today and asks that you call him tomorrow morning @ [phone]  He's also in the office all day Friday

## 2020-10-08 NOTE — TELEPHONE ENCOUNTER
Dr Ely Santana office calling back again to discuss the mutual patient  He will be in the office till 12PM and in all day tomorrow  Please return call  Thank you       [phone]

## 2020-10-29 ENCOUNTER — TELEPHONE (OUTPATIENT)
Dept: NEUROLOGY | Facility: CLINIC | Age: 75
End: 2020-10-29

## 2020-10-29 ENCOUNTER — OFFICE VISIT (OUTPATIENT)
Dept: NEUROLOGY | Facility: CLINIC | Age: 75
End: 2020-10-29
Payer: MEDICARE

## 2020-10-29 VITALS
BODY MASS INDEX: 35.85 KG/M2 | TEMPERATURE: 97.8 F | SYSTOLIC BLOOD PRESSURE: 123 MMHG | HEIGHT: 67 IN | DIASTOLIC BLOOD PRESSURE: 60 MMHG | HEART RATE: 60 BPM

## 2020-10-29 DIAGNOSIS — R44.1 HALLUCINATION, VISUAL: ICD-10-CM

## 2020-10-29 DIAGNOSIS — Z96.89 S/P DEEP BRAIN STIMULATOR PLACEMENT: ICD-10-CM

## 2020-10-29 DIAGNOSIS — G20 PARKINSON'S DISEASE WITH USE OF ELECTRICAL BRAIN STIMULATION (HCC): Primary | ICD-10-CM

## 2020-10-29 DIAGNOSIS — G20 COGNITIVE DEFICIT DUE TO PARKINSON'S DISEASE (HCC): ICD-10-CM

## 2020-10-29 PROCEDURE — 95970 ALYS NPGT W/O PRGRMG: CPT | Performed by: PSYCHIATRY & NEUROLOGY

## 2020-10-29 PROCEDURE — 99215 OFFICE O/P EST HI 40 MIN: CPT | Performed by: PSYCHIATRY & NEUROLOGY

## 2020-10-30 PROBLEM — R44.1 HALLUCINATION, VISUAL: Status: ACTIVE | Noted: 2020-10-30

## 2021-02-12 DIAGNOSIS — Z23 ENCOUNTER FOR IMMUNIZATION: ICD-10-CM

## 2021-03-18 ENCOUNTER — PROCEDURE VISIT (OUTPATIENT)
Dept: NEUROLOGY | Facility: CLINIC | Age: 76
End: 2021-03-18
Payer: MEDICARE

## 2021-03-18 VITALS
BODY MASS INDEX: 35.87 KG/M2 | WEIGHT: 229 LBS | HEART RATE: 75 BPM | DIASTOLIC BLOOD PRESSURE: 65 MMHG | SYSTOLIC BLOOD PRESSURE: 130 MMHG

## 2021-03-18 DIAGNOSIS — Z96.82 PRESENCE OF NEUROSTIMULATOR: ICD-10-CM

## 2021-03-18 DIAGNOSIS — R13.19 OTHER DYSPHAGIA: ICD-10-CM

## 2021-03-18 DIAGNOSIS — G20 COGNITIVE DEFICIT DUE TO PARKINSON'S DISEASE (HCC): ICD-10-CM

## 2021-03-18 DIAGNOSIS — R44.3 HALLUCINATIONS: ICD-10-CM

## 2021-03-18 DIAGNOSIS — G20 PARKINSON'S DISEASE WITH USE OF ELECTRICAL BRAIN STIMULATION (HCC): Primary | ICD-10-CM

## 2021-03-18 DIAGNOSIS — R44.1 HALLUCINATION, VISUAL: ICD-10-CM

## 2021-03-18 PROCEDURE — 99214 OFFICE O/P EST MOD 30 MIN: CPT | Performed by: PSYCHIATRY & NEUROLOGY

## 2021-03-18 PROCEDURE — 95970 ALYS NPGT W/O PRGRMG: CPT | Performed by: PSYCHIATRY & NEUROLOGY

## 2021-03-18 RX ORDER — PIMAVANSERIN TARTRATE 34 MG/1
1 CAPSULE ORAL
Qty: 30 CAPSULE | Refills: 8
Start: 2021-03-18 | End: 2022-01-13 | Stop reason: SDUPTHER

## 2021-03-18 RX ORDER — RIVASTIGMINE TARTRATE 4.5 MG/1
4.5 CAPSULE ORAL 2 TIMES DAILY
Qty: 180 CAPSULE | Refills: 2 | Status: SHIPPED | OUTPATIENT
Start: 2021-03-18 | End: 2021-11-22

## 2021-03-18 NOTE — ASSESSMENT & PLAN NOTE
Cognitive decline continues to progress  She has developed Capgras symptoms with her replicating her home which has been challenging for her   Also with hallucinations which are more frequent and so vivid she has a hard time accepting it a times  We will start Nuplazid 34mg daily  Time spent discussing the use of Nuplazid for PD hallucinations  We discussed the boxed warning of increased risk of death with the use of antipsychotics in the elderly with dementia

## 2021-03-18 NOTE — PROGRESS NOTES
Patient ID: Lesly Jaime is a 67 y o  female  Assessment/Plan:    Parkinson's disease with use of electrical brain stimulation ([facility] )  Parkinson's complicated by postural instability and gait  Gait appears to be overall stable and if anything a bit better than when last seen  Deep brain stimulator was interrogated for impendence check, update, and battery tracking on the left given it was placed in 2014  Right battery 2 88  3 4V, PW90, Rate 160 C+1-    Left battery 2 56  2 6V, PW60, Rate 160 C+2-    Continue on current medications  Will refer to neurosurgery to plan out left IPG replacement  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 [facility])  Cognitive decline continues to progress  She has developed Capgras symptoms with her replicating her home which has been challenging for her   Also with hallucinations which are more frequent and so vivid she has a hard time accepting it a times  We will start Nuplazid 34mg daily  Time spent discussing the use of Nuplazid for PD hallucinations  We discussed the boxed warning of increased risk of death with the use of antipsychotics in the elderly with dementia  Diagnoses and all orders for this visit:    Parkinson's disease with use of electrical brain stimulation ([facility] )  -     FL barium swallow video w speech; Future  -     Ambulatory referral to Neurosurgery; Future    Other dysphagia  -     FL barium swallow video w speech; Future    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 ([facility] )  -     rivastigmine (EXELON) 4 5 MG capsule; Take 1 capsule (4 5 mg total) by mouth 2 (two) times a day    Hallucinations  -     Pimavanserin Tartrate (Nuplazid) 34 MG CAPS;  Take 1 capsule by mouth daily at bedtime    Other orders  -     TURMERIC PO; Take by mouth           Subjective:    Ms Lesly Jaime is a pleasant female s/p bilateral CTS s/p surgery, significant arthritis (psoriatic), Parkinson's disease since about 2009 now s/p bilateral STN DBS placement 11/5/14 with ACTIVA SC 11/11/14, s/p right IPG replacement 12/18/18, here for follow up  Prior to surgery she was on Sinemet 25/100 q 4 hours tid and amantadine bid  She remains on Azilect  Previously she tried ropinirole, Requip XL (highest dose 8mg) and Mirapex ER 1 5 with no effect  Insurance would not cover Neupro and she found it a nuisance  Discussion about moving the IPG had with Dr Micheline Sloan but she decided to wait until the next needed replacement  She continues to have postural instability and trouble with gait  Retrials of Sinemet 25/100 1 po tid q 4 hours associated with hallucinations  PT for gait and balance with modest degree of success  Amantadine trial in the past with no improvement  She fell in December 2019 and fractured her right hip  This was pinned and during hospitalization she has post-procedure delurium with hallucinations  Sinemet decreased to 0 5 tabs 3 times daily and she was on selegiline 5mg daily  Current medication:  rasagiline 1mg daily  Rivastigmine 3mg bid    She returns today accompanied by her   She has hallucinations both in the day and night  She sees her neighbor has a gorilla dn a couple of monkeys  It seems so real to her  He is a  so it is feasible to her that he may be doing something for the school  She will see her granddaughter and her boyfriend in her home when she is at [facility]  She will hear people in the home overnight  She has periods in which she feels their home is a duplicate of their home and she wants to go back home  She has been tolerating rivastigmine 3mg bid  She has short term memory issues, forgetting conversation    She remembers a lot from the past  She can be disoriented to dates  She can have trouble with household activities such as using the computer, remote, or phone  She continues to have gait issues but she feels this has been good  She has occasional freezing  No serious recent falls   She walks with the walker  She has not continued exercises since completing PT  She does not wish to continue going for exercise  She has some sleep issues which are variable  Melatonin helps at times  She denies any daytime sedation or fatigue  She can dress independently slowly but tends to be assisted by the aide  Showering and performing hygiene acts with assistance getting into the shower  She uses a shower chair and bars  She needs assistance rising from a seated position  They have daytime aides 7 hours daily, 6 days week  Medications are administered  She chokes while eating a few times a day  Objective:    Blood pressure 130/65, pulse 75, weight 104 kg (229 lb)  Physical Exam  Eyes:      Extraocular Movements: Extraocular movements intact  Pupils: Pupils are equal, round, and reactive to light  Neurological:      Mental Status: She is alert  Deep Tendon Reflexes: Strength normal          Neurological Exam  Mental Status  Alert  Oriented only to person, place and situation  Speech: hypophonia  Language is fluent with no aphasia  Attention and concentration are normal     Cranial Nerves  CN III, IV, VI: Extraocular movements intact bilaterally  Pupils equal round and reactive to light bilaterally  CN VII: Full and symmetric facial movement  CN VIII: Hearing is normal   CN XI: Shoulder shrug strength is normal   CN XII: Tongue midline without atrophy or fasciculations  Motor   Increased muscle tone  Strength is 5/5 throughout all four extremities  Sensory  Light touch is normal in upper and lower extremities  Coordination  Right: Finger-to-nose normal  Rapid alternating movement abnormality:  Left: Finger-to-nose normal  Rapid alternating movement abnormality:  See MDS UPDRS III  Gait  Casual gait: Unable to rise from chair without using arms  Arose using hands  Ambulated with walker  En bloc turn  No freezing  Unsteady on turn         MDS UPDRS III                             Time since last dose:  5/7/20 10/29/20 3/18/21   Speech  1 1 1   Facial Expression  2 2 2   Rigidity - Neck  0 2 2   Rigidity - Upper Extremity (Right)  0 0 0   Rigidity - Upper Extremity (Left)   0 1 1   Rigidity - Lower Extremity (Right)  0 2 1   Rigidity - Lower Extremity (Left)   0 2 1   Finger Taps (Right)   2 2 2   Finger Taps (Left)   2 2 2   Hand Movement (Right)  2 1 2   Hand Movement (Left)   2 2 2   Pronation/Supination (Right)  2 2 2   Pronation/Supination (Left)   3 3 3   Toe Tapping (Right) 2 3 3   Toe Tapping (Left) 2 2 1   Leg Agility (Right)  2 2 1   Leg Agility (Left)   2 2 1   Arising from Chair   3 3 2   Gait   3 3 3   Freezing of Gait 1 0 0   Postural Stability          Posture 2 2 2   Global spontaneity of movement 2 2 2   Postural Tremor (Right) 0 0 0   Postural Tremor (Left) 0 0 0   Kinetic Tremor (Right)  0 0 0   Kinetic Tremor (Left)  0 0 0   Rest tremor amplitude RUE 0 0 0   Rest tremor amplitude LUE 0 0 0   Rest tremor amplitude RLE 0 0 0   Reset tremor amplitude LLE 0 0 0   Lip/Jaw Tremor  0 0 0              Motor Exam Total:                 ROS:    Review of Systems   Constitutional: Negative  Negative for appetite change and fever  HENT: Positive for voice change (softer)  Negative for hearing loss, tinnitus and trouble swallowing  Eyes: Negative  Negative for photophobia and pain  Respiratory: Negative  Negative for shortness of breath  Cardiovascular: Negative  Negative for palpitations  Gastrointestinal: Negative  Negative for nausea and vomiting  Endocrine: Negative  Negative for cold intolerance  Genitourinary: Negative  Negative for dysuria, frequency and urgency  Musculoskeletal: Positive for gait problem (Shuffles and sometimes her one foot will freeze)  Negative for myalgias and neck pain  Balance Issues     Skin: Negative  Negative for rash  Allergic/Immunologic: Negative      Neurological: Positive for speech difficulty (Sometimes has issues getting words out)  Negative for dizziness, tremors, seizures, syncope, facial asymmetry, weakness, light-headedness, numbness and headaches  Hematological: Negative  Does not bruise/bleed easily  Psychiatric/Behavioral: Positive for hallucinations and sleep disturbance  Negative for confusion  Memory issues     All other systems reviewed and are negative  Review of system was personally reviewed

## 2021-03-26 DIAGNOSIS — R44.3 HALLUCINATIONS: ICD-10-CM

## 2021-03-26 RX ORDER — PIMAVANSERIN TARTRATE 34 MG/1
1 CAPSULE ORAL
Qty: 90 CAPSULE | Refills: 5 | Status: CANCELLED | OUTPATIENT
Start: 2021-03-26

## 2021-03-26 NOTE — TELEPHONE ENCOUNTER
Chart reviewed: Nuplazid was ordered on 3/18/21 but sent to print  [facility] never receive this script  Rx re-entered   Pls review and sign off      thanks

## 2021-03-26 NOTE — TELEPHONE ENCOUNTER
----- Message from [address] [place] [address]   Hazel sent at 3/26/2021  2:05 PM EDT -----  Regarding: Prescription Question  Contact: [phone]  Request status of Nuplazid prescription ordered at last visit

## 2021-03-29 ENCOUNTER — OFFICE VISIT (OUTPATIENT)
Dept: NEUROSURGERY | Facility: CLINIC | Age: 76
End: 2021-03-29
Payer: MEDICARE

## 2021-03-29 VITALS
HEART RATE: 59 BPM | DIASTOLIC BLOOD PRESSURE: 77 MMHG | SYSTOLIC BLOOD PRESSURE: 147 MMHG | RESPIRATION RATE: 16 BRPM | TEMPERATURE: 98.5 F | BODY MASS INDEX: 35.94 KG/M2 | WEIGHT: 229 LBS | HEIGHT: 67 IN

## 2021-03-29 DIAGNOSIS — Z45.42 END OF BATTERY LIFE OF DEEP BRAIN STIMULATOR: Primary | ICD-10-CM

## 2021-03-29 DIAGNOSIS — G20 PARKINSON'S DISEASE WITH USE OF ELECTRICAL BRAIN STIMULATION (HCC): ICD-10-CM

## 2021-03-29 PROCEDURE — 99214 OFFICE O/P EST MOD 30 MIN: CPT | Performed by: NURSE PRACTITIONER

## 2021-03-29 RX ORDER — SODIUM CHLORIDE, SODIUM LACTATE, POTASSIUM CHLORIDE, CALCIUM CHLORIDE 600; 310; 30; 20 MG/100ML; MG/100ML; MG/100ML; MG/100ML
50 INJECTION, SOLUTION INTRAVENOUS CONTINUOUS
Status: CANCELLED | OUTPATIENT
Start: 2021-04-07

## 2021-03-29 RX ORDER — CHLORHEXIDINE GLUCONATE 0.12 MG/ML
15 RINSE ORAL ONCE
Status: CANCELLED | OUTPATIENT
Start: 2021-03-29 | End: 2021-03-29

## 2021-03-29 RX ORDER — CEFAZOLIN SODIUM 2 G/50ML
2000 SOLUTION INTRAVENOUS ONCE
Status: CANCELLED | OUTPATIENT
Start: 2021-04-07

## 2021-03-29 NOTE — PROGRESS NOTES
Assessment/Plan:    End of battery life of deep brain stimulator  · As addressed in HPI  · Deep Brain Stimulator right upper chest generator nearing end of life , generator at 2 58 V as per recent interrogation reading by Dr Valdez Setting 3/15/2021  · Presents for surgical assessment and determination for replacement   · Dr Jayce Newell will exp[erik surgical procedure , risks, and benefits then obtain surgical consent verbal and written to proceed with surgery  PLAN:     Preoperative Assessments:   · Prior General Anesthesia Reactions--denies      · Exercise Capacity ---   She/ admit to exertional symptoms of dyspnea when ambulating using walker, she rarely every climbs 2 sets of stairs  Both deny chest pain when when performing either activity  · Nicotine dependence ----  Does not smoke  · Personal history of venous thromboembolic disease--denies   · Bleeding Tendency ---denies easily bruising , spontaneous nose or gum bleeds  · Nickel or metal reaction/allergy--had surgical staples in the past without adverse effect   · History of cancer or other health condition that requires routine MRI imagining---denies     Surgery Clearance:   · PCP/Medical Clearance- pending completion  · Cardiac---EKG required , knows how to turn off DBS for procedure  · PAT--pending completion, explained complete before attending preoperative clearance appointment   Imagining requirement---no requirement for procedure      In preparation for surgery the following Preoperative teaching is reviewed     · Copy of written preoperative teaching instructions provided and reviewed in detail ti include the following  · Medication exclusion list provided and reviewed - do not take 7 days prior surgery or 14 days postoperatively including OTC, supplements, ASA containing products (Excedrin migraine) and NSAID  · Read and review the education booklet provided by the OR  today     · Explained preoperative skin preparation hygiene protocol using Hibiclens (chlorhexidine) body wash and OLY wipes  · Informed an antibiotic will be prescribed the day prior surgery, start night of surgery and continue thru completion  · Avoid lifting greater than 10 lbs using arm on operative side, no repetitive arm movements pushing, pulling, or rotation  , no stretching thru 6 weeks (about 1 and a half months) post op  · Do not drive for 2 weeks after surgery (deviation from this restriction is at the discretion of the surgeon)   · Can ambulate as tolerated immediately post op  · Avoid immersion in water no swimming, hot tub use, or tub bath thru 6-week post -op  · Postoperative pain management extra strength Tylenol recommended but can order opiate  DO NOT PRESCRIBE OPIATE WITHH TREAT WITH ACETAMINOPHEN 650 MG PO EVERY 6 HOURS   · Postoperative follow-up appointments reviewed; 2 weeks visit for surgical incision assessment  · Schedule appointment with neurologist for visit within 2-3 week after surgery  · The product Rep is present during surgery  [facility]  · As addressed in HPI  · As addressed in end of generator life DBS generator  · Hallucinates, Dr Tamara Carr recently prescribed Nuplazid , has not filled script  · Has short term memory deficit  · Gait instability , ambulates using a rolling walker, has intermittent freezing  · Requires assistance rising from a seated to standing position  · Is scheduled for a swallowing study at [address] [place] [address],  described swallowing difficultly  coughs multiple times while eating, chocks on food      PLAN;  · As addressed in end of battery life,         Parkinson's disease with use of electrical brain stimulation [facility])  -     Ambulatory referral to Neurosurgery        Subjective: Deep brain Stimulator battery is ner end of life (EOL)    Patient ID: Jazmin Clement is a 67 y o  female     HPI   History Parkinson disease since 2009   Symptoms of  postural instability and gait problems associated with freezing  She has physical therapy for gait instability  With some degree of gait improvement   Fall in 2019 resulted in  right hip fracture, repaired with surgical pinning  During 2019 inpatient admit suffered postoperative delirium and hallucinations  Failed parkinson disease (PD) management with sinemet and  Amantadine and several medications  Currently treating with Azilect  Sinemet caused hallucinations  Status post insertion bilateral STN deep brain stimulator  (DBS) placement  11/15/2014 with ACTIVA SC  Placed 11/11/2014  Right DBS generator replacement 12/18/2018     Recent right upper chest BS interrogation by Dr Susie Wu 3/18/21 generator life at 2 58 V, nearing EOL requires replacement  Referral to neurosurgery for surgical assessment to replace DBS generator  I have spent 60 minutes with Patient/daughter today in which greater than 50% of this time was spent in assessment, explaining the surgical process, counseling/coordination of care in preparation for surgery, and education, both patient and  verbalized and understanding and agreement with plan  REVIEW OF SYSTEMS  Review of Systems   Constitutional: Negative  HENT: Positive for trouble swallowing (scheduled for swallow test tomorrow)  Eyes: Negative  Respiratory: Positive for cough  Cardiovascular: Negative  Gastrointestinal: Negative  Endocrine: Positive for heat intolerance  Genitourinary:        Bladder incontinence   Musculoskeletal: Positive for gait problem (using rolling walker)  Skin: Negative  Allergic/Immunologic: Negative  Neurological: Positive for speech difficulty (some muffled speech per , some difficulty word finding)  Negative for tremors (controlled with DBS)  Hematological: Negative      Psychiatric/Behavioral: Positive for decreased concentration, dysphoric mood, hallucinations and sleep disturbance (some nights)  The patient is nervous/anxious  Some memory difficulty         Meds/Allergies     Current Outpatient Medications   Medication Sig Dispense Refill    aspirin 81 MG tablet Take 1 tablet by mouth daily      atenolol (TENORMIN) 50 mg tablet Take 1 tablet (50 mg total) by mouth daily 30 tablet 0    Coenzyme Q10 400 MG CAPS Take 1 capsule (400 mg total) by mouth daily 30 capsule 0    Omega-3 Fatty Acids (FISH OIL) 1,000 mg Take 1 capsule (1,000 mg total) by mouth daily 30 capsule 0    PARoxetine (PAXIL) 20 mg tablet Take 1 tablet (20 mg total) by mouth daily 30 tablet 0    rasagiline (AZILECT) 1 MG TAKE 1 TABLET BY MOUTH  DAILY 90 tablet 3    rivastigmine (EXELON) 4 5 MG capsule Take 1 capsule (4 5 mg total) by mouth 2 (two) times a day 180 capsule 2    rosuvastatin (CRESTOR) 5 mg tablet Take 1 tablet (5 mg total) by mouth every other day 30 tablet 0    TURMERIC PO Take by mouth daily       acetaminophen (TYLENOL) 325 mg tablet Take 2 tablets (650 mg total) by mouth every 6 (six) hours as needed for mild pain, headaches or fever (Patient not taking: Reported on 3/29/2021) 30 tablet 0    Chromium-Cinnamon (CINNAMON PLUS CHROMIUM) 200-1000 MCG-MG CAPS Take 1,000 mg by mouth 2 (two) times a day      Pimavanserin Tartrate (Nuplazid) 34 MG CAPS Take 1 capsule by mouth daily at bedtime (Patient not taking: Reported on 3/29/2021) 30 capsule 8     No current facility-administered medications for this visit          Allergies   Allergen Reactions    Sulfa Antibiotics Hives       PAST HISTORY    Past Medical History:   Diagnosis Date    Anxiety     [facility]     Diabetes mellitus [facility]     Diabetes mellitus type 2, diet-controlled [facility]     Hyperlipidemia     Hypertension     Parkinson disease ([facility] )        Past Surgical History:   Procedure Laterality Date    ACHILLES TENDON SURGERY      DEEP BRAIN STIMULATOR PLACEMENT      DENTAL SURGERY      FRACTURE SURGERY      DE IMP STIM,CRANIAL,SUBQ,1 ARRAY Right 12/18/2018    Procedure: REPLACEMENT IMPLANTABLE PULSE GENERATOR (IPG) DEEP BRAIN STIMULATION (DBS); Surgeon: Marialuisa Jean MD;  [facility];  Service: Neurosurgery    DE LAP,APPENDECTOMY N/A 8/16/2020    Procedure: Nikko Cary;  Surgeon: Miguel Sapp MD;  [facility];  Service: General    DE OPEN RX FEMUR FX+INTRAMED ERIKA Right 12/14/2019    Procedure: INSERTION NAIL IM FEMUR ANTEGRADE (TROCHANTERIC); Surgeon: Anuj Cavazos DO;  [facility];  Service: Orthopedics    REPLACEMENT TOTAL KNEE      REVERSE TOTAL SHOULDER ARTHROPLASTY Left 8/23/2018    Procedure: ARTHROPLASTY SHOULDER REVERSE;  Surgeon: Viridiana Foreman MD;  [facility];  Service: Orthopedics    TONSILLECTOMY         Social History     Tobacco Use    Smoking status: Never Smoker    Smokeless tobacco: Never Used   Substance Use Topics    Alcohol use: Yes     Alcohol/week: 2 0 standard drinks     Types: 2 Glasses of wine per week     Frequency: Monthly or less     Drinks per session: 1 or 2     Comment: occasional    Drug use: No       Family History   Problem Relation Age of Onset    Arthritis Mother     No Known Problems Father     No Known Problems Maternal Grandmother     No Known Problems Maternal Grandfather     No Known Problems Paternal Grandmother     No Known Problems Paternal Grandfather     No Known Problems Son     No Known Problems Son        The following portions of the patient's history were reviewed and updated as appropriate: allergies, current medications, past family history, past medical history, past social history, past surgical history and problem list       EXAM    Vitals:Blood pressure 147/77, pulse 59, temperature 98 5 °F (36 9 °C), temperature source Tympanic, resp  rate 16, height 5' 7" (1 702 m), weight 104 kg (229 lb)  ,Body mass index is 35 87 kg/m²  Physical Exam  Vitals signs and nursing note reviewed   Exam conducted with a chaperone present ()  Constitutional:       General: She is not in acute distress  Appearance: She is obese  She is not ill-appearing or diaphoretic  Eyes:      General: No scleral icterus  Right eye: No discharge  Left eye: No discharge  Extraocular Movements: Extraocular movements intact  Conjunctiva/sclera: Conjunctivae normal    Cardiovascular:      Rate and Rhythm: Regular rhythm  Heart sounds: Normal heart sounds  Pulmonary:      Effort: Pulmonary effort is normal  No respiratory distress  Breath sounds: Normal breath sounds  Abdominal:      General: Bowel sounds are normal    Musculoskeletal:      Right lower leg: Edema (trace ankle pedal non pitting) present  Left lower leg: Edema (trace ankle pedal non pitting) present  Skin:     General: Skin is warm and dry  Findings: Erythema (lower legs ) present  Neurological:      Mental Status: She is oriented to person, place, and time  Mental status is at baseline  Gait: Gait abnormal    Psychiatric:         Behavior: Behavior normal       Comments: Flat affect         Neurologic Exam     Mental Status   Oriented to person, place, and time  Level of consciousness: alert (subdued )    Motor Exam     Strength   Right quadriceps: 4/5  Left quadriceps: 4/5  Right hamstrin/5  Left hamstrin/5  Right anterior tibial: 4/5  Left anterior tibial: 4/5  Right gastroc: 4/5  Left gastroc: 4/5    Gait, Coordination, and Reflexes     Gait  Gait: (Gait instability, freezing)    Tremor   Resting tremor: absent  Intention tremor: absentGait instability , bradykinesia , freezing assist with position change from seated       Imaging Studies  No results found

## 2021-03-29 NOTE — H&P (VIEW-ONLY)
Assessment/Plan:    End of battery life of deep brain stimulator  · As addressed in HPI  · Deep Brain Stimulator right upper chest generator nearing end of life , generator at 2 58 V as per recent interrogation reading by Dr Vivas Pean 3/15/2021  · Presents for surgical assessment and determination for replacement   · Dr Bernal Parents will exp[erik surgical procedure , risks, and benefits then obtain surgical consent verbal and written to proceed with surgery  PLAN:     Preoperative Assessments:   · Prior General Anesthesia Reactions--denies      · Exercise Capacity ---   She/ admit to exertional symptoms of dyspnea when ambulating using walker, she rarely every climbs 2 sets of stairs  Both deny chest pain when when performing either activity  · Nicotine dependence ----  Does not smoke  · Personal history of venous thromboembolic disease--denies   · Bleeding Tendency ---denies easily bruising , spontaneous nose or gum bleeds  · Nickel or metal reaction/allergy--had surgical staples in the past without adverse effect   · History of cancer or other health condition that requires routine MRI imagining---denies     Surgery Clearance:   · PCP/Medical Clearance- pending completion  · Cardiac---EKG required , knows how to turn off DBS for procedure  · PAT--pending completion, explained complete before attending preoperative clearance appointment   Imagining requirement---no requirement for procedure      In preparation for surgery the following Preoperative teaching is reviewed     · Copy of written preoperative teaching instructions provided and reviewed in detail ti include the following  · Medication exclusion list provided and reviewed - do not take 7 days prior surgery or 14 days postoperatively including OTC, supplements, ASA containing products (Excedrin migraine) and NSAID  · Read and review the education booklet provided by the OR  today     · Explained preoperative skin preparation hygiene protocol using Hibiclens (chlorhexidine) body wash and OLY wipes  · Informed an antibiotic will be prescribed the day prior surgery, start night of surgery and continue thru completion  · Avoid lifting greater than 10 lbs using arm on operative side, no repetitive arm movements pushing, pulling, or rotation  , no stretching thru 6 weeks (about 1 and a half months) post op  · Do not drive for 2 weeks after surgery (deviation from this restriction is at the discretion of the surgeon)   · Can ambulate as tolerated immediately post op  · Avoid immersion in water no swimming, hot tub use, or tub bath thru 6-week post -op  · Postoperative pain management extra strength Tylenol recommended but can order opiate  DO NOT PRESCRIBE OPIATE WITHH TREAT WITH ACETAMINOPHEN 650 MG PO EVERY 6 HOURS   · Postoperative follow-up appointments reviewed; 2 weeks visit for surgical incision assessment  · Schedule appointment with neurologist for visit within 2-3 week after surgery  · The product Rep is present during surgery  Park[facility]'s [facility]  · As addressed in HPI  · As addressed in end of generator life DBS generator  · Hallucinates, Dr Ara Roldan recently prescribed Nuplazid , has not filled script  · Has short term memory deficit  · Gait instability , ambulates using a rolling walker, has intermittent freezing  · Requires assistance rising from a seated to standing position  · Is scheduled for a swallowing study at [address] [place] [address],  described swallowing difficultly  coughs multiple times while eating, chocks on food      PLAN;  · As addressed in end of battery life,         Parkinson's disease with use of electrical brain stimulation [facility])  -     Ambulatory referral to Neurosurgery        Subjective: Deep brain Stimulator battery is ner end of life (EOL)    Patient ID: Madai Graham is a 67 y o  female     HPI   History Parkinson disease since 2009   Symptoms of  postural instability and gait problems associated with freezing  She has physical therapy for gait instability  With some degree of gait improvement   Fall in 2019 resulted in  right hip fracture, repaired with surgical pinning  During 2019 inpatient admit suffered postoperative delirium and hallucinations  Failed parkinson disease (PD) management with sinemet and  Amantadine and several medications  Currently treating with Azilect  Sinemet caused hallucinations  Status post insertion bilateral STN deep brain stimulator  (DBS) placement  11/15/2014 with ACTIVA SC  Placed 11/11/2014  Right DBS generator replacement 12/18/2018     Recent right upper chest BS interrogation by Dr Camara Fails 3/18/21 generator life at 2 58 V, nearing EOL requires replacement  Referral to neurosurgery for surgical assessment to replace DBS generator  I have spent 60 minutes with Patient/daughter today in which greater than 50% of this time was spent in assessment, explaining the surgical process, counseling/coordination of care in preparation for surgery, and education, both patient and  verbalized and understanding and agreement with plan  REVIEW OF SYSTEMS  Review of Systems   Constitutional: Negative  HENT: Positive for trouble swallowing (scheduled for swallow test tomorrow)  Eyes: Negative  Respiratory: Positive for cough  Cardiovascular: Negative  Gastrointestinal: Negative  Endocrine: Positive for heat intolerance  Genitourinary:        Bladder incontinence   Musculoskeletal: Positive for gait problem (using rolling walker)  Skin: Negative  Allergic/Immunologic: Negative  Neurological: Positive for speech difficulty (some muffled speech per , some difficulty word finding)  Negative for tremors (controlled with DBS)  Hematological: Negative      Psychiatric/Behavioral: Positive for decreased concentration, dysphoric mood, hallucinations and sleep disturbance (some nights)  The patient is nervous/anxious  Some memory difficulty         Meds/Allergies     Current Outpatient Medications   Medication Sig Dispense Refill    aspirin 81 MG tablet Take 1 tablet by mouth daily      atenolol (TENORMIN) 50 mg tablet Take 1 tablet (50 mg total) by mouth daily 30 tablet 0    Coenzyme Q10 400 MG CAPS Take 1 capsule (400 mg total) by mouth daily 30 capsule 0    Omega-3 Fatty Acids (FISH OIL) 1,000 mg Take 1 capsule (1,000 mg total) by mouth daily 30 capsule 0    PARoxetine (PAXIL) 20 mg tablet Take 1 tablet (20 mg total) by mouth daily 30 tablet 0    rasagiline (AZILECT) 1 MG TAKE 1 TABLET BY MOUTH  DAILY 90 tablet 3    rivastigmine (EXELON) 4 5 MG capsule Take 1 capsule (4 5 mg total) by mouth 2 (two) times a day 180 capsule 2    rosuvastatin (CRESTOR) 5 mg tablet Take 1 tablet (5 mg total) by mouth every other day 30 tablet 0    TURMERIC PO Take by mouth daily       acetaminophen (TYLENOL) 325 mg tablet Take 2 tablets (650 mg total) by mouth every 6 (six) hours as needed for mild pain, headaches or fever (Patient not taking: Reported on 3/29/2021) 30 tablet 0    Chromium-Cinnamon (CINNAMON PLUS CHROMIUM) 200-1000 MCG-MG CAPS Take 1,000 mg by mouth 2 (two) times a day      Pimavanserin Tartrate (Nuplazid) 34 MG CAPS Take 1 capsule by mouth daily at bedtime (Patient not taking: Reported on 3/29/2021) 30 capsule 8     No current facility-administered medications for this visit          Allergies   Allergen Reactions    Sulfa Antibiotics Hives       PAST HISTORY    Past Medical History:   Diagnosis Date    Anxiety     [facility]     Diabetes mellitus [facility]     Diabetes mellitus type 2, diet-controlled ([facility]     Hyperlipidemia     Hypertension     Parkinson disease ([facility] )        Past Surgical History:   Procedure Laterality Date    ACHILLES TENDON SURGERY      DEEP BRAIN STIMULATOR PLACEMENT      DENTAL SURGERY      FRACTURE SURGERY      AK IMP STIM,CRANIAL,SUBQ,1 ARRAY Right 12/18/2018    Procedure: REPLACEMENT IMPLANTABLE PULSE GENERATOR (IPG) DEEP BRAIN STIMULATION (DBS); Surgeon: Sudha Portillo MD;  [facility];  Service: Neurosurgery    AK LAP,APPENDECTOMY N/A 8/16/2020    Procedure: Mcgee Mary;  Surgeon: Doug Valiente MD;  [facility];  Service: General    AK OPEN RX FEMUR FX+INTRAMED ERIKA Right 12/14/2019    Procedure: INSERTION NAIL IM FEMUR ANTEGRADE (TROCHANTERIC); Surgeon: Dianne Schultz DO;  [facility];  Service: Orthopedics    REPLACEMENT TOTAL KNEE      REVERSE TOTAL SHOULDER ARTHROPLASTY Left 8/23/2018    Procedure: ARTHROPLASTY SHOULDER REVERSE;  Surgeon: Idania Huber MD;  [facility];  Service: Orthopedics    TONSILLECTOMY         Social History     Tobacco Use    Smoking status: Never Smoker    Smokeless tobacco: Never Used   Substance Use Topics    Alcohol use: Yes     Alcohol/week: 2 0 standard drinks     Types: 2 Glasses of wine per week     Frequency: Monthly or less     Drinks per session: 1 or 2     Comment: occasional    Drug use: No       Family History   Problem Relation Age of Onset    Arthritis Mother     No Known Problems Father     No Known Problems Maternal Grandmother     No Known Problems Maternal Grandfather     No Known Problems Paternal Grandmother     No Known Problems Paternal Grandfather     No Known Problems Son     No Known Problems Son        The following portions of the patient's history were reviewed and updated as appropriate: allergies, current medications, past family history, past medical history, past social history, past surgical history and problem list       EXAM    Vitals:Blood pressure 147/77, pulse 59, temperature 98 5 °F (36 9 °C), temperature source Tympanic, resp  rate 16, height 5' 7" (1 702 m), weight 104 kg (229 lb)  ,Body mass index is 35 87 kg/m²  Physical Exam  Vitals signs and nursing note reviewed   Exam conducted with a chaperone present ()  Constitutional:       General: She is not in acute distress  Appearance: She is obese  She is not ill-appearing or diaphoretic  Eyes:      General: No scleral icterus  Right eye: No discharge  Left eye: No discharge  Extraocular Movements: Extraocular movements intact  Conjunctiva/sclera: Conjunctivae normal    Cardiovascular:      Rate and Rhythm: Regular rhythm  Heart sounds: Normal heart sounds  Pulmonary:      Effort: Pulmonary effort is normal  No respiratory distress  Breath sounds: Normal breath sounds  Abdominal:      General: Bowel sounds are normal    Musculoskeletal:      Right lower leg: Edema (trace ankle pedal non pitting) present  Left lower leg: Edema (trace ankle pedal non pitting) present  Skin:     General: Skin is warm and dry  Findings: Erythema (lower legs ) present  Neurological:      Mental Status: She is oriented to person, place, and time  Mental status is at baseline  Gait: Gait abnormal    Psychiatric:         Behavior: Behavior normal       Comments: Flat affect         Neurologic Exam     Mental Status   Oriented to person, place, and time  Level of consciousness: alert (subdued )    Motor Exam     Strength   Right quadriceps: 4/5  Left quadriceps: 4/5  Right hamstrin/5  Left hamstrin/5  Right anterior tibial: 4/5  Left anterior tibial: 4/5  Right gastroc: 4/5  Left gastroc: 4/5    Gait, Coordination, and Reflexes     Gait  Gait: (Gait instability, freezing)    Tremor   Resting tremor: absent  Intention tremor: absentGait instability , bradykinesia , freezing assist with position change from seated       Imaging Studies  No results found

## 2021-03-29 NOTE — ASSESSMENT & PLAN NOTE
· As addressed in HPI  · Deep Brain Stimulator right upper chest generator nearing end of life , generator at 2 58 V as per recent interrogation reading by Dr Keyla Stephens 3/15/2021  · Presents for surgical assessment and determination for replacement   · Dr Shashank Camp will exp[erik surgical procedure , risks, and benefits then obtain surgical consent verbal and written to proceed with surgery  PLAN:     Preoperative Assessments:   · Prior General Anesthesia Reactions--denies      · Exercise Capacity ---   She/ admit to exertional symptoms of dyspnea when ambulating using walker, she rarely every climbs 2 sets of stairs  Both deny chest pain when when performing either activity  · Nicotine dependence ----  Does not smoke  · Personal history of venous thromboembolic disease--denies   · Bleeding Tendency ---denies easily bruising , spontaneous nose or gum bleeds  · Nickel or metal reaction/allergy--had surgical staples in the past without adverse effect   · History of cancer or other health condition that requires routine MRI imagining---denies     Surgery Clearance:   · PCP/Medical Clearance- pending completion  · Cardiac---EKG required , knows how to turn off DBS for procedure  · PAT--pending completion, explained complete before attending preoperative clearance appointment   Imagining requirement---no requirement for procedure      In preparation for surgery the following Preoperative teaching is reviewed     · Copy of written preoperative teaching instructions provided and reviewed in detail ti include the following  · Medication exclusion list provided and reviewed - do not take 7 days prior surgery or 14 days postoperatively including OTC, supplements, ASA containing products (Excedrin migraine) and NSAID  · Read and review the education booklet provided by the OR  today  · Explained preoperative skin preparation hygiene protocol using Hibiclens (chlorhexidine) body wash and OLY wipes  · Informed an antibiotic will be prescribed the day prior surgery, start night of surgery and continue thru completion  · Avoid lifting greater than 10 lbs using arm on operative side, no repetitive arm movements pushing, pulling, or rotation  , no stretching thru 6 weeks (about 1 and a half months) post op  · Do not drive for 2 weeks after surgery (deviation from this restriction is at the discretion of the surgeon)   · Can ambulate as tolerated immediately post op  · Avoid immersion in water no swimming, hot tub use, or tub bath thru 6-week post -op  · Postoperative pain management extra strength Tylenol recommended but can order opiate  DO NOT PRESCRIBE OPIATE WITHH TREAT WITH ACETAMINOPHEN 650 MG PO EVERY 6 HOURS   · Postoperative follow-up appointments reviewed; 2 weeks visit for surgical incision assessment  · Schedule appointment with neurologist for visit within 2-3 week after surgery  · The product Rep is present during surgery

## 2021-03-29 NOTE — ASSESSMENT & PLAN NOTE
· As addressed in HPI  · As addressed in end of generator life DBS generator  · Hallucinates, Dr Preston Pinto recently prescribed Nuplazid , has not filled script  · Has short term memory deficit  · Gait instability , ambulates using a rolling walker, has intermittent freezing    · Requires assistance rising from a seated to standing position  · Is scheduled for a swallowing study at [address] [place] [address],  described swallowing difficultly  coughs multiple times while eating, chocks on food      PLAN;  · As addressed in end of battery life,

## 2021-03-30 ENCOUNTER — TELEPHONE (OUTPATIENT)
Dept: NEUROSURGERY | Facility: CLINIC | Age: 76
End: 2021-03-30

## 2021-03-30 ENCOUNTER — HOSPITAL ENCOUNTER (OUTPATIENT)
Dept: NON INVASIVE DIAGNOSTICS | Facility: HOSPITAL | Age: 76
Discharge: HOME | End: 2021-03-30
Attending: NEUROLOGICAL SURGERY
Payer: MEDICARE

## 2021-03-30 ENCOUNTER — HOSPITAL ENCOUNTER (OUTPATIENT)
Dept: RADIOLOGY | Facility: HOSPITAL | Age: 76
Discharge: HOME | End: 2021-03-30
Attending: PSYCHIATRY & NEUROLOGY
Payer: MEDICARE

## 2021-03-30 ENCOUNTER — APPOINTMENT (OUTPATIENT)
Dept: LAB | Facility: HOSPITAL | Age: 76
End: 2021-03-30
Attending: NEUROLOGICAL SURGERY
Payer: MEDICARE

## 2021-03-30 DIAGNOSIS — G20 PARKINSON'S DISEASE WITH USE OF ELECTRICAL BRAIN STIMULATION (HCC): ICD-10-CM

## 2021-03-30 DIAGNOSIS — R13.19 OTHER DYSPHAGIA: ICD-10-CM

## 2021-03-30 LAB
ALBUMIN SERPL BCP-MCNC: 3.7 G/DL (ref 3.5–5)
ALP SERPL-CCNC: 41 U/L (ref 46–116)
ALT SERPL W P-5'-P-CCNC: 20 U/L (ref 12–78)
ANION GAP SERPL CALCULATED.3IONS-SCNC: 7 MMOL/L (ref 4–13)
APTT PPP: 29 SECONDS (ref 23–37)
AST SERPL W P-5'-P-CCNC: 10 U/L (ref 5–45)
BACTERIA UR QL AUTO: ABNORMAL /HPF
BASOPHILS # BLD AUTO: 0.05 THOUSANDS/ΜL (ref 0–0.1)
BASOPHILS NFR BLD AUTO: 1 % (ref 0–1)
BILIRUB SERPL-MCNC: 0.33 MG/DL (ref 0.2–1)
BILIRUB UR QL STRIP: NEGATIVE
BUN SERPL-MCNC: 18 MG/DL (ref 5–25)
CALCIUM SERPL-MCNC: 9.2 MG/DL (ref 8.3–10.1)
CHLORIDE SERPL-SCNC: 107 MMOL/L (ref 100–108)
CLARITY UR: ABNORMAL
CO2 SERPL-SCNC: 31 MMOL/L (ref 21–32)
COLOR UR: YELLOW
CREAT SERPL-MCNC: 0.98 MG/DL (ref 0.6–1.3)
EOSINOPHIL # BLD AUTO: 0.4 THOUSAND/ΜL (ref 0–0.61)
EOSINOPHIL NFR BLD AUTO: 4 % (ref 0–6)
ERYTHROCYTE [DISTWIDTH] IN BLOOD BY AUTOMATED COUNT: 13 % (ref 11.6–15.1)
EST. AVERAGE GLUCOSE BLD GHB EST-MCNC: 126 MG/DL
GFR SERPL CREATININE-BSD FRML MDRD: 56 ML/MIN/1.73SQ M
GLUCOSE SERPL-MCNC: 129 MG/DL (ref 65–140)
GLUCOSE UR STRIP-MCNC: NEGATIVE MG/DL
HBA1C MFR BLD: 6 %
HCT VFR BLD AUTO: 46.3 % (ref 34.8–46.1)
HGB BLD-MCNC: 14.6 G/DL (ref 11.5–15.4)
HGB UR QL STRIP.AUTO: ABNORMAL
IMM GRANULOCYTES # BLD AUTO: 0.04 THOUSAND/UL (ref 0–0.2)
IMM GRANULOCYTES NFR BLD AUTO: 0 % (ref 0–2)
INR PPP: 1.01 (ref 0.84–1.19)
KETONES UR STRIP-MCNC: NEGATIVE MG/DL
LEUKOCYTE ESTERASE UR QL STRIP: ABNORMAL
LYMPHOCYTES # BLD AUTO: 2.1 THOUSANDS/ΜL (ref 0.6–4.47)
LYMPHOCYTES NFR BLD AUTO: 20 % (ref 14–44)
MCH RBC QN AUTO: 30.9 PG (ref 26.8–34.3)
MCHC RBC AUTO-ENTMCNC: 31.5 G/DL (ref 31.4–37.4)
MCV RBC AUTO: 98 FL (ref 82–98)
MONOCYTES # BLD AUTO: 0.76 THOUSAND/ΜL (ref 0.17–1.22)
MONOCYTES NFR BLD AUTO: 7 % (ref 4–12)
MUCOUS THREADS UR QL AUTO: ABNORMAL
NEUTROPHILS # BLD AUTO: 6.95 THOUSANDS/ΜL (ref 1.85–7.62)
NEUTS SEG NFR BLD AUTO: 68 % (ref 43–75)
NITRITE UR QL STRIP: POSITIVE
NON-SQ EPI CELLS URNS QL MICRO: ABNORMAL /HPF
NRBC BLD AUTO-RTO: 0 /100 WBCS
PH UR STRIP.AUTO: 5.5 [PH]
PLATELET # BLD AUTO: 203 THOUSANDS/UL (ref 149–390)
PMV BLD AUTO: 10.5 FL (ref 8.9–12.7)
POTASSIUM SERPL-SCNC: 3.9 MMOL/L (ref 3.5–5.3)
PROT SERPL-MCNC: 7.4 G/DL (ref 6.4–8.2)
PROT UR STRIP-MCNC: NEGATIVE MG/DL
PROTHROMBIN TIME: 13.1 SECONDS (ref 11.6–14.5)
RBC # BLD AUTO: 4.73 MILLION/UL (ref 3.81–5.12)
RBC #/AREA URNS AUTO: ABNORMAL /HPF
SODIUM SERPL-SCNC: 145 MMOL/L (ref 136–145)
SP GR UR STRIP.AUTO: 1.02 (ref 1–1.03)
UROBILINOGEN UR QL STRIP.AUTO: 0.2 E.U./DL
WBC # BLD AUTO: 10.3 THOUSAND/UL (ref 4.31–10.16)
WBC #/AREA URNS AUTO: ABNORMAL /HPF

## 2021-03-30 PROCEDURE — 80053 COMPREHEN METABOLIC PANEL: CPT

## 2021-03-30 PROCEDURE — 85610 PROTHROMBIN TIME: CPT

## 2021-03-30 PROCEDURE — 92611 MOTION FLUOROSCOPY/SWALLOW: CPT

## 2021-03-30 PROCEDURE — 83036 HEMOGLOBIN GLYCOSYLATED A1C: CPT

## 2021-03-30 PROCEDURE — 74230 X-RAY XM SWLNG FUNCJ C+: CPT

## 2021-03-30 PROCEDURE — 85025 COMPLETE CBC W/AUTO DIFF WBC: CPT

## 2021-03-30 PROCEDURE — 87086 URINE CULTURE/COLONY COUNT: CPT | Performed by: NEUROLOGICAL SURGERY

## 2021-03-30 PROCEDURE — 87077 CULTURE AEROBIC IDENTIFY: CPT | Performed by: NEUROLOGICAL SURGERY

## 2021-03-30 PROCEDURE — 85730 THROMBOPLASTIN TIME PARTIAL: CPT

## 2021-03-30 PROCEDURE — 87186 SC STD MICRODIL/AGAR DIL: CPT | Performed by: NEUROLOGICAL SURGERY

## 2021-03-30 PROCEDURE — 81001 URINALYSIS AUTO W/SCOPE: CPT | Performed by: NEUROLOGICAL SURGERY

## 2021-03-30 PROCEDURE — 36415 COLL VENOUS BLD VENIPUNCTURE: CPT

## 2021-03-30 NOTE — TELEPHONE ENCOUNTER
Nuplazid form not media  Not sure if this was faxed or not  Called [facility], spoke w/ Val Man and states they have not received any script  Rudy, do you have the Nuplazid form?

## 2021-03-30 NOTE — TELEPHONE ENCOUNTER
Received a call from patient's  Macho Trejo questioning the paper he received yesterday at the office visit  Returned call to patient to go over this matter  Left voicemail to return call at their earliest convenience, provided office number

## 2021-03-30 NOTE — PROCEDURES
Video Swallow Study      Patient Name: Kyle Walker  UCHLQ'F Date: 3/30/2021        Past Medical History  Past Medical History:   Diagnosis Date    Anxiety     [facility]     Diabetes mellitus [facility]     Diabetes mellitus type 2, diet-controlled ([facility] )     Hyperlipidemia     Hypertension     Parkinson disease ([facility]         Past Surgical History  Past Surgical History:   Procedure Laterality Date    ACHILLES TENDON SURGERY      DEEP BRAIN STIMULATOR PLACEMENT      DENTAL SURGERY      FRACTURE SURGERY      GA IMP STIM,CRANIAL,SUBQ,1 ARRAY Right 12/18/2018    Procedure: REPLACEMENT IMPLANTABLE PULSE GENERATOR (IPG) DEEP BRAIN STIMULATION (DBS); Surgeon: Fayrene Seip, MD;  Location: [facility];  Service: Neurosurgery    GA LAP,APPENDECTOMY N/A 8/16/2020    Procedure: Omar Almeida;  Surgeon: Bari Cruz MD;  [facility];  Service: General    GA OPEN RX FEMUR FX+INTRAMED ERIKA Right 12/14/2019    Procedure: INSERTION NAIL IM FEMUR ANTEGRADE (TROCHANTERIC); Surgeon: Mily Perry DO;  [facility];  Service: Orthopedics    REPLACEMENT TOTAL KNEE      REVERSE TOTAL SHOULDER ARTHROPLASTY Left 8/23/2018    Procedure: ARTHROPLASTY SHOULDER REVERSE;  Surgeon: Kaylee Arana MD;  [facility];  Service: Orthopedics    TONSILLECTOMY       Video Barium Swallow Study    Summary:  Images are on PACS for review  Pt presents w/ mild/WFL oropharyngeal swallow  Mastication was prolonged but functional  Formation was WNL  Control was mildly reduced/ WFL w/ thin liquids  Transfer was WNL  Swallows were fairly prompt  Epiglottic inversion was complete for the majority of swallows  Hyolaryngeal excursion and pharyngeal constriction were WNL  Transient penetration occurred x2 w/ thin liquids  No penetration or aspiration this study  Per gross esophageal screening:  Slow motility noted w/ all   Retention was observed in distal esophagus following solids and liquids  Pill remained in distal esophagus for a brief period of time before clearing w/ additional sips of thin liquids  Mild distal/medial retropulsion was noted w/ thin liquids  Recommendations:  Diet: Regular, softer selections   Liquids: Thin   Meds: Whole w/ thin  If large, consider crushing   Strategies: Additional sips of liquids when taking pills to clear esophagus, alternate solids w/ fluids to clear esophagus, small bites and sips   Upright position  F/u ST tx: Not at this time   Reflux Precautions  Consider consult with: GI  Results reviewed with: pt,   If a dedicated assessment of the esophagus is desired, consider esophagram/barium swallow or EGD  Patient's goal:  None stated    Pt is a 66 yo female referred for VBS by Neurology  Per neurology note, " described swallowing difficultly, coughs multiple times while eating, chokes on food "  reports pt does not drink until she is done eating  Previous VBS:  None documented   Current Diet:   Regular  Dentition:  Implants   Cognitive status:  Alert    Consistencies administered: Barium laden applesauce, nutrigrain bar, granola bar, cheerios/nectar, sandwich, thin liquids, 13mm barium pill  Liquids were administered by cup  Pt was seated laterally at 90 degrees  Oral stage:  Lip closure:  WNL  Mastication: Prolonged  Bolus formation: WNL  Bolus control: Mildly reduced/WFL  Transfer: Piecemeal, WNL  Residue: trace BOT w/ puree and sandwich , trace lingual surface w/ nutrigrain bar   Pharyngeal stage:  Swallow promptness: Prompt  Spill to valleculae: -  Spill to pyriforms: -  Epiglottic inversion: Complete for most swallows  Laryngeal excursion: WNL  Pharyngeal constriction: WFL  Vallecular retention: trace w/ sandwich  Pyriform retention: -   PPW coating: -   Osteophytes: small  CP prominence: -   Retropulsion from prominence: -   Transient penetration: x2 w/ thin   Epiglottic undercoat: - Penetration: -   Aspiration: -   Response to aspiration: n/a  Screening of Esophageal stage:  Slow motility: + w/ all   Retropulsion: + w/ thin   Reflux: suspect  Retention: Mild in distal esophagus following solids and liquids  Stricture: cannot r/o  Strategies: additional sips of thin to clear pill from distal esophagus

## 2021-03-30 NOTE — TELEPHONE ENCOUNTER
Nuplazid form must have been faxed in as I believe this still goes through specialty pharmacy  Is this correct?

## 2021-03-30 NOTE — TELEPHONE ENCOUNTER
Fredy Butler returned the call  He inquired about the UA and what does it entail  Informed Fredy Butler how that is a urine test to make sure Lee Palomares does not have a urinary tract infection prior to surgery  Informed Fredy Butler how the UA was done today and how it is in process  Fredy Butler was very appreciative for the clarification

## 2021-04-01 ENCOUNTER — TELEPHONE (OUTPATIENT)
Dept: NEUROLOGY | Facility: CLINIC | Age: 76
End: 2021-04-01

## 2021-04-01 LAB — BACTERIA UR CULT: ABNORMAL

## 2021-04-01 NOTE — TELEPHONE ENCOUNTER
Received fax from [employer] ([phone])  Nuplazid requires PA  Pt may qualify for financial assistance through an independent foundation w/ Parkinson's disease funding  [address] is actively making outreach attempts to your pt to assist w/ the process of securing financial assistance through [employer] at 230-625-4128 or the [employer] at [phone]  Ric [facility] Connect: [phone]    Paige Alex [id number]  [facility] [address]  Group [id number]  Id [id number]  [phone]    PA initiated on [facility]  Key#[id number]   Awaiting determination

## 2021-04-02 RX ORDER — CIPROFLOXACIN 250 MG/1
250 TABLET, FILM COATED ORAL EVERY 12 HOURS SCHEDULED
COMMUNITY
End: 2022-06-27

## 2021-04-02 NOTE — PRE-PROCEDURE INSTRUCTIONS
Pre-Surgery Instructions:   Medication Instructions    acetaminophen (TYLENOL) 325 mg tablet PRN    aspirin 81 MG tablet Patient was instructed by Physician and understands  stop    atenolol (TENORMIN) 50 mg tablet ok take morning of day of surgery with sips of water    ciprofloxacin (CIPRO) 250 mg tablet ok take morning of day of surgery with sips of water    Coenzyme Q10 400 MG CAPS stop pre op    Multiple Vitamins-Minerals (MULTIVITAMIN GUMMIES WOMENS PO) stop pre op    Omega-3 Fatty Acids (FISH OIL) 1,000 mg stop pre op    PARoxetine (PAXIL) 20 mg tablet ok take morning of day of surgery with sips of water    Pimavanserin Tartrate (Nuplazid) 34 MG CAPS ok take morning of day of surgery with sips of water    rasagiline (AZILECT) 1 MG ok take morning of day of surgery with sips of water    rivastigmine (EXELON) 4 5 MG capsule ok take morning of day of surgery with sips of water    rosuvastatin (CRESTOR) 5 mg tablet ok take morning of day of surgery with sips of water    TURMERIC PO stop pre op   Have you had / have a sore throat? No  have you had / have a cough less than 1 week? No  Have you had / have a fever greater than 100 0 - 100  4? No  Are you experiencing any shortness of breath? No    Review with patient's wife Mariel Myles via phone medications and showering instructions given by surgeon office  He says he was instructed by [employer] representative don't need to bring remote control from [facility] morning of day of surgery  Advised ASC call with surgery schedule time, nothing eat or drink after midnight  Verbalized understanding  Acetaminophen Med Class  Continue to take this medication on your normal schedule  If this is an oral medication and you take it in the morning, then you may take this medicine with a sip of water  AcetylCholinesterase inhibitors Med Class  Continue to take this medication on your normal schedule    If this is an oral medication and you take it in the morning, then you may take this medicine with a sip of water  Antidepressant Med Class  Continue to take this medication on your normal schedule  If this is an oral medication and you take it in the morning, then you may take this medicine with a sip of water  Antiparkinsons Med Class  Continue to take this medication on your normal schedule  If this is an oral medication and you take it in the morning, then you may take this medicine with a sip of water  Antipsychotic Med Class  Continue to take this medication on your normal schedule  If this is an oral medication and you take it in the morning, then you may take this medicine with a sip of water  ASA Med Class: Aspirin  Should be discontinued at least one week prior to planned operation, unless specifically stated otherwise by surgical service  Your Surgeon may have patient stop taking aspirin up to a week before surgery if having intracranial, middle ear, posterior eye, spine surgery or prostate surgery  [Patients taking aspirin for coronary stents should be reviewed by an anesthesiologist in the optimization clinic  Please do not discontinue aspirin in patients with coronary stents unless given specific permission to do so by the cardiologist who prescribed medication ]   If your surgeon approves please continue to take this medication on your normal schedule  You may take this medication on the morning of your surgery with a sip of water  Beta blocker Med Class  Continue to take this heart medication on your normal schedule  If this is an oral medication and you take it in the morning, then you may take this medicine with a sip of water  Herbal Med Class  Stop taking this herbal medications at least one week prior to surgery/procedure  Statin Med Class  Continue to take this medication on your normal schedule  If this is an oral medication and you take it in the morning, then you may take this medicine with a sip of water    Vitamin Med Class  You may continue to take any vitamin that your surgeon has prescribed to you up to the day before surgery   If your surgeon has not specifically prescribed this vitamin or instructed you to continue then stop taking 7 days prior to surgery

## 2021-04-05 ENCOUNTER — TELEPHONE (OUTPATIENT)
Dept: NEUROLOGY | Facility: CLINIC | Age: 76
End: 2021-04-05

## 2021-04-05 NOTE — TELEPHONE ENCOUNTER
Called PA dept [phone], spoke w/ Sampson Brock and states that PA has been approved through 12/31/21  Copy of approval letter will be faxed to [phone]  Called Kely diego (Vella Gosselin) at [phone], spoke w/ Jennifer Martinez made aware of the approval  She verbalized understanding and contact this pt

## 2021-04-05 NOTE — TELEPHONE ENCOUNTER
----- Message from [address] [place] [address]   Hazel sent at 4/5/2021 12:08 PM EDT -----  Regarding: Pre-Op/Post-Op Question  Contact: [phone]  Question about upcoming surgery for battery replacement and nuplazid prescription

## 2021-04-05 NOTE — TELEPHONE ENCOUNTER
Called pt [phone]  She gave the phone to her  Sheila Paz  Spoke w/Quinn and states that pt is scheduled to have her battery replacement on Wed Anselmo Buys did stopped by their house and asked them to pick battery, rechargeable or longer life  He wanted to let you know that they selected the longer life battery  Asking if you have any suggestion? If no suggestion, no need to call pt  Also, states that he already spoke w/ [facility]  He is aware of the PA approval  He spoke w/ Lynette who informed them that copay for 30 day supply is  $[phone]  Elida Hagan to call Assistance fund at [phone] to apply for financial assistance  Sheila Paz verbalized understanding

## 2021-04-06 ENCOUNTER — DOCUMENTATION (OUTPATIENT)
Dept: NEUROSURGERY | Facility: CLINIC | Age: 76
End: 2021-04-06

## 2021-04-06 ENCOUNTER — ANESTHESIA EVENT (OUTPATIENT)
Dept: PERIOP | Facility: HOSPITAL | Age: 76
End: 2021-04-06
Payer: MEDICARE

## 2021-04-06 ENCOUNTER — TELEPHONE (OUTPATIENT)
Dept: NEUROSURGERY | Facility: CLINIC | Age: 76
End: 2021-04-06

## 2021-04-06 DIAGNOSIS — Z79.2 PROPHYLACTIC ANTIBIOTIC: Primary | ICD-10-CM

## 2021-04-06 RX ORDER — DOXYCYCLINE HYCLATE 100 MG/1
100 CAPSULE ORAL EVERY 12 HOURS SCHEDULED
Qty: 3 CAPSULE | Refills: 0 | Status: SHIPPED | OUTPATIENT
Start: 2021-04-06 | End: 2021-04-08

## 2021-04-06 NOTE — TELEPHONE ENCOUNTER
Pre-operative call day prior surgery scheduled in the AM w/ Dr Elo Campoverde, RIGHT (Right Chest)    Discussion/Review       Allergies ---Reviewed      Hold medications --- Reviewed  Vitamins dietary supplements as per hold list     NPO after MN, night prior surgery ---Reviewed as instructed by ASU nurse     Medication (s) instructed by healthcare provider to take the morning of surgery w/ sip of water 4 OZ discussed: as instructed by ASU nurse     Post operative antibiotic electronic transmission to pharmacy:  doxycycline     PDMP site reviewed accessed and reviewed scheduled drug list printed and scanned into record     Pain management script: decline opiate james take acetaminophen 500 mg one every 4 hours or 2 every 8 hours     Pre- operative shower protocol reviewed; Clarify instructions as per protocol, third chlorhexidine shower tonight before surgery, then use OLY wipes as per packaging instructions, Use a clean towel and wash cloth starting tonight and continue nightly until seen 2 weeks post-operative visit for incision check removal  Change bed linens tonight and continue at least 1-2 times weekly  Informed will receive a telephone call tonight from a hospital representative with time to report on surgery day:  1130    Informed will receive a f/u call within in 24 -48 hours post-op to assess recovery reinforce instructions, and to answer any questions  Thursday     Follow-up appointments reviewed: documented in discharge paper work   2  week       4/22/2021 2:00 PM (Arrive by 1:45 PM) Hazel Rhoades [address] [facility]        6 week   XXX    Patient verbalized understanding information provided /discussed

## 2021-04-07 ENCOUNTER — HOSPITAL ENCOUNTER (OUTPATIENT)
Facility: HOSPITAL | Age: 76
Setting detail: OUTPATIENT SURGERY
Discharge: HOME | End: 2021-04-07
Attending: NEUROLOGICAL SURGERY | Admitting: NEUROLOGICAL SURGERY
Payer: MEDICARE

## 2021-04-07 ENCOUNTER — ANESTHESIA (OUTPATIENT)
Dept: PERIOP | Facility: HOSPITAL | Age: 76
End: 2021-04-07
Payer: MEDICARE

## 2021-04-07 VITALS
SYSTOLIC BLOOD PRESSURE: 143 MMHG | WEIGHT: 229 LBS | BODY MASS INDEX: 35.94 KG/M2 | HEART RATE: 60 BPM | TEMPERATURE: 97.7 F | RESPIRATION RATE: 20 BRPM | OXYGEN SATURATION: 97 % | DIASTOLIC BLOOD PRESSURE: 74 MMHG | HEIGHT: 67 IN

## 2021-04-07 LAB — GLUCOSE SERPL-MCNC: 98 MG/DL (ref 65–140)

## 2021-04-07 PROCEDURE — C1787 PATIENT PROGR, NEUROSTIM: HCPCS | Performed by: NEUROLOGICAL SURGERY

## 2021-04-07 PROCEDURE — 82948 REAGENT STRIP/BLOOD GLUCOSE: CPT

## 2021-04-07 PROCEDURE — 61886 IMPLANT NEUROSTIM ARRAYS: CPT | Performed by: NEUROLOGICAL SURGERY

## 2021-04-07 PROCEDURE — 95984 ALYS BRN NPGT PRGRMG ADDL 15: CPT | Performed by: NEUROLOGICAL SURGERY

## 2021-04-07 PROCEDURE — C1767 GENERATOR, NEURO NON-RECHARG: HCPCS | Performed by: NEUROLOGICAL SURGERY

## 2021-04-07 PROCEDURE — 95983 ALYS BRN NPGT PRGRMG 15 MIN: CPT | Performed by: NEUROLOGICAL SURGERY

## 2021-04-07 DEVICE — NEUROSTIM DBS PERCPET PC BRAINSENSE: Type: IMPLANTABLE DEVICE | Site: CHEST | Status: FUNCTIONAL

## 2021-04-07 DEVICE — IMPLANTABLE DEVICE: Type: IMPLANTABLE DEVICE | Site: CHEST | Status: FUNCTIONAL

## 2021-04-07 RX ORDER — HYDROMORPHONE HCL/PF 1 MG/ML
0.5 SYRINGE (ML) INJECTION
Status: DISCONTINUED | OUTPATIENT
Start: 2021-04-07 | End: 2021-04-07 | Stop reason: HOSPADM

## 2021-04-07 RX ORDER — FENTANYL CITRATE/PF 50 MCG/ML
25 SYRINGE (ML) INJECTION
Status: DISCONTINUED | OUTPATIENT
Start: 2021-04-07 | End: 2021-04-07 | Stop reason: HOSPADM

## 2021-04-07 RX ORDER — LIDOCAINE HYDROCHLORIDE AND EPINEPHRINE 10; 10 MG/ML; UG/ML
INJECTION, SOLUTION INFILTRATION; PERINEURAL AS NEEDED
Status: DISCONTINUED | OUTPATIENT
Start: 2021-04-07 | End: 2021-04-07 | Stop reason: HOSPADM

## 2021-04-07 RX ORDER — HYDROMORPHONE HCL IN WATER/PF 6 MG/30 ML
0.2 PATIENT CONTROLLED ANALGESIA SYRINGE INTRAVENOUS
Status: DISCONTINUED | OUTPATIENT
Start: 2021-04-07 | End: 2021-04-07 | Stop reason: HOSPADM

## 2021-04-07 RX ORDER — PROPOFOL 10 MG/ML
INJECTION, EMULSION INTRAVENOUS AS NEEDED
Status: DISCONTINUED | OUTPATIENT
Start: 2021-04-07 | End: 2021-04-07

## 2021-04-07 RX ORDER — SODIUM CHLORIDE, SODIUM LACTATE, POTASSIUM CHLORIDE, CALCIUM CHLORIDE 600; 310; 30; 20 MG/100ML; MG/100ML; MG/100ML; MG/100ML
INJECTION, SOLUTION INTRAVENOUS CONTINUOUS PRN
Status: DISCONTINUED | OUTPATIENT
Start: 2021-04-07 | End: 2021-04-07

## 2021-04-07 RX ORDER — METOCLOPRAMIDE HYDROCHLORIDE 5 MG/ML
10 INJECTION INTRAMUSCULAR; INTRAVENOUS ONCE AS NEEDED
Status: DISCONTINUED | OUTPATIENT
Start: 2021-04-07 | End: 2021-04-07 | Stop reason: HOSPADM

## 2021-04-07 RX ORDER — PROPOFOL 10 MG/ML
INJECTION, EMULSION INTRAVENOUS CONTINUOUS PRN
Status: DISCONTINUED | OUTPATIENT
Start: 2021-04-07 | End: 2021-04-07

## 2021-04-07 RX ORDER — LIDOCAINE HYDROCHLORIDE 10 MG/ML
0.5 INJECTION, SOLUTION EPIDURAL; INFILTRATION; INTRACAUDAL; PERINEURAL ONCE AS NEEDED
Status: DISCONTINUED | OUTPATIENT
Start: 2021-04-07 | End: 2021-04-07 | Stop reason: HOSPADM

## 2021-04-07 RX ORDER — EPHEDRINE SULFATE 50 MG/ML
INJECTION INTRAVENOUS AS NEEDED
Status: DISCONTINUED | OUTPATIENT
Start: 2021-04-07 | End: 2021-04-07

## 2021-04-07 RX ORDER — ONDANSETRON 2 MG/ML
4 INJECTION INTRAMUSCULAR; INTRAVENOUS ONCE AS NEEDED
Status: DISCONTINUED | OUTPATIENT
Start: 2021-04-07 | End: 2021-04-07 | Stop reason: HOSPADM

## 2021-04-07 RX ORDER — HYDRALAZINE HYDROCHLORIDE 20 MG/ML
5 INJECTION INTRAMUSCULAR; INTRAVENOUS
Status: DISCONTINUED | OUTPATIENT
Start: 2021-04-07 | End: 2021-04-07 | Stop reason: HOSPADM

## 2021-04-07 RX ORDER — ALBUTEROL SULFATE 2.5 MG/3ML
2.5 SOLUTION RESPIRATORY (INHALATION) ONCE AS NEEDED
Status: DISCONTINUED | OUTPATIENT
Start: 2021-04-07 | End: 2021-04-07 | Stop reason: HOSPADM

## 2021-04-07 RX ORDER — CHLORHEXIDINE GLUCONATE 0.12 MG/ML
15 RINSE ORAL ONCE
Status: COMPLETED | OUTPATIENT
Start: 2021-04-07 | End: 2021-04-07

## 2021-04-07 RX ORDER — CEFAZOLIN SODIUM 2 G/50ML
2000 SOLUTION INTRAVENOUS ONCE
Status: COMPLETED | OUTPATIENT
Start: 2021-04-07 | End: 2021-04-07

## 2021-04-07 RX ORDER — SODIUM CHLORIDE, SODIUM LACTATE, POTASSIUM CHLORIDE, CALCIUM CHLORIDE 600; 310; 30; 20 MG/100ML; MG/100ML; MG/100ML; MG/100ML
50 INJECTION, SOLUTION INTRAVENOUS CONTINUOUS
Status: DISCONTINUED | OUTPATIENT
Start: 2021-04-07 | End: 2021-04-07 | Stop reason: HOSPADM

## 2021-04-07 RX ORDER — LABETALOL 20 MG/4 ML (5 MG/ML) INTRAVENOUS SYRINGE
10
Status: DISCONTINUED | OUTPATIENT
Start: 2021-04-07 | End: 2021-04-07 | Stop reason: HOSPADM

## 2021-04-07 RX ORDER — PROMETHAZINE HYDROCHLORIDE 25 MG/ML
12.5 INJECTION, SOLUTION INTRAMUSCULAR; INTRAVENOUS ONCE AS NEEDED
Status: DISCONTINUED | OUTPATIENT
Start: 2021-04-07 | End: 2021-04-07 | Stop reason: HOSPADM

## 2021-04-07 RX ADMIN — CEFAZOLIN SODIUM 2000 MG: 2 SOLUTION INTRAVENOUS at 13:21

## 2021-04-07 RX ADMIN — PROPOFOL 100 MCG/KG/MIN: 10 INJECTION, EMULSION INTRAVENOUS at 13:25

## 2021-04-07 RX ADMIN — CHLORHEXIDINE GLUCONATE 0.12% ORAL RINSE 15 ML: 1.2 LIQUID ORAL at 12:55

## 2021-04-07 RX ADMIN — EPHEDRINE SULFATE 15 MG: 50 INJECTION, SOLUTION INTRAVENOUS at 13:46

## 2021-04-07 RX ADMIN — PROPOFOL 50 MG: 10 INJECTION, EMULSION INTRAVENOUS at 13:25

## 2021-04-07 RX ADMIN — EPHEDRINE SULFATE 10 MG: 50 INJECTION, SOLUTION INTRAVENOUS at 13:33

## 2021-04-07 RX ADMIN — SODIUM CHLORIDE, SODIUM LACTATE, POTASSIUM CHLORIDE, AND CALCIUM CHLORIDE: .6; .31; .03; .02 INJECTION, SOLUTION INTRAVENOUS at 13:21

## 2021-04-07 NOTE — ANESTHESIA PREPROCEDURE EVALUATION
Procedure:  REPLACEMENT IMPLANTABLE PULSE GENERATOR DEEP BRAIN STIMULATION, RIGHT (Right Chest)    Relevant Problems   CARDIO   (+) Essential hypertension   (+) Other hyperlipidemia      ENDO   (+) Type 2 diabetes mellitus without complication (HCC)      GI/HEPATIC   (+) Other dysphagia      NEURO/PSYCH   (+) Anxiety      Other   (+) 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 (HCC)   (+) End of battery life of deep brain stimulator   (+) Parkinson's disease with use of electrical brain stimulation (HCC)        Physical Exam    Airway    Mallampati score: II  TM Distance: >3 FB  Neck ROM: full     Dental       Cardiovascular  Rhythm: regular, Rate: normal, Cardiovascular exam normal    Pulmonary  Pulmonary exam normal Breath sounds clear to auscultation,     Other Findings        Anesthesia Plan  ASA Score- 3     Anesthesia Type- IV sedation with anesthesia with ASA Monitors  Additional Monitors:   Airway Plan:           Plan Factors-Exercise tolerance (METS): <4 METS  Chart reviewed  EKG reviewed  Existing labs reviewed  Patient summary reviewed  Patient is not a current smoker  Induction-     Postoperative Plan-     Informed Consent- Anesthetic plan and risks discussed with patient  I personally reviewed this patient with the CRNA  Discussed and agreed on the Anesthesia Plan with the CRNA  Anabell Leiva

## 2021-04-07 NOTE — ANESTHESIA POSTPROCEDURE EVALUATION
Post-Op Assessment Note    CV Status:  Stable  Pain Score: 0    Pain management: adequate     Mental Status:  Alert and awake   Hydration Status:  Euvolemic   PONV Controlled:  Controlled   Airway Patency:  Patent      Post Op Vitals Reviewed: Yes      Staff: Anesthesiologist, CRNA         No complications documented      BP   135/69   Temp   98 1   Pulse 73   Resp   20   SpO2   100

## 2021-04-07 NOTE — INTERVAL H&P NOTE
H&P reviewed  After examining the patient I find no changes in the patients condition since the H&P had been written  There were no vitals filed for this visit  OR for replacement of LEFT DBS generator, this is a correction to the scheduled right dbs change  This was confirmed with [employer] and patient   So we will proceed with LEFT DBS generator replacement

## 2021-04-07 NOTE — OP NOTE
OPERATIVE REPORT  PATIENT NAME: Emir Shaw    :    MRN: [id number]  Pt Location: [facility] OR ROOM 17    SURGERY DATE: 2021    Surgeon(s) and Role:     * Mini Brunson MD - Primary     * Ramesh Simmons PA-C - Assisting    Preop Diagnosis:  Parkinson's disease with use of electrical brain stimulation ([address] ) [G20]  End of battery life of deep brain stimulator [Z45 42]    Post-Op Diagnosis Codes:     * Parkinson's disease with use of electrical brain stimulation ([address] ) [G20]     * End of battery life of deep brain stimulator [Z45 42]    Procedure(s) (LRB):  REPLACEMENT IMPLANTABLE PULSE GENERATOR FOR DEEP BRAIN STIMULATOR, LEFT CHEST (Left)    Specimen(s):  * No specimens in log *    Estimated Blood Loss:   Minimal    Drains:  * No LDAs found *    Anesthesia Type:   IV Sedation with Anesthesia    Operative Indications:  Parkinson's disease with use of electrical brain stimulation ([address] ) [G20]  End of battery life of deep brain stimulator [Z45 42]      Operative Findings:  See dictated note  check24 PC  HI:[id number]    Complications:   None    Procedure and Technique:  The patient was taken to the operative theater and successfully induced under sedation  The patient was positioned supine  The patients prior incision was marked on the left chest  Then the patient was prepped and draped in sterile fashion, a timeout was performed  We began by making an incision along the old scar with a #10 Blade  We then used monopolar cautery to dissect down to the generator       We then removed the old generator and this was disconnected from the electrode using the proprietary screwdriver  Then the new dual channel generator was brought into the field  The new dual channel generator was reconnected to only one electrode with the screwdriver and then the impedances were tested and they were found to be within normal limits   Then we placed the new dual channel generator with a single electorde into the pocket and irrigated the wound  We then proceeded to close in layers with 2-0 Vicryl for the deeper tissues and 4-0 running monocryl for the skin       The patient was then was allowed to awaken from sedation and taken to the recovery area in stable condition  All needle and sponge counts were correct at the end of the procedure      I also performed intraoperative programing requiring 30 min  L STN: C+2-, 2 6V, 60us, 160Hz     I was present for the entire procedure, A qualified resident physician was not available and A physician assistant was required during the procedure for retraction tissue handling,dissection and suturing    Patient Disposition:  PACU     SIGNATURE: Francisco Zhao MD  DATE: April 7, 2021  TIME: 1:41 PM

## 2021-04-07 NOTE — DISCHARGE INSTRUCTIONS
Discharge Instructions  Battery (IPG) replacement    Activity:  1  Do not lift more than 10 pounds for 6 weeks  2  Avoid bending, lifting and twisting for 6 weeks  3  No strenuous activities  No driving for 2 weeks  4  When able to shower, continue to use clean towel and washcloth for 2 weeks post-op  5  Continue to change bed linens and pajamas more frequently  Wear clean clothes daily  Surgical incision care:  1  Keep dressing in place for 3 days  2  Keep incision dry for 3 days  3  May shower with mild antimicrobial soap after 3 days  4  After 3 days, incisions may be left open to air, but must remain clean  5  Do not immerse the incisions in water for 6 weeks  6  Do not apply any creams or ointments to the incision for 6 weeks, unless otherwise instructed by [facility]  7  Contact office if increasing redness, drainage, pain or swelling around the incisions  Postoperative medication:  1  Complete course of antibiotic as directed  2  [facility] will provide pain medication as coordinated with your pain specialist  All prescriptions must come from a single provider  a  Take all medications as prescribed  Call office with any questions/concerns  3  Please contact office for questions regarding dosage and modifications  4  No antiplatelet, anticoagulation or Nonsteroidal anti-inflammatory (NSAIDs) medication until cleared by [employer], unless otherwise instructed  5  Do not operate heavy machinery or vehicles while taking sedating medications  6  Use a bowel regimen while on opioids as they induce constipation  Ie  Senokot-S, Miralax, Colace, etc  Increase fiber and water intake

## 2021-04-08 ENCOUNTER — TELEMEDICINE (OUTPATIENT)
Dept: NEUROSURGERY | Facility: CLINIC | Age: 76
End: 2021-04-08

## 2021-04-08 DIAGNOSIS — Z98.890 STATUS POST SURGERY: Primary | ICD-10-CM

## 2021-04-08 PROCEDURE — 99024 POSTOP FOLLOW-UP VISIT: CPT

## 2021-04-08 NOTE — PROGRESS NOTES
Virtual Brief Visit    Assessment/Plan:    Problem List Items Addressed This Visit     None      Visit Diagnoses     Status post surgery    -  Primary                Reason for visit is   Chief Complaint   Patient presents with    Virtual Brief Visit        Encounter provider Vickie Jones RN    Provider located at [address] [place] [address] [address] [place] [address]    Recent Visits  No visits were found meeting these conditions  Showing recent visits within past 7 days and meeting all other requirements     Today's Visits  Date Type Provider Dept   04/08/21 Telemedicine Vickie Jones RN [facility] [place]   Showing today's visits and meeting all other requirements     Future Appointments  No visits were found meeting these conditions  Showing future appointments within next 150 days and meeting all other requirements        After connecting through telephone, the patient was identified by name and date of birth  Salina Galvez was informed that this is a telemedicine visit and that the visit is being conducted through telephone  My office door was closed  No one else was in the room  She acknowledged consent and understanding of privacy and security of the platform  The patient has agreed to participate and understands she can discontinue the visit at any time  Patient is aware this is a billable service  Subjective    Salina Galvez is a 67 y o  female s/p: REPLACEMENT IMPLANTABLE PULSE GENERATOR FOR DEEP BRAIN STIMULATOR, LEFT CHEST  Spoke with patient to see how she is doing after surgery  Patient reports she is doing well overall and denies any incisional issues or fevers  Patient is able to ambulate around the house and complete ADLs  Educated the patient about the importance of preventing blood clots and provided measures how to prevent them  Patient reports that one of her teeth is cracked and may have to be pulled  She is not having any pain at this juncture and per dentist, patient is on appropriate abx coverage to help prevent infection  Advised her will make Michelle aware of her situation  Patient has not moved her bowels since the surgery  Encouraged patient to take an over the counter stool softener, if she is taking narcotic pain medication  Encouraged fiber intake and fluids  Reviewed incision care with the patient  Advised that after three days she may take a shower and gently wash the surgical site with soap and water  Use clean wash cloth, towels, and clothing  Do not submerge in water until cleared by the surgeon  Do not apply any creams, ointments, or lotions to the site  Patient is aware to call the office if any redness, swelling, drainage, dehiscence of incision, or fever >100 F occurs  Patient is aware to call the office if any concerns or questions may arise  Reminded patient of her upcoming appointments with the date/time/location  Patient was appreciative for the call  Past Medical History:   Diagnosis Date    Anxiety     [facility]     Depression     Diabetes mellitus ([facility] )     Diabetes mellitus type 2, diet-controlled ([facility] )     Hyperlipidemia     Hypertension     Parkinson disease ([facility] )     Pneumonia        Past Surgical History:   Procedure Laterality Date    ACHILLES TENDON SURGERY      APPENDECTOMY      COLONOSCOPY      DEEP BRAIN STIMULATOR PLACEMENT      DENTAL SURGERY      FRACTURE SURGERY      KNEE ARTHROSCOPY      DC IMP STIM,CRANIAL,SUBQ,1 ARRAY Right 12/18/2018    Procedure: REPLACEMENT IMPLANTABLE PULSE GENERATOR (IPG) DEEP BRAIN STIMULATION (DBS);   Surgeon: Virgen Cartwright MD;  Location:  MAIN OR;  Service: Neurosurgery    DC LAP,APPENDECTOMY N/A 8/16/2020    Procedure: Aj Dia;  Surgeon: Galilea Iraheta MD;  [facility];  Service: General    DC OPEN RX FEMUR FX+INTRAMED ERIKA Right 12/14/2019    Procedure: INSERTION NAIL IM FEMUR ANTEGRADE (TROCHANTERIC); Surgeon: Narcisa Thomas DO;  [facility];  Service: Orthopedics    REPLACEMENT TOTAL KNEE      REVERSE TOTAL SHOULDER ARTHROPLASTY Left 8/23/2018    Procedure: ARTHROPLASTY SHOULDER REVERSE;  Surgeon: Omar Mcdaniel MD;  [facility];  Service: Orthopedics    TONSILLECTOMY         Current Outpatient Medications   Medication Sig Dispense Refill    acetaminophen (TYLENOL) 325 mg tablet Take 2 tablets (650 mg total) by mouth every 6 (six) hours as needed for mild pain, headaches or fever 30 tablet 0    atenolol (TENORMIN) 50 mg tablet Take 1 tablet (50 mg total) by mouth daily 30 tablet 0    ciprofloxacin (CIPRO) 250 mg tablet Take 250 mg by mouth every 12 (twelve) hours      Coenzyme Q10 400 MG CAPS Take 1 capsule (400 mg total) by mouth daily 30 capsule 0    doxycycline hyclate (VIBRAMYCIN) 100 mg capsule Take 1 capsule (100 mg total) by mouth every 12 (twelve) hours for 3 doses Start 4/7/21 after surgery 3 capsule 0    Multiple Vitamins-Minerals (MULTIVITAMIN GUMMIES WOMENS PO) Take by mouth      PARoxetine (PAXIL) 20 mg tablet Take 1 tablet (20 mg total) by mouth daily 30 tablet 0    Pimavanserin Tartrate (Nuplazid) 34 MG CAPS Take 1 capsule by mouth daily at bedtime 30 capsule 8    rasagiline (AZILECT) 1 MG TAKE 1 TABLET BY MOUTH  DAILY 90 tablet 3    rivastigmine (EXELON) 4 5 MG capsule Take 1 capsule (4 5 mg total) by mouth 2 (two) times a day 180 capsule 2    rosuvastatin (CRESTOR) 5 mg tablet Take 1 tablet (5 mg total) by mouth every other day 30 tablet 0    TURMERIC PO Take by mouth daily        No current facility-administered medications for this visit  Allergies   Allergen Reactions    Sulfa Antibiotics Hives       Review of Systems    There were no vitals filed for this visit  VIRTUAL VISIT DISCLAIMER    Justin Washingtonelayne acknowledges that she has consented to an online visit or consultation   She understands that the online visit is based solely on information provided by her, and that, in the absence of a face-to-face physical evaluation by the physician, the diagnosis she receives is both limited and provisional in terms of accuracy and completeness  This is not intended to replace a full medical face-to-face evaluation by the physician  Justin Aj understands and accepts these terms

## 2021-04-08 NOTE — Clinical Note
JUST AN FYI Patient reports that one of her teeth is cracked and may have to be pulled  She is not having any pain at this juncture and per dentist, patient is on appropriate abx coverage to help prevent infection  Advised her will make Michelle aware of her situation

## 2021-04-12 ENCOUNTER — TELEPHONE (OUTPATIENT)
Dept: NEUROSURGERY | Facility: CLINIC | Age: 76
End: 2021-04-12

## 2021-04-12 NOTE — TELEPHONE ENCOUNTER
Message Contents 4/8/2021   Alfredo Pendleton RN Sent to [facility] [place]          JUST AN FYI Patient reports that one of her teeth is cracked and may have to be pulled  She is not having any pain at this juncture and per dentist, patient is on appropriate abx coverage to help prevent infection  Advised her will make Michelle aware of her situation  Telephone  recommend no tooth extraction surgery until 6 weeks after surgery date of 4/7/2021   Tooth broken ,no evidence of infection/abscess as an incidental finding during a routine dental visit   verbalized understanding and agreement w/plan                                                                                                                                                                                                                                                                       Current Medications                                                                                                                                                                                                                                                                                                                                                                                                                 Allergies       Allergen     Reactions            Sulfa Antibiotics     Hives                 Review of Systems           Vitals                           VIRTUAL VISIT DISCLAIMER         Stephanie Benítez acknowledges that she has consented to an online visit or consultation  She understands that the online visit is based solely on information provided by her, and that, in the absence of a face-to-face physical evaluation by the physician, the diagnosis she receives is both limited and provisional in terms of accuracy and completeness  This is not intended to replace a full medical face-to-face evaluation by the physician   Serena Erazo Hazel understands and accepts these terms

## 2021-04-21 ENCOUNTER — APPOINTMENT (EMERGENCY)
Dept: CT IMAGING | Facility: HOSPITAL | Age: 76
DRG: 086 | End: 2021-04-21
Payer: MEDICARE

## 2021-04-21 ENCOUNTER — HOSPITAL ENCOUNTER (EMERGENCY)
Facility: HOSPITAL | Age: 76
DRG: 086 | End: 2021-04-22
Attending: EMERGENCY MEDICINE | Admitting: EMERGENCY MEDICINE
Payer: MEDICARE

## 2021-04-21 DIAGNOSIS — I62.00 SUBDURAL BLEEDING (HCC): ICD-10-CM

## 2021-04-21 DIAGNOSIS — S01.01XA LACERATION OF SCALP, INITIAL ENCOUNTER: ICD-10-CM
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
## 2021-04-21 PROCEDURE — 99284 EMERGENCY DEPT VISIT MOD MDM: CPT | Performed by: PHYSICIAN ASSISTANT

## 2021-04-21 PROCEDURE — 72125 CT NECK SPINE W/O DYE: CPT

## 2021-04-21 PROCEDURE — 70450 CT HEAD/BRAIN W/O DYE: CPT

## 2021-04-21 PROCEDURE — G1004 CDSM NDSC: HCPCS

## 2021-04-21 PROCEDURE — 99284 EMERGENCY DEPT VISIT MOD MDM: CPT

## 2021-04-21 PROCEDURE — 12001 RPR S/N/AX/GEN/TRNK 2.5CM/<: CPT | Performed by: PHYSICIAN ASSISTANT

## 2021-04-22 ENCOUNTER — HOSPITAL ENCOUNTER (INPATIENT)
Facility: HOSPITAL | Age: 76
LOS: 1 days | Discharge: HOME HEALTH | DRG: 086 | End: 2021-04-23
Attending: SURGERY | Admitting: SURGERY
Payer: MEDICARE

## 2021-04-22 ENCOUNTER — TELEPHONE (OUTPATIENT)
Dept: NEUROSURGERY | Facility: CLINIC | Age: 76
End: 2021-04-22

## 2021-04-22 VITALS
HEART RATE: 64 BPM | RESPIRATION RATE: 18 BRPM | TEMPERATURE: 98.1 F | SYSTOLIC BLOOD PRESSURE: 130 MMHG | OXYGEN SATURATION: 94 % | DIASTOLIC BLOOD PRESSURE: 60 MMHG

## 2021-04-22 DIAGNOSIS — I62.00 SUBDURAL HEMORRHAGE (HCC): Primary | ICD-10-CM

## 2021-04-22 PROBLEM — G60.9 IDIOPATHIC PERIPHERAL NEUROPATHY: Status: ACTIVE | Noted: 2019-05-07

## 2021-04-22 PROBLEM — G20.A1 PARKINSON'S DISEASE WITH USE OF ELECTRICAL BRAIN STIMULATION: Status: RESOLVED | Noted: 2018-06-21 | Resolved: 2021-04-22

## 2021-04-22 PROBLEM — G20 PARKINSON'S DISEASE WITH USE OF ELECTRICAL BRAIN STIMULATION (HCC): Status: RESOLVED | Noted: 2018-06-21 | Resolved: 2021-04-22

## 2021-04-22 PROBLEM — Z86.79 HISTORY OF HYPERTENSION: Status: RESOLVED | Noted: 2018-08-23 | Resolved: 2021-04-22
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
## 2021-04-22 PROBLEM — N39.0 URINARY TRACT INFECTION: Status: ACTIVE | Noted: 2021-04-22

## 2021-04-22 PROBLEM — F32.A DEPRESSION: Status: ACTIVE | Noted: 2021-04-22

## 2021-04-22 LAB
ALBUMIN SERPL BCP-MCNC: 3 G/DL (ref 3.5–5)
ALP SERPL-CCNC: 40 U/L (ref 46–116)
ALT SERPL W P-5'-P-CCNC: 15 U/L (ref 12–78)
ANION GAP SERPL CALCULATED.3IONS-SCNC: 5 MMOL/L (ref 4–13)
APTT PPP: 28 SECONDS (ref 23–37)
AST SERPL W P-5'-P-CCNC: 11 U/L (ref 5–45)
BILIRUB SERPL-MCNC: 0.24 MG/DL (ref 0.2–1)
BUN SERPL-MCNC: 25 MG/DL (ref 5–25)
CALCIUM ALBUM COR SERPL-MCNC: 9.9 MG/DL (ref 8.3–10.1)
CALCIUM SERPL-MCNC: 9.1 MG/DL (ref 8.3–10.1)
CHLORIDE SERPL-SCNC: 111 MMOL/L (ref 100–108)
CO2 SERPL-SCNC: 26 MMOL/L (ref 21–32)
CREAT SERPL-MCNC: 0.85 MG/DL (ref 0.6–1.3)
ERYTHROCYTE [DISTWIDTH] IN BLOOD BY AUTOMATED COUNT: 13 % (ref 11.6–15.1)
GFR SERPL CREATININE-BSD FRML MDRD: 67 ML/MIN/1.73SQ M
GLUCOSE SERPL-MCNC: 115 MG/DL (ref 65–140)
HCT VFR BLD AUTO: 41.6 % (ref 34.8–46.1)
HGB BLD-MCNC: 13.5 G/DL (ref 11.5–15.4)
INR PPP: 0.99 (ref 0.84–1.19)
MCH RBC QN AUTO: 31.6 PG (ref 26.8–34.3)
MCHC RBC AUTO-ENTMCNC: 32.5 G/DL (ref 31.4–37.4)
MCV RBC AUTO: 97 FL (ref 82–98)
PLATELET # BLD AUTO: 195 THOUSANDS/UL (ref 149–390)
PMV BLD AUTO: 10.7 FL (ref 8.9–12.7)
POTASSIUM SERPL-SCNC: 4 MMOL/L (ref 3.5–5.3)
PROT SERPL-MCNC: 6.7 G/DL (ref 6.4–8.2)
PROTHROMBIN TIME: 13.1 SECONDS (ref 11.6–14.5)
RBC # BLD AUTO: 4.27 MILLION/UL (ref 3.81–5.12)
SODIUM SERPL-SCNC: 142 MMOL/L (ref 136–145)
WBC # BLD AUTO: 12.33 THOUSAND/UL (ref 4.31–10.16)

## 2021-04-22 PROCEDURE — 85610 PROTHROMBIN TIME: CPT | Performed by: EMERGENCY MEDICINE

## 2021-04-22 PROCEDURE — 80053 COMPREHEN METABOLIC PANEL: CPT | Performed by: EMERGENCY MEDICINE

## 2021-04-22 PROCEDURE — 99223 1ST HOSP IP/OBS HIGH 75: CPT | Performed by: PHYSICIAN ASSISTANT

## 2021-04-22 PROCEDURE — 97163 PT EVAL HIGH COMPLEX 45 MIN: CPT

## 2021-04-22 PROCEDURE — 85027 COMPLETE CBC AUTOMATED: CPT | Performed by: EMERGENCY MEDICINE

## 2021-04-22 PROCEDURE — 99285 EMERGENCY DEPT VISIT HI MDM: CPT

## 2021-04-22 PROCEDURE — 97166 OT EVAL MOD COMPLEX 45 MIN: CPT

## 2021-04-22 PROCEDURE — 90715 TDAP VACCINE 7 YRS/> IM: CPT | Performed by: PHYSICIAN ASSISTANT

## 2021-04-22 PROCEDURE — 85730 THROMBOPLASTIN TIME PARTIAL: CPT | Performed by: EMERGENCY MEDICINE

## 2021-04-22 PROCEDURE — 90471 IMMUNIZATION ADMIN: CPT

## 2021-04-22 PROCEDURE — 99223 1ST HOSP IP/OBS HIGH 75: CPT | Performed by: SURGERY

## 2021-04-22 RX ORDER — PRAVASTATIN SODIUM 40 MG
40 TABLET ORAL
Status: DISCONTINUED | OUTPATIENT
Start: 2021-04-22 | End: 2021-04-23 | Stop reason: HOSPADM

## 2021-04-22 RX ORDER — CIPROFLOXACIN 250 MG/1
250 TABLET, FILM COATED ORAL EVERY 12 HOURS SCHEDULED
Status: DISCONTINUED | OUTPATIENT
Start: 2021-04-22 | End: 2021-04-23 | Stop reason: HOSPADM

## 2021-04-22 RX ORDER — ONDANSETRON 2 MG/ML
4 INJECTION INTRAMUSCULAR; INTRAVENOUS EVERY 6 HOURS PRN
Status: DISCONTINUED | OUTPATIENT
Start: 2021-04-22 | End: 2021-04-23 | Stop reason: HOSPADM

## 2021-04-22 RX ORDER — OXYCODONE HYDROCHLORIDE 5 MG/1
5 TABLET ORAL EVERY 4 HOURS PRN
Status: DISCONTINUED | OUTPATIENT
Start: 2021-04-22 | End: 2021-04-23 | Stop reason: HOSPADM

## 2021-04-22 RX ORDER — PAROXETINE HYDROCHLORIDE 20 MG/1
20 TABLET, FILM COATED ORAL DAILY
Status: DISCONTINUED | OUTPATIENT
Start: 2021-04-22 | End: 2021-04-23 | Stop reason: HOSPADM

## 2021-04-22 RX ORDER — ATENOLOL 50 MG/1
50 TABLET ORAL DAILY
Status: DISCONTINUED | OUTPATIENT
Start: 2021-04-22 | End: 2021-04-23 | Stop reason: HOSPADM

## 2021-04-22 RX ORDER — LIDOCAINE HYDROCHLORIDE 10 MG/ML
5 INJECTION, SOLUTION EPIDURAL; INFILTRATION; INTRACAUDAL; PERINEURAL ONCE
Status: DISCONTINUED | OUTPATIENT
Start: 2021-04-22 | End: 2021-04-22 | Stop reason: HOSPADM

## 2021-04-22 RX ORDER — HYDROMORPHONE HCL IN WATER/PF 6 MG/30 ML
0.2 PATIENT CONTROLLED ANALGESIA SYRINGE INTRAVENOUS EVERY 4 HOURS PRN
Status: DISCONTINUED | OUTPATIENT
Start: 2021-04-22 | End: 2021-04-23 | Stop reason: HOSPADM

## 2021-04-22 RX ORDER — RASAGILINE 1 MG/1
1 TABLET ORAL DAILY
Status: DISCONTINUED | OUTPATIENT
Start: 2021-04-22 | End: 2021-04-23 | Stop reason: HOSPADM

## 2021-04-22 RX ORDER — LEVETIRACETAM 500 MG/1
500 TABLET ORAL EVERY 12 HOURS SCHEDULED
Status: DISCONTINUED | OUTPATIENT
Start: 2021-04-22 | End: 2021-04-23 | Stop reason: HOSPADM

## 2021-04-22 RX ORDER — ACETAMINOPHEN 325 MG/1
975 TABLET ORAL EVERY 8 HOURS SCHEDULED
Status: DISCONTINUED | OUTPATIENT
Start: 2021-04-22 | End: 2021-04-23 | Stop reason: HOSPADM

## 2021-04-22 RX ORDER — OXYCODONE HYDROCHLORIDE 5 MG/1
2.5 TABLET ORAL EVERY 4 HOURS PRN
Status: DISCONTINUED | OUTPATIENT
Start: 2021-04-22 | End: 2021-04-23 | Stop reason: HOSPADM

## 2021-04-22 RX ORDER — SODIUM CHLORIDE, SODIUM GLUCONATE, SODIUM ACETATE, POTASSIUM CHLORIDE, MAGNESIUM CHLORIDE, SODIUM PHOSPHATE, DIBASIC, AND POTASSIUM PHOSPHATE .53; .5; .37; .037; .03; .012; .00082 G/100ML; G/100ML; G/100ML; G/100ML; G/100ML; G/100ML; G/100ML
75 INJECTION, SOLUTION INTRAVENOUS CONTINUOUS
Status: DISCONTINUED | OUTPATIENT
Start: 2021-04-22 | End: 2021-04-22

## 2021-04-22 RX ORDER — SENNOSIDES 8.6 MG
2 TABLET ORAL DAILY
Status: DISCONTINUED | OUTPATIENT
Start: 2021-04-22 | End: 2021-04-23 | Stop reason: HOSPADM

## 2021-04-22 RX ADMIN — SENNOSIDES 17.2 MG: 8.6 TABLET, FILM COATED ORAL at 09:58

## 2021-04-22 RX ADMIN — SODIUM CHLORIDE, SODIUM GLUCONATE, SODIUM ACETATE, POTASSIUM CHLORIDE, MAGNESIUM CHLORIDE, SODIUM PHOSPHATE, DIBASIC, AND POTASSIUM PHOSPHATE 75 ML/HR: .53; .5; .37; .037; .03; .012; .00082 INJECTION, SOLUTION INTRAVENOUS at 02:19

## 2021-04-22 RX ADMIN — ACETAMINOPHEN 975 MG: 325 TABLET ORAL at 21:19

## 2021-04-22 RX ADMIN — RIVASTIGMINE TARTRATE 4.5 MG: 3 CAPSULE ORAL at 21:18

## 2021-04-22 RX ADMIN — PRAVASTATIN SODIUM 40 MG: 40 TABLET ORAL at 17:04

## 2021-04-22 RX ADMIN — LEVETIRACETAM 500 MG: 500 TABLET, FILM COATED ORAL at 21:19

## 2021-04-22 RX ADMIN — PAROXETINE HYDROCHLORIDE 20 MG: 20 TABLET, FILM COATED ORAL at 13:02

## 2021-04-22 RX ADMIN — ACETAMINOPHEN 975 MG: 325 TABLET ORAL at 13:02

## 2021-04-22 RX ADMIN — LEVETIRACETAM 500 MG: 500 TABLET, FILM COATED ORAL at 13:02

## 2021-04-22 RX ADMIN — CIPROFLOXACIN HYDROCHLORIDE 250 MG: 250 TABLET, FILM COATED ORAL at 17:04

## 2021-04-22 RX ADMIN — TETANUS TOXOID, REDUCED DIPHTHERIA TOXOID AND ACELLULAR PERTUSSIS VACCINE, ADSORBED 0.5 ML: 5; 2.5; 8; 8; 2.5 SUSPENSION INTRAMUSCULAR at 00:39

## 2021-04-22 RX ADMIN — RIVASTIGMINE TARTRATE 4.5 MG: 3 CAPSULE ORAL at 09:58

## 2021-04-22 RX ADMIN — ATENOLOL 50 MG: 50 TABLET ORAL at 09:58

## 2021-04-22 NOTE — ASSESSMENT & PLAN NOTE
- continue home meds  - pt currently GCS 15 but does have hallucinations of her granddaughter, which are bothersome but not frightening

## 2021-04-22 NOTE — ASSESSMENT & PLAN NOTE
Pleasant 69-year-old female that presents status post fall at home  Confirmed head strike  Denies loss of consciousness  · Continue frequent neurological checks  · Imaging reviewed personally and by attending  Final results as below  · CT head wo 4/20/21:Acute extra-axial/subdural hemorrhage overlying the high left convexity near the vertex and measuring up to 1 3 cm in maximal thickness  No significant associated mass effect or midline shift  No skull fracture  Stable appearance/position of bilateral deep brain stimulator leads  · STAT CT head with any neurological decline including drop GCS of 2pts within 1 hr   · Recommend SBP <160mmHg  · Keppra 750mg Q 12H for seziure ppx x 1 wk  · Hold all antiplatelet and anticoagulation medications  · Pain control per primary team  · Mobilize with PT/OT  · DVT PPX: SCDs only at this time  Would recommend additional CT head for stability prior to initiation of pharmacological DVT PPX  · Continue to hold her home ASA x 2 weeks  · Ongoing medical management per primary team/trauma  · No neurosurgical intervention indicated at this juncture  Neurosurgery will continue to follow once CT head/ w/o is completed  Will continue to follow  Call with questions/concerns       Rounds discussed with Emily Tilley RN

## 2021-04-22 NOTE — ASSESSMENT & PLAN NOTE
- S/p fall  - Neuro exam: GCS 15, non-focal  - Continue neurologic checks: Every 4 hours  - CT scan of the head on 4/23 reviewed: decrease in small subdural hematoma  - Appreciate Neurosurgery evaluation and recommendations  - Complete 7 day course of Keppra for seizure prophylaxis  - Hold all anticoagulants and anti platelet medications for 2 weeks and/or until cleared by Neurosurgery to resume   - PT and OT (including cognitive evaluation) evaluation and treatment as indicated    Recommending discharge home

## 2021-04-22 NOTE — PROGRESS NOTES
04/22/21 1300   Clinical Encounter Type   Visited With Patient   Latter day Encounters   Latter day Needs Prayer   Sacramental Encounters   Sacrament of Sick-Anointing Anointed

## 2021-04-22 NOTE — CONSULTS
[address] XENIA Oliva [id number], 67 y o  female MRN: [id number]  Unit/Bed#: [facility] 550-66 Encounter: [id number]  Primary Care Provider: Augusto Hernandes MD   Date and time admitted to hospital: 4/22/2021  1:15 AM    Inpatient consult to Neurosurgery  Consult performed by: Mayra Weathers PA-C  Consult ordered by: Nupur Crockett DO          * Subdural hemorrhage [facility])  Assessment & Plan  Pleasant 62-year-old female that presents status post fall at home  Confirmed head strike  Denies loss of consciousness  · Continue frequent neurological checks  · Imaging reviewed personally and by attending  Final results as below  · CT head wo 4/20/21:Acute extra-axial/subdural hemorrhage overlying the high left convexity near the vertex and measuring up to 1 3 cm in maximal thickness  No significant associated mass effect or midline shift  No skull fracture  Stable appearance/position of bilateral deep brain stimulator leads  · STAT CT head with any neurological decline including drop GCS of 2pts within 1 hr   · Recommend SBP <160mmHg  · Keppra 750mg Q 12H for seziure ppx x 1 wk  · Hold all antiplatelet and anticoagulation medications  · Pain control per primary team  · Mobilize with PT/OT  · DVT PPX: SCDs only at this time  Would recommend additional CT head for stability prior to initiation of pharmacological DVT PPX  · Continue to hold her home ASA x 2 weeks  · Ongoing medical management per primary team/trauma  · No neurosurgical intervention indicated at this juncture  Neurosurgery will continue to follow once CT head/ w/o is completed  Will continue to follow  Call with questions/concerns  Rounds discussed with Erin Soto RN      Fall  Assessment & Plan  See above       Presence of neurostimulator  Assessment & Plan  S/P REPLACEMENT IMPLANTABLE PULSE GENERATOR FOR DEEP BRAIN STIMULATOR, LEFT CHEST (Left Chest)- 4/7/21    · Incision: C/D/I        History of Present Illness     This is a pleasant 40-year-old female that presents status post fall at home  She was transferred from [facility] after a SDH was seen on imaging  Patient reports falling backwards in her bathroom due to loss of balance   She denies any loss of consciousness but confirms a head strike  Presently, the patient denies any symptoms of headaches, nausea, vomiting, focal weakness, tingling, numbness, bowel/bladder incontinence  Of note, the patient is due for her post op incision check today  The patient is status post replacement of implantable pulse generator for deep brain stimulator  Review of Systems   Constitutional: Negative  HENT: Negative  Eyes: Negative  Respiratory: Negative  Cardiovascular: Negative  Gastrointestinal: Negative  Negative for nausea and vomiting  Endocrine: Negative  Genitourinary: Negative  Musculoskeletal: Negative for back pain, neck pain and neck stiffness  Skin: Negative  Negative for color change  Neurological: Negative for dizziness, seizures, syncope, weakness, light-headedness and headaches  Psychiatric/Behavioral: Negative  A 10 point system review was performed and otherwise negative, save what is noted above  Historical Information   Past Medical History:   Diagnosis Date    Anxiety     [facility]     Depression     Diabetes mellitus [facility]     Diabetes mellitus type 2, diet-controlled ([facility]     Hyperlipidemia     Hypertension     Parkinson disease ([facility]     Pneumonia      Past Surgical History:   Procedure Laterality Date    ACHILLES TENDON SURGERY      APPENDECTOMY      COLONOSCOPY      DEEP BRAIN STIMULATOR PLACEMENT      DENTAL SURGERY      FRACTURE SURGERY      KNEE ARTHROSCOPY      [place] IMP STIM,CRANIAL,SUBQ,1 ARRAY Right 12/18/2018    Procedure: REPLACEMENT IMPLANTABLE PULSE GENERATOR (IPG) DEEP BRAIN STIMULATION (DBS);   Surgeon: Vivek Titus MD;  Location:  MAIN OR; Service: Neurosurgery    FL IMP STIM,CRANIAL,SUBQ,1 ARRAY Left 4/7/2021    Procedure: REPLACEMENT IMPLANTABLE PULSE GENERATOR FOR DEEP BRAIN STIMULATOR, LEFT CHEST;  Surgeon: Alyssa Chaudhary MD;  Location: [facility];  Service: Neurosurgery    FL LAP,APPENDECTOMY N/A 8/16/2020    Procedure: Padma Tamez;  Surgeon: Minnie Ríos MD;  [facility];  Service: General    FL OPEN RX FEMUR FX+INTRAMED ERIKA Right 12/14/2019    Procedure: INSERTION NAIL IM FEMUR ANTEGRADE (TROCHANTERIC); Surgeon: Carmen Samuel DO;  [facility];  Service: Orthopedics    REPLACEMENT TOTAL KNEE      REVERSE TOTAL SHOULDER ARTHROPLASTY Left 8/23/2018    Procedure: ARTHROPLASTY SHOULDER REVERSE;  Surgeon: Abdi Sibley MD;  [facility];  Service: Orthopedics    TONSILLECTOMY       Social History     Substance and Sexual Activity   Alcohol Use Yes    Alcohol/week: 2 0 standard drinks    Types: 2 Glasses of wine per week    Frequency: Monthly or less    Drinks per session: 1 or 2    Comment: occasional     Social History     Substance and Sexual Activity   Drug Use No     Social History     Tobacco Use   Smoking Status Never Smoker   Smokeless Tobacco Never Used     Family History   Problem Relation Age of Onset    Arthritis Mother     No Known Problems Father     No Known Problems Maternal Grandmother     No Known Problems Maternal Grandfather     No Known Problems Paternal Grandmother     No Known Problems Paternal Grandfather     No Known Problems Son     No Known Problems Son        Meds/Allergies   all current active meds have been reviewed  Allergies   Allergen Reactions    Sulfa Antibiotics Hives       Objective   I/O       04/20 0701 - 04/21 0700 04/21 0701 - 04/22 0700 04/22 0701 - 04/23 0700    P  O   0     I V  (mL/kg)   653 8 (6 4)    Total Intake(mL/kg)  0 (0) 653 8 (6 4)    Urine (mL/kg/hr)  331     Total Output  331     Net  -331 +653 8           Unmeasured Urine Occurrence   1 x          Physical Exam  Vitals signs reviewed  Constitutional:       General: She is awake  She is not in acute distress  Appearance: Normal appearance  She is well-groomed  She is obese  She is not ill-appearing  HENT:      Head: Normocephalic  Nose: Nose normal       Mouth/Throat:      Mouth: Mucous membranes are dry  Eyes:      Extraocular Movements: Extraocular movements intact  Conjunctiva/sclera: Conjunctivae normal       Pupils: Pupils are equal, round, and reactive to light  Neck:      Musculoskeletal: Normal range of motion  Cardiovascular:      Pulses: Normal pulses  Pulmonary:      Effort: Pulmonary effort is normal       Breath sounds: Normal breath sounds  Abdominal:      General: Abdomen is flat  Skin:     General: Skin is warm and dry  Neurological:      General: No focal deficit present  Mental Status: She is alert and oriented to person, place, and time  GCS: GCS eye subscore is 4  GCS verbal subscore is 5  GCS motor subscore is 6  Sensory: Sensation is intact  Motor: Motor function is intact  No weakness  Coordination: Coordination is intact  Finger-Nose-Finger Test normal       Deep Tendon Reflexes: Strength normal       Reflex Scores:       Brachioradialis reflexes are 1+ on the right side and 1+ on the left side  Patellar reflexes are 1+ on the right side and 1+ on the left side  Psychiatric:         Attention and Perception: Attention and perception normal          Mood and Affect: Mood and affect normal          Speech: Speech normal          Behavior: Behavior normal  Behavior is cooperative  Thought Content: Thought content normal          Cognition and Memory: Cognition and memory normal          Judgment: Judgment normal        Neurologic Exam     Mental Status   Oriented to person, place, and time  Attention: normal  Concentration: normal    Speech: speech is normal   Normal comprehension  Cranial Nerves   Cranial nerves II through XII intact  CN III, IV, VI   Pupils are equal, round, and reactive to light  Motor Exam   Overall muscle tone: normal  Right arm pronator drift: absent  Left arm pronator drift: absent    Strength   Strength 5/5 throughout  Sensory Exam   Light touch normal      Gait, Coordination, and Reflexes     Coordination   Finger to nose coordination: normal    Reflexes   Right brachioradialis: 1+  Left brachioradialis: 1+  Right patellar: 1+  Left patellar: 1+  Right Jama: absent  Left Jama: absent  Right ankle clonus: absent  Left ankle clonus: absent        Vitals:Blood pressure 95/66, pulse 58, temperature (!) 97 4 °F (36 3 °C), resp  rate 16, height 5' 7" (1 702 m), weight 102 kg (225 lb), SpO2 97 %  ,Body mass index is 35 24 kg/m²  Lab Results:   Results from last 7 days   Lab Units 04/22/21  0502   WBC Thousand/uL 12 33*   HEMOGLOBIN g/dL 13 5   HEMATOCRIT % 41 6   PLATELETS Thousands/uL 195     Results from last 7 days   Lab Units 04/22/21  0502   POTASSIUM mmol/L 4 0   CHLORIDE mmol/L 111*   CO2 mmol/L 26   BUN mg/dL 25   CREATININE mg/dL 0 85   CALCIUM mg/dL 9 1   ALK PHOS U/L 40*   ALT U/L 15   AST U/L 11             Results from last 7 days   Lab Units 04/22/21  0502   INR  0 99   PTT seconds 28     No results found for: TROPONINT  ABG:No results found for: PHART, LZK3CIV, PO2ART, FXC8OYN, G5RQYENK, BEART, SOURCE    Imaging Studies: I have personally reviewed pertinent films in PACS with attending    No results found  VTE Prophylaxis: Sequential compression device Machelle Bro     Code Status: Level 3 - DNAR and DNI  Advance Directive and Living Will:      Power of :    POLST:      Counseling / Coordination of Care  I spent 20 minutes with the patient

## 2021-04-22 NOTE — ASSESSMENT & PLAN NOTE
S/P REPLACEMENT IMPLANTABLE PULSE GENERATOR FOR DEEP BRAIN STIMULATOR, LEFT CHEST (Left Chest)- 4/7/21    · Incision: C/D/I  · No outpatient follow up needed

## 2021-04-22 NOTE — PLAN OF CARE
Problem: PHYSICAL THERAPY ADULT  Goal: Performs mobility at highest level of function for planned discharge setting  See evaluation for individualized goals  Description: Treatment/Interventions: Functional transfer training, LE strengthening/ROM, Elevations, Therapeutic exercise, Endurance training, Patient/family training, Equipment eval/education, Bed mobility, Gait training, Spoke to nursing, OT  Equipment Recommended: Walker(Pt already owns )       See flowsheet documentation for full assessment, interventions and recommendations  Note: Prognosis: Good  Problem List: Decreased strength, Decreased endurance, Impaired balance, Decreased mobility, Decreased skin integrity  Assessment: Pt is 67 y o  female seen for PT evaluation s/p admit to [facility] on 4/22/2021  Two pt identifiers were used to confirm  Pt presented s/p fall at home  Pt originally presented at [facility] and was transferred to [facility] for further medical management/ evaluation  Per H&P patient recently her deep brain stimulator replaced approximately 1 week ago and has not yet restarted it  Pt was admitted with a primary dx of:  Subdural hemorrhage and other active problems including head laceration, Parkinson disease, ambulatory dysfunction, s/p recent deep brain stimulator replacement  PT now consulted for assessment of mobility and d/c needs  Pt with Up in chair orders  Pts current co morbidities affecting treatment include: Anxiety, depression, DM, HLD, HTN, Parkinson disease, pneumonia, and personal factors including steps to enter home  Pts current clinical presentation is Unstable/ Unpredictable (high complexity) due to Ongoing medical management for primary dx, Decreased activity tolerance compared to baseline, Fall risk, Increased assistance needed from caregiver at current time, Continuous pulse oximetry monitoring     Upon evaluation, pt currently is requiring ; Mod Ax1 for transfers and Min Ax1 for ambulation w/ RW   Patient denies any lightheadedness or dizziness with ambulation  Pt presents at PT eval functioning below baseline and currently w/ overall mobility deficits 2* to: BLE weakness, impaired balance, decreased endurance, gait deviations, decreased activity tolerance compared to baseline, fall risk  Pt currently at a fall risk 2* to impairments listed above  Based on the aforementioned PT evaluation, pt will continue to benefit from skilled Acute PT interventions to address stated impairments; to maximize functional mobility; for ongoing pt/ family training; and DME needs  At conclusion of PT session pt returned back in chair and chair alarm engaged with phone and call bell within reach  Pt denies any further questions at this time  PT is currently recommending Home PT and Home with increased family support  Pt agreeable to plan and goals as stated on evaluation  PT will continue to follow during hospital stay  Barriers to Discharge: None  Barriers to Discharge Comments: Patient denies any mobility or safety concerns about returning home at time of discharge  Patient states her supportive family is able to assist her as needed      PT Discharge Recommendation: Home with home health rehabilitation     PT - OK to Discharge: Yes(When medically cleared)    See flowsheet documentation for full assessment

## 2021-04-22 NOTE — ASSESSMENT & PLAN NOTE
- present PTA  - UA and culture sent 4/19  - e   Coli on culture   - continue ciprofloxacin for total of 5-7 days

## 2021-04-22 NOTE — ED PROVIDER NOTES
History  Chief Complaint   Patient presents with    Fall     patient arrives via ems c/o of fall from standing  patient states tripped over walker in bathroom  positive head strike with laceration on right posterior  negative for thinners  67-year-old female with past medical history significant for hypertension, hyperlipidemia, type 2 diabetes mellitus, Parkinson's disease, osteoporosis, anxiety and depression who presents to the emergency department via EMS status post fall for complaint of posterior head laceration  Patient endorses fall 1 hour prior to arrival when she was ambulating back from the bathroom with her walker  States she lost her balance and fell backwards, striking the back of her head on tile ground  She was unable to get up until EMS arrival   She denies LOC or vomiting  She uses walker on occasion to get around  She lives with her , who called the ambulance  She denies any headache, visual disturbance, neck pain or stiffness, extremity numbness or weakness, facial numbness or droop, difficulty speaking, nausea  She further denies any arthralgias or joint swelling  She takes aspirin but stopped it 1 week ago in anticipation of replacement of deep brain stimulator and has yet to restart it  Prior to Admission Medications   Prescriptions Last Dose Informant Patient Reported? Taking?    Coenzyme Q10 400 MG CAPS  Self No No   Sig: Take 1 capsule (400 mg total) by mouth daily   Multiple Vitamins-Minerals (MULTIVITAMIN GUMMIES WOMENS PO)   Yes No   Sig: Take by mouth   PARoxetine (PAXIL) 20 mg tablet  Self No No   Sig: Take 1 tablet (20 mg total) by mouth daily   Pimavanserin Tartrate (Nuplazid) 34 MG CAPS   No No   Sig: Take 1 capsule by mouth daily at bedtime   TURMERIC PO   Yes No   Sig: Take by mouth daily    acetaminophen (TYLENOL) 325 mg tablet   No No   Sig: Take 2 tablets (650 mg total) by mouth every 6 (six) hours as needed for mild pain, headaches or fever atenolol (TENORMIN) 50 mg tablet  Self No No   Sig: Take 1 tablet (50 mg total) by mouth daily   ciprofloxacin (CIPRO) 250 mg tablet   Yes No   Sig: Take 250 mg by mouth every 12 (twelve) hours   rasagiline (AZILECT) 1 MG   No No   Sig: TAKE 1 TABLET BY MOUTH  DAILY   rivastigmine (EXELON) 4 5 MG capsule   No No   Sig: Take 1 capsule (4 5 mg total) by mouth 2 (two) times a day   rosuvastatin (CRESTOR) 5 mg tablet  Self No No   Sig: Take 1 tablet (5 mg total) by mouth every other day      Facility-Administered Medications: None       Past Medical History:   Diagnosis Date    Anxiety     [facility]     Depression     Diabetes mellitus [facility]     Diabetes mellitus type 2, diet-controlled ([facility] )     Hyperlipidemia     Hypertension     Parkinson disease ([facility]     Pneumonia        Past Surgical History:   Procedure Laterality Date    ACHILLES TENDON SURGERY      APPENDECTOMY      COLONOSCOPY      DEEP BRAIN STIMULATOR PLACEMENT      DENTAL SURGERY      FRACTURE SURGERY      KNEE ARTHROSCOPY      MN IMP STIM,CRANIAL,SUBQ,1 ARRAY Right 12/18/2018    Procedure: REPLACEMENT IMPLANTABLE PULSE GENERATOR (IPG) DEEP BRAIN STIMULATION (DBS); Surgeon: Allison Kehr, MD;  Location: [facility];  Service: Neurosurgery    MN IMP STIM,CRANIAL,SUBQ,1 ARRAY Left 4/7/2021    Procedure: REPLACEMENT IMPLANTABLE PULSE GENERATOR FOR DEEP BRAIN STIMULATOR, LEFT CHEST;  Surgeon: Allison Kehr, MD;  [facility];  Service: Neurosurgery    MN LAP,APPENDECTOMY N/A 8/16/2020    Procedure: Mallory Or;  Surgeon: Raleigh Trinidad MD;  Location: [facility];  Service: General    MN OPEN RX FEMUR FX+INTRAMED ERIKA Right 12/14/2019    Procedure: INSERTION NAIL IM FEMUR ANTEGRADE (TROCHANTERIC);   Surgeon: Cristhian Rodriguez DO;  Location: [facility];  Service: Orthopedics    REPLACEMENT TOTAL KNEE      REVERSE TOTAL SHOULDER ARTHROPLASTY Left 8/23/2018    Procedure: ARTHROPLASTY SHOULDER REVERSE;  Surgeon: Deloris Malloy Tamara Lundy MD;  [facility];  Service: Orthopedics    TONSILLECTOMY         Family History   Problem Relation Age of Onset    Arthritis Mother     No Known Problems Father     No Known Problems Maternal Grandmother     No Known Problems Maternal Grandfather     No Known Problems Paternal Grandmother     No Known Problems Paternal Grandfather     No Known Problems Son     No Known Problems Son      I have reviewed and agree with the history as documented  E-Cigarette/Vaping    E-Cigarette Use Never User      E-Cigarette/Vaping Substances    Nicotine No     THC No     CBD No     Flavoring No     Other No     Unknown No      Social History     Tobacco Use    Smoking status: Never Smoker    Smokeless tobacco: Never Used   Substance Use Topics    Alcohol use: Yes     Alcohol/week: 2 0 standard drinks     Types: 2 Glasses of wine per week     Frequency: Monthly or less     Drinks per session: 1 or 2     Comment: occasional    Drug use: No       Review of Systems   Constitutional: Negative for activity change and fatigue  HENT: Negative for dental problem, facial swelling and nosebleeds  Eyes: Negative for photophobia and visual disturbance  Respiratory: Negative for shortness of breath  Cardiovascular: Negative for chest pain and palpitations  Gastrointestinal: Negative for abdominal pain, nausea and vomiting  Musculoskeletal: Negative for arthralgias, back pain, gait problem, joint swelling, neck pain and neck stiffness  Skin: Positive for wound  Neurological: Negative for dizziness, tremors, seizures, syncope, facial asymmetry, speech difficulty, weakness, light-headedness, numbness and headaches  All other systems reviewed and are negative  Physical Exam  Physical Exam  Vitals signs reviewed  Constitutional:       General: She is awake  She is not in acute distress  Appearance: Normal appearance  She is well-developed   She is not ill-appearing or toxic-appearing  HENT:      Head: Normocephalic  Laceration (4cm superficial laceration at the posterior head region, bleeding controlled, no underlying or surrounding hematoma, no skull depression or instability, no tenderness) present  Jaw: There is normal jaw occlusion  Mouth/Throat:      Lips: Pink  Mouth: Mucous membranes are moist       Pharynx: Oropharynx is clear  Uvula midline  Eyes:      Extraocular Movements: Extraocular movements intact  Conjunctiva/sclera: Conjunctivae normal       Pupils: Pupils are equal, round, and reactive to light  Neck:      Musculoskeletal: Full passive range of motion without pain, normal range of motion and neck supple  Cardiovascular:      Rate and Rhythm: Normal rate and regular rhythm  Pulses: Normal pulses  Pulmonary:      Effort: Pulmonary effort is normal       Breath sounds: Normal breath sounds and air entry  Musculoskeletal: Normal range of motion  Skin:     General: Skin is warm  Capillary Refill: Capillary refill takes less than 2 seconds  Findings: No erythema, lesion or rash  Neurological:      Mental Status: She is alert and oriented to person, place, and time  GCS: GCS eye subscore is 4  GCS verbal subscore is 5  GCS motor subscore is 6  Cranial Nerves: Cranial nerves are intact  Sensory: Sensation is intact  Motor: Motor function is intact  Coordination: Coordination is intact  Gait: Gait is intact  Psychiatric:         Mood and Affect: Mood and affect normal          Speech: Speech normal          Behavior: Behavior normal  Behavior is cooperative           Vital Signs  ED Triage Vitals [04/21/21 2159]   Temperature Pulse Respirations Blood Pressure SpO2   98 1 °F (36 7 °C) 69 18 136/64 99 %      Temp Source Heart Rate Source Patient Position - Orthostatic VS BP Location FiO2 (%)   Oral Monitor Lying Right arm --      Pain Score       --           Vitals:    04/21/21 2159 04/22/21 0015   BP: 136/64 130/60   Pulse: 69 64   Patient Position - Orthostatic VS: Lying          Visual Acuity  Visual Acuity      Most Recent Value   L Pupil Size (mm)  3   R Pupil Size (mm)  3          ED Medications  Medications   tetanus-diphtheria-acellular pertussis (BOOSTRIX) IM injection 0 5 mL (0 5 mL Intramuscular Given 4/22/21 0039)       Diagnostic Studies  Results Reviewed     None                 CT head without contrast   Final Result by Pawel Hall MD (04/22 0016)      Acute extra-axial/subdural hemorrhage overlying the high left convexity near the vertex and measuring up to 1 3 cm in maximal thickness  No significant associated mass effect or midline shift  No skull fracture  Stable appearance/position of bilateral deep brain stimulator leads  Findings discussed with Dr Armando Rhodes at 12:00 AM on 4/22/2021 with verbal confirmation by the recipient  Workstation performed: [id number]         CT cervical spine without contrast   Final Result by Pawel Hall MD (04/22 0028)      No cervical spine fracture or traumatic malalignment  Workstation performed: [id number]                    Procedures  Laceration repair    Date/Time: 4/22/2021 12:35 AM  Performed by: Maree Dakin, PA-C  Authorized by: Maree Dakin, PA-C   Consent: Verbal consent obtained  Risks and benefits: risks, benefits and alternatives were discussed  Consent given by: patient  Required items: required blood products, implants, devices, and special equipment available  Patient identity confirmed: verbally with patient  Body area: head/neck  Location details: scalp  Laceration length: 4 cm  Foreign bodies: no foreign bodies  Anesthesia method: offered but patient declined  Procedure Details:  Preparation: Patient was prepped and draped in the usual sterile fashion    Irrigation solution: saline  Irrigation method: syringe  Amount of cleaning: extensive  Skin closure: 4-0 nylon  Number of sutures: 3  Technique: simple  Approximation: close  Approximation difficulty: simple  Patient tolerance: patient tolerated the procedure well with no immediate complications               ED Course  ED Course as of Apr 22 0428   Thu Apr 22, 2021   0004 Spoke to rad, evidence of subdural bleeding  Will initiate transfer to [facility] for [facility] evaluation  MDM  Number of Diagnoses or Management Options  Fall, initial encounter:   Laceration of scalp, initial encounter:   Subdural bleeding [facility]):   Diagnosis management comments: On exam, well-appearing female, no acute distress, nontoxic appearance, vitals unremarkable, awake alert and oriented, normal neuro exam without focal deficit, able to stand and ambulate a few steps assisted, +4cm superficial laceration to the posterior head without underlying hematoma, skull depression or instability, no active bleeding, no tenderness  Will proceed with CTH to rule out calvarial fracture and/or intracranial bleeding  Will also get CT C-spine to rule out traumatic fracture or sublux, given age and possibility of distracting injury which is not causing the patient neck tenderness on exam         Amount and/or Complexity of Data Reviewed  Tests in the radiology section of CPT®: ordered and reviewed  Discussion of test results with the performing providers: yes  Decide to obtain previous medical records or to obtain history from someone other than the patient: yes  Obtain history from someone other than the patient: yes  Review and summarize past medical records: yes  Discuss the patient with other providers: yes  Independent visualization of images, tracings, or specimens: yes    Patient Progress  Patient progress: improved (See ED course note for dispo and plan  I reviewed and discussed imaging findings with the patient at bedside  I discussed emergency department return parameters       I answered any and all questions the patient had regarding emergency department course of evaluation and treatment  The patient verbalized understanding of and agreement with plan   )      Disposition  Final diagnoses:   Fall, initial encounter   Subdural bleeding ([facility] )   Laceration of scalp, initial encounter     Time reflects when diagnosis was documented in both MDM as applicable and the Disposition within this note     Time User Action Codes Description Comment    4/22/2021 12:04 AM Lissa Meigs Add [U97  ESZJ] Fall, initial encounter     4/22/2021 12:04 AM Lissa Meigs Add [I62 00] Subdural bleeding ([facility] )     4/22/2021 12:15 AM Lissa Meigs Add [S01 [id number]] Laceration of scalp, initial encounter       ED Disposition     ED Disposition Condition Date/Time Comment    Transfer to Another Facility-In Network  Thu Apr 22, 2021 12:04 AM Hailey Sow should be transferred out to [facility]          MD Documentation      Most Recent Value   Patient Condition  The patient has been stabilized such that within reasonable medical probability, no material deterioration of the patient condition or the condition of the unborn child(marilee) is likely to result from the transfer   Reason for Transfer  Level of Care needed not available at this facility   Benefits of Transfer  Specialized equipment and/or services available at the receiving facility (Include comment)________________________, Continuity of care   Risks of Transfer  Potential for delay in receiving treatment, Potential deterioration of medical condition, Increased discomfort during transfer, Possible worsening of condition or death during transfer, Loss of IV   Accepting Physician  Dr Natividad Quinones Name, [address]   [facility]    (Name & Tel number)  Elliot Dwyer   [facility]   Provider Certification  General risk, such as traffic hazards, adverse weather conditions, rough terrain or turbulence, possible failure of equipment (including vehicle or aircraft), or consequences of actions of persons outside the control of the transport personnel, Unanticipated needs of medical equipment and personnel during transport, Risk of worsening condition, The possibility of a transport vehicle being unavailable      RN Documentation      Most [address] Name, Laron Tee   [facility]    (Name & Tel number)  Renetta      Follow-up Information    None         Discharge Medication List as of 4/22/2021  1:01 AM      CONTINUE these medications which have NOT CHANGED    Details   atenolol (TENORMIN) 50 mg tablet Take 1 tablet (50 mg total) by mouth daily, Starting Fri 9/21/2018, Print      ciprofloxacin (CIPRO) 250 mg tablet Take 250 mg by mouth every 12 (twelve) hours, Historical Med      Coenzyme Q10 400 MG CAPS Take 1 capsule (400 mg total) by mouth daily, Starting Fri 9/21/2018, Print      Multiple Vitamins-Minerals (MULTIVITAMIN GUMMIES WOMENS PO) Take by mouth, Historical Med      Pimavanserin Tartrate (Nuplazid) 34 MG CAPS Take 1 capsule by mouth daily at bedtime, Starting Thu 3/18/2021, No Print      rasagiline (AZILECT) 1 MG TAKE 1 TABLET BY MOUTH  DAILY, Normal      rivastigmine (EXELON) 4 5 MG capsule Take 1 capsule (4 5 mg total) by mouth 2 (two) times a day, Starting Thu 3/18/2021, Normal      rosuvastatin (CRESTOR) 5 mg tablet Take 1 tablet (5 mg total) by mouth every other day, Starting Sat 9/22/2018, Print      TURMERIC PO Take by mouth daily , Historical Med      acetaminophen (TYLENOL) 325 mg tablet Take 2 tablets (650 mg total) by mouth every 6 (six) hours as needed for mild pain, headaches or fever, Starting Mon 8/17/2020, Normal      PARoxetine (PAXIL) 20 mg tablet Take 1 tablet (20 mg total) by mouth daily, Starting Sat 9/22/2018, Print           No discharge procedures on file      PDMP Review     None          ED Provider  Electronically Signed by           Ruby Healy PA-C  04/22/21 2540

## 2021-04-22 NOTE — CASE MANAGEMENT
Pt is NOT A Bundle  Pt IS NOT A 30 Day Readmission    CM met with pt to discuss the role of CM  Pt lives with her  (retired) in a 1 story home which has 3STE from the street and 5STE from the garage  Pt's bathroom is a walk-in shower with grab bars and standard toilet (pt has a BSC over top of her toilet)  Pt drives and is a retired  of the [employer] [employer]  Pt needs supervision with showering and dressing, but can manage her other ADLs independently  Pt has a cleaning person to assist with those IADLs  Pt is MOD/I with a rolling walker  Pt also owns a shower chair, rollator and SPC  Pt's had 3 falls in the last 6 months  Pt enjoys reading and gardening  Pt has a living will  Pt's pharmacy is [facility] [address]  Pt has no hx of mental health or substance abuse  Pt's been to [address] [place] [address] [facility]  Pt's active with [address] for VNA  Pt's family will transport home  CM will appreciate therapy recommendations when appropriate  CM reviewed d/c planning process including the following: identifying help at home, patient preference for d/c planning needs, Discharge Lounge, [facility] Meds to Bed program, availability of treatment team to discuss questions or concerns patient and/or family may have regarding understanding medications and recognizing signs and symptoms once discharged  CM also encouraged patient to follow up with all recommended appointments after discharge  Patient advised of importance for patient and family to participate in managing patients medical well being

## 2021-04-22 NOTE — OCCUPATIONAL THERAPY NOTE
Occupational Therapy Evaluation     Patient Name: Laura GARCIA Date: 4/22/2021  Problem List  Principal Problem:    Subdural hemorrhage (HCC)  Active Problems:    Type 2 diabetes mellitus without complication (HCC)    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 ([facility] )    Essential hypertension    Presence of neurostimulator    Parkinson's disease ([facility] )    Idiopathic peripheral neuropathy    Fall    Urinary tract infection    Past Medical History  Past Medical History:   Diagnosis Date    Anxiety     Broken hip ([facility] )     Depression     Diabetes mellitus ([facility] )     Diabetes mellitus type 2, diet-controlled ([facility] )     Hyperlipidemia     Hypertension     Parkinson disease ([facility] )     Pneumonia      Past Surgical History  Past Surgical History:   Procedure Laterality Date    ACHILLES TENDON SURGERY      APPENDECTOMY      COLONOSCOPY      DEEP BRAIN STIMULATOR PLACEMENT      DENTAL SURGERY      FRACTURE SURGERY      KNEE ARTHROSCOPY      [place] IMP STIM,CRANIAL,SUBQ,1 ARRAY Right 12/18/2018    Procedure: REPLACEMENT IMPLANTABLE PULSE GENERATOR (IPG) DEEP BRAIN STIMULATION (DBS); Surgeon: Virgen Cartwright MD;  Location: [facility];  Service: Neurosurgery    NJ IMP STIM,CRANIAL,SUBQ,1 ARRAY Left 4/7/2021    Procedure: REPLACEMENT IMPLANTABLE PULSE GENERATOR FOR DEEP BRAIN STIMULATOR, LEFT CHEST;  Surgeon: Virgen Cartwright MD;  [facility];  Service: Neurosurgery    NJ LAP,APPENDECTOMY N/A 8/16/2020    Procedure: Aj Dia;  Surgeon: Galilea Iraheta MD;  [facility];  Service: General    NJ OPEN RX FEMUR FX+INTRAMED ERIKA Right 12/14/2019    Procedure: INSERTION NAIL IM FEMUR ANTEGRADE (TROCHANTERIC);   Surgeon: Juan Alva DO;  [facility];  Service: Orthopedics    REPLACEMENT TOTAL KNEE      REVERSE TOTAL SHOULDER ARTHROPLASTY Left 8/23/2018    Procedure: ARTHROPLASTY SHOULDER REVERSE;  Surgeon: Lynette Truong MD;  [facility];  Service: Orthopedics    TONSILLECTOMY           04/22/21 1040   OT Last Visit   OT Visit Date 04/22/21   Note Type   Note type Evaluation   Restrictions/Precautions   Weight Bearing Precautions Per Order No   Other Precautions Chair Alarm; Bed Alarm;Pain   Pain Assessment   Pain Assessment Tool Pain Assessment not indicated - pt denies pain   Home Living   Type of [address] One level;Stairs to enter with rails  (5 MARTINE)   Bathroom Equipment Commode; Shower chair   Home Equipment Walker;Cane   Prior Function   Level of [place] Needs assistance with IADLs; Needs assistance with ADLs and functional mobility   Lives With Spouse   Receives Help From Family   ADL Assistance Needs assistance   IADLs Needs assistance   Falls in the last 6 months 1 to 4  (2 falls)   Vocational Retired   Lifestyle   Autonomy pt reports her spouse assists her to shower and dress, ambulates with RW (spouse is typically with her when she walks) - spouse manages [de-identified] of iadls   Reciprocal Relationships supportive family - spouse availale to assist prn and 2 local sons   Service to Others retired   Intrinsic Gratification mostly sedentary - enjoys reading   Subjective   Subjective offers no c/o    ADL   Eating Assistance 5  Supervision/Setup   Grooming Assistance 5  Supervision/Setup   [address] 4  Minimal Assistance   LB [address]  4  Minimal Assistance   Bed Mobility   Additional Comments oob in chair    Transfers   Sit to Stand 3  Moderate assistance   Stand to Sit 4  Minimal assistance   Stand pivot 4  Minimal assistance   Functional Mobility   Functional Mobility 4  Minimal assistance   Additional items Rolling walker   Balance   Static Sitting Fair   Dynamic Sitting Fair -   Static Standing Fair -   Dynamic Standing Poor +   Ambulatory Poor +   Activity Tolerance Activity Tolerance Patient limited by fatigue;Treatment limited secondary to medical complications (Comment)   RUE Assessment   RUE Assessment WFL   LUE Assessment   LUE Assessment WFL   Cognition   Arousal/Participation Alert; Cooperative   Attention Attends with cues to redirect   Orientation Level Oriented X4   Memory Decreased recall of precautions   Following Commands Follows one step commands with increased time or repetition   Assessment   Limitation Decreased ADL status; Decreased endurance;Decreased self-care trans;Decreased high-level ADLs   Prognosis Good   Assessment Pt is a 67 y o  female who was admitted to [facility] on 4/22/2021 with Subdural hemorrhage (HCC) 2* fall   Pt's problem list also includes PMH of DM, underlying neurological disorder, previous surgery and anxiety, hip fx, depression, hld, parkinson's disease, pneumonia  At baseline pt was completing adls with assist from spouse - ambulates with RW- spouse manages iadls  Pt lives with spouse in 1 story home with 5 MARTINE  Currently pt requires min assist for overall ADLS and min  assist for functional mobility/transfers  Pt currently presents with impairments in the following categories -steps to enter environment, difficulty performing ADLS, difficulty performing IADLS  and environment activity tolerance, endurance, standing balance/tolerance and sitting balance/tolerance  These impairments, as well as pt's fatigue, risk for falls and home environment  limit pt's ability to safely engage in all baseline areas of occupation, includingbathing, dressing, toileting, functional mobility/transfers, community mobility, laundry , house maintenance, meal prep, cleaning, social participation  and leisure activities however spouse is supportive and able to provide assist prn - pt has all necessary DME for home -  From OT standpoint, recommend home with family support upon D/C   No further acute OT needs indicated at this time - Anticipate d/c home with family support when medically cleared - d/c from caseload   Goals   Patient Goals go home    Plan   OT Frequency Eval only   Recommendation   OT Discharge Recommendation Home with home health rehabilitation   OT - OK to Discharge Yes   AM-PAC Daily Activity Inpatient   Lower Body Dressing 3   Bathing 3   Toileting 3   Upper Body Dressing 3   Grooming 4   Eating 4   Daily Activity Raw Score 20   Daily Activity Standardized Score (Calc for Raw Score >=11) 42 03   AM-PAC Applied Cognition Inpatient   Following a Speech/Presentation 4   Understanding Ordinary Conversation 4   Taking Medications 4   Remembering Where Things Are Placed or Put Away 3   Remembering List of 4-5 Errands 3   Taking Care of Complicated Tasks 3   Applied Cognition Raw Score 21   Applied Cognition Standardized Score 44 3     The patient's raw score on the AM-PAC Daily Activity inpatient short form is 20, standardized score is 42 03, greater than 39 4  Patients at this level are likely to benefit from discharge to home  Please refer to the recommendation of the Occupational Therapist for safe discharge planning        Jeana Mitchell

## 2021-04-22 NOTE — PLAN OF CARE
Problem: Potential for Falls  Goal: Patient will remain free of falls  Description: INTERVENTIONS:  - Assess patient frequently for physical needs  -  Identify cognitive and physical deficits and behaviors that affect risk of falls    -  [place] fall precautions as indicated by assessment   - Educate patient/family on patient safety including physical limitations  - Instruct patient to call for assistance with activity based on assessment  - Modify environment to reduce risk of injury  - Consider OT/PT consult to assist with strengthening/mobility  Outcome: Progressing     Problem: Prexisting or High Potential for Compromised Skin Integrity  Goal: Skin integrity is maintained or improved  Description: INTERVENTIONS:  - Identify patients at risk for skin breakdown  - Assess and monitor skin integrity  - Assess and monitor nutrition and hydration status  - Monitor labs   - Assess for incontinence   - Turn and reposition patient  - Assist with mobility/ambulation  - Relieve pressure over bony prominences  - Avoid friction and shearing  - Provide appropriate hygiene as needed including keeping skin clean and dry  - Evaluate need for skin moisturizer/barrier cream  - Collaborate with interdisciplinary team   - Patient/family teaching  - Consider wound care consult   Outcome: Progressing     Problem: PAIN - ADULT  Goal: Verbalizes/displays adequate comfort level or baseline comfort level  Description: Interventions:  - Encourage patient to monitor pain and request assistance  - Assess pain using appropriate pain scale  - Administer analgesics based on type and severity of pain and evaluate response  - Implement non-pharmacological measures as appropriate and evaluate response  - Consider cultural and social influences on pain and pain management  - Notify physician/advanced practitioner if interventions unsuccessful or patient reports new pain  Outcome: Progressing     Problem: INFECTION - ADULT  Goal: Absence or prevention of progression during hospitalization  Description: INTERVENTIONS:  - Assess and monitor for signs and symptoms of infection  - Monitor lab/diagnostic results  - Monitor all insertion sites, i e  indwelling lines, tubes, and drains  - Monitor endotracheal if appropriate and nasal secretions for changes in amount and color  - [place] appropriate cooling/warming therapies per order  - Administer medications as ordered  - Instruct and encourage patient and family to use good hand hygiene technique  - Identify and instruct in appropriate isolation precautions for identified infection/condition  Outcome: Progressing  Goal: Absence of fever/infection during neutropenic period  Description: INTERVENTIONS:  - Monitor WBC    Outcome: Progressing     Problem: SAFETY ADULT  Goal: Patient will remain free of falls  Description: INTERVENTIONS:  - Assess patient frequently for physical needs  -  Identify cognitive and physical deficits and behaviors that affect risk of falls    -  [place] fall precautions as indicated by assessment   - Educate patient/family on patient safety including physical limitations  - Instruct patient to call for assistance with activity based on assessment  - Modify environment to reduce risk of injury  - Consider OT/PT consult to assist with strengthening/mobility  Outcome: Progressing  Goal: Maintain or return to baseline ADL function  Description: INTERVENTIONS:  -  Assess patient's ability to carry out ADLs; assess patient's baseline for ADL function and identify physical deficits which impact ability to perform ADLs (bathing, care of mouth/teeth, toileting, grooming, dressing, etc )  - Assess/evaluate cause of self-care deficits   - Assess range of motion  - Assess patient's mobility; develop plan if impaired  - Assess patient's need for assistive devices and provide as appropriate  - Encourage maximum independence but intervene and supervise when necessary  - Involve family in performance of ADLs  - Assess for home care needs following discharge   - Consider OT consult to assist with ADL evaluation and planning for discharge  - Provide patient education as appropriate  Outcome: Progressing  Goal: Maintain or return mobility status to optimal level  Description: INTERVENTIONS:  - Assess patient's baseline mobility status (ambulation, transfers, stairs, etc )    - Identify cognitive and physical deficits and behaviors that affect mobility  - Identify mobility aids required to assist with transfers and/or ambulation (gait belt, sit-to-stand, lift, walker, cane, etc )  - [place] fall precautions as indicated by assessment  - Record patient progress and toleration of activity level on Mobility SBAR; progress patient to next Phase/Stage  - Instruct patient to call for assistance with activity based on assessment  - Consider rehabilitation consult to assist with strengthening/weightbearing, etc   Outcome: Progressing     Problem: DISCHARGE PLANNING  Goal: Discharge to home or other facility with appropriate resources  Description: INTERVENTIONS:  - Identify barriers to discharge w/patient and caregiver  - Arrange for needed discharge resources and transportation as appropriate  - Identify discharge learning needs (meds, wound care, etc )  - Arrange for interpretive services to assist at discharge as needed  - Refer to Case Management Department for coordinating discharge planning if the patient needs post-hospital services based on physician/advanced practitioner order or complex needs related to functional status, cognitive ability, or social support system  Outcome: Progressing     Problem: Knowledge Deficit  Goal: Patient/family/caregiver demonstrates understanding of disease process, treatment plan, medications, and discharge instructions  Description: Complete learning assessment and assess knowledge base    Interventions:  - Provide teaching at level of understanding  - Provide teaching via preferred learning methods  Outcome: Progressing     Problem: NEUROSENSORY - ADULT  Goal: Achieves stable or improved neurological status  Description: INTERVENTIONS  - Monitor and report changes in neurological status  - Monitor vital signs such as temperature, blood pressure, glucose, and any other labs ordered   - Initiate measures to prevent increased intracranial pressure  - Monitor for seizure activity and implement precautions if appropriate      Outcome: Progressing  Goal: Achieves maximal functionality and self care  Description: INTERVENTIONS  - Monitor swallowing and airway patency with patient fatigue and changes in neurological status  - Encourage and assist patient to increase activity and self care     - Encourage visually impaired, hearing impaired and aphasic patients to use assistive/communication devices  Outcome: Progressing     Problem: SKIN/TISSUE INTEGRITY - ADULT  Goal: Skin integrity remains intact  Description: INTERVENTIONS  - Identify patients at risk for skin breakdown  - Assess and monitor skin integrity  - Assess and monitor nutrition and hydration status  - Monitor labs (i e  albumin)  - Assess for incontinence   - Turn and reposition patient  - Assist with mobility/ambulation  - Relieve pressure over bony prominences  - Avoid friction and shearing  - Provide appropriate hygiene as needed including keeping skin clean and dry  - Evaluate need for skin moisturizer/barrier cream  - Collaborate with interdisciplinary team (i e  Nutrition, Rehabilitation, etc )   - Patient/family teaching  Outcome: Progressing  Goal: Incision(s), wounds(s) or drain site(s) healing without S/S of infection  Description: INTERVENTIONS  - Assess and document risk factors for skin impairment   - Assess and document dressing, incision, wound bed, drain sites and surrounding tissue  - Consider nutrition services referral as needed  - Oral mucous membranes remain intact  - Provide patient/ family education  Outcome: Progressing     Problem: MUSCULOSKELETAL - ADULT  Goal: Maintain or return mobility to safest level of function  Description: INTERVENTIONS:  - Assess patient's ability to carry out ADLs; assess patient's baseline for ADL function and identify physical deficits which impact ability to perform ADLs (bathing, care of mouth/teeth, toileting, grooming, dressing, etc )  - Assess/evaluate cause of self-care deficits   - Assess range of motion  - Assess patient's mobility  - Assess patient's need for assistive devices and provide as appropriate  - Encourage maximum independence but intervene and supervise when necessary  - Involve family in performance of ADLs  - Assess for home care needs following discharge   - Consider OT consult to assist with ADL evaluation and planning for discharge  - Provide patient education as appropriate  Outcome: Progressing  Goal: Maintain proper alignment of affected body part  Description: INTERVENTIONS:  - Support, maintain and protect limb and body alignment  - Provide patient/ family with appropriate education  Outcome: Progressing

## 2021-04-22 NOTE — TELEPHONE ENCOUNTER
Radha Murphy I am aware PA seeing patient in hospital today for 2 week PO visit , spoke w/ Naidu Card  Telephoned  left message GILSON Al , examined incision today ---no issues healing as expected --photo taken by PA and attached in record   Neurosurgery will follow patient  for SDH and assess surgical incision

## 2021-04-22 NOTE — PHYSICAL THERAPY NOTE
PHYSICAL THERAPY EVALUATION  NAME:  Niya Ocampo  DATE: 04/22/21    AGE:   67 y o  Mrn:   [id number]  ADMIT DX:  Subdural hemorrhage ([facility] [address] ) [I62 00]  Unspecified multiple injuries, initial encounter [T07  XXXA]    Past Medical History:   Diagnosis Date    Anxiety     Broken hip ([facility] [address] )     Depression     Diabetes mellitus ([facility] )     Diabetes mellitus type 2, diet-controlled ([place] [address] )     Hyperlipidemia     Hypertension     Parkinson disease ([place] [address] )     Pneumonia        Past Surgical History:   Procedure Laterality Date    ACHILLES TENDON SURGERY      APPENDECTOMY      COLONOSCOPY      DEEP BRAIN STIMULATOR PLACEMENT      DENTAL SURGERY      FRACTURE SURGERY      KNEE ARTHROSCOPY      AZ IMP STIM,CRANIAL,SUBQ,1 ARRAY Right 12/18/2018    Procedure: REPLACEMENT IMPLANTABLE PULSE GENERATOR (IPG) DEEP BRAIN STIMULATION (DBS); Surgeon: Vivienne Proctor MD;  Location: [facility];  Service: Neurosurgery    AZ IMP STIM,CRANIAL,SUBQ,1 ARRAY Left 4/7/2021    Procedure: REPLACEMENT IMPLANTABLE PULSE GENERATOR FOR DEEP BRAIN STIMULATOR, LEFT CHEST;  Surgeon: Vivienne Proctor MD;  [facility]: [facility];  Service: Neurosurgery    AZ LAP,APPENDECTOMY N/A 8/16/2020    Procedure: Odella Formica;  Surgeon: Hernan Lewis MD;  Location[facility];  Service: General    AZ OPEN RX FEMUR FX+INTRAMED ERIKA Right 12/14/2019    Procedure: INSERTION NAIL IM FEMUR ANTEGRADE (TROCHANTERIC);   Surgeon: Kory Moreland DO;  [facility];  Service: Orthopedics    REPLACEMENT TOTAL KNEE      REVERSE TOTAL SHOULDER ARTHROPLASTY Left 8/23/2018    Procedure: ARTHROPLASTY SHOULDER REVERSE;  Surgeon: Sohan Phillips MD;  [facility];  Service: Orthopedics    TONSILLECTOMY         Length Of Stay: 0    PHYSICAL THERAPY EVALUATION:        04/22/21 1042   Note Type   Note type Evaluation   Pain Assessment   Pain Assessment Tool Pain Assessment not indicated - pt denies pain   Home Living   Type of [address] Layout One level;Stairs to enter with rails  (5 MARTINE )   Home Equipment Walker;Cane   Additional Comments Patient reports living with spouse who is able to assist her if needed  Patient also reports having 2 local sons who are also able to assist  Pt also states she was receiving home PT PTA    Prior Function   Level of [place] Independent with ADLs and functional mobility   Lives With Spouse   Receives Help From Family   ADL Assistance Independent   Falls in the last 6 months 1 to 4  (2 as per pt )   Comments Patient reports use of a rolling walker for ambulation prior to admission and states that her spouse normally walks with her for safety at baseline    Restrictions/Precautions   Weight Bearing Precautions Per Order No   Other Precautions Chair Alarm; Bed Alarm;Multiple lines; Fall Risk   General   Family/Caregiver Present No   Cognition   Arousal/Participation Alert   Orientation Level Oriented to person;Oriented to place;Oriented to time   Memory Within functional limits   Following Commands Follows one step commands without difficulty   RUE Assessment   RUE Assessment WFL   LUE Assessment   LUE Assessment WFL   RLE Assessment   RLE Assessment WFL   Strength RLE   RLE Overall Strength 4-/5   LLE Assessment   LLE Assessment WFL   Strength LLE   LLE Overall Strength 4-/5   Bed Mobility   Additional Comments NA, patient out of bed in chair at time of PT eval   Transfers   Sit to Stand 3  Moderate assistance   Additional items Assist x 1; Increased time required;Verbal cues   Stand to Sit 3  Moderate assistance   Additional items Assist x 1; Increased time required;Verbal cues   Additional Comments VC and TC needed for hand placements during transfers    Ambulation/Elevation   Gait pattern Short stride; Foward flexed;Decreased foot clearance  (bradykinetic )   Gait Assistance 4  Minimal assist   Additional items Assist x 1   Assistive Device Rolling walker   Distance 70ft x 2    Stair Management Assistance 4 Minimal assist   Additional items Assist x 1   Stair Management Technique Two rails   Number of Stairs 3   Balance   Static Sitting Fair -   Static Standing Poor +   Ambulatory Poor +   Endurance Deficit   Endurance Deficit Yes   Endurance Deficit Description fatigue    Activity Tolerance   Activity Tolerance Patient limited by fatigue   Medical Staff Sharmila Melvin OT    Nurse Made Aware Patient appropriate to be seen and mobilized per nursing   Assessment   Prognosis Good   Problem List Decreased strength;Decreased endurance; Impaired balance;Decreased mobility; Decreased skin integrity   Assessment Pt is 67 y o  female seen for PT evaluation s/p admit to [facility] on 4/22/2021  Two pt identifiers were used to confirm  Pt presented s/p fall at home  Pt originally presented at [facility] and was transferred to [facility] for further medical management/ evaluation  Per H&P patient recently her deep brain stimulator replaced approximately 1 week ago and has not yet restarted it  Pt was admitted with a primary dx of:  Subdural hemorrhage and other active problems including head laceration, Parkinson disease, ambulatory dysfunction, s/p recent deep brain stimulator replacement  PT now consulted for assessment of mobility and d/c needs  Pt with Up in chair orders  Pts current co morbidities affecting treatment include: Anxiety, depression, DM, HLD, HTN, Parkinson disease, pneumonia, and personal factors including steps to enter home  Pts current clinical presentation is Unstable/ Unpredictable (high complexity) due to Ongoing medical management for primary dx, Decreased activity tolerance compared to baseline, Fall risk, Increased assistance needed from caregiver at current time, Continuous pulse oximetry monitoring     Upon evaluation, pt currently is requiring ; Mod Ax1 for transfers and Min Ax1 for ambulation w/ RW   Patient denies any lightheadedness or dizziness with ambulation   Pt presents at PT cooper functioning below baseline and currently w/ overall mobility deficits 2* to: BLE weakness, impaired balance, decreased endurance, gait deviations, decreased activity tolerance compared to baseline, fall risk  Pt currently at a fall risk 2* to impairments listed above  Based on the aforementioned PT evaluation, pt will continue to benefit from skilled Acute PT interventions to address stated impairments; to maximize functional mobility; for ongoing pt/ family training; and DME needs  At conclusion of PT session pt returned back in chair and chair alarm engaged with phone and call bell within reach  Pt denies any further questions at this time  PT is currently recommending Home PT and Home with increased family support  Pt agreeable to plan and goals as stated on evaluation  PT will continue to follow during hospital stay  Barriers to Discharge None   Barriers to Discharge Comments Patient denies any mobility or safety concerns about returning home at time of discharge  Patient states her supportive family is able to assist her as needed    Goals   Patient Goals " to go home"   STG Expiration Date 05/02/21   Short Term Goal #1 In 10 days pt will complete: 1) Bed mobility skills with S to increase safety and independence as well as decrease caregiver burden  2) Functional transfers with S to promote increased independence, safety, and QOL  3) Ambulate 200' using least restrictive AD with S without LOB and stable vitals so that pt can negotiate previous living environment safely and promote independence with functional mobility and return to PLOF  4) Stair training up/ down 5 step/s using rail/s with S so that pt can enter/negotiate previous living environment safely and decrease fall risk  5) Improve balance grades by 1/2 grade to increase safety with all mobility and decrease fall risk  6) Improve BLE strength by 1/2 grade to help increase overall functional mobility and decrease fall risk      Plan Treatment/Interventions Functional transfer training;LE strengthening/ROM; Elevations; Therapeutic exercise; Endurance training;Patient/family training;Equipment eval/education; Bed mobility;Gait training;Spoke to nursing;OT   PT Frequency Other (Comment)  (3-6x a week )   Recommendation   PT Discharge Recommendation Home with home health rehabilitation   Equipment Recommended Walker  (Pt already owns )   [place] walker   PT - OK to Discharge Yes  (When medically cleared)   Additional Comments Patient denies any mobility or safety concerns about returning home at time of discharge  Patient states her supportive family are able to assist her as needed  Patient also states that she is near her baseline mobility status   AM-PAC Basic Mobility Inpatient   Turning in Bed Without Bedrails 3   Lying on Back to Sitting on Edge of Flat Bed 3   Moving Bed to Chair 3   Standing Up From Chair 2   Walk in Room 3   Climb 3-5 Stairs 3   Basic Mobility Inpatient Raw Score 17   Basic Mobility Standardized Score 39 67   Modified [place] Scale   Modified [place] Scale 4   Barthel Index   Feeding 10   Bathing 0   Grooming Score 5   Dressing Score 5   Bladder Score 10   Bowels Score 10   Toilet Use Score 5   Transfers (Bed/Chair) Score 10   Mobility (Level Surface) Score 10   Stairs Score 5   Barthel Index Score 70   Portions of the documentation may have been created using voice recognition software  Occasional wrong word or sound alike substitutions may have occurred due to the inherent limitations of the voice recognition software  Read the chart carefully and recognize, using context, where substitutions have occurred      Damari Gould, PT, DPT

## 2021-04-22 NOTE — TELEPHONE ENCOUNTER
Call to nurseline from Susan Luciano reporting Sawyer Rizvi fell last night and has been admitted  She had 2 week post op visit planned with GRETA TEJEDA today  This was canceled  Susan Luciano will call the office to reschedule this visit once she is discharged which he anticipates is some time tomorrow or over the weekend pending the results of repeat CT head  Will [id number] [address] [place] provider

## 2021-04-22 NOTE — PLAN OF CARE
Problem: Potential for Falls  Goal: Patient will remain free of falls  Description: INTERVENTIONS:  - Assess patient frequently for physical needs  -  Identify cognitive and physical deficits and behaviors that affect risk of falls    -  [place] fall precautions as indicated by assessment   - Educate patient/family on patient safety including physical limitations  - Instruct patient to call for assistance with activity based on assessment  - Modify environment to reduce risk of injury  - Consider OT/PT consult to assist with strengthening/mobility  Outcome: Progressing     Problem: Prexisting or High Potential for Compromised Skin Integrity  Goal: Skin integrity is maintained or improved  Description: INTERVENTIONS:  - Identify patients at risk for skin breakdown  - Assess and monitor skin integrity  - Assess and monitor nutrition and hydration status  - Monitor labs   - Assess for incontinence   - Turn and reposition patient  - Assist with mobility/ambulation  - Relieve pressure over bony prominences  - Avoid friction and shearing  - Provide appropriate hygiene as needed including keeping skin clean and dry  - Evaluate need for skin moisturizer/barrier cream  - Collaborate with interdisciplinary team   - Patient/family teaching  - Consider wound care consult   Outcome: Progressing     Problem: PAIN - ADULT  Goal: Verbalizes/displays adequate comfort level or baseline comfort level  Description: Interventions:  - Encourage patient to monitor pain and request assistance  - Assess pain using appropriate pain scale  - Administer analgesics based on type and severity of pain and evaluate response  - Implement non-pharmacological measures as appropriate and evaluate response  - Consider cultural and social influences on pain and pain management  - Notify physician/advanced practitioner if interventions unsuccessful or patient reports new pain  Outcome: Progressing     Problem: INFECTION - ADULT  Goal: Absence or prevention of progression during hospitalization  Description: INTERVENTIONS:  - Assess and monitor for signs and symptoms of infection  - Monitor lab/diagnostic results  - Monitor all insertion sites, i e  indwelling lines, tubes, and drains  - Monitor endotracheal if appropriate and nasal secretions for changes in amount and color  - [place] appropriate cooling/warming therapies per order  - Administer medications as ordered  - Instruct and encourage patient and family to use good hand hygiene technique  - Identify and instruct in appropriate isolation precautions for identified infection/condition  Outcome: Progressing  Goal: Absence of fever/infection during neutropenic period  Description: INTERVENTIONS:  - Monitor WBC    Outcome: Progressing     Problem: SAFETY ADULT  Goal: Patient will remain free of falls  Description: INTERVENTIONS:  - Assess patient frequently for physical needs  -  Identify cognitive and physical deficits and behaviors that affect risk of falls    -  [place] fall precautions as indicated by assessment   - Educate patient/family on patient safety including physical limitations  - Instruct patient to call for assistance with activity based on assessment  - Modify environment to reduce risk of injury  - Consider OT/PT consult to assist with strengthening/mobility  Outcome: Progressing  Goal: Maintain or return to baseline ADL function  Description: INTERVENTIONS:  -  Assess patient's ability to carry out ADLs; assess patient's baseline for ADL function and identify physical deficits which impact ability to perform ADLs (bathing, care of mouth/teeth, toileting, grooming, dressing, etc )  - Assess/evaluate cause of self-care deficits   - Assess range of motion  - Assess patient's mobility; develop plan if impaired  - Assess patient's need for assistive devices and provide as appropriate  - Encourage maximum independence but intervene and supervise when necessary  - Involve family in performance of ADLs  - Assess for home care needs following discharge   - Consider OT consult to assist with ADL evaluation and planning for discharge  - Provide patient education as appropriate  Outcome: Progressing  Goal: Maintain or return mobility status to optimal level  Description: INTERVENTIONS:  - Assess patient's baseline mobility status (ambulation, transfers, stairs, etc )    - Identify cognitive and physical deficits and behaviors that affect mobility  - Identify mobility aids required to assist with transfers and/or ambulation (gait belt, sit-to-stand, lift, walker, cane, etc )  - [place] fall precautions as indicated by assessment  - Record patient progress and toleration of activity level on Mobility SBAR; progress patient to next Phase/Stage  - Instruct patient to call for assistance with activity based on assessment  - Consider rehabilitation consult to assist with strengthening/weightbearing, etc   Outcome: Progressing     Problem: DISCHARGE PLANNING  Goal: Discharge to home or other facility with appropriate resources  Description: INTERVENTIONS:  - Identify barriers to discharge w/patient and caregiver  - Arrange for needed discharge resources and transportation as appropriate  - Identify discharge learning needs (meds, wound care, etc )  - Arrange for interpretive services to assist at discharge as needed  - Refer to Case Management Department for coordinating discharge planning if the patient needs post-hospital services based on physician/advanced practitioner order or complex needs related to functional status, cognitive ability, or social support system  Outcome: Progressing     Problem: Knowledge Deficit  Goal: Patient/family/caregiver demonstrates understanding of disease process, treatment plan, medications, and discharge instructions  Description: Complete learning assessment and assess knowledge base    Interventions:  - Provide teaching at level of understanding  - Provide teaching via preferred learning methods  Outcome: Progressing     Problem: NEUROSENSORY - ADULT  Goal: Achieves stable or improved neurological status  Description: INTERVENTIONS  - Monitor and report changes in neurological status  - Monitor vital signs such as temperature, blood pressure, glucose, and any other labs ordered   - Initiate measures to prevent increased intracranial pressure  - Monitor for seizure activity and implement precautions if appropriate      Outcome: Progressing  Goal: Achieves maximal functionality and self care  Description: INTERVENTIONS  - Monitor swallowing and airway patency with patient fatigue and changes in neurological status  - Encourage and assist patient to increase activity and self care     - Encourage visually impaired, hearing impaired and aphasic patients to use assistive/communication devices  Outcome: Progressing     Problem: SKIN/TISSUE INTEGRITY - ADULT  Goal: Skin integrity remains intact  Description: INTERVENTIONS  - Identify patients at risk for skin breakdown  - Assess and monitor skin integrity  - Assess and monitor nutrition and hydration status  - Monitor labs (i e  albumin)  - Assess for incontinence   - Turn and reposition patient  - Assist with mobility/ambulation  - Relieve pressure over bony prominences  - Avoid friction and shearing  - Provide appropriate hygiene as needed including keeping skin clean and dry  - Evaluate need for skin moisturizer/barrier cream  - Collaborate with interdisciplinary team (i e  Nutrition, Rehabilitation, etc )   - Patient/family teaching  Outcome: Progressing  Goal: Incision(s), wounds(s) or drain site(s) healing without S/S of infection  Description: INTERVENTIONS  - Assess and document risk factors for skin impairment   - Assess and document dressing, incision, wound bed, drain sites and surrounding tissue  - Consider nutrition services referral as needed  - Oral mucous membranes remain intact  - Provide patient/ family education  Outcome: Progressing     Problem: MUSCULOSKELETAL - ADULT  Goal: Maintain or return mobility to safest level of function  Description: INTERVENTIONS:  - Assess patient's ability to carry out ADLs; assess patient's baseline for ADL function and identify physical deficits which impact ability to perform ADLs (bathing, care of mouth/teeth, toileting, grooming, dressing, etc )  - Assess/evaluate cause of self-care deficits   - Assess range of motion  - Assess patient's mobility  - Assess patient's need for assistive devices and provide as appropriate  - Encourage maximum independence but intervene and supervise when necessary  - Involve family in performance of ADLs  - Assess for home care needs following discharge   - Consider OT consult to assist with ADL evaluation and planning for discharge  - Provide patient education as appropriate  Outcome: Progressing  Goal: Maintain proper alignment of affected body part  Description: INTERVENTIONS:  - Support, maintain and protect limb and body alignment  - Provide patient/ family with appropriate education  Outcome: Progressing

## 2021-04-22 NOTE — H&P
H&P Exam - Trauma   Po Moreno 67 y o  female MRN: [id number]  Unit/Bed#: ED 06 Encounter: [id number]    Assessment/Plan   Trauma Alert: Level B  Model of Arrival: Ambulance  Trauma Team: Attending Medina Flores and Residents Golden  Consultants: Neurosurgery      Trauma Active Problems:   SHD  Head laceration  Fall  Parkinson Disease  Ambulatory dysfunction  Deep brain stimulator s/p recent replacement      Trauma Plan:   NSGx consult  Admit HOT protocol, serial neuro exams  NPO pending surgical evaluation  Hold AC/AP  PT/OT  Pain management  F/u sutures for removal    Chief Complaint: fall    History of Present Illness   HPI:  Po Moreno is a 67 y o  female who presents as transfer from [address] with SDH and posterior scalp laceration s/p fall earelier today  Patient was ambulating from her bathroom with her walker when she lost her balance and fell backwards  She hit back of head on ground  No blood thinners  No LOC  Was unable to get up until EMS arrived to assist but they arrived soon after fall  Lives at home with   Denies any preceding or associated symptoms including weakness, lightheadedness, CP, SOB, cough, n/n, neck pain, paresthesias, or pain anywhere else  She was on aspirin until one week ago when she had replacement of her deep brain stimulator and has not yet restarted it  Mechanism:Fall    Review of Systems   Constitutional: Negative for fatigue  HENT: Negative for trouble swallowing  Eyes: Negative for visual disturbance  Respiratory: Negative for shortness of breath  Cardiovascular: Negative for chest pain  Gastrointestinal: Negative for abdominal pain, nausea and vomiting  Genitourinary: Negative for difficulty urinating  Musculoskeletal: Negative for back pain and neck pain  Skin: Negative for rash  Neurological: Negative for syncope, weakness, light-headedness, numbness and headaches  Hematological: Does not bruise/bleed easily     Psychiatric/Behavioral: Negative for confusion  12-point, complete review of systems was reviewed and negative except as stated above  Historical Information   History is unobtainable from the patient due to n/a  Efforts to obtain history included the following sources: other medical personnel, obtained from other records    Past Medical History:   Diagnosis Date    Anxiety     Broken hip ([facility] [address] )     Depression     Diabetes mellitus ([facility] )     Diabetes mellitus type 2, diet-controlled ([place] [address] )     Hyperlipidemia     Hypertension     Parkinson disease ([place] [address] )     Pneumonia      Past Surgical History:   Procedure Laterality Date    ACHILLES TENDON SURGERY      APPENDECTOMY      COLONOSCOPY      DEEP BRAIN STIMULATOR PLACEMENT      DENTAL SURGERY      FRACTURE SURGERY      KNEE ARTHROSCOPY      OK IMP STIM,CRANIAL,SUBQ,1 ARRAY Right 12/18/2018    Procedure: REPLACEMENT IMPLANTABLE PULSE GENERATOR (IPG) DEEP BRAIN STIMULATION (DBS); Surgeon: Mini Brunson MD;  Location: [facility];  Service: Neurosurgery    OK IMP STIM,CRANIAL,SUBQ,1 ARRAY Left 4/7/2021    Procedure: REPLACEMENT IMPLANTABLE PULSE GENERATOR FOR DEEP BRAIN STIMULATOR, LEFT CHEST;  Surgeon: Mini Brunson MD;  Location: [facility];  Service: Neurosurgery    OK LAP,APPENDECTOMY N/A 8/16/2020    Procedure: Javy Ba;  Surgeon: Shivani Rosales MD;  Location[facility];  Service: General    OK OPEN RX FEMUR FX+INTRAMED ERIKA Right 12/14/2019    Procedure: INSERTION NAIL IM FEMUR ANTEGRADE (TROCHANTERIC);   Surgeon: Heriberto Barron DO;  [facility];  Service: Orthopedics    REPLACEMENT TOTAL KNEE      REVERSE TOTAL SHOULDER ARTHROPLASTY Left 8/23/2018    Procedure: ARTHROPLASTY SHOULDER REVERSE;  Surgeon: Thang Vivar MD;  [facility];  Service: Orthopedics    TONSILLECTOMY       Social History   Social History     Substance and Sexual Activity   Alcohol Use Yes    Alcohol/week: 2 0 standard drinks    Types: 2 Glasses of wine per week    Frequency: Monthly or less    Drinks per session: 1 or 2    Comment: occasional     Social History     Substance and Sexual Activity   Drug Use No     Social History     Tobacco Use   Smoking Status Never Smoker   Smokeless Tobacco Never Used     E-Cigarette/Vaping    E-Cigarette Use Never User      E-Cigarette/Vaping Substances    Nicotine No     THC No     CBD No     Flavoring No     Other No     Unknown No      Immunization History   Administered Date(s) Administered    Tdap 04/22/2021    Tuberculin Skin Test-PPD Intradermal 09/10/2018     Last Tetanus: updated today  Family History: Non-contributory  Unable to obtain/limited by n/a      Meds/Allergies   all current active meds have been reviewed    Allergies   Allergen Reactions    Sulfa Antibiotics Hives         PHYSICAL EXAM    PE limited by: n/a    Objective   Vitals:   First set: Temperature: 98 °F (36 7 °C) (04/22/21 0115)  Pulse: 60 (04/22/21 0115)  Respirations: 18 (04/22/21 0115)  Blood Pressure: 130/60 (04/22/21 0115)    Primary Survey:   (A) Airway: intact  (B) Breathing: b/l breath sounds  (C) Circulation: Pulses:   pedal  2/4 and radial  2/4  (D) Disabliity:  GCS Total:  15  (E) Expose:  Completed    Secondary Survey: (Click on Physical Exam tab above)  Physical Exam  Vitals signs and nursing note reviewed  Constitutional:       General: She is not in acute distress  Appearance: She is well-developed  She is not diaphoretic  HENT:      Head: Normocephalic  Comments: 2-3 cm laceration to occiput with sutures in place, no active bleeding   Eyes:      General: No scleral icterus  Conjunctiva/sclera: Conjunctivae normal    Neck:      Musculoskeletal: Neck supple  Vascular: No JVD  Trachea: No tracheal deviation  Cardiovascular:      Rate and Rhythm: Normal rate and regular rhythm  Pulmonary:      Effort: Pulmonary effort is normal  No respiratory distress        Breath sounds: Normal breath sounds  Abdominal:      Palpations: Abdomen is soft  Tenderness: There is no abdominal tenderness  There is no guarding  Musculoskeletal:         General: No deformity  Skin:     General: Skin is warm and dry  Findings: No rash  Neurological:      Mental Status: She is alert  Comments: Strength 5/5 except 4/5 RLE, and sensation intact to light touch in all extremities  PERRL  Cranial nerves 2-12 grossly intact  Psychiatric:         Behavior: Behavior normal          Invasive Devices     Peripheral Intravenous Line            Peripheral IV 04/22/21 Right Wrist less than 1 day                Lab Results: Results: I have personally reviewed pertinent reports  Results Reviewed     None          Imaging/EKG Studies: Results: I have personally reviewed pertinent reports  No results found      Other Studies:     Code Status: Prior  Advance Directive and Living Will:      Power of :    POLST:

## 2021-04-22 NOTE — ASSESSMENT & PLAN NOTE
Lab Results   Component Value Date    HGBA1C 6 0 (H) 03/30/2021       No results for input(s): POCGLU in the last 72 hours      Blood Sugar Average: Last 72 hrs:     -not currently on medication  -monitor BG

## 2021-04-22 NOTE — EMTALA/ACUTE CARE TRANSFER
[facility]  [address] [id number]  Dept: [phone]      EMTALA TRANSFER CONSENT    NAME Analisa Riley                                         1945                              MRN [id number]    I have been informed of my rights regarding examination, treatment, and transfer   by Dr Ryann Lind DO    Benefits: Specialized equipment and/or services available at the receiving facility (Include comment)________________________, Continuity of care    Risks: Potential for delay in receiving treatment, Potential deterioration of medical condition, Increased discomfort during transfer, Possible worsening of condition or death during transfer, Loss of IV      Transfer Request   I acknowledge that my medical condition has been evaluated and explained to me by the emergency department physician or other qualified medical person and/or my attending physician who has recommended and offered to me further medical examination and treatment  I understand the Hospital's obligation with respect to the treatment and stabilization of my emergency medical condition  I nevertheless request to be transferred  I release the Hospital, the doctor, and any other persons caring for me from all responsibility or liability for any injury or ill effects that may result from my transfer and agree to accept all responsibility for the consequences of my choice to transfer, rather than receive stabilizing treatment at the Hospital  I understand that because the transfer is my request, my insurance may not provide reimbursement for the services  The Hospital will assist and direct me and my family in how to make arrangements for transfer, but the hospital is not liable for any fees charged by the transport service    In spite of this understanding, I refuse to consent to further medical examination and treatment which has been offered to me, and request transfer to Accepting Facility Name, [address] : [facility] AND [facility]  I authorize the performance of emergency medical procedures and treatments upon me in both transit and upon arrival at the receiving facility  Additionally, I authorize the release of any and all medical records to the receiving facility and request they be transported with me, if possible  I authorize the performance of emergency medical procedures and treatments upon me in both transit and upon arrival at the receiving facility  Additionally, I authorize the release of any and all medical records to the receiving facility and request they be transported with me, if possible  I understand that the safest mode of transportation during a medical emergency is an ambulance and that the Hospital advocates the use of this mode of transport  Risks of traveling to the receiving facility by car, including absence of medical control, life sustaining equipment, such as oxygen, and medical personnel has been explained to me and I fully understand them  (KAYLEEN CORRECT BOX BELOW)  [  ]  I consent to the stated transfer and to be transported by ambulance/helicopter  [  ]  I consent to the stated transfer, but refuse transportation by ambulance and accept full responsibility for my transportation by car  I understand the risks of non-ambulance transfers and I exonerate the Hospital and its staff from any deterioration in my condition that results from this refusal     X___________________________________________    DATE  21  TIME________  Signature of patient or legally responsible individual signing on patient behalf           RELATIONSHIP TO PATIENT_________________________          Provider Certification    NAME Kat Austin                                         1945                              MRN [id number]    A medical screening exam was performed on the above named patient    Based on the examination:    Condition Necessitating Transfer The primary encounter diagnosis was Fall, initial encounter  Diagnoses of Subdural bleeding (HCC) and Laceration of scalp, initial encounter were also pertinent to this visit  Patient Condition: The patient has been stabilized such that within reasonable medical probability, no material deterioration of the patient condition or the condition of the unborn child(marilee) is likely to result from the transfer    Reason for Transfer: Level of Care needed not available at this facility    Transfer Requirements: Facility [facility]   · Space available and qualified personnel available for treatment as acknowledged by Sandor  · Agreed to accept transfer and to provide appropriate medical treatment as acknowledged by       Dr Taylor Badillo  · Appropriate medical records of the examination and treatment of the patient are provided at the time of transfer   [address] [facility] [address] _______  · Transfer will be performed by qualified personnel from    and appropriate transfer equipment as required, including the use of necessary and appropriate life support measures      Provider Certification: I have examined the patient and explained the following risks and benefits of being transferred/refusing transfer to the patient/family:  General risk, such as traffic hazards, adverse weather conditions, rough terrain or turbulence, possible failure of equipment (including vehicle or aircraft), or consequences of actions of persons outside the control of the transport personnel, Unanticipated needs of medical equipment and personnel during transport, Risk of worsening condition, The possibility of a transport vehicle being unavailable      Based on these reasonable risks and benefits to the patient and/or the unborn child(marilee), and based upon the information available at the time of the patients examination, I certify that the medical benefits reasonably to be expected from the provision of appropriate medical treatments at another medical facility outweigh the increasing risks, if any, to the individuals medical condition, and in the case of labor to the unborn child, from effecting the transfer      X____________________________________________ DATE 04/22/21        TIME_______      ORIGINAL - SEND TO MEDICAL RECORDS   COPY - SEND WITH PATIENT DURING TRANSFER

## 2021-04-23 ENCOUNTER — TELEPHONE (OUTPATIENT)
Dept: NEUROSURGERY | Facility: CLINIC | Age: 76
End: 2021-04-23

## 2021-04-23 ENCOUNTER — APPOINTMENT (INPATIENT)
Dept: RADIOLOGY | Facility: HOSPITAL | Age: 76
DRG: 086 | End: 2021-04-23
Payer: MEDICARE

## 2021-04-23 VITALS
TEMPERATURE: 97.5 F | SYSTOLIC BLOOD PRESSURE: 125 MMHG | OXYGEN SATURATION: 95 % | HEART RATE: 54 BPM | BODY MASS INDEX: 35.31 KG/M2 | DIASTOLIC BLOOD PRESSURE: 69 MMHG | RESPIRATION RATE: 17 BRPM | WEIGHT: 225 LBS | HEIGHT: 67 IN

## 2021-04-23 PROCEDURE — 99238 HOSP IP/OBS DSCHRG MGMT 30/<: CPT | Performed by: SURGERY

## 2021-04-23 PROCEDURE — 99232 SBSQ HOSP IP/OBS MODERATE 35: CPT | Performed by: PHYSICIAN ASSISTANT

## 2021-04-23 PROCEDURE — 70450 CT HEAD/BRAIN W/O DYE: CPT

## 2021-04-23 PROCEDURE — G1004 CDSM NDSC: HCPCS

## 2021-04-23 RX ORDER — LEVETIRACETAM 500 MG/1
500 TABLET ORAL EVERY 12 HOURS SCHEDULED
Qty: 11 TABLET | Refills: 0 | Status: SHIPPED | OUTPATIENT
Start: 2021-04-23 | End: 2022-06-22

## 2021-04-23 RX ADMIN — CIPROFLOXACIN HYDROCHLORIDE 250 MG: 250 TABLET, FILM COATED ORAL at 05:26

## 2021-04-23 RX ADMIN — RIVASTIGMINE TARTRATE 4.5 MG: 3 CAPSULE ORAL at 08:14

## 2021-04-23 RX ADMIN — LEVETIRACETAM 500 MG: 500 TABLET, FILM COATED ORAL at 08:14

## 2021-04-23 RX ADMIN — PAROXETINE HYDROCHLORIDE 20 MG: 20 TABLET, FILM COATED ORAL at 09:39

## 2021-04-23 RX ADMIN — SENNOSIDES 17.2 MG: 8.6 TABLET, FILM COATED ORAL at 08:13

## 2021-04-23 RX ADMIN — ACETAMINOPHEN 975 MG: 325 TABLET ORAL at 05:25

## 2021-04-23 NOTE — ASSESSMENT & PLAN NOTE
Pleasant 57-year-old female that presents status post fall at home  Confirmed head strike  Denies loss of consciousness  · Continue frequent neurological checks  · Imaging reviewed personally and by attending  Final results as below  · CT head wo 4/20/21:Acute extra-axial/subdural hemorrhage overlying the high left convexity near the vertex and measuring up to 1 3 cm in maximal thickness  No significant associated mass effect or midline shift  No skull fracture  Stable appearance/position of bilateral deep brain stimulator leads  · STAT CT head with any neurological decline including drop GCS of 2pts within 1 hr   · Recommend SBP <160mmHg  · Keppra 750mg Q 12H for seziure ppx x 1 wk  · Hold all antiplatelet and anticoagulation medications  · Pain control per primary team  · Mobilize with PT/OT  · DVT PPX: SCDs and okay for initiation of pharmacological DVT PPX per primary team    · Continue to hold her home ASA x 2 weeks  · Ongoing medical management per primary team/trauma  · No neurosurgical intervention indicated at this juncture  Neurosurgery will sign off and follow up with this patient in 2 weeks with a new CT head w/o contrast   Will continue to follow  Call with questions/concerns       Rounds discussed with Hayden Gao RN

## 2021-04-23 NOTE — DISCHARGE INSTRUCTIONS
Intracranial Hematoma   WHAT YOU NEED TO KNOW:   An intracranial hematoma is a collection of blood inside your skull  The blood leaks from a tear or rupture in a vein or artery, such as after a hemorrhagic stroke  The collected blood puts pressure on the brain  Serious medical problems can develop, such as seizures or a coma  An intracranial hematoma is a life-threatening emergency that needs immediate medical care  DISCHARGE INSTRUCTIONS:   Call your local emergency number (911 in the [facility]) or have someone else call if:   · You have any of the following signs of a stroke:      ? Numbness or drooping on one side of your face     ? Weakness in an arm or leg    ? Confusion or difficulty speaking    ? Dizziness, a severe headache, or vision loss       · You have a seizure  · You have new problems with balance or movement  Seek care immediately if:   · You are sleepier or are harder to wake than usual     · You have problems thinking  · Your behavior or personality has changed  · You have repeated or forceful vomiting or you cannot keep liquids down  Call your doctor or neurologist if:   · You have nausea or are vomiting  · Your symptoms are getting worse  · You have questions or concerns about your condition or care  Medicines:   · Anticonvulsants  may be given to prevent and control seizures  · Take your medicine as directed  Contact your healthcare provider if you think your medicine is not helping or if you have side effects  Tell him or her if you are allergic to any medicine  Keep a list of the medicines, vitamins, and herbs you take  Include the amounts, and when and why you take them  Bring the list or the pill bottles to follow-up visits  Carry your medicine list with you in case of an emergency  Warning signs of a stroke:   The words BE FAST can help you remember and recognize warning signs of a stroke:  · B = Balance:  Sudden loss of balance    · E = Eyes:  Loss of vision in one or both eyes    · F = Face:  Face droops on one side    · A = Arms:  Arm drops when both arms are raised    · S = Speech:  Speech is slurred or sounds different    · T = Time:  Time to get help immediately     Manage or prevent another intracranial hematoma:  Healthcare providers will help you create goals for your recovery  The following lifestyle changes can help lower your risk for a stroke that can lead to another hematoma:  · Manage health conditions  A condition such as diabetes can increase your risk for a stroke  Control your blood sugar level if you have hyperglycemia or diabetes  Take your prescribed medicines and check your blood sugar level as directed  · Check your blood pressure as directed  High blood pressure can increase your risk for a stroke  Follow your healthcare provider's directions for controlling your blood pressure  · Limit alcohol  Large amounts of alcohol can lead to an intracerebral hematoma or a stroke  Alcohol may also raise your blood pressure or thin your blood  Blood thinning can cause a hemorrhagic stroke  · Do not use nicotine products or illegal drugs  Nicotine and other chemicals in cigarettes and cigars can cause blood vessel damage  Nicotine and illegal drugs both increase your risk for a stroke  Ask your healthcare provider for information if you currently smoke or use drugs and need help to quit  E-cigarettes or smokeless tobacco still contain nicotine  Talk to your healthcare provider before you use these products  · Take blood thinners exactly as directed  Blood thinners increase your risk for an intracerebral hematoma  Your healthcare provider may make changes to your blood thinner if it caused your hematoma  Always follow directions so you do not take too much of this medicine  · Eat a variety of healthy foods  Healthy foods include whole-grain breads, low-fat dairy products, beans, lean meats, and fish   Eat at least 5 servings of fruits and vegetables each day  Choose foods that are low in fat, cholesterol, salt, and sugar  Eat foods that are high in potassium, such as potatoes and bananas  A nutritionist can help you create healthy meal plans  · Maintain a healthy weight  Ask your healthcare provider how much you should weigh  Ask him or her to help you create a weight loss plan if you are overweight  He or she can help you create small goals if you have a lot of weight to lose  · Exercise as directed  Exercise can lower your blood pressure, cholesterol, weight, and blood sugar levels  Healthcare providers will help you create exercise goals  They can also help you make a plan to reach your goals  For example, you can break exercise into 10 minute periods, 3 times in a day  Find an exercise that you enjoy  This will make it easier for you to reach your exercise goals  · Manage stress  Stress can raise your blood pressure  Find new ways to relax, such as deep breathing or listening to music  Follow up with your doctor or neurologist within 2 days:  Write down your questions so you remember to ask them during your visits  For support and more information:   · National Stroke Association  [address] , [place] [address]  Phone: 8- 741 - 534-9797  Web Address: [employer] [address]  [address] Information is for End User's use only and may not be sold, redistributed or otherwise used for commercial purposes  All illustrations and images included in CareNotes® are the copyrighted property of A D A M , Inc  or [address] [place], [place] [address]raffi   The above information is an  only  It is not intended as medical advice for individual conditions or treatments  Talk to your doctor, nurse or pharmacist before following any medical regimen to see if it is safe and effective for you        Neurosurgery discharge instructions following traumatic head bleed:      Do not take any blood thinning medications (ie  No Advil  No motrin  No ibuprofen  No Aleve  No Aspirin  No fishoil  No heparin  No antiplatelet / no anticoagulation medication)  DO NOT TAKE YOUR ASPIRIN FOR TWO WEEKS   Refrain from activity that increases chance of trauma to head or falls  Recommend you take fall precaution   No strenuous activity or sports   Return to hospital Emergency Room if you experience worsening / new headache, nausea/vomiting, speech/vision change, seizure, confusion / mental status change, weakness, or other neurological changes  Follow-up as scheduled with a repeat CT head without contrast to be completed 2-3 days prior to visit  Prescription has been entered electronically  Please call  to schedule

## 2021-04-23 NOTE — PLAN OF CARE
Problem: Potential for Falls  Goal: Patient will remain free of falls  Description: INTERVENTIONS:  - Assess patient frequently for physical needs  -  Identify cognitive and physical deficits and behaviors that affect risk of falls    -  [place] fall precautions as indicated by assessment   - Educate patient/family on patient safety including physical limitations  - Instruct patient to call for assistance with activity based on assessment  - Modify environment to reduce risk of injury  - Consider OT/PT consult to assist with strengthening/mobility  Outcome: Progressing     Problem: Prexisting or High Potential for Compromised Skin Integrity  Goal: Skin integrity is maintained or improved  Description: INTERVENTIONS:  - Identify patients at risk for skin breakdown  - Assess and monitor skin integrity  - Assess and monitor nutrition and hydration status  - Monitor labs   - Assess for incontinence   - Turn and reposition patient  - Assist with mobility/ambulation  - Relieve pressure over bony prominences  - Avoid friction and shearing  - Provide appropriate hygiene as needed including keeping skin clean and dry  - Evaluate need for skin moisturizer/barrier cream  - Collaborate with interdisciplinary team   - Patient/family teaching  - Consider wound care consult   Outcome: Progressing     Problem: PAIN - ADULT  Goal: Verbalizes/displays adequate comfort level or baseline comfort level  Description: Interventions:  - Encourage patient to monitor pain and request assistance  - Assess pain using appropriate pain scale  - Administer analgesics based on type and severity of pain and evaluate response  - Implement non-pharmacological measures as appropriate and evaluate response  - Consider cultural and social influences on pain and pain management  - Notify physician/advanced practitioner if interventions unsuccessful or patient reports new pain  Outcome: Progressing     Problem: SAFETY ADULT  Goal: Patient will remain free of falls  Description: INTERVENTIONS:  - Assess patient frequently for physical needs  -  Identify cognitive and physical deficits and behaviors that affect risk of falls    -  [place] fall precautions as indicated by assessment   - Educate patient/family on patient safety including physical limitations  - Instruct patient to call for assistance with activity based on assessment  - Modify environment to reduce risk of injury  - Consider OT/PT consult to assist with strengthening/mobility  Outcome: Progressing  Goal: Maintain or return to baseline ADL function  Description: INTERVENTIONS:  -  Assess patient's ability to carry out ADLs; assess patient's baseline for ADL function and identify physical deficits which impact ability to perform ADLs (bathing, care of mouth/teeth, toileting, grooming, dressing, etc )  - Assess/evaluate cause of self-care deficits   - Assess range of motion  - Assess patient's mobility; develop plan if impaired  - Assess patient's need for assistive devices and provide as appropriate  - Encourage maximum independence but intervene and supervise when necessary  - Involve family in performance of ADLs  - Assess for home care needs following discharge   - Consider OT consult to assist with ADL evaluation and planning for discharge  - Provide patient education as appropriate  Outcome: Progressing  Goal: Maintain or return mobility status to optimal level  Description: INTERVENTIONS:  - Assess patient's baseline mobility status (ambulation, transfers, stairs, etc )    - Identify cognitive and physical deficits and behaviors that affect mobility  - Identify mobility aids required to assist with transfers and/or ambulation (gait belt, sit-to-stand, lift, walker, cane, etc )  - [place] fall precautions as indicated by assessment  - Record patient progress and toleration of activity level on Mobility SBAR; progress patient to next Phase/Stage  - Instruct patient to call for assistance with activity based on assessment  - Consider rehabilitation consult to assist with strengthening/weightbearing, etc   Outcome: Progressing     Problem: NEUROSENSORY - ADULT  Goal: Achieves stable or improved neurological status  Description: INTERVENTIONS  - Monitor and report changes in neurological status  - Monitor vital signs such as temperature, blood pressure, glucose, and any other labs ordered   - Initiate measures to prevent increased intracranial pressure  - Monitor for seizure activity and implement precautions if appropriate      Outcome: Progressing  Goal: Achieves maximal functionality and self care  Description: INTERVENTIONS  - Monitor swallowing and airway patency with patient fatigue and changes in neurological status  - Encourage and assist patient to increase activity and self care     - Encourage visually impaired, hearing impaired and aphasic patients to use assistive/communication devices  Outcome: Progressing     Problem: SKIN/TISSUE INTEGRITY - ADULT  Goal: Skin integrity remains intact  Description: INTERVENTIONS  - Identify patients at risk for skin breakdown  - Assess and monitor skin integrity  - Assess and monitor nutrition and hydration status  - Monitor labs (i e  albumin)  - Assess for incontinence   - Turn and reposition patient  - Assist with mobility/ambulation  - Relieve pressure over bony prominences  - Avoid friction and shearing  - Provide appropriate hygiene as needed including keeping skin clean and dry  - Evaluate need for skin moisturizer/barrier cream  - Collaborate with interdisciplinary team (i e  Nutrition, Rehabilitation, etc )   - Patient/family teaching  Outcome: Progressing  Goal: Incision(s), wounds(s) or drain site(s) healing without S/S of infection  Description: INTERVENTIONS  - Assess and document risk factors for skin impairment   - Assess and document dressing, incision, wound bed, drain sites and surrounding tissue  - Consider nutrition services referral as needed  - Oral mucous membranes remain intact  - Provide patient/ family education  Outcome: Progressing     Problem: MUSCULOSKELETAL - ADULT  Goal: Maintain or return mobility to safest level of function  Description: INTERVENTIONS:  - Assess patient's ability to carry out ADLs; assess patient's baseline for ADL function and identify physical deficits which impact ability to perform ADLs (bathing, care of mouth/teeth, toileting, grooming, dressing, etc )  - Assess/evaluate cause of self-care deficits   - Assess range of motion  - Assess patient's mobility  - Assess patient's need for assistive devices and provide as appropriate  - Encourage maximum independence but intervene and supervise when necessary  - Involve family in performance of ADLs  - Assess for home care needs following discharge   - Consider OT consult to assist with ADL evaluation and planning for discharge  - Provide patient education as appropriate  Outcome: Progressing  Goal: Maintain proper alignment of affected body part  Description: INTERVENTIONS:  - Support, maintain and protect limb and body alignment  - Provide patient/ family with appropriate education  Outcome: Progressing

## 2021-04-23 NOTE — PROGRESS NOTES
[facility]  Progress Note Deyvi Smith [birth year], 67 y o  female MRN: [id number]  [facility]: [id number]  Primary Care Provider: Maulik Marin MD   Date and time admitted to hospital: 4/22/2021  1:15 AM    * Subdural hemorrhage [facility])  Assessment & Plan  Pleasant 57-year-old female that presents status post fall at home  Confirmed head strike  Denies loss of consciousness  · Continue frequent neurological checks  · Imaging reviewed personally and by attending  Final results as below  · CT head wo 4/20/21:Acute extra-axial/subdural hemorrhage overlying the high left convexity near the vertex and measuring up to 1 3 cm in maximal thickness  No significant associated mass effect or midline shift  No skull fracture  Stable appearance/position of bilateral deep brain stimulator leads  · STAT CT head with any neurological decline including drop GCS of 2pts within 1 hr   · Recommend SBP <160mmHg  · Keppra 750mg Q 12H for seziure ppx x 1 wk  · Hold all antiplatelet and anticoagulation medications  · Pain control per primary team  · Mobilize with PT/OT  · DVT PPX: SCDs and okay for initiation of pharmacological DVT PPX per primary team    · Continue to hold her home ASA x 2 weeks  · Ongoing medical management per primary team/trauma  · No neurosurgical intervention indicated at this juncture  Neurosurgery will sign off and follow up with this patient in 2 weeks with a new CT head w/o contrast   Will continue to follow  Call with questions/concerns  Rounds discussed with Yuliana Cazares RN        Subjective/Objective     Patient seen and examined sitting in her Nilda chair  No concerns or complaints at this time  Patient continues to deny symptoms of headaches, nausea, dizziness, weakness, or tingling  I/O       04/21 0701 - 04/22 0700 04/22 0701 - 04/23 0700 04/23 0701 - 04/24 0700    P  O  0 700 240    I V  (mL/kg)  653 8 (6 4)     Total Intake(mL/kg) 0 (0) 1353 8 (13 3) 240 (2 4)    Urine (mL/kg/hr) 331 937 (0 4) 300 (0 5)    Total Output 331 937 300    Net -331 +416 8 -60           Unmeasured Urine Occurrence  1 x           Invasive Devices     Peripheral Intravenous Line            Peripheral IV 04/22/21 Right Wrist 1 day          Drain            External Urinary Catheter 1 day                Physical Exam:  Vitals: Blood pressure 131/65, pulse (!) 48, temperature (!) 97 °F (36 1 °C), temperature source Oral, resp  rate 17, height 5' 7" (1 702 m), weight 102 kg (225 lb), SpO2 95 %  ,Body mass index is 35 24 kg/m²  General appearance: alert, appears stated age, cooperative and no distress  Head: Normocephalic, without obvious abnormality, atraumatic  Eyes: EOMI, PERRL  Neck: supple, symmetrical, trachea midline and NT  Back: no kyphosis present, no tenderness to percussion or palpation  Lungs: non labored breathing  Heart: regular heart rate  Neurologic:   Mental status: Alert, awake, oriented x 3, thought content appropriate  Cranial nerves: grossly intact (Cranial nerves II-XII)  Sensory: normal to LT  Motor: moving all extremities without focal weakness   Reflexes: 2+ and symmetric  Coordination: finger to nose normal bilaterally, no drift bilaterally      Lab Results:  Results from last 7 days   Lab Units 04/22/21  0502   WBC Thousand/uL 12 33*   HEMOGLOBIN g/dL 13 5   HEMATOCRIT % 41 6   PLATELETS Thousands/uL 195     Results from last 7 days   Lab Units 04/22/21  0502   POTASSIUM mmol/L 4 0   CHLORIDE mmol/L 111*   CO2 mmol/L 26   BUN mg/dL 25   CREATININE mg/dL 0 85   CALCIUM mg/dL 9 1   ALK PHOS U/L 40*   ALT U/L 15   AST U/L 11             Results from last 7 days   Lab Units 04/22/21  0502   INR  0 99   PTT seconds 28     No results found for: TROPONINT  ABG:No results found for: PHART, IQE7ZRT, PO2ART, PTF5XPR, A7FBMISG, BEART, SOURCE    Imaging Studies: I have personally reviewed pertinent reports     and I have personally reviewed pertinent films in PACS    Ct Head Wo Contrast    Result Date: 4/23/2021  Impression: Mild interval decrease in size in the previously seen small left-sided subdural hemorrhage, as described  Other findings as above without significant interval change  Workstation performed: [id number]       EKG, Pathology, and Other Studies: I have personally reviewed pertinent reports

## 2021-04-23 NOTE — DISCHARGE SUMMARY
[address] Hazel [birth year], 67 y o  female MRN: [id number]  Unit/Bed#: [facility] 213-68 Encounter: [id number]  Primary Care Provider: Gerard Herron MD   Date and time admitted to hospital: 4/22/2021  1:15 AM    Urinary tract infection  Assessment & Plan  - present PTA  - UA and culture sent 4/19  - e  Coli on culture   - continue ciprofloxacin for total of 5-7 days    Fall  Assessment & Plan  - Injuries as listed  - PT/OT    Parkinson's disease ([facility] [address] )  Assessment & Plan  - continue home meds  - PT/OT recommending DC home    Presence of neurostimulator  Assessment & Plan  - for Parkinson Disease  - has had since 2014      Essential hypertension  Assessment & Plan  - continue home meds    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 [facility])  Assessment & Plan  - continue home meds  - pt currently GCS 15 but does have hallucinations of her granddaughter, which are bothersome but not frightening    [facility]  Assessment & Plan  Lab Results   Component Value Date    HGBA1C 6 0 (H) 03/30/2021       No results for input(s): POCGLU in the last 72 hours  Blood Sugar Average: Last 72 hrs:     -not currently on medication  -monitor BG    * Subdural hemorrhage (HCC)  Assessment & Plan  - S/p fall  - Neuro exam: GCS 15, non-focal  - Continue neurologic checks: Every 4 hours  - CT scan of the head on 4/23 reviewed: decrease in small subdural hematoma  - Appreciate Neurosurgery evaluation and recommendations  - Complete 7 day course of Keppra for seizure prophylaxis  - Hold all anticoagulants and anti platelet medications for 2 weeks and/or until cleared by Neurosurgery to resume   - PT and OT (including cognitive evaluation) evaluation and treatment as indicated  Recommending discharge home    TERTIARY TRAUMA SURVEY NOTE    Prophylaxis: Sequential compression device Augustina Quiñones)     Disposition:  Discharge home with   Follow-up outpatient with Neurosurgery with repeat CT head    Code status:  Level 3 - DNAR and DNI    Consultants: Neurosurgery    Is the patient 72 years or older?: YES:    1  Before the illness or injury that brought you to the Emergency, did you need someone to help you on a regular basis? 1=Yes   2  Since the illness or injury that brought you to the Emergency, have you needed more help than usual to take care of yourself? 0=No   3  Have you been hospitalized for one or more nights during the past 6 months (excluding a stay in the Emergency Department)? 1=Yes   4  In general, do you see well? 0=Yes   5  In general, do you have serious problems with your memory? 0=No   6  Do you take more than three different medications everyday? 1=Yes   TOTAL   3     Did you order a geriatric consult if the score was 2 or greater?: no because patient had been at baseline throughout admission, and is being discharged home today with close follow up with Neurosurgery, Trauma Clinic, and Primary Care Provider    Identification of Seniors at Risk      Most Recent Value   (ISAR) Identification of Seniors at Risk   Before the illness or injury that brought you to the Emergency, did you need someone to help you on a regular basis? 1 Filed at: 04/22/2021 0129   In the last 24 hours, have you needed more help than usual?  0 Filed at: 04/22/2021 5504   Have you been hospitalized for one or more nights during the past 6 months? 1 Filed at: 04/22/2021 0129   In general, do you see well? 1 Filed at: 04/22/2021 0129   In general, do you have serious problems with your memory? 0 Filed at: 04/22/2021 0129   Do you take more than three different medications every day?   1 Filed at: 04/22/2021 0129   ISAR Score  4 Filed at: 04/22/2021 0129            SUBJECTIVE:     Transfer from: [facility] \[facility]\""  Outside Films Received: yes  Tertiary Exam Due on: 4/23/2021    Mechanism of Injury:Fall    Details related to Injury: +LOC:  no    Chief Complaint: "I'm good"    HPI/Last 24 hour events:  Patient reports that she feels good today  She admits that she has been feeling well and walking around the room, with minimal assistance  She denies headaches, dizziness, vision changes, chest pain shortness of breath, abdominal pain, nausea  Active medications:           Current Facility-Administered Medications:     acetaminophen (TYLENOL) tablet 975 mg, 975 mg, Oral, Q8H [address], 975 mg at 04/23/21 0525    atenolol (TENORMIN) tablet 50 mg, 50 mg, Oral, Daily, Stopped at 04/23/21 0813    ciprofloxacin (CIPRO) tablet 250 mg, 250 mg, Oral, Q12H [address], 250 mg at 04/23/21 0526    HYDROmorphone HCl (DILAUDID) injection 0 2 mg, 0 2 mg, Intravenous, Q4H PRN    levETIRAcetam (KEPPRA) tablet 500 mg, 500 mg, Oral, Q12H MANUEL, 500 mg at 04/23/21 0814    ondansetron (ZOFRAN) injection 4 mg, 4 mg, Intravenous, Q6H PRN    oxyCODONE (ROXICODONE) IR tablet 2 5 mg, 2 5 mg, Oral, Q4H PRN    oxyCODONE (ROXICODONE) IR tablet 5 mg, 5 mg, Oral, Q4H PRN    PARoxetine (PAXIL) tablet 20 mg, 20 mg, Oral, Daily, 20 mg at 04/23/21 0939    Pimavanserin Tartrate CAPS 1 capsule, 1 capsule, Oral, HS    pravastatin (PRAVACHOL) tablet 40 mg, 40 mg, Oral, Daily With Dinner, 40 mg at 04/22/21 1704    rasagiline (AZILECT) tablet 1 mg, 1 mg, Oral, Daily, Stopped at 04/22/21 0900    rivastigmine (EXELON) capsule 4 5 mg, 4 5 mg, Oral, BID, 4 5 mg at 04/23/21 0814    senna (SENOKOT) tablet 17 2 mg, 2 tablet, Oral, Daily, 17 2 mg at 04/23/21 0813      OBJECTIVE:     Vitals:   Vitals:    04/23/21 1438   BP: 125/69   Pulse: (!) 54   Resp: 17   Temp: 97 5 °F (36 4 °C)   SpO2: 95%       Physical Exam:   GENERAL APPEARANCE: Patient in no acute distress  HEENT: Posterior scalp laceration repaired; PERRL, EOMs intact; Mucous membranes moist  NECK / BACK: ROM intact, nontender  CV: Regular rate and rhythm; no murmur/gallops/rubs appreciated  CHEST / LUNGS: Clear to auscultation; no wheezes/rales/rhonci    ABD: NABS; soft; non-distended; non-tender  : Voiding  EXT: +2 pulses bilaterally upper & lower extremities; no edema  NEURO: GCS 15; no focal neurologic deficits; neurovascularly intact  SKIN: Warm, dry and well perfused; no rash; no jaundice  I/O:   I/O       04/21 0701 - 04/22 0700 04/22 0701 - 04/23 0700 04/23 0701 - 04/24 0700    P  O  0 700     I V  (mL/kg)  653 8 (6 4)     Total Intake(mL/kg) 0 (0) 1353 8 (13 3)     Urine (mL/kg/hr) 331 937 (0 4)     Total Output 331 937     Net -331 +416 8            Unmeasured Urine Occurrence  1 x           Invasive Devices: Invasive Devices     Peripheral Intravenous Line            Peripheral IV 04/22/21 Right Wrist 1 day          Drain            External Urinary Catheter 1 day                  Imaging:   Ct Head Wo Contrast    Result Date: 4/23/2021  Impression: Mild interval decrease in size in the previously seen small left-sided subdural hemorrhage, as described  Other findings as above without significant interval change  Workstation performed: [id number]       Labs: CBC: No results found for: WBC, HGB, HCT, MCV, PLT, ADJUSTEDWBC, MCH, MCHC, RDW, MPV, NRBC  CMP: No results found for: NA, CL, CO2, ANIONGAP, BUN, CREATININE, GLUCOSE, CALCIUM, AST, ALT, ALKPHOS, PROT, BILITOT, EGFR                        Resolved Problems  Date Reviewed: 4/23/2021          Resolved    Parkinson's disease with use of electrical brain stimulation ([facility] [address] ) 4/22/2021     Resolved by  Torie Huerta DO    History of hypertension 4/22/2021     Resolved by  Torie Huerta DO          Admission Date:   Admission Orders (From admission, onward)     Ordered        04/22/21 0153  Inpatient Admission  Once                     Admitting Diagnosis: Subdural hemorrhage ([facility] [address] ) [I62 00]  Unspecified multiple injuries, initial encounter [T07  XXXA]    HPI written by Marquita White DO 4/22/2021 "Emir Shaw is a 67 y o  female who presents as transfer from [address] with SDH and posterior scalp laceration s/p fall earelier today  Patient was ambulating from her bathroom with her walker when she lost her balance and fell backwards  She hit back of head on ground  No blood thinners  No LOC  Was unable to get up until EMS arrived to assist but they arrived soon after fall  Lives at home with   Denies any preceding or associated symptoms including weakness, lightheadedness, CP, SOB, cough, n/n, neck pain, paresthesias, or pain anywhere else  She was on aspirin until one week ago when she had replacement of her deep brain stimulator and has not yet restarted it  "    Procedures Performed: No orders of the defined types were placed in this encounter  Summary of Hospital Course:  50-year-old woman transferred from [address] [place] [address] to [facility] for trauma service and Neurosurgery evaluations after found to have a subdural hematoma, scalp laceration after a fall  Laceration was repaired 4/22 with sutures  Neurosurgery evaluated the patient, recommended no surgical intervention  Patient was started on Keppra for seizure prophylaxis and repeat CT head 04/23/2021 revealed decrease in small subdural hematoma  In addition physical therapy and occupational therapy evaluated and treated the patient come recommended discharge home  Patient will follow-up with Neurosurgery outpatient 2 weeks, follow-up with primary care doctor, follow-up with Trauma Clinic in 1 week for suture removal      Significant Findings, Care, Treatment and Services Provided:   CT head wo contrast   Final Result by Akash Canales DO (04/23 0303)      Mild interval decrease in size in the previously seen small left-sided subdural hemorrhage, as described  Other findings as above without significant interval change                    Workstation performed: [id number]         CT head wo contrast    (Results Pending)         Complications: None    Condition at Discharge: good         Discharge instructions/Information to patient and family:   See after visit summary for information provided to patient and family  Provisions for Follow-Up Care:  See after visit summary for information related to follow-up care and any pertinent home health orders  PCP: Ganga Moore MD    Disposition: Home    Planned Readmission: No    Discharge Statement   I spent 20 minutes discharging the patient  This time was spent on the day of discharge  I had direct contact with the patient on the day of discharge  Additional documentation is required if more than 30 minutes were spent on discharge  Discharge Medications:  See after visit summary for reconciled discharge medications provided to patient and family

## 2021-04-23 NOTE — TELEPHONE ENCOUNTER
05/04/2021-CT HEAD SCHEDULED ON 05/07/2021 04/28/2021-CALLED PT, SPOKE TO PT AND LORI (), CONFIRMED 05/13/2021 APT AND LORI STATES HE WILL SCHEDULE CT PRIOR TO APT      WAITING FOR CT TO BE SCHEDULED  [address] SUPPLEMENT        04/23/2021-PT STILL IN HOSPITAL  05/07/2021 APT W/CT HEAD      ----- Message from Jose Cobb PA-C sent at 4/23/2021 12:35 PM EDT -----  2 week f/u with a [facility] w/o contrast  AP vist

## 2021-04-23 NOTE — CASE MANAGEMENT
Pt will d/c home today  Pt has the necessary DME  Pt will resume VNA with [address] rehab  Pt has no other needs

## 2021-04-28 ENCOUNTER — TELEPHONE (OUTPATIENT)
Dept: NEUROSURGERY | Facility: CLINIC | Age: 76
End: 2021-04-28

## 2021-04-28 NOTE — TELEPHONE ENCOUNTER
Received a call from Susan Luciano questioning if patient's PCP can remove head lac stitches at appt tomorrow 4/29  Returned call to Susan Luciano and explained trauma is managing the stitches from the patient's head laceration and advised to call their office  Susan Luciano appreciative of call back

## 2021-05-05 NOTE — PHYSICIAN ADVISOR
Current patient class: Inpatient  The patient is currently on Hospital Day: 2 at [facility]      The patient was admitted to the hospital at [address] [facility] on 4/22/21 for the following diagnosis:  Subdural hemorrhage ([address] ) [I62 00]  Unspecified multiple injuries, initial encounter [T07  XXXA]     Patient crossed 2 midnights at 2 different campuses [facility]      There is documentation in the medical record of an expected length of stay of at least 2 midnights  The patient is therefore expected to satisfy the 2 midnight benchmark and given the 2 midnight presumption is appropriate for INPATIENT ADMISSION  Given this expectation of a satisfying stay, CMS instructs us that the patient is most often appropriate for inpatient admission under part A provided medical necessity is documented in the chart  After review of the relevant documentation, labs, vital signs and test results, the patient is appropriate for INPATIENT ADMISSION  Admission to the hospital as an inpatient is a complex decision making process which requires the practitioner to consider the patients presenting complaint, history and physical examination and all relevant testing  With this in mind, in this case, the patient was deemed appropriate for INPATIENT ADMISSION  After review of the documentation and testing available at the time of the admission I concur with this clinical determination of medical necessity  Rationale is as follows: The patient is a 67 yrs old Female who presented to the ED at 4/22/2021  1:15 AM with a chief complaint of Trauma (Pt is a trauma transfer from [facility], has a subdural hematoma)  Patient with ho Parkinson;s  She had a fall due to loss of balance and was found to have SDH on ct scan at [facility]  She was transferred to [facility] for neurosurgical evaluation  She was started on  neurologic checks q 4 hours    CT scan of the head on 4/23 reviewed: decrease in small subdural hematoma  - It was recommended to complete 7 day course of Keppra for seizure prophylaxis and hold all anticoagulants and anti platelet medications for 2 weeks  PT and OT (including cognitive evaluation) evaluation recommending discharge home  Patient was discharged home  Given fall with SDH, need for transfer for neurosurgical evaluation and frequent neuro checks, she is inpatient appropriate,    The patients vitals on arrival were   ED Triage Vitals   Temperature Pulse Respirations Blood Pressure SpO2   04/22/21 0115 04/22/21 0115 04/22/21 0115 04/22/21 0115 04/22/21 0115   98 °F (36 7 °C) 60 18 130/60 96 %      Temp Source Heart Rate Source Patient Position - Orthostatic VS BP Location FiO2 (%)   04/22/21 0115 04/22/21 0115 04/22/21 0115 04/22/21 0200 --   Oral Monitor Lying Right arm       Pain Score       04/22/21 0306       No Pain           Past Medical History:   Diagnosis Date    Anxiety     [facility]     Depression     Diabetes mellitus ([facility] )     Diabetes mellitus type 2, diet-controlled ([facility] )     Hyperlipidemia     Hypertension     Parkinson disease ([facility] )     Pneumonia      Past Surgical History:   Procedure Laterality Date    ACHILLES TENDON SURGERY      APPENDECTOMY      COLONOSCOPY      DEEP BRAIN STIMULATOR PLACEMENT      DENTAL SURGERY      FRACTURE SURGERY      KNEE ARTHROSCOPY      TX IMP STIM,CRANIAL,SUBQ,1 ARRAY Right 12/18/2018    Procedure: REPLACEMENT IMPLANTABLE PULSE GENERATOR (IPG) DEEP BRAIN STIMULATION (DBS);   Surgeon: Nguyen Tolbert MD;  [facility];  Service: Neurosurgery    TX IMP STIM,CRANIAL,SUBQ,1 ARRAY Left 4/7/2021    Procedure: REPLACEMENT IMPLANTABLE PULSE GENERATOR FOR DEEP BRAIN STIMULATOR, LEFT CHEST;  Surgeon: Nguyen Tolbert MD;  [facility];  Service: Neurosurgery    TX LAP,APPENDECTOMY N/A 8/16/2020    Procedure: Sohan Mcgill;  Surgeon: Alex Piedra MD;  [facility];  Service: General    TX OPEN RX FEMUR FX+INTRAMED ERIKA Right 12/14/2019    Procedure: INSERTION NAIL IM FEMUR ANTEGRADE (TROCHANTERIC); Surgeon: Renetta Antonio DO;  [facility];  Service: Orthopedics    REPLACEMENT TOTAL KNEE      REVERSE TOTAL SHOULDER ARTHROPLASTY Left 8/23/2018    Procedure: ARTHROPLASTY SHOULDER REVERSE;  Surgeon: Zoraida Dia MD;  [facility];  Service: Orthopedics    TONSILLECTOMY             Consults have been placed to:   IP CONSULT TO NEUROSURGERY    Vitals:    04/23/21 0309 04/23/21 0723 04/23/21 0810 04/23/21 1438   BP: 132/65 131/65  125/69   BP Location: Left arm      Pulse: 55 (!) 48  (!) 54   Resp: 16 17  17   Temp: (!) 97 °F (36 1 °C)   97 5 °F (36 4 °C)   TempSrc: Oral      SpO2: 93% 95% 95% 95%   Weight:       Height:           Most recent labs:    No results for input(s): WBC, HGB, HCT, PLT, K, NA, CALCIUM, BUN, CREATININE, LIPASE, AMYLASE, INR, TROPONINI, CKTOTAL, AST, ALT, ALKPHOS, BILITOT in the last 72 hours  Scheduled Meds:  Continuous Infusions:No current facility-administered medications for this encounter  PRN Meds:      Surgical procedures (if appropriate):

## 2021-05-07 ENCOUNTER — HOSPITAL ENCOUNTER (OUTPATIENT)
Dept: CT IMAGING | Facility: HOSPITAL | Age: 76
Discharge: HOME | End: 2021-05-07
Payer: MEDICARE

## 2021-05-07 DIAGNOSIS — I62.00 SUBDURAL HEMORRHAGE (HCC): ICD-10-CM

## 2021-05-07 PROCEDURE — G1004 CDSM NDSC: HCPCS

## 2021-05-07 PROCEDURE — 70450 CT HEAD/BRAIN W/O DYE: CPT

## 2021-05-13 ENCOUNTER — OFFICE VISIT (OUTPATIENT)
Dept: NEUROSURGERY | Facility: CLINIC | Age: 76
End: 2021-05-13

## 2021-05-13 VITALS
DIASTOLIC BLOOD PRESSURE: 80 MMHG | SYSTOLIC BLOOD PRESSURE: 120 MMHG | HEIGHT: 67 IN | WEIGHT: 229 LBS | TEMPERATURE: 98 F | BODY MASS INDEX: 35.94 KG/M2

## 2021-05-13 DIAGNOSIS — Z45.42 END OF BATTERY LIFE OF DEEP BRAIN STIMULATOR: ICD-10-CM

## 2021-05-13 DIAGNOSIS — S06.5X9A SDH (SUBDURAL HEMATOMA) (HCC): Primary | ICD-10-CM

## 2021-05-13 PROCEDURE — 1124F ACP DISCUSS-NO DSCNMKR DOCD: CPT | Performed by: NURSE PRACTITIONER

## 2021-05-13 PROCEDURE — 99024 POSTOP FOLLOW-UP VISIT: CPT | Performed by: NURSE PRACTITIONER

## 2021-05-13 NOTE — ASSESSMENT & PLAN NOTE
Presents for 2-week post-hospital follow-up for left convexity SDH s/p mechanical fall at home  · Was on ASA as preventative but had stopped 2 weeks prior to DBS change  · History of Parkinson's disease, s/p DBS battery change 4/7/21  · Currently feels well  Exam non-focal  Parkinson's symptoms stable  Imaging:  · CT head wo 5/7/21: Decreasing attenuation in size of the previously noted extra-axial hemorrhage at the left frontoparietal vertex  Stable appearance of the bifrontal approach deep brain stimulator lead wires  Plan:  · Reviewed imaging extensively with patient and  Alyssa Meza  · Discussed that she is cleared from neurosurgical standpoint to resume ASA, but recommend risk vs benefit discussion with PCP in this patient who is a high fall risk  They have an appointment next week  · No neurosurgical intervention anticipated  · Ok to resume fish oil supplement  · Follow up as needed

## 2021-05-13 NOTE — PROGRESS NOTES
Neurosurgery Office Note  Jessica Yoder 67 y o  female MRN: [id number]      Assessment/Plan     Subdural hemorrhage [facility])  Presents for 2-week post-hospital follow-up for left convexity SDH s/p mechanical fall at home  · Was on ASA as preventative but had stopped 2 weeks prior to DBS change  · History of Parkinson's disease, s/p DBS battery change 4/7/21  · Currently feels well  Exam non-focal  Parkinson's symptoms stable  Imaging:  · CT head wo 5/7/21: Decreasing attenuation in size of the previously noted extra-axial hemorrhage at the left frontoparietal vertex  Stable appearance of the bifrontal approach deep brain stimulator lead wires  Plan:  · Reviewed imaging extensively with patient and  Alia Long  · Discussed that she is cleared from neurosurgical standpoint to resume ASA, but recommend risk vs benefit discussion with PCP in this patient who is a high fall risk  They have an appointment next week  · No neurosurgical intervention anticipated  · Ok to resume fish oil supplement  · Follow up as needed  End of battery life of deep brain stimulator  · S/p batter replacement of DBS 4/7/21 with Dr Wm Rueda  · Examined left chest incision site  Healing well  Clean and dry, well-approximated  No dehiscence  No edema or erythema  · Instructed to continue to wash area with gentle soap and warm water and pat dry  Do not pick at glue  Diagnoses and all orders for this visit:    SDH (subdural hematoma) (HCC)    End of battery life of deep brain stimulator            CHIEF COMPLAINT    Chief Complaint   Patient presents with    Follow-up     Subdural hemorrhage       HISTORY    History of Present Illness     67y o  year old female With past medical history of type 2 diabetes, essential hypertension, Parkinson's disease left shoulder arthroplasty, known to our practice for history of deep brain stimulator placement    She was at home when she stood up using the bathroom and fell while trying to regain her ground hitting her head  On 04/21/2021  The fall was witnessed by her   She presented to the hospital for repair scalp laceration and was noted on imaging acute subdural hematoma  Two weeks prior on 04/07/2021 she had undergone replacement of DBS battery at her left chest       Currently she states she is doing well  She denies any headaches or visual disturbances  She denies any nausea or vomiting  She states her Parkinson's symptoms are under control  She continues to ambulate with a walker as she does at baseline  She denies any numbness or weakness  HPI    See Discussion    REVIEW OF SYSTEMS    Review of Systems   Constitutional: Negative  HENT: Negative  Eyes: Negative  Respiratory: Negative  Cardiovascular: Negative  Gastrointestinal: Positive for constipation  Endocrine: Negative  Genitourinary: Negative  Musculoskeletal: Positive for gait problem (walker)  Skin: Negative  Allergic/Immunologic: Negative  Hematological: Negative  Psychiatric/Behavioral: Negative  ROS reviewed and edited as needed      Meds/Allergies     Current Outpatient Medications   Medication Sig Dispense Refill    acetaminophen (TYLENOL) 325 mg tablet Take 2 tablets (650 mg total) by mouth every 6 (six) hours as needed for mild pain, headaches or fever 30 tablet 0    atenolol (TENORMIN) 50 mg tablet Take 1 tablet (50 mg total) by mouth daily 30 tablet 0    ciprofloxacin (CIPRO) 250 mg tablet Take 250 mg by mouth every 12 (twelve) hours      Coenzyme Q10 400 MG CAPS Take 1 capsule (400 mg total) by mouth daily 30 capsule 0    Multiple Vitamins-Minerals (MULTIVITAMIN GUMMIES WOMENS PO) Take by mouth      PARoxetine (PAXIL) 20 mg tablet Take 1 tablet (20 mg total) by mouth daily 30 tablet 0    Pimavanserin Tartrate (Nuplazid) 34 MG CAPS Take 1 capsule by mouth daily at bedtime 30 capsule 8    rasagiline (AZILECT) 1 MG TAKE 1 TABLET BY MOUTH  DAILY 90 tablet 3    rivastigmine (EXELON) 4 5 MG capsule Take 1 capsule (4 5 mg total) by mouth 2 (two) times a day 180 capsule 2    rosuvastatin (CRESTOR) 5 mg tablet Take 1 tablet (5 mg total) by mouth every other day 30 tablet 0    TURMERIC PO Take by mouth daily       levETIRAcetam (KEPPRA) 500 mg tablet Take 1 tablet (500 mg total) by mouth every 12 (twelve) hours for 11 doses 11 tablet 0     No current facility-administered medications for this visit  Allergies   Allergen Reactions    Sulfa Antibiotics Hives       PAST HISTORY    Past Medical History:   Diagnosis Date    Anxiety     Broken hip ([address] )     Depression     Diabetes mellitus ([address] )     pre diabetic    Diabetes mellitus type 2, diet-controlled (HCC)     Hyperlipidemia     Hypertension     Parkinson disease ([address] )     Pneumonia        Past Surgical History:   Procedure Laterality Date    ACHILLES TENDON SURGERY      APPENDECTOMY      COLONOSCOPY      DEEP BRAIN STIMULATOR PLACEMENT      DENTAL SURGERY      FRACTURE SURGERY      KNEE ARTHROSCOPY      NE IMP STIM,CRANIAL,SUBQ,1 ARRAY Right 12/18/2018    Procedure: REPLACEMENT IMPLANTABLE PULSE GENERATOR (IPG) DEEP BRAIN STIMULATION (DBS); Surgeon: Gavin Farris MD;  [facility];  Service: Neurosurgery    NE IMP STIM,CRANIAL,SUBQ,1 ARRAY Left 4/7/2021    Procedure: REPLACEMENT IMPLANTABLE PULSE GENERATOR FOR DEEP BRAIN STIMULATOR, LEFT CHEST;  Surgeon: Gavin Farris MD;  [facility];  Service: Neurosurgery    NE LAP,APPENDECTOMY N/A 8/16/2020    Procedure: Luc Yates;  Surgeon: Gardiner Sicard, MD;  [facility];  Service: General    NE OPEN RX FEMUR FX+INTRAMED ERIKA Right 12/14/2019    Procedure: INSERTION NAIL IM FEMUR ANTEGRADE (TROCHANTERIC);   Surgeon: Renetta Antonio DO;  [facility];  Service: Orthopedics    REPLACEMENT TOTAL KNEE      REVERSE TOTAL SHOULDER ARTHROPLASTY Left 8/23/2018    Procedure: ARTHROPLASTY SHOULDER REVERSE;  Surgeon: Lynette Truong MD;  Location[facility];  Service: Orthopedics    TONSILLECTOMY         Social History     Tobacco Use    Smoking status: Never Smoker    Smokeless tobacco: Never Used   Substance Use Topics    Alcohol use: Yes     Alcohol/week: 2 0 standard drinks     Types: 2 Glasses of wine per week     Frequency: Monthly or less     Drinks per session: 1 or 2     Comment: occasional    Drug use: No       Family History   Problem Relation Age of Onset    Arthritis Mother     No Known Problems Father     No Known Problems Maternal Grandmother     No Known Problems Maternal Grandfather     No Known Problems Paternal Grandmother     No Known Problems Paternal Grandfather     No Known Problems Son     No Known Problems Son          Above history personally reviewed  EXAM    Vitals:Blood pressure 120/80, temperature 98 °F (36 7 °C), temperature source Temporal, height 5' 7" (1 702 m), weight 104 kg (229 lb)  ,Body mass index is 35 87 kg/m²  Physical Exam  Constitutional:       Appearance: She is well-developed  She is obese  HENT:      Head: Normocephalic and atraumatic  Eyes:      Extraocular Movements: EOM normal       Pupils: Pupils are equal, round, and reactive to light  Neck:      Musculoskeletal: Normal range of motion and neck supple  Pulmonary:      Effort: Pulmonary effort is normal    Abdominal:      Palpations: Abdomen is soft  Musculoskeletal: Normal range of motion  Skin:     General: Skin is warm and dry  Neurological:      Mental Status: She is alert and oriented to person, place, and time  Mental status is at baseline  Cranial Nerves: No cranial nerve deficit  Sensory: No sensory deficit  Motor: Weakness present        Coordination: Coordination abnormal  Finger-Nose-Finger Test normal       Gait: Gait abnormal       Deep Tendon Reflexes: Reflexes normal    Psychiatric:         Speech: Speech normal          Neurologic Exam     Mental Status Oriented to person, place, and time  Oriented to person  Oriented to place  Oriented to time  Oriented to year, month and date  Attention: normal  Concentration: normal    Speech: speech is normal   Level of consciousness: alert  Knowledge: good and consistent with education  Able to name object  Flat affect     Cranial Nerves   Cranial nerves II through XII intact  CN III, IV, VI   Pupils are equal, round, and reactive to light  Extraocular motions are normal    Right pupil: Size: 3 mm  Shape: regular  Reactivity: brisk  Consensual response: intact  Accommodation: intact  Left pupil: Size: 3 mm  Shape: regular  Reactivity: brisk  Consensual response: intact  Accommodation: intact  Nystagmus: none   Diplopia: none  Conjugate gaze: present    CN V   Right facial sensation deficit: none  Left facial sensation deficit: none    CN VII   Facial expression full, symmetric       CN VIII   Hearing: intact    CN IX, X   Palate: symmetric    CN XI   Right sternocleidomastoid strength: normal  Left sternocleidomastoid strength: normal  Right trapezius strength: normal  Left trapezius strength: normal    CN XII   Tongue: not atrophic  Fasciculations: absent  Tongue deviation: none    Motor Exam   Muscle bulk: normal  Overall muscle tone: normal  Right arm pronator drift: absent  Left arm pronator drift: absent    Strength   Right deltoid: 5/5  Left deltoid: 5/5  Right biceps: 5/5  Left biceps: 5/5  Right triceps: 5/5  Left triceps: 5/5  Right quadriceps: 5/5  Left quadriceps: 5/5  Right hamstrin/5  Left hamstrin/5  Right anterior tibial: 5/5  Left anterior tibial: 5/5  Right posterior tibial: 5/5  Left posterior tibial: 5/5  Right peroneal: 5/5  Left peroneal: 5/5  Right gastroc: 5/5  Left gastroc: 5/5    Sensory Exam   Light touch normal    Proprioception normal      Gait, Coordination, and Reflexes     Gait  Gait: shuffling    Coordination   Finger to nose coordination: normal    Tremor   Resting tremor: absent  Intention tremor: absent  Action tremor: absent        MEDICAL DECISION MAKING    Imaging Studies:     Ct Head Wo Contrast    Result Date: 5/11/2021  Narrative: CT BRAIN - WITHOUT CONTRAST INDICATION:   Nontraumatic subdural hemorrhage, unspecified  COMPARISON:  Head CT from April 23, 2021  TECHNIQUE:  CT examination of the brain was performed  In addition to axial images, sagittal and coronal 2D reformatted images were created and submitted for interpretation  Radiation dose length product (DLP) for this visit:  890 mGy-cm   This examination, like all CT scans performed in the [facility], was performed utilizing techniques to minimize radiation dose exposure, including the use of iterative reconstruction and automated exposure control  IMAGE QUALITY:  Diagnostic  FINDINGS: PARENCHYMA:  No interval change in the appearance of the parenchyma  Bilateral frontal approach deep brain stimulator lead wires are noted with their tips in the subthalamic region  There is no parenchymal hemorrhage  Mild periventricular hypoattenuation is suggestive of chronic microangiopathy  VENTRICLES AND EXTRA-AXIAL SPACES:  The left high frontoparietal convexity extra-axial hemorrhage is smaller and less hyperattenuating suggesting partial resorption and evolution of the blood products when comparing to the 23rd 2021 study  The extra-axial collection measures approximately 2 5 x 0 4 cm on image 35 of series 4 compared to the 2 8 x 0 8 cm measurement noted on the prior study  There is no significant mass effect or buckling of the underlying cortex  No new areas of extra-axial hemorrhage are identified  VISUALIZED ORBITS AND PARANASAL SINUSES:  Unremarkable  CALVARIUM AND EXTRACRANIAL SOFT TISSUES:  Postoperative changes of bilateral deep brain stimulator lead placement with bifrontal conor holes       Impression: Decreasing attenuation in size of the previously noted extra-axial hemorrhage at the left frontoparietal vertex  Stable appearance of the bifrontal approach deep brain stimulator lead wires  Workstation performed: [id number]     Ct Head Wo Contrast    Result Date: 4/23/2021  Narrative: CT BRAIN - WITHOUT CONTRAST INDICATION:   Subdural hemorrhage, follow-up SDH  COMPARISON:  CT head dated 4/21/2021 TECHNIQUE:  CT examination of the brain was performed  In addition to axial images, sagittal and coronal 2D reformatted images were created and submitted for interpretation  Radiation dose length product (DLP) for this visit:  953 55 mGy-cm   This examination, like all CT scans performed in the [facility], was performed utilizing techniques to minimize radiation dose exposure, including the use of iterative  reconstruction and automated exposure control  IMAGE QUALITY:  Diagnostic  FINDINGS: PARENCHYMA, VENTRICLES AND EXTRA-AXIAL SPACES :  Small amount of hyperdense subdural hemorrhage in the posterior high left convexity is redemonstrated and has mildly intervally decreased in size (series in now measuring approximately 0 8 cm in maximal thickness  No significant mass effect or midline shift  No intracranial mass, mass effect or midline shift  No CT signs of acute territorial infarction  No acute parenchymal hemorrhage  Gray-white differentiation appears maintained  Stable appearance/position of bilateral deep brain stimulator leads  Parenchymal atrophy  Basal cisterns are patent  No hydrocephalus  The intracranial structures are otherwise intervally unchanged  VISUALIZED ORBITS AND PARANASAL SINUSES:  Stable CALVARIUM AND EXTRACRANIAL SOFT TISSUES:  Stable     Impression: Mild interval decrease in size in the previously seen small left-sided subdural hemorrhage, as described  Other findings as above without significant interval change   Workstation performed: [id number]     Ct Head Without Contrast    Result Date: 4/22/2021  Narrative: CT BRAIN - WITHOUT CONTRAST INDICATION:   Head trauma, minor (Age => 64y) fall, lac to post head  COMPARISON:  CT head exam dated 7/8/2019  TECHNIQUE:  CT examination of the brain was performed  In addition to axial images, sagittal and coronal 2D reformatted images were created and submitted for interpretation  Radiation dose length product (DLP) for this visit:  954 mGy-cm   This examination, like all CT scans performed in the [facility], was performed utilizing techniques to minimize radiation dose exposure, including the use of iterative reconstruction and automated exposure control  IMAGE QUALITY:  Diagnostic  FINDINGS: PARENCHYMA:  No intracranial mass, mass effect or midline shift  No CT signs of acute infarction  No acute parenchymal hemorrhage  Stable appearance/position of bilateral deep brain stimulator leads  VENTRICLES AND EXTRA-AXIAL SPACES:  No hydrocephalus  There is acute extra-axial hemorrhage overlying the high left convexity near the vertex measuring up to 1 5 cm in maximal thickness  VISUALIZED ORBITS AND PARANASAL SINUSES:  Unremarkable  CALVARIUM AND EXTRACRANIAL SOFT TISSUES:  No skull fracture  Stable bifrontal conor hole craniotomies and subcutaneous wires  Impression: Acute extra-axial/subdural hemorrhage overlying the high left convexity near the vertex and measuring up to 1 3 cm in maximal thickness  No significant associated mass effect or midline shift  No skull fracture  Stable appearance/position of bilateral deep brain stimulator leads  Findings discussed with Dr Neil Marti at 12:00 AM on 4/22/2021 with verbal confirmation by the recipient  Workstation performed: [id number]     Ct Cervical Spine Without Contrast    Result Date: 4/22/2021  Narrative: CT CERVICAL SPINE - WITHOUT CONTRAST INDICATION:   Neck trauma (Age => 64y) fall, hit neck  COMPARISON:  None  TECHNIQUE:  CT examination of the cervical spine was performed without intravenous contrast   Contiguous axial images were obtained    Sagittal and coronal reconstructions were performed  Radiation dose length product (DLP) for this visit:  691 mGy-cm   This examination, like all CT scans performed in the [facility], was performed utilizing techniques to minimize radiation dose exposure, including the use of iterative reconstruction and automated exposure control  IMAGE QUALITY:  Diagnostic  FINDINGS: ALIGNMENT:  Normal alignment of the cervical spine  No subluxation  VERTEBRAL BODIES:  No fracture  DEGENERATIVE CHANGES:  Mild multilevel cervical degenerative changes are noted without critical central canal stenosis  PREVERTEBRAL AND PARASPINAL SOFT TISSUES:  Unremarkable  THORACIC INLET:  Normal      Impression: No cervical spine fracture or traumatic malalignment  Workstation performed: [id number]       I have personally reviewed pertinent reports     and I have personally reviewed pertinent films in PACS

## 2021-05-13 NOTE — ASSESSMENT & PLAN NOTE
· S/p batter replacement of DBS 4/7/21 with Dr Cornell Palacios  · Examined left chest incision site  Healing well  Clean and dry, well-approximated  No dehiscence  No edema or erythema  · Instructed to continue to wash area with gentle soap and warm water and pat dry  Do not pick at glue

## 2021-08-15 DIAGNOSIS — G20 PARKINSON'S DISEASE WITH USE OF ELECTRICAL BRAIN STIMULATION (HCC): ICD-10-CM

## 2021-08-16 RX ORDER — RASAGILINE 1 MG/1
TABLET ORAL
Qty: 90 TABLET | Refills: 3 | Status: SHIPPED | OUTPATIENT
Start: 2021-08-16

## 2021-08-17 NOTE — PROGRESS NOTES
Daily Note     Today's date: 2018  Patient name: Emerald Reno  : [id number]  MRN: [id number]  Referring provider: Jessenia Godoy MD  Dx:   Encounter Diagnosis     ICD-10-CM    1  S/P reverse total shoulder arthroplasty, left Z96 612    2  Abnormal gait R26 9                   Subjective: Pt wants to start working more on her gait NV      Objective: See treatment diary below      Assessment: Tolerated treatment well  Patient exhibited good technique with therapeutic exercises   AROM shoulder elevation  120  Sh flex 4+/5  Sh abd 4/5  Ext rot lag sign- haven't been able to restore ER AROM with arm in neutral, possible lower infrapsinatus tear from fall or previous wear and tear  Goals  STG (2-3 weeks)  1: increase PROM 10-15 degrees- met  2: increase AROM 10 degrees- met  3: Pt independent with HEP- met    LTG (4-6 weeks) (9-12 weeks s/p)  1: Pt able to raise arm above shoulder level without pain- met  2: full distal AROM in L UE- met  3: strength to GHJ rotators 4 to 4+/5- not met  4: deltoid 4/5-met      Plan: Continue per plan of care  perform reassessment NV to assess gait and balance  D/C treatment for shoulder at this time       Precautions: DM, parkinson's with deep brain stimulator, HTN, amb with RW- will try to transition back to [facility]  R shoulder reverse TSA - protocol  DOS 18    Daily Treatment Diary     Manual           R shoulder PROM 10 10 10 10         Gentle isotonics IR/ER in scap plane, isometric deltoid 5 5 5 5                                    15 15 15 15             Exercise Diary           Supine sh flexion  1# 2# 20x 2# 20x 2# 20x         A IR/ER in scaption 15x 15x 15x 15x         incline sh flexion 0# 20x 20x 20x 20x         Ceiling punches 0# 20x 20x 20x 20x                      flexbar towel twisting Green 20x Green 20x Green 20x Green 20x                      pulley 5 min 5 min NP NP                      Standing scaption LM with pts mother to see if she is still planning on picking up the sports physical form or if we should mail it.   with stick 15x 15x 15x 15x                      TB row, ext BTB 3x10 each  BTB 3x10 each BTB 3x10 BTB 3x10         TB IR BTB 2x10 BTB 2x10 BTB 3x10 BTB 3x10         TB sh abd chicken wing RTB 2x10 RTB 2x10 RTB 2x10 RTB 2x10                                                             Gait training    NV          20/20 15/15 15/10 15/10             Modalities              CP post TE                                           HEP:table slides, doorway stretch, cane overhead, cane ER, forearm sup/pron with small object

## 2021-09-29 ENCOUNTER — PROCEDURE VISIT (OUTPATIENT)
Dept: NEUROLOGY | Facility: CLINIC | Age: 76
End: 2021-09-29
Payer: MEDICARE

## 2021-09-29 VITALS
SYSTOLIC BLOOD PRESSURE: 110 MMHG | BODY MASS INDEX: 34.77 KG/M2 | WEIGHT: 222 LBS | HEART RATE: 69 BPM | DIASTOLIC BLOOD PRESSURE: 58 MMHG

## 2021-09-29 DIAGNOSIS — G20 PARKINSON'S DISEASE (HCC): ICD-10-CM

## 2021-09-29 DIAGNOSIS — G20 COGNITIVE DEFICIT DUE TO PARKINSON'S DISEASE (HCC): Primary | ICD-10-CM

## 2021-09-29 PROCEDURE — 95983 ALYS BRN NPGT PRGRMG 15 MIN: CPT | Performed by: PSYCHIATRY & NEUROLOGY

## 2021-09-29 PROCEDURE — 99214 OFFICE O/P EST MOD 30 MIN: CPT | Performed by: PSYCHIATRY & NEUROLOGY

## 2021-09-29 NOTE — ASSESSMENT & PLAN NOTE
Patient's cognitive sx seem to be improving, particularly as her hallucinations decrease   Nuplazid 34 mg qd started at last visit which  believes has significantly helped (frequency of some of the hallucinations has gone down from 8-9 times a day to sometimes once or none per day)   Continue Nuplazid at current dosing

## 2021-09-29 NOTE — PROGRESS NOTES
Patient ID: Sam Maria is a 67 y o  female  Assessment/Plan:    Parkinson's disease ([address] )  Parkinson's complicated by postural instability and gait   Gait appears to be overall stable though  is noting some increased instability and patient suffered a fall 4/22 w/ SDH   Patient recently s/p L DBS battery replacement w/ Dr Pa Leon (4/7/21)    Plan:   DBS interrogated for impendence check, update, and battery tracking bilaterally; settings adjusted for increased RUE stiffness  o Right battery SC 2 82  o Settings: 3 4V, PW90, Rate 160 C+1-     o Left battery Percept  95%  o Settings: 2 0V, PW60, Rate 160 C+2- --> changed to 2 4    Continue on current medications  Nuplazid appears to be improving hallucination frequency and severity (per ) though patient notes the hallucinations are still occurring sometimes  Unable to tolerate Sinemet previously  Can continue on current dosing of Azilect (1 mg)    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 ([address] )  Patient's cognitive sx seem to be improving, particularly as her hallucinations decrease  Nuplazid 34 mg qd started at last visit which  believes has significantly helped (frequency of some of the hallucinations has gone down from 8-9 times a day to sometimes once or none per day)   Continue Nuplazid at current dosing      Diagnoses and all orders for this visit:    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 ([address] )    Parkinson's disease ([address] )    Other orders  -     ASPIRIN 81 PO; Take by mouth         Subjective:    Sam aMria is a 67 y o  female w/ PMH psoriatic arthritis, HTN, DM2, PD now s/p b/l STN DBS implantation (2014) who presents today as follow-up for PD  Per chart review, PD present since at least 2009, DBS surgery 11/5/14, ACTIVA SC 11/11/13, R IPG replacement 12/18/18, left battery replacement 4/7/21   Previous medications which did not work or that she could not tolerate include sinemet, amantadine, ropinirole, Requip, mirapex  Current medications:  Rasagiline 1 mg qs  Rivastigmine 4 5 mg bid  Pimvanserin (Nuplazid) 34 mg qhs    Today patient states that she believes everything has stayed about the same but  believes that the gait/balance is worse  She has been more unsteady on her feet, tending to fall mostly to the left or backwards, and often does not realize that she is falling backwards  Aaron Holloway most recently in April with traumatic SDH  No residual sx or HA  Patient's dysarthria, constipation, eating troubles (sometimes choking on food, infrequent), urinary incontinence and sleep disruption appear to be stable at this time  Hallucinations appear to be improving significantly - although patient notes that she does not think the medication is helping because she still has pretty vivid hallucinations at times, her  notes that some she used to have are less frequent or gone completely  The hallucinations have been ongoing for at least a year, but now the frequency is down for some of the types of hallucinations from 8-9x/day down to once or even none a day  Denies N/V/F/C, abdominal pain, dizziness, HA, visual changes, new weakness or sensory changes  Reviewed relevant prior notes, prior imaging, additional relevant labs  Discussed Nuplazid, and completed battery check    The following portions of the patient's history were reviewed and updated as appropriate:   She  has a past medical history of Anxiety, Broken hip ([address] ), Depression, Diabetes mellitus ([address] ), Diabetes mellitus type 2, diet-controlled ([address] ), Hyperlipidemia, Hypertension, Parkinson disease ([address] ), and Pneumonia    She   Patient Active Problem List    Diagnosis Date Noted    Depression 04/22/2021    Fall 04/22/2021    Subdural hemorrhage ([address] ) 04/22/2021    Urinary tract infection 04/22/2021    End of battery life of deep brain stimulator 03/29/2021    Other dysphagia 03/18/2021    Hallucination, visual 10/30/2020    Presence of neurostimulator 05/07/2020    REM behavioral disorder 02/06/2020    Generalized edema 12/23/2019    Constipation 12/16/2019    Closed right hip fracture, initial encounter ([facility] ) 12/13/2019    Essential hypertension 12/13/2019    Closed fracture of right hip ([facility] ) 12/13/2019    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 ([place] [address] ) 11/15/2018    Anxiety 09/12/2018    Other hyperlipidemia 09/10/2018    Type 2 diabetes mellitus without complication ([place] [address] ) 40/05/2754    S/P reverse total shoulder arthroplasty, left 09/07/2018    Fracture, shoulder, left, closed, initial encounter 08/22/2018    Type II or unspecified type diabetes mellitus without mention of complication, not stated as uncontrolled 01/17/2014    Parkinson's disease ([place] [address] ) 01/02/2014    Hyperlipidemia 01/02/2014    Hypertension 01/12/2007     She  has a past surgical history that includes Replacement total knee; Achilles tendon surgery; Dental surgery; Deep brain stimulator placement; Tonsillectomy; Reverse total shoulder arthroplasty (Left, 8/23/2018); pr imp stim,cranial,subq,1 array (Right, 12/18/2018); pr open rx femur fx+intramed beatriz (Right, 12/14/2019); Fracture surgery; pr lap,appendectomy (N/A, 8/16/2020); Colonoscopy; Knee arthroscopy; Appendectomy; and pr imp stim,cranial,subq,1 array (Left, 4/7/2021)  Her family history includes Arthritis in her mother; No Known Problems in her father, maternal grandfather, maternal grandmother, paternal grandfather, paternal grandmother, son, and son  She  reports that she has never smoked  She has never used smokeless tobacco  She reports current alcohol use of about 2 0 standard drinks of alcohol per week  She reports that she does not use drugs    Current Outpatient Medications   Medication Sig Dispense Refill    ASPIRIN 81 PO Take by mouth      atenolol (TENORMIN) 50 mg tablet Take 1 tablet (50 mg total) by mouth daily 30 tablet 0    Coenzyme Q10 400 MG CAPS Take 1 capsule (400 mg total) by mouth daily 30 capsule 0    Multiple Vitamins-Minerals (MULTIVITAMIN GUMMIES WOMENS PO) Take by mouth      PARoxetine (PAXIL) 20 mg tablet Take 1 tablet (20 mg total) by mouth daily 30 tablet 0    Pimavanserin Tartrate (Nuplazid) 34 MG CAPS Take 1 capsule by mouth daily at bedtime 30 capsule 8    rasagiline (AZILECT) 1 MG TAKE 1 TABLET BY MOUTH  DAILY 90 tablet 3    rivastigmine (EXELON) 4 5 MG capsule Take 1 capsule (4 5 mg total) by mouth 2 (two) times a day 180 capsule 2    rosuvastatin (CRESTOR) 5 mg tablet Take 1 tablet (5 mg total) by mouth every other day 30 tablet 0    TURMERIC PO Take by mouth daily       acetaminophen (TYLENOL) 325 mg tablet Take 2 tablets (650 mg total) by mouth every 6 (six) hours as needed for mild pain, headaches or fever (Patient not taking: Reported on 9/29/2021) 30 tablet 0    ciprofloxacin (CIPRO) 250 mg tablet Take 250 mg by mouth every 12 (twelve) hours (Patient not taking: Reported on 9/29/2021)      levETIRAcetam (KEPPRA) 500 mg tablet Take 1 tablet (500 mg total) by mouth every 12 (twelve) hours for 11 doses 11 tablet 0     No current facility-administered medications for this visit       Current Outpatient Medications on File Prior to Visit   Medication Sig    ASPIRIN 81 PO Take by mouth    atenolol (TENORMIN) 50 mg tablet Take 1 tablet (50 mg total) by mouth daily    Coenzyme Q10 400 MG CAPS Take 1 capsule (400 mg total) by mouth daily    Multiple Vitamins-Minerals (MULTIVITAMIN GUMMIES WOMENS PO) Take by mouth    PARoxetine (PAXIL) 20 mg tablet Take 1 tablet (20 mg total) by mouth daily    Pimavanserin Tartrate (Nuplazid) 34 MG CAPS Take 1 capsule by mouth daily at bedtime    rasagiline (AZILECT) 1 MG TAKE 1 TABLET BY MOUTH  DAILY    rivastigmine (EXELON) 4 5 MG capsule Take 1 capsule (4 5 mg total) by mouth 2 (two) times a day    rosuvastatin (CRESTOR) 5 mg tablet Take 1 tablet (5 mg total) by mouth every other day    TURMERIC PO Take by mouth daily     acetaminophen (TYLENOL) 325 mg tablet Take 2 tablets (650 mg total) by mouth every 6 (six) hours as needed for mild pain, headaches or fever (Patient not taking: Reported on 9/29/2021)    ciprofloxacin (CIPRO) 250 mg tablet Take 250 mg by mouth every 12 (twelve) hours (Patient not taking: Reported on 9/29/2021)    levETIRAcetam (KEPPRA) 500 mg tablet Take 1 tablet (500 mg total) by mouth every 12 (twelve) hours for 11 doses     No current facility-administered medications on file prior to visit  She is allergic to sulfa antibiotics        Objective:    Blood pressure 110/58, pulse 69, weight 101 kg (222 lb)  Physical Exam  Vitals reviewed  Constitutional:       General: not in acute distress  Appearance: Normal appearance  Not ill-appearing, toxic-appearing or diaphoretic  HENT:      Head: Normocephalic and atraumatic  Right Ear: External ear normal     Left Ear: External ear normal       Nose: Nose normal  No congestion or rhinorrhea  Mouth/Throat:    Mouth: Mucous membranes are moist     Pharynx: Oropharynx is clear  No oropharyngeal exudate or posterior oropharyngeal erythema  Eyes:      General: No scleral icterus  Right eye: No discharge  Left eye: No discharge  Conjunctiva/sclera: Conjunctivae normal    Pulmonary:      Effort: Pulmonary effort is normal  No respiratory distress  Skin:     Coloration: Skin is not pale  Psychiatric:         Mood and Affect: Mood normal          Behavior: Behavior normal    Affect mildly flat    Neurological Exam  Mental Status   Level of consciousness: alert     Oriented to person and place, and month/year/president but not date  Attention: normal  Speech: speech is slow and mildly dysarthric   Able to name object  Able to repeat  Cranial Nerves   CN II Visual fields full to confrontation     CN III, IV, VI PERRL, brisk reactivity, intact consensual response b/l, EOMI, no CN III palsy, no CN VI palsy  no nystagmus   CN V   Facial sensation intact  CN VII  Facial expression full, symmetric  CN VIII Hearing: intact and roughly symmetric  CN IX, X Palate: symmetric  CN XI trapezius strength decreased on right subtle  CN XII normal      Motor Exam   Overall bradykinetic  Muscle bulk: normal    Overall muscle tone: mildly increased on R  Pronator drift: absent b/l  Strength intact and symmetric BUE/BLE (possible slight right-sided deficits but may be limited by increased tone)    Sensory Exam   Light touch normal  Vibration normal  Temperature intact and symmetric     Gait, Coordination, and Reflexes   Coordination: FTN w/ mild tremor and incoordination commensurate with this b/l  Tremor: Resting tremor: absent Action tremor: mild, low amplitude, mid frequency  Reflexes: Brachioradialis: 2+ b/l    Biceps: 2+ b/l    Patellar: blunted  Gait: normal to slightly widened stance, no arm swing to assess w/ walker, shortened stride, multiple step turns, improved s/p device adjustments    ROS:    Review of Systems   Constitutional: Negative  Negative for appetite change and fever  HENT: Positive for voice change (does not talk loud)  Negative for hearing loss, tinnitus and trouble swallowing  Eyes: Negative  Negative for photophobia and pain  Respiratory: Positive for choking (at times)  Negative for shortness of breath  Cardiovascular: Negative  Negative for palpitations  Gastrointestinal: Negative  Negative for nausea and vomiting  Endocrine: Negative  Negative for cold intolerance  Genitourinary: Negative  Negative for dysuria, frequency and urgency  Musculoskeletal: Positive for gait problem  Negative for myalgias and neck pain  Balance issues     Skin: Negative  Negative for rash  Allergic/Immunologic: Negative  Neurological: Positive for speech difficulty (slurred speech a little bit)  Negative for dizziness, tremors, seizures, syncope, facial asymmetry, weakness, light-headedness, numbness and headaches  Hematological: Negative  Does not bruise/bleed easily  Psychiatric/Behavioral: Positive for confusion and hallucinations  Negative for sleep disturbance  All other systems reviewed and are negative

## 2021-09-29 NOTE — ASSESSMENT & PLAN NOTE
Parkinson's complicated by postural instability and gait   Gait appears to be overall stable though  is noting some increased instability and patient suffered a fall 4/22 w/ SDH   Patient recently s/p L DBS battery replacement w/ Dr Rudy Farias (4/7/21)    Plan:   DBS interrogated for impendence check, update, and battery tracking bilaterally; settings adjusted for increased RUE stiffness  o Right battery SC 2 82  o Settings: 3 4V, PW90, Rate 160 C+1-     o Left battery Percept  95%  o Settings: 2 0V, PW60, Rate 160 C+2- --> changed to 2 4    Continue on current medications  Nuplazid appears to be improving hallucination frequency and severity (per ) though patient notes the hallucinations are still occurring sometimes  Unable to tolerate Sinemet previously   Can continue on current dosing of Azilect (1 mg)

## 2021-11-19 DIAGNOSIS — G20 COGNITIVE DEFICIT DUE TO PARKINSON'S DISEASE (HCC): ICD-10-CM

## 2021-11-22 RX ORDER — RIVASTIGMINE TARTRATE 4.5 MG/1
CAPSULE ORAL
Qty: 180 CAPSULE | Refills: 3 | Status: SHIPPED | OUTPATIENT
Start: 2021-11-22 | End: 2022-06-20 | Stop reason: SDUPTHER

## 2022-01-13 ENCOUNTER — TELEPHONE (OUTPATIENT)
Dept: NEUROLOGY | Facility: CLINIC | Age: 77
End: 2022-01-13

## 2022-01-13 DIAGNOSIS — R44.3 HALLUCINATIONS: ICD-10-CM

## 2022-01-13 RX ORDER — PIMAVANSERIN TARTRATE 34 MG/1
CAPSULE ORAL
Qty: 30 CAPSULE | Refills: 0 | Status: SHIPPED | OUTPATIENT
Start: 2022-01-13 | End: 2022-01-17

## 2022-01-13 NOTE — TELEPHONE ENCOUNTER
Received fax from [facility] for Nuplazid [facility]  Submitted to plan, received this message:    " This medication or product was previously approved on [id number] from 2022-01-01 to 2022-12-31  Please note: Formulary lowering, tiering exception, cost reduction and/or pre-benefit determination review (including prospective Medicare hospice reviews) requests cannot be requested using this method of submission  Please contact us at [phone] instead "    Called [facility]  They confirm last shipped medication 12/23  They also confirm script received today, will start process

## 2022-01-15 DIAGNOSIS — R44.3 HALLUCINATIONS: ICD-10-CM

## 2022-01-17 RX ORDER — PIMAVANSERIN TARTRATE 34 MG/1
CAPSULE ORAL
Qty: 30 CAPSULE | Refills: 0 | Status: SHIPPED | OUTPATIENT
Start: 2022-01-17 | End: 2022-02-11 | Stop reason: SDUPTHER

## 2022-01-27 ENCOUNTER — HOSPITAL ENCOUNTER (EMERGENCY)
Facility: HOSPITAL | Age: 77
Discharge: HOME | End: 2022-01-27
Attending: EMERGENCY MEDICINE | Admitting: EMERGENCY MEDICINE
Payer: MEDICARE

## 2022-01-27 ENCOUNTER — APPOINTMENT (EMERGENCY)
Dept: CT IMAGING | Facility: HOSPITAL | Age: 77
End: 2022-01-27
Payer: MEDICARE

## 2022-01-27 ENCOUNTER — APPOINTMENT (EMERGENCY)
Dept: RADIOLOGY | Facility: HOSPITAL | Age: 77
End: 2022-01-27
Payer: MEDICARE

## 2022-01-27 VITALS
SYSTOLIC BLOOD PRESSURE: 130 MMHG | OXYGEN SATURATION: 95 % | TEMPERATURE: 98 F | HEART RATE: 64 BPM | RESPIRATION RATE: 20 BRPM | DIASTOLIC BLOOD PRESSURE: 67 MMHG

## 2022-01-27 DIAGNOSIS — S09.90XA INJURY OF HEAD, INITIAL ENCOUNTER: ICD-10-CM

## 2022-01-27 DIAGNOSIS — S01.81XA FACIAL LACERATION, INITIAL ENCOUNTER: ICD-10-CM
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
## 2022-01-27 PROCEDURE — 99284 EMERGENCY DEPT VISIT MOD MDM: CPT

## 2022-01-27 PROCEDURE — 73502 X-RAY EXAM HIP UNI 2-3 VIEWS: CPT

## 2022-01-27 PROCEDURE — 70450 CT HEAD/BRAIN W/O DYE: CPT

## 2022-01-27 PROCEDURE — 73552 X-RAY EXAM OF FEMUR 2/>: CPT

## 2022-01-27 PROCEDURE — G1004 CDSM NDSC: HCPCS

## 2022-01-27 PROCEDURE — 73564 X-RAY EXAM KNEE 4 OR MORE: CPT

## 2022-01-27 PROCEDURE — 72125 CT NECK SPINE W/O DYE: CPT

## 2022-01-27 PROCEDURE — 12011 RPR F/E/E/N/L/M 2.5 CM/<: CPT | Performed by: PHYSICIAN ASSISTANT

## 2022-01-27 PROCEDURE — 99284 EMERGENCY DEPT VISIT MOD MDM: CPT | Performed by: PHYSICIAN ASSISTANT

## 2022-01-28 NOTE — DISCHARGE INSTRUCTIONS
You were seen in the emergency department today for fall with head injury  Radiologic imaging did not reveal any acute abnormalities  Please follow-up with your primary care physician regarding your symptoms  Please return to the emergency department with any worsening symptoms including but not limited to: fevers, dizziness, chest pain, shortness of breath, palpitations, loss of consciousness, abdominal pain, nausea, vomiting, diarrhea, or lower extremity changes  Keep wound clean and dry, monitor for sings of discharge, redness or streaking   Steri strips will fall off as wound heals

## 2022-01-28 NOTE — ED PROVIDER NOTES
History  Chief Complaint   Patient presents with    Fall     pt  reprots fell tonight after losing her balance  pt  hx of parkinsons  pt  denies thinners  pt  trish LOC  pt  reports headache  pt  deneis neck/back pain  pt  alert and oriented  UTD on tetnoemyus     Michaelas Jigna is a 67 y o   female with PMH of Parkinson's, HTN, HLD, DM 2 Diet controlled  who presents to the emergency department after a fall  The patient states that she was getting up from her chair to ambulate to the window to fix a drape when she lost her balance and struck her head on the wall/window sill  She states she did brace her fall with her left hand/arm  She states her disbalance is chronic for her due to Parkinson's  It does not worsen normal this evening  She is compliant with her Parkinson's medications  She states she feels as though her deep brain stimulator still working without any changes in his status  She states her primary care doctor did take her off her daily aspirin since her last fall  She denies any dizziness, lightheadedness, chest pain, shortness of breath, palpitations, abdominal pain, nausea, vomiting, fevers, chills, urinary symptoms prior to the fall or after the fall  She states currently she has some mild pain to the right side of her head  She denies loss consciousness or neck or back pain  She has ambulated since the fall  She normally ambulates at home with the assistance of a walker  She does feel comfortable being discharged back home was on his scans are okay   is also bedside with sister at home who states she has also at her baseline    No medications prior to arrival               History provided by:  Patient   used: No    Fall  Mechanism of injury: fall    Injury location:  Head/neck  Head/neck injury location:  Head  Incident location:  Home  Time since incident:  1 hour  Arrived directly from scene: yes    Fall:     Fall occurred:  Standing    Impact surface:  Hard floor and wall    Point of impact:  Head    Entrapped after fall: no    Tetanus status:  Up to date  Prior to arrival data:     Bystander interventions:  None    Blood loss:  None    Responsiveness at scene:  Alert    Orientation at scene:  Person, place, situation and time    Loss of consciousness: no      Amnesic to event: no      Immobilization:  None  Current pain details:     Pain quality:  Aching    Pain Severity:  Mild    Pain timing:  Constant  Associated symptoms: no abdominal pain, no back pain, no blindness, no chest pain, no difficulty breathing, no headaches, no hearing loss, no loss of consciousness, no nausea, no neck pain, no seizures and no vomiting    Risk factors: beta blocker therapy    Risk factors: no anticoagulation therapy    Risk factors comment:  Parkinson's      Prior to Admission Medications   Prescriptions Last Dose Informant Patient Reported? Taking?    ASPIRIN 81 PO   Yes No   Sig: Take by mouth   Coenzyme Q10 400 MG CAPS  Self No No   Sig: Take 1 capsule (400 mg total) by mouth daily   Multiple Vitamins-Minerals (MULTIVITAMIN GUMMIES WOMENS PO)   Yes No   Sig: Take by mouth   Nuplazid 34 MG CAPS   No No   Sig: TAKE 1 CAPSULE BY MOUTH ONCE DAILY   PARoxetine (PAXIL) 20 mg tablet  Self No No   Sig: Take 1 tablet (20 mg total) by mouth daily   TURMERIC PO   Yes No   Sig: Take by mouth daily    acetaminophen (TYLENOL) 325 mg tablet   No No   Sig: Take 2 tablets (650 mg total) by mouth every 6 (six) hours as needed for mild pain, headaches or fever   atenolol (TENORMIN) 50 mg tablet  Self No No   Sig: Take 1 tablet (50 mg total) by mouth daily   ciprofloxacin (CIPRO) 250 mg tablet   Yes No   Sig: Take 250 mg by mouth every 12 (twelve) hours   Patient not taking: Reported on 9/29/2021   levETIRAcetam (KEPPRA) 500 mg tablet   No No   Sig: Take 1 tablet (500 mg total) by mouth every 12 (twelve) hours for 11 doses   rasagiline (AZILECT) 1 MG   No No   Sig: TAKE 1 TABLET BY MOUTH  DAILY rivastigmine (EXELON) 4 5 MG capsule   No No   Sig: TAKE 1 CAPSULE BY MOUTH  TWICE DAILY   rosuvastatin (CRESTOR) 5 mg tablet  Self No No   Sig: Take 1 tablet (5 mg total) by mouth every other day      Facility-Administered Medications: None       Past Medical History:   Diagnosis Date    Anxiety     Broken hip ([facility] )     Depression     Diabetes mellitus ([place] [address] )     pre diabetic    Diabetes mellitus type 2, diet-controlled ([place] [address] )     Hyperlipidemia     Hypertension     Parkinson disease ([place] [address] )     Pneumonia        Past Surgical History:   Procedure Laterality Date    ACHILLES TENDON SURGERY      APPENDECTOMY      COLONOSCOPY      DEEP BRAIN STIMULATOR PLACEMENT      DENTAL SURGERY      FRACTURE SURGERY      KNEE ARTHROSCOPY      CT IMP STIM,CRANIAL,SUBQ,1 ARRAY Right 12/18/2018    Procedure: REPLACEMENT IMPLANTABLE PULSE GENERATOR (IPG) DEEP BRAIN STIMULATION (DBS); Surgeon: Gavin Farris MD;  Location:  MAIN OR;  Service: Neurosurgery    CT IMP STIM,CRANIAL,SUBQ,1 ARRAY Left 4/7/2021    Procedure: REPLACEMENT IMPLANTABLE PULSE GENERATOR FOR DEEP BRAIN STIMULATOR, LEFT CHEST;  Surgeon: Gavin Farris MD;  Location: [facility];  Service: Neurosurgery    CT LAP,APPENDECTOMY N/A 8/16/2020    Procedure: Luc Yates;  Surgeon: Gardiner Sicard, MD;  [facility];  Service: General    CT OPEN RX FEMUR FX+INTRAMED ERIKA Right 12/14/2019    Procedure: INSERTION NAIL IM FEMUR ANTEGRADE (TROCHANTERIC);   Surgeon: Renetta Antonio DO;  [facility];  Service: Orthopedics    REPLACEMENT TOTAL KNEE      REVERSE TOTAL SHOULDER ARTHROPLASTY Left 8/23/2018    Procedure: ARTHROPLASTY SHOULDER REVERSE;  Surgeon: Zoraida Dia MD;  [facility];  Service: Orthopedics    TONSILLECTOMY         Family History   Problem Relation Age of Onset    Arthritis Mother     No Known Problems Father     No Known Problems Maternal Grandmother     No Known Problems Maternal Grandfather     No Known Problems Paternal Grandmother     No Known Problems Paternal Grandfather     No Known Problems Son     No Known Problems Son      I have reviewed and agree with the history as documented  E-Cigarette/Vaping    E-Cigarette Use Never User      E-Cigarette/Vaping Substances    Nicotine No     THC No     CBD No     Flavoring No     Other No     Unknown No      Social History     Tobacco Use    Smoking status: Never Smoker    Smokeless tobacco: Never Used   Vaping Use    Vaping Use: Never used   Substance Use Topics    Alcohol use: Yes     Alcohol/week: 2 0 standard drinks     Types: 2 Glasses of wine per week     Comment: occasional    Drug use: No       Review of Systems   Constitutional: Negative for activity change, appetite change, chills, diaphoresis, fever and unexpected weight change  HENT: Negative for congestion, dental problem, ear pain, hearing loss, mouth sores, nosebleeds, sinus pressure, sinus pain, sneezing, sore throat and trouble swallowing  Eyes: Negative for blindness  Respiratory: Negative for apnea, cough, choking, chest tightness, shortness of breath, wheezing and stridor  Cardiovascular: Negative for chest pain, palpitations and leg swelling  Gastrointestinal: Negative for abdominal pain, constipation, diarrhea, nausea and vomiting  Genitourinary: Negative for dysuria, flank pain, frequency and urgency  Musculoskeletal: Positive for arthralgias and gait problem  Negative for back pain, joint swelling, myalgias and neck pain  Skin: Positive for wound  Negative for color change, pallor and rash  Neurological: Negative for dizziness, tremors, seizures, loss of consciousness, syncope, speech difficulty, weakness, light-headedness, numbness and headaches  All other systems reviewed and are negative  Physical Exam  Physical Exam  Vitals and nursing note reviewed  Constitutional:       General: She is not in acute distress       Appearance: Normal appearance  She is normal weight  She is not ill-appearing or toxic-appearing  Comments: Patient alert oriented, well-appearing  Wound noted to the right periocular area  Bleeding controlled  HENT:      Head: Normocephalic and atraumatic  Comments: Negative raccoon's or larry sign  Small laceration/abrasion to the right periocular area      Right Ear: Tympanic membrane, ear canal and external ear normal       Left Ear: Tympanic membrane, ear canal and external ear normal       Ears:      Comments: No hemotympanum     Nose: Nose normal  No congestion or rhinorrhea  Comments: No septal hematomas or deformities noted     Mouth/Throat:      Mouth: Mucous membranes are moist       Pharynx: Oropharynx is clear  Eyes:      General:         Right eye: No discharge  Left eye: No discharge  Extraocular Movements: Extraocular movements intact  Conjunctiva/sclera: Conjunctivae normal       Pupils: Pupils are equal, round, and reactive to light  Comments: Vision is grossly intact  Extraocular movements are intact without pain  There is no conjunctival injection  Pupils are equal round reactive to light  Cardiovascular:      Rate and Rhythm: Normal rate and regular rhythm  Pulses: Normal pulses  Heart sounds: Normal heart sounds  No murmur heard  No friction rub  No gallop  Comments: No chest tenderness  No bruising noted on the chest wall  Lung sounds are clear bilaterally  Pulmonary:      Effort: Pulmonary effort is normal  No respiratory distress  Breath sounds: Normal breath sounds  No stridor  No wheezing, rhonchi or rales  Abdominal:      General: Abdomen is flat  Bowel sounds are normal  There is no distension  Palpations: Abdomen is soft  There is no mass  Tenderness: There is no abdominal tenderness  There is no guarding or rebound  Hernia: No hernia is present  Comments: Negative [place]'s or Jacome Arguelles sign  Musculoskeletal:         General: No swelling, tenderness, deformity or signs of injury  Normal range of motion  Cervical back: Normal range of motion  No rigidity or tenderness  Right lower leg: No edema  Left lower leg: No edema  Comments: C, T, L-spine without step-offs or deformities or tenderness  Bilateral upper extremities:  No obvious deformity/abrasions/lacerations  2+ radial Pulse/ Cap Refill <2 seconds  Shoulder: NTTP, Strength 5/5- FROM including ER/IR/Abduction/Adduction/Flexion/Extension  Elbow: NTTP, Strength 5/5- FROM including Flexion/Extension/ Pronation/supination  Wrist: NTTP, Strength 5/5- FROM including Flexion/Extension/Ulnar Deviation/Radial Deviation  Hand: NTTP: Strength 5/5- FROM at all fingers including flexion, extension, abduction, adduction, and opposition of the thumb  FDS/FDP tendons intact by exam, Extensor tendons intact by exam    Radial/Ulnar/ Median/ and Axillary sensation intact  Left lower extremity:  No obvious deformity/abrasions/lacerations  2+ femoral/DP/PT/ Cap Refill <2 seconds  Hip:  Tender palpation over the greater trochanter area  No contusions, hematomas in the area  Strength 5/5- FROM including Abduction/Adduction/Flexion/Extension  Knee:  Abrasion noted to the patellar surface  Otherwise nontender to palpation  , Strength 5/5- FROM including Flexion/Extension- no apparent laxity  Ankle: NTTP, Strength 5/5- FROM including Flexion/Extension/Inversion/Eversion  Foot : NTTP: Strength 5/5- FROM at all toes including flexion, extension, abduction, adduction  Sensation intact over all joints and foot  Right lower extremity:  No obvious deformity/abrasions/lacerations   2+ femoral/DP/PT/ Cap Refill <2 seconds  Hip: NTTP, Strength 5/5- FROM including Abduction/Adduction/Flexion/Extension  Knee: NTTP, Strength 5/5- FROM including Flexion/Extension- no apparent laxity  Ankle: NTTP, Strength 5/5- FROM including Flexion/Extension/Inversion/Eversion  Foot : NTTP: Strength 5/5- FROM at all toes including flexion, extension, abduction, adduction  Sensation intact over all joints and foot  Skin:     General: Skin is warm and dry  Capillary Refill: Capillary refill takes less than 2 seconds  Comments: Small laceration/abrasion to the right periocular area  Bleeding controlled  Neurological:      General: No focal deficit present  Mental Status: She is alert and oriented to person, place, and time  Mental status is at baseline  Cranial Nerves: No cranial nerve deficit  Sensory: No sensory deficit  Motor: No weakness  Coordination: Coordination normal       Comments: GCS 15  CN 2-12 intact  PERRLA  Bilateral upper and lower extremities have 5/5 strength and sensation is intact  Finger to Nose and Heel to shin are intact  Speech normal            Vital Signs  ED Triage Vitals   Temperature Pulse Respirations Blood Pressure SpO2   01/27/22 1852 01/27/22 1851 01/27/22 1851 01/27/22 1851 01/27/22 1851   98 °F (36 7 °C) 64 20 130/67 95 %      Temp Source Heart Rate Source Patient Position - Orthostatic VS BP Location FiO2 (%)   01/27/22 1852 01/27/22 1851 01/27/22 1851 01/27/22 1851 --   Oral Monitor Sitting Right arm       Pain Score       01/27/22 1851       4           Vitals:    01/27/22 1851   BP: 130/67   Pulse: 64   Patient Position - Orthostatic VS: Sitting         Visual Acuity      ED Medications  Medications - No data to display    Diagnostic Studies  Results Reviewed     None                 CT head without contrast   Final Result by Logan Bustamante MD (01/27 2022)      No acute intracranial abnormality  Workstation performed: [id number]         CT cervical spine without contrast   Final Result by Logan Bustamante MD (01/27 2028)      No cervical spine fracture or traumatic malalignment                     Workstation performed: [id number]         XR hip/pelv 2-3 vws left   ED Interpretation by Natividad Land PA-C (01/27 1955)   No acute fracture/dislocation as visualized by me      XR femur 2 views LEFT   ED Interpretation by Natividad Land PA-C (01/27 1956)   No acute fracture/dislocation as visualized by me      XR knee 4+ views left injury   ED Interpretation by Natividad Land PA-C (01/27 1956)   No acute fracture/dislocation as visualized by me- hardware appears to be in place                 Procedures  Laceration repair    Date/Time: 1/27/2022 7:38 PM  Performed by: Natividad Land PA-C  Authorized by: Natividad Land PA-C   Consent: Verbal consent obtained  Risks and benefits: risks, benefits and alternatives were discussed  Consent given by: patient  Patient understanding: patient states understanding of the procedure being performed  Patient consent: the patient's understanding of the procedure matches consent given  Site marked: the operative site was marked  Radiology Images displayed and confirmed  If images not available, report reviewed: imaging studies available  Required items: required blood products, implants, devices, and special equipment available  Patient identity confirmed: verbally with patient, arm band and provided demographic data  Time out: Immediately prior to procedure a "time out" was called to verify the correct patient, procedure, equipment, support staff and site/side marked as required    Body area: head/neck  Location details: right eyebrow  Laceration length: 1 cm  Tendon involvement: none  Nerve involvement: none  Vascular damage: no    Wound Dehiscence:  Superficial Wound Dehiscence: simple closure      Procedure Details:  Irrigation solution: saline  Debridement: none  Degree of undermining: none  Skin closure: Steri-Strips  Approximation: close  Approximation difficulty: simple  Patient tolerance: patient tolerated the procedure well with no immediate complications               ED Course  ED Course as of 01/27/22 2125   Thu Jan 27, 2022   1906 Patient seen and examined  Presents after fall at home  Patient has baseline difficulties with balance department since  Not worse than normal   Compliant with Parkinson's meds  States brain deep brain stimulator is been acting normally for her  States she tripped getting up and walking to the window  Adalid Enter and struck the right lateral side of her face  No loss of consciousness no neck or back pain  She no longer is taking her daily aspirin  On exam found to have left hip tenderness as well  Will obtain imaging  Tetanus is up-to-date  Wound repaired with Steri-Strips   2024 CT head without contrast  No acute intracranial abnormality   2029 CT cervical spine without contrast  No traumatic fracture    2029 Ambulated with walker  Comfortable with discharge  MDM  Number of Diagnoses or Management Options  Facial laceration, initial encounter: new and requires workup  Fall, initial encounter: new and requires workup  Injury of head, initial encounter: new and requires workup  Diagnosis management comments: Patient was seen and examined  in the emergency department for chief complaint of fall  The patient presented approximately 1 hour after losing her balance at home and falling striking the right side of her head and bracing herself with her left arm  Disbalance is chronic for her due to her Parkinson's, not worse than normal, compliant with medications, deep brain stimulator is functioning without difficulty per patient  Not taking any anticoagulants including aspirin, apixaban, rivaroxaban or warfarin  No loss conscious no head neck or back pain  Complains of pain over right periorbital area where there is a small laceration/abrasion which requires Steri-Strips  Otherwise neuro intact  Lungs are clear  No traumatic injuries to the chest identified  Abdomen soft nontender nondistended    Regular rate rhythm, no murmurs rubs or gallops  No traumatic injuries identified to the abdomen  Patient has some tenderness over the greater trochanter of the left hip  Noted abrasion over the left knee  Otherwise neurovascularly intact in full range of motion  DDx including but not limited to: intracranial injury, concussion, cervical injury,  extremity injury--fracture, dislocation, strain, sprain, contusion  Low suspicion for intrathoracic or intra-abdominal injury based on mechanism of injury as well as lack of complaints and benign physical exam        Workup:  Obtain CT head and CT cervical spine as well as x-rays of the hip and femur and knee  No acute fracture by my interpretation on x-ray  No acute abnormality on CT head or c-spine  Ambulated prior to discharge without difficulty  Disposition:General impression is 67year old female who presents after fall with facial laceration requiring steri strips  No other traumatic injuries identified and scans were negative  Ambulated without difficulty,  bedside feels comfortable taking her home  The patient was discharged in stable condition  Patient ambulated off the department with assistance of walker-baseline  Extensive return to emergency department precautions were discussed  Follow up with appropriate providers including primary care physician was discussed  Patient and/or their  primary decision maker expressed understanding  Patient remained stable during entire emergency department stay           Amount and/or Complexity of Data Reviewed  Tests in the radiology section of CPT®: ordered and reviewed  Review and summarize past medical records: yes  Independent visualization of images, tracings, or specimens: yes    Risk of Complications, Morbidity, and/or Mortality  Presenting problems: moderate  Diagnostic procedures: low  Management options: low    Patient Progress  Patient progress: stable      Disposition  Final diagnoses: Fall, initial encounter   Injury of head, initial encounter   Facial laceration, initial encounter     Time reflects when diagnosis was documented in both MDM as applicable and the Disposition within this note     Time User Action Codes Description Comment    1/27/2022  7:38 PM Shi Wes Add [I70  [id number]] Fall, initial encounter     1/27/2022  7:38 PM Shi Peach Add [[id number]] Injury of head, initial encounter     1/27/2022  7:38 PM Shi Wes Add [[id number]] Facial laceration, initial encounter       ED Disposition     ED Disposition Condition Date/Time Comment    Discharge Stable u Jan 27, 2022  8:30 PM Elbert Orr discharge to home/self care              Follow-up Information     Follow up With Specialties Details Why [address] [facility] Emergency Department Emergency Medicine Go to  As needed, If symptoms worsen [place] [id number]  [address] [facility] Emergency Department, [address] [place] [address] 200  Call  To schedule an appointment with a primary care physician [phone]       Elia Gilford, MD Family Medicine Schedule an appointment as soon as possible for a visit   [address] [facility]  [address] [place] [address] 350 [facility]             Discharge Medication List as of 1/27/2022  8:30 PM      CONTINUE these medications which have NOT CHANGED    Details   acetaminophen (TYLENOL) 325 mg tablet Take 2 tablets (650 mg total) by mouth every 6 (six) hours as needed for mild pain, headaches or fever, Starting Mon 8/17/2020, Normal      ASPIRIN 81 PO Take by mouth, Historical Med      atenolol (TENORMIN) 50 mg tablet Take 1 tablet (50 mg total) by mouth daily, Starting Fri 9/21/2018, Print      ciprofloxacin (CIPRO) 250 mg tablet Take 250 mg by mouth every 12 (twelve) hours, Historical Med      Coenzyme Q10 400 MG CAPS Take 1 capsule (400 mg total) by mouth daily, Starting Fri 9/21/2018, Print      levETIRAcetam (KEPPRA) 500 mg tablet Take 1 tablet (500 mg total) by mouth every 12 (twelve) hours for 11 doses, Starting Fri 4/23/2021, Until Thu 4/29/2021, Normal      Multiple Vitamins-Minerals (MULTIVITAMIN GUMMIES WOMENS PO) Take by mouth, Historical Med      Nuplazid 34 MG CAPS TAKE 1 CAPSULE BY MOUTH ONCE DAILY, Normal      PARoxetine (PAXIL) 20 mg tablet Take 1 tablet (20 mg total) by mouth daily, Starting Sat 9/22/2018, Print      rasagiline (AZILECT) 1 MG TAKE 1 TABLET BY MOUTH  DAILY, Normal      rivastigmine (EXELON) 4 5 MG capsule TAKE 1 CAPSULE BY MOUTH  TWICE DAILY, Normal      rosuvastatin (CRESTOR) 5 mg tablet Take 1 tablet (5 mg total) by mouth every other day, Starting Sat 9/22/2018, Print      TURMERIC PO Take by mouth daily , Historical Med             No discharge procedures on file      PDMP Review       Value Time User    PDMP Reviewed  Yes 4/23/2021 12:24 PM Rodríguez Gomez PA-C          ED Provider  Electronically Signed by           Leonidas Lyman PA-C  01/27/22 4273

## 2022-02-11 DIAGNOSIS — R44.3 HALLUCINATIONS: ICD-10-CM

## 2022-02-11 RX ORDER — PIMAVANSERIN TARTRATE 34 MG/1
CAPSULE ORAL
Qty: 30 CAPSULE | Refills: 0 | Status: SHIPPED | OUTPATIENT
Start: 2022-02-11 | End: 2022-03-11

## 2022-02-18 ENCOUNTER — HOSPITAL ENCOUNTER (OUTPATIENT)
Dept: MAMMOGRAPHY | Facility: CLINIC | Age: 77
Discharge: HOME | End: 2022-02-18
Payer: MEDICARE

## 2022-02-18 VITALS — WEIGHT: 222 LBS | HEIGHT: 67 IN | BODY MASS INDEX: 34.84 KG/M2

## 2022-02-18 DIAGNOSIS — Z12.31 SCREENING MAMMOGRAM FOR HIGH-RISK PATIENT: ICD-10-CM

## 2022-02-18 PROCEDURE — 77067 SCR MAMMO BI INCL CAD: CPT

## 2022-02-18 PROCEDURE — 77063 BREAST TOMOSYNTHESIS BI: CPT

## 2022-03-11 DIAGNOSIS — R44.3 HALLUCINATIONS: ICD-10-CM

## 2022-03-11 RX ORDER — PIMAVANSERIN TARTRATE 34 MG/1
CAPSULE ORAL
Qty: 30 CAPSULE | Refills: 0 | Status: SHIPPED | OUTPATIENT
Start: 2022-03-11 | End: 2022-04-12

## 2022-04-12 DIAGNOSIS — R44.3 HALLUCINATIONS: ICD-10-CM

## 2022-04-12 RX ORDER — PIMAVANSERIN TARTRATE 34 MG/1
CAPSULE ORAL
Qty: 30 CAPSULE | Refills: 0 | Status: SHIPPED | OUTPATIENT
Start: 2022-04-12 | End: 2022-05-11

## 2022-04-27 ENCOUNTER — PROCEDURE VISIT (OUTPATIENT)
Dept: NEUROLOGY | Facility: CLINIC | Age: 77
End: 2022-04-27
Payer: MEDICARE

## 2022-04-27 VITALS
BODY MASS INDEX: 35.36 KG/M2 | HEIGHT: 67 IN | DIASTOLIC BLOOD PRESSURE: 71 MMHG | WEIGHT: 225.3 LBS | TEMPERATURE: 98.6 F | HEART RATE: 62 BPM | SYSTOLIC BLOOD PRESSURE: 120 MMHG

## 2022-04-27 DIAGNOSIS — Z96.89 S/P DEEP BRAIN STIMULATOR PLACEMENT: ICD-10-CM

## 2022-04-27 DIAGNOSIS — G47.52 REM BEHAVIORAL DISORDER: ICD-10-CM

## 2022-04-27 DIAGNOSIS — G20 PARKINSON'S DISEASE (HCC): Primary | ICD-10-CM

## 2022-04-27 PROCEDURE — 95970 ALYS NPGT W/O PRGRMG: CPT | Performed by: PSYCHIATRY & NEUROLOGY

## 2022-04-27 PROCEDURE — 99214 OFFICE O/P EST MOD 30 MIN: CPT | Performed by: PSYCHIATRY & NEUROLOGY

## 2022-04-27 RX ORDER — AMPICILLIN TRIHYDRATE 250 MG
1000 CAPSULE ORAL 2 TIMES DAILY
Status: ON HOLD | COMMUNITY
End: 2022-06-27 | Stop reason: ALTCHOICE

## 2022-04-27 NOTE — PROGRESS NOTES
Patient ID: Kamaljit Valle is a 67 y o  female  Assessment/Plan:    Parkinson's disease ([facility] [address] )  Parkinson's complicated by postural instability and gait, with worsening balance lately   Gait appears to be overall stable though  is noting some increased instability and patient suffered a fall 4/22 w/ SDH   Patient recently s/p L DBS battery replacement w/ Dr Carter Cooper (4/7/21)      DBS interrogated   o Right battery SC 2 62 (requires neurosurgery referral to replace)  o Settings: 3 4V, PW90, Rate 160 C+1     o Left battery Percept  90% (9 years 5 months)  o Settings: 2 4V, PW60, Rate 160 C+2    Plan:   Continue on current regimen  Nuplazid appears to be improving hallucination frequency and severity (per ) though patient notes the hallucinations are still occurring sometimes (she sees 2 dogs but is aware they are hallucinations)  Unable to tolerate Sinemet previously and also did not tolerate Azilect   Refer to neurosurgery to replace R battery with precept similar to L battery which was replaced last year   Return 4 weeks after IPG replacement           Diagnoses and all orders for this visit:    Parkinson's disease [facility])  -     Ambulatory Referral to Neurosurgery; Future    S/P deep brain stimulator placement  -     Ambulatory Referral to Neurosurgery; Future    REM behavioral disorder    Other orders  -     Omega-3 Fatty Acids (OMEGA-3 FISH OIL PO); take 2 daily  -     Cinnamon 500 MG capsule; Take 1,000 mg by mouth 2 (two) times a day           Subjective:    Kamaljit Valle is a 67 y o  female w/ PMH psoriatic arthritis, HTN, DM2, PD now s/p b/l STN DBS implantation (2014) who presents today as follow-up for PD  Per chart review, PD present since at least 2009, DBS surgery 11/5/14, ACTIVA SC 11/11/13, R IPG replacement 12/18/18, left battery replacement 4/7/21  Previous medications which did not work or that she could not tolerate include sinemet, amantadine, ropinirole, Requip, mirapex     Current medications:  Rivastigmine 4 5 mg bid  Pimvanserin (Nuplazid) 34 mg qhs    Today patient states that she thinks everything has been pretty much stable with exception to 1 concern that her  brings up about her sleeping  Since her last visit here she is now not staying asleep although can fall asleep easily  He had been attempting to give her melatonin at times but not consistently and was not sure of the dose  She also has continued on Nuplazid since her last visit here which has reduced the frequency her hallucinations and they are now easy for her to determine that they are hallucinations  DBS was also interrogated today no changes made  Noted that right battery now needs a replacement and have sent referral for IPG replacement to Neurosurgery  The following portions of the patient's history were reviewed and updated as appropriate: allergies, current medications, past family history, past medical history, past social history, past surgical history and problem list          Objective:    Blood pressure 120/71, pulse 62, temperature 98 6 °F (37 °C), height 5' 7" (1 702 m), weight 102 kg (225 lb 4 8 oz)  Physical Exam  Eyes:      Extraocular Movements: Extraocular movements intact  Pupils: Pupils are equal, round, and reactive to light  Neurological:      Mental Status: She is alert  Deep Tendon Reflexes: Strength normal          Neurological Exam  Mental Status  Alert  Oriented only to person, place and situation  Speech: hypophonia  Language is fluent with no aphasia  Attention and concentration are normal     Cranial Nerves  CN III, IV, VI: Extraocular movements intact bilaterally  Pupils equal round and reactive to light bilaterally  CN VII: Full and symmetric facial movement  CN VIII: Hearing is normal   CN XI: Shoulder shrug strength is normal   CN XII: Tongue midline without atrophy or fasciculations  Motor   Increased muscle tone   Strength is 5/5 throughout all four extremities  Sensory  Light touch is normal in upper and lower extremities  Coordination  Right: Finger-to-nose normal  Rapid alternating movement abnormality:  Left: Finger-to-nose normal  Rapid alternating movement abnormality:  See MDS UPDRS III  Gait  Casual gait: Unable to rise from chair without using arms  Arose using hands  Ambulated with walker  Unsteady on turn           ROS:    Review of Systems   Constitutional: Negative  Negative for appetite change and fever  HENT: Positive for voice change (She is talking softer)  Negative for hearing loss, tinnitus and trouble swallowing  Eyes: Negative  Negative for photophobia and pain  Respiratory: Negative  Negative for shortness of breath  Cardiovascular: Negative  Negative for palpitations  Gastrointestinal: Negative  Negative for nausea and vomiting  Endocrine: Negative  Negative for cold intolerance  Genitourinary: Negative  Negative for dysuria, frequency and urgency  Musculoskeletal: Negative  Negative for myalgias and neck pain  Hip and shoulder   Skin: Negative  Negative for rash  Neurological: Positive for speech difficulty (Sometimes) and weakness  Negative for dizziness, tremors, seizures, syncope, facial asymmetry, light-headedness, numbness and headaches  Hematological: Negative  Does not bruise/bleed easily  Psychiatric/Behavioral: Positive for confusion, hallucinations and sleep disturbance  Review of systems as documented by the MA was reviewed in full by myself, Elease Bosworth, MD      More than 50% of this time spent with the patient was devoted to counseling and coordination of care  Issues addressed during this clinic visit included overall management, medication counseling or monitoring (including adverse effects, side effects and risks of medications)

## 2022-04-27 NOTE — ASSESSMENT & PLAN NOTE
Parkinson's complicated by postural instability and gait, with worsening balance lately   Gait appears to be overall stable though  is noting some increased instability and patient suffered a fall 4/22 w/ SDH   Patient recently s/p L DBS battery replacement w/ Dr Jennifer Villagomez (4/7/21)      DBS interrogated   o Right battery SC 2 62 (requires neurosurgery referral to replace)  o Settings: 3 4V, PW90, Rate 160 C+1     o Left battery Percept  90% (9 years 5 months)  o Settings: 2 4V, PW60, Rate 160 C+2    Plan:   Continue on current regimen  Nuplazid appears to be improving hallucination frequency and severity (per ) though patient notes the hallucinations are still occurring sometimes (she sees 2 dogs but is aware they are hallucinations)   Unable to tolerate Sinemet previously and also did not tolerate Azilect   Refer to neurosurgery to replace R battery with precept similar to L battery which was replaced last year   Return 4 weeks after IPG replacement

## 2022-05-03 NOTE — ASSESSMENT & PLAN NOTE
Parkinson's complicated by postural instability and gait  Gait appears to be overall stable and if anything a bit better than when last seen  Deep brain stimulator was interrogated for impendence check, update, and battery tracking on the left given it was placed in 2014  Right battery 2 88  3 4V, PW90, Rate 160 C+1-    Left battery 2 56  2 6V, PW60, Rate 160 C+2-    Continue on current medications  Will refer to neurosurgery to plan out left IPG replacement  Detail Level: Zone Render Risk Assessment In Note?: no Additional Notes: 3/31/22-pt with a hx of migraines, worse around this time of year but worse than normal since increasing dose of accutane. Patient has an upcoming appointment with his neurologist. Patient was advised to stop accutane for 1 week and if symptoms improve he can restart accutane once a day then will slowly increase back to BID if tolerated. If headache become worse, mom was advised to contact the office.

## 2022-05-11 DIAGNOSIS — R44.3 HALLUCINATIONS: ICD-10-CM

## 2022-05-11 RX ORDER — PIMAVANSERIN TARTRATE 34 MG/1
CAPSULE ORAL
Qty: 30 CAPSULE | Refills: 0 | Status: SHIPPED | OUTPATIENT
Start: 2022-05-11 | End: 2022-06-10 | Stop reason: SDUPTHER

## 2022-05-13 ENCOUNTER — OFFICE VISIT (OUTPATIENT)
Dept: NEUROSURGERY | Facility: CLINIC | Age: 77
End: 2022-05-13
Payer: MEDICARE

## 2022-05-13 VITALS
SYSTOLIC BLOOD PRESSURE: 138 MMHG | BODY MASS INDEX: 35.31 KG/M2 | RESPIRATION RATE: 16 BRPM | DIASTOLIC BLOOD PRESSURE: 60 MMHG | HEART RATE: 61 BPM | HEIGHT: 67 IN | WEIGHT: 225 LBS | TEMPERATURE: 98.7 F

## 2022-05-13 DIAGNOSIS — G20 PARKINSON'S DISEASE (HCC): ICD-10-CM

## 2022-05-13 DIAGNOSIS — Z96.89 S/P DEEP BRAIN STIMULATOR PLACEMENT: ICD-10-CM

## 2022-05-13 PROCEDURE — 99214 OFFICE O/P EST MOD 30 MIN: CPT | Performed by: NEUROLOGICAL SURGERY

## 2022-05-13 RX ORDER — CEFAZOLIN SODIUM 2 G/50ML
2000 SOLUTION INTRAVENOUS ONCE
Status: CANCELLED | OUTPATIENT
Start: 2022-05-13 | End: 2022-05-13

## 2022-05-13 NOTE — PROGRESS NOTES
Neurosurgery Office Note  Woody Pearce 67 y o  female MRN: [id number]      Assessment/Plan        Problem List Items Addressed This Visit        Nervous and Auditory    Parkinson's disease [facility])    Relevant Orders    Case request operating room: Right chest IPG replacement (Completed)      Other Visit Diagnoses     S/P deep brain stimulator placement        Relevant Orders    Case request operating room: Right chest IPG replacement (Completed)            Discussion:  Patient is a 65-year-old female with h/o DM, HTN, and PD s/p bilateral STN DBS placement on in 2014 s/p left chest IPG replacement by Dr Cecilia Cline on 4/7/21, recent hospitalization on 4/22 for left SDH s/p fall, evaluated for follow up on 5/13/21 doing well, now with right IPG EOL  Today, she reports slight worsening of her rigidity and bradykinesia, which can interfere with ambulation  No headache, change in vision/speech, numbness, weakness, gait imbalance, bowel/bladder changes, saddle anesthesia, recent trauma  Takes ASA 81 mg for cardioprotection  Non-smoker  At this time, I will schedule surgery for right chest IPG replacement  She signs consent today to proceed  All questions and concerns were addressed during this visit  CHIEF COMPLAINT    Chief Complaint   Patient presents with    Follow-up     RIGHT SIDE DBS IPG 2 62 REPLACEMENT       HISTORY    History of Present Illness     67y o  year old female     HPI    See Discussion    REVIEW OF SYSTEMS    Review of Systems   Constitutional: Negative  HENT: Negative  Eyes: Negative  Respiratory: Negative  Cardiovascular: Negative  Gastrointestinal: Positive for constipation  Endocrine: Negative  Genitourinary: Positive for enuresis  Negative for frequency  Musculoskeletal: Positive for gait problem (walker)  Skin: Negative  Allergic/Immunologic: Negative  Neurological: Positive for tremors (PD)          RIGHT SIDE DBS IPG 2 62 REPLACEMENT-DBS Replacement- (L) Left battery Percept  90%    IPG Right side BY DR MIGUEL HINOJOSA 12/18/18     LEFT MEDTRONIC DBS IPG REPLACED BY DR MIGUEL HINOJOSA 4/7/21     lst procedure ov w/neuro-Dr Renata Feng 4/27/22 (PD)    non smoker   Hematological: Negative  Psychiatric/Behavioral: Negative  All other systems reviewed and are negative          Meds/Allergies     Current Outpatient Medications   Medication Sig Dispense Refill    acetaminophen (TYLENOL) 325 mg tablet Take 2 tablets (650 mg total) by mouth every 6 (six) hours as needed for mild pain, headaches or fever (Patient not taking: Reported on 4/27/2022 ) 30 tablet 0    ASPIRIN 81 PO Take by mouth (Patient not taking: Reported on 4/27/2022 )      atenolol (TENORMIN) 50 mg tablet Take 1 tablet (50 mg total) by mouth daily 30 tablet 0    Cinnamon 500 MG capsule Take 1,000 mg by mouth 2 (two) times a day      ciprofloxacin (CIPRO) 250 mg tablet Take 250 mg by mouth every 12 (twelve) hours (Patient not taking: Reported on 9/29/2021)      Coenzyme Q10 400 MG CAPS Take 1 capsule (400 mg total) by mouth daily (Patient taking differently: Take 100 mg by mouth daily  ) 30 capsule 0    levETIRAcetam (KEPPRA) 500 mg tablet Take 1 tablet (500 mg total) by mouth every 12 (twelve) hours for 11 doses 11 tablet 0    Multiple Vitamins-Minerals (MULTIVITAMIN GUMMIES WOMENS PO) Take by mouth      Nuplazid 34 MG CAPS TAKE 1 CAPSULE BY MOUTH ONCE DAILY 30 capsule 0    Omega-3 Fatty Acids (OMEGA-3 FISH OIL PO) take 2 daily      PARoxetine (PAXIL) 20 mg tablet Take 1 tablet (20 mg total) by mouth daily (Patient not taking: Reported on 4/27/2022 ) 30 tablet 0    rasagiline (AZILECT) 1 MG TAKE 1 TABLET BY MOUTH  DAILY (Patient not taking: Reported on 4/27/2022) 90 tablet 3    rivastigmine (EXELON) 4 5 MG capsule TAKE 1 CAPSULE BY MOUTH  TWICE DAILY 180 capsule 3    rosuvastatin (CRESTOR) 5 mg tablet Take 1 tablet (5 mg total) by mouth every other day 30 tablet 0    TURMERIC PO Take by mouth daily        No current facility-administered medications for this visit  Allergies   Allergen Reactions    Sulfa Antibiotics Hives       PAST HISTORY    Past Medical History:   Diagnosis Date    Anxiety     Broken hip ([facility] [address] )     Depression     Diabetes mellitus ([facility] [address] )     pre diabetic    Diabetes mellitus type 2, diet-controlled (HCC)     Hyperlipidemia     Hypertension     Parkinson disease ([facility] [address] )     Pneumonia        Past Surgical History:   Procedure Laterality Date    ACHILLES TENDON SURGERY      APPENDECTOMY      COLONOSCOPY      DEEP BRAIN STIMULATOR PLACEMENT      DENTAL SURGERY      FRACTURE SURGERY      KNEE ARTHROSCOPY      OK IMP STIM,CRANIAL,SUBQ,1 ARRAY Right 12/18/2018    Procedure: REPLACEMENT IMPLANTABLE PULSE GENERATOR (IPG) DEEP BRAIN STIMULATION (DBS); Surgeon: Alicia Currie MD;  Location: [facility];  Service: Neurosurgery    OK IMP STIM,CRANIAL,SUBQ,1 ARRAY Left 4/7/2021    Procedure: REPLACEMENT IMPLANTABLE PULSE GENERATOR FOR DEEP BRAIN STIMULATOR, LEFT CHEST;  Surgeon: Alicia Currie MD;  [facility]: [facility];  Service: Neurosurgery    OK LAP,APPENDECTOMY N/A 8/16/2020    Procedure: Manav Dorantes;  Surgeon: Agnieszka Clay MD;  [facility]: [facility];  Service: General    OK OPEN RX FEMUR FX+INTRAMED ERIKA Right 12/14/2019    Procedure: INSERTION NAIL IM FEMUR ANTEGRADE (TROCHANTERIC); Surgeon: Ginette Neff DO;  [facility]: [facility];  Service: Orthopedics    REPLACEMENT TOTAL KNEE      REVERSE TOTAL SHOULDER ARTHROPLASTY Left 8/23/2018    Procedure: ARTHROPLASTY SHOULDER REVERSE;  Surgeon: Yuniel Turpin MD;  Location: [facility];  Service: Orthopedics    TONSILLECTOMY         Social History     Tobacco Use    Smoking status: Never Smoker    Smokeless tobacco: Never Used   Vaping Use    Vaping Use: Never used   Substance Use Topics    Alcohol use:  Yes     Alcohol/week: 2 0 standard drinks     Types: 2 Glasses of wine per week     Comment: occasional    Drug use: No       Family History   Problem Relation Age of Onset    Arthritis Mother     No Known Problems Father     No Known Problems Maternal Grandmother     No Known Problems Maternal Grandfather     No Known Problems Paternal Grandmother     No Known Problems Paternal Grandfather     No Known Problems Son     No Known Problems Son          The following portions of the patient's history were reviewed in this encounter and updated as appropriate: Past medical, surgical, family, and social history, as well as medications, allergies, and review of systems  EXAM    Vitals:Blood pressure 138/60, pulse 61, temperature 98 7 °F (37 1 °C), temperature source Temporal, resp  rate 16, height 5' 7" (1 702 m), weight 102 kg (225 lb)  ,There is no height or weight on file to calculate BMI  Physical Exam  Constitutional:       Appearance: Normal appearance  HENT:      Head: Normocephalic and atraumatic  Eyes:      Extraocular Movements: Extraocular movements intact and EOM normal       Pupils: Pupils are equal, round, and reactive to light  Cardiovascular:      Rate and Rhythm: Normal rate and regular rhythm  Pulses: Normal pulses  Heart sounds: Normal heart sounds  Pulmonary:      Effort: Pulmonary effort is normal       Breath sounds: Normal breath sounds  Abdominal:      General: Abdomen is flat  Palpations: Abdomen is soft  Musculoskeletal:         General: Normal range of motion  Cervical back: Normal range of motion  Skin:     General: Skin is warm  Neurological:      General: No focal deficit present  Mental Status: She is alert and oriented to person, place, and time  Mental status is at baseline  Gait: Gait is intact        Deep Tendon Reflexes: Strength normal    Psychiatric:         Mood and Affect: Mood normal          Speech: Speech normal          Behavior: Behavior normal          Neurologic Exam     Mental Status   Oriented to person, place, and time  Attention: normal    Speech: speech is normal   Level of consciousness: alert    Cranial Nerves     CN II   Visual fields full to confrontation  Visual acuity: normal  Right visual field deficit: none  Left visual field deficit: none     CN III, IV, VI   Pupils are equal, round, and reactive to light  Extraocular motions are normal    CN III: no CN III palsy  CN VI: no CN VI palsy  Nystagmus: none   Diplopia: none  Ophthalmoparesis: none  Upgaze: normal  Downgaze: normal  Conjugate gaze: present    CN V   Facial sensation intact  Right facial sensation deficit: none  Left facial sensation deficit: none    CN VII   Facial expression full, symmetric  Right facial weakness: none  Left facial weakness: none    CN VIII   CN VIII normal      CN IX, X   CN IX normal    CN X normal    Palate: symmetric    CN XI   CN XI normal    Right sternocleidomastoid strength: normal  Left sternocleidomastoid strength: normal  Right trapezius strength: normal  Left trapezius strength: normal    CN XII   CN XII normal    Tongue deviation: none    Motor Exam   Muscle bulk: normal  Overall muscle tone: normal  Right arm pronator drift: absent  Left arm pronator drift: absent    Strength   Strength 5/5 throughout  Sensory Exam   Light touch normal      Gait, Coordination, and Reflexes     Gait  Gait: normal    Reflexes   Reflexes 2+ except as noted  MEDICAL DECISION MAKING    Imaging Studies:     No results found  I have personally reviewed pertinent reports  and I have personally reviewed pertinent films in [facility]    Neurosurgeon

## 2022-05-31 ENCOUNTER — TELEPHONE (OUTPATIENT)
Dept: NEUROSURGERY | Facility: CLINIC | Age: 77
End: 2022-05-31

## 2022-05-31 NOTE — TELEPHONE ENCOUNTER
Patient's  called the nurse line asking if it was okay for his wife to receive a prolia injection a week or two prior to her scheduled surgery  I spoke with Dr Smyth Reason, she prefers if patient waits until at least 2 week's post-op to ensure that it does not inhibit her wound healing  I called back and spoke with the patient  I made her aware of Dr Giuseppe Powell recommendation  She was appreciative and verbalized understanding

## 2022-06-07 ENCOUNTER — APPOINTMENT (OUTPATIENT)
Dept: LAB | Facility: MEDICAL CENTER | Age: 77
End: 2022-06-07
Payer: MEDICARE

## 2022-06-07 ENCOUNTER — CLINICAL SUPPORT (OUTPATIENT)
Dept: URGENT CARE | Facility: MEDICAL CENTER | Age: 77
End: 2022-06-07
Payer: MEDICARE

## 2022-06-07 DIAGNOSIS — G20 PARKINSON'S DISEASE (HCC): ICD-10-CM

## 2022-06-07 DIAGNOSIS — Z01.818 PRE-PROCEDURAL EXAMINATION: ICD-10-CM

## 2022-06-07 DIAGNOSIS — Z45.42 END OF BATTERY LIFE OF DEEP BRAIN STIMULATOR: ICD-10-CM

## 2022-06-07 LAB
ALBUMIN SERPL BCP-MCNC: 3.7 G/DL (ref 3.5–5)
ALP SERPL-CCNC: 40 U/L (ref 46–116)
ALT SERPL W P-5'-P-CCNC: 21 U/L (ref 12–78)
ANION GAP SERPL CALCULATED.3IONS-SCNC: 3 MMOL/L (ref 4–13)
APTT PPP: 32 SECONDS (ref 23–37)
AST SERPL W P-5'-P-CCNC: 15 U/L (ref 5–45)
ATRIAL RATE: 56 BPM
BASOPHILS # BLD AUTO: 0.05 THOUSANDS/ΜL (ref 0–0.1)
BASOPHILS NFR BLD AUTO: 0 % (ref 0–1)
BILIRUB SERPL-MCNC: 0.46 MG/DL (ref 0.2–1)
BUN SERPL-MCNC: 19 MG/DL (ref 5–25)
CALCIUM SERPL-MCNC: 10.4 MG/DL (ref 8.3–10.1)
CHLORIDE SERPL-SCNC: 106 MMOL/L (ref 100–108)
CO2 SERPL-SCNC: 30 MMOL/L (ref 21–32)
CREAT SERPL-MCNC: 0.93 MG/DL (ref 0.6–1.3)
EOSINOPHIL # BLD AUTO: 0.26 THOUSAND/ΜL (ref 0–0.61)
EOSINOPHIL NFR BLD AUTO: 2 % (ref 0–6)
ERYTHROCYTE [DISTWIDTH] IN BLOOD BY AUTOMATED COUNT: 13.3 % (ref 11.6–15.1)
EST. AVERAGE GLUCOSE BLD GHB EST-MCNC: 120 MG/DL
GFR SERPL CREATININE-BSD FRML MDRD: 59 ML/MIN/1.73SQ M
GLUCOSE SERPL-MCNC: 98 MG/DL (ref 65–140)
HBA1C MFR BLD: 5.8 %
HCT VFR BLD AUTO: 45.2 % (ref 34.8–46.1)
HGB BLD-MCNC: 14.9 G/DL (ref 11.5–15.4)
IMM GRANULOCYTES # BLD AUTO: 0.07 THOUSAND/UL (ref 0–0.2)
IMM GRANULOCYTES NFR BLD AUTO: 1 % (ref 0–2)
INR PPP: 0.97 (ref 0.84–1.19)
LYMPHOCYTES # BLD AUTO: 2.99 THOUSANDS/ΜL (ref 0.6–4.47)
LYMPHOCYTES NFR BLD AUTO: 26 % (ref 14–44)
MCH RBC QN AUTO: 31.8 PG (ref 26.8–34.3)
MCHC RBC AUTO-ENTMCNC: 33 G/DL (ref 31.4–37.4)
MCV RBC AUTO: 96 FL (ref 82–98)
MONOCYTES # BLD AUTO: 1 THOUSAND/ΜL (ref 0.17–1.22)
MONOCYTES NFR BLD AUTO: 9 % (ref 4–12)
NEUTROPHILS # BLD AUTO: 7.03 THOUSANDS/ΜL (ref 1.85–7.62)
NEUTS SEG NFR BLD AUTO: 62 % (ref 43–75)
NRBC BLD AUTO-RTO: 0 /100 WBCS
P AXIS: 41 DEGREES
PLATELET # BLD AUTO: 246 THOUSANDS/UL (ref 149–390)
PMV BLD AUTO: 11.2 FL (ref 8.9–12.7)
POTASSIUM SERPL-SCNC: 4.7 MMOL/L (ref 3.5–5.3)
PR INTERVAL: 190 MS
PROT SERPL-MCNC: 7.8 G/DL (ref 6.4–8.2)
PROTHROMBIN TIME: 12.5 SECONDS (ref 11.6–14.5)
QRS AXIS: -19 DEGREES
QRSD INTERVAL: 82 MS
QT INTERVAL: 450 MS
QTC INTERVAL: 434 MS
RBC # BLD AUTO: 4.69 MILLION/UL (ref 3.81–5.12)
SODIUM SERPL-SCNC: 139 MMOL/L (ref 136–145)
T WAVE AXIS: 30 DEGREES
VENTRICULAR RATE: 56 BPM
WBC # BLD AUTO: 11.4 THOUSAND/UL (ref 4.31–10.16)

## 2022-06-07 PROCEDURE — 93005 ELECTROCARDIOGRAM TRACING: CPT

## 2022-06-07 PROCEDURE — 80053 COMPREHEN METABOLIC PANEL: CPT

## 2022-06-07 PROCEDURE — 83036 HEMOGLOBIN GLYCOSYLATED A1C: CPT

## 2022-06-07 PROCEDURE — 85025 COMPLETE CBC W/AUTO DIFF WBC: CPT

## 2022-06-07 PROCEDURE — 93010 ELECTROCARDIOGRAM REPORT: CPT | Performed by: INTERNAL MEDICINE

## 2022-06-07 PROCEDURE — 85730 THROMBOPLASTIN TIME PARTIAL: CPT

## 2022-06-07 PROCEDURE — 85610 PROTHROMBIN TIME: CPT

## 2022-06-07 PROCEDURE — 36415 COLL VENOUS BLD VENIPUNCTURE: CPT

## 2022-06-10 DIAGNOSIS — R44.3 HALLUCINATIONS: ICD-10-CM

## 2022-06-10 RX ORDER — PIMAVANSERIN TARTRATE 34 MG/1
CAPSULE ORAL
Qty: 30 CAPSULE | Refills: 0 | Status: SHIPPED | OUTPATIENT
Start: 2022-06-10 | End: 2022-07-13

## 2022-06-20 DIAGNOSIS — G20 COGNITIVE DEFICIT DUE TO PARKINSON'S DISEASE (HCC): ICD-10-CM

## 2022-06-20 RX ORDER — RIVASTIGMINE TARTRATE 4.5 MG/1
4.5 CAPSULE ORAL 2 TIMES DAILY
Qty: 180 CAPSULE | Refills: 0 | Status: SHIPPED | OUTPATIENT
Start: 2022-06-20 | End: 2022-09-16

## 2022-06-20 NOTE — TELEPHONE ENCOUNTER
Pt's  left a vm today at 1:17 requesting rivastigmine script be sent to [facility] pharmacy  in [facility]  Out of stock at mail order pharmacy  [phone]  Rx entered   Pls review and sign off    thanks

## 2022-06-22 RX ORDER — NITROFURANTOIN 25; 75 MG/1; MG/1
CAPSULE ORAL
COMMUNITY
Start: 2022-06-20 | End: 2022-06-27

## 2022-06-22 NOTE — PRE-PROCEDURE INSTRUCTIONS
Pre-Surgery Instructions:   Medication Instructions    atenolol (TENORMIN) 50 mg tablet Take day of surgery   Melatonin 5 MG CAPS Take night before surgery    nitrofurantoin (MACROBID) 100 mg capsule Take day of surgery   Nuplazid 34 MG CAPS Take day of surgery   PARoxetine (PAXIL) 20 mg tablet Take day of surgery   rasagiline (AZILECT) 1 MG Take day of surgery   rivastigmine (EXELON) 4 5 MG capsule Take day of surgery   rosuvastatin (CRESTOR) 5 mg tablet Take day of surgery   Senna-Natural Laxatives (SENOKOT LAXATIVE PO) Take night before surgery    You will receive a phone call from hospital for arrival time  Please call surgeons office if any changes in your condition  Wear easy on/off clothing; consider type of surgery;  Valuables, jewelry, piercing's please keep at home  **COVID-19  education/surgical guidelines  Updated covid    Visitation policy  Please: No contact lenses or eye make up, artificial eyelashes    Please bring special ordered sling or braces if needed for  Your particular surgery  n/a    Please secure transportation     Follow pre surgery showering or cleaning instructions as  Reviewed by nurse or surgeons office      Questions answered and concerns addressed

## 2022-06-24 ENCOUNTER — DOCUMENTATION (OUTPATIENT)
Dept: NEUROSURGERY | Facility: CLINIC | Age: 77
End: 2022-06-24

## 2022-06-24 RX ORDER — CEFAZOLIN SODIUM 2 G/50ML
2000 SOLUTION INTRAVENOUS ONCE
Status: DISCONTINUED | OUTPATIENT
Start: 2022-06-24 | End: 2022-06-27 | Stop reason: HOSPADM

## 2022-06-24 NOTE — H&P
History & Physical Exam  Kyle Walker 67 y o  female MRN: [id number]    Assessment & Plan:   Patient is a 48-year-old female with h/o DM, HTN, and PD s/p bilateral STN DBS placement on in 2014 s/p left chest IPG replacement by Dr Mauricio Kasper on 4/7/21, recent hospitalization on 4/22 for left SDH s/p fall, evaluated for follow up on 5/13/21 doing well, now undergoing R chest IPG replacement after my evaluation and medical clearance  No changes per patient since my last evaluation in clinic  HPI  Please refer to my office note for full HPI leading up to this surgery  History:  Past Medical History:   Diagnosis Date    Anxiety     Broken hip ([facility] )     Depression     Diabetes mellitus ([facility] )     pre diabetic    Diabetes mellitus type 2, diet-controlled (HCC)     Hyperlipidemia     Hypertension     Parkinson disease ([facility] )     Pneumonia      Past Surgical History:   Procedure Laterality Date    ACHILLES TENDON SURGERY      APPENDECTOMY      COLONOSCOPY      DEEP BRAIN STIMULATOR PLACEMENT      DENTAL SURGERY      FRACTURE SURGERY      KNEE ARTHROSCOPY      MT IMP STIM,CRANIAL,SUBQ,1 ARRAY Right 12/18/2018    Procedure: REPLACEMENT IMPLANTABLE PULSE GENERATOR (IPG) DEEP BRAIN STIMULATION (DBS); Surgeon: Fayrene Seip, MD;  Location: [facility];  Service: Neurosurgery    MT IMP STIM,CRANIAL,SUBQ,1 ARRAY Left 4/7/2021    Procedure: REPLACEMENT IMPLANTABLE PULSE GENERATOR FOR DEEP BRAIN STIMULATOR, LEFT CHEST;  Surgeon: Fayrene Seip, MD;  [facility];  Service: Neurosurgery    MT LAP,APPENDECTOMY N/A 8/16/2020    Procedure: Omar Almeida;  Surgeon: Bari Cruz MD;  [facility];  Service: General    MT OPEN RX FEMUR FX+INTRAMED ERIKA Right 12/14/2019    Procedure: INSERTION NAIL IM FEMUR ANTEGRADE (TROCHANTERIC);   Surgeon: Mily Perry DO;  [facility];  Service: Orthopedics    REPLACEMENT TOTAL KNEE      REVERSE TOTAL SHOULDER ARTHROPLASTY Left 8/23/2018 Procedure: ARTHROPLASTY SHOULDER REVERSE;  Surgeon: Federico Bajwa MD;  [facility];  Service: Orthopedics    TONSILLECTOMY         Current medications:  No current facility-administered medications for this encounter      Current Outpatient Medications:     atenolol (TENORMIN) 50 mg tablet, Take 1 tablet (50 mg total) by mouth daily, Disp: 30 tablet, Rfl: 0    Melatonin 5 MG CAPS, Take 15 mg by mouth Bedtime, Disp: , Rfl:     nitrofurantoin (MACROBID) 100 mg capsule, , Disp: , Rfl:     Nuplazid 34 MG CAPS, TAKE 1 CAPSULE BY MOUTH ONCE DAILY, Disp: 30 capsule, Rfl: 0    PARoxetine (PAXIL) 20 mg tablet, Take 1 tablet (20 mg total) by mouth daily, Disp: 30 tablet, Rfl: 0    rasagiline (AZILECT) 1 MG, TAKE 1 TABLET BY MOUTH  DAILY, Disp: 90 tablet, Rfl: 3    rivastigmine (EXELON) 4 5 MG capsule, Take 1 capsule (4 5 mg total) by mouth 2 (two) times a day, Disp: 180 capsule, Rfl: 0    rosuvastatin (CRESTOR) 5 mg tablet, Take 1 tablet (5 mg total) by mouth every other day, Disp: 30 tablet, Rfl: 0    Senna-Natural Laxatives (SENOKOT LAXATIVE PO), Take by mouth daily at bedtime, Disp: , Rfl:     acetaminophen (TYLENOL) 325 mg tablet, Take 2 tablets (650 mg total) by mouth every 6 (six) hours as needed for mild pain, headaches or fever (Patient not taking: No sig reported), Disp: 30 tablet, Rfl: 0    ASPIRIN 81 PO, Take by mouth (Patient not taking: No sig reported), Disp: , Rfl:     Cinnamon 500 MG capsule, Take 1,000 mg by mouth 2 (two) times a day, Disp: , Rfl:     ciprofloxacin (CIPRO) 250 mg tablet, Take 250 mg by mouth every 12 (twelve) hours (Patient not taking: No sig reported), Disp: , Rfl:     Coenzyme Q10 400 MG CAPS, Take 1 capsule (400 mg total) by mouth daily (Patient taking differently: Take 100 mg by mouth in the morning ), Disp: 30 capsule, Rfl: 0    levETIRAcetam (KEPPRA) 500 mg tablet, Take 1 tablet (500 mg total) by mouth every 12 (twelve) hours for 11 doses, Disp: 11 tablet, Rfl: 0    Multiple Vitamins-Minerals (MULTIVITAMIN GUMMIES WOMENS PO), Take by mouth, Disp: , Rfl:     Omega-3 Fatty Acids (OMEGA-3 FISH OIL PO), take 2 daily, Disp: , Rfl:     TURMERIC PO, Take by mouth daily , Disp: , Rfl:     Allergies: Allergies   Allergen Reactions    Sulfa Antibiotics Hives       Review of systems:  General ROS: negative for chills, fatigue, fever, night sweats, or unintentional weight changes  Respiratory ROS: no cough, shortness of breath, or wheezing  Cardiovascular ROS: no chest pain or dyspnea on exertion  Gastrointestinal ROS: no abdominal pain, change in bowel habits, or black or bloody stools  Genito-Urinary ROS: no dysuria, trouble voiding, or hematuria  Musculoskeletal ROS: negative  Neurological ROS: negative except for symptoms outlined in HPI and Discussion     Physical exam:  Neurologic:  AOX3  PERRL, EOMI  FS  Follows commands briskly throughout  5/5 in all extremities, no drift  Sensation intact throughout  Baseline neurologic deficits as per recent clinic note    CV: regular sinus rhythm without murmur or abnormalities  Respiratory: normal breath sounds  Abdominal: soft, non-tender, normal bowel sounds  Extremities: normal coloration, no edema or visible/gross abnormalities     Lab Results: All relevant preoperative labs reviewed  Imaging: All relevant preoperative imaging reviewed  EKG, Pathology, and Other Studies: All relevant preoperative studies reviewed    We will proceed with surgery as planned      Alie Martin MD

## 2022-06-24 NOTE — PROGRESS NOTES
[place] PDMP including surrounding states were searched for patient that is scheduled for surgery Monday 6/27/2022  No current record exists

## 2022-06-26 ENCOUNTER — ANESTHESIA EVENT (OUTPATIENT)
Dept: PERIOP | Facility: HOSPITAL | Age: 77
End: 2022-06-26
Payer: MEDICARE

## 2022-06-26 RX ORDER — SODIUM CHLORIDE, SODIUM LACTATE, POTASSIUM CHLORIDE, CALCIUM CHLORIDE 600; 310; 30; 20 MG/100ML; MG/100ML; MG/100ML; MG/100ML
125 INJECTION, SOLUTION INTRAVENOUS CONTINUOUS
Status: CANCELLED | OUTPATIENT
Start: 2022-06-26

## 2022-06-27 ENCOUNTER — HOSPITAL ENCOUNTER (OUTPATIENT)
Facility: HOSPITAL | Age: 77
Setting detail: OUTPATIENT SURGERY
Discharge: HOME | End: 2022-06-27
Attending: NEUROLOGICAL SURGERY | Admitting: NEUROLOGICAL SURGERY
Payer: MEDICARE

## 2022-06-27 ENCOUNTER — ANESTHESIA (OUTPATIENT)
Dept: PERIOP | Facility: HOSPITAL | Age: 77
End: 2022-06-27
Payer: MEDICARE

## 2022-06-27 VITALS
HEIGHT: 67 IN | OXYGEN SATURATION: 93 % | TEMPERATURE: 95.2 F | WEIGHT: 225 LBS | HEART RATE: 54 BPM | BODY MASS INDEX: 35.31 KG/M2 | DIASTOLIC BLOOD PRESSURE: 54 MMHG | SYSTOLIC BLOOD PRESSURE: 117 MMHG | RESPIRATION RATE: 16 BRPM

## 2022-06-27 DIAGNOSIS — Z98.890 POST-OPERATIVE STATE: Primary | ICD-10-CM

## 2022-06-27 LAB
GLUCOSE SERPL-MCNC: 116 MG/DL (ref 65–140)
GLUCOSE SERPL-MCNC: 124 MG/DL (ref 65–140)

## 2022-06-27 PROCEDURE — 82948 REAGENT STRIP/BLOOD GLUCOSE: CPT

## 2022-06-27 PROCEDURE — 99024 POSTOP FOLLOW-UP VISIT: CPT | Performed by: NEUROLOGICAL SURGERY

## 2022-06-27 PROCEDURE — C1767 GENERATOR, NEURO NON-RECHARG: HCPCS | Performed by: NEUROLOGICAL SURGERY

## 2022-06-27 PROCEDURE — 61885 INSRT/REDO NEUROSTIM 1 ARRAY: CPT | Performed by: NEUROLOGICAL SURGERY

## 2022-06-27 DEVICE — NEUROSTIM DBS PERCPET PC BRAINSENSE: Type: IMPLANTABLE DEVICE | Site: CHEST | Status: FUNCTIONAL

## 2022-06-27 DEVICE — CONNECTOR PLUG SENSIGHT: Type: IMPLANTABLE DEVICE | Site: CHEST | Status: FUNCTIONAL

## 2022-06-27 DEVICE — ENVELOPE TYRX NEURO ABS ANBCTRL LRG: Type: IMPLANTABLE DEVICE | Site: CHEST | Status: FUNCTIONAL

## 2022-06-27 RX ORDER — HYDROMORPHONE HCL/PF 1 MG/ML
0.5 SYRINGE (ML) INJECTION
Status: DISCONTINUED | OUTPATIENT
Start: 2022-06-27 | End: 2022-06-27 | Stop reason: HOSPADM

## 2022-06-27 RX ORDER — BUPIVACAINE HYDROCHLORIDE AND EPINEPHRINE 5; 5 MG/ML; UG/ML
INJECTION, SOLUTION EPIDURAL; INTRACAUDAL; PERINEURAL AS NEEDED
Status: DISCONTINUED | OUTPATIENT
Start: 2022-06-27 | End: 2022-06-27 | Stop reason: HOSPADM

## 2022-06-27 RX ORDER — CEFAZOLIN SODIUM 1 G/3ML
INJECTION, POWDER, FOR SOLUTION INTRAMUSCULAR; INTRAVENOUS AS NEEDED
Status: DISCONTINUED | OUTPATIENT
Start: 2022-06-27 | End: 2022-06-27

## 2022-06-27 RX ORDER — METOCLOPRAMIDE HYDROCHLORIDE 5 MG/ML
10 INJECTION INTRAMUSCULAR; INTRAVENOUS ONCE AS NEEDED
Status: DISCONTINUED | OUTPATIENT
Start: 2022-06-27 | End: 2022-06-27 | Stop reason: HOSPADM

## 2022-06-27 RX ORDER — FENTANYL CITRATE/PF 50 MCG/ML
50 SYRINGE (ML) INJECTION
Status: DISCONTINUED | OUTPATIENT
Start: 2022-06-27 | End: 2022-06-27 | Stop reason: HOSPADM

## 2022-06-27 RX ORDER — LIDOCAINE HYDROCHLORIDE AND EPINEPHRINE 10; 10 MG/ML; UG/ML
INJECTION, SOLUTION INFILTRATION; PERINEURAL AS NEEDED
Status: DISCONTINUED | OUTPATIENT
Start: 2022-06-27 | End: 2022-06-27 | Stop reason: HOSPADM

## 2022-06-27 RX ORDER — LIDOCAINE HYDROCHLORIDE 10 MG/ML
INJECTION, SOLUTION EPIDURAL; INFILTRATION; INTRACAUDAL; PERINEURAL AS NEEDED
Status: DISCONTINUED | OUTPATIENT
Start: 2022-06-27 | End: 2022-06-27

## 2022-06-27 RX ORDER — TIZANIDINE 2 MG/1
2 TABLET ORAL EVERY 8 HOURS PRN
Qty: 9 TABLET | Refills: 0 | Status: SHIPPED | OUTPATIENT
Start: 2022-06-27

## 2022-06-27 RX ORDER — OXYCODONE HYDROCHLORIDE AND ACETAMINOPHEN 5; 325 MG/1; MG/1
1 TABLET ORAL EVERY 4 HOURS PRN
Qty: 18 TABLET | Refills: 0 | Status: SHIPPED | OUTPATIENT
Start: 2022-06-27 | End: 2022-06-30

## 2022-06-27 RX ORDER — DOCUSATE SODIUM 100 MG/1
100 CAPSULE, LIQUID FILLED ORAL 2 TIMES DAILY
Qty: 6 CAPSULE | Refills: 0 | Status: SHIPPED | OUTPATIENT
Start: 2022-06-27 | End: 2022-08-04

## 2022-06-27 RX ORDER — SODIUM CHLORIDE, SODIUM LACTATE, POTASSIUM CHLORIDE, CALCIUM CHLORIDE 600; 310; 30; 20 MG/100ML; MG/100ML; MG/100ML; MG/100ML
INJECTION, SOLUTION INTRAVENOUS CONTINUOUS PRN
Status: DISCONTINUED | OUTPATIENT
Start: 2022-06-27 | End: 2022-06-27

## 2022-06-27 RX ORDER — PROPOFOL 10 MG/ML
INJECTION, EMULSION INTRAVENOUS CONTINUOUS PRN
Status: DISCONTINUED | OUTPATIENT
Start: 2022-06-27 | End: 2022-06-27

## 2022-06-27 RX ORDER — PROPOFOL 10 MG/ML
INJECTION, EMULSION INTRAVENOUS AS NEEDED
Status: DISCONTINUED | OUTPATIENT
Start: 2022-06-27 | End: 2022-06-27

## 2022-06-27 RX ORDER — OXYCODONE HYDROCHLORIDE AND ACETAMINOPHEN 5; 325 MG/1; MG/1
1 TABLET ORAL EVERY 4 HOURS PRN
Status: DISCONTINUED | OUTPATIENT
Start: 2022-06-27 | End: 2022-06-27 | Stop reason: HOSPADM

## 2022-06-27 RX ORDER — CEPHALEXIN 500 MG/1
500 CAPSULE ORAL EVERY 6 HOURS SCHEDULED
Qty: 12 CAPSULE | Refills: 0 | Status: SHIPPED | OUTPATIENT
Start: 2022-06-27 | End: 2022-06-30

## 2022-06-27 RX ADMIN — PROPOFOL 80 MCG/KG/MIN: 10 INJECTION, EMULSION INTRAVENOUS at 08:27

## 2022-06-27 RX ADMIN — PROPOFOL 20 MG: 10 INJECTION, EMULSION INTRAVENOUS at 08:27

## 2022-06-27 RX ADMIN — CEFAZOLIN 2000 MG: 1 INJECTION, POWDER, FOR SOLUTION INTRAMUSCULAR; INTRAVENOUS at 08:28

## 2022-06-27 RX ADMIN — Medication 50 MCG: at 09:53

## 2022-06-27 RX ADMIN — SODIUM CHLORIDE, SODIUM LACTATE, POTASSIUM CHLORIDE, AND CALCIUM CHLORIDE: .6; .31; .03; .02 INJECTION, SOLUTION INTRAVENOUS at 08:16

## 2022-06-27 RX ADMIN — LIDOCAINE HYDROCHLORIDE 50 MG: 10 INJECTION, SOLUTION EPIDURAL; INFILTRATION; INTRACAUDAL at 08:27

## 2022-06-27 RX ADMIN — Medication 5 MG: at 08:39

## 2022-06-27 NOTE — ANESTHESIA PREPROCEDURE EVALUATION
Procedure:  Right chest IPG replacement for Deep Brain Stimulator (Right Chest)    Relevant Problems   CARDIO   (+) Essential hypertension   (+) Hyperlipidemia   (+) Hypertension   (+) Other hyperlipidemia      ENDO   (+) Type 2 diabetes mellitus without complication (HCC)      GI/HEPATIC   (+) Other dysphagia      NEURO/PSYCH   (+) Anxiety   (+) Depression   (+) Subdural hemorrhage (HCC)        Physical Exam    Airway    Mallampati score: II  TM Distance: >3 FB  Neck ROM: full     Dental   No notable dental hx     Cardiovascular      Pulmonary      Other Findings        Anesthesia Plan  ASA Score- 3     Anesthesia Type- IV sedation with anesthesia with ASA Monitors  Additional Monitors:   Airway Plan:           Plan Factors-Exercise tolerance (METS): >4 METS  Chart reviewed  Existing labs reviewed  Patient summary reviewed  Patient is not a current smoker  Induction- intravenous  Postoperative Plan- Plan for postoperative opioid use  Informed Consent- Anesthetic plan and risks discussed with patient  I personally reviewed this patient with the CRNA  Discussed and agreed on the Anesthesia Plan with the CRNA  Jeremy Andujar

## 2022-06-27 NOTE — OP NOTE
OPERATIVE REPORT  PATIENT NAME: Brady Robb    :    MRN: [id number]  Pt Location:  OR ROOM 09    SURGERY DATE: 2022    Surgeon(s) and Role:  Osvaldo Laughlin MD - Primary    Preop Diagnosis:  Parkinson's disease ([facility] ) [G20]  S/P deep brain stimulator placement [Z96 89]  End of battery life of deep brain stimulator [Z45 42]    Post-Op Diagnosis Codes:  Parkinson's disease ([facility] ) [G20]  S/P deep brain stimulator placement [Z96 89]  End of battery life of deep brain stimulator [Z45 42]    Procedure(s) (LRB):  Right chest IPG replacement for Deep Brain Stimulator (Right)    Specimen(s):  None    Estimated Blood Loss:   Minimal    Drains:  None      Anesthesia Type:   IV Sedation with Anesthesia    Operative Indications:  Parkinson's disease ([facility] ) [G20]  S/P deep brain stimulator placement [Z96 89]  End of battery life of deep brain stimulator [Z45 42]    Operative Findings:  New IPG connected to distal leads of DBS, fully functional upon interrogation    Procedure and Technique:  The patient was brought to the OR and adequately sedated by the anesthesia team     The right chest incision over the IPG was marked  The area was prepped and draped in standard fashion  A timeout was performed by the neurosurgery, anesthesia, and nursing teams  The patient received antibiotics per protocol  The incision was infiltrated with local anesthetic, Marcaine with Epinephrine  The previous incision was reopened with a scalpel, and the pocket was dissected bluntly and with Bovie cautery when necessary  Hemostasis was maintained  The IPG was encountered and fully dissected  The pocket was extended adequately to replace the previous IPG with a new IPG (Medtronic, same settings)  The new IPG was covered with antibiotic coating (Tyrx, Medtronic) and secured in place with silk sutures  The distal ends of the DBS leads were intact       The new IPG was found to be functional at the desired settings upon multiple interrogations  The new pocket was copiously irrigated with antibiotic-infused irrigation  Hemostasis was confirmed prior to closure  The incision was infiltrated with local anesthetic, Marcaine with Epinephrine  All instrument counts, sponge counts, and needle counts were correct prior to closure of the skin  The dermal layer was closed with 2-0 Vicryl sutures, and the skin was reapproximated with absorbable Monocryl suture  The incision was cleansed, then sealed and dressed with Steri-strips  The drapes were withdrawn, and the area was cleansed  The patient woke up from sedation, was examined in the operative room (found to be at neurologic baseline), transferred to the recovery units, and discharged home on the same day without any active issues or concerns      Complications:   None    I was present for the entire procedure, and a qualified resident physician was not available    Patient Disposition:  PACU then home

## 2022-06-27 NOTE — ANESTHESIA POSTPROCEDURE EVALUATION
Post-Op Assessment Note    CV Status:  Stable  Pain Score: 0    Pain management: adequate     Mental Status:  Alert and awake   Hydration Status:  Euvolemic   PONV Controlled:  Controlled   Airway Patency:  Patent      Post Op Vitals Reviewed: Yes      Staff: CRNA         No complications documented      BP   136/53   Temp  97 4   Pulse 61   Resp   16   SpO2   100

## 2022-06-27 NOTE — DISCHARGE INSTRUCTIONS
Discharge Instructions  Battery (IPG) replacement    Activity:  Do not lift more than 10 pounds for 6 weeks  Avoid bending, lifting and twisting for 6 weeks  No strenuous activities  No driving for 2 weeks  When able to shower, continue to use clean towel and washcloth for 2 weeks post-op  Continue to change bed linens and pajamas more frequently  Wear clean clothes daily  Surgical incision care:  Keep dressing in place for 3 days  Keep incision dry for 5 days  May shower with mild antimicrobial soap after 5 days  After 5 days, incisions may be left open to air, but must remain clean  Do not immerse the incisions in water for 6 weeks  Do not apply any creams or ointments to the incision for 6 weeks, unless otherwise instructed by [facility]  Contact office if increasing redness, drainage, pain or swelling around the incisions  Postoperative medication:  Complete course of antibiotic as directed  [facility] will provide pain medication as coordinated with your pain specialist  All prescriptions must come from a single provider  Take all medications as prescribed  Call office with any questions/concerns  Please contact office for questions regarding dosage and modifications  No antiplatelet, anticoagulation or Nonsteroidal anti-inflammatory (NSAIDs) medication until cleared by [facility], unless otherwise instructed  Do not operate heavy machinery or vehicles while taking sedating medications  Use a bowel regimen while on opioids as they induce constipation  Ie  Senokot-S, Miralax, Colace, etc  Increase fiber and water intake

## 2022-06-28 ENCOUNTER — TELEPHONE (OUTPATIENT)
Dept: NEUROSURGERY | Facility: CLINIC | Age: 77
End: 2022-06-28

## 2022-06-29 NOTE — TELEPHONE ENCOUNTER
Called patient to see how she is doing after surgery  Patient reports she is doing well overall and denies any incisional issues or fevers  Patient is able to ambulate around the house and complete ADLs  Educated the patient about the importance of preventing blood clots and provided measures how to prevent them  Patient has moved her bowels since the surgery  Encouraged patient to take an over the counter stool softener, if she is taking narcotic pain medication  Encouraged fiber intake and fluids  She states she has not been taking the percocet  Her  questions why she can not take tylenol  Advised that she should not take percocet and acetaminophen together as the percocet also contains acetaminophen  Explained that she may take the tylenol if no longer using percocet  Reviewed incision care with the patient  Advised that per her hospital discharge instructions she may shower as of 7/2 and allow water to wash over the surgical site  Use clean wash cloth, towels, and clothing  Do not submerge in water until cleared by the surgeon  Do not apply any creams, ointments, or lotions to the site  Patient is aware to call the office if any redness, swelling, drainage, dehiscence of incision, or fever >100 F occurs  Patient is aware to call the office if any concerns or questions may arise  Reminded patient of her upcoming appointments with the date/time/location  Patient was appreciative for the call

## 2022-07-01 ENCOUNTER — TELEPHONE (OUTPATIENT)
Dept: NEUROLOGY | Facility: CLINIC | Age: 77
End: 2022-07-01

## 2022-07-01 NOTE — TELEPHONE ENCOUNTER
Received adverse event report from [place] in relation to pt's Nuplazid  Worsening symptoms and hallucinations reported  Completed form with information from last office visit  Faxed

## 2022-07-08 ENCOUNTER — CLINICAL SUPPORT (OUTPATIENT)
Dept: NEUROSURGERY | Facility: CLINIC | Age: 77
End: 2022-07-08

## 2022-07-08 VITALS
WEIGHT: 225 LBS | TEMPERATURE: 98.4 F | HEIGHT: 67 IN | SYSTOLIC BLOOD PRESSURE: 116 MMHG | BODY MASS INDEX: 35.31 KG/M2 | DIASTOLIC BLOOD PRESSURE: 70 MMHG

## 2022-07-08 DIAGNOSIS — Z48.89 AFTERCARE FOLLOWING SURGERY FOR INJURY AND TRAUMA: Primary | ICD-10-CM

## 2022-07-08 PROCEDURE — 99024 POSTOP FOLLOW-UP VISIT: CPT

## 2022-07-08 NOTE — PROGRESS NOTES
Post-Op Visit- Neurosurgery    Dom Peterson 67 y o  female MRN: [id number]    Chief Complaint:   Patient presents post: Right chest IPG replacement for Deep Brain Stimulator - Right    History of Present Illness:  Patient presents for 2 week POV for incision check accompanied by spouse, Davy Beauchamp, and ambulating with a walker  Patient reports she is doing well overall and denies any incisional issues or fevers  She denies any pain  She is able to ambulate with her walker and complete ADLs with assistance  Davy Beauchamp reports how they have caregiver at home and how the patient is back to her baseline prior to surgery  Assessment:  Vitals:    07/08/22 1330   BP: 116/70   Temp: 98 4 °F (36 9 °C)   TempSrc: Tympanic   Weight: 102 kg (225 lb)   Height: 5' 7" (1 702 m)        Wound Exam: Incision well approximated  No erythema, edema or drainage present  Location: right chest    Complications: None  Incision LOLY  Discussion/Summary:  Reviewed incision care with patient including daily observation for s/s infection including: increased erythema, edema, drainage, dehiscence of incision or fever >101  Should these be observed, she understands that she is to call and/or return immediately for reassessment  Advised patient to continue cleansing area with mild soap and water and pat dry  Not to apply any lotions, creams, or ointments, & not to submerge in any water for two more weeks  Activity levels were also reviewed with the patient in detail, she is to slowly increase her level of activity and ambulation is encouraged as tolerated  Do not lift more than 10 pounds for 6 weeks after surgery  Avoid bending, lifting, or twisting  Patient will continue not to drive  Reminded her to call the office with any further questions or concerns, or if any incisional issues or fevers would arise   Patient will follow up with the office on a PRN basis  Noticed how the patient is not scheduled to see neurology  Advised patient to contact the neurology office to schedule an appointment  Oswald Salazar said he will do so

## 2022-07-18 NOTE — TELEPHONE ENCOUNTER
Hello Good Afternoon,    Pt and  have called regarding testing results and to schedule an appointment at the [place] office with Dr Marilyn Renner  Unfortunately nothing  Is left for this year  Can you help and advise when to schedule the pt? And  if pt needs to be seen asap or can wait until the next available appointment?     I thank you in advance,     Janay Porras

## 2022-07-19 ENCOUNTER — TELEPHONE (OUTPATIENT)
Dept: NEUROLOGY | Facility: CLINIC | Age: 77
End: 2022-07-19

## 2022-07-19 NOTE — TELEPHONE ENCOUNTER
Called pt and spoke with pt  regarding appointment with Dr Laci Verde for 7/20/22 at 10:30 am  Informed that Dr Laci Verde will not be able to see patients in office tomorrow but will be able to see patients virtually  Asked patient  if agreeable to change appointment from in office visit to virtual appointment   is agreeable and patient is now scheduled as a virtual visit  Confirmed link will be sent to mobile number that is on file

## 2022-07-19 NOTE — TELEPHONE ENCOUNTER
Per Dr Preston Rueda    "this post battery replacement so could also be scheduled with Risa Bowers but inform her and see if she wishes Radha Gamboa to be informed of visit in case she needs support "    Called patient to inform that Dr Preston Rueda would like this patient to be seen in person and not virtually  Offered patient an appointment with Risa Bowers in the [facility] office on 8/4/22 at 11:45 am  Patient agreed and accepted appointment  Patient is now scheduled

## 2022-08-04 ENCOUNTER — OFFICE VISIT (OUTPATIENT)
Dept: NEUROLOGY | Facility: CLINIC | Age: 77
End: 2022-08-04

## 2022-08-04 ENCOUNTER — TELEPHONE (OUTPATIENT)
Dept: NEUROLOGY | Facility: CLINIC | Age: 77
End: 2022-08-04

## 2022-08-04 VITALS
HEART RATE: 64 BPM | WEIGHT: 221 LBS | SYSTOLIC BLOOD PRESSURE: 112 MMHG | BODY MASS INDEX: 34.61 KG/M2 | DIASTOLIC BLOOD PRESSURE: 58 MMHG

## 2022-08-04 DIAGNOSIS — R44.3 HALLUCINATIONS: ICD-10-CM

## 2022-08-04 DIAGNOSIS — G20 PARKINSON'S DISEASE: Primary | ICD-10-CM

## 2022-08-04 DIAGNOSIS — G20 PARKINSON'S DISEASE (HCC): Primary | ICD-10-CM

## 2022-08-04 RX ORDER — PIMAVANSERIN TARTRATE 34 MG/1
CAPSULE ORAL
Qty: 90 CAPSULE | Refills: 3 | Status: SHIPPED | OUTPATIENT
Start: 2022-08-04

## 2022-08-04 NOTE — ASSESSMENT & PLAN NOTE
Patient with Parkinson's disease complicated by postural instability, gait difficulties an intolerance to medication  She recently underwent a right IPG replacement on 6/17/2022  She presents for follow-up after this surgery  She states she is overall doing well and has recovered nicely  Her incision is well healed with no issues  She feels that her Parkinson's symptoms remain stable compared to prior to the IPG replacement  No tremors and her gait is overall the same  On exam she also appears to be stable  Her DBS was interrogated however no changes were made to her settings  Prior to surgery she was set at 3 4 volts on the right and it appears that after surgery this was reduced to 2 9 volts  She appears to be tolerating this change well with out any worsening of Parkinson's symptoms  The  did have some questions regarding obtain both a wheelchair as well as a hospital bed  At this time he would 1st like to proceed with the wheelchair to help with getting her out of the house safely  May also consider a hospital bed given she has had multiple slips/falls out of bed  Will reach out to the  to help with referral to the wheelchair Clinic and to see what type of coverage he may be able to get for a hospital bed  She does continue to have some hallucinations and delusions  These appear to be slightly worse following her recent surgery  They are still significantly better with the Nuplazid  We did discuss perhaps switching or adding a low-dose of Seroquel if hallucinations worsen however they would not like to make any changes at this time  DBS interrogated   ? Right battery Percept 98% (6 years 1month)  ? Settings: 2 9V, PW90, Rate 160 C+9 (prior to IPG replacement she was at 3 4V)     ? Left battery Percept  87% (9 years 4 months)  ?  Settings: 2 4V, PW60, Rate 160 C+2

## 2022-08-04 NOTE — TELEPHONE ENCOUNTER
MSW phoned patient's  Sheila Paz regarding medical equipment  Sheila Paz would like patient to be referred to [address] clinic  At this time, he would like more information on a hospital bed  MSW provided Sheila Paz with [employer] phone number: [phone]  Appreciative of call

## 2022-08-04 NOTE — Clinical Note
Patient looking to get a wheel chair to use mainly outside the home, would like to get her set at the wheel chair clinic first   Thanks

## 2022-08-04 NOTE — TELEPHONE ENCOUNTER
----- Message from Sonia Ayoub PA-C sent at 8/4/2022  1:08 PM EDT -----   would also like to talk about his options for getting a hospital bed as well, thanks!

## 2022-08-04 NOTE — PROGRESS NOTES
Patient ID: Radha Garduno is a 67 y o  female  Assessment/Plan:    Park[facility]'s disease ([facility] )  Patient with Parkinson's disease complicated by postural instability, gait difficulties an intolerance to medication  She recently underwent a right IPG replacement on 6/17/2022  She presents for follow-up after this surgery  She states she is overall doing well and has recovered nicely  Her incision is well healed with no issues  She feels that her Parkinson's symptoms remain stable compared to prior to the IPG replacement  No tremors and her gait is overall the same  On exam she also appears to be stable  Her DBS was interrogated however no changes were made to her settings  Prior to surgery she was set at 3 4 volts on the right and it appears that after surgery this was reduced to 2 9 volts  She appears to be tolerating this change well with out any worsening of Parkinson's symptoms  The  did have some questions regarding obtain both a wheelchair as well as a hospital bed  At this time he would 1st like to proceed with the wheelchair to help with getting her out of the house safely  May also consider a hospital bed given she has had multiple slips/falls out of bed  Will reach out to the  to help with referral to the wheelchair Clinic and to see what type of coverage he may be able to get for a hospital bed  She does continue to have some hallucinations and delusions  These appear to be slightly worse following her recent surgery  They are still significantly better with the Nuplazid  We did discuss perhaps switching or adding a low-dose of Seroquel if hallucinations worsen however they would not like to make any changes at this time  DBS interrogated   ? Right battery Percept 98% (6 years 1month)  ? Settings: 2 9V, PW90, Rate 160 C+9 (prior to IPG replacement she was at 3 4V)     ? Left battery Percept  87% (9 years 4 months)  ?  Settings: 2 4V, PW60, Rate 160 C+2            Subjective:    Tammy Fang is a 67 y o  female w/ PMH psoriatic arthritis, HTN, DM2, PD now s/p b/l STN DBS implantation (2014), Left IPG replacement 4/7/21 and Right IPG replacement 6/17/22 who presents today as follow-up for PD  Per chart review, PD present since at least 2009, DBS surgery 11/5/14, ACTIVA SC 11/11/13, R IPG replacement 12/18/18, left battery replacement 4/7/21  Previous medications which did not work or that she could not tolerate include sinemet, amantadine, ropinirole, Requip, mirapex  At her last visit she was referred for R IPG replacement  Told to try and increase her Melatonin dose  INTERVAL HISTORY:  She did well s/p IPG replacement   She feels that she is doing well overall   She continues to struggle with walking and balance   She uses a walker   No falls at home however she does at times slip out of bed when she tries to get out on her own   She has an aide that comes daily to help with dressing and showering   She does have some coughing when eating at times, this can happen a few times a week   She had a swallow study in 3/2021, no rel changes since that time   She sleeps well however she does tend to wake a few times a night   She does struggle with getting out of bed and has had some falls out,  is questioning the use of a hospital bed     She does have a railing however this does not seem to help with keeping her from getting out of bed  She takes Melatonin 15mg qhs    has been noticing some increase of hallucinations since her recent IPG replacement   She may see dogs in the living room, she is aware that these are hallucinations   She does feel they are overall better than prior to the Nuplazid   She does often have some confusion and she may think that they are at someone else's home   She does not feel that her PD symptoms are any different with her current settings    looking to get a wheel chair for her to use outside of the home Current medications:  Rivastigmine 4 5 mg bid  Pimvanserin (Nuplazid) 34 mg qhs  Azilect 1mg daily     Prior medications:  Sinemet               I personally reviewed and updated the ROS  Objective:    Blood pressure 112/58, pulse 64, weight 100 kg (221 lb)  Physical Exam  Constitutional:       Appearance: Normal appearance  Eyes:      Extraocular Movements: Extraocular movements intact  Pupils: Pupils are equal, round, and reactive to light  Pulmonary:      Effort: Pulmonary effort is normal    Neurological:      Mental Status: She is alert  Deep Tendon Reflexes: Strength normal          Neurological Exam  Mental Status  Alert  Oriented only to person, place and situation  Speech: hypophonia  Language is fluent with no aphasia  Attention and concentration are normal     Cranial Nerves  CN III, IV, VI: Extraocular movements intact bilaterally  Pupils equal round and reactive to light bilaterally  CN VII: Full and symmetric facial movement  CN VIII: Hearing is normal   CN XI: Shoulder shrug strength is normal   CN XII: Tongue midline without atrophy or fasciculations  Motor   Increased muscle tone  Strength is 5/5 throughout all four extremities  Sensory  Light touch is normal in upper and lower extremities  Coordination  Right: Finger-to-nose normal  Rapid alternating movement abnormality:Left: Finger-to-nose normal  Rapid alternating movement abnormality:  See MDS UPDRS III  Gait  Casual gait: Unable to rise from chair without using arms  Arose using hands  Ambulated with walker  En bloc turn  Unsteady on turn           MDS UPDRS III                             Time since last dose:  8/4/22 3/18/21   Speech  1 1   Facial Expression  2 2   Rigidity - Neck  2 2   Rigidity - Upper Extremity (Right)  1 0   Rigidity - Upper Extremity (Left)   1 1   Rigidity - Lower Extremity (Right)  1 1   Rigidity - Lower Extremity (Left)   1 1   Finger Taps (Right)   2 2   Finger Taps (Left)   2 2   Hand Movement (Right)  1 2   Hand Movement (Left)   2 2   Pronation/Supination (Right)  2 2   Pronation/Supination (Left)   3 3   Toe Tapping (Right) 3 3   Toe Tapping (Left) 2 1   Leg Agility (Right)  2 1   Leg Agility (Left)   2 1   Arising from Chair   3 2   Gait   3 3   Freezing of Gait 0 0   Postural Stability         Posture 2 2   Global spontaneity of movement 2 2   Postural Tremor (Right) 0 0   Postural Tremor (Left) 0 0   Kinetic Tremor (Right)  0 0   Kinetic Tremor (Left)  0 0   Rest tremor amplitude RUE 0 0   Rest tremor amplitude LUE 0 0   Rest tremor amplitude RLE 0 0   Reset tremor amplitude LLE 0 0   Lip/Jaw Tremor  0 0              Motor Exam Total:               ROS:    Review of Systems   Constitutional: Negative  Negative for appetite change and fever  HENT: Positive for voice change (soft / low voice)  Negative for hearing loss, tinnitus and trouble swallowing  Eyes: Negative  Negative for photophobia and pain  Respiratory: Positive for choking (Sometimes)  Negative for shortness of breath  Cardiovascular: Negative  Negative for palpitations  Gastrointestinal: Negative  Negative for nausea and vomiting  Endocrine: Negative  Negative for cold intolerance  Genitourinary: Negative  Negative for dysuria, frequency and urgency  Musculoskeletal: Positive for gait problem and myalgias (Occasionally in upper portion of her legs)  Negative for neck pain  Balance Issues     Skin: Negative  Negative for rash  Allergic/Immunologic: Negative  Neurological: Positive for dizziness (In the morning if she gets up too quick)  Negative for tremors, seizures, syncope, facial asymmetry, speech difficulty, weakness, light-headedness, numbness and headaches  Hematological: Negative  Does not bruise/bleed easily  Psychiatric/Behavioral: Positive for hallucinations and sleep disturbance  Negative for confusion     All other systems reviewed and are negative

## 2022-08-08 NOTE — TELEPHONE ENCOUNTER
MSW phoned [facility] at [phone] to learn if they have received referral  MSW spoke with Elvie acevedo and will have a therapist reach out to patient's  Virgia Heimlich  MSW phoned patient's  to inform of same  Appreciative of call  No additional needs at this time  Still unsure if he wants to proceed with hospital bed  MSW will close task at this time   Should he want to proceed, MSW will remain available to assist

## 2022-08-16 DIAGNOSIS — G20 PARKINSON'S DISEASE: Primary | ICD-10-CM

## 2022-09-02 ENCOUNTER — EVALUATION (OUTPATIENT)
Dept: OCCUPATIONAL THERAPY | Facility: REHABILITATION | Age: 77
End: 2022-09-02
Payer: MEDICARE

## 2022-09-02 DIAGNOSIS — G20 PARKINSON'S DISEASE (HCC): Primary | ICD-10-CM

## 2022-09-02 PROCEDURE — 97166 OT EVAL MOD COMPLEX 45 MIN: CPT

## 2022-09-02 PROCEDURE — 97542 WHEELCHAIR MNGMENT TRAINING: CPT

## 2022-09-02 NOTE — PROGRESS NOTES
OCCUPATIONAL THERAPY EVALUATION - Mobility Device Evaluation    Name: Vincent Rogel  Date: [birth year]  : 1945  Referring Provider: Shira Bloch, MD  AUTHORIZATION:   Insurance: Payor: [employer] / Plan: MEDICARE A AND B / Product Type: Medicare A & B Fee for Service /     SUBJECTIVE:  HPI: Vincent Rogel is a 67 y o  female referred to outpatient occupational therapy for the following diagnosis   Encounter Diagnosis   Name Primary?  Parkinson's disease ([facility] ) Yes     Sergey Banks has an extensive PMH of Parkinsons disease of about 15 years, L shoulder reverse arthroplasty ~3-4 yrs ago, R Hip fracture from a fall 3-4 yrs ago  Pt has a DBS since   Recently underwent right IPG replacement 2022  Denies any tremors, some hallucinations  Pt resides in a 1 level ranch home, has 3 MARTINE from front and 5 MARTINE from garage  When she goes outside, she goes out through the garage because of having b/l hand rails  Has a very open/wide kitchen and extra wide hallways / doors (~39")  Counter tops are also higher  Is using the 2 WW around the house, requires someone to be with her at all times  Pt has had 4-5 falls in the home over past year  Has a lot of falls from her bedroom, mostly from the bed  Experiences a lot of near falls due to her balance  For her ADL's, she has a shower chair and requires assistance for all showering tasks, using grab bars  Requires total assist for dressing UB/LB  Has a raised toilet seat with hand rails  Has a caregiver 7 days/week, 40 hrs/week, 9-3:30  She reports watching a lot of TV, is mostly sedentary  Did have PT services at [address] [place] [address] several years ago  Pt reports difficulty walking over various distances and uneven terrain   says she has a terrible time backing up when in stance  Wants to be able to move around easily in her house, more independently       Height: 5 feet 7 inches  Weight: 221 lbs    Based on patient history recommend device for trial: Lightweight manual wheelchair    Visual problems Yes    Suspected Glaucoma, Reading glasses    History of falls Yes     History of skin breakdown No     Problems of bowel or bladder Yes    Bladder, incontinent    Pain Yes    B/L shoulders especially when eating    Use of assistive or mobility devices Yes    2 WW, Rollator      Patient goals: "So I can go places "    Past Medical History:   Diagnosis Date    Anxiety     Broken hip ([place] [address] )     Depression     Diabetes mellitus ([place] [address] )     pre diabetic    Diabetes mellitus type 2, diet-controlled ([place] [address] )     Hyperlipidemia     Hypertension     Parkinson disease ([place] [address] )     Pneumonia        Current Outpatient Medications:     atenolol (TENORMIN) 50 mg tablet, Take 1 tablet (50 mg total) by mouth daily, Disp: 30 tablet, Rfl: 0    Coenzyme Q10 400 MG CAPS, Take 1 capsule (400 mg total) by mouth daily (Patient taking differently: Take 100 mg by mouth daily), Disp: 30 capsule, Rfl: 0    docusate sodium (COLACE) 100 mg capsule, Take 1 capsule (100 mg total) by mouth 2 (two) times a day for 3 days, Disp: 6 capsule, Rfl: 0    levETIRAcetam (KEPPRA) 500 mg tablet, Take 1 tablet (500 mg total) by mouth every 12 (twelve) hours for 11 doses, Disp: 11 tablet, Rfl: 0    Melatonin 5 MG CAPS, Take 15 mg by mouth Bedtime, Disp: , Rfl:     Multiple Vitamins-Minerals (MULTIVITAMIN GUMMIES WOMENS PO), Take by mouth, Disp: , Rfl:     Nuplazid 34 MG CAPS, Take 1tab daily, Disp: 90 capsule, Rfl: 3    Omega-3 Fatty Acids (OMEGA-3 FISH OIL PO), take 2 daily, Disp: , Rfl:     PARoxetine (PAXIL) 20 mg tablet, Take 1 tablet (20 mg total) by mouth daily, Disp: 30 tablet, Rfl: 0    rasagiline (AZILECT) 1 MG, TAKE 1 TABLET BY MOUTH  DAILY, Disp: 90 tablet, Rfl: 3    rivastigmine (EXELON) 4 5 MG capsule, Take 1 capsule (4 5 mg total) by mouth 2 (two) times a day, Disp: 180 capsule, Rfl: 0    rosuvastatin (CRESTOR) 5 mg tablet, Take 1 tablet (5 mg total) by mouth every other day, Disp: 30 tablet, Rfl: 0   Senna-Natural Laxatives (SENOKOT LAXATIVE PO), Take by mouth daily at bedtime, Disp: , Rfl:     tiZANidine (ZANAFLEX) 2 mg tablet, Take 1 tablet (2 mg total) by mouth every 8 (eight) hours as needed for muscle spasms (Patient not taking: Reported on 8/4/2022), Disp: 9 tablet, Rfl: 0    TURMERIC PO, Take by mouth daily , Disp: , Rfl:     OBJECTIVE:    Range of motion: Functional, Right hand dominant     UE Active Range of Motion (Seated) Right Left   Shoulder flexion  118* 116*   Shoulder extension 56* 48*   Elbow flexion WNL- full WNL- full   Elbow extension WNL- full WNL- full   Wrist flexion 52* 52*   Wrist extension 24* 30*   Composite Grasp full full     Manual Muscle Tests Right Left   Shoulder flexion 4-/5 4-/5   Elbow flexion 4+/5 4+/5   Wrist flexion 4/5 4/5   Wrist extension 4/5 4/5        Hip flexion 3+/5 3+/5   Knee extension 4-/5 4-/5   Dorsiflexion 4-/5 4-/5       Muscle Tone--Modified Andrea Scale Right Left   Elbow flexors/extensors 1 1   Wrist flexors/extensors 1 1   Rigidity in b/l UE's       Sensation -  Monofilament Testing Right Left   Normal: 2 83 mm      2 83 mm 2 83 mm        Core Movement Tasks Description of task    Sitting G sitting posture    Sitting to Standing Requires arm rests to stand, some rocking noted,  provided MIN A/support to stand from armed chair, cues for widening NANCY before standing   Standing Requires 2WW in stance, decreased NANCY   Walking Unsteady, provided contact guard for all walking in facility  Decreased stride length, cues for larger steps in L  Patient used a manual wheelchair  Sawyer Rizvi was instructed in set-up and use, and was able to drive the device 65+ feet and make appropriate turns using both UE's and LE's  She was able to transfer in and out of the device with contact guard assistance    Patient understands process of obtaining this equipment and that this equipment will be expected to be in use for up to 5 years prior to receiving any new equipment  ASSESSMENT:  Charlotte Rhoades is a 66 y/o female seeking a Quickie 2 manual wheelchair  This manual wheelchair will improve Sally's ability to move more independently in her home for all mobility-related activities of daily living  Tanner Enciso has had several falls in her home and is at further risk for falls when completing her ADL's  This manual wheelchair will improve her ability to perform grooming tasks at the bathroom sink more independently without requiring assistance from others  Tanner Enciso will be able to perform functional mobility at an independent level throughout her entire home in order to engage in dressing and showering tasks  Although Tanner Enciso currently ambulates with a rolling walker, she has had many falls and is at risk for further injury or compromised health, requiring her  or caregivers to provide assistance with mobility  Tanner Enciso is unable to rely on a cane due to her significant unsteadiness, decreased stride length, and her history of falls  Tanner Enciso is seeking the Quickie 2 manual wheelchair with the following: standard swing away frame, rear side frame, 20" width x 19" seat depth, 18" frame depth, standard seat sling, [employer] SPP cushion with incontinent liner, 6" polyurethane aaron wheels, pneumatic airless insert wheel tire, aluminum hand rim, extended push wheel locks in order to improve her safety, QOL, and independence in her home  It is with my and Shon Kinsey' clinical opinion that Sally would benefit from a light weight manual wheelchair to improve her functional mobility, reduce risk for falls, and maximize her QOL for mobility related ADL's  Precautions: Fall Risk    Short-term/long-term goals:  1  Patient receives mobility device within 5 months  2  Patient demonstrates modified independence with navigating around clinic in mobility device in 5 months  3  Patient reports comfortable in mobility device in 5 months      PLAN:  Wheelchair fitting and instruction in use of mobility device  Follow-up as needed when mobility device is approved and delivered, instruction as needed  Up to 5 visits as needed over the next 12 months      Jeana Juárez  9/2/2022

## 2022-09-16 DIAGNOSIS — G20 COGNITIVE DEFICIT DUE TO PARKINSON'S DISEASE (HCC): ICD-10-CM

## 2022-09-16 RX ORDER — RIVASTIGMINE TARTRATE 4.5 MG/1
4.5 CAPSULE ORAL 2 TIMES DAILY
Qty: 180 CAPSULE | Refills: 0 | Status: SHIPPED | OUTPATIENT
Start: 2022-09-16

## 2022-10-12 PROBLEM — N39.0 URINARY TRACT INFECTION: Status: RESOLVED | Noted: 2021-04-22 | Resolved: 2022-10-12

## 2022-11-02 DIAGNOSIS — G20 PARKINSON'S DISEASE WITH USE OF ELECTRICAL BRAIN STIMULATION (HCC): ICD-10-CM

## 2022-11-02 RX ORDER — RASAGILINE 1 MG/1
TABLET ORAL
Qty: 90 TABLET | Refills: 3 | Status: SHIPPED | OUTPATIENT
Start: 2022-11-02

## 2022-12-15 DIAGNOSIS — G20 COGNITIVE DEFICIT DUE TO PARKINSON'S DISEASE (HCC): ICD-10-CM

## 2022-12-15 RX ORDER — RIVASTIGMINE TARTRATE 4.5 MG/1
4.5 CAPSULE ORAL 2 TIMES DAILY
Qty: 180 CAPSULE | Refills: 0 | Status: SHIPPED | OUTPATIENT
Start: 2022-12-15

## 2023-01-11 NOTE — ASSESSMENT & PLAN NOTE
Hyperlipidemia continue statin 51-year-old female past medical history diabetes on metformin, psych disorder NOS (patient reports on lithium) brought in by EMS after appearing abnormal to family.  Per report from EMS patient's mother, her caregiver, passed away earlier this evening at home.  Patient reported to be fine initially.  [employer] with family at the house and family felt the patient acting in a normal manner.  Upon EMS arrival patient calm cooperative minimally conversant.  Patient transported to ED without incident.  Patient awake alert oriented.  Minimally conversant in ED.  She denies any somatic complaints.  Denies SI, HI, hallucinations.  Patient know she is take lithium but denies any other medication.  Per EMS report to ED.

## 2023-01-23 ENCOUNTER — TELEPHONE (OUTPATIENT)
Dept: NEUROLOGY | Facility: CLINIC | Age: 78
End: 2023-01-23

## 2023-01-23 NOTE — TELEPHONE ENCOUNTER
Called patient regarding appointment on 2/2/23 at 1:30 pm with Dr Elkin Egan in [place]  Advised patient that provider will be out of the office and we will have to reschedule the appointment  Informed patient that there is an opening on 1/26/23 at 12:30 pm in [place]  Patient accepted appointment and is now scheduled

## 2023-01-26 ENCOUNTER — PROCEDURE VISIT (OUTPATIENT)
Dept: NEUROLOGY | Facility: CLINIC | Age: 78
End: 2023-01-26

## 2023-01-26 VITALS — SYSTOLIC BLOOD PRESSURE: 116 MMHG | HEART RATE: 68 BPM | DIASTOLIC BLOOD PRESSURE: 60 MMHG

## 2023-01-26 DIAGNOSIS — G20 PARKINSON'S DISEASE WITH USE OF ELECTRICAL BRAIN STIMULATION (HCC): Primary | ICD-10-CM

## 2023-01-26 DIAGNOSIS — G20 PARKINSON'S DISEASE (HCC): ICD-10-CM

## 2023-01-26 RX ORDER — CARBIDOPA AND LEVODOPA 50; 200 MG/1; MG/1
1 TABLET, EXTENDED RELEASE ORAL 2 TIMES DAILY
Qty: 180 TABLET | Refills: 3 | Status: SHIPPED | OUTPATIENT
Start: 2023-01-26 | End: 2023-04-26

## 2023-01-26 NOTE — PROGRESS NOTES
Patient ID: Javad Bravo is a 65 y o  female  Assessment/Plan:    Parkinson's disease ([facility] )  PD with dementia complicated by postural instability, cognitive decline and hallucinations/ Capgras  He has a history of having difficulty tolerating medications such as carbidopa levodopa due to hallucinations  The brain stimulator was interrogated  Mild increase in made with an which may be in part also related to arthritis  Body of the note for clinical details  We discussed potential medication changes that could be made to see if this would improve hallucinations and mobility  Will discontinue rasagiline as it is unclear how much additional benefit this is providing and it may aggravate hallucinations which are worsening  We can retrial carbidopa/levodopa in an extended release formulation to see if this would be better tolerated  Start Sinemet CR 25/100 1 tab in 8am and 2pm       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 ([facility] )  Continued cognitive decline gated by hallucinations  Initially with improvement on Nuplazid  Hallucinations believes that she is in the wrong home are now more persistent  Not as easily redirected  Continue Nuplazid  She had previously tried and failed quetiapine  We will try discontinuing rasagiline  A trial of Sinemet CR will be tried instead  Diagnoses and all orders for this visit:    Parkinson's disease with use of electrical brain stimulation (HCC)  -     carbidopa-levodopa (SINEMET CR)  mg per tablet; Take 1 tablet by mouth 2 (two) times a day qam and afternoon  -     Ambulatory Referral to Physical Therapy; Future    Parkinson's disease ([place] [address] )    Other orders  -     FIBER ADULT GUMMIES PO; Take by mouth  -     Ascorbic Acid (VITAMIN C ADULT GUMMIES PO);  Take by mouth           Subjective:    Javad Bravo is a 65 y o  female w/ PMH psoriatic arthritis, HTN, DM2, PD now s/p b/l STN DBS implantation (2014), Left IPG replacement 4/7/21 and Right IPG replacement 6/17/22 who presents today as follow-up for PD  Per chart review, PD present since at least 2009, DBS surgery 11/5/14, ACTIVA SC 11/11/13, R IPG replacement 12/18/18, left battery replacement 4/7/21  Previous medications which did not work or that she could not tolerate include sinemet, amantadine, ropinirole, Requip, mirapex  Current medications:  Rivastigmine 4 5 mg bid  Pimvanserin (Nuplazid) 34 mg qhs  Azilect 1mg daily     Prior medications:  Sinemet     Her gait is worse  She uses a walker  She has not had any falls  She has slid out of bed many times  She has daily hallucinations where she sees people in the room or outside the house anytime of day  She does not believe their house is their house  Nuplazid was initially helpful when first started  Sleep is variable  She wakes up and tries to get out of bed  Speech is soft  There is no drooling  She chokes at times on saliva  Dressing and hygiene acts performed with assistance  There is difficulty arising out of chair  There are rare occasions of lightheadedness on standing  Objective:    Blood pressure 116/60, pulse 68  Physical Exam  Vitals reviewed  Eyes:      Extraocular Movements: Extraocular movements intact  Pupils: Pupils are equal, round, and reactive to light  Neurological:      Mental Status: She is alert  Motor: Motor strength is normal          Neurological Exam  Mental Status  Alert  Oriented only to person and place  Speech: hypophonia  Language is fluent with no aphasia  Attention and concentration are normal     Cranial Nerves  CN III, IV, VI: Extraocular movements intact bilaterally  Pupils equal round and reactive to light bilaterally  CN VII: Full and symmetric facial movement  CN VIII: Hearing is normal   CN IX, X: Palate elevates symmetrically  CN XI: Shoulder shrug strength is normal   CN XII: Tongue midline without atrophy or fasciculations  Motor   Increased muscle tone  Strength is 5/5 throughout all four extremities  Sensory  Light touch is normal in upper and lower extremities  Coordination  Right: Finger-to-nose normal  Rapid alternating movement abnormality:Left: Finger-to-nose normal  Rapid alternating movement abnormality:  See motor UPDRS  Gait  Casual gait: Unable to rise from chair without using arms  Ambulates with walker  Moderately stooped  Reduced stride  Turn           MDS UPDRS III                             Time since last dose:     Speech  2   Facial Expression  2   Rigidity - Neck  2   Rigidity - Upper Extremity (Right)  1   Rigidity - Upper Extremity (Left)   2   Rigidity - Lower Extremity (Right)     Rigidity - Lower Extremity (Left)      Finger Taps (Right)   3   Finger Taps (Left)   3   Hand Movement (Right)  3   Hand Movement (Left)   3   Pronation/Supination (Right)  3   Pronation/Supination (Left)   3   Toe Tapping (Right) 3   Toe Tapping (Left) 3   Leg Agility (Right)  3   Leg Agility (Left)   3   Arising from Chair   3   Gait   3   Freezing of Gait 1   Postural Stability      Posture 2   Global spontaneity of movement 3   Postural Tremor (Right) 0   Postural Tremor (Left) 0   Kinetic Tremor (Right)  0   Kinetic Tremor (Left)  0   Rest tremor amplitude RUE 0   Rest tremor amplitude LUE 0   Rest tremor amplitude RLE 0   Reset tremor amplitude LLE 0   Lip/Jaw Tremor  0        Motor Exam Total:           Deep brain interrogation:  Left chest/ VIM  Percept PC [id number]  [id number]  EBL: 8 yrs, 10 months  Impedance: ok  C+2-, PW60, 160Hz, 2 4mA (1-3)   Increased to 2  6mA     Right chest/ VIM  Percept PC [id number]   [id number]  Implanted: June 27/2022  EBL: 5 years , 9 months   Impedance ok   C+9-, PW90, 160Hz, 2 9mA (0-3 2)  Increased to 3 1 (0-3 4)        ROS:    Review of Systems   Constitutional: Positive for fatigue (a little bit)  Negative for appetite change and fever  HENT: Positive for voice change (low as per )   Negative for hearing loss, tinnitus and trouble swallowing  Eyes: Negative  Negative for photophobia, pain and visual disturbance  Respiratory: Negative  Negative for shortness of breath  Cardiovascular: Negative  Negative for palpitations  Gastrointestinal: Negative  Negative for nausea and vomiting  Endocrine: Negative  Negative for cold intolerance  Genitourinary: Negative  Negative for dysuria, frequency and urgency  Musculoskeletal: Positive for gait problem (has gotten worse) and myalgias (Leg cramps once in a while not often  )  Negative for neck pain  Balance Issues have gotten worse     Skin: Negative  Negative for rash  Allergic/Immunologic: Negative  Neurological: Positive for dizziness (Occasionally not often), speech difficulty and weakness (sometimes her legs feel weak and give out a little but does not occur often)  Negative for tremors, seizures, syncope, facial asymmetry, light-headedness, numbness and headaches  Hematological: Negative  Does not bruise/bleed easily  Psychiatric/Behavioral: Positive for hallucinations and sleep disturbance  Negative for confusion  All other systems reviewed and are negative

## 2023-01-26 NOTE — ASSESSMENT & PLAN NOTE
PD with dementia complicated by postural instability, cognitive decline and hallucinations/ Capgras  He has a history of having difficulty tolerating medications such as carbidopa levodopa due to hallucinations  The brain stimulator was interrogated  Mild increase in made with an which may be in part also related to arthritis  Body of the note for clinical details  We discussed potential medication changes that could be made to see if this would improve hallucinations and mobility  Will discontinue rasagiline as it is unclear how much additional benefit this is providing and it may aggravate hallucinations which are worsening  We can retrial carbidopa/levodopa in an extended release formulation to see if this would be better tolerated       Start Sinemet CR 25/100 1 tab in 8am and 2pm

## 2023-01-26 NOTE — PATIENT INSTRUCTIONS
PD with dementia complicated by postural instability, cognitive decline and hallucinations/ Capgras  Will discontinue rasagiline as it is unclear how much additional benefit this is providing ad it may aggravate hallucinations which are worsening  We can retrial carbidopa/levodopa in an extended release formulation to see if this would be better tolerated       Start Sinemet CR 25/100 1 tab in 8am and 2pm

## 2023-01-27 NOTE — ASSESSMENT & PLAN NOTE
Continued cognitive decline gated by hallucinations  Initially with improvement on Nuplazid  Hallucinations believes that she is in the wrong home are now more persistent  Not as easily redirected  Continue Nuplazid  She had previously tried and failed quetiapine  We will try discontinuing rasagiline  A trial of Sinemet CR will be tried instead

## 2023-03-28 ENCOUNTER — TELEPHONE (OUTPATIENT)
Dept: NEUROLOGY | Facility: CLINIC | Age: 78
End: 2023-03-28

## 2023-03-28 NOTE — TELEPHONE ENCOUNTER
Called pt and spoke to Estefania Ewing in regards to getting a sooner appt as she is having persistent issues and are bothersome  I then offered an appt on 03/30/2023 in the [place] location   Patient agreed to the appt and is now scheduled on 03/30/2023 at 1:00 PM

## 2023-03-30 ENCOUNTER — OFFICE VISIT (OUTPATIENT)
Dept: NEUROLOGY | Facility: CLINIC | Age: 78
End: 2023-03-30

## 2023-03-30 VITALS — DIASTOLIC BLOOD PRESSURE: 60 MMHG | SYSTOLIC BLOOD PRESSURE: 102 MMHG | HEART RATE: 64 BPM

## 2023-03-30 DIAGNOSIS — G47.52 REM BEHAVIORAL DISORDER: ICD-10-CM

## 2023-03-30 DIAGNOSIS — G20 COGNITIVE DEFICIT DUE TO PARKINSON'S DISEASE (HCC): ICD-10-CM

## 2023-03-30 DIAGNOSIS — G20 PARKINSON'S DISEASE (HCC): Primary | ICD-10-CM

## 2023-03-30 RX ORDER — CLONAZEPAM 0.5 MG/1
0.5 TABLET, ORALLY DISINTEGRATING ORAL
Qty: 30 TABLET | Refills: 1 | Status: SHIPPED | OUTPATIENT
Start: 2023-03-30

## 2023-03-30 NOTE — PROGRESS NOTES
Review of Systems   Constitutional: Positive for appetite change  Negative for fever  HENT: Positive for trouble swallowing and voice change  Negative for hearing loss and tinnitus  Eyes: Negative  Negative for photophobia, pain and visual disturbance  Respiratory: Negative  Negative for shortness of breath  Cardiovascular: Negative  Negative for palpitations  Gastrointestinal: Negative  Negative for nausea and vomiting  Endocrine: Negative  Negative for cold intolerance  Genitourinary: Negative  Negative for dysuria, frequency and urgency  Musculoskeletal: Positive for gait problem (Since starting the Carbidopa Levodopa Gait has gotten better) and myalgias (Every once in a while)  Negative for neck pain  Balance Issues has gotten worse     Skin: Negative  Negative for rash  Allergic/Immunologic: Negative  Neurological: Positive for dizziness (Occasional ) and weakness (Left Leg)  Negative for tremors, seizures, syncope, facial asymmetry, speech difficulty, light-headedness, numbness and headaches  Left foot has constant movement     Hematological: Negative  Does not bruise/bleed easily  Psychiatric/Behavioral: Positive for hallucinations (Has gotten worse since Carbidopa Levodopa) and sleep disturbance  Negative for confusion  All other systems reviewed and are negative

## 2023-03-30 NOTE — PROGRESS NOTES
Patient ID: Corry Will is a 65 y o  female    Assessment/Plan:    REM behavioral disorder  Insomnia with trouble initiating and maintaining sleep  She has RBD symptoms with talking in sleep and vivid dreams from which she can awaken with confusion  Melatonin 15mg qhs is no longer effective  Will discontinue and     Parkinson's disease ([facility] )  Parkinson's disease with dementia complicated by postural stability, dyskinesias of the lower extremity, cognitive decline with hallucinations  With the addition of Sinemet CR she appears to have an increase in both day and night hallucinations  She has been unable to tolerate Parkinson's medications  She is taking new supplies at but will miss occasional doses  We will discontinue carbidopa levodopa  She will remain off medications for Parkinson's disease and slowly on deep brain stimulation stimulation at this point  We will continue to use Nuplazid given hallucinations are at its peak at this point  Brain stimulator was interrogated and attempts to reduce left leg dyskinesia  Attempted spread stimulation across contacts 9 and 10 and then 9 and 11 using independent contacts  Not clearly improved  Gait appeared somewhat more difficult  Return to prior settings  See body of clinical note for clinical details  Lowered amplitude 5 1  We will remain on the settings  Will reassess on follow-up when off levodopa  Diagnoses and all orders for this visit:    Parkinson's disease ([facility] )    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 ([facility] )    REM behavioral disorder  -     clonazePAM (KlonoPIN) 0 5 MG disintegrating tablet; Take 1 tablet (0 5 mg total) by mouth daily at bedtime        Subjective:      Corry Will is a 65 y o  female w/ PMH psoriatic arthritis, HTN, DM2, PD now s/p b/l STN DBS implantation (2014), Left IPG replacement 4/7/21 and Right IPG replacement 6/17/22 who presents today as follow-up for PD   Per chart review, PD present since at least 2009, DBS surgery 11/5/14, ACTIVA SC 11/11/13, R IPG replacement 12/18/18, left battery replacement 4/7/21  Previous medications which did not work or that she could not tolerate include sinemet, amantadine, ropinirole, Requip, mirapex  Current medications:  Rivastigmine 4 5 mg bid  Pimvanserin (Nuplazid) 34 mg qhs  Azilect 1mg daily   Sinemet CR 25/100 1 tab qam and afternoon  Melatonin 15mg qhs     Prior medications:  Sinemet     Last seen January 26, 2023 and mild increase in stimulation made  Given an increase in hallucinations we discontinued rasagiline  We also restarted carbidopa/levodopa in an extended release formulation to see if this would be better tolerated and improve mobility  Patient's  contacted the office on 3/6/2023 with reports of increased in hallucinations, difficulty staying asleep at night, and increased left leg dyskinesia  On levodopa walking improved  She has increased hallucinations all day  They are not as violent as they use to be  She is sleeping 2 hours nightly on many nights  She can have trouble initiating sleep and can awaken at night  Every few days she has a good night  She does not sleep during the day  She has increased vivid dreams  She is overall more agitated and will refuse to take food or medications at times  She does occasionally refuse her Nuplazid  Objective:    /60 (BP Location: Left arm, Patient Position: Standing, Cuff Size: Large)   Pulse 64   LMP  (LMP Unknown)       Physical Exam  Vitals reviewed  Eyes:      Extraocular Movements: Extraocular movements intact  Neurological:      Mental Status: She is alert  Motor: Motor strength is normal          Neurological Exam  Mental Status  Alert  Oriented only to person and situation  Speech: hypophonia  Language is fluent with no aphasia  Attention and concentration are normal     Cranial Nerves  CN III, IV, VI: Extraocular movements intact bilaterally    CN VII: Full and symmetric facial movement  CN VIII: Hearing is normal   CN IX, X: Palate elevates symmetrically  CN XI: Shoulder shrug strength is normal   CN XII: Tongue midline without atrophy or fasciculations  Motor   Increased muscle tone  Strength is 5/5 throughout all four extremities  Sensory  Light touch is normal in upper and lower extremities  Coordination  Right: Finger-to-nose normal   See motor UPDRS  Gait  Casual gait: Unable to rise from chair without using arms  required assist   Reduced stride    Freezing turned in with initiation         MDS UPDRS III                             Time since last dose:       Speech  2 2   Facial Expression  2 2   Rigidity - Neck  2    Rigidity - Upper Extremity (Right)  1 1   Rigidity - Upper Extremity (Left)   2 2   Rigidity - Lower Extremity (Right)    2   Rigidity - Lower Extremity (Left)     2   Finger Taps (Right)   3 3   Finger Taps (Left)   3 3   Hand Movement (Right)  3 2   Hand Movement (Left)   3 3   Pronation/Supination (Right)  3 3   Pronation/Supination (Left)   3 3   Toe Tapping (Right) 3    Toe Tapping (Left) 3    Leg Agility (Right)  3 3   Leg Agility (Left)   3 3   Arising from Chair   3 3   Gait   3 3   Freezing of Gait 1 1   Postural Stability        Posture 2 2   Global spontaneity of movement 3 2   Postural Tremor (Right) 0 0   Postural Tremor (Left) 0 0   Kinetic Tremor (Right)  0 0   Kinetic Tremor (Left)  0 0   Rest tremor amplitude RUE 0 0   Rest tremor amplitude LUE 0 0   Rest tremor amplitude RLE 0 0   Reset tremor amplitude LLE 0 0   Lip/Jaw Tremor  0 0          Motor Exam Total:          Left foot dyskinesia     Deep brain interrogation:  Right chest/ VIM  Percept PC [id number]   [id number]  Implanted: June 27,2022  EBL: 5 years , 2 months   Impedance ok measured at 1mA  C+9-, PW90, 160Hz, 3 1mA (0-3 2)  Reduced to  (0-3 4)                Current Outpatient Medications on File Prior to Visit   Medication Sig Dispense Refill   • Ascorbic Acid (VITAMIN C ADULT GUMMIES PO) Take by mouth     • atenolol (TENORMIN) 50 mg tablet Take 1 tablet (50 mg total) by mouth daily 30 tablet 0   • carbidopa-levodopa (SINEMET CR)  mg per tablet Take 1 tablet by mouth 2 (two) times a day qam and afternoon 180 tablet 3   • docusate sodium (COLACE) 100 mg capsule Take 1 capsule (100 mg total) by mouth 2 (two) times a day for 3 days (Patient taking differently: Take 100 mg by mouth if needed) 6 capsule 0   • FIBER ADULT GUMMIES PO Take by mouth     • Melatonin 5 MG CAPS Take 15 mg by mouth Bedtime     • Multiple Vitamins-Minerals (MULTIVITAMIN GUMMIES WOMENS PO) Take by mouth     • Nuplazid 34 MG CAPS Take 1tab daily 90 capsule 3   • Omega-3 Fatty Acids (OMEGA-3 FISH OIL PO) take 2 daily     • PARoxetine (PAXIL) 20 mg tablet Take 1 tablet (20 mg total) by mouth daily 30 tablet 0   • rivastigmine (EXELON) 4 5 MG capsule TAKE 1 CAPSULE (4 5 MG TOTAL) BY MOUTH 2 (TWO) TIMES A  capsule 3   • rosuvastatin (CRESTOR) 5 mg tablet Take 1 tablet (5 mg total) by mouth every other day 30 tablet 0   • Senna-Natural Laxatives (SENOKOT LAXATIVE PO) Take by mouth daily at bedtime     • Coenzyme Q10 400 MG CAPS Take 1 capsule (400 mg total) by mouth daily (Patient not taking: Reported on 3/30/2023) 30 capsule 0   • levETIRAcetam (KEPPRA) 500 mg tablet Take 1 tablet (500 mg total) by mouth every 12 (twelve) hours for 11 doses 11 tablet 0   • tiZANidine (ZANAFLEX) 2 mg tablet Take 1 tablet (2 mg total) by mouth every 8 (eight) hours as needed for muscle spasms (Patient not taking: Reported on 8/4/2022) 9 tablet 0   • TURMERIC PO Take by mouth daily  (Patient not taking: Reported on 3/30/2023)     • [DISCONTINUED] rasagiline (AZILECT) 1 MG TAKE 1 TABLET BY MOUTH  DAILY 90 tablet 3     No current facility-administered medications on file prior to visit           Micki Dee MD  Movement disorder physician  [facility] [employer]

## 2023-03-30 NOTE — ASSESSMENT & PLAN NOTE
Parkinson's disease with dementia complicated by postural stability, dyskinesias of the lower extremity, cognitive decline with hallucinations  With the addition of Sinemet CR she appears to have an increase in both day and night hallucinations  She has been unable to tolerate Parkinson's medications  She is taking new supplies at but will miss occasional doses  We will discontinue carbidopa levodopa  She will remain off medications for Parkinson's disease and slowly on deep brain stimulation stimulation at this point  We will continue to use Nuplazid given hallucinations are at its peak at this point  Brain stimulator was interrogated and attempts to reduce left leg dyskinesia  Attempted spread stimulation across contacts 9 and 10 and then 9 and 11 using independent contacts  Not clearly improved  Gait appeared somewhat more difficult  Return to prior settings  See body of clinical note for clinical details  Lowered amplitude 5 1  We will remain on the settings  Will reassess on follow-up when off levodopa

## 2023-03-30 NOTE — PATIENT INSTRUCTIONS
Discontinue Sinemet CR  Start clonazepam 0 5mg nightly for sleep and dream enactment  Provide update in a couple of week to assure PD symptoms are stable  If not we will adjust stimulation

## 2023-03-30 NOTE — ASSESSMENT & PLAN NOTE
Insomnia with trouble initiating and maintaining sleep  She has RBD symptoms with talking in sleep and vivid dreams from which she can awaken with confusion  Melatonin 15mg qhs is no longer effective   Will discontinue and

## 2023-04-16 ENCOUNTER — HOSPITAL ENCOUNTER (INPATIENT)
Facility: HOSPITAL | Age: 78
LOS: 6 days | Discharge: NURSING, REHAB OR LONG-TERM CARE | End: 2023-04-22
Attending: EMERGENCY MEDICINE | Admitting: INTERNAL MEDICINE

## 2023-04-16 ENCOUNTER — APPOINTMENT (EMERGENCY)
Dept: CT IMAGING | Facility: HOSPITAL | Age: 78
End: 2023-04-16

## 2023-04-16 ENCOUNTER — APPOINTMENT (EMERGENCY)
Dept: RADIOLOGY | Facility: HOSPITAL | Age: 78
End: 2023-04-16

## 2023-04-16 DIAGNOSIS — G20 PARKINSON'S DISEASE (HCC): ICD-10-CM

## 2023-04-16 DIAGNOSIS — Z96.82 PRESENCE OF NEUROSTIMULATOR: ICD-10-CM

## 2023-04-16 DIAGNOSIS — R09.02 HYPOXIA: ICD-10-CM

## 2023-04-16 DIAGNOSIS — E78.49 OTHER HYPERLIPIDEMIA: ICD-10-CM

## 2023-04-16 DIAGNOSIS — R44.3 HALLUCINATIONS: ICD-10-CM

## 2023-04-16 DIAGNOSIS — I62.00 SUBDURAL HEMORRHAGE (HCC): ICD-10-CM

## 2023-04-16 DIAGNOSIS — I26.99 PULMONARY EMBOLISM (HCC): Primary | ICD-10-CM

## 2023-04-16 DIAGNOSIS — G20 COGNITIVE DEFICIT DUE TO PARKINSON'S DISEASE (HCC): ICD-10-CM

## 2023-04-16 DIAGNOSIS — F41.9 ANXIETY: ICD-10-CM

## 2023-04-16 DIAGNOSIS — Z86.79 HISTORY OF HYPERTENSION: ICD-10-CM

## 2023-04-16 DIAGNOSIS — E11.9 TYPE 2 DIABETES MELLITUS WITHOUT COMPLICATION (HCC): ICD-10-CM

## 2023-04-16 PROBLEM — N30.00 ACUTE CYSTITIS: Status: ACTIVE | Noted: 2023-04-16

## 2023-04-16 LAB
2HR DELTA HS TROPONIN: 2 NG/L
4HR DELTA HS TROPONIN: 13 NG/L
ALBUMIN SERPL BCP-MCNC: 3.9 G/DL (ref 3.5–5)
ALP SERPL-CCNC: 46 U/L (ref 34–104)
ALT SERPL W P-5'-P-CCNC: 26 U/L (ref 7–52)
ANION GAP SERPL CALCULATED.3IONS-SCNC: 14 MMOL/L (ref 4–13)
APTT PPP: 26 SECONDS (ref 23–37)
AST SERPL W P-5'-P-CCNC: 24 U/L (ref 13–39)
BACTERIA UR QL AUTO: ABNORMAL /HPF
BASE EX.OXY STD BLDV CALC-SCNC: 44.6 % (ref 60–80)
BASE EXCESS BLDV CALC-SCNC: -10.5 MMOL/L
BASOPHILS # BLD AUTO: 0.06 THOUSANDS/ΜL (ref 0–0.1)
BASOPHILS NFR BLD AUTO: 0 % (ref 0–1)
BILIRUB SERPL-MCNC: 0.52 MG/DL (ref 0.2–1)
BILIRUB UR QL STRIP: NEGATIVE
BNP SERPL-MCNC: 937 PG/ML (ref 0–100)
BUN SERPL-MCNC: 30 MG/DL (ref 5–25)
CALCIUM SERPL-MCNC: 9.3 MG/DL (ref 8.4–10.2)
CARDIAC TROPONIN I PNL SERPL HS: 44 NG/L
CARDIAC TROPONIN I PNL SERPL HS: 46 NG/L
CARDIAC TROPONIN I PNL SERPL HS: 57 NG/L
CHLORIDE SERPL-SCNC: 108 MMOL/L (ref 96–108)
CK SERPL-CCNC: 46 U/L (ref 26–192)
CLARITY UR: CLEAR
CO2 SERPL-SCNC: 20 MMOL/L (ref 21–32)
COLOR UR: YELLOW
CREAT SERPL-MCNC: 1.12 MG/DL (ref 0.6–1.3)
D DIMER PPP FEU-MCNC: >20 UG/ML FEU
EOSINOPHIL # BLD AUTO: 0.21 THOUSAND/ΜL (ref 0–0.61)
EOSINOPHIL NFR BLD AUTO: 1 % (ref 0–6)
ERYTHROCYTE [DISTWIDTH] IN BLOOD BY AUTOMATED COUNT: 13.7 % (ref 11.6–15.1)
FLUAV RNA RESP QL NAA+PROBE: NEGATIVE
FLUBV RNA RESP QL NAA+PROBE: NEGATIVE
GFR SERPL CREATININE-BSD FRML MDRD: 47 ML/MIN/1.73SQ M
GLUCOSE SERPL-MCNC: 114 MG/DL (ref 65–140)
GLUCOSE SERPL-MCNC: 180 MG/DL (ref 65–140)
GLUCOSE UR STRIP-MCNC: NEGATIVE MG/DL
HCO3 BLDV-SCNC: 17.5 MMOL/L (ref 24–30)
HCT VFR BLD AUTO: 44 % (ref 34.8–46.1)
HGB BLD-MCNC: 13.5 G/DL (ref 11.5–15.4)
HGB UR QL STRIP.AUTO: NEGATIVE
HYALINE CASTS #/AREA URNS LPF: ABNORMAL /LPF
IMM GRANULOCYTES # BLD AUTO: 0.12 THOUSAND/UL (ref 0–0.2)
IMM GRANULOCYTES NFR BLD AUTO: 1 % (ref 0–2)
INR PPP: 1.21 (ref 0.84–1.19)
KETONES UR STRIP-MCNC: NEGATIVE MG/DL
LACTATE SERPL-SCNC: 1.8 MMOL/L (ref 0.5–2)
LACTATE SERPL-SCNC: 6.7 MMOL/L (ref 0.5–2)
LEUKOCYTE ESTERASE UR QL STRIP: NEGATIVE
LYMPHOCYTES # BLD AUTO: 3.31 THOUSANDS/ΜL (ref 0.6–4.47)
LYMPHOCYTES NFR BLD AUTO: 20 % (ref 14–44)
MCH RBC QN AUTO: 31 PG (ref 26.8–34.3)
MCHC RBC AUTO-ENTMCNC: 30.7 G/DL (ref 31.4–37.4)
MCV RBC AUTO: 101 FL (ref 82–98)
MONOCYTES # BLD AUTO: 1.34 THOUSAND/ΜL (ref 0.17–1.22)
MONOCYTES NFR BLD AUTO: 8 % (ref 4–12)
MUCOUS THREADS UR QL AUTO: ABNORMAL
NEUTROPHILS # BLD AUTO: 11.2 THOUSANDS/ΜL (ref 1.85–7.62)
NEUTS SEG NFR BLD AUTO: 70 % (ref 43–75)
NITRITE UR QL STRIP: POSITIVE
NON-SQ EPI CELLS URNS QL MICRO: ABNORMAL /HPF
NRBC BLD AUTO-RTO: 0 /100 WBCS
O2 CT BLDV-SCNC: 9.3 ML/DL
OTHER STN SPEC: ABNORMAL
PCO2 BLDV: 46.3 MM HG (ref 42–50)
PH BLDV: 7.2 [PH] (ref 7.3–7.4)
PH UR STRIP.AUTO: 5 [PH]
PLATELET # BLD AUTO: 180 THOUSANDS/UL (ref 149–390)
PMV BLD AUTO: 10.8 FL (ref 8.9–12.7)
PO2 BLDV: 30.8 MM HG (ref 35–45)
POTASSIUM SERPL-SCNC: 4.7 MMOL/L (ref 3.5–5.3)
PROT SERPL-MCNC: 6.6 G/DL (ref 6.4–8.4)
PROT UR STRIP-MCNC: ABNORMAL MG/DL
PROTHROMBIN TIME: 15.3 SECONDS (ref 11.6–14.5)
RBC # BLD AUTO: 4.35 MILLION/UL (ref 3.81–5.12)
RBC #/AREA URNS AUTO: ABNORMAL /HPF
RSV RNA RESP QL NAA+PROBE: NEGATIVE
SARS-COV-2 RNA RESP QL NAA+PROBE: NEGATIVE
SODIUM SERPL-SCNC: 142 MMOL/L (ref 135–147)
SP GR UR STRIP.AUTO: 1.02 (ref 1–1.03)
UROBILINOGEN UR QL STRIP.AUTO: 2 E.U./DL
WBC # BLD AUTO: 16.24 THOUSAND/UL (ref 4.31–10.16)
WBC #/AREA URNS AUTO: ABNORMAL /HPF

## 2023-04-16 RX ORDER — INSULIN LISPRO 100 [IU]/ML
1-6 INJECTION, SOLUTION INTRAVENOUS; SUBCUTANEOUS EVERY 6 HOURS SCHEDULED
Status: DISCONTINUED | OUTPATIENT
Start: 2023-04-17 | End: 2023-04-19

## 2023-04-16 RX ORDER — HEPARIN SODIUM 1000 [USP'U]/ML
7600 INJECTION, SOLUTION INTRAVENOUS; SUBCUTANEOUS EVERY 6 HOURS PRN
Status: DISCONTINUED | OUTPATIENT
Start: 2023-04-16 | End: 2023-04-20

## 2023-04-16 RX ORDER — HEPARIN SODIUM 1000 [USP'U]/ML
7600 INJECTION, SOLUTION INTRAVENOUS; SUBCUTANEOUS ONCE
Status: COMPLETED | OUTPATIENT
Start: 2023-04-16 | End: 2023-04-16

## 2023-04-16 RX ORDER — RASAGILINE 0.5 MG/1
1 TABLET ORAL DAILY
COMMUNITY
End: 2023-04-22 | Stop reason: SDUPTHER

## 2023-04-16 RX ORDER — HEPARIN SODIUM 1000 [USP'U]/ML
3800 INJECTION, SOLUTION INTRAVENOUS; SUBCUTANEOUS EVERY 6 HOURS PRN
Status: DISCONTINUED | OUTPATIENT
Start: 2023-04-16 | End: 2023-04-20

## 2023-04-16 RX ORDER — RIVASTIGMINE TARTRATE 3 MG/1
3 CAPSULE ORAL 2 TIMES DAILY
COMMUNITY
End: 2023-04-22

## 2023-04-16 RX ORDER — HEPARIN SODIUM 10000 [USP'U]/100ML
3-30 INJECTION, SOLUTION INTRAVENOUS
Status: DISCONTINUED | OUTPATIENT
Start: 2023-04-16 | End: 2023-04-20

## 2023-04-16 RX ORDER — CHLORHEXIDINE GLUCONATE 0.12 MG/ML
15 RINSE ORAL EVERY 12 HOURS SCHEDULED
Status: DISCONTINUED | OUTPATIENT
Start: 2023-04-16 | End: 2023-04-22 | Stop reason: HOSPADM

## 2023-04-16 RX ADMIN — SODIUM CHLORIDE 1000 ML: 0.9 INJECTION, SOLUTION INTRAVENOUS at 18:00

## 2023-04-16 RX ADMIN — HEPARIN SODIUM 18 UNITS/KG/HR: 10000 INJECTION, SOLUTION INTRAVENOUS at 18:54

## 2023-04-16 RX ADMIN — HEPARIN SODIUM 7600 UNITS: 1000 INJECTION INTRAVENOUS; SUBCUTANEOUS at 18:53

## 2023-04-16 RX ADMIN — IOHEXOL 100 ML: 350 INJECTION, SOLUTION INTRAVENOUS at 17:23

## 2023-04-16 RX ADMIN — CEFEPIME 2000 MG: 2 INJECTION, POWDER, FOR SOLUTION INTRAVENOUS at 18:00

## 2023-04-16 NOTE — ED PROVIDER NOTES
History  Chief Complaint   Patient presents with   • Shortness of Breath     Per EMS pt reported Sob starting today  Per EMS pt was found on the floor after falling from being SOB, no complaints from fall  EMS reports low oxygen in 80s on mid flow  Pt on Bipap upon arrival       65-year-old female history of Parkinson's status post deep brain stimulator implantation presents with change in behavior and difficulty breathing  Brought in by ambulance was placed on BiPAP for work of breathing after found to satting in the 80s on room air, appeared cyanotic  Reportedly may have fallen  Here in the ED patient transitioned to hospital BiPAP, well-appearing  Difficult to communicate with patient secondary to BiPAP mask and Parkinson's  Later accompanied by  who provides history  He states that she has been seeming less herself lately seems more off balance than usual   He does admit that she had a fall a week or 2 ago but did not think much of it  She uses a walker to ambulate with assistance  She has a history of a significant fall with a subdural hemorrhage over a year ago  He notes that today she seemed to be breathing more rapidly than usual and he became concerned  She seemed like she was going to fall today but she was lowered to the floor by  and caregiver  He endorses a history of several UTIs  He also admits that she has trouble with chewing and swallowing and has aspirated before  Generally patient can feed herself but needs help with all ADLs  He endorses lower extremity venous insufficiency but is not aware of history of CHF      - Prior charting reviewed     ROS - limited by BiPAP mask, Parkinson's dementia  Constitutional: denies fevers, chills, sweats  Eyes: denies eye pain  ENT: denies ear, sinus, throat pain  CV: denies chest pain  Resp: denies cough, SOB  GI: denies abdominal pain, nausea, vomiting, diarrhea, bloody or dark stool  : denies difficulty urinating, flank pain  MSK: denies neck or back pain  Neuro: denies headache, new numbness, tingling, weakness  Allergy/Immuno: denies recent illness, known sick contacts        Triage vital signs reviewed    Physical Exam  Const: alert, no acute distress, non-toxic appearing, no diaphoresis  Appears stated age, well-developed, appears comfortable on BiPAP  Eyes: no conjunctival injection, discharge or icterus  Head: normocephalic  Ears: auricles normal   Nose: normal without rhinorrhea  Mouth/throat: clear, moist   Neck: normal ROM, supple and without rigidity   CV: normal rate  Extremities warm and well-perfused   Resp: breathing unlabored, non-stridulous, clear to auscultation bilaterally  Abdomen: soft, non-tender, non-distended  MSK: no gross deformities appreciated  Skin: warm and dry with rapid capillary refill  Neuro: CNs II-XII grossly intact  Sensation and motor function of extremities grossly intact  Dulled mentation  Overall at baseline per   Psych: pleasant, cooperative, flat affect            Prior to Admission Medications   Prescriptions Last Dose Informant Patient Reported? Taking?    Coenzyme Q10 400 MG CAPS   No Yes   Sig: Take 1 capsule (400 mg total) by mouth daily   Patient taking differently: Take 100 mg by mouth daily   FIBER ADULT GUMMIES PO   Yes Yes   Sig: Take by mouth   Melatonin 5 MG CAPS   Yes Yes   Sig: Take 15 mg by mouth Bedtime   Multiple Vitamins-Minerals (MULTIVITAMIN GUMMIES WOMENS PO)   Yes Yes   Sig: Take by mouth   Nuplazid 34 MG CAPS   No Yes   Sig: Take 1tab daily   Omega-3 Fatty Acids (OMEGA-3 FISH OIL PO)   Yes Yes   Sig: take 2 daily   PARoxetine (PAXIL) 20 mg tablet   No Yes   Sig: Take 1 tablet (20 mg total) by mouth daily   Senna-Natural Laxatives (SENOKOT LAXATIVE PO)   Yes Yes   Sig: Take by mouth daily at bedtime   atenolol (TENORMIN) 50 mg tablet   No Yes   Sig: Take 1 tablet (50 mg total) by mouth daily   carbidopa-levodopa (SINEMET CR)  mg per tablet   No Yes   Sig: Take 1 tablet by mouth 2 (two) times a day qam and afternoon   docusate sodium (COLACE) 100 mg capsule   No No   Sig: Take 1 capsule (100 mg total) by mouth 2 (two) times a day for 3 days   Patient taking differently: Take 100 mg by mouth if needed   levETIRAcetam (KEPPRA) 500 mg tablet   No No   Sig: Take 1 tablet (500 mg total) by mouth every 12 (twelve) hours for 11 doses   rasagiline (AZILECT) 0 5 mg   Yes Yes   Sig: Take 1 mg by mouth daily   rivastigmine (EXELON) 3 mg capsule   Yes Yes   Sig: Take 3 mg by mouth 2 (two) times a day   rivastigmine (EXELON) 4 5 MG capsule   No No   Sig: TAKE 1 CAPSULE (4 5 MG TOTAL) BY MOUTH 2 (TWO) TIMES A DAY   rosuvastatin (CRESTOR) 5 mg tablet   No Yes   Sig: Take 1 tablet (5 mg total) by mouth every other day      Facility-Administered Medications: None       Past Medical History:   Diagnosis Date   • Anxiety    • Broken hip (HCC)    • Depression    • Diabetes mellitus ([facility] [address] )     pre diabetic   • Diabetes mellitus type 2, diet-controlled ([facility] [address] )    • Hyperlipidemia    • Hypertension    • Parkinson disease ([facility] [address] )    • Pneumonia        Past Surgical History:   Procedure Laterality Date   • ACHILLES TENDON SURGERY     • APPENDECTOMY     • COLONOSCOPY     • DEEP BRAIN STIMULATOR PLACEMENT     • DENTAL SURGERY     • FRACTURE SURGERY     • KNEE ARTHROSCOPY     • NV INSJ/RPLCMT CRANIAL NEUROSTIM PULSE GENERATOR Right 12/18/2018    Procedure: REPLACEMENT IMPLANTABLE PULSE GENERATOR (IPG) DEEP BRAIN STIMULATION (DBS);   Surgeon: Onofre Treviño MD;  Location:  MAIN OR;  Service: Neurosurgery   • NV INSJ/RPLCMT CRANIAL NEUROSTIM PULSE GENERATOR Left 4/7/2021    Procedure: REPLACEMENT IMPLANTABLE PULSE GENERATOR FOR DEEP BRAIN STIMULATOR, LEFT CHEST;  Surgeon: Onofre Treviño MD;  Location: [facility];  Service: Neurosurgery   • NV INSJ/RPLCMT CRANIAL NEUROSTIM PULSE GENERATOR Right 6/27/2022    Procedure: Right chest IPG replacement for Deep Brain Stimulator;  Surgeon: Harshal Lantigua MD;  Location: [facility];  Service: Neurosurgery   • NC LAPAROSCOPIC APPENDECTOMY N/A 8/16/2020    Procedure: APPENDECTOMY LAPAROSCOPIC;  Surgeon: Shannon Stock MD;  Location: [facility];  Service: General   • NC OPTX FEM SHFT FX W/INSJ IMED IMPLT W/WO SCREW Right 12/14/2019    Procedure: INSERTION NAIL IM FEMUR ANTEGRADE (TROCHANTERIC); Surgeon: Zaheer Leon DO;  Location: [facility];  Service: Orthopedics   • REPLACEMENT TOTAL KNEE     • REVERSE TOTAL SHOULDER ARTHROPLASTY Left 8/23/2018    Procedure: ARTHROPLASTY SHOULDER REVERSE;  Surgeon: Rhianna Cordero MD;  Location[facility];  Service: Orthopedics   • TONSILLECTOMY         Family History   Problem Relation Age of Onset   • Arthritis Mother    • No Known Problems Father    • No Known Problems Maternal Grandmother    • No Known Problems Maternal Grandfather    • No Known Problems Paternal Grandmother    • No Known Problems Paternal Grandfather    • No Known Problems Son    • No Known Problems Son      I have reviewed and agree with the history as documented  E-Cigarette/Vaping   • E-Cigarette Use Never User      E-Cigarette/Vaping Substances   • Nicotine No    • THC No    • CBD No    • Flavoring No    • Other No    • Unknown No      Social History     Tobacco Use   • Smoking status: Never   • Smokeless tobacco: Never   Vaping Use   • Vaping Use: Never used   Substance Use Topics   • Alcohol use:  Yes     Alcohol/week: 2 0 standard drinks     Types: 2 Glasses of wine per week     Comment: occasional   • Drug use: No       Review of Systems    Physical Exam  Physical Exam    Vital Signs  ED Triage Vitals   Temperature Pulse Respirations Blood Pressure SpO2   04/16/23 1700 04/16/23 1543 04/16/23 1543 04/16/23 1552 04/16/23 1543   97 7 °F (36 5 °C) 77 (!) 26 110/69 100 %      Temp Source Heart Rate Source Patient Position - Orthostatic VS BP Location FiO2 (%)   04/16/23 1700 04/16/23 1543 04/16/23 1552 04/16/23 1552 --   Oral Monitor Sitting Left arm       Pain Score 04/16/23 1900       No Pain           Vitals:    04/17/23 1015 04/17/23 1100 04/17/23 1115 04/17/23 1520   BP: (!) 134/103  105/70 (!) 61/47   Pulse: 66 64 64 66   Patient Position - Orthostatic VS:             Visual Acuity  Visual Acuity    Flowsheet Row Most Recent Value   L Pupil Size (mm) 3   R Pupil Size (mm) 3   L Pupil Shape Round   R Pupil Shape Round          ED Medications  Medications   heparin (porcine) 25,000 units in 0 45% NaCl 250 mL infusion (premix) (300 Units/kg/hr × 95 kg (Order-Specific) Intravenous Canceled Entry 4/17/23 1341)   heparin (porcine) injection 7,600 Units (has no administration in time range)   heparin (porcine) injection 3,800 Units (has no administration in time range)   chlorhexidine (PERIDEX) 0 12 % oral rinse 15 mL (15 mL Mouth/Throat Given 4/17/23 0815)   insulin lispro (HumaLOG) 100 units/mL subcutaneous injection 1-6 Units (1 Units Subcutaneous Not Given 4/17/23 1235)   rivastigmine (EXELON) capsule 4 5 mg (4 5 mg Oral Not Given 4/17/23 0802)   carbidopa-levodopa (SINEMET CR)  mg per ER tablet 1 tablet (1 tablet Oral Not Given 4/17/23 0802)   iohexol (OMNIPAQUE) 350 MG/ML injection (SINGLE-DOSE) 100 mL (100 mL Intravenous Given 4/16/23 1723)   sodium chloride 0 9 % bolus 1,000 mL (0 mL Intravenous Stopped 4/16/23 1844)   cefepime (MAXIPIME) 2 g/50 mL dextrose IVPB (0 mg Intravenous Stopped 4/16/23 1826)   heparin (porcine) injection 7,600 Units (7,600 Units Intravenous Given 4/16/23 1853)   lidocaine (PF) (XYLOCAINE-MPF) 1 % injection (10 mL Infiltration Given 4/17/23 1245)   iohexol (OMNIPAQUE) 350 MG/ML injection (SINGLE-DOSE) 200 mL (80 mL Intravenous Given 4/17/23 1527)       Diagnostic Studies  Results Reviewed     Procedure Component Value Units Date/Time    Blood culture #1 [[id number]] Collected: 04/16/23 1628    Lab Status: Preliminary result Specimen: Blood from Arm, Right Updated: 04/17/23 1416     Blood Culture Received in Microbiology Lab   Culture in Progress  Blood culture #2 [[id number]] Collected: 04/16/23 1619    Lab Status: Preliminary result Specimen: Blood from Arm, Right Updated: 04/17/23 1415     Blood Culture Received in Microbiology Lab  Culture in Progress  HS Troponin I 4hr [[id number]]  (Abnormal) Collected: 04/16/23 1940    Lab Status: Final result Specimen: Blood from Arm, Right Updated: 04/16/23 2020     hs TnI 4hr 57 ng/L      Delta 4hr hsTnI 13 ng/L     Lactic acid 2 Hours [[id number]]  (Normal) Collected: 04/16/23 1818    Lab Status: Final result Specimen: Blood from Arm, Right Updated: 04/16/23 1836     LACTIC ACID 1 8 mmol/L     Narrative:      Result may be elevated if tourniquet was used during collection  HS Troponin I 2hr [[id number]]  (Normal) Collected: 04/16/23 1741    Lab Status: Final result Specimen: Blood from Arm, Right Updated: 04/16/23 1827     hs TnI 2hr 46 ng/L      Delta 2hr hsTnI 2 ng/L     Urine Microscopic [[id number]]  (Abnormal) Collected: 04/16/23 1740    Lab Status: Final result Specimen: Urine, Straight Cath Updated: 04/16/23 1827     RBC, UA 2-4 /hpf      WBC, UA 10-20 /hpf      Epithelial Cells Occasional /hpf      Bacteria, UA Occasional /hpf      Hyaline Casts, UA 0-1 /lpf      OTHER OBSERVATIONS Yeast Cells Present     MUCUS THREADS Occasional    Urine culture [[id number]] Collected: 04/16/23 1740    Lab Status:  In process Specimen: Urine, Straight Cath Updated: 04/16/23 1827    UA w Reflex to Microscopic w Reflex to Culture [[id number]]  (Abnormal) Collected: 04/16/23 1740    Lab Status: Final result Specimen: Urine, Straight Cath Updated: 04/16/23 1805     Color, UA Yellow     Clarity, UA Clear     Specific [place], UA 1 020     pH, UA 5 0     Leukocytes, UA Negative     Nitrite, UA Positive     Protein, UA 30 (1+) mg/dl      Glucose, UA Negative mg/dl      Ketones, UA Negative mg/dl      Urobilinogen, UA 2 0 E U /dl      Bilirubin, UA Negative     Occult Blood, UA Negative    D-dimer, quantitative [[id number]]  (Abnormal) Collected: 04/16/23 1619    Lab Status: Final result Specimen: Blood from Arm, Right Updated: 04/16/23 1729     D-Dimer, Quant >20 00 ug/ml FEU     Narrative: In the evaluation for possible pulmonary embolism, in the appropriate (Well's Score of 4 or less) patient, the age adjusted d-dimer cutoff for this patient can be calculated as:    Age x 0 01 (in ug/mL) for Age-adjusted D-dimer exclusion threshold for a patient over 50 years  FLU/RSV/COVID - if FLU/RSV clinically relevant [[id number]]  (Normal) Collected: 04/16/23 1619    Lab Status: Final result Specimen: Nares from Nose Updated: 04/16/23 1721     SARS-CoV-2 Negative     INFLUENZA A PCR Negative     INFLUENZA B PCR Negative     RSV PCR Negative    Narrative:      FOR PEDIATRIC PATIENTS - copy/paste COVID Guidelines URL to browser: [email or web address]    SARS-CoV-2 assay is a Nucleic Acid Amplification assay intended for the  qualitative detection of nucleic acid from SARS-CoV-2 in nasopharyngeal  swabs  Results are for the presumptive identification of SARS-CoV-2 RNA  Positive results are indicative of infection with SARS-CoV-2, the virus  causing COVID-19, but do not rule out bacterial infection or co-infection  with other viruses  Laboratories within the [place] and its  territories are required to report all positive results to the appropriate  public health authorities  Negative results do not preclude SARS-CoV-2  infection and should not be used as the sole basis for treatment or other  patient management decisions  Negative results must be combined with  clinical observations, patient history, and epidemiological information  This test has not been FDA cleared or approved  This test has been authorized by FDA under an Emergency Use Authorization  (EUA)   This test is only authorized for the duration of time the  declaration that circumstances exist justifying the authorization of the  emergency use of an in vitro diagnostic tests for detection of SARS-CoV-2  virus and/or diagnosis of COVID-19 infection under section 564(b)(1) of  the Act, 21 U  S C  984YYP-0(H)(3), unless the authorization is terminated  or revoked sooner  The test has been validated but independent review by FDA  and CLIA is pending  Test performed using [employer] GeneXpert: This RT-PCR assay targets N2,  a region unique to SARS-CoV-2  A conserved region in the E-gene was chosen  for pan-Sarbecovirus detection which includes SARS-CoV-2  According to CMS-2020-01-R, this platform meets the definition of high-throughput technology  Lactic acid, plasma (w/reflex if result > 2 0) [[id number]]  (Abnormal) Collected: 04/16/23 1619    Lab Status: Final result Specimen: Blood from Arm, Right Updated: 04/16/23 1711     LACTIC ACID 6 7 mmol/L     Narrative:      Result may be elevated if tourniquet was used during collection      Protime-INR [[id number]]  (Abnormal) Collected: 04/16/23 1619    Lab Status: Final result Specimen: Blood from Arm, Right Updated: 04/16/23 1703     Protime 15 3 seconds      INR 1 21    APTT [[id number]]  (Normal) Collected: 04/16/23 1619    Lab Status: Final result Specimen: Blood from Arm, Right Updated: 04/16/23 1703     PTT 26 seconds     Blood gas, venous [[id number]]  (Abnormal) Collected: 04/16/23 1619    Lab Status: Final result Specimen: Blood from Arm, Right Updated: 04/16/23 1646     pH, Christopher 7 195     pCO2, Christopher 46 3 mm Hg      pO2, Christopher 30 8 mm Hg      HCO3, Christopher 17 5 mmol/L      Base Excess, Christopher -10 5 mmol/L      O2 Content, Christopher 9 3 ml/dL      O2 HGB, VENOUS 44 6 %     B-Type Natriuretic Peptide(BNP) [[id number]]  (Abnormal) Collected: 04/16/23 1548    Lab Status: Final result Specimen: Blood from Arm, Right Updated: 04/16/23 1644      pg/mL     CK [[id number]]  (Normal) Collected: 04/16/23 1548    Lab Status: Final result Specimen: Blood from Arm, Right Updated: 04/16/23 1641     Total CK 46 U/L     HS Troponin 0hr (reflex protocol) [[id number]]  (Normal) Collected: 04/16/23 1548    Lab Status: Final result Specimen: Blood from Arm, Right Updated: 04/16/23 1619     hs TnI 0hr 44 ng/L     Comprehensive metabolic panel [[id number]]  (Abnormal) Collected: 04/16/23 1548    Lab Status: Final result Specimen: Blood from Arm, Right Updated: 04/16/23 1614     Sodium 142 mmol/L      Potassium 4 7 mmol/L      Chloride 108 mmol/L      CO2 20 mmol/L      ANION GAP 14 mmol/L      BUN 30 mg/dL      Creatinine 1 12 mg/dL      Glucose 180 mg/dL      Calcium 9 3 mg/dL      AST 24 U/L      ALT 26 U/L      Alkaline Phosphatase 46 U/L      Total Protein 6 6 g/dL      Albumin 3 9 g/dL      Total Bilirubin 0 52 mg/dL      eGFR 47 ml/min/1 73sq m     Narrative:      [place] guidelines for Chronic Kidney Disease (CKD):   •  Stage 1 with normal or high GFR (GFR > 90 mL/min/1 73 square meters)  •  Stage 2 Mild CKD (GFR = 60-89 mL/min/1 73 square meters)  •  Stage 3A Moderate CKD (GFR = 45-59 mL/min/1 73 square meters)  •  Stage 3B Moderate CKD (GFR = 30-44 mL/min/1 73 square meters)  •  Stage 4 Severe CKD (GFR = 15-29 mL/min/1 73 square meters)  •  Stage 5 End Stage CKD (GFR <15 mL/min/1 73 square meters)  Note: GFR calculation is accurate only with a steady state creatinine    CBC and differential [[id number]]  (Abnormal) Collected: 04/16/23 1548    Lab Status: Final result Specimen: Blood from Arm, Right Updated: 04/16/23 1554     WBC 16 24 Thousand/uL      RBC 4 35 Million/uL      Hemoglobin 13 5 g/dL      Hematocrit 44 0 %       fL      MCH 31 0 pg      MCHC 30 7 g/dL      RDW 13 7 %      MPV 10 8 fL      Platelets 945 Thousands/uL      nRBC 0 /100 WBCs      Neutrophils Relative 70 %      Immat GRANS % 1 %      Lymphocytes Relative 20 %      Monocytes Relative 8 %      Eosinophils Relative 1 %      Basophils Relative 0 %      Neutrophils Absolute 11 20 Thousands/µL      Immature Grans Absolute 0 12 Thousand/uL      Lymphocytes Absolute 3 31 Thousands/µL      Monocytes Absolute 1 34 Thousand/µL      Eosinophils Absolute 0 21 Thousand/µL      Basophils Absolute 0 06 Thousands/µL                  CT head without contrast   Final Result by Юлия Corrales MD (04/16 1815)      No acute intracranial abnormality  Workstation performed: [id number]         CT chest abdomen pelvis w contrast   Final Result by Юлия Corrales MD (04/16 1842)      Large emboli in the distal bilateral main pulmonary arteries extending into multiple segmental arteries  Right heart strain pattern (RV/LV ratio is 3 2)  No evidence of acute abnormality in the abdomen or pelvis  Colonic diverticulosis without diverticulitis  Incidental 3 2 cm and 4 1 cm right adnexal simple-appearing cysts  According to current guidelines Shalom Javier Radiol 2020; 77:978-505) in this postmenopausal woman, this should be followed up in 6 to 12 months by pelvic ultrasound  I personally discussed this study with Viridiana MERINO on 4/16/2023 at 6:40 PM                           Workstation performed: [id number]         XR chest 1 view portable   Final Result by Juventino Donato MD (04/17 5006)      No acute cardiopulmonary disease                    Workstation performed: [id number]         IR PE endovascular therapy    (Results Pending)   IR other    (Results Pending)              Procedures  CriticalCare Time    Date/Time: 4/17/2023 3:44 PM  Performed by: Jair Rasheed MD  Authorized by: Jair Rasheed MD     Critical care provider statement:     Critical care time (minutes):  40    Critical care was necessary to treat or prevent imminent or life-threatening deterioration of the following conditions:  Respiratory failure, circulatory failure, dehydration and sepsis    Critical care was time spent personally by me on the following activities:  Interpretation of cardiac output measurements, obtaining history from patient or surrogate, ordering and performing treatments and interventions, development of treatment plan with patient or surrogate, ordering and review of laboratory studies, discussions with consultants, ordering and review of radiographic studies, re-evaluation of patient's condition, review of old charts, evaluation of patient's response to treatment and examination of patient    I assumed direction of critical care for this patient from another provider in my specialty: no               ED Course                               SBIRT 20yo+    Flowsheet Row Most Recent Value   Initial Alcohol Screen: US AUDIT-C     1  How often do you have a drink containing alcohol? 0 Filed at: 04/16/2023 1546   2  How many drinks containing alcohol do you have on a typical day you are drinking? 0 Filed at: 04/16/2023 1546   3b  FEMALE Any Age, or MALE 65+: How often do you have 4 or more drinks on one occassion? 0 Filed at: 04/16/2023 1546   Audit-C Score 0 Filed at: 04/16/2023 1546   ELIANA: How many times in the past year have you    Used an illegal drug or used a prescription medication for non-medical reasons? Never Filed at: 04/16/2023 1546                    Medical Decision Making  Limited EMS report, did not get to speak with them myself  After gathering more information from the  seems likely that patient may have aspirated or may have a UTI  Believe tachypnea here may be an early sign of sepsis  Broad work-up including CT head CT chest abdomen pelvis with contrast   Labs, chest x-ray, UA with straight cath  Urine obtained by straight cath does appear hazy and is foul-smelling per nurse  Noted lactic elevation, will initiate fluid resuscitation, adding cefepime  Noted elevation of troponin, BNP  May have element of CHF? Dimer particularly noted elevated after patient sent for CT  Becoming much more concerned for PE   Patient has appeared well off BiPAP but has continued to have a small O2 requirement  Not tachycardic but does take a beta blocker  I reached out for a stat CT read, unfortunately planning to escalate to CTA if CT without explanation for O2 requirement  Received call from radiologist noting PE with right heart strain  Spoke with IR followed by critical care attending  Anticipate mechanical thrombectomy tomorrow as patient is hemodynamically stable with low grade O2 requirement  Called  to inform him of findings and plan  He is appreciative and confirms patient is full code  Amount and/or Complexity of Data Reviewed  Labs: ordered  Radiology: ordered  Risk  Prescription drug management  Decision regarding hospitalization            Disposition  Final diagnoses:   Pulmonary embolism ([facility] [address] )   Hypoxia   Parkinson's disease ([facility] [address] )   Presence of neurostimulator   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 [facility]     Time reflects when diagnosis was documented in both MDM as applicable and the Disposition within this note     Time User Action Codes Description Comment    4/16/2023  6:59 PM Blanca Spine [I26 99] Pulmonary embolism ([facility] [address] )     4/16/2023  7:00 PM Maricarmen [place] Add [R09 02] Hypoxia     4/17/2023  3:54 PM Maricarmen [place] Add [G20] 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 ([facility] [address] )     4/17/2023  3:54 PM Maricarmen [place] Add [Z96 82] Presence of neurostimulator     4/17/2023  3:55 PM [place] Remove [G20] 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 ([facility] [address] )     4/17/2023  3:55 PM [place] Remove [Z96 82] Presence of neurostimulator     4/17/2023  3:55 PM Deysi Krabbe Parkinson's disease ([facility] [address] )     4/17/2023  3:55 PM [place] Add [Z96 82] Presence of neurostimulator     4/17/2023  3:55 PM [place] Add [G20] 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 [facility])       ED Disposition     ED Disposition   Admit    Condition   Stable    Date/Time   Sun Apr 16, 2023  7:00 PM    Comment   Case was discussed with Johanna Trejo and the patient's admission status was agreed to be Admission Status: inpatient status to the service of Dr Johanna Trejo              Follow-up Information    None         Current Discharge Medication List      CONTINUE these medications which have NOT CHANGED    Details   atenolol (TENORMIN) 50 mg tablet Take 1 tablet (50 mg total) by mouth daily  Qty: 30 tablet, Refills: 0    Associated Diagnoses: History of hypertension      carbidopa-levodopa (SINEMET CR)  mg per tablet Take 1 tablet by mouth 2 (two) times a day qam and afternoon  Qty: 180 tablet, Refills: 3    Associated Diagnoses: Parkinson's disease with use of electrical brain stimulation ([facility])      Coenzyme Q10 400 MG CAPS Take 1 capsule (400 mg total) by mouth daily  Qty: 30 capsule, Refills: 0    Associated Diagnoses: Type 2 diabetes mellitus without complication ([facility])      FIBER ADULT GUMMIES PO Take by mouth      Melatonin 5 MG CAPS Take 15 mg by mouth Bedtime      Multiple Vitamins-Minerals (MULTIVITAMIN GUMMIES WOMENS PO) Take by mouth      Nuplazid 34 MG CAPS Take 1tab daily  Qty: 90 capsule, Refills: 3    Associated Diagnoses: Hallucinations      Omega-3 Fatty Acids (OMEGA-3 FISH OIL PO) take 2 daily      PARoxetine (PAXIL) 20 mg tablet Take 1 tablet (20 mg total) by mouth daily  Qty: 30 tablet, Refills: 0    Associated Diagnoses: Anxiety      rasagiline (AZILECT) 0 5 mg Take 1 mg by mouth daily      !! rivastigmine (EXELON) 3 mg capsule Take 3 mg by mouth 2 (two) times a day      rosuvastatin (CRESTOR) 5 mg tablet Take 1 tablet (5 mg total) by mouth every other day  Qty: 30 tablet, Refills: 0    Associated Diagnoses: Other hyperlipidemia      Senna-Natural Laxatives (SENOKOT LAXATIVE PO) Take by mouth daily at bedtime      docusate sodium (COLACE) 100 mg capsule Take 1 capsule (100 mg total) by mouth 2 (two) times a day for 3 days  Qty: 6 capsule, Refills: 0    Associated Diagnoses: Post-operative state      levETIRAcetam (KEPPRA) 500 mg tablet Take 1 tablet (500 mg total) by mouth every 12 (twelve) hours for 11 doses  Qty: 11 tablet, Refills: 0    Associated Diagnoses: Subdural hemorrhage ([facility] )      ! ! 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 [address] )       ! ! - Potential duplicate medications found  Please discuss with provider  No discharge procedures on file      PDMP Review       Value Time User    PDMP Reviewed  Yes 3/30/2023  1:53 PM Callie Cota MD          ED Provider  Electronically Signed by           Stefano Skiff, MD  04/17/23 [id number]

## 2023-04-17 ENCOUNTER — APPOINTMENT (INPATIENT)
Dept: RADIOLOGY | Facility: HOSPITAL | Age: 78
End: 2023-04-17

## 2023-04-17 ENCOUNTER — APPOINTMENT (INPATIENT)
Dept: NON INVASIVE DIAGNOSTICS | Facility: HOSPITAL | Age: 78
End: 2023-04-17

## 2023-04-17 LAB
ABO GROUP BLD: NORMAL
ANION GAP SERPL CALCULATED.3IONS-SCNC: 7 MMOL/L (ref 4–13)
AORTIC ROOT: 3.6 CM
AORTIC VALVE ANNULUS: 2.1 CM
APICAL FOUR CHAMBER EJECTION FRACTION: 70 %
APTT PPP: 133 SECONDS (ref 23–37)
APTT PPP: >210 SECONDS (ref 23–37)
APTT PPP: >210 SECONDS (ref 23–37)
ASCENDING AORTA: 3.8 CM
ATRIAL RATE: 150 BPM
ATRIAL RATE: 468 BPM
ATRIAL RATE: 72 BPM
BASOPHILS # BLD AUTO: 0.04 THOUSANDS/ΜL (ref 0–0.1)
BASOPHILS NFR BLD AUTO: 0 % (ref 0–1)
BLD GP AB SCN SERPL QL: NEGATIVE
BUN SERPL-MCNC: 28 MG/DL (ref 5–25)
CALCIUM SERPL-MCNC: 8.6 MG/DL (ref 8.4–10.2)
CHLORIDE SERPL-SCNC: 112 MMOL/L (ref 96–108)
CO2 SERPL-SCNC: 23 MMOL/L (ref 21–32)
CREAT SERPL-MCNC: 0.78 MG/DL (ref 0.6–1.3)
E WAVE DECELERATION TIME: 286 MS
EOSINOPHIL # BLD AUTO: 0.21 THOUSAND/ΜL (ref 0–0.61)
EOSINOPHIL NFR BLD AUTO: 2 % (ref 0–6)
ERYTHROCYTE [DISTWIDTH] IN BLOOD BY AUTOMATED COUNT: 13.7 % (ref 11.6–15.1)
FRACTIONAL SHORTENING: 38 (ref 28–44)
GFR SERPL CREATININE-BSD FRML MDRD: 73 ML/MIN/1.73SQ M
GLUCOSE SERPL-MCNC: 109 MG/DL (ref 65–140)
GLUCOSE SERPL-MCNC: 91 MG/DL (ref 65–140)
GLUCOSE SERPL-MCNC: 97 MG/DL (ref 65–140)
HCT VFR BLD AUTO: 36.9 % (ref 34.8–46.1)
HGB BLD-MCNC: 11.8 G/DL (ref 11.5–15.4)
IMM GRANULOCYTES # BLD AUTO: 0.07 THOUSAND/UL (ref 0–0.2)
IMM GRANULOCYTES NFR BLD AUTO: 1 % (ref 0–2)
INTERVENTRICULAR SEPTUM IN DIASTOLE (PARASTERNAL SHORT AXIS VIEW): 1.1 CM
INTERVENTRICULAR SEPTUM: 1.1 CM (ref 0.6–1.1)
KCT BLD-ACNC: 176 SEC (ref 89–137)
KCT BLD-ACNC: 226 SEC (ref 89–137)
LEFT ATRIUM SIZE: 3.6 CM
LEFT INTERNAL DIMENSION IN SYSTOLE: 2 CM (ref 2.1–4)
LEFT VENTRICULAR INTERNAL DIMENSION IN DIASTOLE: 3.2 CM (ref 3.5–6)
LEFT VENTRICULAR POSTERIOR WALL IN END DIASTOLE: 1.2 CM
LEFT VENTRICULAR STROKE VOLUME: 29 ML
LVSV (TEICH): 29 ML
LYMPHOCYTES # BLD AUTO: 3.15 THOUSANDS/ΜL (ref 0.6–4.47)
LYMPHOCYTES NFR BLD AUTO: 25 % (ref 14–44)
MAGNESIUM SERPL-MCNC: 2 MG/DL (ref 1.9–2.7)
MCH RBC QN AUTO: 31.6 PG (ref 26.8–34.3)
MCHC RBC AUTO-ENTMCNC: 32 G/DL (ref 31.4–37.4)
MCV RBC AUTO: 99 FL (ref 82–98)
MONOCYTES # BLD AUTO: 0.87 THOUSAND/ΜL (ref 0.17–1.22)
MONOCYTES NFR BLD AUTO: 7 % (ref 4–12)
MV E'TISSUE VEL-SEP: 6 CM/S
MV PEAK A VEL: 0.74 M/S
MV PEAK E VEL: 59 CM/S
MV STENOSIS PRESSURE HALF TIME: 83 MS
MV VALVE AREA P 1/2 METHOD: 2.65
NEUTROPHILS # BLD AUTO: 8.25 THOUSANDS/ΜL (ref 1.85–7.62)
NEUTS SEG NFR BLD AUTO: 65 % (ref 43–75)
NRBC BLD AUTO-RTO: 0 /100 WBCS
P AXIS: 0 DEGREES
PA SYSTOLIC PRESSURE: 59 MMHG
PHOSPHATE SERPL-MCNC: 3.1 MG/DL (ref 2.3–4.1)
PLATELET # BLD AUTO: 130 THOUSANDS/UL (ref 149–390)
PMV BLD AUTO: 10.6 FL (ref 8.9–12.7)
POTASSIUM SERPL-SCNC: 4 MMOL/L (ref 3.5–5.3)
QRS AXIS: -20 DEGREES
QRS AXIS: -31 DEGREES
QRS AXIS: 2 DEGREES
QRSD INTERVAL: 88 MS
QRSD INTERVAL: 88 MS
QRSD INTERVAL: 94 MS
QT INTERVAL: 374 MS
QT INTERVAL: 460 MS
QT INTERVAL: 598 MS
QTC INTERVAL: 412 MS
QTC INTERVAL: 503 MS
QTC INTERVAL: 654 MS
RBC # BLD AUTO: 3.74 MILLION/UL (ref 3.81–5.12)
RH BLD: POSITIVE
SINOTUBULAR JUNCTION: 2.6 CM
SL CV LV EF: 65
SL CV PED ECHO LEFT VENTRICLE DIASTOLIC VOLUME (MOD BIPLANE) 2D: 41 ML
SL CV PED ECHO LEFT VENTRICLE SYSTOLIC VOLUME (MOD BIPLANE) 2D: 12 ML
SL CV SINUS OF VALSALVA 2D: 3.3 CM
SODIUM SERPL-SCNC: 142 MMOL/L (ref 135–147)
SPECIMEN EXPIRATION DATE: NORMAL
SPECIMEN SOURCE: ABNORMAL
SPECIMEN SOURCE: ABNORMAL
STJ: 2.6 CM
T WAVE AXIS: 116 DEGREES
T WAVE AXIS: 45 DEGREES
T WAVE AXIS: 57 DEGREES
TR MAX PG: 45 MMHG
TR PEAK VELOCITY: 3.4 M/S
TRICUSPID ANNULAR PLANE SYSTOLIC EXCURSION: 1.9 CM
TRICUSPID VALVE PEAK REGURGITATION VELOCITY: 3.35 M/S
VENTRICULAR RATE: 72 BPM
VENTRICULAR RATE: 72 BPM
VENTRICULAR RATE: 73 BPM
WBC # BLD AUTO: 12.59 THOUSAND/UL (ref 4.31–10.16)

## 2023-04-17 PROCEDURE — B31S1ZZ FLUOROSCOPY OF RIGHT PULMONARY ARTERY USING LOW OSMOLAR CONTRAST: ICD-10-PCS | Performed by: RADIOLOGY

## 2023-04-17 PROCEDURE — 02CQ3ZZ EXTIRPATION OF MATTER FROM RIGHT PULMONARY ARTERY, PERCUTANEOUS APPROACH: ICD-10-PCS | Performed by: RADIOLOGY

## 2023-04-17 PROCEDURE — B31T1ZZ FLUOROSCOPY OF LEFT PULMONARY ARTERY USING LOW OSMOLAR CONTRAST: ICD-10-PCS | Performed by: RADIOLOGY

## 2023-04-17 PROCEDURE — 02CR3ZZ EXTIRPATION OF MATTER FROM LEFT PULMONARY ARTERY, PERCUTANEOUS APPROACH: ICD-10-PCS | Performed by: RADIOLOGY

## 2023-04-17 RX ORDER — LIDOCAINE HYDROCHLORIDE 10 MG/ML
INJECTION, SOLUTION EPIDURAL; INFILTRATION; INTRACAUDAL; PERINEURAL AS NEEDED
Status: COMPLETED | OUTPATIENT
Start: 2023-04-17 | End: 2023-04-17

## 2023-04-17 RX ORDER — CARBIDOPA AND LEVODOPA 50; 200 MG/1; MG/1
1 TABLET, EXTENDED RELEASE ORAL 2 TIMES DAILY
Status: DISCONTINUED | OUTPATIENT
Start: 2023-04-17 | End: 2023-04-22 | Stop reason: HOSPADM

## 2023-04-17 RX ADMIN — CARBIDOPA AND LEVODOPA 1 TABLET: 50; 200 TABLET, EXTENDED RELEASE ORAL at 20:21

## 2023-04-17 RX ADMIN — RIVASTIGMINE TARTRATE 4.5 MG: 3 CAPSULE ORAL at 20:21

## 2023-04-17 RX ADMIN — IOHEXOL 80 ML: 350 INJECTION, SOLUTION INTRAVENOUS at 15:27

## 2023-04-17 RX ADMIN — LIDOCAINE HYDROCHLORIDE 10 ML: 10 INJECTION, SOLUTION EPIDURAL; INFILTRATION; INTRACAUDAL; PERINEURAL at 12:45

## 2023-04-17 RX ADMIN — CEFEPIME HYDROCHLORIDE 2000 MG: 2 INJECTION, POWDER, FOR SOLUTION INTRAVENOUS at 05:24

## 2023-04-17 RX ADMIN — CHLORHEXIDINE GLUCONATE 0.12% ORAL RINSE 15 ML: 1.2 LIQUID ORAL at 20:20

## 2023-04-17 RX ADMIN — CHLORHEXIDINE GLUCONATE 0.12% ORAL RINSE 15 ML: 1.2 LIQUID ORAL at 08:15

## 2023-04-17 RX ADMIN — HEPARIN SODIUM 15 UNITS/KG/HR: 10000 INJECTION, SOLUTION INTRAVENOUS at 07:29

## 2023-04-17 NOTE — QUICK NOTE
Called the patient's spouse regarding the findings on CT chest and the plan for IR thrombectomy  All questions and concerns were addressed

## 2023-04-17 NOTE — ASSESSMENT & PLAN NOTE
4/16 cta chest: Large filling defects in the distal aspects of the bilateral main pulmonary arteries extending into multiple segmental arteries bilaterally  Elevated RV/LV ratio of 3 2 indicative of right heart strain    -heparin gtt  -IR notified in ER   Pt to have thrombectomy in am   -monitor tele  -NC for pulse ox >88

## 2023-04-17 NOTE — ASSESSMENT & PLAN NOTE
-recent fall in bathroom and hit her back  -but recently irritable, trouble talking, and not walking well per - possibly 2/2 uti vs pe vs worsening parkinsons vs other

## 2023-04-17 NOTE — ASSESSMENT & PLAN NOTE
4/16 cta chest: Large filling defects in the distal aspects of the bilateral main pulmonary arteries extending into multiple segmental arteries bilaterally  Elevated RV/LV ratio of 3 2 indicative of right heart strain    -heparin gtt  -IR notified in ER   Pt to have thrombectomy in am   -monitor tele  -NC for pulse ox >88   -echo pending

## 2023-04-17 NOTE — ASSESSMENT & PLAN NOTE
-recent fall in bathroom and hit her back with ecchymosis  -but recently irritable, trouble talking, and not walking well per - possibly 2/2 uti vs pe vs worsening parkinsons vs other

## 2023-04-17 NOTE — TELEMEDICINE
e-Consult (IP[facility])  - Interventional Radiology  Savana Mcdonald 65 y o  female MRN: [id number]  Unit/Bed#: ICU 01 Encounter: [id number]          Interventional Radiology has been consulted to evaluate Savana Mcdonald    We were consulted by Critical Care concerning this patient with pulmonary embolus  Patient was admitted to the emergency department last night with concern for change in behavior and increased respiratory difficulty  Patient is status post brain stimulator placement for Parkinson's disease  She was found to have a large bilateral pulmonary embolus with evidence of heart strain       Inpatient Consult to IR  Consult performed by: Alecia Coreas PA-C  Consult ordered by: Florecita Abbott DO        04/17/23    Assessment/Recommendation:     1  Pulmonary Embolism with Heart Strain  -Plan for intervention as soon as reasonably possible, tentatively 11am today  - RV/LV ratio 3 2  - trop 57, bnp 937  -Case reviewed with Dr Lamont Galvin of IR    11-20 minutes, >50% of the total time devoted to medical consultative verbal/EMR discussion between providers  Written report will be generated in the EMR  Thank you for allowing Interventional Radiology to participate in the care of Savana Mcdonald  Please don't hesitate to call or TigerText us with any questions       Alecia Coreas PA-C

## 2023-04-17 NOTE — PLAN OF CARE
Problem: Potential for Falls  Goal: Patient will remain free of falls  Description: INTERVENTIONS:  - Educate patient/family on patient safety including physical limitations  - Instruct patient to call for assistance with activity   - Consult OT/PT to assist with strengthening/mobility   - Keep Call bell within reach  - Keep bed low and locked with side rails adjusted as appropriate  - Keep care items and personal belongings within reach  - Initiate and maintain comfort rounds  - Make Fall Risk Sign visible to staff  - Offer Toileting every 2 Hours, in advance of need  - Initiate/Maintain bed alarm  - Obtain necessary fall risk management equipment  - Apply yellow socks and bracelet for high fall risk patients  - Consider moving patient to room near nurses station  Outcome: Progressing     Problem: MOBILITY - ADULT  Goal: Maintain or return to baseline ADL function  Description: INTERVENTIONS:  -  Assess patient's ability to carry out ADLs; assess patient's baseline for ADL function and identify physical deficits which impact ability to perform ADLs (bathing, care of mouth/teeth, toileting, grooming, dressing, etc )  - Assess/evaluate cause of self-care deficits   - Assess range of motion  - Assess patient's mobility; develop plan if impaired  - Assess patient's need for assistive devices and provide as appropriate  - Encourage maximum independence but intervene and supervise when necessary  - Involve family in performance of ADLs  - Assess for home care needs following discharge   - Consider OT consult to assist with ADL evaluation and planning for discharge  - Provide patient education as appropriate  Outcome: Progressing  Goal: Maintains/Returns to pre admission functional level  Description: INTERVENTIONS:  - Perform BMAT or MOVE assessment daily    - Set and communicate daily mobility goal to care team and patient/family/caregiver     - Collaborate with rehabilitation services on mobility goals if consulted  - Reposition patient every 2 hours    - Dangle patient 3 times a day  - Stand patient 3 times a day  - Ambulate patient 3 times a day  - Out of bed to chair 3 times a day   - Out of bed for meals 3 times a day  - Out of bed for toileting  - Record patient progress and toleration of activity level   Outcome: Progressing     Problem: PAIN - ADULT  Goal: Verbalizes/displays adequate comfort level or baseline comfort level  Description: Interventions:  - Encourage patient to monitor pain and request assistance  - Assess pain using appropriate pain scale  - Administer analgesics based on type and severity of pain and evaluate response  - Implement non-pharmacological measures as appropriate and evaluate response  - Consider cultural and social influences on pain and pain management  - Notify physician/advanced practitioner if interventions unsuccessful or patient reports new pain  Outcome: Progressing     Problem: INFECTION - ADULT  Goal: Absence or prevention of progression during hospitalization  Description: INTERVENTIONS:  - Assess and monitor for signs and symptoms of infection  - Monitor lab/diagnostic results  - Monitor all insertion sites, i e  indwelling lines, tubes, and drains  - Monitor endotracheal if appropriate and nasal secretions for changes in amount and color  - [place] appropriate cooling/warming therapies per order  - Administer medications as ordered  - Instruct and encourage patient and family to use good hand hygiene technique  - Identify and instruct in appropriate isolation precautions for identified infection/condition  Outcome: Progressing  Goal: Absence of fever/infection during neutropenic period  Description: INTERVENTIONS:  - Monitor WBC    Outcome: Progressing     Problem: SAFETY ADULT  Goal: Patient will remain free of falls  Description: INTERVENTIONS:  - Educate patient/family on patient safety including physical limitations  - Instruct patient to call for assistance with activity   - Consult OT/PT to assist with strengthening/mobility   - Keep Call bell within reach  - Keep bed low and locked with side rails adjusted as appropriate  - Keep care items and personal belongings within reach  - Initiate and maintain comfort rounds  - Make Fall Risk Sign visible to staff  - Offer Toileting every 2 Hours, in advance of need  - Initiate/Maintain bed alarm  - Obtain necessary fall risk management equipment  - Apply yellow socks and bracelet for high fall risk patients  - Consider moving patient to room near nurses station  Outcome: Progressing  Goal: Maintain or return to baseline ADL function  Description: INTERVENTIONS:  -  Assess patient's ability to carry out ADLs; assess patient's baseline for ADL function and identify physical deficits which impact ability to perform ADLs (bathing, care of mouth/teeth, toileting, grooming, dressing, etc )  - Assess/evaluate cause of self-care deficits   - Assess range of motion  - Assess patient's mobility; develop plan if impaired  - Assess patient's need for assistive devices and provide as appropriate  - Encourage maximum independence but intervene and supervise when necessary  - Involve family in performance of ADLs  - Assess for home care needs following discharge   - Consider OT consult to assist with ADL evaluation and planning for discharge  - Provide patient education as appropriate  Outcome: Progressing  Goal: Maintains/Returns to pre admission functional level  Description: INTERVENTIONS:  - Perform BMAT or MOVE assessment daily    - Set and communicate daily mobility goal to care team and patient/family/caregiver  - Collaborate with rehabilitation services on mobility goals if consulted    - Reposition patient every 2 hours    - Dangle patient 3 times a day  - Stand patient 3 times a day  - Ambulate patient 3 times a day  - Out of bed to chair 3 times a day   - Out of bed for meals 3 times a day  - Out of bed for toileting  - Record patient progress and toleration of activity level   Outcome: Progressing     Problem: DISCHARGE PLANNING  Goal: Discharge to home or other facility with appropriate resources  Description: INTERVENTIONS:  - Identify barriers to discharge w/patient and caregiver  - Arrange for needed discharge resources and transportation as appropriate  - Identify discharge learning needs (meds, wound care, etc )  - Arrange for interpretive services to assist at discharge as needed  - Refer to Case Management Department for coordinating discharge planning if the patient needs post-hospital services based on physician/advanced practitioner order or complex needs related to functional status, cognitive ability, or social support system  Outcome: Progressing     Problem: Knowledge Deficit  Goal: Patient/family/caregiver demonstrates understanding of disease process, treatment plan, medications, and discharge instructions  Description: Complete learning assessment and assess knowledge base    Interventions:  - Provide teaching at level of understanding  - Provide teaching via preferred learning methods  Outcome: Progressing     Problem: CARDIOVASCULAR - ADULT  Goal: Maintains optimal cardiac output and hemodynamic stability  Description: INTERVENTIONS:  - Monitor I/O, vital signs and rhythm  - Monitor for S/S and trends of decreased cardiac output  - Administer and titrate ordered vasoactive medications to optimize hemodynamic stability  - Assess quality of pulses, skin color and temperature  - Assess for signs of decreased coronary artery perfusion  - Instruct patient to report change in severity of symptoms  Outcome: Progressing  Goal: Absence of cardiac dysrhythmias or at baseline rhythm  Description: INTERVENTIONS:  - Continuous cardiac monitoring, vital signs, obtain 12 lead EKG if ordered  - Administer antiarrhythmic and heart rate control medications as ordered  - Monitor electrolytes and administer replacement therapy as ordered  Outcome: Progressing     Problem: RESPIRATORY - ADULT  Goal: Achieves optimal ventilation and oxygenation  Description: INTERVENTIONS:  - Assess for changes in respiratory status  - Assess for changes in mentation and behavior  - Position to facilitate oxygenation and minimize respiratory effort  - Oxygen administered by appropriate delivery if ordered  - Initiate smoking cessation education as indicated  - Encourage broncho-pulmonary hygiene including cough, deep breathe, Incentive Spirometry  - Assess the need for suctioning and aspirate as needed  - Assess and instruct to report SOB or any respiratory difficulty  - Respiratory Therapy support as indicated  Outcome: Progressing     Problem: SKIN/TISSUE INTEGRITY - ADULT  Goal: Skin Integrity remains intact(Skin Breakdown Prevention)  Description: Assess:  -Inspect skin when repositioning, toileting, and assisting with ADLS  -Assess under medical devices  -Assess extremities for adequate circulation and sensation     Bed Management:  -Have minimal linens on bed & keep smooth, unwrinkled  -Change linens as needed when moist or perspiring  -Avoid sitting or lying in one position for more than 2 hours while in bed  -Keep HOB at 30 degrees     Toileting:  -Offer bedside commode  -Assess for incontinence every hour  -Use incontinent care products after each incontinent episode     Activity:  -Encourage activity and walks on unit  -Encourage or provide ROM exercises   -Turn and reposition patient every 2 Hours  -Use appropriate equipment to lift or move patient in bed  -Instruct/ Assist with weight shifting every 2 when out of bed in chair  -Consider limitation of chair time 2 hour intervals    Skin Care:  -Avoid use of baby powder, tape, friction and shearing, hot water or constrictive clothing  -Relieve pressure over bony prominences  -Do not massage red bony areas    Next Steps:  -Teach patient strategies to minimize risks    -Consider consults to  interdisciplinary teams Outcome: Progressing  Goal: Pressure injury heals and does not worsen  Description: Interventions:  - Implement low air loss mattress or specialty surface (Criteria met)  - Apply silicone foam dressing  - Instruct/assist with weight shifting every 60 minutes when in chair   - Limit chair time to 2 hour intervals  - Use special pressure reducing interventions   - Apply fecal or urinary incontinence containment device   - Perform passive or active ROM every 2 hours  - Turn and reposition patient & offload bony prominences  - Utilize friction reducing device or surface for transfers   - Consider consults to  interdisciplinary teams  - Use incontinent care products after each incontinent episode  - Consider nutrition services referral as needed  Outcome: Progressing     Problem: SAFETY,RESTRAINT: NV/NON-SELF DESTRUCTIVE BEHAVIOR  Goal: Remains free of harm/injury (restraint for non violent/non self-detsructive behavior)  Description: INTERVENTIONS:  - Instruct patient/family regarding restraint use   - Assess and monitor physiologic and psychological status   - Provide interventions and comfort measures to meet assessed patient needs   - Identify and implement measures to help patient regain control  - Assess readiness for release of restraint   Outcome: Progressing  Goal: Returns to optimal restraint-free functioning  Description: INTERVENTIONS:  - Assess the patient's behavior and symptoms that indicate continued need for restraint  - Identify and implement measures to help patient regain control  - Assess readiness for release of restraint   Outcome: Progressing

## 2023-04-17 NOTE — PROGRESS NOTES
"H&P reviewed  Patient has been taken off BiPAP  She is currently on room air  BP (!) 134/103   Pulse 66   Temp 98 3 °F (36 8 °C) (Oral)   Resp (!) 25   Ht 5' 7\" (1 702 m)   Wt 95 3 kg (210 lb)   LMP  (LMP Unknown)   SpO2 96%   BMI 32 89 kg/m²     Prior imaging was reviewed  58-year-old woman with history of Parkinson's disease brought to the ER by her  and found to have bilateral central pulmonary embolism with evidence of right heart strain  Echo performed confirming right sided dilatation  She was placed on BiPAP initially uncertain as to why though she is currently is 100% on room air in no apparent distress  She is normotensive currently with no hemodynamic instability since admission  Biomarkers were elevated on admission  She is intermediate high risk and catheter directed intervention was proposed  She has a brain stimulator in place and I believe this is a contraindication to thrombolytic therapy  She also has a remote history of traumatic subdural hemorrhage about 2 years ago  She had a fall recently as well with soft tissue contusion though her head CT did not show any intracranial bleed  I discussed intervention with her  and with her as well specifically performing mechanical thrombectomy  We discussed indications for procedure and risk of not performing procedure  They were agreeable to proceed with mechanical thrombectomy  Risks including but not limited to hemorrhage, arrhythmia, and death discussed  Informed written consent was obtained      Rebeca Fowler MD    "

## 2023-04-17 NOTE — INCIDENTAL FINDINGS
CT chest, abdomen, and pelvis with contrast showed incidental finding of right 3 2 x 4 1 cm right adnexal simple-appearing cyst  This should be followed up in 6 to 12 months by pelvic ultrasound

## 2023-04-17 NOTE — PROGRESS NOTES
[address] [place] [address]  Progress Note  Name: Jamie Capps  MRN: [id number]  Unit/Bed#: ICU 01 I Date of Admission: 4/16/2023   Date of Service: 4/17/2023 I Hospital Day: 1    Assessment/Plan   * Pulmonary embolism [facility])  Assessment & Plan  4/16 cta chest: Large filling defects in the distal aspects of the bilateral main pulmonary arteries extending into multiple segmental arteries bilaterally  Elevated RV/LV ratio of 3 2 indicative of right heart strain    -heparin gtt  -IR notified in ER  Pt to have thrombectomy in am   -monitor tele  -NC for pulse ox >88   -echo pending      Parkinson's disease ([address] )  Assessment & Plan  -neurostimulator in place  -cont home meds in am  -very poor balance per     Acute cystitis  Assessment & Plan  -abx started in ed and will continue for now  -culture pending  -afebrile    Fall  Assessment & Plan  -recent fall in bathroom and hit her back with ecchymosis  -but recently irritable, trouble talking, and not walking well per - possibly 2/2 uti vs pe vs worsening parkinsons vs other        Presence of neurostimulator  Assessment & Plan  -continue           ----------------------------------------------------------------------------------------  HPI/24hr events: 65 y o  with hx of parkinsons, neurostimulator, recent fall, sdh 4/21, hld, dm2,  who presents with sob and change in behavior pta   called ems  Pt brought in on bipap for sats in 80s  Pt slow to respond but appropriate due to parkinsons  Hx taken from ER and   Pt had a fall in bathroom and hit back recently  Pt has very poor balance and has been more irritable and had trouble talking and walking though didn't complain over last 1 5 weeks  In ER she was taken off bipap and placed on nc  Ct done in ER showed b/l pulm artery thrombus with right heart strain  IR notified and plan for thrombectomy in am  Pt transferred to ICU/step down  O/n--No acute events o/n   Pt pleasant without complaints  On heparin gtt and 3L nc  Patient appropriate for transfer out of the ICU today?: No  Disposition: Continue Stepdown Level 1 level of care   Code Status: Level 1 - Full Code  ---------------------------------------------------------------------------------------  SUBJECTIVE  Pt without complaints    Review of Systems   Constitutional: Negative  HENT: Negative  Respiratory: Negative  Cardiovascular: Negative  Gastrointestinal: Negative  Neurological: Negative  Review of systems was reviewed and negative unless stated above in HPI/24-hour events   ---------------------------------------------------------------------------------------  OBJECTIVE    Vitals   Vitals:    23 2208 23 2308 23 0008 23 0223   BP: 109/80 95/67 126/76 97/57   BP Location:       Pulse: 66 70 66 64   Resp: (!) 23 22 (!) 25 22   Temp:   98 1 °F (36 7 °C)    TempSrc:   Oral    SpO2: 95% (!) 85% 95% 99%   Weight:       Height:         Temp (24hrs), Av 8 °F (36 6 °C), Min:97 7 °F (36 5 °C), Max:98 1 °F (36 7 °C)  Current: Temperature: 98 1 °F (36 7 °C)          Respiratory:  SpO2: SpO2: 99 %         Physical Exam  Vitals and nursing note reviewed  Constitutional:       General: She is not in acute distress  Appearance: She is well-developed  HENT:      Head: Normocephalic and atraumatic  Mouth/Throat:      Mouth: Mucous membranes are moist    Eyes:      Conjunctiva/sclera: Conjunctivae normal    Cardiovascular:      Rate and Rhythm: Normal rate and regular rhythm  Pulses: Normal pulses  Heart sounds: Normal heart sounds  No murmur heard  Comments: neurostimulator b/l anterior cw  Pulmonary:      Effort: Pulmonary effort is normal  No respiratory distress  Breath sounds: Normal breath sounds  Abdominal:      Palpations: Abdomen is soft  Tenderness: There is no abdominal tenderness  Musculoskeletal:         General: No swelling        Cervical back: "Neck supple  Right lower leg: Edema present  Left lower leg: Edema present  Skin:     General: Skin is warm and dry  Capillary Refill: Capillary refill takes less than 2 seconds  Findings: Bruising present  Neurological:      Mental Status: She is alert and oriented to person, place, and time  Comments: Speech slow and at baseline   Psychiatric:         Mood and Affect: Mood normal          Laboratory and Diagnostics:  Results from last 7 days   Lab Units 04/16/23  1548   WBC Thousand/uL 16 24*   HEMOGLOBIN g/dL 13 5   HEMATOCRIT % 44 0   PLATELETS Thousands/uL 180   NEUTROS PCT % 70   MONOS PCT % 8     Results from last 7 days   Lab Units 04/16/23  1548   SODIUM mmol/L 142   POTASSIUM mmol/L 4 7   CHLORIDE mmol/L 108   CO2 mmol/L 20*   ANION GAP mmol/L 14*   BUN mg/dL 30*   CREATININE mg/dL 1 12   CALCIUM mg/dL 9 3   GLUCOSE RANDOM mg/dL 180*   ALT U/L 26   AST U/L 24   ALK PHOS U/L 46   ALBUMIN g/dL 3 9   TOTAL BILIRUBIN mg/dL 0 52          Results from last 7 days   Lab Units 04/16/23  1619   INR  1 21*   PTT seconds 26          Results from last 7 days   Lab Units 04/16/23  1818 04/16/23  1619   LACTIC ACID mmol/L 1 8 6 7*     ABG:    VBG:  Results from last 7 days   Lab Units 04/16/23  1619   PH SUNITA  7 195*   PCO2 SUNITA mm Hg 46 3   PO2 SUNITA mm Hg 30 8*   HCO3 SUNITA mmol/L 17 5*   BASE EXC SUNITA mmol/L -10 5           Micro  Results from last 7 days   Lab Units 04/16/23  1628 04/16/23  1619   BLOOD CULTURE  Received in Microbiology Lab  Culture in Progress  Received in Microbiology Lab  Culture in Progress  EKG: artifact but nsr  Imaging: I have personally reviewed pertinent reports     and I have personally reviewed pertinent films in PACS    Intake and Output  I/O       04/15 0701  04/16 0700 04/16 0701 04/17 0700    I V  (mL/kg)  121 4 (1 3)    Total Intake(mL/kg)  121 4 (1 3)    Net  +121 4          Unmeasured Urine Occurrence  1 x          Height and Weights   Height: 5' 7\" " (170 2 cm)  IBW (Ideal Body Weight): 61 6 kg  Body mass index is 32 91 kg/m²  Weight (last 2 days)     Date/Time Weight    04/16/23 2044 95 3 (210 1)    04/16/23 1543 95 8 (211 2)            Nutrition       Diet Orders   (From admission, onward)             Start     Ordered    04/16/23 2122  Diet NPO; Sips with meds  Diet effective now        References:    Nutrtion Support Algorithm Enteral vs  Parenteral   Question Answer Comment   Diet Type NPO    NPO Except: Sips with meds    RD to adjust diet per protocol?  Yes        04/16/23 2126                  Active Medications  Scheduled Meds:  Current Facility-Administered Medications   Medication Dose Route Frequency Provider Last Rate   • [facility]  [facility]     • chlorhexidine  15 mL Mouth/Throat Q12H [facility]-     • heparin (porcine)  3-30 Units/kg/hr (Order-Specific) Intravenous Titrated Tiffany Elaine MD 18 Units/kg/hr (04/16/23 1854)   • heparin (porcine)  3,800 Units Intravenous Q6H PRN Tiffany Elaine MD     • heparin (porcine)  7,600 Units Intravenous Q6H PRN Tiffany Elaine MD     • insulin lispro  1-6 Units Subcutaneous Q6H [facility]       Continuous Infusions:  heparin (porcine), 3-30 Units/kg/hr (Order-Specific), Last Rate: 18 Units/kg/hr (04/16/23 1854)      PRN Meds:   heparin (porcine), 3,800 Units, Q6H PRN  heparin (porcine), 7,600 Units, Q6H PRN        Invasive Devices Review  Invasive Devices     Peripheral Intravenous Line  Duration           Peripheral IV 04/16/23 Left Forearm 1 day                Rationale for remaining devices: cont piv  ---------------------------------------------------------------------------------------  Advance Directive and Living Will:      Power of :    POLST:    ---------------------------------------------------------------------------------------  Care Time Delivered:   No Critical Care time spent       [facility] "[facility]      Portions of the record may have been created with voice recognition software  Occasional wrong word or \"sound a like\" substitutions may have occurred due to the inherent limitations of voice recognition software    Read the chart carefully and recognize, using context, where substitutions have occurred  "

## 2023-04-17 NOTE — PLAN OF CARE
Problem: Potential for Falls  Goal: Patient will remain free of falls  Description: INTERVENTIONS:  - Educate patient/family on patient safety including physical limitations  - Instruct patient to call for assistance with activity   - Consult OT/PT to assist with strengthening/mobility   - Keep Call bell within reach  - Keep bed low and locked with side rails adjusted as appropriate  - Keep care items and personal belongings within reach  - Initiate and maintain comfort rounds  - Make Fall Risk Sign visible to staff  - Offer Toileting in advance of need  - Initiate/Maintain bed alarm  - Obtain necessary fall risk management equipment  - Apply yellow socks and bracelet for high fall risk patients  - Consider moving patient to room near nurses station  Outcome: Progressing     Problem: MOBILITY - ADULT  Goal: Maintain or return to baseline ADL function  Description: INTERVENTIONS:  -  Assess patient's ability to carry out ADLs; assess patient's baseline for ADL function and identify physical deficits which impact ability to perform ADLs (bathing, care of mouth/teeth, toileting, grooming, dressing, etc )  - Assess/evaluate cause of self-care deficits   - Assess range of motion  - Assess patient's mobility; develop plan if impaired  - Assess patient's need for assistive devices and provide as appropriate  - Encourage maximum independence but intervene and supervise when necessary  - Involve family in performance of ADLs  - Assess for home care needs following discharge   - Consider OT consult to assist with ADL evaluation and planning for discharge  - Provide patient education as appropriate  Outcome: Progressing  Goal: Maintains/Returns to pre admission functional level  Description: INTERVENTIONS:  - Perform BMAT or MOVE assessment daily    - Set and communicate daily mobility goal to care team and patient/family/caregiver     - Collaborate with rehabilitation services on mobility goals if consulted  - Out of bed for toileting  - Record patient progress and toleration of activity level   Outcome: Progressing     Problem: PAIN - ADULT  Goal: Verbalizes/displays adequate comfort level or baseline comfort level  Description: Interventions:  - Encourage patient to monitor pain and request assistance  - Assess pain using appropriate pain scale  - Administer analgesics based on type and severity of pain and evaluate response  - Implement non-pharmacological measures as appropriate and evaluate response  - Consider cultural and social influences on pain and pain management  - Notify physician/advanced practitioner if interventions unsuccessful or patient reports new pain  Outcome: Progressing     Problem: INFECTION - ADULT  Goal: Absence or prevention of progression during hospitalization  Description: INTERVENTIONS:  - Assess and monitor for signs and symptoms of infection  - Monitor lab/diagnostic results  - Monitor all insertion sites, i e  indwelling lines, tubes, and drains  - Monitor endotracheal if appropriate and nasal secretions for changes in amount and color  - [place]acon appropriate cooling/warming therapies per order  - Administer medications as ordered  - Instruct and encourage patient and family to use good hand hygiene technique  - Identify and instruct in appropriate isolation precautions for identified infection/condition  Outcome: Progressing  Goal: Absence of fever/infection during neutropenic period  Description: INTERVENTIONS:  - Monitor WBC    Outcome: Progressing     Problem: SAFETY ADULT  Goal: Patient will remain free of falls  Description: INTERVENTIONS:  - Educate patient/family on patient safety including physical limitations  - Instruct patient to call for assistance with activity   - Consult OT/PT to assist with strengthening/mobility   - Keep Call bell within reach  - Keep bed low and locked with side rails adjusted as appropriate  - Keep care items and personal belongings within reach  - Initiate and maintain comfort rounds  - Make Fall Risk Sign visible to staff  - Offer Toileting in advance of need  - Initiate/Maintain bed alarm  - Obtain necessary fall risk management equipment  - Apply yellow socks and bracelet for high fall risk patients  - Consider moving patient to room near nurses station  Outcome: Progressing  Goal: Maintain or return to baseline ADL function  Description: INTERVENTIONS:  -  Assess patient's ability to carry out ADLs; assess patient's baseline for ADL function and identify physical deficits which impact ability to perform ADLs (bathing, care of mouth/teeth, toileting, grooming, dressing, etc )  - Assess/evaluate cause of self-care deficits   - Assess range of motion  - Assess patient's mobility; develop plan if impaired  - Assess patient's need for assistive devices and provide as appropriate  - Encourage maximum independence but intervene and supervise when necessary  - Involve family in performance of ADLs  - Assess for home care needs following discharge   - Consider OT consult to assist with ADL evaluation and planning for discharge  - Provide patient education as appropriate  Outcome: Progressing  Goal: Maintains/Returns to pre admission functional level  Description: INTERVENTIONS:  - Perform BMAT or MOVE assessment daily    - Set and communicate daily mobility goal to care team and patient/family/caregiver     - Collaborate with rehabilitation services on mobility goals if consulted  - Out of bed for toileting  - Record patient progress and toleration of activity level   Outcome: Progressing     Problem: CARDIOVASCULAR - ADULT  Goal: Maintains optimal cardiac output and hemodynamic stability  Description: INTERVENTIONS:  - Monitor I/O, vital signs and rhythm  - Monitor for S/S and trends of decreased cardiac output  - Administer and titrate ordered vasoactive medications to optimize hemodynamic stability  - Assess quality of pulses, skin color and temperature  - Assess for signs of decreased coronary artery perfusion  - Instruct patient to report change in severity of symptoms  Outcome: Progressing  Goal: Absence of cardiac dysrhythmias or at baseline rhythm  Description: INTERVENTIONS:  - Continuous cardiac monitoring, vital signs, obtain 12 lead EKG if ordered  - Administer antiarrhythmic and heart rate control medications as ordered  - Monitor electrolytes and administer replacement therapy as ordered  Outcome: Progressing     Problem: RESPIRATORY - ADULT  Goal: Achieves optimal ventilation and oxygenation  Description: INTERVENTIONS:  - Assess for changes in respiratory status  - Assess for changes in mentation and behavior  - Position to facilitate oxygenation and minimize respiratory effort  - Oxygen administered by appropriate delivery if ordered  - Initiate smoking cessation education as indicated  - Encourage broncho-pulmonary hygiene including cough, deep breathe, Incentive Spirometry  - Assess the need for suctioning and aspirate as needed  - Assess and instruct to report SOB or any respiratory difficulty  - Respiratory Therapy support as indicated  Outcome: Progressing     Goal: Pressure injury heals and does not worsen  Description: Interventions:  - Implement low air loss mattress or specialty surface (Criteria met)  - Apply silicone foam dressing  - Consider nutrition services referral as needed  Outcome: Progressing

## 2023-04-17 NOTE — BRIEF OP NOTE (RAD/CATH)
INTERVENTIONAL RADIOLOGY PROCEDURE NOTE    Date: 4/17/2023    Procedure:   Procedure Summary     Date: 04/17/23 Room / Location: [address] [place] [address] Interventional Radiology    Anesthesia Start: 6502 Anesthesia Stop:     Procedure: IR PE ENDOVASCULAR THERAPY Diagnosis: (large PE, heart strain)    Scheduled Providers:  Responsible Provider: Shmuel Yeh MD    Anesthesia Type: IV sedation with anesthesia ASA Status: 4 - Emergent          Preoperative diagnosis:   1  Pulmonary embolism ([address] )    2  Hypoxia         Postoperative diagnosis: Same  Surgeon: Singh Tellez MD     Assistant: None  No qualified resident was available  Blood loss: Less than 100 mL    Specimens: None    Findings: Successful mechanical thrombectomy bilateral pulmonary arteries  Pre-PA pressures: 30/9 (20)    Post PA pressures: 70/17 (23)    Complications: None immediate      Anesthesia: MAC sedation

## 2023-04-17 NOTE — DISCHARGE INSTRUCTIONS
ARTERIOGRAM    WHAT YOU SHOULD KNOW:   An angiogram is a procedure to look at arteries in your body  Arteries are the blood vessels that carry blood from your heart to your body  AFTER YOU LEAVE:     Self-care:   Limit activity: Rest for the remainder of the day of your procedure  Have some one with you until the next morning  Keep your arm or leg straight as much as possible  Rest as much as possible, sitting lying or reclining  Walk only to go to the bathroom, to bed or to eat  If the angiogram catheter was put in your leg, use the stairs as little as possible  No driving for 45-46 hours  No heavy lifting, >10 lbs  Or strenuous activity for 48 hours  Keep your wound clean and dry  Remove band aid/ dressing tomorrow  You may shower 24 hours after your procedure  Shower and wash groin area or wrist area gently with soap and water: beginning tomorrow  Rinse and pat Dry  Apply new water seal band aid  Repeat this process for 5 days  If there is any drainage from the puncture site, you should put on a clean bandage  No Powders, creams, lotions or antibiotic ointments for 5 days  No tub baths, hot tubs or swimming for 5 days  Watch for bleeding and bruising: It is normal to have a bruise and soreness where the angiogram catheter went in  Medication: If your angiogram was performed to treat blockages in your leg arteries, it is strongly recommended that you take both an antiplatelet medication (like aspirin or Plavix) to prevent clotting AND a statin drug (like Lipitor or Crestor), even if you have normal cholesterol  If these drugs are not ordered for you please contact either your Vascular Surgery office or the Interventional Radiology Dept during normal daytime working hours  See Interventional Radiology telephone numbers below    You Should Have Follow up with the vascular surgeon   call [phone] with questions  Diet:   You may resume your regular diet, Sips of flat soda will help with mild nausea  Drink more liquids than usual for the next 24 hours        IMMEDIATELY Contact Interventional Radiology at [phone] Tonya PATIENTS: Contact Interventional Radiology at [phone]) Elham Michel PATIENTS: Contact Interventional Radiology at [phone]) if any of the following occur: If your bruise gets larger or if you notice any active bleeding  APPLY DIRECT PRESSURE TO THE BLEEDING SITE  If you notice increased swelling or have increased pain at the puncture site   If you have any numbness or pain in the extremity of the puncture site   If that extremity seems cold or pale  You have fever greater than 101  Persistent nausea or vomitting    Follow up with your primary healthcare provider  as directed: Write down your questions so you remember to ask them during your visits  ARTERIOGRAM    WHAT YOU SHOULD KNOW:   An angiogram is a procedure to look at arteries in your body  Arteries are the blood vessels that carry blood from your heart to your body  AFTER YOU LEAVE:     Self-care:   Limit activity: Rest for the remainder of the day of your procedure  Have some one with you until the next morning  Keep your arm or leg straight as much as possible  Rest as much as possible, sitting lying or reclining  Walk only to go to the bathroom, to bed or to eat  If the angiogram catheter was put in your leg, use the stairs as little as possible  No driving for 67-44 hours  No heavy lifting, >10 lbs  Or strenuous activity for 48 hours  Keep your wound clean and dry  Remove band aid/ dressing tomorrow  You may shower 24 hours after your procedure  Shower and wash groin area or wrist area gently with soap and water: beginning tomorrow  Rinse and pat Dry  Apply new water seal band aid  Repeat this process for 5 days  If there is any drainage from the puncture site, you should put on a clean bandage  No Powders, creams, lotions or antibiotic ointments for 5 days    No tub baths, hot tubs or swimming for 5 days  Watch for bleeding and bruising: It is normal to have a bruise and soreness where the angiogram catheter went in  Medication: If your angiogram was performed to treat blockages in your leg arteries, it is strongly recommended that you take both an antiplatelet medication (like aspirin or Plavix) to prevent clotting AND a statin drug (like Lipitor or Crestor), even if you have normal cholesterol  If these drugs are not ordered for you please contact either your Vascular Surgery office or the Interventional Radiology Dept during normal daytime working hours  See Interventional Radiology telephone numbers below  You Should Have Follow up with the vascular surgeon   call [phone] with questions  Diet:   You may resume your regular diet, Sips of flat soda will help with mild nausea  Drink more liquids than usual for the next 24 hours        IMMEDIATELY Contact Interventional Radiology at [phone] Tonya PATIENTS: Contact Interventional Radiology at [phone]) Karen Buitrago PATIENTS: Contact Interventional Radiology at [phone]) if any of the following occur: If your bruise gets larger or if you notice any active bleeding  APPLY DIRECT PRESSURE TO THE BLEEDING SITE  If you notice increased swelling or have increased pain at the puncture site   If you have any numbness or pain in the extremity of the puncture site   If that extremity seems cold or pale  You have fever greater than 101  Persistent nausea or vomitting    Follow up with your primary healthcare provider  as directed: Write down your questions so you remember to ask them during your visits

## 2023-04-17 NOTE — H&P
[facility]  H&P  Name: Arianna Muñiz 65 y o  female I MRN: [id number]  Unit/Bed#: ICU 01 I Date of Admission: 4/16/2023   Date of Service: 4/16/2023 I Hospital Day: 0      Assessment/Plan   * Pulmonary embolism [facility])  Assessment & Plan  4/16 cta chest: Large filling defects in the distal aspects of the bilateral main pulmonary arteries extending into multiple segmental arteries bilaterally  Elevated RV/LV ratio of 3 2 indicative of right heart strain    -heparin gtt  -IR notified in ER  Pt to have thrombectomy in am   -monitor tele  -NC for pulse ox >88      Parkinson's disease (HCC)  Assessment & Plan  -neurostimulator in place  -cont home meds  -very poor balance per     Acute cystitis  Assessment & Plan  -abx started in ed and will continue for now  -culture pending  -afebrile     Fall  Assessment & Plan  -recent fall in bathroom and hit her back  -but recently irritable, trouble talking, and not walking well per - possibly 2/2 uti vs pe vs worsening parkinsons vs other        Presence of neurostimulator  Assessment & Plan  -continue         History of Present Illness     HPI: Arianna Muñiz is a 65 y o  who presents with sob and change in behavior pta   called ems  Pt brought in on bipap for sats in 80s  Pt slow to respond but appropriate due to parkinsons  Hx taken from ER and   Pt had a fall in bathroom and hit back recently  Pt has very poor balance and has been more irritable and had trouble talking and walking though didn't complain over last 1 5 weeks  In ER she was taken off bipap and placed on nc  Ct done in ER showed b/l pulm artery thrombus with right heart strain  IR notified and plan for thrombectomy in am  Pt transferred to ICU/step down  History obtained from spouse, chart review and the patient  Historical Information   Past Medical History:  No date:  Anxiety  No date: Broken hip ([address] )  No date: Depression  No date: Diabetes mellitus ([facility] )      Comment:  pre diabetic  No date: Diabetes mellitus type 2, diet-controlled ([place] [address] )  No date: Hyperlipidemia  No date: Hypertension  No date: Parkinson disease ([place] [address] )  No date: Pneumonia Past Surgical History:  No date: ACHILLES TENDON SURGERY  No date: APPENDECTOMY  No date: COLONOSCOPY  No date: DEEP BRAIN STIMULATOR PLACEMENT  No date: DENTAL SURGERY  No date: FRACTURE SURGERY  No date: KNEE ARTHROSCOPY  12/18/2018: NE INSJ/RPLCMT CRANIAL NEUROSTIM PULSE GENERATOR; Right      Comment:  Procedure: REPLACEMENT IMPLANTABLE PULSE GENERATOR (IPG)               DEEP BRAIN STIMULATION (DBS); Surgeon: Marie Blanc MD;                Location: [facility] MA[facility] OR;  Service: Neurosurgery  4/7/2021: NE INSJ/RPLCMT CRANIAL NEUROSTIM PULSE GENERATOR; Left      Comment:  Procedure: REPLACEMENT IMPLANTABLE PULSE GENERATOR FOR                DEEP BRAIN STIMULATOR, LEFT CHEST;  Surgeon: Marie Blanc MD;  Location: [facility];  Service: Neurosurgery  6/27/2022: NE INSJ/RPLCMT CRANIAL NEUROSTIM PULSE GENERATOR; Right      Comment:  Procedure: Right chest IPG replacement for Deep Brain                Stimulator;  Surgeon: Renetta Anaya MD;  Location: [facility] MA[facility]               OR;  Service: Neurosurgery  8/16/2020: NE LAPAROSCOPIC APPENDECTOMY; N/A      Comment:  Procedure: APPENDECTOMY LAPAROSCOPIC;  Surgeon: Boyd Flood MD;  Location: [facility] Main OR;  Service: General  12/14/2019: NE OPTX FEM SHFT FX W/INSJ IMED IMPLT W/WO SCREW; Right      Comment:  Procedure: INSERTION NAIL IM FEMUR ANTEGRADE                (TROCHANTERIC); Surgeon: Barney Schwab DO;  Location:                [facility] OR;  Service: Orthopedics  No date: REPLACEMENT TOTAL KNEE  8/23/2018: REVERSE TOTAL SHOULDER ARTHROPLASTY;  Left      Comment:  Procedure: ARTHROPLASTY SHOULDER REVERSE;  Surgeon:                Shanon Li MD;  Location: [facility];  Service:                Orthopedics  No date: TONSILLECTOMY   Current Outpatient Medications   Medication Instructions   • Ascorbic Acid (VITAMIN C ADULT GUMMIES PO) Take by mouth   • atenolol (TENORMIN) 50 mg, Oral, Daily   • carbidopa-levodopa (SINEMET CR)  mg per tablet 1 tablet, Oral, 2 times daily, qam and afternoon   • clonazePAM (KLONOPIN) 0 5 mg, Oral, Daily at bedtime   • Coenzyme Q10 400 mg, Oral, Daily   • docusate sodium (COLACE) 100 mg, Oral, 2 times daily   • FIBER ADULT GUMMIES PO Oral   • levETIRAcetam (KEPPRA) 500 mg, Oral, Every 12 hours scheduled   • Melatonin 15 mg, Oral, Bedtime   • Multiple Vitamins-Minerals (MULTIVITAMIN GUMMIES WOMENS PO) Oral   • Nuplazid 34 MG CAPS Take 1tab daily   • Omega-3 Fatty Acids (OMEGA-3 FISH OIL PO) take 2 daily   • PARoxetine (PAXIL) 20 mg, Oral, Daily   • rasagiline (AZILECT) 1 mg, Oral, Daily   • rivastigmine (EXELON) 4 5 mg, Oral, 2 times daily   • rivastigmine (EXELON) 3 mg, Oral, 2 times daily   • rosuvastatin (CRESTOR) 5 mg, Oral, Every other day   • Senna-Natural Laxatives (SENOKOT LAXATIVE PO) Oral, Daily at bedtime   • tiZANidine (ZANAFLEX) 2 mg, Oral, Every 8 hours PRN   • TURMERIC PO Daily    Allergies   Allergen Reactions   • Sulfa Antibiotics Hives      Social History     Tobacco Use   • Smoking status: Never   • Smokeless tobacco: Never   Vaping Use   • Vaping Use: Never used   Substance Use Topics   • Alcohol use:  Yes     Alcohol/week: 2 0 standard drinks     Types: 2 Glasses of wine per week     Comment: occasional   • Drug use: No    Family History   Problem Relation Age of Onset   • Arthritis Mother    • No Known Problems Father    • No Known Problems Maternal Grandmother    • No Known Problems Maternal Grandfather    • No Known Problems Paternal Grandmother    • No Known Problems Paternal Grandfather    • No Known Problems Son    • No Known Problems Son           Objective                            Vitals I/O      Most Recent Min/Max in 24hrs   Temp 97 7 °F (36 5 °C) Temp  Min: 97 7 °F (36 5 °C)  Max: 97 7 °F (36 5 °C)   Pulse 70 Pulse  Min: 66  Max: 86   Resp 22 Resp  Min: 21  Max: 27   /65 BP  Min: 102/66  Max: 130/77   O2 Sat 98 % SpO2  Min: 93 %  Max: 100 %      Intake/Output Summary (Last 24 hours) at 4/16/2023 2151  Last data filed at 4/16/2023 2101  Gross per 24 hour   Intake 36 2 ml   Output --   Net 36 2 ml         Diet NPO; Sips with meds     Invasive Monitoring Physical exam   none   Physical Exam  Vitals and nursing note reviewed  Constitutional:       General: She is not in acute distress  Appearance: Normal appearance  She is well-developed  HENT:      Head: Normocephalic and atraumatic  Nose: Nose normal       Mouth/Throat:      Mouth: Mucous membranes are moist    Eyes:      Conjunctiva/sclera: Conjunctivae normal       Pupils: Pupils are equal, round, and reactive to light  Cardiovascular:      Rate and Rhythm: Normal rate and regular rhythm  Pulses: Normal pulses  Heart sounds: Normal heart sounds  No murmur heard  Pulmonary:      Effort: Pulmonary effort is normal  No respiratory distress  Breath sounds: Normal breath sounds  Abdominal:      Palpations: Abdomen is soft  Tenderness: There is no abdominal tenderness  Musculoskeletal:      Cervical back: Neck supple  Right lower leg: Edema present  Left lower leg: Edema present  Skin:     General: Skin is warm and dry  Capillary Refill: Capillary refill takes less than 2 seconds  Coloration: Skin is pale  Neurological:      Mental Status: She is alert and oriented to person, place, and time  Mental status is at baseline  Comments: Slow speech but appropriate responses  [place]  Psychiatric:         Mood and Affect: Mood normal             Diagnostic Studies      EKG: artifact but otherwise nsr  Imaging:Large emboli in the distal bilateral main pulmonary arteries extending into multiple segmental arteries  Right heart strain pattern (RV/LV ratio is 3 2)    I have personally reviewed pertinent reports     and I have personally reviewed pertinent films in PACS     Medications:  Scheduled PRN   chlorhexidine, 15 mL, Q12H [address]      heparin (porcine), 3,800 Units, Q6H PRN  heparin (porcine), 7,600 Units, Q6H PRN       Continuous    heparin (porcine), 3-30 Units/kg/hr (Order-Specific), Last Rate: 18 Units/kg/hr (04/16/23 1854)         Labs:    CBC    Recent Labs     04/16/23  1548   WBC 16 24*   HGB 13 5   HCT 44 0        BMP    Recent Labs     04/16/23  1548   SODIUM 142   K 4 7      CO2 20*   AGAP 14*   BUN 30*   CREATININE 1 12   CALCIUM 9 3       Coags    Recent Labs     04/16/23  1619   INR 1 21*   PTT 26        Additional Electrolytes  No recent results       Blood Gas    No recent results  Recent Labs     04/16/23  1619   PHVEN 7 195*   LNL6TRL 46 3   PO2VEN 30 8*   MWI4EQU 17 5*   BEVEN -10 5    LFTs  Recent Labs     04/16/23  1548   ALT 26   AST 24   ALKPHOS 46   ALB 3 9   TBILI 0 52       Infectious  No recent results  Glucose  Recent Labs     04/16/23  1548   GLUC 180*               Anticipated Length of Stay is > 2 midnights  Sebastián Crooks, [place]

## 2023-04-18 LAB
ANION GAP SERPL CALCULATED.3IONS-SCNC: 8 MMOL/L (ref 4–13)
APTT PPP: 44 SECONDS (ref 23–37)
APTT PPP: 50 SECONDS (ref 23–37)
APTT PPP: 72 SECONDS (ref 23–37)
BASOPHILS # BLD AUTO: 0.04 THOUSANDS/ΜL (ref 0–0.1)
BASOPHILS NFR BLD AUTO: 0 % (ref 0–1)
BUN SERPL-MCNC: 19 MG/DL (ref 5–25)
CALCIUM SERPL-MCNC: 8.4 MG/DL (ref 8.4–10.2)
CHLORIDE SERPL-SCNC: 114 MMOL/L (ref 96–108)
CO2 SERPL-SCNC: 21 MMOL/L (ref 21–32)
CREAT SERPL-MCNC: 0.68 MG/DL (ref 0.6–1.3)
EOSINOPHIL # BLD AUTO: 0.3 THOUSAND/ΜL (ref 0–0.61)
EOSINOPHIL NFR BLD AUTO: 3 % (ref 0–6)
ERYTHROCYTE [DISTWIDTH] IN BLOOD BY AUTOMATED COUNT: 13.6 % (ref 11.6–15.1)
GFR SERPL CREATININE-BSD FRML MDRD: 84 ML/MIN/1.73SQ M
GLUCOSE SERPL-MCNC: 113 MG/DL (ref 65–140)
GLUCOSE SERPL-MCNC: 114 MG/DL (ref 65–140)
GLUCOSE SERPL-MCNC: 119 MG/DL (ref 65–140)
GLUCOSE SERPL-MCNC: 123 MG/DL (ref 65–140)
GLUCOSE SERPL-MCNC: 132 MG/DL (ref 65–140)
GLUCOSE SERPL-MCNC: 89 MG/DL (ref 65–140)
HCT VFR BLD AUTO: 31.7 % (ref 34.8–46.1)
HGB BLD-MCNC: 10.4 G/DL (ref 11.5–15.4)
IMM GRANULOCYTES # BLD AUTO: 0.06 THOUSAND/UL (ref 0–0.2)
IMM GRANULOCYTES NFR BLD AUTO: 1 % (ref 0–2)
LYMPHOCYTES # BLD AUTO: 2.1 THOUSANDS/ΜL (ref 0.6–4.47)
LYMPHOCYTES NFR BLD AUTO: 21 % (ref 14–44)
MCH RBC QN AUTO: 31.9 PG (ref 26.8–34.3)
MCHC RBC AUTO-ENTMCNC: 32.8 G/DL (ref 31.4–37.4)
MCV RBC AUTO: 97 FL (ref 82–98)
MONOCYTES # BLD AUTO: 0.78 THOUSAND/ΜL (ref 0.17–1.22)
MONOCYTES NFR BLD AUTO: 8 % (ref 4–12)
NEUTROPHILS # BLD AUTO: 6.93 THOUSANDS/ΜL (ref 1.85–7.62)
NEUTS SEG NFR BLD AUTO: 67 % (ref 43–75)
NRBC BLD AUTO-RTO: 0 /100 WBCS
PLATELET # BLD AUTO: 128 THOUSANDS/UL (ref 149–390)
PMV BLD AUTO: 10.7 FL (ref 8.9–12.7)
POTASSIUM SERPL-SCNC: 3.8 MMOL/L (ref 3.5–5.3)
RBC # BLD AUTO: 3.26 MILLION/UL (ref 3.81–5.12)
SODIUM SERPL-SCNC: 143 MMOL/L (ref 135–147)
WBC # BLD AUTO: 10.21 THOUSAND/UL (ref 4.31–10.16)

## 2023-04-18 RX ORDER — QUETIAPINE FUMARATE 25 MG/1
25 TABLET, FILM COATED ORAL
Status: DISCONTINUED | OUTPATIENT
Start: 2023-04-18 | End: 2023-04-19

## 2023-04-18 RX ORDER — LANOLIN ALCOHOL/MO/W.PET/CERES
3 CREAM (GRAM) TOPICAL
Status: DISCONTINUED | OUTPATIENT
Start: 2023-04-18 | End: 2023-04-22 | Stop reason: HOSPADM

## 2023-04-18 RX ADMIN — CHLORHEXIDINE GLUCONATE 0.12% ORAL RINSE 15 ML: 1.2 LIQUID ORAL at 08:00

## 2023-04-18 RX ADMIN — CARBIDOPA AND LEVODOPA 1 TABLET: 50; 200 TABLET, EXTENDED RELEASE ORAL at 21:18

## 2023-04-18 RX ADMIN — HEPARIN SODIUM 3800 UNITS: 1000 INJECTION INTRAVENOUS; SUBCUTANEOUS at 02:32

## 2023-04-18 RX ADMIN — HEPARIN SODIUM 3800 UNITS: 1000 INJECTION INTRAVENOUS; SUBCUTANEOUS at 17:39

## 2023-04-18 RX ADMIN — CHLORHEXIDINE GLUCONATE 0.12% ORAL RINSE 15 ML: 1.2 LIQUID ORAL at 21:18

## 2023-04-18 RX ADMIN — QUETIAPINE FUMARATE 25 MG: 25 TABLET ORAL at 21:18

## 2023-04-18 RX ADMIN — RIVASTIGMINE TARTRATE 4.5 MG: 3 CAPSULE ORAL at 08:00

## 2023-04-18 RX ADMIN — HEPARIN SODIUM 11 UNITS/KG/HR: 10000 INJECTION, SOLUTION INTRAVENOUS at 08:00

## 2023-04-18 RX ADMIN — CARBIDOPA AND LEVODOPA 1 TABLET: 50; 200 TABLET, EXTENDED RELEASE ORAL at 08:00

## 2023-04-18 RX ADMIN — RIVASTIGMINE TARTRATE 4.5 MG: 3 CAPSULE ORAL at 21:18

## 2023-04-18 RX ADMIN — Medication 3 MG: at 21:18

## 2023-04-18 NOTE — PROGRESS NOTES
"Progress Note -Interventional Radiology PA  Jeanette Palomino 65 y o  female MRN: [id number]  Unit/Bed#: ICU 01 Encounter: [id number]    Assessment:    65year old female with pmh of Parkinson's disease with gait difficulty, brain stimulator presenting with intermediate risk central pulmonary embolism with right heart strain  Patient underwent mechanical PE thrombectomy 4/17/23  Plan:    - right groin woggle removed at bedside by IR tech today  Currently no evidence of right groin hematoma  - patient on RA  - she will need to continue anticoagulation for at least 6 months, if not for life  - echo in 3 months per recommendation of pulmonology  - the above discussed with patient and  at bedside  - please reach out to IR with questions/concerns      Subjective: Patient currently comfortable on RA  No right groin discomfort  Feels much better than yesterday   at bedside and agrees she appears well  Objective:    Vitals:  /63   Pulse 70   Temp 98 °F (36 7 °C) (Oral)   Resp (!) 25   Ht 5' 7\" (1 702 m)   Wt 97 9 kg (215 lb 13 3 oz)   LMP  (LMP Unknown)   SpO2 92%   BMI 33 80 kg/m²   Body mass index is 33 8 kg/m²    Weight (last 2 days)     Date/Time Weight    04/18/23 0546 97 9 (215 83)    04/17/23 0915 95 3 (210)    04/16/23 2044 95 3 (210 1)    04/16/23 1543 95 8 (211 2)          I/Os:    Intake/Output Summary (Last 24 hours) at 4/18/2023 1433  Last data filed at 4/18/2023 1400  Gross per 24 hour   Intake 931 7 ml   Output 950 ml   Net -18 3 ml       Invasive Devices     Peripheral Intravenous Line  Duration           Peripheral IV 04/17/23 Left Antecubital 1 day          Drain  Duration           External Urinary Catheter <1 day                Physical Exam:  General appearance: alert and oriented, in no acute distress  Lungs: clear to auscultation bilaterally and normal respiratory effort  Skin: right groin c/d/i, no hematoma                    Lab Results and Cultures:   CBC with diff: " Lab Results   Component Value Date    WBC 10 21 (H) 04/18/2023    HGB 10 4 (L) 04/18/2023    HCT 31 7 (L) 04/18/2023    MCV 97 04/18/2023     (L) 04/18/2023    MCH 31 9 04/18/2023    MCHC 32 8 04/18/2023    RDW 13 6 04/18/2023    MPV 10 7 04/18/2023    NRBC 0 04/18/2023      BMP/CMP:  Lab Results   Component Value Date     11/06/2014    K 3 8 04/18/2023    K 3 6 11/06/2014     (H) 04/18/2023     11/06/2014    CO2 21 04/18/2023    CO2 29 11/06/2014    ANIONGAP 6 11/06/2014    BUN 19 04/18/2023    BUN 16 11/06/2014    CREATININE 0 68 04/18/2023    CREATININE 0 51 (L) 11/06/2014    GLUCOSE 100 11/06/2014    CALCIUM 8 4 04/18/2023    CALCIUM 9 0 11/06/2014    AST 24 04/16/2023    ALT 26 04/16/2023    ALKPHOS 46 04/16/2023    EGFR 84 04/18/2023   ,     Coags:   Lab Results   Component Value Date    PTT 72 (H) 04/18/2023    PTT 27 11/06/2014    INR 1 21 (H) 04/16/2023    INR 1 07 11/06/2014   ,   Results from last 7 days   Lab Units 04/18/23  0848 04/18/23  0153 04/17/23  1715 04/17/23  1037 04/17/23  0219 04/16/23  1619   PTT seconds 72* 44* >210* 133* >210* 26   INR   --   --   --   --   --  1 21*        Lipid Panel: No results found for: CHOL  No results found for: HDL  No results found for: HDL  No results found for: LDLCALC  No results found for: TRIG    HgbA1c:   Lab Results   Component Value Date    HGBA1C 5 9 (H) 01/17/2023    HGBA1C 5 8 (H) 06/07/2022    HGBA1C 6 0 (H) 08/19/2021       Blood Culture:   Lab Results   Component Value Date    BLOODCX No Growth at 24 hrs  04/16/2023   ,   Urinalysis:   Lab Results   Component Value Date    COLORU Yellow 04/16/2023    COLORU Brown 10/22/2014    CLARITYU Clear 04/16/2023    CLARITYU Turbid 10/22/2014    SPECGRAV 1 020 04/16/2023    SPECGRAV 1 017 10/22/2014    PHUR 5 0 04/16/2023    PHUR 6 0 08/16/2020    PHUR 8 5 (H) 10/22/2014    LEUKOCYTESUR Negative 04/16/2023    LEUKOCYTESUR Small (A) 10/22/2014    NITRITE Positive (A) 04/16/2023 NITRITE Negative 10/22/2014    PROTEINUA Negative 10/22/2014    GLUCOSEU Negative 04/16/2023    GLUCOSEU Negative 10/22/2014    KETONESU Negative 04/16/2023    KETONESU Negative 10/22/2014    BILIRUBINUR Negative 04/16/2023    BILIRUBINUR Negative 10/22/2014    BLOODU Negative 04/16/2023    BLOODU Negative 10/22/2014   ,   Urine Culture:   Lab Results   Component Value Date    URINECX >100,000 cfu/ml Klebsiella pneumoniae (A) 03/30/2021   ,   Wound Culure:  No results found for: WOUNDCULT    VTE Pharmacologic Prophylaxis: Heparin      Thank you for allowing me to participate in the care of [employer]  Please don't hesitate to call, text, email, or TigerText with any questions  This text is generated with voice recognition software  There may be translation, syntax,  or grammatical errors  If you have any questions, please contact the dictating provider      Erin Jackson

## 2023-04-18 NOTE — PROGRESS NOTES
Pastoral Care Progress Note    2023  Patient: Harjinder Haney :   Admission Date & Time: 2023 1539  MRN: [id number] CSN: [id number]        Patient was visited  not presence talked to staff about patient, confused but pleasant will continue to follow patient and support

## 2023-04-18 NOTE — ASSESSMENT & PLAN NOTE
-abx discontinued on 4/17; pt asymptomatic and afebrile  -blood cultures x2 after 24 hrs  -urine culture pending

## 2023-04-18 NOTE — ASSESSMENT & PLAN NOTE
4/16 cta chest: Large filling defects in the distal aspects of the bilateral main pulmonary arteries extending into multiple segmental arteries bilaterally  Elevated RV/LV ratio of 3 2 indicative of right heart strain  4/17 IR thrombectomy was successfully performed without major complications  Echo showed severely dilated right ventricle and elevated pulmonary artery pressure      -heparin gtt  -monitor tele  -Continue to monitor vital signs per routine  -Currently on RA

## 2023-04-18 NOTE — PLAN OF CARE
Problem: Potential for Falls  Goal: Patient will remain free of falls  Description: INTERVENTIONS:  - Educate patient/family on patient safety including physical limitations  - Instruct patient to call for assistance with activity   - Consult OT/PT to assist with strengthening/mobility   - Keep Call bell within reach  - Keep bed low and locked with side rails adjusted as appropriate  - Keep care items and personal belongings within reach  - Initiate and maintain comfort rounds  - Make Fall Risk Sign visible to staff  - Offer Toileting in advance of need  - Initiate/Maintain bed alarm  - Obtain necessary fall risk management equipment  - Apply yellow socks and bracelet for high fall risk patients  - Consider moving patient to room near nurses station  Outcome: Progressing     Problem: MOBILITY - ADULT  Goal: Maintain or return to baseline ADL function  Description: INTERVENTIONS:  -  Assess patient's ability to carry out ADLs; assess patient's baseline for ADL function and identify physical deficits which impact ability to perform ADLs (bathing, care of mouth/teeth, toileting, grooming, dressing, etc )  - Assess/evaluate cause of self-care deficits   - Assess range of motion  - Assess patient's mobility; develop plan if impaired  - Assess patient's need for assistive devices and provide as appropriate  - Encourage maximum independence but intervene and supervise when necessary  - Involve family in performance of ADLs  - Assess for home care needs following discharge   - Consider OT consult to assist with ADL evaluation and planning for discharge  - Provide patient education as appropriate  Outcome: Progressing  Goal: Maintains/Returns to pre admission functional level  Description: INTERVENTIONS:  - Perform BMAT or MOVE assessment daily    - Set and communicate daily mobility goal to care team and patient/family/caregiver     - Collaborate with rehabilitation services on mobility goals if consulted  - Out of bed for toileting  - Record patient progress and toleration of activity level   Outcome: Progressing     Problem: PAIN - ADULT  Goal: Verbalizes/displays adequate comfort level or baseline comfort level  Description: Interventions:  - Encourage patient to monitor pain and request assistance  - Assess pain using appropriate pain scale  - Administer analgesics based on type and severity of pain and evaluate response  - Implement non-pharmacological measures as appropriate and evaluate response  - Consider cultural and social influences on pain and pain management  - Notify physician/advanced practitioner if interventions unsuccessful or patient reports new pain  Outcome: Progressing     Problem: INFECTION - ADULT  Goal: Absence or prevention of progression during hospitalization  Description: INTERVENTIONS:  - Assess and monitor for signs and symptoms of infection  - Monitor lab/diagnostic results  - Monitor all insertion sites, i e  indwelling lines, tubes, and drains  - Monitor endotracheal if appropriate and nasal secretions for changes in amount and color  - [place] appropriate cooling/warming therapies per order  - Administer medications as ordered  - Instruct and encourage patient and family to use good hand hygiene technique  - Identify and instruct in appropriate isolation precautions for identified infection/condition  Outcome: Progressing  Goal: Absence of fever/infection during neutropenic period  Description: INTERVENTIONS:  - Monitor WBC    Outcome: Progressing     Problem: SAFETY ADULT  Goal: Patient will remain free of falls  Description: INTERVENTIONS:  - Educate patient/family on patient safety including physical limitations  - Instruct patient to call for assistance with activity   - Consult OT/PT to assist with strengthening/mobility   - Keep Call bell within reach  - Keep bed low and locked with side rails adjusted as appropriate  - Keep care items and personal belongings within reach  - Initiate and maintain comfort rounds  - Make Fall Risk Sign visible to staff  - Offer Toileting in advance of need  - Initiate/Maintain bed alarm  - Obtain necessary fall risk management equipment  - Apply yellow socks and bracelet for high fall risk patients  - Consider moving patient to room near nurses station  Outcome: Progressing  Goal: Maintain or return to baseline ADL function  Description: INTERVENTIONS:  -  Assess patient's ability to carry out ADLs; assess patient's baseline for ADL function and identify physical deficits which impact ability to perform ADLs (bathing, care of mouth/teeth, toileting, grooming, dressing, etc )  - Assess/evaluate cause of self-care deficits   - Assess range of motion  - Assess patient's mobility; develop plan if impaired  - Assess patient's need for assistive devices and provide as appropriate  - Encourage maximum independence but intervene and supervise when necessary  - Involve family in performance of ADLs  - Assess for home care needs following discharge   - Consider OT consult to assist with ADL evaluation and planning for discharge  - Provide patient education as appropriate  Outcome: Progressing  Goal: Maintains/Returns to pre admission functional level  Description: INTERVENTIONS:  - Perform BMAT or MOVE assessment daily    - Set and communicate daily mobility goal to care team and patient/family/caregiver     - Collaborate with rehabilitation services on mobility goals if consulted  - Out of bed for toileting  - Record patient progress and toleration of activity level   Outcome: Progressing     Problem: CARDIOVASCULAR - ADULT  Goal: Maintains optimal cardiac output and hemodynamic stability  Description: INTERVENTIONS:  - Monitor I/O, vital signs and rhythm  - Monitor for S/S and trends of decreased cardiac output  - Administer and titrate ordered vasoactive medications to optimize hemodynamic stability  - Assess quality of pulses, skin color and temperature  - Assess for signs of decreased coronary artery perfusion  - Instruct patient to report change in severity of symptoms  Outcome: Progressing  Goal: Absence of cardiac dysrhythmias or at baseline rhythm  Description: INTERVENTIONS:  - Continuous cardiac monitoring, vital signs, obtain 12 lead EKG if ordered  - Administer antiarrhythmic and heart rate control medications as ordered  - Monitor electrolytes and administer replacement therapy as ordered  Outcome: Progressing     Problem: RESPIRATORY - ADULT  Goal: Achieves optimal ventilation and oxygenation  Description: INTERVENTIONS:  - Assess for changes in respiratory status  - Assess for changes in mentation and behavior  - Position to facilitate oxygenation and minimize respiratory effort  - Oxygen administered by appropriate delivery if ordered  - Initiate smoking cessation education as indicated  - Encourage broncho-pulmonary hygiene including cough, deep breathe, Incentive Spirometry  - Assess the need for suctioning and aspirate as needed  - Assess and instruct to report SOB or any respiratory difficulty  - Respiratory Therapy support as indicated  Outcome: Progressing       Problem: SAFETY,RESTRAINT: NV/NON-SELF DESTRUCTIVE BEHAVIOR  Goal: Remains free of harm/injury (restraint for non violent/non self-detsructive behavior)  Description: INTERVENTIONS:  - Instruct patient/family regarding restraint use   - Assess and monitor physiologic and psychological status   - Provide interventions and comfort measures to meet assessed patient needs   - Identify and implement measures to help patient regain control  - Assess readiness for release of restraint   Outcome: Not Progressing  Goal: Returns to optimal restraint-free functioning  Description: INTERVENTIONS:  - Assess the patient's behavior and symptoms that indicate continued need for restraint  - Identify and implement measures to help patient regain control  - Assess readiness for release of restraint   Outcome: Not Progressing

## 2023-04-18 NOTE — NURSING NOTE
Went to bedside and removed right groin closure device without incident  Applied steristrips, gauze and tape over access site

## 2023-04-18 NOTE — PROGRESS NOTES
04/18/23 1300   Clinical Encounter Type   Visited With Patient; Health care provider   Routine Visit Introduction   Continue Visiting Yes

## 2023-04-18 NOTE — PROGRESS NOTES
[address] [place] [address]  Progress Note  Name: Armani Velasquez  MRN: [id number]  Unit/Bed#: ICU 01 I Date of Admission: 4/16/2023   Date of Service: 4/18/2023 I Hospital Day: 2    Assessment/Plan   * Pulmonary embolism [facility])  Assessment & Plan  4/16 cta chest: Large filling defects in the distal aspects of the bilateral main pulmonary arteries extending into multiple segmental arteries bilaterally  Elevated RV/LV ratio of 3 2 indicative of right heart strain  4/17 IR thrombectomy was successfully performed without major complications  Echo showed severely dilated right ventricle and elevated pulmonary artery pressure  -heparin gtt  -monitor tele  -Continue to monitor vital signs per routine  -Currently on RA         Acute cystitis  Assessment & Plan  -abx discontinued on 4/17; pt asymptomatic and afebrile  -blood cultures x2 after 24 hrs  -urine culture pending      [address]  -recent fall in bathroom and hit her back with ecchymosis  -but recently irritable, trouble talking, and not walking well per - possibly 2/2 uti vs pe vs worsening parkinsons vs other        Parkinson's disease ([facility] [address] )  Assessment & Plan  -neurostimulator in place  -cont home meds in am  -very poor balance per     Presence of neurostimulator  Assessment & Plan  -Continue         ----------------------------------------------------------------------------------------  HPI/24hr events: Patient was restrained overnight due to acute delirium and confusion  Was reported to be pulling at her IVs      Patient appropriate for transfer out of the ICU today?: Patient does not meet criteria for ICU Follow-up Clinic; referral has not been made  Disposition: Transfer to Med-Surg   Code Status: Level 1 - Full Code  ---------------------------------------------------------------------------------------  SUBJECTIVE  Patient seen and examined at the bedside this morning in no acute distress   She endorses sleeping well and reports that she may have been agitated and pulling at her lines overnight  She denies any present chest pain, shortness of breath, lightheadedness, dizziness, and abdominal pain  She is oriented to person and time, but not to place  Review of Systems  Review of systems was reviewed and negative unless stated above in HPI/24-hour events   ---------------------------------------------------------------------------------------  OBJECTIVE    Vitals   Vitals:    23 0500 23 0546 23 0600 23 0709   BP:       BP Location:       Pulse: 72  72    Resp: (!) 28  (!) 27    Temp:    (!) 97 4 °F (36 3 °C)   TempSrc:    Oral   SpO2: 93%  92%    Weight:  97 9 kg (215 lb 13 3 oz)     Height:         Temp (24hrs), Av 9 °F (36 6 °C), Min:97 4 °F (36 3 °C), Max:98 2 °F (36 8 °C)  Current: Temperature: (!) 97 4 °F (36 3 °C)  Arterial Line BP: 116/60  Arterial Line MAP (mmHg): 82 mmHg    Respiratory:  SpO2: SpO2: 92 %       Invasive/non-invasive ventilation settings   Respiratory    Lab Data (Last 4 hours)    None         O2/Vent Data (Last 4 hours)    None                Physical Exam  Vitals and nursing note reviewed  Constitutional:       General: She is not in acute distress  Appearance: She is well-developed  She is not ill-appearing  HENT:      Head: Normocephalic and atraumatic  Right Ear: External ear normal       Left Ear: External ear normal       Nose: Nose normal       Mouth/Throat:      Mouth: Mucous membranes are moist    Eyes:      Extraocular Movements: Extraocular movements intact  Conjunctiva/sclera: Conjunctivae normal    Cardiovascular:      Rate and Rhythm: Normal rate and regular rhythm  Pulses: Normal pulses  Heart sounds: Normal heart sounds  No murmur heard  Comments: neurostimulator b/l anterior cw  Pulmonary:      Effort: Pulmonary effort is normal  No respiratory distress  Breath sounds: Normal breath sounds     Abdominal: General: Abdomen is flat  Bowel sounds are normal  There is no distension  Palpations: Abdomen is soft  Tenderness: There is no abdominal tenderness  Musculoskeletal:         General: No swelling  Cervical back: Neck supple  Right lower leg: Edema present  Left lower leg: Edema present  Skin:     General: Skin is warm and dry  Capillary Refill: Capillary refill takes less than 2 seconds  Findings: Bruising present  Neurological:      Mental Status: She is alert  She is confused  Sensory: Sensation is intact  Motor: Motor function is intact        Comments: Speech slow and at baseline   Psychiatric:         Mood and Affect: Mood normal              Laboratory and Diagnostics:  Results from last 7 days   Lab Units 04/18/23  0426 04/17/23  0459 04/16/23  1548   WBC Thousand/uL 10 21* 12 59* 16 24*   HEMOGLOBIN g/dL 10 4* 11 8 13 5   HEMATOCRIT % 31 7* 36 9 44 0   PLATELETS Thousands/uL 128* 130* 180   NEUTROS PCT % 67 65 70   MONOS PCT % 8 7 8     Results from last 7 days   Lab Units 04/18/23  0426 04/17/23  0459 04/16/23  1548   SODIUM mmol/L 143 142 142   POTASSIUM mmol/L 3 8 4 0 4 7   CHLORIDE mmol/L 114* 112* 108   CO2 mmol/L 21 23 20*   ANION GAP mmol/L 8 7 14*   BUN mg/dL 19 28* 30*   CREATININE mg/dL 0 68 0 78 1 12   CALCIUM mg/dL 8 4 8 6 9 3   GLUCOSE RANDOM mg/dL 123 109 180*   ALT U/L  --   --  26   AST U/L  --   --  24   ALK PHOS U/L  --   --  46   ALBUMIN g/dL  --   --  3 9   TOTAL BILIRUBIN mg/dL  --   --  0 52     Results from last 7 days   Lab Units 04/17/23  0459   MAGNESIUM mg/dL 2 0   PHOSPHORUS mg/dL 3 1      Results from last 7 days   Lab Units 04/18/23  0153 04/17/23  1715 04/17/23  1037 04/17/23  0219 04/16/23  1619   INR   --   --   --   --  1 21*   PTT seconds 44* >210* 133* >210* 26          Results from last 7 days   Lab Units 04/16/23  1818 04/16/23  1619   LACTIC ACID mmol/L 1 8 6 7*     ABG:    VBG:  Results from last 7 days   Lab Units "04/16/23  1619   PH SUNITA  7 195*   PCO2 SUNITA mm Hg 46 3   PO2 SUNITA mm Hg 30 8*   HCO3 SUNITA mmol/L 17 5*   BASE EXC SUNITA mmol/L -10 5           Micro  Results from last 7 days   Lab Units 04/16/23  1628 04/16/23  1619   BLOOD CULTURE  No Growth at 24 hrs  No Growth at 24 hrs  EKG: Telemetry showing normal sinus rhythm  Imaging: I have personally reviewed pertinent reports  Intake and Output  I/O       04/16 0701 04/17 0700 04/17 0701 04/18 0700    I V  (mL/kg) 145 6 (1 5) 704 2 (7 2)    IV Piggyback  50    Total Intake(mL/kg) 145 6 (1 5) 754 2 (7 7)    Urine (mL/kg/hr)  1100 (0 5)    Total Output  1100    Net +145 6 -345  9          Unmeasured Urine Occurrence 1 x 1 x          Height and Weights   Height: 5' 7\" (170 2 cm)  IBW (Ideal Body Weight): 61 6 kg  Body mass index is 33 8 kg/m²  Weight (last 2 days)     Date/Time Weight    04/18/23 0546 97 9 (215 83)    04/17/23 0915 95 3 (210)    04/16/23 2044 95 3 (210 1)    04/16/23 1543 95 8 (211 2)            Nutrition       Diet Orders   (From admission, onward)             Start     Ordered    04/17/23 1725  Diet Regular; Regular House  Diet effective now        References:    Nutrtion Support Algorithm Enteral vs  Parenteral   Question Answer Comment   Diet Type Regular    Regular Regular House    RD to adjust diet per protocol?  No        04/17/23 1725                  Active Medications  Scheduled Meds:  Current Facility-Administered Medications   Medication Dose Route Frequency Provider Last Rate   • carbidopa-levodopa  1 tablet Oral BID Rip Salt, PA-C     • chlorhexidine  15 mL Mouth/Throat Q12H [address] Rip Isaac, PA-C     • heparin (porcine)  3-30 Units/kg/hr (Order-Specific) Intravenous Titrated Bashir Mcfadden MD 11 Units/kg/hr (04/18/23 0800)   • heparin (porcine)  3,800 Units Intravenous Q6H PRN Bashir Mcfadden MD     • heparin (porcine)  7,600 Units Intravenous Q6H PRN Bashir Mcfadden MD     • insulin lispro  1-6 Units Subcutaneous " "Q6H [address] Salvatore Watters PA-C     • rivastigmine  4 5 mg Oral BID Salvatore Watters PA-C       Continuous Infusions:  heparin (porcine), 3-30 Units/kg/hr (Order-Specific), Last Rate: 11 Units/kg/hr (04/18/23 0800)      PRN Meds:   heparin (porcine), 3,800 Units, Q6H PRN  heparin (porcine), 7,600 Units, Q6H PRN        Invasive Devices Review  Invasive Devices     Peripheral Intravenous Line  Duration           Peripheral IV 04/17/23 Left Antecubital 1 day          Arterial Line  Duration           Arterial Line 04/17/23 Right Radial 1 day          Drain  Duration           External Urinary Catheter <1 day                Rationale for remaining devices: Vascular access for hemodynamic monitoring  ---------------------------------------------------------------------------------------  Advance Directive and Living Will:      Power of :    POLST:    ---------------------------------------------------------------------------------------  Care Time Delivered:   Please refer to attending attestation      Lucía Kerr, DO      Portions of the record may have been created with voice recognition software  Occasional wrong word or \"sound a like\" substitutions may have occurred due to the inherent limitations of voice recognition software    Read the chart carefully and recognize, using context, where substitutions have occurred  "

## 2023-04-19 LAB
APTT PPP: 120 SECONDS (ref 23–37)
APTT PPP: 51 SECONDS (ref 23–37)
APTT PPP: 56 SECONDS (ref 23–37)
APTT PPP: 80 SECONDS (ref 23–37)
GLUCOSE SERPL-MCNC: 114 MG/DL (ref 65–140)
GLUCOSE SERPL-MCNC: 119 MG/DL (ref 65–140)
GLUCOSE SERPL-MCNC: 165 MG/DL (ref 65–140)
GLUCOSE SERPL-MCNC: 83 MG/DL (ref 65–140)

## 2023-04-19 RX ORDER — INSULIN LISPRO 100 [IU]/ML
1-6 INJECTION, SOLUTION INTRAVENOUS; SUBCUTANEOUS
Status: DISCONTINUED | OUTPATIENT
Start: 2023-04-19 | End: 2023-04-22 | Stop reason: HOSPADM

## 2023-04-19 RX ORDER — SODIUM CHLORIDE, SODIUM GLUCONATE, SODIUM ACETATE, POTASSIUM CHLORIDE, MAGNESIUM CHLORIDE, SODIUM PHOSPHATE, DIBASIC, AND POTASSIUM PHOSPHATE .53; .5; .37; .037; .03; .012; .00082 G/100ML; G/100ML; G/100ML; G/100ML; G/100ML; G/100ML; G/100ML
500 INJECTION, SOLUTION INTRAVENOUS ONCE
Status: COMPLETED | OUTPATIENT
Start: 2023-04-19 | End: 2023-04-19

## 2023-04-19 RX ADMIN — SODIUM CHLORIDE, SODIUM GLUCONATE, SODIUM ACETATE, POTASSIUM CHLORIDE, MAGNESIUM CHLORIDE, SODIUM PHOSPHATE, DIBASIC, AND POTASSIUM PHOSPHATE 500 ML: .53; .5; .37; .037; .03; .012; .00082 INJECTION, SOLUTION INTRAVENOUS at 04:20

## 2023-04-19 RX ADMIN — INSULIN LISPRO 1 UNITS: 100 INJECTION, SOLUTION INTRAVENOUS; SUBCUTANEOUS at 17:49

## 2023-04-19 RX ADMIN — CARBIDOPA AND LEVODOPA 1 TABLET: 50; 200 TABLET, EXTENDED RELEASE ORAL at 08:01

## 2023-04-19 RX ADMIN — RIVASTIGMINE TARTRATE 4.5 MG: 3 CAPSULE ORAL at 08:01

## 2023-04-19 RX ADMIN — HEPARIN SODIUM 3800 UNITS: 1000 INJECTION INTRAVENOUS; SUBCUTANEOUS at 09:24

## 2023-04-19 RX ADMIN — HEPARIN SODIUM 3800 UNITS: 1000 INJECTION INTRAVENOUS; SUBCUTANEOUS at 16:25

## 2023-04-19 RX ADMIN — CHLORHEXIDINE GLUCONATE 0.12% ORAL RINSE 15 ML: 1.2 LIQUID ORAL at 08:02

## 2023-04-19 RX ADMIN — RIVASTIGMINE TARTRATE 4.5 MG: 3 CAPSULE ORAL at 23:29

## 2023-04-19 RX ADMIN — CARBIDOPA AND LEVODOPA 1 TABLET: 50; 200 TABLET, EXTENDED RELEASE ORAL at 23:30

## 2023-04-19 RX ADMIN — HEPARIN SODIUM 10 UNITS/KG/HR: 10000 INJECTION, SOLUTION INTRAVENOUS at 07:39

## 2023-04-19 RX ADMIN — Medication 3 MG: at 23:30

## 2023-04-19 NOTE — PLAN OF CARE
Problem: Potential for Falls  Goal: Patient will remain free of falls  Description: INTERVENTIONS:  - Educate patient/family on patient safety including physical limitations  - Instruct patient to call for assistance with activity   - Consult OT/PT to assist with strengthening/mobility   - Keep Call bell within reach  - Keep bed low and locked with side rails adjusted as appropriate  - Keep care items and personal belongings within reach  - Initiate and maintain comfort rounds  - Make Fall Risk Sign visible to staff  - Offer Toileting in advance of need  - Initiate/Maintain bed alarm  - Obtain necessary fall risk management equipment  - Apply yellow socks and bracelet for high fall risk patients  - Consider moving patient to room near nurses station  Outcome: Progressing     Problem: MOBILITY - ADULT  Goal: Maintain or return to baseline ADL function  Description: INTERVENTIONS:  -  Assess patient's ability to carry out ADLs; assess patient's baseline for ADL function and identify physical deficits which impact ability to perform ADLs (bathing, care of mouth/teeth, toileting, grooming, dressing, etc )  - Assess/evaluate cause of self-care deficits   - Assess range of motion  - Assess patient's mobility; develop plan if impaired  - Assess patient's need for assistive devices and provide as appropriate  - Encourage maximum independence but intervene and supervise when necessary  - Involve family in performance of ADLs  - Assess for home care needs following discharge   - Consider OT consult to assist with ADL evaluation and planning for discharge  - Provide patient education as appropriate  Outcome: Progressing  Goal: Maintains/Returns to pre admission functional level  Description: INTERVENTIONS:  - Perform BMAT or MOVE assessment daily    - Set and communicate daily mobility goal to care team and patient/family/caregiver     - Collaborate with rehabilitation services on mobility goals if consulted  - Out of bed for toileting  - Record patient progress and toleration of activity level   Outcome: Progressing     Problem: PAIN - ADULT  Goal: Verbalizes/displays adequate comfort level or baseline comfort level  Description: Interventions:  - Encourage patient to monitor pain and request assistance  - Assess pain using appropriate pain scale  - Administer analgesics based on type and severity of pain and evaluate response  - Implement non-pharmacological measures as appropriate and evaluate response  - Consider cultural and social influences on pain and pain management  - Notify physician/advanced practitioner if interventions unsuccessful or patient reports new pain  Outcome: Progressing     Problem: INFECTION - ADULT  Goal: Absence or prevention of progression during hospitalization  Description: INTERVENTIONS:  - Assess and monitor for signs and symptoms of infection  - Monitor lab/diagnostic results  - Monitor all insertion sites, i e  indwelling lines, tubes, and drains  - Monitor endotracheal if appropriate and nasal secretions for changes in amount and color  - [place] appropriate cooling/warming therapies per order  - Administer medications as ordered  - Instruct and encourage patient and family to use good hand hygiene technique  - Identify and instruct in appropriate isolation precautions for identified infection/condition  Outcome: Progressing  Goal: Absence of fever/infection during neutropenic period  Description: INTERVENTIONS:  - Monitor WBC    Outcome: Progressing     Problem: SAFETY ADULT  Goal: Patient will remain free of falls  Description: INTERVENTIONS:  - Educate patient/family on patient safety including physical limitations  - Instruct patient to call for assistance with activity   - Consult OT/PT to assist with strengthening/mobility   - Keep Call bell within reach  - Keep bed low and locked with side rails adjusted as appropriate  - Keep care items and personal belongings within reach  - Initiate and maintain comfort rounds  - Make Fall Risk Sign visible to staff  - Offer Toileting in advance of need  - Initiate/Maintain bed alarm  - Obtain necessary fall risk management equipment  - Apply yellow socks and bracelet for high fall risk patients  - Consider moving patient to room near nurses station  Outcome: Progressing  Goal: Maintain or return to baseline ADL function  Description: INTERVENTIONS:  -  Assess patient's ability to carry out ADLs; assess patient's baseline for ADL function and identify physical deficits which impact ability to perform ADLs (bathing, care of mouth/teeth, toileting, grooming, dressing, etc )  - Assess/evaluate cause of self-care deficits   - Assess range of motion  - Assess patient's mobility; develop plan if impaired  - Assess patient's need for assistive devices and provide as appropriate  - Encourage maximum independence but intervene and supervise when necessary  - Involve family in performance of ADLs  - Assess for home care needs following discharge   - Consider OT consult to assist with ADL evaluation and planning for discharge  - Provide patient education as appropriate  Outcome: Progressing  Goal: Maintains/Returns to pre admission functional level  Description: INTERVENTIONS:  - Perform BMAT or MOVE assessment daily    - Set and communicate daily mobility goal to care team and patient/family/caregiver     - Collaborate with rehabilitation services on mobility goals if consulted  - Out of bed for toileting  - Record patient progress and toleration of activity level   Outcome: Progressing     Problem: CARDIOVASCULAR - ADULT  Goal: Maintains optimal cardiac output and hemodynamic stability  Description: INTERVENTIONS:  - Monitor I/O, vital signs and rhythm  - Monitor for S/S and trends of decreased cardiac output  - Administer and titrate ordered vasoactive medications to optimize hemodynamic stability  - Assess quality of pulses, skin color and temperature  - Assess for signs of decreased coronary artery perfusion  - Instruct patient to report change in severity of symptoms  Outcome: Progressing  Goal: Absence of cardiac dysrhythmias or at baseline rhythm  Description: INTERVENTIONS:  - Continuous cardiac monitoring, vital signs, obtain 12 lead EKG if ordered  - Administer antiarrhythmic and heart rate control medications as ordered  - Monitor electrolytes and administer replacement therapy as ordered  Outcome: Progressing     Problem: RESPIRATORY - ADULT  Goal: Achieves optimal ventilation and oxygenation  Description: INTERVENTIONS:  - Assess for changes in respiratory status  - Assess for changes in mentation and behavior  - Position to facilitate oxygenation and minimize respiratory effort  - Oxygen administered by appropriate delivery if ordered  - Initiate smoking cessation education as indicated  - Encourage broncho-pulmonary hygiene including cough, deep breathe, Incentive Spirometry  - Assess the need for suctioning and aspirate as needed  - Assess and instruct to report SOB or any respiratory difficulty  - Respiratory Therapy support as indicated  Outcome: Progressing     Problem: SAFETY,RESTRAINT: NV/NON-SELF DESTRUCTIVE BEHAVIOR  Goal: Remains free of harm/injury (restraint for non violent/non self-detsructive behavior)  Description: INTERVENTIONS:  - Instruct patient/family regarding restraint use   - Assess and monitor physiologic and psychological status   - Provide interventions and comfort measures to meet assessed patient needs   - Identify and implement measures to help patient regain control  - Assess readiness for release of restraint   Outcome: Progressing  Goal: Returns to optimal restraint-free functioning  Description: INTERVENTIONS:  - Assess the patient's behavior and symptoms that indicate continued need for restraint  - Identify and implement measures to help patient regain control  - Assess readiness for release of restraint   Outcome: Progressing Problem: Nutrition/Hydration-ADULT  Goal: Nutrient/Hydration intake appropriate for improving, restoring or maintaining nutritional needs  Description: Monitor and assess patient's nutrition/hydration status for malnutrition  Collaborate with interdisciplinary team and initiate plan and interventions as ordered  Monitor patient's weight and dietary intake as ordered or per policy  Utilize nutrition screening tool and intervene as necessary  Determine patient's food preferences and provide high-protein, high-caloric foods as appropriate       INTERVENTIONS:  - Monitor oral intake, urinary output, labs, and treatment plans  - Assess nutrition and hydration status and recommend course of action  - Evaluate amount of meals eaten  - Assist patient with eating if necessary   - Allow adequate time for meals  - Recommend/ encourage appropriate diets, oral nutritional supplements, and vitamin/mineral supplements  - Order, calculate, and assess calorie counts as needed  - Recommend, monitor, and adjust tube feedings and TPN/PPN based on assessed needs  - Assess need for intravenous fluids  - Provide specific nutrition/hydration education as appropriate  - Include patient/family/caregiver in decisions related to nutrition  Outcome: Progressing

## 2023-04-19 NOTE — OCCUPATIONAL THERAPY NOTE
Occupational Therapy Evaluation     Patient Name: Homer Doss  EOLNE'V Date: 4/19/2023  Problem List  Principal Problem:    Pulmonary embolism ([facility] [address] )  Active Problems:    Presence of neurostimulator    Parkinson's disease ([facility] )    Fall    Acute cystitis    Past Medical History  Past Medical History:   Diagnosis Date    Anxiety     Broken hip ([facility] )     Depression     Diabetes mellitus ([facility] [address] )     pre diabetic    Diabetes mellitus type 2, diet-controlled ([facility] [address] )     Hyperlipidemia     Hypertension     Parkinson disease ([facility] [address] )     Pneumonia      Past Surgical History  Past Surgical History:   Procedure Laterality Date    ACHILLES TENDON SURGERY      APPENDECTOMY      COLONOSCOPY      DEEP BRAIN STIMULATOR PLACEMENT      DENTAL SURGERY      FRACTURE SURGERY      KNEE ARTHROSCOPY      AL INSJ/RPLCMT CRANIAL NEUROSTIM PULSE GENERATOR Right 12/18/2018    Procedure: REPLACEMENT IMPLANTABLE PULSE GENERATOR (IPG) DEEP BRAIN STIMULATION (DBS); Surgeon: Gómez James MD;  Location: [facility];  Service: Neurosurgery    AL INSJ/RPLCMT CRANIAL NEUROSTIM PULSE GENERATOR Left 4/7/2021    Procedure: REPLACEMENT IMPLANTABLE PULSE GENERATOR FOR DEEP BRAIN STIMULATOR, LEFT CHEST;  Surgeon: Gómez James MD;  [facility];  Service: Neurosurgery    AL INSJ/RPLCMT CRANIAL NEUROSTIM PULSE GENERATOR Right 6/27/2022    Procedure: Right chest IPG replacement for Deep Brain Stimulator;  Surgeon: Norbert Gaucher, MD;  [facility];  Service: Neurosurgery    AL LAPAROSCOPIC APPENDECTOMY N/A 8/16/2020    Procedure: Orie Son;  Surgeon: Milena Daugherty MD;  [facility];  Service: General    AL OPTX FEM SHFT FX W/INSJ IMED IMPLT W/WO SCREW Right 12/14/2019    Procedure: INSERTION NAIL IM FEMUR ANTEGRADE (TROCHANTERIC);   Surgeon: Lisette Muller DO;  [facility];  Service: Orthopedics    REPLACEMENT TOTAL KNEE      REVERSE TOTAL SHOULDER ARTHROPLASTY Left 8/23/2018    Procedure: ARTHROPLASTY SHOULDER REVERSE;  Surgeon: Magdaleno Mcburney, MD;  [facility];  Service: Orthopedics    TONSILLECTOMY             04/19/23 0924   OT Last Visit   OT Visit Date 04/19/23   Note Type   Note type Evaluation   Pain Assessment   Pain Assessment Tool 0-10   Pain Score No Pain   Restrictions/Precautions   Weight Bearing Precautions Per Order No   Other Precautions Cognitive; Chair Alarm; Bed Alarm; Fall Risk;Multiple lines;Telemetry   Home Living   Type of [address] [place] [address] One level;Stairs to enter with rails  (3 MARTINE front, 5 MARTINE in garage)   Bathroom Shower/Tub Walk-in shower   Bathroom Toilet Standard   Bathroom Equipment Grab bars in shower; Shower chair; Toilet raiser   Bathroom Accessibility Accessible   Home Equipment Walker;Cane   Additional Comments Pt is poor historian, info on home setup and PLOF obtained via pt's chart  Pt lives with spouse in a one level house with 3 vs 5 MARTINE  Pt has caregivers 9AM-5PM Mon-Fri and 6 hours on weekends  (-) home alone  Prior Function   Level of [place] Needs assistance with ADLs; Needs assistance with functional mobility; Needs assistance with IADLS   Lives With Spouse   Receives Help From Family; Other (Comment)  (Caregivers)   IADLs Family/Friend/Other provides medication management; Family/Friend/Other provides meals; Family/Friend/Other provides transportation   Falls in the last 6 months 1 to 4  (3)   Vocational Retired   Comments At baseline, pt required assist w/ ADLs, IADLs, and functional transfers/mobility w/ use of RW  (-)   +Falls PTA  Lifestyle   Autonomy At baseline, pt required assist w/ ADLs, IADLs, and functional transfers/mobility w/ use of RW  (-)   +Falls PTA     Reciprocal Relationships Spouse   Service to Others Retired- [employer]    Intrinsic Gratification Reading, watching TV- informational news shows   ADL   Where [address] Assistance 4  Minimal Assistance   LB Bathing Assistance 3  Moderate Assistance   UB Dressing Assistance 4  Minimal Assistance   LB Dressing Assistance 2  Maximal Assistance   Toileting Assistance  2  Maximal Assistance   Functional Assistance 3  Moderate Assistance   Bed Mobility   Supine to Sit 3  Moderate assistance   Additional items Assist x 2; Increased time required;Verbal cues;LE management;HOB elevated   Sit to Supine Unable to assess   Additional Comments Pt seated OOB in chair with chair alarm activated at end of session  Call bell and phone within reach  All needs met and pt reports no further questions for OT at this time  Transfers   Sit to Stand 3  Moderate assistance   Additional items Assist x 2; Increased time required;Verbal cues   Stand to Sit 3  Moderate assistance   Additional items Assist x 2; Increased time required;Verbal cues  (progressing to Min A of 2 on 2nd trial)   Additional Comments Cues for safe technique and hand placement   Functional Mobility   Functional Mobility 3  Moderate assistance   Additional Comments Assist x1-2;  Pt initially required Mod A of 2 with B/L HHA, progressing to Mod A of 1 w/ use of RW   Additional items Rolling walker;Hand hold assistance   Balance   Static Sitting Fair   Dynamic Sitting Fair -   Static Standing Poor +   Dynamic Standing Poor   Ambulatory Poor   Activity Tolerance   Activity Tolerance Patient limited by fatigue;Treatment limited secondary to medical complications (Comment)   Medical Staff Made Jimbo Brown, PT   Nurse Made Aware yes; Kris Shields RN   RUE Assessment   RUE Assessment X  (limited shldr flex; Elbow-distal=WFL)   RUE Strength   R Shoulder Flexion 3-/5   R Shoulder Extension 3-/5   R Elbow Flexion 3+/5   R Elbow Extension 3+/5   LUE Assessment   LUE Assessment WFL  (3+/5 throughout)   Hand Function   Gross Motor Coordination Functional   Fine Motor Coordination Functional   Sensation   Light Touch No apparent deficits   Proprioception Proprioception No apparent deficits   Vision-Basic Assessment   Current Vision Wears glasses only for reading   Vision - Complex Assessment   Ocular Range of Motion Intact   Acuity Able to read clock/calendar on wall without difficulty; Able to read employee name badge without difficulty   Psychosocial   Psychosocial (WDL) WDL   Perception   Inattention/Neglect Appears intact   Cognition   Overall Cognitive Status Impaired   Arousal/Participation Alert; Cooperative   Attention Attends with cues to redirect   Orientation Level Oriented to person;Oriented to place; Disoriented to time;Disoriented to situation   Memory Decreased recall of precautions;Decreased recall of recent events;Decreased short term memory   Following Commands Follows one step commands with increased time or repetition   Assessment   Limitation Decreased ADL status; Decreased UE ROM; Decreased UE strength;Decreased Safe judgement during ADL;Decreased cognition;Decreased endurance;Decreased self-care trans;Decreased high-level ADLs   Prognosis Fair   Assessment Pt is a 65 y o  female seen for OT evaluation s/p adm to [facility] on 4/16/2023 w/ Pulmonary embolism, Acute cystitis, and Fall  Comorbidities affecting pt’s functional performance include a significant PMH of Anxiety, Depression, DM, HLD, HTN, Parkinson's s/p deep brain stimulator implantation  Pt with active OT orders and activity orders for Up with assistance  Pt is poor historian, info on home setup and PLOF obtained via pt's chart  Pt lives with spouse in a one level house with 3 vs 5 MARTINE  Pt has caregivers 9AM-5PM Mon-Fri and 6 hours on weekends  (-) home alone  At baseline, pt required assist w/ ADLs, IADLs, and functional transfers/mobility w/ use of RW  (-)   +Falls PTA   Upon evaluation, pt currently requires Min A for UB ADLs, Max-Mod A for LB ADLs, Max A for toileting, Mod A of 2 for bed mobility, Mod-Min A of 2 for transfers, and Mod A of 1-2 for functional mobility 2* the following deficits impacting occupational performance: decreased ROM, decreased strength, decreased balance, decreased tolerance, impaired memory, impaired problem solving and decreased safety awareness  These impairments, as well at pt’s fall risk, multiple lines, steps to enter environment, difficulty performing ADLS, limited insight into deficits and decreased initiation and engagement  limit pt’s ability to safely engage in all baseline areas of occupation  Based on the aforementioned OT evaluation, functional performance deficits, and assessments, pt has been identified as a High complexity evaluation  Pt to continue to benefit from continued acute OT services during hospital stay to address defined deficits and to maximize level of functional independence in the following Occupational Performance areas: grooming, bathing/shower, toilet hygiene, dressing, medication management, health maintenance, functional mobility, community mobility, clothing management and social participation  From OT standpoint, recommend STR vs Home OT and  care pending progress and available home support upon D/C  OT will continue to follow pt 3-5x/wk to address the following goals to  w/in 10-14 days:   Goals   Patient Goals None stated 2* cognitive deficits   LTG Time Frame 10-14   Long Term Goal Please refer to LTGs listed below   Plan   Treatment Interventions ADL retraining;Functional transfer training;UE strengthening/ROM; Endurance training;Patient/family training;Equipment evaluation/education; Compensatory technique education;Continued evaluation; Activityengagement   Goal Expiration Date 23   OT Treatment Day 0   OT Frequency 3-5x/wk   Recommendation   OT Discharge Recommendation Post acute rehabilitation services  (vs Home OT and / care pending progress and available home support)   Additional Comments  The patient's raw score on the AM-PAC Daily Activity Inpatient Short Form is 16   A raw score of less than 19 suggests the patient may benefit from discharge to post-acute rehabilitation services  Please refer to the recommendation of the Occupational Therapist for safe discharge planning     AM-PAC Daily Activity Inpatient   Lower Body Dressing 2   Bathing 2   Toileting 2   Upper Body Dressing 3   Grooming 3   Eating 4   Daily Activity Raw Score 16   Daily Activity Standardized Score (Calc for Raw Score >=11) 35 96   AM-PAC Applied Cognition Inpatient   Following a Speech/Presentation 3   Understanding Ordinary Conversation 3   Taking Medications 1   Remembering Where Things Are Placed or Put Away 2   Remembering List of 4-5 Errands 1   Taking Care of Complicated Tasks 1   Applied Cognition Raw Score 11   Applied Cognition Standardized Score 27 03        GOALS    Pt will improve activity tolerance to G for min 30 min txment sessions for increase engagement in functional tasks    Pt will complete bed mobility at a Min A level w/ G balance/safety demonstrated to decrease caregiver assistance required     Pt will complete UB dressing/self care w/ Supervision using adaptive device and DME as needed     Pt will complete LB dressing/self care w/ min A using adaptive device and DME as needed    Pt will complete toileting w/ min A w/ G hygiene/thoroughness using DME as needed    Pt will improve functional transfers to Min A on/off all surfaces using DME as needed w/ G balance/safety     Pt will improve functional mobility during ADL/IADL/leisure tasks to Min A using DME as needed w/ G balance/safety     Pt will be attentive 100% of the time during ongoing cognitive assessment w/ G participation to assist w/ safe d/c planning/recommendations    Pt will demonstrate G carryover of pt/caregiver education and training as appropriate w/o cues w/ good tolerance to increase safety during functional tasks    Pt will increase BUE strength by 1MM grade via AROM/AAROM/PROM exercises to increase independence in ADLs and transfers    Pt will verbalize 3 potential fall hazards and identify appropriate compensatory techniques to decrease fall risk in home environment     Pt will increase standing tolerance to 3-5 mins with Poor+ dynamic standing balance to increase safety during participation in ADLs       Sancho Vallejo OTR/L

## 2023-04-19 NOTE — PLAN OF CARE
Problem: PHYSICAL THERAPY ADULT  Goal: Performs mobility at highest level of function for planned discharge setting  See evaluation for individualized goals  Description: Treatment/Interventions: Functional transfer training, LE strengthening/ROM, Therapeutic exercise, Endurance training, Patient/family training, Equipment eval/education, Bed mobility, Gait training, Compensatory technique education, Continued evaluation, Spoke to nursing, OT  Equipment Recommended: Breana Pinto (pt has )       See flowsheet documentation for full assessment, interventions and recommendations  Outcome: Progressing  Note: Prognosis: Fair  Problem List: Decreased strength, Decreased range of motion, Decreased endurance, Impaired balance, Decreased mobility, Decreased coordination, Impaired judgement, Decreased safety awareness, Decreased cognition, Impaired tone  Assessment: Letha Hawkins is a 65 y o  who presents with SOB and change in behavior pta   called ems  Pt brought in on bipap for sats in 80s  Acute PE acute cystitis  + fall  4/17 thrombectomy  PT with hx of parkinson's, HTN, hyperlipidemia, DM, anxiety, depression, + nuerostimulator  PT consulted  Up with assist orders  Prior to admission resides with spouse  Has Caregivers that provide assist 9-5 M-F and 6 hours on weekends  Ambulates with RW and assist   A for ADLs and IADLs  Currently presents with functional limitations related to impairments in cognition, strength, posture, tone, coordination, functional mobility, balance, activity tolerance and locomotion ability requiring increased assist from baseline  BP supine 95/50  EOB 95/50 and OOB to chair 105/67  Requires modA of 2 for bed mobility, transfers and to ambulate few feet OOB to chair with BUE hand held assistance  Risk for falls given impairments  Will benefit from skilled PT in order to progress and optimize functional outcomes, facilitating return to PLOF    The patient's AM-PAC Basic Mobility Inpatient Short Form Raw Score is 11  A Raw score of less than or equal to 16 suggests the patient may benefit from discharge to post-acute rehabilitation services  Please also refer to the recommendation of the Physical Therapist for safe discharge planning  Given impairments will benefit from STR in order to facilitate outcomes and return to PLOF  Will monitor disposition with progress towards IPPT goals  PT Discharge Recommendation: Post acute rehabilitation services (monitor disposition with progress )    See flowsheet documentation for full assessment

## 2023-04-19 NOTE — ASSESSMENT & PLAN NOTE
Anesthesia Pre-Procedure Evaluation    Patient: Milly Loaiza   MRN: [id number] : 1949          Preoperative Diagnosis: Invasive ductal carcinoma of breast, right (H) [C50.911]    Procedure(s):  Right Wire Localized Breast Lumpectomy(wire placement at 8:30)    Past Medical History:   Diagnosis Date     Acute reaction to stress 10/9/2019     Malignant neoplasm of breast (female), unspecified site      Past Surgical History:   Procedure Laterality Date     SURGICAL HISTORY OF -       Cervical biopsy     SURGICAL HISTORY OF -       Tubal ligation     SURGICAL HISTORY OF -   2004    Right mastectomy     SURGICAL HISTORY OF -   3-23-09    Needle-guided left breast biopsy.       Anesthesia Evaluation     . Pt has had prior anesthetic. Type: MAC and General    No history of anesthetic complications          ROS/MED HX    ENT/Pulmonary:  - neg pulmonary ROS     Neurologic:     (+)TIA     Cardiovascular:     (+) hypertension----. : . . . :. . Previous cardiac testing Echodate:3-2017results:Interpretation Summary     A cardiac source of embolus was not identified.  The rhythm was normal sinus.  The left ventricle is normal in structure, function and size.  The visual ejection fraction is estimated at 60-65%.  The right ventricle is normal in structure, function and size.  There is trace aortic regurgitation.date: results:ECG reviewed date: results:Sinus Rhythm   WITHIN NORMAL LIMITS   date: results:          METS/Exercise Tolerance:  >4 METS   Hematologic:  - neg hematologic  ROS   (+) History of blood clots pt is not anticoagulated, -      Musculoskeletal:  - neg musculoskeletal ROS       GI/Hepatic:  - neg GI/hepatic ROS       Renal/Genitourinary:  - ROS Renal section negative       Endo:     (+) Other Endocrine Disorder overweight.      Psychiatric:  - neg psychiatric ROS       Infectious Disease:  - neg infectious disease ROS       Malignancy:   (+) Malignancy History of Breast  Breast CA  Watch with heparin drip "Active status post Surgery.         Other:    - neg other ROS                      Physical Exam  Normal systems: cardiovascular, pulmonary and dental    Airway   Mallampati: I  TM distance: >3 FB  Neck ROM: full    Dental     Cardiovascular       Pulmonary             Lab Results   Component Value Date    WBC 8.5 10/23/2019    HGB 14.0 10/23/2019    HCT 42.2 10/23/2019     10/23/2019     12/17/2019    POTASSIUM 3.5 12/17/2019    CHLORIDE 106 12/17/2019    CO2 29 12/17/2019    BUN 11 12/17/2019    CR 0.82 12/17/2019    GLC 92 12/17/2019    LUCIAN 9.1 12/17/2019    ALBUMIN 4.0 10/23/2019    PROTTOTAL 8.2 10/23/2019    ALT 24 10/23/2019    AST 20 10/23/2019    ALKPHOS 120 10/23/2019    BILITOTAL 0.3 10/23/2019    PTT 28 09/21/2018    INR 0.96 09/21/2018    TSH 3.60 10/08/2019       Preop Vitals  BP Readings from Last 3 Encounters:   01/27/20 (!) 176/95   01/14/20 130/88   01/02/20 (!) 160/92    Pulse Readings from Last 3 Encounters:   01/14/20 66   01/02/20 102   12/17/19 88      Resp Readings from Last 3 Encounters:   01/27/20 18   01/14/20 16   01/02/20 16    SpO2 Readings from Last 3 Encounters:   01/27/20 97%   12/10/19 99%   11/04/19 96%      Temp Readings from Last 1 Encounters:   01/27/20 36.6  C (97.9  F) (Oral)    Ht Readings from Last 1 Encounters:   01/27/20 1.632 m (5' 4.25\")      Wt Readings from Last 1 Encounters:   01/27/20 72.6 kg (160 lb)    Estimated body mass index is 27.25 kg/m  as calculated from the following:    Height as of this encounter: 1.632 m (5' 4.25\").    Weight as of this encounter: 72.6 kg (160 lb).       Anesthesia Plan      History & Physical Review  History and physical reviewed and following examination; no interval change.    ASA Status:  2 .    NPO Status:  > 6 hours    Plan for MAC Maintenance will be Balanced.  Reason for MAC:  Deep or markedly invasive procedure (G8)  PONV prophylaxis:  Ondansetron (or other 5HT-3) and Dexamethasone or Solumedrol       Postoperative " Care  Postoperative pain management:  IV analgesics and Oral pain medications.      Consents  Anesthetic plan, risks, benefits and alternatives discussed with:  Patient and Spouse..                 FABIANA Roper CRNA

## 2023-04-19 NOTE — PHYSICAL THERAPY NOTE
PT EVALUATION 08:57-09:15  09:15-09:28    65 y o     [id number]    Pulmonary embolism (HCC) [I26 99]  SOB (shortness of breath) [R06 02]  Hypoxia [R09 02]    Past Medical History:   Diagnosis Date    Anxiety     Broken hip ([facility] [address] )     Depression     Diabetes mellitus ([facility] )     pre diabetic    Diabetes mellitus type 2, diet-controlled ([place] [address] )     Hyperlipidemia     Hypertension     Parkinson disease ([place] [address] )     Pneumonia          Past Surgical History:   Procedure Laterality Date    ACHILLES TENDON SURGERY      APPENDECTOMY      COLONOSCOPY      DEEP BRAIN STIMULATOR PLACEMENT      DENTAL SURGERY      FRACTURE SURGERY      KNEE ARTHROSCOPY      OH INSJ/RPLCMT CRANIAL NEUROSTIM PULSE GENERATOR Right 12/18/2018    Procedure: REPLACEMENT IMPLANTABLE PULSE GENERATOR (IPG) DEEP BRAIN STIMULATION (DBS); Surgeon: Melinda Campoverde MD;  Location: [facility];  Service: Neurosurgery    OH INSJ/RPLCMT CRANIAL NEUROSTIM PULSE GENERATOR Left 4/7/2021    Procedure: REPLACEMENT IMPLANTABLE PULSE GENERATOR FOR DEEP BRAIN STIMULATOR, LEFT CHEST;  Surgeon: Melinda Campoverde MD;  [facility];  Service: Neurosurgery    OH INSJ/RPLCMT CRANIAL NEUROSTIM PULSE GENERATOR Right 6/27/2022    Procedure: Right chest IPG replacement for Deep Brain Stimulator;  Surgeon: Bridgette Aldridge MD;  [facility];  Service: Neurosurgery    OH LAPAROSCOPIC APPENDECTOMY N/A 8/16/2020    Procedure: Herminia Ba;  Surgeon: Jessica Carlson MD;  Location: [facility];  Service: General    OH OPTX FEM SHFT FX W/INSJ IMED IMPLT W/WO SCREW Right 12/14/2019    Procedure: INSERTION NAIL IM FEMUR ANTEGRADE (TROCHANTERIC);   Surgeon: Zeb Severino DO;  [facility];  Service: Orthopedics    REPLACEMENT TOTAL KNEE      REVERSE TOTAL SHOULDER ARTHROPLASTY Left 8/23/2018    Procedure: ARTHROPLASTY SHOULDER REVERSE;  Surgeon: Elvie Bonds MD;  [facility];  Service: Orthopedics    TONSILLECTOMY        04/19/23 0857   PT Last Visit   PT Visit Date 04/19/23   Note Type   Note type Evaluation  (and treat)   Pain Assessment   Pain Score No Pain   Restrictions/Precautions   Weight Bearing Precautions Per Order No   Other Precautions Cognitive; Chair Alarm; Bed Alarm; Fall Risk;Telemetry;Multiple lines   Home Living   Type of [address] One level  (3[address] front, [address] garage )   Bathroom Shower/Tub Walk-in shower   Bathroom Toilet Standard  (with riser)   Bathroom Equipment Grab bars in shower; Shower chair   Bathroom Accessibility Accessible   Home Equipment Walker;Cane   Additional Comments resides with spouse in one story home  Caregiver hours 9-5 and 7-9 M-F 6 hours on weekends per CM  Prior Function   Level of [place] Needs assistance with ADLs; Needs assistance with functional mobility; Needs assistance with IADLS   Lives With Spouse   Receives Help From Family   IADLs Family/Friend/Other provides transportation; Family/Friend/Other provides meals; Family/Friend/Other provides medication management   Falls in the last 6 months 1 to 4   Comments A for ADLS and IADLs  Ambulates with RW and assistance at baseline  General   Additional Pertinent History Pt is 64 y/o female admitted with SOB and behavior changes, hypoxia   + acute PE and acute cystitis  PT consulted     Family/Caregiver Present No   Cognition   Overall Cognitive Status Impaired   Arousal/Participation Alert   Orientation Level Oriented to person;Oriented to place   Following Commands Follows one step commands with increased time or repetition   Comments Pleasant   Subjective   Subjective Feels fine   RUE Assessment   RUE Assessment X  (as observed with reach and grasp at least 3/5, movements slowed)   LUE Assessment   LUE Assessment WFL   RLE Assessment   RLE Assessment X   Strength RLE   RLE Overall Strength 4-/5   R Ankle Dorsiflexion 2/5   R Ankle Plantar Flexion 2/5   LLE Assessment   LLE Assessment WFL   Strength LLE   LLE Overall Strength 4/5   Coordination Movements are Fluid and Coordinated 0   Coordination and Movement Description bradykinesia   Light Touch   RLE Light Touch Grossly intact   LLE Light Touch Grossly intact   Bed Mobility   Supine to Sit 3  Moderate assistance   Additional items Assist x 2; Increased time required;Verbal cues   Additional Comments Increased time to complete  Use of bedpad and additional assist to fully reach EOB  BP 95/50   Transfers   Sit to Stand 3  Moderate assistance   Additional items Assist x 2; Increased time required;Verbal cues   Stand to Sit 3  Moderate assistance   Additional items Assist x 2; Increased time required;Verbal cues   Additional Comments Increased time to complete transitions   Ambulation/Elevation   Gait pattern Improper Weight shift;Narrow NANCY; Decreased foot clearance;R Foot drag; Inconsistent dayday; Short stride; Excessively slow  (bradykinesia)   Gait Assistance 3  Moderate assist   Additional items Assist x 2;Verbal cues; Tactile cues   Assistive Device Other (Comment)  (BUE hand held assistance)   Distance 3'x1  Increased time, assist to weight shift to improve RLE advancement  Balance   Static Sitting Fair   Dynamic Sitting Fair -   Static Standing Poor +   Dynamic Standing Poor   Ambulatory Poor   Endurance Deficit   Endurance Deficit Yes   Endurance Deficit Description weakness fatigue  RA O2 sat 97%   Activity Tolerance   Activity Tolerance Patient limited by fatigue;Treatment limited secondary to medical complications (Comment)   Medical Staff Made Aware NurseCj  OT: Zaynab Joseph: Pt seen for co-evaluation/treatment with skilled Occupational Therapist 2* clinically unstable/unpredictable presentation, medical complexity, fall risk, cognitive impairments, functional/physical limitations, impaired functional balance, decreased safety awareness, limited activity tolerance which is decline from PLOF and may impact overall functional mobility/mobility safety     Nurse Made Aware yes   Assessment Prognosis Fair   Problem List Decreased strength;Decreased range of motion;Decreased endurance; Impaired balance;Decreased mobility; Decreased coordination; Impaired judgement;Decreased safety awareness;Decreased cognition; Impaired tone   Assessment Asael Quinn is a 65 y o  who presents with SOB and change in behavior pta   called ems  Pt brought in on bipap for sats in 80s  Acute PE acute cystitis  + fall  4/17 thrombectomy  PT with hx of parkinson's, HTN, hyperlipidemia, DM, anxiety, depression, + nuerostimulator  PT consulted  Up with assist orders  Prior to admission resides with spouse  Has Caregivers that provide assist 9-5 M-F and 6 hours on weekends  Ambulates with RW and assist   A for ADLs and IADLs  Currently presents with functional limitations related to impairments in cognition, strength, posture, tone, coordination, functional mobility, balance, activity tolerance and locomotion ability requiring increased assist from baseline  BP supine 95/50  EOB 95/50 and OOB to chair 105/67  Requires modA of 2 for bed mobility, transfers and to ambulate few feet OOB to chair with BUE hand held assistance  Risk for falls given impairments  Will benefit from skilled PT in order to progress and optimize functional outcomes, facilitating return to PLOF  The patient's AM-PAC Basic Mobility Inpatient Short Form Raw Score is 11  A Raw score of less than or equal to 16 suggests the patient may benefit from discharge to post-acute rehabilitation services  Please also refer to the recommendation of the Physical Therapist for safe discharge planning  Given impairments will benefit from STR in order to facilitate outcomes and return to PLOF  Will monitor disposition with progress towards IPPT goals  Goals   Patient Goals none stated 2* cognition  STG Expiration Date 04/29/23   Short Term Goal #1 10 days: 1)  Pt will perform bed mobility with Geri in order to improve function  2)  Perform all transfers with Geri demonstrating safe and appropriate technique 100% of the time in order to improve ability to negotiate safely in home environment with less reliance on caregiver  3) Amb with least restrictive AD > 80'x1 with Geri in order to demonstrate ability to negotiate in home environment  4)  Improve overall strength and balance 1/2 grade in order to optimize ability to perform functional tasks and reduce fall risk  5) Increase activity tolerance to 30 minutes in order to improve endurance to functional tasks  6)  Negotiate stairs using most appropriate technique and mod A in order to be able to negotiate safely into home environment  7) PT for ongoing patient and family/caregiver education, DME needs and d/c planning in order to promote highest level of function in least restrictive environment  Plan   Treatment/Interventions Functional transfer training;LE strengthening/ROM; Therapeutic exercise; Endurance training;Patient/family training;Equipment eval/education; Bed mobility;Gait training; Compensatory technique education;Continued evaluation;Spoke to nursing;OT   PT Frequency 3-5x/wk   Recommendation   PT Discharge Recommendation Post acute rehabilitation services  (monitor disposition with progress )   Equipment Recommended Walker  (pt has )   [place] walker   AM-PAC Basic Mobility Inpatient   Turning in Flat Bed Without Bedrails 2   Lying on Back to Sitting on Edge of Flat Bed Without Bedrails 2   Moving Bed to Chair 2   Standing Up From Chair Using Arms 2   Walk in Room 2   Climb 3-5 Stairs With Railing 1   Basic Mobility Inpatient Raw Score 11   Basic Mobility Standardized Score 30 25   Highest Level Of Mobility   JH-HLM Goal 4: Move to chair/commode   JH-HLM Achieved 4: Move to chair/commode   Additional Treatment Session   Start Time 0915   End Time 2379   Treatment Assessment Seen for treatment following evaluation to progress with mobility and ambulation   Sit to stand with modA of 2  Increased time to acheive balance, posterior balance  Progressed with ambulation using RW x 8 ft with modA  Increased time   + LOB upon approach to chair with modA to correct  Stand to sit with Geri of 2  Cues for hand placement  Performed seated AP, heel raises, LAQ and HF ex x 10 B/L    BP p mobility 128/81  RA O2 sat 97%  Tolerated progression well and remained OOB in chair, alarm in place  Equipment Use RW   Additional Treatment Day 1   End of Consult   Patient Position at End of Consult Bedside chair;Bed/Chair alarm activated; All needs within reach     Hx/personal factors: co-morbidities, inaccessible home, dec caregiver support, home alone, advanced age, mutliple lines, telemetry, use of AD, dec cognition, h/o of falls, fall risk, and assist w/ ADL's  Examination: dec mobility, dec balance, dec endurance, dec amb, risk for falls, dec cognition, assessed body system, balance, endurance, amb, D/C disposition & fall risk, impairements in locomotion, musculoskeletal, balance, endurance, posture, coordination  Clinical: unpredictable (ongoing medical status, abnormal lab values, and risk for falls), cognition, bed/chair alarm, ICU status     Complexity: high             Simran Jackson, PT

## 2023-04-19 NOTE — ASSESSMENT & PLAN NOTE
Had large bilateral pulmonary emboli requiring IR thrombectomy  Doing well  On room air    Currently on IV heparin drip  Will transition to novel anticoagulant soon

## 2023-04-19 NOTE — PROGRESS NOTES
[address] [place] [address]  Progress Note  Name: Jen Roman  MRN: [id number]  Unit/Bed#: ICU 01 I Date of Admission: 2023   Date of Service: 2023 I Hospital Day: 3    Assessment/Plan   * Pulmonary embolism [facility])  Assessment & Plan  Had large bilateral pulmonary emboli requiring IR thrombectomy  Doing well  On room air    Currently on IV heparin drip  Will transition to novel anticoagulant soon  Parkinson's disease ([address] )  Assessment & Plan  Continue home regimen    Presence of neurostimulator  Assessment & Plan  Watch with heparin drip               Subjective:   Feels well  No SOB  She is excited to get out of ICU      Objective:     Vitals:   Temp (24hrs), Av 4 °F (36 3 °C), Min:97 4 °F (36 3 °C), Max:97 5 °F (36 4 °C)    Temp:  [97 4 °F (36 3 °C)-97 5 °F (36 4 °C)] 97 5 °F (36 4 °C)  HR:  [54-80] 78  Resp:  [17-33] 29  BP: ()/(48-74) 116/63  SpO2:  [85 %-99 %] 95 %  Body mass index is 33 35 kg/m²  Input and Output Summary (last 24 hours): Intake/Output Summary (Last 24 hours) at 2023 1520  Last data filed at 2023 1400  Gross per 24 hour   Intake 1591 05 ml   Output 400 ml   Net 1191 05 ml       Physical Exam:     Physical Exam  Vitals and nursing note reviewed  HENT:      Head: Normocephalic and atraumatic  Eyes:      Pupils: Pupils are equal, round, and reactive to light  Cardiovascular:      Rate and Rhythm: Normal rate and regular rhythm  Heart sounds: No murmur heard  No friction rub  No gallop  Pulmonary:      Effort: Pulmonary effort is normal       Breath sounds: Normal breath sounds  No wheezing or rales  Abdominal:      General: Bowel sounds are normal       Palpations: Abdomen is soft  Tenderness: There is no abdominal tenderness  Musculoskeletal:      Right lower leg: No edema  Left lower leg: No edema                 Additional Data:     Labs:    Results from last 7 days   Lab Units 23  0426   WBC Thousand/uL 10 21*   HEMOGLOBIN g/dL 10 4*   HEMATOCRIT % 31 7*   PLATELETS Thousands/uL 128*   NEUTROS PCT % 67   LYMPHS PCT % 21   MONOS PCT % 8   EOS PCT % 3     Results from last 7 days   Lab Units 04/18/23  0426 04/17/23  0459 04/16/23  1548   POTASSIUM mmol/L 3 8   < > 4 7   CHLORIDE mmol/L 114*   < > 108   CO2 mmol/L 21   < > 20*   BUN mg/dL 19   < > 30*   CREATININE mg/dL 0 68   < > 1 12   CALCIUM mg/dL 8 4   < > 9 3   ALK PHOS U/L  --   --  46   ALT U/L  --   --  26   AST U/L  --   --  24    < > = values in this interval not displayed  Results from last 7 days   Lab Units 04/16/23  1619   INR  1 21*     Results from last 7 days   Lab Units 04/19/23  1132 04/19/23  0535 04/18/23  2356 04/18/23  1746 04/18/23  1143 04/18/23  0544 04/18/23  0001 04/17/23  1813 04/17/23  0528 04/16/23  2349   POC GLUCOSE mg/dl 119 83 113 89 132 119 114 91 97 114               * I Have Reviewed All Lab Data     Recent Cultures (last 7 days):     Results from last 7 days   Lab Units 04/16/23  1740 04/16/23  1628 04/16/23  1619   BLOOD CULTURE   --  Micrococcus luteus* No Growth at 48 hrs     GRAM STAIN RESULT   --  Gram positive cocci in clusters*  --    URINE CULTURE  >100,000 cfu/ml Gram Negative Cornelius Enteric Like*  --   --          Last 24 Hours Medication List:   Current Facility-Administered Medications   Medication Dose Route Frequency Provider Last Rate   • carbidopa-levodopa  1 tablet Oral BID Melissa Mac PA-C     • chlorhexidine  15 mL Mouth/Throat Q12H [address] Melissa Mac PA-C     • heparin (porcine)  3-30 Units/kg/hr (Order-Specific) Intravenous Titrated Rayshawn Stevenson MD 12 Units/kg/hr (04/19/23 0930)   • heparin (porcine)  3,800 Units Intravenous Q6H PRN Rayshawn Stevenson MD     • heparin (porcine)  7,600 Units Intravenous Q6H PRN Rayshawn Stevenson MD     • insulin lispro  1-6 Units Subcutaneous Q6H [address] Melissa Mac PA-C     • melatonin  3 mg Oral HS Isa Davenport DO     • rivastigmine 4 5 mg Oral BID Eleni Tellez PA-C           VTE Pharmacologic Prophylaxis:   Pharmacologic: Heparin Drip      Current Length of Stay: 3 day(s)    Current Patient Status: Inpatient       Discharge Plan: discharge 2-3 days    Code Status: Level 1 - Full Code           Today, Patient Was Seen By: Brian Gomez DO    ** Please Note: Dictation voice to text software may have been used in the creation of this document   **

## 2023-04-19 NOTE — PLAN OF CARE
Problem: Potential for Falls  Goal: Patient will remain free of falls  Description: INTERVENTIONS:  - Educate patient/family on patient safety including physical limitations  - Instruct patient to call for assistance with activity   - Consult OT/PT to assist with strengthening/mobility   - Keep Call bell within reach  - Keep bed low and locked with side rails adjusted as appropriate  - Keep care items and personal belongings within reach  - Initiate and maintain comfort rounds  - Make Fall Risk Sign visible to staff  - Offer Toileting every 2  Hours, in advance of need  - Initiate/Maintain bed alarm  - Obtain necessary fall risk management equipment  - Apply yellow socks and bracelet for high fall risk patients  - Consider moving patient to room near nurses station  Outcome: Progressing     Problem: MOBILITY - ADULT  Goal: Maintain or return to baseline ADL function  Description: INTERVENTIONS:  - Educate patient/family on patient safety including physical limitations  - Instruct patient to call for assistance with activity   - Consult OT/PT to assist with strengthening/mobility   - Keep Call bell within reach  - Keep bed low and locked with side rails adjusted as appropriate  - Keep care items and personal belongings within reach  - Initiate and maintain comfort rounds  - Make Fall Risk Sign visible to staff  - Offer Toileting every 2 Hours, in advance of need  - Initiate/Maintain bed alarm  - Obtain necessary fall risk management equipment  - Apply yellow socks and bracelet for high fall risk patients  - Consider moving patient to room near nurses station  Outcome: Progressing  Goal: Maintains/Returns to pre admission functional level  Description: INTERVENTIONS:  -  Assess patient's ability to carry out ADLs; assess patient's baseline for ADL function and identify physical deficits which impact ability to perform ADLs (bathing, care of mouth/teeth, toileting, grooming, dressing, etc )  - Assess/evaluate cause of self-care deficits   - Assess range of motion  - Assess patient's mobility; develop plan if impaired  - Assess patient's need for assistive devices and provide as appropriate  - Encourage maximum independence but intervene and supervise when necessary  - Involve family in performance of ADLs  - Assess for home care needs following discharge   - Consider OT consult to assist with ADL evaluation and planning for discharge  - Provide patient education as appropriate  Outcome: Progressing     Problem: PAIN - ADULT  Goal: Verbalizes/displays adequate comfort level or baseline comfort level  Description: Interventions:  - Encourage patient to monitor pain and request assistance  - Assess pain using appropriate pain scale  - Administer analgesics based on type and severity of pain and evaluate response  - Implement non-pharmacological measures as appropriate and evaluate response  - Consider cultural and social influences on pain and pain management  - Notify physician/advanced practitioner if interventions unsuccessful or patient reports new pain  Outcome: Progressing     Problem: INFECTION - ADULT  Goal: Absence or prevention of progression during hospitalization  Description: INTERVENTIONS:  - Assess and monitor for signs and symptoms of infection  - Monitor lab/diagnostic results  - Monitor all insertion sites, i e  indwelling lines, tubes, and drains  - Monitor endotracheal if appropriate and nasal secretions for changes in amount and color  - [place] appropriate cooling/warming therapies per order  - Administer medications as ordered  - Instruct and encourage patient and family to use good hand hygiene technique  - Identify and instruct in appropriate isolation precautions for identified infection/condition  Outcome: Progressing  Goal: Absence of fever/infection during neutropenic period  Description: INTERVENTIONS:  - Monitor WBC    Outcome: Progressing     Problem: SAFETY ADULT  Goal: Patient will remain free of falls  Description: INTERVENTIONS:  - Educate patient/family on patient safety including physical limitations  - Instruct patient to call for assistance with activity   - Consult OT/PT to assist with strengthening/mobility   - Keep Call bell within reach  - Keep bed low and locked with side rails adjusted as appropriate  - Keep care items and personal belongings within reach  - Initiate and maintain comfort rounds  - Make Fall Risk Sign visible to staff  - Offer Toileting every 2 Hours, in advance of need  - Initiate/Maintain bed alarm  - Obtain necessary fall risk management equipment  - Apply yellow socks and bracelet for high fall risk patients  - Consider moving patient to room near nurses station  Outcome: Progressing  Goal: Maintain or return to baseline ADL function  Description: INTERVENTIONS:  - Educate patient/family on patient safety including physical limitations  - Instruct patient to call for assistance with activity   - Consult OT/PT to assist with strengthening/mobility   - Keep Call bell within reach  - Keep bed low and locked with side rails adjusted as appropriate  - Keep care items and personal belongings within reach  - Initiate and maintain comfort rounds  - Make Fall Risk Sign visible to staff  - Offer Toileting every 2 Hours, in advance of need  - Initiate/Maintain bed alarm  - Obtain necessary fall risk management equipmen  - Apply yellow socks and bracelet for high fall risk patients  - Consider moving patient to room near nurses station  Outcome: Progressing  Goal: Maintains/Returns to pre admission functional level  Description: INTERVENTIONS:  -  Assess patient's ability to carry out ADLs; assess patient's baseline for ADL function and identify physical deficits which impact ability to perform ADLs (bathing, care of mouth/teeth, toileting, grooming, dressing, etc )  - Assess/evaluate cause of self-care deficits   - Assess range of motion  - Assess patient's mobility; develop plan if impaired  - Assess patient's need for assistive devices and provide as appropriate  - Encourage maximum independence but intervene and supervise when necessary  - Involve family in performance of ADLs  - Assess for home care needs following discharge   - Consider OT consult to assist with ADL evaluation and planning for discharge  - Provide patient education as appropriate  Outcome: Progressing     Problem: DISCHARGE PLANNING  Goal: Discharge to home or other facility with appropriate resources  Description: INTERVENTIONS:  - Identify barriers to discharge w/patient and caregiver  - Arrange for needed discharge resources and transportation as appropriate  - Identify discharge learning needs (meds, wound care, etc )  - Arrange for interpretive services to assist at discharge as needed  - Refer to Case Management Department for coordinating discharge planning if the patient needs post-hospital services based on physician/advanced practitioner order or complex needs related to functional status, cognitive ability, or social support system  Outcome: Progressing     Problem: Knowledge Deficit  Goal: Patient/family/caregiver demonstrates understanding of disease process, treatment plan, medications, and discharge instructions  Description: Complete learning assessment and assess knowledge base    Interventions:  - Provide teaching at level of understanding  - Provide teaching via preferred learning methods  Outcome: Progressing     Problem: CARDIOVASCULAR - ADULT  Goal: Maintains optimal cardiac output and hemodynamic stability  Description: INTERVENTIONS:  - Monitor I/O, vital signs and rhythm  - Monitor for S/S and trends of decreased cardiac output  - Administer and titrate ordered vasoactive medications to optimize hemodynamic stability  - Assess quality of pulses, skin color and temperature  - Assess for signs of decreased coronary artery perfusion  - Instruct patient to report change in severity of symptoms  Outcome: Progressing  Goal: Absence of cardiac dysrhythmias or at baseline rhythm  Description: INTERVENTIONS:  - Continuous cardiac monitoring, vital signs, obtain 12 lead EKG if ordered  - Administer antiarrhythmic and heart rate control medications as ordered  - Monitor electrolytes and administer replacement therapy as ordered  Outcome: Progressing     Problem: RESPIRATORY - ADULT  Goal: Achieves optimal ventilation and oxygenation  Description: INTERVENTIONS:  - Assess for changes in respiratory status  - Assess for changes in mentation and behavior  - Position to facilitate oxygenation and minimize respiratory effort  - Oxygen administered by appropriate delivery if ordered  - Initiate smoking cessation education as indicated  - Encourage broncho-pulmonary hygiene including cough, deep breathe, Incentive Spirometry  - Assess the need for suctioning and aspirate as needed  - Assess and instruct to report SOB or any respiratory difficulty  - Respiratory Therapy support as indicated  Outcome: Progressing     Problem: SKIN/TISSUE INTEGRITY - ADULT  Goal: Skin Integrity remains intact(Skin Breakdown Prevention)  Description: Assess:  -Perform Sridhar assessment   -Clean and moisturize skin   -Inspect skin when repositioning, toileting, and assisting with ADLS  -Assess under medical devices   -Assess extremities for adequate circulation and sensation     Bed Management:  -Have minimal linens on bed & keep smooth, unwrinkled  -Change linens as needed when moist or perspiring  -Avoid sitting or lying in one position for more than 2 hours while in bed  -Keep HOB at 30 degrees     Toileting:  -Offer bedside commode  -Assess for incontinence  -Use incontinent care products after each incontinent episode    Activity:  -Mobilize patient 3 times a day  -Encourage activity and walks on unit  -Encourage or provide ROM exercises   -Turn and reposition patient every 2 Hours  -Use appropriate equipment to lift or move patient in bed  -Instruct/ Assist with weight shifting every 2 hours when out of bed in chair  -Consider limitation of chair time    Skin Care:  -Avoid use of baby powder, tape, friction and shearing, hot water or constrictive clothing  -Relieve pressure over bony prominences  -Do not massage red bony areas    Next Steps:  -Teach patient strategies to minimize risks   -Consider consults to  interdisciplinary teams  Outcome: Progressing  Goal: Pressure injury heals and does not worsen  Description: Interventions:  - Implement low air loss mattress or specialty surface (Criteria met)  - Apply silicone foam dressing  - Instruct/assist with weight shifting every 60 minutes when in chair   - Limit chair time to 2 hour intervals  - Use special pressure reducing interventions   - Apply fecal or urinary incontinence containment device   - Perform passive or active ROM   - Turn and reposition patient & offload bony prominences every 2 hours   - Utilize friction reducing device or surface for transfers   - Consider consults to  interdisciplinary teams   - Use incontinent care products after each incontinent episodes  - Consider nutrition services referral as needed  Outcome: Progressing     Problem: SAFETY,RESTRAINT: NV/NON-SELF DESTRUCTIVE BEHAVIOR  Goal: Remains free of harm/injury (restraint for non violent/non self-detsructive behavior)  Description: INTERVENTIONS:  - Instruct patient/family regarding restraint use   - Assess and monitor physiologic and psychological status   - Provide interventions and comfort measures to meet assessed patient needs   - Identify and implement measures to help patient regain control  - Assess readiness for release of restraint   Outcome: Progressing  Goal: Returns to optimal restraint-free functioning  Description: INTERVENTIONS:  - Assess the patient's behavior and symptoms that indicate continued need for restraint  - Identify and implement measures to help patient regain control  - Assess readiness for release of restraint   Outcome: Progressing     Problem: Nutrition/Hydration-ADULT  Goal: Nutrient/Hydration intake appropriate for improving, restoring or maintaining nutritional needs  Description: Monitor and assess patient's nutrition/hydration status for malnutrition  Collaborate with interdisciplinary team and initiate plan and interventions as ordered  Monitor patient's weight and dietary intake as ordered or per policy  Utilize nutrition screening tool and intervene as necessary  Determine patient's food preferences and provide high-protein, high-caloric foods as appropriate       INTERVENTIONS:  - Monitor oral intake, urinary output, labs, and treatment plans  - Assess nutrition and hydration status and recommend course of action  - Evaluate amount of meals eaten  - Assist patient with eating if necessary   - Allow adequate time for meals  - Recommend/ encourage appropriate diets, oral nutritional supplements, and vitamin/mineral supplements  - Order, calculate, and assess calorie counts as needed  - Recommend, monitor, and adjust tube feedings and TPN/PPN based on assessed needs  - Assess need for intravenous fluids  - Provide specific nutrition/hydration education as appropriate  - Include patient/family/caregiver in decisions related to nutrition  Outcome: Progressing

## 2023-04-19 NOTE — PLAN OF CARE
Problem: OCCUPATIONAL THERAPY ADULT  Goal: Performs self-care activities at highest level of function for planned discharge setting  See evaluation for individualized goals  Description: Treatment Interventions: ADL retraining, Functional transfer training, UE strengthening/ROM, Endurance training, Patient/family training, Equipment evaluation/education, Compensatory technique education, Continued evaluation, Activityengagement          See flowsheet documentation for full assessment, interventions and recommendations  Note: Limitation: Decreased ADL status, Decreased UE ROM, Decreased UE strength, Decreased Safe judgement during ADL, Decreased cognition, Decreased endurance, Decreased self-care trans, Decreased high-level ADLs  Prognosis: Fair  Assessment: Pt is a 65 y o  female seen for OT evaluation s/p adm to [facility] on 4/16/2023 w/ Pulmonary embolism, Acute cystitis, and Fall  Comorbidities affecting pt’s functional performance include a significant PMH of Anxiety, Depression, DM, HLD, HTN, Parkinson's s/p deep brain stimulator implantation  Pt with active OT orders and activity orders for Up with assistance  Pt is poor historian, info on home setup and PLOF obtained via pt's chart  Pt lives with spouse in a one level house with 3 vs 5 MARTINE  Pt has caregivers 9AM-5PM Mon-Fri and 6 hours on weekends  (-) home alone  At baseline, pt required assist w/ ADLs, IADLs, and functional transfers/mobility w/ use of RW  (-)   +Falls PTA  Upon evaluation, pt currently requires Min A for UB ADLs, Max-Mod A for LB ADLs, Max A for toileting, Mod A of 2 for bed mobility, Mod-Min A of 2 for transfers, and Mod A of 1-2 for functional mobility 2* the following deficits impacting occupational performance: decreased ROM, decreased strength, decreased balance, decreased tolerance, impaired memory, impaired problem solving and decreased safety awareness   These impairments, as well at pt’s fall risk, multiple lines, steps to enter environment, difficulty performing ADLS, limited insight into deficits and decreased initiation and engagement  limit pt’s ability to safely engage in all baseline areas of occupation  Based on the aforementioned OT evaluation, functional performance deficits, and assessments, pt has been identified as a High complexity evaluation  Pt to continue to benefit from continued acute OT services during hospital stay to address defined deficits and to maximize level of functional independence in the following Occupational Performance areas: grooming, bathing/shower, toilet hygiene, dressing, medication management, health maintenance, functional mobility, community mobility, clothing management and social participation  From OT standpoint, recommend STR vs Home OT and 24/7 care pending progress and available home support upon D/C   OT will continue to follow pt 3-5x/wk to address the following goals to  w/in 10-14 days:     OT Discharge Recommendation: Post acute rehabilitation services (vs Home OT and 24/7 care pending progress and available home support)

## 2023-04-20 LAB
ALL TARGETS: NOT DETECTED
ANION GAP SERPL CALCULATED.3IONS-SCNC: 8 MMOL/L (ref 4–13)
APTT PPP: 77 SECONDS (ref 23–37)
BACTERIA BLD CULT: ABNORMAL
BACTERIA UR CULT: ABNORMAL
BUN SERPL-MCNC: 16 MG/DL (ref 5–25)
CALCIUM SERPL-MCNC: 8.3 MG/DL (ref 8.4–10.2)
CHLORIDE SERPL-SCNC: 110 MMOL/L (ref 96–108)
CO2 SERPL-SCNC: 24 MMOL/L (ref 21–32)
CREAT SERPL-MCNC: 0.62 MG/DL (ref 0.6–1.3)
ERYTHROCYTE [DISTWIDTH] IN BLOOD BY AUTOMATED COUNT: 13.4 % (ref 11.6–15.1)
GFR SERPL CREATININE-BSD FRML MDRD: 86 ML/MIN/1.73SQ M
GLUCOSE SERPL-MCNC: 101 MG/DL (ref 65–140)
GLUCOSE SERPL-MCNC: 103 MG/DL (ref 65–140)
GLUCOSE SERPL-MCNC: 106 MG/DL (ref 65–140)
GLUCOSE SERPL-MCNC: 116 MG/DL (ref 65–140)
GLUCOSE SERPL-MCNC: 149 MG/DL (ref 65–140)
GRAM STN SPEC: ABNORMAL
HCT VFR BLD AUTO: 29.7 % (ref 34.8–46.1)
HGB BLD-MCNC: 9.6 G/DL (ref 11.5–15.4)
MCH RBC QN AUTO: 31.5 PG (ref 26.8–34.3)
MCHC RBC AUTO-ENTMCNC: 32.3 G/DL (ref 31.4–37.4)
MCV RBC AUTO: 97 FL (ref 82–98)
PLATELET # BLD AUTO: 140 THOUSANDS/UL (ref 149–390)
PMV BLD AUTO: 10.9 FL (ref 8.9–12.7)
POTASSIUM SERPL-SCNC: 3.4 MMOL/L (ref 3.5–5.3)
RBC # BLD AUTO: 3.05 MILLION/UL (ref 3.81–5.12)
SODIUM SERPL-SCNC: 142 MMOL/L (ref 135–147)
WBC # BLD AUTO: 8.23 THOUSAND/UL (ref 4.31–10.16)

## 2023-04-20 RX ADMIN — APIXABAN 10 MG: 5 TABLET, FILM COATED ORAL at 17:11

## 2023-04-20 RX ADMIN — CARBIDOPA AND LEVODOPA 1 TABLET: 50; 200 TABLET, EXTENDED RELEASE ORAL at 08:18

## 2023-04-20 RX ADMIN — RIVASTIGMINE TARTRATE 4.5 MG: 3 CAPSULE ORAL at 21:25

## 2023-04-20 RX ADMIN — HEPARIN SODIUM 14 UNITS/KG/HR: 10000 INJECTION, SOLUTION INTRAVENOUS at 04:18

## 2023-04-20 RX ADMIN — CARBIDOPA AND LEVODOPA 1 TABLET: 50; 200 TABLET, EXTENDED RELEASE ORAL at 21:26

## 2023-04-20 RX ADMIN — CHLORHEXIDINE GLUCONATE 0.12% ORAL RINSE 15 ML: 1.2 LIQUID ORAL at 08:50

## 2023-04-20 RX ADMIN — RIVASTIGMINE TARTRATE 4.5 MG: 3 CAPSULE ORAL at 08:18

## 2023-04-20 RX ADMIN — Medication 3 MG: at 21:26

## 2023-04-20 NOTE — ASSESSMENT & PLAN NOTE
Had large bilateral pulmonary emboli requiring IR thrombectomy      Doing well  On room air    Will change from heparin drip to Eliquis today

## 2023-04-20 NOTE — ARC ADMISSION
Referral received for patient consideration of ARC placement for rehab  Will review with [facility] physician as able and will update CM

## 2023-04-20 NOTE — PROGRESS NOTES
[address] [place] [address]  Progress Note  Name: Carey Livingston  MRN: [id number]  Unit/Bed#: [address] 87 224-02 I Date of Admission: 2023   Date of Service: 2023 I Hospital Day: 4    Assessment/Plan   * Pulmonary embolism [facility])  Assessment & Plan  Had large bilateral pulmonary emboli requiring IR thrombectomy  Doing well  On room air    Will change from heparin drip to Eliquis today    Parkinson's disease [facility])  Assessment & Plan  Continue home regimen    Spoke with patient and wife    Plan is for STR at discharge                 Subjective:   Feels well  No SOB  No leg pain  She is unsteady with walking with walker  Objective:     Vitals:   Temp (24hrs), Av 6 °F (36 4 °C), Min:97 1 °F (36 2 °C), Max:98 3 °F (36 8 °C)    Temp:  [97 1 °F (36 2 °C)-98 3 °F (36 8 °C)] 97 5 °F (36 4 °C)  HR:  [74-83] 74  Resp:  [18-26] 18  BP: (126-132)/(68-71) 126/70  SpO2:  [93 %-99 %] 99 %  Body mass index is 33 29 kg/m²  Input and Output Summary (last 24 hours): Intake/Output Summary (Last 24 hours) at 2023 1628  Last data filed at 2023 1800  Gross per 24 hour   Intake 328 61 ml   Output --   Net 328 61 ml       Physical Exam:     Physical Exam  Vitals and nursing note reviewed  HENT:      Head: Normocephalic and atraumatic  Eyes:      Pupils: Pupils are equal, round, and reactive to light  Cardiovascular:      Rate and Rhythm: Normal rate and regular rhythm  Heart sounds: No murmur heard  No friction rub  No gallop  Pulmonary:      Effort: Pulmonary effort is normal       Breath sounds: Normal breath sounds  No wheezing or rales  Abdominal:      General: Bowel sounds are normal       Palpations: Abdomen is soft  Tenderness: There is no abdominal tenderness  Musculoskeletal:      Right lower leg: No edema  Left lower leg: No edema                 Additional Data:     Labs:    Results from last 7 days   Lab Units 23  0507 23  0426   WBC Thousand/uL 8 23 10 21*   HEMOGLOBIN g/dL 9 6* 10 4*   HEMATOCRIT % 29 7* 31 7*   PLATELETS Thousands/uL 140* 128*   NEUTROS PCT %  --  67   LYMPHS PCT %  --  21   MONOS PCT %  --  8   EOS PCT %  --  3     Results from last 7 days   Lab Units 04/20/23  0507 04/17/23  0459 04/16/23  1548   POTASSIUM mmol/L 3 4*   < > 4 7   CHLORIDE mmol/L 110*   < > 108   CO2 mmol/L 24   < > 20*   BUN mg/dL 16   < > 30*   CREATININE mg/dL 0 62   < > 1 12   CALCIUM mg/dL 8 3*   < > 9 3   ALK PHOS U/L  --   --  46   ALT U/L  --   --  26   AST U/L  --   --  24    < > = values in this interval not displayed  Results from last 7 days   Lab Units 04/16/23  1619   INR  1 21*     Results from last 7 days   Lab Units 04/20/23  1616 04/20/23  1108 04/20/23  0732 04/19/23  2203 04/19/23  1748 04/19/23  1132 04/19/23  0535 04/18/23  2356 04/18/23  1746 04/18/23  1143 04/18/23  0544 04/18/23  0001   POC GLUCOSE mg/dl 116 106 101 114 165* 119 83 113 89 132 119 114               * I Have Reviewed All Lab Data     Recent Cultures (last 7 days):     Results from last 7 days   Lab Units 04/16/23  1740 04/16/23  1628 04/16/23  1619   BLOOD CULTURE   --  Micrococcus luteus* No Growth at 72 hrs     GRAM STAIN RESULT   --  Gram positive cocci in clusters*  --    URINE CULTURE  >100,000 cfu/ml Escherichia coli*  --   --          Last 24 Hours Medication List:   Current Facility-Administered Medications   Medication Dose Route Frequency Provider Last Rate   • apixaban  10 mg Oral BID Shiela Buchanan DO     • carbidopa-levodopa  1 tablet Oral BID Isa Cuadra DO     • chlorhexidine  15 mL Mouth/Throat Q12H [address] Isa Cuadra DO     • insulin lispro  1-6 Units Subcutaneous 4x Daily (AC & HS) Hesham Puri PA-C     • melatonin  3 mg Oral HS Isa Cuadra DO     • rivastigmine  4 5 mg Oral BID Isa Cuadra DO           VTE Pharmacologic Prophylaxis:   Pharmacologic: Apixaban (Eliquis)      Current Length of Stay: 4 day(s)    Current Patient Status: Inpatient       Discharge Plan: likely STR over the weekend or early next week    Code Status: Level 1 - Full Code           Today, Patient Was Seen By: Pratibha Jensen DO    ** Please Note: Dictation voice to text software may have been used in the creation of this document   **

## 2023-04-20 NOTE — CASE MANAGEMENT
Case Management Discharge Planning Note    Patient name Letha Hawkins  Location [place] [address] 2 /[address] 2 Deepthi Lona* MRN [id number]  : 1945 Date 2023       Current Admission Date: 2023  Current Admission Diagnosis:Pulmonary embolism [facility])   Patient Active Problem List    Diagnosis Date Noted   • Pulmonary embolism ([facility] ) 2023   • Acute cystitis 2023   • Depression 2021   • Fall 2021   • Subdural hemorrhage ([facility] ) 2021   • End of battery life of deep brain stimulator 2021   • Other dysphagia 2021   • Hallucination, visual 10/30/2020   • Presence of neurostimulator 2020   • REM behavioral disorder 2020   • Generalized edema 2019   • Constipation 2019   • Closed right hip fracture, initial encounter ([place] [address] ) 2019   • Essential hypertension 2019   • Closed fracture of right hip ([place] [address] ) 2019   • 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 ([place] [address] ) 11/15/2018   • Anxiety 2018   • Other hyperlipidemia 09/10/2018   • Type 2 diabetes mellitus without complication ([place] [address] )    • S/P reverse total shoulder arthroplasty, left 2018   • Fracture, shoulder, left, closed, initial encounter 2018   • Type II or unspecified type diabetes mellitus without mention of complication, not stated as uncontrolled 2014   • Parkinson's disease ([facility] ) 2014   • Hyperlipidemia 2014   • Hypertension 2007      LOS (days): 4  Geometric Mean LOS (GMLOS) (days): 8 10  Days to GMLOS:4 3     OBJECTIVE:  Risk of Unplanned Readmission Score: 15 4         Current admission status: Inpatient   Preferred Pharmacy:    [place] [address]  Phone: [phone] Fax: [phone]    [facility] ([address] [place] [address],   [address] [place] [address] [facility]  [address]  [facility] [address]  Phone: [phone] Fax: [facility] [address] [place] [address] [address] [facility] [address] [address]  Phone: [phone] Fax: [phone]    [facility] [address] [facility] [address] # [facility] [address] # [address] [address]  Phone: [phone] Fax: [phone]    [address] Delivery ([facility] ) - [address],[facility]  [address]  Phone: [phone] Fax: [phone]    [facility] [address] [place], [place] - [address]  [facility] [address]  Phone: [phone] Fax: [phone]    Primary Care Provider: Abel Kendall MD    Primary Insurance: MEDICARE  Secondary Insurance: Jose Luis Scale    DISCHARGE DETAILS:    Discharge planning discussed with[de-identified] pt and pt's   Freedom of Choice: Yes  Comments - Freedom of Choice: Agreeable to STR hoping for [facility] vs [facility]- pt had been approved for TCF- awaiting ability of [facility] to accept  CM contacted family/caregiver?: Yes  Were Treatment Team discharge recommendations reviewed with patient/caregiver?: Yes  Did patient/caregiver verbalize understanding of patient care needs?: Yes       Contacts  Patient Contacts: Angle Goldman  Relationship to Patient[de-identified] Family  Contact Method:  In Person  Reason/Outcome: Discharge Planning, Referral                   Would you like to participate in our [address] service program?  : No - Declined    Treatment Team Recommendation: Short Term Rehab  Discharge Destination Plan[de-identified]  [facility] vs [facility]- TBD)  Transport at Discharge : BLS Ambulance (transfer asst x2 being nonambulatory)                             IMM Given (Date):: 04/20/23  IMM Given to[de-identified] Family     Additional Comments:  interested in referral to [facility] for HHA assessment when pt is d/c'd from [facility]- contact information provided to  explaining that to communicate wishes to the accepting facility SW however he, too, is able to call once pt returns home requesting assessment as well  Verbal understanding given of same

## 2023-04-20 NOTE — PLAN OF CARE
Problem: Potential for Falls  Goal: Patient will remain free of falls  Description: INTERVENTIONS:  - Educate patient/family on patient safety including physical limitations  - Instruct patient to call for assistance with activity   - Consult OT/PT to assist with strengthening/mobility   - Keep Call bell within reach  - Keep bed low and locked with side rails adjusted as appropriate  - Keep care items and personal belongings within reach  - Initiate and maintain comfort rounds  - Make Fall Risk Sign visible to staff  - Offer Toileting every  Hours, in advance of need  - Initiate/Maintain alarm  - Obtain necessary fall risk management equipment:   - Apply yellow socks and bracelet for high fall risk patients  - Consider moving patient to room near nurses station  Outcome: Progressing     Problem: MOBILITY - ADULT  Goal: Maintain or return to baseline ADL function  Description: INTERVENTIONS:  -  Assess patient's ability to carry out ADLs; assess patient's baseline for ADL function and identify physical deficits which impact ability to perform ADLs (bathing, care of mouth/teeth, toileting, grooming, dressing, etc )  - Assess/evaluate cause of self-care deficits   - Assess range of motion  - Assess patient's mobility; develop plan if impaired  - Assess patient's need for assistive devices and provide as appropriate  - Encourage maximum independence but intervene and supervise when necessary  - Involve family in performance of ADLs  - Assess for home care needs following discharge   - Consider OT consult to assist with ADL evaluation and planning for discharge  - Provide patient education as appropriate  Outcome: Progressing  Goal: Maintains/Returns to pre admission functional level  Description: INTERVENTIONS:  - Perform BMAT or MOVE assessment daily    - Set and communicate daily mobility goal to care team and patient/family/caregiver     - Collaborate with rehabilitation services on mobility goals if consulted  - Perform Range of Motion  times a day  - Reposition patient e hours    - Dangle patient  times a day  - Stand patient  times a day  - Ambulate patient  times a day  - Out of bed to chair  times a day   - Out of bed for meals  times a day  - Out of bed for toileting  - Record patient progress and toleration of activity level   Outcome: Progressing     Problem: PAIN - ADULT  Goal: Verbalizes/displays adequate comfort level or baseline comfort level  Description: Interventions:  - Encourage patient to monitor pain and request assistance  - Assess pain using appropriate pain scale  - Administer analgesics based on type and severity of pain and evaluate response  - Implement non-pharmacological measures as appropriate and evaluate response  - Consider cultural and social influences on pain and pain management  - Notify physician/advanced practitioner if interventions unsuccessful or patient reports new pain  Outcome: Progressing     Problem: INFECTION - ADULT  Goal: Absence or prevention of progression during hospitalization  Description: INTERVENTIONS:  - Assess and monitor for signs and symptoms of infection  - Monitor lab/diagnostic results  - Monitor all insertion sites, i e  indwelling lines, tubes, and drains  - Monitor endotracheal if appropriate and nasal secretions for changes in amount and color  - [place] appropriate cooling/warming therapies per order  - Administer medications as ordered  - Instruct and encourage patient and family to use good hand hygiene technique  - Identify and instruct in appropriate isolation precautions for identified infection/condition  Outcome: Progressing  Goal: Absence of fever/infection during neutropenic period  Description: INTERVENTIONS:  - Monitor WBC    Outcome: Progressing     Problem: SAFETY ADULT  Goal: Patient will remain free of falls  Description: INTERVENTIONS:  - Educate patient/family on patient safety including physical limitations  - Instruct patient to call for assistance with activity   - Consult OT/PT to assist with strengthening/mobility   - Keep Call bell within reach  - Keep bed low and locked with side rails adjusted as appropriate  - Keep care items and personal belongings within reach  - Initiate and maintain comfort rounds  - Make Fall Risk Sign visible to staff  - Offer Toileting every  Hours, in advance of need  - Initiate/Maintain alarm  - Obtain necessary fall risk management equipment:   - Apply yellow socks and bracelet for high fall risk patients  - Consider moving patient to room near nurses station  Outcome: Progressing  Goal: Maintain or return to baseline ADL function  Description: INTERVENTIONS:  -  Assess patient's ability to carry out ADLs; assess patient's baseline for ADL function and identify physical deficits which impact ability to perform ADLs (bathing, care of mouth/teeth, toileting, grooming, dressing, etc )  - Assess/evaluate cause of self-care deficits   - Assess range of motion  - Assess patient's mobility; develop plan if impaired  - Assess patient's need for assistive devices and provide as appropriate  - Encourage maximum independence but intervene and supervise when necessary  - Involve family in performance of ADLs  - Assess for home care needs following discharge   - Consider OT consult to assist with ADL evaluation and planning for discharge  - Provide patient education as appropriate  Outcome: Progressing  Goal: Maintains/Returns to pre admission functional level  Description: INTERVENTIONS:  - Perform BMAT or MOVE assessment daily    - Set and communicate daily mobility goal to care team and patient/family/caregiver  - Collaborate with rehabilitation services on mobility goals if consulted  - Perform Range of Motion  times a day  - Reposition patient every  hours    - Dangle patient  times a day  - Stand patient  times a day  - Ambulate patient  times a day  - Out of bed to chair  times a day   - Out of bed for meals  times a day  - Out of bed for toileting  - Record patient progress and toleration of activity level   Outcome: Progressing     Problem: DISCHARGE PLANNING  Goal: Discharge to home or other facility with appropriate resources  Description: INTERVENTIONS:  - Identify barriers to discharge w/patient and caregiver  - Arrange for needed discharge resources and transportation as appropriate  - Identify discharge learning needs (meds, wound care, etc )  - Arrange for interpretive services to assist at discharge as needed  - Refer to Case Management Department for coordinating discharge planning if the patient needs post-hospital services based on physician/advanced practitioner order or complex needs related to functional status, cognitive ability, or social support system  Outcome: Progressing     Problem: Knowledge Deficit  Goal: Patient/family/caregiver demonstrates understanding of disease process, treatment plan, medications, and discharge instructions  Description: Complete learning assessment and assess knowledge base    Interventions:  - Provide teaching at level of understanding  - Provide teaching via preferred learning methods  Outcome: Progressing     Problem: CARDIOVASCULAR - ADULT  Goal: Maintains optimal cardiac output and hemodynamic stability  Description: INTERVENTIONS:  - Monitor I/O, vital signs and rhythm  - Monitor for S/S and trends of decreased cardiac output  - Administer and titrate ordered vasoactive medications to optimize hemodynamic stability  - Assess quality of pulses, skin color and temperature  - Assess for signs of decreased coronary artery perfusion  - Instruct patient to report change in severity of symptoms  Outcome: Progressing  Goal: Absence of cardiac dysrhythmias or at baseline rhythm  Description: INTERVENTIONS:  - Continuous cardiac monitoring, vital signs, obtain 12 lead EKG if ordered  - Administer antiarrhythmic and heart rate control medications as ordered  - Monitor electrolytes and administer replacement therapy as ordered  Outcome: Progressing     Problem: RESPIRATORY - ADULT  Goal: Achieves optimal ventilation and oxygenation  Description: INTERVENTIONS:  - Assess for changes in respiratory status  - Assess for changes in mentation and behavior  - Position to facilitate oxygenation and minimize respiratory effort  - Oxygen administered by appropriate delivery if ordered  - Initiate smoking cessation education as indicated  - Encourage broncho-pulmonary hygiene including cough, deep breathe, Incentive Spirometry  - Assess the need for suctioning and aspirate as needed  - Assess and instruct to report SOB or any respiratory difficulty  - Respiratory Therapy support as indicated  Outcome: Progressing     Problem: SKIN/TISSUE INTEGRITY - ADULT  Goal: Skin Integrity remains intact(Skin Breakdown Prevention)  Description: Assess:  -Perform Sridhar assessment every   -Clean and moisturize skin every   -Inspect skin when repositioning, toileting, and assisting with ADLS  -Assess under medical devices such as  every   -Assess extremities for adequate circulation and sensation     Bed Management:  -Have minimal linens on bed & keep smooth, unwrinkled  -Change linens as needed when moist or perspiring  -Avoid sitting or lying in one position for more than  hours while in bed  -Keep HOB at degrees     Toileting:  -Offer bedside commode  -Assess for incontinence every   -Use incontinent care products after each incontinent episode such as     Activity:  -Mobilize patient  times a day  -Encourage activity and walks on unit  -Encourage or provide ROM exercises   -Turn and reposition patient every  Hours  -Use appropriate equipment to lift or move patient in bed  -Instruct/ Assist with weight shifting every  when out of bed in chair  -Consider limitation of chair time  hour intervals    Skin Care:  -Avoid use of baby powder, tape, friction and shearing, hot water or constrictive clothing  -Relieve pressure over bony prominences using   -Do not massage red bony areas    Next Steps:  -Teach patient strategies to minimize risks such as    -Consider consults to  interdisciplinary teams such as   Outcome: Progressing  Goal: Pressure injury heals and does not worsen  Description: Interventions:  - Implement low air loss mattress or specialty surface (Criteria met)  - Apply silicone foam dressing  - Instruct/assist with weight shifting every  minutes when in chair   - Limit chair time to  hour intervals  - Use special pressure reducing interventions such as  when in chair   - Apply fecal or urinary incontinence containment device   - Perform passive or active ROM every   - Turn and reposition patient & offload bony prominences every  hours   - Utilize friction reducing device or surface for transfers   - Consider consults to  interdisciplinary teams such as   - Use incontinent care products after each incontinent episode such as   - Consider nutrition services referral as needed  Outcome: Progressing     Problem: SAFETY,RESTRAINT: NV/NON-SELF DESTRUCTIVE BEHAVIOR  Goal: Remains free of harm/injury (restraint for non violent/non self-detsructive behavior)  Description: INTERVENTIONS:  - Instruct patient/family regarding restraint use   - Assess and monitor physiologic and psychological status   - Provide interventions and comfort measures to meet assessed patient needs   - Identify and implement measures to help patient regain control  - Assess readiness for release of restraint   Outcome: Progressing  Goal: Returns to optimal restraint-free functioning  Description: INTERVENTIONS:  - Assess the patient's behavior and symptoms that indicate continued need for restraint  - Identify and implement measures to help patient regain control  - Assess readiness for release of restraint   Outcome: Progressing     Problem: Nutrition/Hydration-ADULT  Goal: Nutrient/Hydration intake appropriate for improving, restoring or maintaining nutritional needs  Description: Monitor and assess patient's nutrition/hydration status for malnutrition  Collaborate with interdisciplinary team and initiate plan and interventions as ordered  Monitor patient's weight and dietary intake as ordered or per policy  Utilize nutrition screening tool and intervene as necessary  Determine patient's food preferences and provide high-protein, high-caloric foods as appropriate       INTERVENTIONS:  - Monitor oral intake, urinary output, labs, and treatment plans  - Assess nutrition and hydration status and recommend course of action  - Evaluate amount of meals eaten  - Assist patient with eating if necessary   - Allow adequate time for meals  - Recommend/ encourage appropriate diets, oral nutritional supplements, and vitamin/mineral supplements  - Order, calculate, and assess calorie counts as needed  - Recommend, monitor, and adjust tube feedings and TPN/PPN based on assessed needs  - Assess need for intravenous fluids  - Provide specific nutrition/hydration education as appropriate  - Include patient/family/caregiver in decisions related to nutrition  Outcome: Progressing

## 2023-04-20 NOTE — PLAN OF CARE
Problem: Potential for Falls  Goal: Patient will remain free of falls  Description: INTERVENTIONS:  - Educate patient/family on patient safety including physical limitations  - Instruct patient to call for assistance with activity   - Consult OT/PT to assist with strengthening/mobility   - Keep Call bell within reach  - Keep bed low and locked with side rails adjusted as appropriate  - Keep care items and personal belongings within reach  - Initiate and maintain comfort rounds  - Make Fall Risk Sign visible to staff  - Offer Toileting every *** Hours, in advance of need  - Initiate/Maintain ***alarm  - Obtain necessary fall risk management equipment: ***  - Apply yellow socks and bracelet for high fall risk patients  - Consider moving patient to room near nurses station  4/20/2023 0717 by Savannah Arnold RN  Outcome: Progressing  4/20/2023 0708 by Savannah Arnold RN  Outcome: Progressing     Problem: MOBILITY - ADULT  Goal: Maintain or return to baseline ADL function  Description: INTERVENTIONS:  -  Assess patient's ability to carry out ADLs; assess patient's baseline for ADL function and identify physical deficits which impact ability to perform ADLs (bathing, care of mouth/teeth, toileting, grooming, dressing, etc )  - Assess/evaluate cause of self-care deficits   - Assess range of motion  - Assess patient's mobility; develop plan if impaired  - Assess patient's need for assistive devices and provide as appropriate  - Encourage maximum independence but intervene and supervise when necessary  - Involve family in performance of ADLs  - Assess for home care needs following discharge   - Consider OT consult to assist with ADL evaluation and planning for discharge  - Provide patient education as appropriate  4/20/2023 0717 by Savannah Arnold RN  Outcome: Progressing  4/20/2023 0708 by Savannah Arnold RN  Outcome: Progressing  Goal: Maintains/Returns to pre admission functional level  Description: INTERVENTIONS:  - Perform BMAT or MOVE assessment daily    - Set and communicate daily mobility goal to care team and patient/family/caregiver  - Collaborate with rehabilitation services on mobility goals if consulted  - Perform Range of Motion *** times a day  - Reposition patient every *** hours    - Dangle patient *** times a day  - Stand patient *** times a day  - Ambulate patient *** times a day  - Out of bed to chair *** times a day   - Out of bed for meals *** times a day  - Out of bed for toileting  - Record patient progress and toleration of activity level   4/20/2023 0717 by Mateo Staton RN  Outcome: Progressing  4/20/2023 0708 by Mateo Staton RN  Outcome: Progressing     Problem: PAIN - ADULT  Goal: Verbalizes/displays adequate comfort level or baseline comfort level  Description: Interventions:  - Encourage patient to monitor pain and request assistance  - Assess pain using appropriate pain scale  - Administer analgesics based on type and severity of pain and evaluate response  - Implement non-pharmacological measures as appropriate and evaluate response  - Consider cultural and social influences on pain and pain management  - Notify physician/advanced practitioner if interventions unsuccessful or patient reports new pain  4/20/2023 0717 by Mateo Staton RN  Outcome: Progressing  4/20/2023 0708 by Mateo Staton RN  Outcome: Progressing     Problem: INFECTION - ADULT  Goal: Absence or prevention of progression during hospitalization  Description: INTERVENTIONS:  - Assess and monitor for signs and symptoms of infection  - Monitor lab/diagnostic results  - Monitor all insertion sites, i e  indwelling lines, tubes, and drains  - Monitor endotracheal if appropriate and nasal secretions for changes in amount and color  - [place] appropriate cooling/warming therapies per order  - Administer medications as ordered  - Instruct and encourage patient and family to use good hand hygiene technique  - Identify and instruct in appropriate isolation precautions for identified infection/condition  4/20/2023 0717 by Alissa Dominique RN  Outcome: Progressing  4/20/2023 0708 by Alissa Dominique RN  Outcome: Progressing  Goal: Absence of fever/infection during neutropenic period  Description: INTERVENTIONS:  - Monitor WBC    4/20/2023 0717 by Alissa Dominique RN  Outcome: Progressing  4/20/2023 0708 by Alissa Dominique RN  Outcome: Progressing     Problem: SAFETY ADULT  Goal: Patient will remain free of falls  Description: INTERVENTIONS:  - Educate patient/family on patient safety including physical limitations  - Instruct patient to call for assistance with activity   - Consult OT/PT to assist with strengthening/mobility   - Keep Call bell within reach  - Keep bed low and locked with side rails adjusted as appropriate  - Keep care items and personal belongings within reach  - Initiate and maintain comfort rounds  - Make Fall Risk Sign visible to staff  - Offer Toileting every *** Hours, in advance of need  - Initiate/Maintain ***alarm  - Obtain necessary fall risk management equipment: ***  - Apply yellow socks and bracelet for high fall risk patients  - Consider moving patient to room near nurses station  4/20/2023 0717 by Alissa Dominique RN  Outcome: Progressing  4/20/2023 0708 by Alissa Dominique RN  Outcome: Progressing  Goal: Maintain or return to baseline ADL function  Description: INTERVENTIONS:  -  Assess patient's ability to carry out ADLs; assess patient's baseline for ADL function and identify physical deficits which impact ability to perform ADLs (bathing, care of mouth/teeth, toileting, grooming, dressing, etc )  - Assess/evaluate cause of self-care deficits   - Assess range of motion  - Assess patient's mobility; develop plan if impaired  - Assess patient's need for assistive devices and provide as appropriate  - Encourage maximum independence but intervene and supervise when necessary  - Involve family in performance of ADLs  - Assess for home care needs following discharge   - Consider OT consult to assist with ADL evaluation and planning for discharge  - Provide patient education as appropriate  4/20/2023 0717 by Kate Ibrahim RN  Outcome: Progressing  4/20/2023 0708 by Kate Ibrahim RN  Outcome: Progressing  Goal: Maintains/Returns to pre admission functional level  Description: INTERVENTIONS:  - Perform BMAT or MOVE assessment daily    - Set and communicate daily mobility goal to care team and patient/family/caregiver  - Collaborate with rehabilitation services on mobility goals if consulted  - Perform Range of Motion *** times a day  - Reposition patient every *** hours    - Dangle patient *** times a day  - Stand patient *** times a day  - Ambulate patient *** times a day  - Out of bed to chair *** times a day   - Out of bed for meals *** times a day  - Out of bed for toileting  - Record patient progress and toleration of activity level   4/20/2023 0717 by Kate Ibrahim RN  Outcome: Progressing  4/20/2023 0708 by Kate Ibrahim RN  Outcome: Progressing     Problem: DISCHARGE PLANNING  Goal: Discharge to home or other facility with appropriate resources  Description: INTERVENTIONS:  - Identify barriers to discharge w/patient and caregiver  - Arrange for needed discharge resources and transportation as appropriate  - Identify discharge learning needs (meds, wound care, etc )  - Arrange for interpretive services to assist at discharge as needed  - Refer to Case Management Department for coordinating discharge planning if the patient needs post-hospital services based on physician/advanced practitioner order or complex needs related to functional status, cognitive ability, or social support system  4/20/2023 0717 by Kate Ibrahim RN  Outcome: Progressing  4/20/2023 0708 by Kate Ibrahim RN  Outcome: Progressing     Problem: Knowledge Deficit  Goal: Patient/family/caregiver demonstrates understanding of disease process, treatment plan, medications, and discharge instructions  Description: Complete learning assessment and assess knowledge base    Interventions:  - Provide teaching at level of understanding  - Provide teaching via preferred learning methods  4/20/2023 0717 by Juliette Flaherty RN  Outcome: Progressing  4/20/2023 0708 by Juliette Flaherty RN  Outcome: Progressing     Problem: CARDIOVASCULAR - ADULT  Goal: Maintains optimal cardiac output and hemodynamic stability  Description: INTERVENTIONS:  - Monitor I/O, vital signs and rhythm  - Monitor for S/S and trends of decreased cardiac output  - Administer and titrate ordered vasoactive medications to optimize hemodynamic stability  - Assess quality of pulses, skin color and temperature  - Assess for signs of decreased coronary artery perfusion  - Instruct patient to report change in severity of symptoms  4/20/2023 0717 by Juliette Flaherty RN  Outcome: Progressing  4/20/2023 0708 by Juliette Flaherty RN  Outcome: Progressing  Goal: Absence of cardiac dysrhythmias or at baseline rhythm  Description: INTERVENTIONS:  - Continuous cardiac monitoring, vital signs, obtain 12 lead EKG if ordered  - Administer antiarrhythmic and heart rate control medications as ordered  - Monitor electrolytes and administer replacement therapy as ordered  4/20/2023 0717 by Juliette Flaherty RN  Outcome: Progressing  4/20/2023 0708 by Juliette Flaherty RN  Outcome: Progressing     Problem: RESPIRATORY - ADULT  Goal: Achieves optimal ventilation and oxygenation  Description: INTERVENTIONS:  - Assess for changes in respiratory status  - Assess for changes in mentation and behavior  - Position to facilitate oxygenation and minimize respiratory effort  - Oxygen administered by appropriate delivery if ordered  - Initiate smoking cessation education as indicated  - Encourage broncho-pulmonary hygiene including cough, deep breathe, Incentive Spirometry  - Assess the need for suctioning and aspirate as needed  - Assess and instruct to report SOB or any respiratory difficulty  - Respiratory Therapy support as indicated  4/20/2023 0717 by Alissa Dominique RN  Outcome: Progressing  4/20/2023 0708 by Alissa Dominique RN  Outcome: Progressing     Problem: SKIN/TISSUE INTEGRITY - ADULT  Goal: Skin Integrity remains intact(Skin Breakdown Prevention)  Description: Assess:  -Perform Sridhar assessment every ***  -Clean and moisturize skin every ***  -Inspect skin when repositioning, toileting, and assisting with ADLS  -Assess under medical devices such as *** every ***  -Assess extremities for adequate circulation and sensation     Bed Management:  -Have minimal linens on bed & keep smooth, unwrinkled  -Change linens as needed when moist or perspiring  -Avoid sitting or lying in one position for more than *** hours while in bed  -Keep HOB at ***degrees     Toileting:  -Offer bedside commode  -Assess for incontinence every ***  -Use incontinent care products after each incontinent episode such as ***    Activity:  -Mobilize patient *** times a day  -Encourage activity and walks on unit  -Encourage or provide ROM exercises   -Turn and reposition patient every *** Hours  -Use appropriate equipment to lift or move patient in bed  -Instruct/ Assist with weight shifting every *** when out of bed in chair  -Consider limitation of chair time *** hour intervals    Skin Care:  -Avoid use of baby powder, tape, friction and shearing, hot water or constrictive clothing  -Relieve pressure over bony prominences using ***  -Do not massage red bony areas    Next Steps:  -Teach patient strategies to minimize risks such as ***   -Consider consults to  interdisciplinary teams such as ***  4/20/2023 0717 by Alissa Dominique RN  Outcome: Progressing  4/20/2023 0708 by Alissa Dominique RN  Outcome: Progressing  Goal: Pressure injury heals and does not worsen  Description: Interventions:  - Implement low air loss mattress or specialty surface (Criteria met)  - Apply silicone foam dressing  - Instruct/assist with weight shifting every *** minutes when in chair   - Limit chair time to *** hour intervals  - Use special pressure reducing interventions such as *** when in chair   - Apply fecal or urinary incontinence containment device   - Perform passive or active ROM every ***  - Turn and reposition patient & offload bony prominences every *** hours   - Utilize friction reducing device or surface for transfers   - Consider consults to  interdisciplinary teams such as ***  - Use incontinent care products after each incontinent episode such as ***  - Consider nutrition services referral as needed  4/20/2023 0717 by Mateo Staton RN  Outcome: Progressing  4/20/2023 0708 by Mateo Staton RN  Outcome: Progressing     Problem: SAFETY,RESTRAINT: NV/NON-SELF DESTRUCTIVE BEHAVIOR  Goal: Remains free of harm/injury (restraint for non violent/non self-detsructive behavior)  Description: INTERVENTIONS:  - Instruct patient/family regarding restraint use   - Assess and monitor physiologic and psychological status   - Provide interventions and comfort measures to meet assessed patient needs   - Identify and implement measures to help patient regain control  - Assess readiness for release of restraint   4/20/2023 0717 by Mateo Staton RN  Outcome: Progressing  4/20/2023 0708 by Mateo Staton RN  Outcome: Progressing  Goal: Returns to optimal restraint-free functioning  Description: INTERVENTIONS:  - Assess the patient's behavior and symptoms that indicate continued need for restraint  - Identify and implement measures to help patient regain control  - Assess readiness for release of restraint   4/20/2023 0717 by Mateo Staton RN  Outcome: Progressing  4/20/2023 0708 by Mateo Staton RN  Outcome: Progressing     Problem: Nutrition/Hydration-ADULT  Goal: Nutrient/Hydration intake appropriate for improving, restoring or maintaining nutritional needs  Description: Monitor and assess patient's nutrition/hydration status for malnutrition  Collaborate with interdisciplinary team and initiate plan and interventions as ordered  Monitor patient's weight and dietary intake as ordered or per policy  Utilize nutrition screening tool and intervene as necessary  Determine patient's food preferences and provide high-protein, high-caloric foods as appropriate       INTERVENTIONS:  - Monitor oral intake, urinary output, labs, and treatment plans  - Assess nutrition and hydration status and recommend course of action  - Evaluate amount of meals eaten  - Assist patient with eating if necessary   - Allow adequate time for meals  - Recommend/ encourage appropriate diets, oral nutritional supplements, and vitamin/mineral supplements  - Order, calculate, and assess calorie counts as needed  - Recommend, monitor, and adjust tube feedings and TPN/PPN based on assessed needs  - Assess need for intravenous fluids  - Provide specific nutrition/hydration education as appropriate  - Include patient/family/caregiver in decisions related to nutrition  4/20/2023 0717 by Juliette Flaherty RN  Outcome: Progressing  4/20/2023 0708 by Juliette Flaherty RN  Outcome: Progressing

## 2023-04-20 NOTE — PLAN OF CARE
Problem: Potential for Falls  Goal: Patient will remain free of falls  Description: INTERVENTIONS:  - Educate patient/family on patient safety including physical limitations  - Instruct patient to call for assistance with activity   - Consult OT/PT to assist with strengthening/mobility   - Keep Call bell within reach  - Keep bed low and locked with side rails adjusted as appropriate  - Keep care items and personal belongings within reach  - Initiate and maintain comfort rounds  - Make Fall Risk Sign visible to staff  - Offer Toileting every 2 Hours, in advance of need  - Initiate/Maintain bed alarm  - Obtain necessary fall risk management equipment: bed rails  - Apply yellow socks and bracelet for high fall risk patients  - Consider moving patient to room near nurses station  Outcome: Progressing     Problem: MOBILITY - ADULT  Goal: Maintain or return to baseline ADL function  Description: INTERVENTIONS:  -  Assess patient's ability to carry out ADLs; assess patient's baseline for ADL function and identify physical deficits which impact ability to perform ADLs (bathing, care of mouth/teeth, toileting, grooming, dressing, etc )  - Assess/evaluate cause of self-care deficits   - Assess range of motion  - Assess patient's mobility; develop plan if impaired  - Assess patient's need for assistive devices and provide as appropriate  - Encourage maximum independence but intervene and supervise when necessary  - Involve family in performance of ADLs  - Assess for home care needs following discharge   - Consider OT consult to assist with ADL evaluation and planning for discharge  - Provide patient education as appropriate  Outcome: Progressing  Goal: Maintains/Returns to pre admission functional level  Description: INTERVENTIONS:  - Perform BMAT or MOVE assessment daily    - Set and communicate daily mobility goal to care team and patient/family/caregiver     - Collaborate with rehabilitation services on mobility goals if consulted  - Perform Range of Motion 2 times a day  - Reposition patient every 2 hours    - Record patient progress and toleration of activity level   Outcome: Progressing     Problem: PAIN - ADULT  Goal: Verbalizes/displays adequate comfort level or baseline comfort level  Description: Interventions:  - Encourage patient to monitor pain and request assistance  - Assess pain using appropriate pain scale  - Administer analgesics based on type and severity of pain and evaluate response  - Implement non-pharmacological measures as appropriate and evaluate response  - Consider cultural and social influences on pain and pain management  - Notify physician/advanced practitioner if interventions unsuccessful or patient reports new pain  Outcome: Progressing     Problem: INFECTION - ADULT  Goal: Absence or prevention of progression during hospitalization  Description: INTERVENTIONS:  - Assess and monitor for signs and symptoms of infection  - Monitor lab/diagnostic results  - Monitor all insertion sites, i e  indwelling lines, tubes, and drains  - Monitor endotracheal if appropriate and nasal secretions for changes in amount and color  - [place] appropriate cooling/warming therapies per order  - Administer medications as ordered  - Instruct and encourage patient and family to use good hand hygiene technique  - Identify and instruct in appropriate isolation precautions for identified infection/condition  Outcome: Progressing  Goal: Absence of fever/infection during neutropenic period  Description: INTERVENTIONS:  - Monitor WBC    Outcome: Progressing     Problem: SAFETY ADULT  Goal: Patient will remain free of falls  Description: INTERVENTIONS:  - Educate patient/family on patient safety including physical limitations  - Instruct patient to call for assistance with activity   - Consult OT/PT to assist with strengthening/mobility   - Keep Call bell within reach  - Keep bed low and locked with side rails adjusted as appropriate  - Keep care items and personal belongings within reach  - Initiate and maintain comfort rounds  - Make Fall Risk Sign visible to staff  - Offer Toileting every 2 Hours, in advance of need  - Initiate/Maintain bed alarm  - Obtain necessary fall risk management equipment: bed rails  - Apply yellow socks and bracelet for high fall risk patients  - Consider moving patient to room near nurses station  Outcome: Progressing  Goal: Maintain or return to baseline ADL function  Description: INTERVENTIONS:  -  Assess patient's ability to carry out ADLs; assess patient's baseline for ADL function and identify physical deficits which impact ability to perform ADLs (bathing, care of mouth/teeth, toileting, grooming, dressing, etc )  - Assess/evaluate cause of self-care deficits   - Assess range of motion  - Assess patient's mobility; develop plan if impaired  - Assess patient's need for assistive devices and provide as appropriate  - Encourage maximum independence but intervene and supervise when necessary  - Involve family in performance of ADLs  - Assess for home care needs following discharge   - Consider OT consult to assist with ADL evaluation and planning for discharge  - Provide patient education as appropriate  Outcome: Progressing  Goal: Maintains/Returns to pre admission functional level  Description: INTERVENTIONS:  - Perform BMAT or MOVE assessment daily    - Set and communicate daily mobility goal to care team and patient/family/caregiver  - Collaborate with rehabilitation services on mobility goals if consulted  - Perform Range of Motion 2 times a day  - Reposition patient every 2 hours    - Record patient progress and toleration of activity level   Outcome: Progressing     Problem: DISCHARGE PLANNING  Goal: Discharge to home or other facility with appropriate resources  Description: INTERVENTIONS:  - Identify barriers to discharge w/patient and caregiver  - Arrange for needed discharge resources and transportation as appropriate  - Identify discharge learning needs (meds, wound care, etc )  - Arrange for interpretive services to assist at discharge as needed  - Refer to Case Management Department for coordinating discharge planning if the patient needs post-hospital services based on physician/advanced practitioner order or complex needs related to functional status, cognitive ability, or social support system  Outcome: Progressing     Problem: Knowledge Deficit  Goal: Patient/family/caregiver demonstrates understanding of disease process, treatment plan, medications, and discharge instructions  Description: Complete learning assessment and assess knowledge base    Interventions:  - Provide teaching at level of understanding  - Provide teaching via preferred learning methods  Outcome: Progressing     Problem: CARDIOVASCULAR - ADULT  Goal: Maintains optimal cardiac output and hemodynamic stability  Description: INTERVENTIONS:  - Monitor I/O, vital signs and rhythm  - Monitor for S/S and trends of decreased cardiac output  - Administer and titrate ordered vasoactive medications to optimize hemodynamic stability  - Assess quality of pulses, skin color and temperature  - Assess for signs of decreased coronary artery perfusion  - Instruct patient to report change in severity of symptoms  Outcome: Progressing  Goal: Absence of cardiac dysrhythmias or at baseline rhythm  Description: INTERVENTIONS:  - Continuous cardiac monitoring, vital signs, obtain 12 lead EKG if ordered  - Administer antiarrhythmic and heart rate control medications as ordered  - Monitor electrolytes and administer replacement therapy as ordered  Outcome: Progressing     Problem: RESPIRATORY - ADULT  Goal: Achieves optimal ventilation and oxygenation  Description: INTERVENTIONS:  - Assess for changes in respiratory status  - Assess for changes in mentation and behavior  - Position to facilitate oxygenation and minimize respiratory effort  - Oxygen administered by appropriate delivery if ordered  - Initiate smoking cessation education as indicated  - Encourage broncho-pulmonary hygiene including cough, deep breathe, Incentive Spirometry  - Assess the need for suctioning and aspirate as needed  - Assess and instruct to report SOB or any respiratory difficulty  - Respiratory Therapy support as indicated  Outcome: Progressing     Problem: SKIN/TISSUE INTEGRITY - ADULT  Goal: Skin Integrity remains intact(Skin Breakdown Prevention)  Description: Assess:  -Perform Sridhar assessment every shift  -Clean and moisturize skin as needed  -Inspect skin when repositioning, toileting, and assisting with ADLS  -Assess under medical devices such as masimo every 4 hrs  -Assess extremities for adequate circulation and sensation     Bed Management:  -Have minimal linens on bed & keep smooth, unwrinkled  -Change linens as needed when moist or perspiring  -Avoid sitting or lying in one position for more than 2 hours while in bed  -Keep HOB at 30 degrees     Toileting:  -Offer bedside commode  -Assess for incontinence every 2 hours  -Use incontinent care products after each incontinent episode such as bath wipes    Activity:  -Encourage or provide ROM exercises   -Turn and reposition patient every 2Hours  -Use appropriate equipment to lift or move patient in bed  -Instruct/ Assist with weight shifting every 15 min when out of bed in chair  -Consider limitation of chair time to 2  hour intervals    Skin Care:  -Avoid use of baby powder, tape, friction and shearing, hot water or constrictive clothing  -Relieve pressure over bony prominences using allevyn foams  -Do not massage red bony areas    Next Steps:  -Teach patient strategies to minimize risks    -Consider consults to  interdisciplinary teams   Outcome: Progressing  Goal: Pressure injury heals and does not worsen  Description: Interventions:  - Implement low air loss mattress or specialty surface (Criteria met)  - Apply silicone foam dressing  - Perform passive or active ROM every shift  - Turn and reposition patient & offload bony prominences every 2 hours   - Utilize friction reducing device or surface for transfers   - Consider consults to  interdisciplinary teams   - Use incontinent care products after each incontinent episode such as bath wipes  - Consider nutrition services referral as needed  Outcome: Progressing     Problem: SAFETY,RESTRAINT: NV/NON-SELF DESTRUCTIVE BEHAVIOR  Goal: Remains free of harm/injury (restraint for non violent/non self-detsructive behavior)  Description: INTERVENTIONS:  - Instruct patient/family regarding restraint use   - Assess and monitor physiologic and psychological status   - Provide interventions and comfort measures to meet assessed patient needs   - Identify and implement measures to help patient regain control  - Assess readiness for release of restraint   Outcome: Progressing  Goal: Returns to optimal restraint-free functioning  Description: INTERVENTIONS:  - Assess the patient's behavior and symptoms that indicate continued need for restraint  - Identify and implement measures to help patient regain control  - Assess readiness for release of restraint   Outcome: Progressing     Problem: Nutrition/Hydration-ADULT  Goal: Nutrient/Hydration intake appropriate for improving, restoring or maintaining nutritional needs  Description: Monitor and assess patient's nutrition/hydration status for malnutrition  Collaborate with interdisciplinary team and initiate plan and interventions as ordered  Monitor patient's weight and dietary intake as ordered or per policy  Utilize nutrition screening tool and intervene as necessary  Determine patient's food preferences and provide high-protein, high-caloric foods as appropriate       INTERVENTIONS:  - Monitor oral intake, urinary output, labs, and treatment plans  - Assess nutrition and hydration status and recommend course of action  - Evaluate amount of meals eaten  - Assist patient with eating if necessary   - Allow adequate time for meals  - Recommend/ encourage appropriate diets, oral nutritional supplements, and vitamin/mineral supplements  - Order, calculate, and assess calorie counts as needed  - Recommend, monitor, and adjust tube feedings and TPN/PPN based on assessed needs  - Assess need for intravenous fluids  - Provide specific nutrition/hydration education as appropriate  - Include patient/family/caregiver in decisions related to nutrition  Outcome: Progressing     Problem: Prexisting or High Potential for Compromised Skin Integrity  Goal: Skin integrity is maintained or improved  Description: INTERVENTIONS:  - Identify patients at risk for skin breakdown  - Assess and monitor skin integrity  - Assess and monitor nutrition and hydration status  - Monitor labs   - Assess for incontinence   - Turn and reposition patient  - Assist with mobility/ambulation  - Relieve pressure over bony prominences  - Avoid friction and shearing  - Provide appropriate hygiene as needed including keeping skin clean and dry  - Evaluate need for skin moisturizer/barrier cream  - Collaborate with interdisciplinary team   - Patient/family teaching  - Consider wound care consult   Outcome: Progressing

## 2023-04-21 PROBLEM — G93.41 METABOLIC ENCEPHALOPATHY: Status: ACTIVE | Noted: 2023-04-21

## 2023-04-21 LAB
ANION GAP SERPL CALCULATED.3IONS-SCNC: 6 MMOL/L (ref 4–13)
BACTERIA BLD CULT: NORMAL
BASOPHILS # BLD AUTO: 0.02 THOUSANDS/ΜL (ref 0–0.1)
BASOPHILS NFR BLD AUTO: 0 % (ref 0–1)
BUN SERPL-MCNC: 11 MG/DL (ref 5–25)
CALCIUM SERPL-MCNC: 8.6 MG/DL (ref 8.4–10.2)
CHLORIDE SERPL-SCNC: 110 MMOL/L (ref 96–108)
CO2 SERPL-SCNC: 25 MMOL/L (ref 21–32)
CREAT SERPL-MCNC: 0.59 MG/DL (ref 0.6–1.3)
EOSINOPHIL # BLD AUTO: 0.32 THOUSAND/ΜL (ref 0–0.61)
EOSINOPHIL NFR BLD AUTO: 4 % (ref 0–6)
ERYTHROCYTE [DISTWIDTH] IN BLOOD BY AUTOMATED COUNT: 14 % (ref 11.6–15.1)
GFR SERPL CREATININE-BSD FRML MDRD: 88 ML/MIN/1.73SQ M
GLUCOSE SERPL-MCNC: 102 MG/DL (ref 65–140)
GLUCOSE SERPL-MCNC: 119 MG/DL (ref 65–140)
GLUCOSE SERPL-MCNC: 119 MG/DL (ref 65–140)
GLUCOSE SERPL-MCNC: 124 MG/DL (ref 65–140)
GLUCOSE SERPL-MCNC: 125 MG/DL (ref 65–140)
HCT VFR BLD AUTO: 29.8 % (ref 34.8–46.1)
HGB BLD-MCNC: 9.6 G/DL (ref 11.5–15.4)
IMM GRANULOCYTES # BLD AUTO: 0.08 THOUSAND/UL (ref 0–0.2)
IMM GRANULOCYTES NFR BLD AUTO: 1 % (ref 0–2)
LYMPHOCYTES # BLD AUTO: 1.61 THOUSANDS/ΜL (ref 0.6–4.47)
LYMPHOCYTES NFR BLD AUTO: 21 % (ref 14–44)
MCH RBC QN AUTO: 31.5 PG (ref 26.8–34.3)
MCHC RBC AUTO-ENTMCNC: 32.2 G/DL (ref 31.4–37.4)
MCV RBC AUTO: 98 FL (ref 82–98)
MONOCYTES # BLD AUTO: 0.61 THOUSAND/ΜL (ref 0.17–1.22)
MONOCYTES NFR BLD AUTO: 8 % (ref 4–12)
NEUTROPHILS # BLD AUTO: 5.03 THOUSANDS/ΜL (ref 1.85–7.62)
NEUTS SEG NFR BLD AUTO: 66 % (ref 43–75)
NRBC BLD AUTO-RTO: 0 /100 WBCS
PLATELET # BLD AUTO: 152 THOUSANDS/UL (ref 149–390)
PMV BLD AUTO: 10.4 FL (ref 8.9–12.7)
POTASSIUM SERPL-SCNC: 3.4 MMOL/L (ref 3.5–5.3)
RBC # BLD AUTO: 3.05 MILLION/UL (ref 3.81–5.12)
SARS-COV-2 RNA RESP QL NAA+PROBE: NEGATIVE
SODIUM SERPL-SCNC: 141 MMOL/L (ref 135–147)
WBC # BLD AUTO: 7.67 THOUSAND/UL (ref 4.31–10.16)

## 2023-04-21 RX ADMIN — CARBIDOPA AND LEVODOPA 1 TABLET: 50; 200 TABLET, EXTENDED RELEASE ORAL at 08:20

## 2023-04-21 RX ADMIN — APIXABAN 10 MG: 5 TABLET, FILM COATED ORAL at 17:40

## 2023-04-21 RX ADMIN — CARBIDOPA AND LEVODOPA 1 TABLET: 50; 200 TABLET, EXTENDED RELEASE ORAL at 21:47

## 2023-04-21 RX ADMIN — RIVASTIGMINE TARTRATE 4.5 MG: 3 CAPSULE ORAL at 21:48

## 2023-04-21 RX ADMIN — CHLORHEXIDINE GLUCONATE 0.12% ORAL RINSE 15 ML: 1.2 LIQUID ORAL at 08:32

## 2023-04-21 RX ADMIN — Medication 3 MG: at 21:48

## 2023-04-21 RX ADMIN — RIVASTIGMINE TARTRATE 4.5 MG: 3 CAPSULE ORAL at 08:20

## 2023-04-21 RX ADMIN — APIXABAN 10 MG: 5 TABLET, FILM COATED ORAL at 08:20

## 2023-04-21 NOTE — PROGRESS NOTES
[address] [facility]  Progress Note  Name: Michelle Reynoso  MRN: [id number]  Unit/Bed#: [address] 224-02 I Date of Admission: 2023   Date of Service: 2023 I Hospital Day: 5    Assessment/Plan   * Pulmonary embolism [facility])  Assessment & Plan  Pulmonary Embolism With Acute Cor Pulmonale, evidenced by CT with Large emboli in the distal bilateral main pulmonary arteries extending into multiple segmental arteries  Right heart strain pattern (RV/LV ratio is 3 2) and Echo with Right ventricular cavity size is severely dilated  Systolic function is severely reduced, treated with lifelong anticoagulants and Pulmonary Outpatient follow up  Had large bilateral pulmonary emboli requiring IR thrombectomy  Doing well  On room air    Was on heparin drip  Now on apixaban    Metabolic encephalopathy  Assessment & Plan  Possible  Metabolic Encephalopathy, POA, now resolved,  due to Hypoxia and Lactic Acidosis (level 6 7), evidenced by irritability and not herself lately per , requiring CT Head, Neuro checks, Nursing safety measures and treatment of  acute hypoxic respiratory failure, lactic acidosis and PE  Findings:   Acute hypoxic respiratory failure with Bipap and oxygen with 4L NC> 3L NC        This has resolved    Parkinson's disease ([facility] [address] )  Assessment & Plan  Continue home regimen    Spoke with patient and wife    Plan is for STR at discharge             Subjective:   Feels much better  No SOB  No chest pain      Objective:     Vitals:   Temp (24hrs), Av 6 °F (37 °C), Min:98 6 °F (37 °C), Max:98 6 °F (37 °C)    Temp:  [98 6 °F (37 °C)] 98 6 °F (37 °C)  HR:  [73-78] 73  Resp:  [16] 16  BP: (106-138)/(63-71) 106/63  SpO2:  [95 %-96 %] 95 %  Body mass index is 33 29 kg/m²  Input and Output Summary (last 24 hours):        Intake/Output Summary (Last 24 hours) at 2023 1537  Last data filed at 2023 1750  Gross per 24 hour   Intake 480 ml   Output --   Net 480 ml Physical Exam:     Physical Exam  Vitals and nursing note reviewed  HENT:      Head: Normocephalic and atraumatic  Eyes:      Pupils: Pupils are equal, round, and reactive to light  Cardiovascular:      Rate and Rhythm: Normal rate and regular rhythm  Heart sounds: No murmur heard  No friction rub  No gallop  Pulmonary:      Effort: Pulmonary effort is normal       Breath sounds: Normal breath sounds  No wheezing or rales  Abdominal:      General: Bowel sounds are normal       Palpations: Abdomen is soft  Tenderness: There is no abdominal tenderness  Musculoskeletal:      Right lower leg: No edema  Left lower leg: No edema          Additional Data:     Labs:    Results from last 7 days   Lab Units 04/21/23  0441   WBC Thousand/uL 7 67   HEMOGLOBIN g/dL 9 6*   HEMATOCRIT % 29 8*   PLATELETS Thousands/uL 152   NEUTROS PCT % 66   LYMPHS PCT % 21   MONOS PCT % 8   EOS PCT % 4     Results from last 7 days   Lab Units 04/21/23  0441 04/17/23  0459 04/16/23  1548   POTASSIUM mmol/L 3 4*   < > 4 7   CHLORIDE mmol/L 110*   < > 108   CO2 mmol/L 25   < > 20*   BUN mg/dL 11   < > 30*   CREATININE mg/dL 0 59*   < > 1 12   CALCIUM mg/dL 8 6   < > 9 3   ALK PHOS U/L  --   --  46   ALT U/L  --   --  26   AST U/L  --   --  24    < > = values in this interval not displayed  Results from last 7 days   Lab Units 04/16/23  1619   INR  1 21*     Results from last 7 days   Lab Units 04/21/23  1130 04/21/23  0741 04/20/23  2124 04/20/23  1616 04/20/23  1108 04/20/23  0732 04/19/23  2203 04/19/23  1748 04/19/23  1132 04/19/23  0535 04/18/23  2356 04/18/23  1746   POC GLUCOSE mg/dl 119 119 149* 116 106 101 114 165* 119 83 113 89               * I Have Reviewed All Lab Data     Recent Cultures (last 7 days):     Results from last 7 days   Lab Units 04/16/23  1740 04/16/23  1628 04/16/23  1619   BLOOD CULTURE   --  Micrococcus luteus* No Growth After 4 Days     GRAM STAIN RESULT   --  Gram positive cocci in clusters*  --    URINE CULTURE  >100,000 cfu/ml Escherichia coli*  --   --          Last 24 Hours Medication List:   Current Facility-Administered Medications   Medication Dose Route Frequency Provider Last Rate   • apixaban  10 mg Oral BID Roge Buchanan DO     • carbidopa-levodopa  1 tablet Oral BID Isa Hay DO     • chlorhexidine  15 mL Mouth/Throat Q12H [address] Handaryl Hay DO     • insulin lispro  1-6 Units Subcutaneous 4x Daily (AC & HS) Sonia Gao PA-C     • melatonin  3 mg Oral HS Isa Hay DO     • rivastigmine  4 5 mg Oral BID Isa Hay DO           VTE Pharmacologic Prophylaxis:   Pharmacologic: Apixaban (Eliquis)      Current Length of Stay: 5 day(s)    Current Patient Status: Inpatient       Discharge Plan: STR tomorrow      Code Status: Level 1 - Full Code           Today, Patient Was Seen By: Elena Park DO    ** Please Note: Dictation voice to text software may have been used in the creation of this document   **

## 2023-04-21 NOTE — ASSESSMENT & PLAN NOTE
Pulmonary Embolism With Acute Cor Pulmonale, evidenced by CT with Large emboli in the distal bilateral main pulmonary arteries extending into multiple segmental arteries  Right heart strain pattern (RV/LV ratio is 3 2) and Echo with Right ventricular cavity size is severely dilated  Systolic function is severely reduced, treated with lifelong anticoagulants and Pulmonary Outpatient follow up  Had large bilateral pulmonary emboli requiring IR thrombectomy  Doing well  On room air    Was on heparin drip    Now on apixaban

## 2023-04-21 NOTE — PHYSICAL THERAPY NOTE
PHYSICAL THERAPY NOTE          Patient Name: Macy Rios  [id number] Date: 4/21/2023 04/21/23 1256   Note Type   Note Type Treatment   Pain Assessment   Pain Assessment Tool 0-10   Pain Score No Pain   Restrictions/Precautions   Other Precautions Cognitive; Chair Alarm; Bed Alarm; Fall Risk  (masimo)   General   Chart Reviewed Yes   Family/Caregiver Present No   Cognition   Overall Cognitive Status Impaired   Arousal/Participation Alert; Responsive; Cooperative   Attention Attends with cues to redirect   Orientation Level Oriented to person;Disoriented to place; Disoriented to time;Disoriented to situation   Memory Decreased recall of precautions;Decreased recall of recent events;Decreased short term memory   Following Commands Follows one step commands with increased time or repetition   Subjective   Subjective I'm doing okay  Bed Mobility   Additional Comments pt  seated out of bed upon arrival   pt remained out of bed post PT session  Transfers   Sit to Stand 3  Moderate assistance   Additional items Assist x 2;Armrests; Increased time required;Verbal cues   Stand to Sit 3  Moderate assistance   Additional items Assist x 1; Increased time required;Verbal cues;Armrests   Stand pivot 3  Moderate assistance   Additional items Increased time required;Verbal cues; Assist x 1  (min assist of another)   Toilet transfer 3  Moderate assistance  (mod ax1 stand to sit and mod assist x2 for sit to stand)   Additional items Assist x 1; Increased time required;Verbal cues;Standard toilet;Assist x 2  (use of grab bar,)   Additional Comments cue sfor hand placement, turning & backing up to chair or toilet  Ambulation/Elevation   Gait pattern Improper Weight shift; Poor UE support;Narrow NANCY; Decreased foot clearance; Excessively slow;L Foot drag; Inconsistent dayday; Short stride  (L le lags behind at times )   Gait Assistance 3  Moderate assist "  Additional items Assist x 1;Verbal cues  (and min assist of another)   Assistive Device Bariatric Rolling walker   Distance 35' x1, 20' x1   Balance   Static Sitting Fair   Dynamic Sitting Fair -   Static Standing Fair -   Dynamic Standing Poor +   Ambulatory Poor   Exercises   Hip Abduction Sitting;10 reps;AAROM; Bilateral   Hip Adduction Sitting;10 reps;AROM; Bilateral   Knee AROM Long Arc Quad Sitting;20 reps;AROM; Bilateral   Ankle Pumps Sitting;10 reps;AROM; Bilateral   Marching Sitting;10 reps;AROM; Bilateral   Assessment   Prognosis Fair   Problem List Decreased strength;Decreased endurance; Impaired balance;Decreased mobility; Decreased cognition; Impaired judgement;Decreased safety awareness; Obesity; Impaired tone;Decreased coordination   Assessment Pt seen for PT treatment session this date with interventions consisting of transfer and gait training, le there x for ROM and strengthening, balance and endurance training and education provided as needed for safety and direction to improve functional mobility, safety awareness, and activity tolerance  Pt agreeable to PT treatment session upon arrival, pt found seated out of bed in recliner  Pt offers no c/o pain  Pt reports feeling \"OK\" At end of session, pt left seated out of bed in recliner with b/l le's elevated with all needs in reach  In comparison to previous session, pt with improvements in ambulation distances, activity tolerance and endurance  PT continues to demosntrate difficlty inpeforming sit to stand transfers requiring mod assist x2 to perform and complete with cues for hand placementa nd technique  Less assistance for stand to sit performign with mod assist x1 and min assist of another  PT is requiring mod assist x1 and min assist of another for gait training on levels with use of Rw   PT  requires cues for le seqeucning, step through gait and directional cues for navigating in environment   gait deviations as noted in flow sheet slw adducted gait " with decreaed foot clearnnce increasing pt's risk for falls  Increased difficulty with turning and backing up to chair or toilet noted, increased cues assistance required  mile jasso noted  spo2 on ra 93% with mobility ad activity  pt  peforms seated b/l le arom exercises x 10 -20 reps to tolerance with verbal and visual cues for correct performance    Continue to recommend STR at time of d/c in order to maximize functional outcomes and return to PLOF  Pt continues to be functioning below baseline level of mobility  Pt will continue to benefit from inpt PT while here in order to address the deficits listed above and provide interventions consistent w/ POC in effort to achieve STGs  Goals   Patient Goals none stated due to cognitive impairment   STG Expiration Date 04/29/23   PT Treatment Day 1   Plan   Treatment/Interventions Functional transfer training;LE strengthening/ROM; Therapeutic exercise; Endurance training;Cognitive reorientation;Patient/family training;Equipment eval/education;Gait training;Spoke to nursing; Compensatory technique education   Progress Progressing toward goals   PT Frequency Other (Comment)  (3-5x/ week)   Recommendation   PT Discharge Recommendation Post acute rehabilitation services   AM-PAC Basic Mobility Inpatient   Turning in Flat Bed Without Bedrails 2   Lying on Back to Sitting on Edge of Flat Bed Without Bedrails 2   Moving Bed to Chair 2   Standing Up From Chair Using Arms 2   Walk in Room 2   Climb 3-5 Stairs With Railing 1   Basic Mobility Inpatient Raw Score 11   Basic Mobility Standardized Score 30 25   Highest Level Of Mobility   -HLM Goal 4: Move to chair/commode   JH-HLM Achieved 7: Walk 25 feet or more   End of Consult   Patient Position at End of Consult Bedside chair; All needs within reach;Bed/Chair alarm activated   End of Consult Comments pt  in stable condition seated in recliner       Bereket Crowley PTA

## 2023-04-21 NOTE — CASE MANAGEMENT
Case Management Discharge Planning Note    Patient name Stephanie Gao  [facility] /[facility] 2 Lyssa Ayala* MRN [id number]  : 1945 Date 2023       Current Admission Date: 2023  Current Admission Diagnosis:Pulmonary embolism [facility])   Patient Active Problem List    Diagnosis Date Noted   • [facility] 2023   • Acute cystitis 2023   • Depression 2021   • Fall 2021   • Subdural hemorrhage ([facility] ) 2021   • End of battery life of deep brain stimulator 2021   • Other dysphagia 2021   • Hallucination, visual 10/30/2020   • Presence of neurostimulator 2020   • REM behavioral disorder 2020   • Generalized edema 2019   • Constipation 2019   • Closed right hip fracture, initial encounter ([place] [address] ) 2019   • Essential hypertension 2019   • Closed fracture of right hip ([place] [address] ) 2019   • 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 ([place] [address] ) 11/15/2018   • Anxiety 2018   • Other hyperlipidemia 09/10/2018   • Type 2 diabetes mellitus without complication ([place] [address] ) 15/54/3576   • S/P reverse total shoulder arthroplasty, left 2018   • Fracture, shoulder, left, closed, initial encounter 2018   • Type II or unspecified type diabetes mellitus without mention of complication, not stated as uncontrolled 2014   • Parkinson's disease ([facility] ) 2014   • Hyperlipidemia 2014   • Hypertension 2007      LOS (days): 5  Geometric Mean LOS (GMLOS) (days): 8 10  Days to GMLOS:3 4     OBJECTIVE:  Risk of Unplanned Readmission Score: 13 24         Current admission status: Inpatient   Preferred Pharmacy:    [place] [address]  Phone: [phone] Fax: [phone]    [facility] ([address] [place] [address],   [address] [facility]  [address]  [facility] [address]  Phone: [phone] Fax: [facility] [address] [place] [address] [address] [facility] [address] [address] [address]  Phone: [phone] Fax: [phone]    [facility] [address], [address] # [address] [address]  Phone: [phone] Fax: [phone]    [facility] ([facility] ) - [address],[facility]  [address]  Phone: [phone] Fax: [phone] [place], [place] - [address]  [address]  [address] [address]  Phone: [phone] Fax: [phone]    Primary Care Provider: Savannah Zhou MD    Primary Insurance: MEDICARE  Secondary Insurance: Darren Lord    DISCHARGE DETAILS:          Comments - Freedom of Choice: Pt was declined by [facility]  (1st choice) with pt/husand agreeable to [facility] if this would occur- [facility] able to accept for a Saturday admission                Contacts  Patient Contacts: Salazar Quintanilla  Relationship to Patient[de-identified] Family  Contact Method: In Person  Reason/Outcome: Discharge Planning                        Treatment Team Recommendation: Short Term Rehab  Discharge Destination Plan[de-identified] Short Term Rehab, [facility])  Transport at Discharge : [facility]  Dispatcher Contacted: Yes  Number/Name of Dispatcher: Roundtrip  Transported by [employer] and Unit #):  [facility]  ETA of Transport (Date): 04/21/23  ETA of Transport (Time): [address] Name, [address] : [address]  Receiving Facility/Agency Phone Number: [phone]  Facility/Agency Fax Number: [phone]

## 2023-04-21 NOTE — PLAN OF CARE
"  Problem: PHYSICAL THERAPY ADULT  Goal: Performs mobility at highest level of function for planned discharge setting  See evaluation for individualized goals  Description: Treatment/Interventions: Functional transfer training, LE strengthening/ROM, Therapeutic exercise, Endurance training, Patient/family training, Equipment eval/education, Bed mobility, Gait training, Compensatory technique education, Continued evaluation, Spoke to nursing, OT  Equipment Recommended: Rodolfo Hough (pt has )       See flowsheet documentation for full assessment, interventions and recommendations  Outcome: Progressing  Note: Prognosis: Fair  Problem List: Decreased strength, Decreased endurance, Impaired balance, Decreased mobility, Decreased cognition, Impaired judgement, Decreased safety awareness, Obesity, Impaired tone, Decreased coordination  Assessment: Pt seen for PT treatment session this date with interventions consisting of transfer and gait training, le there x for ROM and strengthening, balance and endurance training and education provided as needed for safety and direction to improve functional mobility, safety awareness, and activity tolerance  Pt agreeable to PT treatment session upon arrival, pt found seated out of bed in recliner  Pt offers no c/o pain  Pt reports feeling \"OK\" At end of session, pt left seated out of bed in recliner with b/l le's elevated with all needs in reach  In comparison to previous session, pt with improvements in ambulation distances, activity tolerance and endurance  PT continues to demosntrate difficlty inpeforming sit to stand transfers requiring mod assist x2 to perform and complete with cues for hand placementa nd technique  Less assistance for stand to sit performign with mod assist x1 and min assist of another  PT is requiring mod assist x1 and min assist of another for gait training on levels with use of Rw   PT  requires cues for le seqeucning, step through gait and directional cues " for navigating in environment   gait deviations as noted in flow sheet slw adducted gait with decreaed foot clearnnce increasing pt's risk for falls  Increased difficulty with turning and backing up to chair or toilet noted, increased cues assistance required  mile jasso noted  spo2 on ra 93% with mobility ad activity  pt  peforms seated b/l le arom exercises x 10 -20 reps to tolerance with verbal and visual cues for correct performance    Continue to recommend STR at time of d/c in order to maximize functional outcomes and return to PLOF  Pt continues to be functioning below baseline level of mobility  Pt will continue to benefit from inpt PT while here in order to address the deficits listed above and provide interventions consistent w/ POC in effort to achieve STGs  PT Discharge Recommendation: Post acute rehabilitation services    See flowsheet documentation for full assessment

## 2023-04-21 NOTE — PLAN OF CARE
Problem: Potential for Falls  Goal: Patient will remain free of falls  Description: INTERVENTIONS:  - Educate patient/family on patient safety including physical limitations  - Instruct patient to call for assistance with activity   - Consult OT/PT to assist with strengthening/mobility   - Keep Call bell within reach  - Keep bed low and locked with side rails adjusted as appropriate  - Keep care items and personal belongings within reach  - Initiate and maintain comfort rounds  - Make Fall Risk Sign visible to staff  - Offer Toileting every 2 Hours, in advance of need  - Initiate/Maintain bed alarm  - Obtain necessary fall risk management equipment: alarms  - Apply yellow socks and bracelet for high fall risk patients  - Consider moving patient to room near nurses station  Outcome: Progressing     Problem: MOBILITY - ADULT  Goal: Maintain or return to baseline ADL function  Description: INTERVENTIONS:  - Educate patient/family on patient safety including physical limitations  - Instruct patient to call for assistance with activity   - Consult OT/PT to assist with strengthening/mobility   - Keep Call bell within reach  - Keep bed low and locked with side rails adjusted as appropriate  - Keep care items and personal belongings within reach  - Initiate and maintain comfort rounds  - Make Fall Risk Sign visible to staff  - Offer Toileting every 2 Hours, in advance of need  - Initiate/Maintain bed alarm  - Obtain necessary fall risk management equipment: alarms  - Apply yellow socks and bracelet for high fall risk patients  - Consider moving patient to room near nurses station  Outcome: Progressing  Goal: Maintains/Returns to pre admission functional level  Description: INTERVENTIONS:  - Perform BMAT or MOVE assessment daily    - Set and communicate daily mobility goal to care team and patient/family/caregiver     - Collaborate with rehabilitation services on mobility goals if consulted  - Perform Range of Motion 4 times a day  - Reposition patient every 2 hours    - Dangle patient 4 times a day  - Stand patient 4 times a day  - Ambulate patient 4 times a day  - Out of bed to chair 4 times a day   - Out of bed for meals 3 times a day  - Out of bed for toileting  - Record patient progress and toleration of activity level   Outcome: Progressing     Problem: PAIN - ADULT  Goal: Verbalizes/displays adequate comfort level or baseline comfort level  Description: Interventions:  - Encourage patient to monitor pain and request assistance  - Assess pain using appropriate pain scale  - Administer analgesics based on type and severity of pain and evaluate response  - Implement non-pharmacological measures as appropriate and evaluate response  - Consider cultural and social influences on pain and pain management  - Notify physician/advanced practitioner if interventions unsuccessful or patient reports new pain  Outcome: Progressing     Problem: INFECTION - ADULT  Goal: Absence or prevention of progression during hospitalization  Description: INTERVENTIONS:  - Assess and monitor for signs and symptoms of infection  - Monitor lab/diagnostic results  - Monitor all insertion sites, i e  indwelling lines, tubes, and drains  - Monitor endotracheal if appropriate and nasal secretions for changes in amount and color  - [place] appropriate cooling/warming therapies per order  - Administer medications as ordered  - Instruct and encourage patient and family to use good hand hygiene technique  - Identify and instruct in appropriate isolation precautions for identified infection/condition  Outcome: Progressing     Problem: SAFETY ADULT  Goal: Patient will remain free of falls  Description: INTERVENTIONS:  - Educate patient/family on patient safety including physical limitations  - Instruct patient to call for assistance with activity   - Consult OT/PT to assist with strengthening/mobility   - Keep Call bell within reach  - Keep bed low and locked with side rails adjusted as appropriate  - Keep care items and personal belongings within reach  - Initiate and maintain comfort rounds  - Make Fall Risk Sign visible to staff  - Offer Toileting every 2 Hours, in advance of need  - Initiate/Maintain bed alarm  - Obtain necessary fall risk management equipment: alarms  - Apply yellow socks and bracelet for high fall risk patients  - Consider moving patient to room near nurses station  Outcome: Progressing  Goal: Maintain or return to baseline ADL function  Description: INTERVENTIONS:  - Educate patient/family on patient safety including physical limitations  - Instruct patient to call for assistance with activity   - Consult OT/PT to assist with strengthening/mobility   - Keep Call bell within reach  - Keep bed low and locked with side rails adjusted as appropriate  - Keep care items and personal belongings within reach  - Initiate and maintain comfort rounds  - Make Fall Risk Sign visible to staff  - Offer Toileting every 2 Hours, in advance of need  - Initiate/Maintain bed alarm  - Obtain necessary fall risk management equipment: alarms  - Apply yellow socks and bracelet for high fall risk patients  - Consider moving patient to room near nurses station  Outcome: Progressing  Goal: Maintains/Returns to pre admission functional level  Description: INTERVENTIONS:  - Perform BMAT or MOVE assessment daily    - Set and communicate daily mobility goal to care team and patient/family/caregiver  - Collaborate with rehabilitation services on mobility goals if consulted  - Perform Range of Motion 4 times a day  - Reposition patient every 2 hours    - Dangle patient 4 times a day  - Stand patient 4 times a day  - Ambulate patient 4 times a day  - Out of bed to chair 4 times a day   - Out of bed for meals 3 times a day  - Out of bed for toileting  - Record patient progress and toleration of activity level   Outcome: Progressing     Problem: DISCHARGE PLANNING  Goal: Discharge to home or other facility with appropriate resources  Description: INTERVENTIONS:  - Identify barriers to discharge w/patient and caregiver  - Arrange for needed discharge resources and transportation as appropriate  - Identify discharge learning needs (meds, wound care, etc )  - Arrange for interpretive services to assist at discharge as needed  - Refer to Case Management Department for coordinating discharge planning if the patient needs post-hospital services based on physician/advanced practitioner order or complex needs related to functional status, cognitive ability, or social support system  Outcome: Progressing     Problem: Knowledge Deficit  Goal: Patient/family/caregiver demonstrates understanding of disease process, treatment plan, medications, and discharge instructions  Description: Complete learning assessment and assess knowledge base    Interventions:  - Provide teaching at level of understanding  - Provide teaching via preferred learning methods  Outcome: Progressing     Problem: CARDIOVASCULAR - ADULT  Goal: Maintains optimal cardiac output and hemodynamic stability  Description: INTERVENTIONS:  - Monitor I/O, vital signs and rhythm  - Monitor for S/S and trends of decreased cardiac output  - Administer and titrate ordered vasoactive medications to optimize hemodynamic stability  - Assess quality of pulses, skin color and temperature  - Assess for signs of decreased coronary artery perfusion  - Instruct patient to report change in severity of symptoms  Outcome: Progressing  Goal: Absence of cardiac dysrhythmias or at baseline rhythm  Description: INTERVENTIONS:  - Continuous cardiac monitoring, vital signs, obtain 12 lead EKG if ordered  - Administer antiarrhythmic and heart rate control medications as ordered  - Monitor electrolytes and administer replacement therapy as ordered  Outcome: Progressing     Problem: RESPIRATORY - ADULT  Goal: Achieves optimal ventilation and oxygenation  Description: INTERVENTIONS:  - Assess for changes in respiratory status  - Assess for changes in mentation and behavior  - Position to facilitate oxygenation and minimize respiratory effort  - Oxygen administered by appropriate delivery if ordered  - Initiate smoking cessation education as indicated  - Encourage broncho-pulmonary hygiene including cough, deep breathe, Incentive Spirometry  - Assess the need for suctioning and aspirate as needed  - Assess and instruct to report SOB or any respiratory difficulty  - Respiratory Therapy support as indicated  Outcome: Progressing     Problem: SKIN/TISSUE INTEGRITY - ADULT  Goal: Skin Integrity remains intact(Skin Breakdown Prevention)  Description: Assess:  -Perform Sridhar assessment every shift  -Clean and moisturize skin every shift  -Inspect skin when repositioning, toileting, and assisting with ADLS  -Assess under medical devices such as IV every shift  -Assess extremities for adequate circulation and sensation     Bed Management:  -Have minimal linens on bed & keep smooth, unwrinkled  -Change linens as needed when moist or perspiring  -Avoid sitting or lying in one position for more than 2 hours while in bed  -Keep HOB at 30 degrees     Toileting:  -Offer bedside commode  -Assess for incontinence every shift  -Use incontinent care products after each incontinent episode such as foam cleanser    Activity:  -Mobilize patient 3 times a day  -Encourage activity and walks on unit  -Encourage or provide ROM exercises   -Turn and reposition patient every 2 Hours  -Use appropriate equipment to lift or move patient in bed  -Instruct/ Assist with weight shifting every hour when out of bed in chair  -Consider limitation of chair time 2 hour intervals    Skin Care:  -Avoid use of baby powder, tape, friction and shearing, hot water or constrictive clothing  -Relieve pressure over bony prominences using pillows  -Do not massage red bony areas    Next Steps:  -Teach patient strategies to minimize risks such as skin breakdown   -Consider consults to  interdisciplinary teams such as wound care  Outcome: Progressing  Goal: Pressure injury heals and does not worsen  Description: Interventions:  - Implement low air loss mattress or specialty surface (Criteria met)  - Apply silicone foam dressing  - Instruct/assist with weight shifting every 30 minutes when in chair   - Limit chair time to 2 hour intervals  - Use special pressure reducing interventions such as cushion when in chair   - Apply fecal or urinary incontinence containment device   - Perform passive or active ROM every shift  - Turn and reposition patient & offload bony prominences every 2 hours   - Utilize friction reducing device or surface for transfers   - Consider consults to  interdisciplinary teams such as wound care  - Use incontinent care products after each incontinent episode such as foam cleanser  - Consider nutrition services referral as needed  Outcome: Progressing     Problem: SAFETY,RESTRAINT: NV/NON-SELF DESTRUCTIVE BEHAVIOR  Goal: Remains free of harm/injury (restraint for non violent/non self-detsructive behavior)  Description: INTERVENTIONS:  - Instruct patient/family regarding restraint use   - Assess and monitor physiologic and psychological status   - Provide interventions and comfort measures to meet assessed patient needs   - Identify and implement measures to help patient regain control  - Assess readiness for release of restraint   Outcome: Progressing  Goal: Returns to optimal restraint-free functioning  Description: INTERVENTIONS:  - Assess the patient's behavior and symptoms that indicate continued need for restraint  - Identify and implement measures to help patient regain control  - Assess readiness for release of restraint   Outcome: Progressing     Problem: Nutrition/Hydration-ADULT  Goal: Nutrient/Hydration intake appropriate for improving, restoring or maintaining nutritional needs  Description: Monitor and assess patient's nutrition/hydration status for malnutrition  Collaborate with interdisciplinary team and initiate plan and interventions as ordered  Monitor patient's weight and dietary intake as ordered or per policy  Utilize nutrition screening tool and intervene as necessary  Determine patient's food preferences and provide high-protein, high-caloric foods as appropriate       INTERVENTIONS:  - Monitor oral intake, urinary output, labs, and treatment plans  - Assess nutrition and hydration status and recommend course of action  - Evaluate amount of meals eaten  - Assist patient with eating if necessary   - Allow adequate time for meals  - Recommend/ encourage appropriate diets, oral nutritional supplements, and vitamin/mineral supplements  - Order, calculate, and assess calorie counts as needed  - Recommend, monitor, and adjust tube feedings and TPN/PPN based on assessed needs  - Assess need for intravenous fluids  - Provide specific nutrition/hydration education as appropriate  - Include patient/family/caregiver in decisions related to nutrition  Outcome: Progressing     Problem: Prexisting or High Potential for Compromised Skin Integrity  Goal: Skin integrity is maintained or improved  Description: INTERVENTIONS:  - Identify patients at risk for skin breakdown  - Assess and monitor skin integrity  - Assess and monitor nutrition and hydration status  - Monitor labs   - Assess for incontinence   - Turn and reposition patient  - Assist with mobility/ambulation  - Relieve pressure over bony prominences  - Avoid friction and shearing  - Provide appropriate hygiene as needed including keeping skin clean and dry  - Evaluate need for skin moisturizer/barrier cream  - Collaborate with interdisciplinary team   - Patient/family teaching  - Consider wound care consult   Outcome: Progressing

## 2023-04-21 NOTE — PLAN OF CARE
Problem: Potential for Falls  Goal: Patient will remain free of falls  Description: INTERVENTIONS:  - Educate patient/family on patient safety including physical limitations  - Instruct patient to call for assistance with activity   - Consult OT/PT to assist with strengthening/mobility   - Keep Call bell within reach  - Keep bed low and locked with side rails adjusted as appropriate  - Keep care items and personal belongings within reach  - Initiate and maintain comfort rounds  - Make Fall Risk Sign visible to staff  - Offer Toileting every 2 Hours, in advance of need  - Initiate/Maintain bed alarm  - Obtain necessary fall risk management equipment: alarms  - Apply yellow socks and bracelet for high fall risk patients  - Consider moving patient to room near nurses station  Outcome: Progressing     Problem: MOBILITY - ADULT  Goal: Maintain or return to baseline ADL function  Description: INTERVENTIONS:  - Educate patient/family on patient safety including physical limitations  - Instruct patient to call for assistance with activity   - Consult OT/PT to assist with strengthening/mobility   - Keep Call bell within reach  - Keep bed low and locked with side rails adjusted as appropriate  - Keep care items and personal belongings within reach  - Initiate and maintain comfort rounds  - Make Fall Risk Sign visible to staff  - Offer Toileting every 2 Hours, in advance of need  - Initiate/Maintain bed alarm  - Obtain necessary fall risk management equipment: alarms  - Apply yellow socks and bracelet for high fall risk patients  - Consider moving patient to room near nurses station  Outcome: Progressing  Goal: Maintains/Returns to pre admission functional level  Description: INTERVENTIONS:  - Perform BMAT or MOVE assessment daily    - Set and communicate daily mobility goal to care team and patient/family/caregiver     - Collaborate with rehabilitation services on mobility goals if consulted  - Perform Range of Motion  times a day   - Reposition patient every  hours    - Dangle patient  times a day  - Stand patient  times a day  - Ambulate patient  times a day  - Out of bed to chair  times a day   - Out of bed for meals  times a day  - Out of bed for toileting  - Record patient progress and toleration of activity level   Outcome: Progressing     Problem: PAIN - ADULT  Goal: Verbalizes/displays adequate comfort level or baseline comfort level  Description: Interventions:  - Encourage patient to monitor pain and request assistance  - Assess pain using appropriate pain scale  - Administer analgesics based on type and severity of pain and evaluate response  - Implement non-pharmacological measures as appropriate and evaluate response  - Consider cultural and social influences on pain and pain management  - Notify physician/advanced practitioner if interventions unsuccessful or patient reports new pain  Outcome: Progressing     Problem: INFECTION - ADULT  Goal: Absence or prevention of progression during hospitalization  Description: INTERVENTIONS:  - Assess and monitor for signs and symptoms of infection  - Monitor lab/diagnostic results  - Monitor all insertion sites, i e  indwelling lines, tubes, and drains  - Monitor endotracheal if appropriate and nasal secretions for changes in amount and color  - [place]well appropriate cooling/warming therapies per order  - Administer medications as ordered  - Instruct and encourage patient and family to use good hand hygiene technique  - Identify and instruct in appropriate isolation precautions for identified infection/condition  Outcome: Progressing     Problem: SAFETY ADULT  Goal: Patient will remain free of falls  Description: INTERVENTIONS:  - Educate patient/family on patient safety including physical limitations  - Instruct patient to call for assistance with activity   - Consult OT/PT to assist with strengthening/mobility   - Keep Call bell within reach  - Keep bed low and locked with side rails adjusted as appropriate  - Keep care items and personal belongings within reach  - Initiate and maintain comfort rounds  - Make Fall Risk Sign visible to staff  - Offer Toileting every 2 Hours, in advance of need  - Initiate/Maintain bed alarm  - Obtain necessary fall risk management equipment: alarms  - Apply yellow socks and bracelet for high fall risk patients  - Consider moving patient to room near nurses station  Outcome: Progressing  Goal: Maintain or return to baseline ADL function  Description: INTERVENTIONS:  - Educate patient/family on patient safety including physical limitations  - Instruct patient to call for assistance with activity   - Consult OT/PT to assist with strengthening/mobility   - Keep Call bell within reach  - Keep bed low and locked with side rails adjusted as appropriate  - Keep care items and personal belongings within reach  - Initiate and maintain comfort rounds  - Make Fall Risk Sign visible to staff  - Offer Toileting every 2 Hours, in advance of need  - Initiate/Maintain bed alarm  - Obtain necessary fall risk management equipment: alarms  - Apply yellow socks and bracelet for high fall risk patients  - Consider moving patient to room near nurses station  Outcome: Progressing  Goal: Maintains/Returns to pre admission functional level  Description: INTERVENTIONS:  - Perform BMAT or MOVE assessment daily    - Set and communicate daily mobility goal to care team and patient/family/caregiver  - Collaborate with rehabilitation services on mobility goals if consulted  - Perform Range of Motion  times a day  - Reposition patient every  hours    - Dangle patient  times a day  - Stand patient  times a day  - Ambulate patient  times a day  - Out of bed to chair  times a day   - Out of bed for manda times a day  - Out of bed for toileting  - Record patient progress and toleration of activity level   Outcome: Progressing     Problem: DISCHARGE PLANNING  Goal: Discharge to home or other facility with appropriate resources  Description: INTERVENTIONS:  - Identify barriers to discharge w/patient and caregiver  - Arrange for needed discharge resources and transportation as appropriate  - Identify discharge learning needs (meds, wound care, etc )  - Arrange for interpretive services to assist at discharge as needed  - Refer to Case Management Department for coordinating discharge planning if the patient needs post-hospital services based on physician/advanced practitioner order or complex needs related to functional status, cognitive ability, or social support system  Outcome: Progressing     Problem: Knowledge Deficit  Goal: Patient/family/caregiver demonstrates understanding of disease process, treatment plan, medications, and discharge instructions  Description: Complete learning assessment and assess knowledge base    Interventions:  - Provide teaching at level of understanding  - Provide teaching via preferred learning methods  Outcome: Progressing     Problem: CARDIOVASCULAR - ADULT  Goal: Maintains optimal cardiac output and hemodynamic stability  Description: INTERVENTIONS:  - Monitor I/O, vital signs and rhythm  - Monitor for S/S and trends of decreased cardiac output  - Administer and titrate ordered vasoactive medications to optimize hemodynamic stability  - Assess quality of pulses, skin color and temperature  - Assess for signs of decreased coronary artery perfusion  - Instruct patient to report change in severity of symptoms  Outcome: Progressing  Goal: Absence of cardiac dysrhythmias or at baseline rhythm  Description: INTERVENTIONS:  - Continuous cardiac monitoring, vital signs, obtain 12 lead EKG if ordered  - Administer antiarrhythmic and heart rate control medications as ordered  - Monitor electrolytes and administer replacement therapy as ordered  Outcome: Progressing     Problem: RESPIRATORY - ADULT  Goal: Achieves optimal ventilation and oxygenation  Description: INTERVENTIONS:  - Assess for changes in respiratory status  - Assess for changes in mentation and behavior  - Position to facilitate oxygenation and minimize respiratory effort  - Oxygen administered by appropriate delivery if ordered  - Initiate smoking cessation education as indicated  - Encourage broncho-pulmonary hygiene including cough, deep breathe, Incentive Spirometry  - Assess the need for suctioning and aspirate as needed  - Assess and instruct to report SOB or any respiratory difficulty  - Respiratory Therapy support as indicated  Outcome: Progressing     Problem: SKIN/TISSUE INTEGRITY - ADULT  Goal: Skin Integrity remains intact(Skin Breakdown Prevention)  Description: Assess:  -Perform Sridhar assessment every shift  -Clean and moisturize skin every shift  -Inspect skin when repositioning, toileting, and assisting with ADLS  -Assess under medical devices such as IV every shift  -Assess extremities for adequate circulation and sensation     Bed Management:  -Have minimal linens on bed & keep smooth, unwrinkled  -Change linens as needed when moist or perspiring  -Avoid sitting or lying in one position for more than 2 hours while in bed  -Keep HOB at 30 degrees     Toileting:  -Offer bedside commode  -Assess for incontinence every shift  -Use incontinent care products after each incontinent episode such as foam cleanser    Activity:  -Mobilize patient 3 times a day  -Encourage activity and walks on unit  -Encourage or provide ROM exercises   -Turn and reposition patient every 2 Hours  -Use appropriate equipment to lift or move patient in bed  -Instruct/ Assist with weight shifting every hour when out of bed in chair  -Consider limitation of chair time 2 hour intervals    Skin Care:  -Avoid use of baby powder, tape, friction and shearing, hot water or constrictive clothing  -Relieve pressure over bony prominences using pillows  -Do not massage red bony areas    Next Steps:  -Teach patient strategies to minimize risks such as skin breakdown   -Consider consults to  interdisciplinary teams such as wound care  Outcome: Progressing  Goal: Pressure injury heals and does not worsen  Description: Interventions:  - Implement low air loss mattress or specialty surface (Criteria met)  - Apply silicone foam dressing  - Instruct/assist with weight shifting every 30 minutes when in chair   - Limit chair time to 2 hour intervals  - Use special pressure reducing interventions such as cushion when in chair   - Apply fecal or urinary incontinence containment device   - Perform passive or active ROM every shift  - Turn and reposition patient & offload bony prominences every 2 hours   - Utilize friction reducing device or surface for transfers   - Consider consults to  interdisciplinary teams such as wound care  - Use incontinent care products after each incontinent episode such as foam cleanser  - Consider nutrition services referral as needed  Outcome: Progressing     Problem: SAFETY,RESTRAINT: NV/NON-SELF DESTRUCTIVE BEHAVIOR  Goal: Remains free of harm/injury (restraint for non violent/non self-detsructive behavior)  Description: INTERVENTIONS:  - Instruct patient/family regarding restraint use   - Assess and monitor physiologic and psychological status   - Provide interventions and comfort measures to meet assessed patient needs   - Identify and implement measures to help patient regain control  - Assess readiness for release of restraint   Outcome: Progressing  Goal: Returns to optimal restraint-free functioning  Description: INTERVENTIONS:  - Assess the patient's behavior and symptoms that indicate continued need for restraint  - Identify and implement measures to help patient regain control  - Assess readiness for release of restraint   Outcome: Progressing     Problem: Nutrition/Hydration-ADULT  Goal: Nutrient/Hydration intake appropriate for improving, restoring or maintaining nutritional needs  Description: Monitor and assess patient's nutrition/hydration status for malnutrition  Collaborate with interdisciplinary team and initiate plan and interventions as ordered  Monitor patient's weight and dietary intake as ordered or per policy  Utilize nutrition screening tool and intervene as necessary  Determine patient's food preferences and provide high-protein, high-caloric foods as appropriate       INTERVENTIONS:  - Monitor oral intake, urinary output, labs, and treatment plans  - Assess nutrition and hydration status and recommend course of action  - Evaluate amount of meals eaten  - Assist patient with eating if necessary   - Allow adequate time for meals  - Recommend/ encourage appropriate diets, oral nutritional supplements, and vitamin/mineral supplements  - Order, calculate, and assess calorie counts as needed  - Recommend, monitor, and adjust tube feedings and TPN/PPN based on assessed needs  - Assess need for intravenous fluids  - Provide specific nutrition/hydration education as appropriate  - Include patient/family/caregiver in decisions related to nutrition  Outcome: Progressing     Problem: Prexisting or High Potential for Compromised Skin Integrity  Goal: Skin integrity is maintained or improved  Description: INTERVENTIONS:  - Identify patients at risk for skin breakdown  - Assess and monitor skin integrity  - Assess and monitor nutrition and hydration status  - Monitor labs   - Assess for incontinence   - Turn and reposition patient  - Assist with mobility/ambulation  - Relieve pressure over bony prominences  - Avoid friction and shearing  - Provide appropriate hygiene as needed including keeping skin clean and dry  - Evaluate need for skin moisturizer/barrier cream  - Collaborate with interdisciplinary team   - Patient/family teaching  - Consider wound care consult   Outcome: Progressing

## 2023-04-22 VITALS
BODY MASS INDEX: 33.36 KG/M2 | DIASTOLIC BLOOD PRESSURE: 67 MMHG | TEMPERATURE: 97.7 F | SYSTOLIC BLOOD PRESSURE: 122 MMHG | WEIGHT: 212.52 LBS | HEART RATE: 73 BPM | HEIGHT: 67 IN | RESPIRATION RATE: 20 BRPM | OXYGEN SATURATION: 97 %

## 2023-04-22 LAB — GLUCOSE SERPL-MCNC: 102 MG/DL (ref 65–140)

## 2023-04-22 RX ORDER — PAROXETINE HYDROCHLORIDE 20 MG/1
20 TABLET, FILM COATED ORAL DAILY
Qty: 30 TABLET | Refills: 0 | Status: SHIPPED | OUTPATIENT
Start: 2023-04-22

## 2023-04-22 RX ORDER — ROSUVASTATIN CALCIUM 5 MG/1
5 TABLET, COATED ORAL EVERY OTHER DAY
Qty: 30 TABLET | Refills: 0 | Status: SHIPPED | OUTPATIENT
Start: 2023-04-22

## 2023-04-22 RX ORDER — ATENOLOL 50 MG/1
50 TABLET ORAL DAILY
Qty: 30 TABLET | Refills: 0 | Status: SHIPPED | OUTPATIENT
Start: 2023-04-22

## 2023-04-22 RX ORDER — RASAGILINE 0.5 MG/1
1 TABLET ORAL DAILY
Qty: 30 TABLET | Refills: 0 | Status: ON HOLD | OUTPATIENT
Start: 2023-04-22 | End: 2023-04-24

## 2023-04-22 RX ORDER — PIMAVANSERIN TARTRATE 34 MG/1
CAPSULE ORAL
Qty: 90 CAPSULE | Refills: 0 | Status: SHIPPED | OUTPATIENT
Start: 2023-04-22

## 2023-04-22 RX ADMIN — CARBIDOPA AND LEVODOPA 1 TABLET: 50; 200 TABLET, EXTENDED RELEASE ORAL at 08:34

## 2023-04-22 RX ADMIN — APIXABAN 10 MG: 5 TABLET, FILM COATED ORAL at 08:33

## 2023-04-22 RX ADMIN — RIVASTIGMINE TARTRATE 4.5 MG: 3 CAPSULE ORAL at 08:34

## 2023-04-22 NOTE — NURSING NOTE
No IV access at start of shift  AVS & hospital course called via report to receiving facility, [facility], [facility]  Pt sent with glasses

## 2023-04-22 NOTE — PLAN OF CARE
Problem: Potential for Falls  Goal: Patient will remain free of falls  Description: INTERVENTIONS:  - Educate patient/family on patient safety including physical limitations  - Instruct patient to call for assistance with activity   - Consult OT/PT to assist with strengthening/mobility   - Keep Call bell within reach  - Keep bed low and locked with side rails adjusted as appropriate  - Keep care items and personal belongings within reach  - Initiate and maintain comfort rounds  - Make Fall Risk Sign visible to staff  - Offer Toileting every 2 Hours, in advance of need  - Initiate/Maintain bed alarm  - Obtain necessary fall risk management equipment: alarms  - Apply yellow socks and bracelet for high fall risk patients  - Consider moving patient to room near nurses station  Outcome: Progressing     Problem: MOBILITY - ADULT  Goal: Maintain or return to baseline ADL function  Description: INTERVENTIONS:  - Educate patient/family on patient safety including physical limitations  - Instruct patient to call for assistance with activity   - Consult OT/PT to assist with strengthening/mobility   - Keep Call bell within reach  - Keep bed low and locked with side rails adjusted as appropriate  - Keep care items and personal belongings within reach  - Initiate and maintain comfort rounds  - Make Fall Risk Sign visible to staff  - Offer Toileting every 2 Hours, in advance of need  - Initiate/Maintain bed alarm  - Obtain necessary fall risk management equipment: alarms  - Apply yellow socks and bracelet for high fall risk patients  - Consider moving patient to room near nurses station  Outcome: Progressing  Goal: Maintains/Returns to pre admission functional level  Description: INTERVENTIONS:  - Perform BMAT or MOVE assessment daily    - Set and communicate daily mobility goal to care team and patient/family/caregiver     - Collaborate with rehabilitation services on mobility goals if consulted  - Perform Range of Motion 3 times a day  - Reposition patient every 2 hours    - Dangle patient 4 times a day  - Stand patient 4 times a day  - Ambulate patient 4 times a day  - Out of bed to chair 4 times a day   - Out of bed for meals 3 times a day  - Out of bed for toileting  - Record patient progress and toleration of activity level   Outcome: Progressing     Problem: PAIN - ADULT  Goal: Verbalizes/displays adequate comfort level or baseline comfort level  Description: Interventions:  - Encourage patient to monitor pain and request assistance  - Assess pain using appropriate pain scale  - Administer analgesics based on type and severity of pain and evaluate response  - Implement non-pharmacological measures as appropriate and evaluate response  - Consider cultural and social influences on pain and pain management  - Notify physician/advanced practitioner if interventions unsuccessful or patient reports new pain  Outcome: Progressing     Problem: INFECTION - ADULT  Goal: Absence or prevention of progression during hospitalization  Description: INTERVENTIONS:  - Assess and monitor for signs and symptoms of infection  - Monitor lab/diagnostic results  - Monitor all insertion sites, i e  indwelling lines, tubes, and drains  - Monitor endotracheal if appropriate and nasal secretions for changes in amount and color  - [place] appropriate cooling/warming therapies per order  - Administer medications as ordered  - Instruct and encourage patient and family to use good hand hygiene technique  - Identify and instruct in appropriate isolation precautions for identified infection/condition  Outcome: Progressing     Problem: SAFETY ADULT  Goal: Patient will remain free of falls  Description: INTERVENTIONS:  - Educate patient/family on patient safety including physical limitations  - Instruct patient to call for assistance with activity   - Consult OT/PT to assist with strengthening/mobility   - Keep Call bell within reach  - Keep bed low and locked with side rails adjusted as appropriate  - Keep care items and personal belongings within reach  - Initiate and maintain comfort rounds  - Make Fall Risk Sign visible to staff  - Offer Toileting every 2 Hours, in advance of need  - Initiate/Maintain bed alarm  - Obtain necessary fall risk management equipment: alarms  - Apply yellow socks and bracelet for high fall risk patients  - Consider moving patient to room near nurses station  Outcome: Progressing  Goal: Maintain or return to baseline ADL function  Description: INTERVENTIONS:  - Educate patient/family on patient safety including physical limitations  - Instruct patient to call for assistance with activity   - Consult OT/PT to assist with strengthening/mobility   - Keep Call bell within reach  - Keep bed low and locked with side rails adjusted as appropriate  - Keep care items and personal belongings within reach  - Initiate and maintain comfort rounds  - Make Fall Risk Sign visible to staff  - Offer Toileting every 2 Hours, in advance of need  - Initiate/Maintain bed alarm  - Obtain necessary fall risk management equipment: alarms  - Apply yellow socks and bracelet for high fall risk patients  - Consider moving patient to room near nurses station  Outcome: Progressing  Goal: Maintains/Returns to pre admission functional level  Description: INTERVENTIONS:  - Perform BMAT or MOVE assessment daily    - Set and communicate daily mobility goal to care team and patient/family/caregiver  - Collaborate with rehabilitation services on mobility goals if consulted  - Perform Range of Motion 3 times a day  - Reposition patient every 2 hours    - Dangle patient 4 times a day  - Stand patient 4 times a day  - Ambulate patient 4 times a day  - Out of bed to chair 4 times a day   - Out of bed for meals 3 times a day  - Out of bed for toileting  - Record patient progress and toleration of activity level   Outcome: Progressing     Problem: DISCHARGE PLANNING  Goal: Discharge to home or other facility with appropriate resources  Description: INTERVENTIONS:  - Identify barriers to discharge w/patient and caregiver  - Arrange for needed discharge resources and transportation as appropriate  - Identify discharge learning needs (meds, wound care, etc )  - Arrange for interpretive services to assist at discharge as needed  - Refer to Case Management Department for coordinating discharge planning if the patient needs post-hospital services based on physician/advanced practitioner order or complex needs related to functional status, cognitive ability, or social support system  Outcome: Progressing     Problem: Knowledge Deficit  Goal: Patient/family/caregiver demonstrates understanding of disease process, treatment plan, medications, and discharge instructions  Description: Complete learning assessment and assess knowledge base    Interventions:  - Provide teaching at level of understanding  - Provide teaching via preferred learning methods  Outcome: Progressing     Problem: CARDIOVASCULAR - ADULT  Goal: Maintains optimal cardiac output and hemodynamic stability  Description: INTERVENTIONS:  - Monitor I/O, vital signs and rhythm  - Monitor for S/S and trends of decreased cardiac output  - Administer and titrate ordered vasoactive medications to optimize hemodynamic stability  - Assess quality of pulses, skin color and temperature  - Assess for signs of decreased coronary artery perfusion  - Instruct patient to report change in severity of symptoms  Outcome: Progressing  Goal: Absence of cardiac dysrhythmias or at baseline rhythm  Description: INTERVENTIONS:  - Continuous cardiac monitoring, vital signs, obtain 12 lead EKG if ordered  - Administer antiarrhythmic and heart rate control medications as ordered  - Monitor electrolytes and administer replacement therapy as ordered  Outcome: Progressing     Problem: RESPIRATORY - ADULT  Goal: Achieves optimal ventilation and oxygenation  Description: INTERVENTIONS:  - Assess for changes in respiratory status  - Assess for changes in mentation and behavior  - Position to facilitate oxygenation and minimize respiratory effort  - Oxygen administered by appropriate delivery if ordered  - Initiate smoking cessation education as indicated  - Encourage broncho-pulmonary hygiene including cough, deep breathe, Incentive Spirometry  - Assess the need for suctioning and aspirate as needed  - Assess and instruct to report SOB or any respiratory difficulty  - Respiratory Therapy support as indicated  Outcome: Progressing     Problem: SKIN/TISSUE INTEGRITY - ADULT  Goal: Skin Integrity remains intact(Skin Breakdown Prevention)  Description: Assess:  -Perform Sridhar assessment every shift  -Clean and moisturize skin every shift  -Inspect skin when repositioning, toileting, and assisting with ADLS  -Assess under medical devices such as IV every shift  -Assess extremities for adequate circulation and sensation     Bed Management:  -Have minimal linens on bed & keep smooth, unwrinkled  -Change linens as needed when moist or perspiring  -Avoid sitting or lying in one position for more than 2 hours while in bed  -Keep HOB at 30 degrees     Toileting:  -Offer bedside commode  -Assess for incontinence every shift  -Use incontinent care products after each incontinent episode such as foam cleanser    Activity:  -Mobilize patient 3 times a day  -Encourage activity and walks on unit  -Encourage or provide ROM exercises   -Turn and reposition patient every 2 Hours  -Use appropriate equipment to lift or move patient in bed  -Instruct/ Assist with weight shifting every hour when out of bed in chair  -Consider limitation of chair time 2 hour intervals    Skin Care:  -Avoid use of baby powder, tape, friction and shearing, hot water or constrictive clothing  -Relieve pressure over bony prominences using pillows  -Do not massage red bony areas    Next Steps:  -Teach patient strategies to minimize risks such as skin breakdown   -Consider consults to  interdisciplinary teams such as wound care  Outcome: Progressing  Goal: Pressure injury heals and does not worsen  Description: Interventions:  - Implement low air loss mattress or specialty surface (Criteria met)  - Apply silicone foam dressing  - Instruct/assist with weight shifting every 30 minutes when in chair   - Limit chair time to 2 hour intervals  - Use special pressure reducing interventions such as cushion when in chair   - Apply fecal or urinary incontinence containment device   - Perform passive or active ROM every shift  - Turn and reposition patient & offload bony prominences every 2 hours   - Utilize friction reducing device or surface for transfers   - Consider consults to  interdisciplinary teams such as wound care  - Use incontinent care products after each incontinent episode such as foam cleanser  - Consider nutrition services referral as needed  Outcome: Progressing     Problem: SAFETY,RESTRAINT: NV/NON-SELF DESTRUCTIVE BEHAVIOR  Goal: Remains free of harm/injury (restraint for non violent/non self-detsructive behavior)  Description: INTERVENTIONS:  - Instruct patient/family regarding restraint use   - Assess and monitor physiologic and psychological status   - Provide interventions and comfort measures to meet assessed patient needs   - Identify and implement measures to help patient regain control  - Assess readiness for release of restraint   Outcome: Progressing  Goal: Returns to optimal restraint-free functioning  Description: INTERVENTIONS:  - Assess the patient's behavior and symptoms that indicate continued need for restraint  - Identify and implement measures to help patient regain control  - Assess readiness for release of restraint   Outcome: Progressing     Problem: Nutrition/Hydration-ADULT  Goal: Nutrient/Hydration intake appropriate for improving, restoring or maintaining nutritional needs  Description: Monitor and assess patient's nutrition/hydration status for malnutrition  Collaborate with interdisciplinary team and initiate plan and interventions as ordered  Monitor patient's weight and dietary intake as ordered or per policy  Utilize nutrition screening tool and intervene as necessary  Determine patient's food preferences and provide high-protein, high-caloric foods as appropriate       INTERVENTIONS:  - Monitor oral intake, urinary output, labs, and treatment plans  - Assess nutrition and hydration status and recommend course of action  - Evaluate amount of meals eaten  - Assist patient with eating if necessary   - Allow adequate time for meals  - Recommend/ encourage appropriate diets, oral nutritional supplements, and vitamin/mineral supplements  - Order, calculate, and assess calorie counts as needed  - Recommend, monitor, and adjust tube feedings and TPN/PPN based on assessed needs  - Assess need for intravenous fluids  - Provide specific nutrition/hydration education as appropriate  - Include patient/family/caregiver in decisions related to nutrition  Outcome: Progressing     Problem: Prexisting or High Potential for Compromised Skin Integrity  Goal: Skin integrity is maintained or improved  Description: INTERVENTIONS:  - Identify patients at risk for skin breakdown  - Assess and monitor skin integrity  - Assess and monitor nutrition and hydration status  - Monitor labs   - Assess for incontinence   - Turn and reposition patient  - Assist with mobility/ambulation  - Relieve pressure over bony prominences  - Avoid friction and shearing  - Provide appropriate hygiene as needed including keeping skin clean and dry  - Evaluate need for skin moisturizer/barrier cream  - Collaborate with interdisciplinary team   - Patient/family teaching  - Consider wound care consult   Outcome: Progressing

## 2023-04-22 NOTE — DISCHARGE SUMMARY
[facility]  Discharge- Macy Rios [birth year], 65 y o  female MRN: [id number]  Unit/Bed#: [facility] [address] 224-02 Encounter: [id number]  Primary Care Provider: Patricia Morales MD   Date and time admitted to hospital: 4/16/2023  3:39 PM    * Pulmonary embolism [facility])  Assessment & Plan  Pulmonary Embolism With Acute Cor Pulmonale, evidenced by CT with Large emboli in the distal bilateral main pulmonary arteries extending into multiple segmental arteries  Right heart strain pattern (RV/LV ratio is 3 2) and Echo with Right ventricular cavity size is severely dilated  Systolic function is severely reduced, treated with lifelong anticoagulants and Pulmonary Outpatient follow up  Had large bilateral pulmonary emboli requiring IR thrombectomy  Doing well  On room air    She is on therapeutic eliquis and should continue this for at least 6 months and possibly indefinitely    Metabolic encephalopathy  Assessment & Plan  Possible  Metabolic Encephalopathy, POA, now resolved,  due to Hypoxia and Lactic Acidosis (level 6 7), evidenced by irritability and not herself lately per , requiring CT Head, Neuro checks, Nursing safety measures and treatment of  acute hypoxic respiratory failure, lactic acidosis and PE     Findings:   Acute hypoxic respiratory failure with Bipap and oxygen with 4L NC> 3L NC        This has resolved    Parkinson's disease ([address] )  Assessment & Plan  Continue home regimen      Plan is for STR at discharge      Discharging Physician / Practitioner: Ayaan Matute DO  PCP: Patricia Morales MD  Admission Date:   Admission Orders (From admission, onward)     Ordered        04/16/23 1900  INPATIENT ADMISSION  Once                      Discharge Date: 04/22/23    Medical Problems     Resolved Problems  Date Reviewed: 4/18/2023   None           Consultations During Hospital Stay:  · IR      Procedures Performed:     · Pulmonary embolism thrombectomy      Reason for "Admission: Pulmonary embolism      Hospital Course:     Leonardo Ramirze is a 65 y o  female patient who originally presented to the hospital on 4/16/2023 due to shortness of breath  She was found to have a massive pulmonary embolism  She required IR thrombectomy  She is actually done extremely well and is on room air  I have her on therapeutic Eliquis  She should continue this for at least 6 months if not indefinitely  I will defer that to her primary care physician  She will be going to short-term rehab as she is unsteady walking with her Parkinson's and more deconditioned than usual with this hospitalization  Please see above list of diagnoses and related plan for additional information  Condition at Discharge: stable       Discharge Day Visit / Exam:     Subjective:  No SOB  Doing well  Ready to go to rehab  Vitals: Blood Pressure: 122/67 (04/22/23 0723)  Pulse: 73 (04/22/23 0723)  Temperature: 97 7 °F (36 5 °C) (04/22/23 0723)  Temp Source: Oral (04/22/23 0723)  Respirations: 20 (04/22/23 0723)  Height: 5' 7\" (170 2 cm) (04/17/23 0915)  Weight - Scale: 96 4 kg (212 lb 8 4 oz) (04/20/23 0600)  SpO2: 97 % (04/22/23 0723)    Exam:     Physical Exam  Vitals and nursing note reviewed  HENT:      Head: Normocephalic and atraumatic  Eyes:      Pupils: Pupils are equal, round, and reactive to light  Cardiovascular:      Rate and Rhythm: Normal rate and regular rhythm  Heart sounds: No murmur heard  No friction rub  No gallop  Pulmonary:      Effort: Pulmonary effort is normal       Breath sounds: Normal breath sounds  No wheezing or rales  Abdominal:      General: Bowel sounds are normal       Palpations: Abdomen is soft  Tenderness: There is no abdominal tenderness  Musculoskeletal:      Right lower leg: No edema  Left lower leg: No edema                   Discharge instructions/Information to patient and family:   See after visit summary for information provided to " patient and family  Provisions for Follow-Up Care:  See after visit summary for information related to follow-up care and any pertinent home health orders  Disposition:     Other: short term rehab       Discharge Statement:  I spent 45 minutes discharging the patient  This time was spent on the day of discharge  I had direct contact with the patient on the day of discharge  Greater than 50% of the total time was spent examining patient, answering all patient questions, arranging and discussing plan of care with patient as well as directly providing post-discharge instructions  Additional time then spent on discharge activities  Discharge Medications:  See after visit summary for reconciled discharge medications provided to patient and family        ** Please Note: This note has been constructed using a voice recognition system **

## 2023-04-22 NOTE — ASSESSMENT & PLAN NOTE
Pulmonary Embolism With Acute Cor Pulmonale, evidenced by CT with Large emboli in the distal bilateral main pulmonary arteries extending into multiple segmental arteries  Right heart strain pattern (RV/LV ratio is 3 2) and Echo with Right ventricular cavity size is severely dilated  Systolic function is severely reduced, treated with lifelong anticoagulants and Pulmonary Outpatient follow up  Had large bilateral pulmonary emboli requiring IR thrombectomy      Doing well  On room air    She is on therapeutic eliquis and should continue this for at least 6 months and possibly indefinitely

## 2023-04-22 NOTE — PLAN OF CARE
Problem: Potential for Falls  Goal: Patient will remain free of falls  Description: INTERVENTIONS:  - Educate patient/family on patient safety including physical limitations  - Instruct patient to call for assistance with activity   - Consult OT/PT to assist with strengthening/mobility   - Keep Call bell within reach  - Keep bed low and locked with side rails adjusted as appropriate  - Keep care items and personal belongings within reach  - Initiate and maintain comfort rounds  - Make Fall Risk Sign visible to staff  - Offer Toileting every 2 Hours, in advance of need  - Initiate/Maintain bed alarm  - Obtain necessary fall risk management equipment: alarms  - Apply yellow socks and bracelet for high fall risk patients  - Consider moving patient to room near nurses station  Outcome: Adequate for Discharge     Problem: MOBILITY - ADULT  Goal: Maintain or return to baseline ADL function  Description: INTERVENTIONS:  - Educate patient/family on patient safety including physical limitations  - Instruct patient to call for assistance with activity   - Consult OT/PT to assist with strengthening/mobility   - Keep Call bell within reach  - Keep bed low and locked with side rails adjusted as appropriate  - Keep care items and personal belongings within reach  - Initiate and maintain comfort rounds  - Make Fall Risk Sign visible to staff  - Offer Toileting every 2 Hours, in advance of need  - Initiate/Maintain bed alarm  - Obtain necessary fall risk management equipment: alarms  - Apply yellow socks and bracelet for high fall risk patients  - Consider moving patient to room near nurses station  Outcome: Adequate for Discharge  Goal: Maintains/Returns to pre admission functional level  Description: INTERVENTIONS:  - Perform BMAT or MOVE assessment daily    - Set and communicate daily mobility goal to care team and patient/family/caregiver     - Collaborate with rehabilitation services on mobility goals if consulted  - Perform Range of Motion 3 times a day  - Reposition patient every 2 hours    - Dangle patient 3 times a day  - Stand patient 3 times a day  - Ambulate patient 3 times a day  - Out of bed to chair 3 times a day   - Out of bed for meals 3 times a day  - Out of bed for toileting  - Record patient progress and toleration of activity level   Outcome: Adequate for Discharge     Problem: PAIN - ADULT  Goal: Verbalizes/displays adequate comfort level or baseline comfort level  Description: Interventions:  - Encourage patient to monitor pain and request assistance  - Assess pain using appropriate pain scale  - Administer analgesics based on type and severity of pain and evaluate response  - Implement non-pharmacological measures as appropriate and evaluate response  - Consider cultural and social influences on pain and pain management  - Notify physician/advanced practitioner if interventions unsuccessful or patient reports new pain  Outcome: Adequate for Discharge     Problem: INFECTION - ADULT  Goal: Absence or prevention of progression during hospitalization  Description: INTERVENTIONS:  - Assess and monitor for signs and symptoms of infection  - Monitor lab/diagnostic results  - Monitor all insertion sites, i e  indwelling lines, tubes, and drains  - Monitor endotracheal if appropriate and nasal secretions for changes in amount and color  - [place] appropriate cooling/warming therapies per order  - Administer medications as ordered  - Instruct and encourage patient and family to use good hand hygiene technique  - Identify and instruct in appropriate isolation precautions for identified infection/condition  Outcome: Adequate for Discharge     Problem: SAFETY ADULT  Goal: Patient will remain free of falls  Description: INTERVENTIONS:  - Educate patient/family on patient safety including physical limitations  - Instruct patient to call for assistance with activity   - Consult OT/PT to assist with strengthening/mobility   - Keep Call bell within reach  - Keep bed low and locked with side rails adjusted as appropriate  - Keep care items and personal belongings within reach  - Initiate and maintain comfort rounds  - Make Fall Risk Sign visible to staff  - Offer Toileting every 2 Hours, in advance of need  - Initiate/Maintain bed alarm  - Obtain necessary fall risk management equipment: alarms  - Apply yellow socks and bracelet for high fall risk patients  - Consider moving patient to room near nurses station  Outcome: Adequate for Discharge  Goal: Maintain or return to baseline ADL function  Description: INTERVENTIONS:  - Educate patient/family on patient safety including physical limitations  - Instruct patient to call for assistance with activity   - Consult OT/PT to assist with strengthening/mobility   - Keep Call bell within reach  - Keep bed low and locked with side rails adjusted as appropriate  - Keep care items and personal belongings within reach  - Initiate and maintain comfort rounds  - Make Fall Risk Sign visible to staff  - Offer Toileting every 2 Hours, in advance of need  - Initiate/Maintain bed alarm  - Obtain necessary fall risk management equipment: alarms  - Apply yellow socks and bracelet for high fall risk patients  - Consider moving patient to room near nurses station  Outcome: Adequate for Discharge  Goal: Maintains/Returns to pre admission functional level  Description: INTERVENTIONS:  - Perform BMAT or MOVE assessment daily    - Set and communicate daily mobility goal to care team and patient/family/caregiver  - Collaborate with rehabilitation services on mobility goals if consulted  - Perform Range of Motion 3 times a day  - Reposition patient every 2 hours    - Dangle patient 3 times a day  - Stand patient 3 times a day  - Ambulate patient 3 times a day  - Out of bed to chair 3 times a day   - Out of bed for meals 3 times a day  - Out of bed for toileting  - Record patient progress and toleration of activity level Outcome: Adequate for Discharge

## 2023-04-22 NOTE — ASSESSMENT & PLAN NOTE
Possible  Metabolic Encephalopathy, POA, now resolved,  due to Hypoxia and Lactic Acidosis (level 6 7), evidenced by irritability and not herself lately per , requiring CT Head, Neuro checks, Nursing safety measures and treatment of  acute hypoxic respiratory failure, lactic acidosis and PE     Findings:   Acute hypoxic respiratory failure with Bipap and oxygen with 4L NC> 3L NC        This has resolved

## 2023-04-24 ENCOUNTER — TELEPHONE (OUTPATIENT)
Dept: NEUROLOGY | Facility: CLINIC | Age: 78
End: 2023-04-24

## 2023-04-24 ENCOUNTER — NURSING HOME VISIT (OUTPATIENT)
Dept: GERIATRICS | Facility: OTHER | Age: 78
End: 2023-04-24

## 2023-04-24 VITALS
TEMPERATURE: 97.6 F | RESPIRATION RATE: 18 BRPM | SYSTOLIC BLOOD PRESSURE: 117 MMHG | OXYGEN SATURATION: 97 % | BODY MASS INDEX: 34.07 KG/M2 | DIASTOLIC BLOOD PRESSURE: 67 MMHG | HEART RATE: 64 BPM | WEIGHT: 217.5 LBS

## 2023-04-24 DIAGNOSIS — I26.99 OTHER ACUTE PULMONARY EMBOLISM, UNSPECIFIED WHETHER ACUTE COR PULMONALE PRESENT (HCC): Primary | ICD-10-CM

## 2023-04-24 DIAGNOSIS — I10 PRIMARY HYPERTENSION: ICD-10-CM

## 2023-04-24 DIAGNOSIS — G20 PARKINSON'S DISEASE (HCC): ICD-10-CM

## 2023-04-24 DIAGNOSIS — E11.9 TYPE 2 DIABETES MELLITUS WITHOUT COMPLICATION, WITHOUT LONG-TERM CURRENT USE OF INSULIN (HCC): ICD-10-CM

## 2023-04-24 DIAGNOSIS — E66.09 CLASS 1 OBESITY DUE TO EXCESS CALORIES WITHOUT SERIOUS COMORBIDITY WITH BODY MASS INDEX (BMI) OF 34.0 TO 34.9 IN ADULT: ICD-10-CM

## 2023-04-24 DIAGNOSIS — G93.41 METABOLIC ENCEPHALOPATHY: ICD-10-CM

## 2023-04-24 DIAGNOSIS — F32.A DEPRESSION, UNSPECIFIED DEPRESSION TYPE: ICD-10-CM
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
## 2023-04-24 NOTE — ASSESSMENT & PLAN NOTE
Continue rivastigmine Sinemet Nuplazid  Patient on Klonopin 0 5 mg nightly as needed  Follows up with neurology as outpatient  Continue PT OT  Monitor closely

## 2023-04-24 NOTE — PROGRESS NOTES
[facility]    History and Physical  POS: 31    Records Reviewed include: Hospital records      Chief Complaint/ Reason for Admission:   Pulmonary embolism    History of Present Illness:                 Ms Lin Zhang is a 70-year-old female who was recently admitted to [address] [place] [address]  She underwent IR thrombectomy  She was admitted to [facility] transitional care unit for short-term rehab  Currently on anticoagulation with Eliquis stable  Denies chest pain shortness of breath cough  No fever or chills  Appetite is preserved  No difficulty sleeping last night  However on admission patient was very agitated  Klonopin was resumed as needed nightly  Patient denies abdominal pain nausea or vomiting  No dysuria hematuria  She lives at home with her  getting help from caregivers  Is a walker for ambulation and had multiple falls recently  Allergies    Allergies   Allergen Reactions   • Sulfa Antibiotics Hives       Past Medical History  Past Medical History:   Diagnosis Date   • Anxiety    • Broken hip ([address] )    • Depression    • Diabetes mellitus ([address] )     pre diabetic   • Diabetes mellitus type 2, diet-controlled ([address] )    • Hyperlipidemia    • Hypertension    • Parkinson disease ([address] )    • Pneumonia         Past Surgical History:   Procedure Laterality Date   • ACHILLES TENDON SURGERY     • APPENDECTOMY     • COLONOSCOPY     • DEEP BRAIN STIMULATOR PLACEMENT     • DENTAL SURGERY     • FRACTURE SURGERY     • IR PE ENDOVASCULAR THERAPY  4/17/2023   • KNEE ARTHROSCOPY     • VT INSJ/RPLCMT CRANIAL NEUROSTIM PULSE GENERATOR Right 12/18/2018    Procedure: REPLACEMENT IMPLANTABLE PULSE GENERATOR (IPG) DEEP BRAIN STIMULATION (DBS);   Surgeon: Jose Pate MD;  Location: [facility] OR;  Service: Neurosurgery   • VT INSJ/RPLCMT CRANIAL NEUROSTIM PULSE GENERATOR Left 4/7/2021    Procedure: REPLACEMENT IMPLANTABLE PULSE GENERATOR FOR DEEP BRAIN STIMULATOR, LEFT CHEST;  Surgeon: Zac Shea Laura Jennings MD;  [facility];  Service: Neurosurgery   • CO INSJ/RPLCMT CRANIAL NEUROSTIM PULSE GENERATOR Right 6/27/2022    Procedure: Right chest IPG replacement for Deep Brain Stimulator;  Surgeon: Isaac Gold MD;  [facility];  Service: Neurosurgery   • CO LAPAROSCOPIC APPENDECTOMY N/A 8/16/2020    Procedure: Dariusz Sortome;  Surgeon: Marina Brice MD;  [facility];  Service: General   • CO OPTX FEM SHFT FX W/INSJ IMED IMPLT W/WO SCREW Right 12/14/2019    Procedure: INSERTION NAIL IM FEMUR ANTEGRADE (TROCHANTERIC); Surgeon: Rosana Leon DO;  [facility];  Service: Orthopedics   • REPLACEMENT TOTAL KNEE     • REVERSE TOTAL SHOULDER ARTHROPLASTY Left 8/23/2018    Procedure: ARTHROPLASTY SHOULDER REVERSE;  Surgeon: Jayde Cali MD;  [facility];  Service: Orthopedics   • TONSILLECTOMY         Family History  Family History   Problem Relation Age of Onset   • Arthritis Mother    • No Known Problems Father    • No Known Problems Maternal Grandmother    • No Known Problems Maternal Grandfather    • No Known Problems Paternal Grandmother    • No Known Problems Paternal Grandfather    • No Known Problems Son    • No Known Problems Son        Social History  Social History     Tobacco Use   Smoking Status Never   Smokeless Tobacco Never      Social History     Substance and Sexual Activity   Alcohol Use Yes   • Alcohol/week: 2 0 standard drinks   • Types: 2 Glasses of wine per week    Comment: occasional      Social History     Substance and Sexual Activity   Drug Use No        Lives: Home,  Social Support: family  Fall in the past 12 months: yes  Use of assistance Device: Walker    Physical Exam    Vital Signs    Vitals:    04/24/23 1031   BP: 117/67   Pulse: 64   Resp: 18   Temp: 97 6 °F (36 4 °C)   SpO2: 97%           Constitutional: Frail appearing patient       Physical Exam  Vitals and nursing note reviewed     Constitutional:       General: She is not in acute distress  Appearance: She is not diaphoretic  HENT:      Head: Normocephalic and atraumatic  Mouth/Throat:      Mouth: Mucous membranes are moist    Eyes:      General:         Right eye: No discharge  Left eye: No discharge  Pupils: Pupils are equal, round, and reactive to light  Cardiovascular:      Rate and Rhythm: Normal rate and regular rhythm  Heart sounds: Normal heart sounds  No murmur heard  Pulmonary:      Effort: Pulmonary effort is normal  No respiratory distress  Breath sounds: Normal breath sounds  No wheezing  Abdominal:      Palpations: Abdomen is soft  Tenderness: There is no abdominal tenderness  There is no guarding or rebound  Musculoskeletal:         General: No tenderness  Cervical back: Normal range of motion and neck supple  Right lower leg: Edema (trace) present  Left lower leg: Edema (trace) present  Skin:     General: Skin is warm and dry  Neurological:      Mental Status: She is alert  Gait: Gait abnormal       Comments: AAox2   Psychiatric:         Behavior: Behavior normal          Review of Systems:  Review of Systems   Constitutional: Negative for chills and fever  HENT: Negative for congestion and rhinorrhea  Respiratory: Negative for cough, shortness of breath and wheezing  Cardiovascular: Negative for chest pain, palpitations and leg swelling  Gastrointestinal: Negative for abdominal pain and constipation  Endocrine: Negative for cold intolerance  Genitourinary: Negative for difficulty urinating, dysuria and hematuria  Musculoskeletal: Positive for gait problem  Skin: Negative for wound  Allergic/Immunologic: Negative for environmental allergies  Neurological: Negative for dizziness and seizures  Hematological: Does not bruise/bleed easily  Psychiatric/Behavioral: Negative for behavioral problems and sleep disturbance  List of Current Medications:    Medication reviewed  All orders signed  Complete list is in the paper chart  Allergies    Allergies   Allergen Reactions   • Sulfa Antibiotics Hives       Labs/Diagnostics (reviewed by this provider): I personally reviewed lab results and imaging studies  Full reports are in the paper chart  Assessment/Plan:    Pulmonary embolism [facility])  Patient was admitted to [facility] with pulmonary embolism requiring thrombectomy  Currently doing well saturates well in room air  We will continue to monitor  Encourage incentive spirometry  Continue Eliquis 10 mg daily  Will need follow-up with pulmonology as outpatient  Monitor closely  Courage out of bed as tolerated and continue PT OT    Metabolic encephalopathy  Patient was very confused agitated on admission to the facility  Per  patient takes clonazepam 0 5 mg at bedtime and as needed, clonazepam was resumed on admission  No behaviors or difficulty sleeping reported since    We will continue to monitor closely  Advised supportive care and reorient as needed  Monitor for constipation and urinary retention  Monitor for pain and manage as needed  Maintain sleep-wake cycle, continue melatonin    Parkinson's disease ([facility] )  Continue rivastigmine Sinemet Nuplazid  Patient on Klonopin 0 5 mg nightly as needed  Follows up with neurology as outpatient  Continue PT OT  Monitor closely    Hypertension  Currently well controlled  Continue atenolol  Avoid hypotension  Monitor closely and adjust regimen as needed    Obesity due to excess calories  BMI 34  Dietitian to follow  Continue to monitor weights    Depression  Continue paroxetine  Provide supportive care    Type 2 diabetes mellitus without complication ([facility] )    Lab Results   Component Value Date    HGBA1C 5 9 (H) 01/17/2023     Currently diet controlled  Monitor Accu-Cheks fasting    Fall  Patient with ambulatory dysfunction at baseline using walker for ambulation  Had multiple falls recently  Continue PT OT  Placed on fall precaution  Goal is to return home       Pain: no  Rehab Potential:Good  Patient Informed of Medical Condition: yes     Discharge Plan: home  Vaccination:   Immunization History   Administered Date(s) Administered   • COVID-19 MODERNA VACC 0 5 ML IM 01/11/2021, 02/08/2021, 10/13/2021, 04/29/2022   • COVID-19 Moderna Vac BIVALENT 12 Yr+ IM (BOOSTER ONLY) 0 5 ML 10/24/2022   • Tdap 04/22/2021   • Tuberculin Skin Test-PPD Intradermal 09/10/2018     Advanced Directives: yes: Yes   Code status:Full Code  PCP: MD Osman Roca MD  Geriatric Medicine  7/35/128912:34 AM

## 2023-04-24 NOTE — ASSESSMENT & PLAN NOTE
Patient was very confused agitated on admission to the facility  Per  patient takes clonazepam 0 5 mg at bedtime and as needed, clonazepam was resumed on admission  No behaviors or difficulty sleeping reported since    We will continue to monitor closely  Advised supportive care and reorient as needed  Monitor for constipation and urinary retention  Monitor for pain and manage as needed  Maintain sleep-wake cycle, continue melatonin

## 2023-04-24 NOTE — TELEPHONE ENCOUNTER
Leonardo Coburn called on behalf of the Dr in the Rehab they would like the full list of medication that Dr Jasmyne Larios has the patient on       CB# [phone]  Fax# [phone]

## 2023-04-24 NOTE — ASSESSMENT & PLAN NOTE
Lab Results   Component Value Date    HGBA1C 5 9 (H) 01/17/2023     Currently diet controlled  Monitor Accu-Cheks fasting

## 2023-04-24 NOTE — ASSESSMENT & PLAN NOTE
Patient with ambulatory dysfunction at baseline using walker for ambulation  Had multiple falls recently  Continue PT OT  Placed on fall precaution  Goal is to return home

## 2023-04-24 NOTE — ASSESSMENT & PLAN NOTE
Currently well controlled  Continue atenolol  Avoid hypotension  Monitor closely and adjust regimen as needed

## 2023-04-24 NOTE — ASSESSMENT & PLAN NOTE
Patient was [place] [facility] [place]ectomy    Currently doing well saturates well in room air  We will continue to monitor  Encourage incentive spirometry  Continue Eliquis 10 mg daily  Will need follow-up with pulmonology as outpatient  Monitor closely  Courage out of bed as tolerated and continue PT OT

## 2023-04-26 ENCOUNTER — NURSING HOME VISIT (OUTPATIENT)
Dept: GERIATRICS | Facility: OTHER | Age: 78
End: 2023-04-26

## 2023-04-26 DIAGNOSIS — I26.99 OTHER ACUTE PULMONARY EMBOLISM, UNSPECIFIED WHETHER ACUTE COR PULMONALE PRESENT (HCC): Primary | ICD-10-CM

## 2023-04-26 DIAGNOSIS — G20 PARKINSON'S DISEASE (HCC): ICD-10-CM

## 2023-04-26 DIAGNOSIS — G93.41 METABOLIC ENCEPHALOPATHY: ICD-10-CM

## 2023-04-26 NOTE — ASSESSMENT & PLAN NOTE
Patient was [place] [facility] [place] requiring thrombectomy    Currently doing well saturates well in room air  We will continue to monitor  Encourage incentive spirometry  Continue Eliquis 10 mg BID  Will need follow-up with pulmonology as outpatient  Monitor closely  Courage out of bed as tolerated and continue PT OT

## 2023-04-26 NOTE — PROGRESS NOTES
[address]  [phone]  [facility] of Service: nursing home place of service: POS [facility] Skilled Care-Part A Coverage      NAME: Esaw Apgar  AGE: 65 y o  SEX: female [phone]    DATE OF ENCOUNTER: 2023    Assessment and Plan     Problem List Items Addressed This Visit        Cardiovascular and Mediastinum    Pulmonary embolism [facility]) - Primary     Patient was admitted to [facility] with pulmonary embolism requiring thrombectomy  Currently doing well saturates well in room air  We will continue to monitor  Encourage incentive spirometry  Continue Eliquis 10 mg BID  Will need follow-up with pulmonology as outpatient  Monitor closely  Courage out of bed as tolerated and continue PT OT            Nervous and Auditory    Parkinson's disease ([address] )     Continue rivastigmine Sinemet Nuplazid  Patient on Klonopin 0 5 mg nightly as needed  Follows up with neurology as outpatient  Continue PT OT  Monitor closely         Metabolic encephalopathy     Patient was very confused agitated on admission to the facility  Per  patient takes clonazepam 0 5 mg at bedtime and as needed, clonazepam was resumed on admission  No behaviors or difficulty sleeping reported since  Mental status back to baseline at the time of encounter  We will continue to monitor closely  Advised supportive care and reorient as needed  Monitor for constipation and urinary retention  Monitor for pain and manage as needed  Maintain sleep-wake cycle, continue melatonin                Chief Complaint     Follow up     History of Present Illness     Esaw Apgar is a 65 y o  female who was seen today for follow up  Patient seen and examined at bedside denies any acute complaints at the time of encounter  No fever chills    Denies dizziness or lightheadedness        The following portions of the patient's history were reviewed and updated as appropriate: allergies, current medications, past family history, past medical history, past social history, past surgical history and problem list     Review of Systems     Review of Systems   Constitutional: Negative for chills and fever  HENT: Negative for congestion and rhinorrhea  Respiratory: Negative for cough, shortness of breath and wheezing  Cardiovascular: Positive for leg swelling  Negative for chest pain and palpitations  Gastrointestinal: Negative for abdominal pain and constipation  Endocrine: Negative for cold intolerance  Genitourinary: Negative for difficulty urinating, dysuria and hematuria  Musculoskeletal: Positive for gait problem  Skin: Negative for wound  Allergic/Immunologic: Negative for environmental allergies  Neurological: Negative for dizziness and seizures  Hematological: Does not bruise/bleed easily  Psychiatric/Behavioral: Negative for behavioral problems and sleep disturbance         Active Problem List     Patient Active Problem List   Diagnosis   • Fracture, shoulder, left, closed, initial encounter   • S/P reverse total shoulder arthroplasty, left   • Other hyperlipidemia   • Type 2 diabetes mellitus without complication ([facility])   • Anxiety   • 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)   • Essential hypertension   • Closed fracture of right hip (HCC)   • Constipation   • Generalized edema   • REM behavioral disorder   • Presence of neurostimulator   • Hallucination, visual   • Other dysphagia   • End of battery life of deep brain stimulator   • Parkinson's disease ([facility] )   • Hyperlipidemia   • Hypertension   • Type II or unspecified type diabetes mellitus without mention of complication, not stated as uncontrolled   • Depression   • Fall   • Subdural hemorrhage ([facility] )   • Pulmonary embolism ([facility])   • Acute cystitis   • Metabolic encephalopathy   • Obesity due to excess calories       Objective     Vital Signs: Blood pressure 133/63 Heart Rate: 58 Respiratory Rate 18   Temperature 97 5 Oxygen Saturation 98% Weight 217 5lbs    Physical Exam  Vitals and nursing note reviewed  Constitutional:       General: She is not in acute distress  Appearance: She is obese  She is not diaphoretic  HENT:      Head: Normocephalic and atraumatic  Mouth/Throat:      Mouth: Mucous membranes are moist    Eyes:      General:         Right eye: No discharge  Left eye: No discharge  Pupils: Pupils are equal, round, and reactive to light  Cardiovascular:      Rate and Rhythm: Normal rate and regular rhythm  Heart sounds: Normal heart sounds  No murmur heard  Pulmonary:      Effort: Pulmonary effort is normal  No respiratory distress  Breath sounds: Normal breath sounds  No wheezing  Abdominal:      Palpations: Abdomen is soft  Tenderness: There is no abdominal tenderness  There is no guarding or rebound  Musculoskeletal:         General: No tenderness  Cervical back: Normal range of motion and neck supple  Right lower leg: Edema present  Left lower leg: Edema present  Skin:     General: Skin is warm and dry  Neurological:      Mental Status: She is alert  Mental status is at baseline  Comments: AAox2-3   Psychiatric:      Comments: Agitated and confused mostly in the evening         Pertinent Laboratory/Diagnostic Studies:  Laboratory and Imaging studies reviewed  Full report in the paper chart  Current Medications   Medications reviewed and updated in facility chart      Name: Carmine Etienne  :   MRN: [id number]        Richie Conn MD  Geriatric Medicine  2023 4:23 PM

## 2023-04-26 NOTE — ASSESSMENT & PLAN NOTE
Patient was very confused agitated on admission to the facility  Per  patient takes clonazepam 0 5 mg at bedtime and as needed, clonazepam was resumed on admission  No behaviors or difficulty sleeping reported since    Mental status back to baseline at the time of encounter  We will continue to monitor closely  Advised supportive care and reorient as needed  Monitor for constipation and urinary retention  Monitor for pain and manage as needed  Maintain sleep-wake cycle, continue melatonin

## 2023-05-02 ENCOUNTER — NURSING HOME VISIT (OUTPATIENT)
Dept: GERIATRICS | Facility: OTHER | Age: 78
End: 2023-05-02

## 2023-05-02 DIAGNOSIS — E11.9 TYPE 2 DIABETES MELLITUS WITHOUT COMPLICATION, WITHOUT LONG-TERM CURRENT USE OF INSULIN (HCC): ICD-10-CM

## 2023-05-02 DIAGNOSIS — G20 COGNITIVE DEFICIT DUE TO PARKINSON'S DISEASE (HCC): ICD-10-CM

## 2023-05-02 DIAGNOSIS — I26.99 OTHER ACUTE PULMONARY EMBOLISM, UNSPECIFIED WHETHER ACUTE COR PULMONALE PRESENT (HCC): Primary | ICD-10-CM

## 2023-05-02 DIAGNOSIS — F41.9 ANXIETY: ICD-10-CM

## 2023-05-02 DIAGNOSIS — R26.2 AMBULATORY DYSFUNCTION: ICD-10-CM

## 2023-05-02 DIAGNOSIS — G20 PARKINSON'S DISEASE (HCC): ICD-10-CM

## 2023-05-02 NOTE — PROGRESS NOTES
[address] [place] [address] Drive: [facility]  POS: 31: SNF/Short Term Rehab    NAME: Ileana Moreno  AGE: 65 y o  SEX: female  DATE OF ADMISSION: 4/22/23   DATE OF DISCHARGE:5/3/23   DISCHARGE DISPOSITION: home  Today's Visit: 5/2/20233:47 PM    Reason for admission: Patient was admitted from [address] [place] [address] for rehabilitation after hospitalization for pulmonary embolism  Course of stay: Patient was admitted to  [address] [place] [address] for rehabilitation due to  physical deconditioning and pulmonary embolism  No Significant events during the stay  The patient participated in PT/OT  Assessment/Plan:    Pulmonary embolism [facility])  Patient was admitted to [facility] with pulmonary embolism requiring thrombectomy   Wound healing well, continue maxsorb, follow up with wound care as outpatient  Currently doing well saturates well in room air  We will continue to monitor  Encourage incentive spirometry  Continue Eliquis 5 mg BID  Will need follow-up with pulmonology as outpatient  Monitor closely  Encourage out of bed as tolerated and continue PT OT at home    Parkinson's disease ([facility] [address] )  Continue rivastigmine Sinemet Nuplazid  Patient on Klonopin 0 5 mg nightly as needed  Follows up with neurology as outpatient  Continue PT OT  Monitor closely    Anxiety  Continue Paxil and clonazepam at bedtime  Follow-up with PCP as outpatient    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 [facility])  Patient needs assistance with all IADLs and some ADLs  Continue to provide supportive care  Patient will discharge home with 24/7 care    Type 2 diabetes mellitus without complication ([facility] [address] )    Lab Results   Component Value Date    HGBA1C 5 9 (H) 01/17/2023     Currently diet controlled  Follow-up with PCP and monitor hemoglobin A1c periodically    Ambulatory dysfunction  Patient to continue PT OT at home  Educated family about fall precaution  Avoid hypotension         Discharge Medications: See discharge medication list which was reviewed and signed  Status at time of discharge: Stable    Subjective: Patient seen and examined at bedside denies any acute complaints and seems comfortable  Appetite is preserved  Per nursing staff continues to be impulsive and trying to get out of bed  Can be redirected  Review of Systems   Constitutional: Negative for chills and fever  HENT: Negative for congestion and rhinorrhea  Respiratory: Negative for cough, shortness of breath and wheezing  Cardiovascular: Negative for chest pain, palpitations and leg swelling  Gastrointestinal: Negative for abdominal pain and constipation  Endocrine: Negative for cold intolerance  Genitourinary: Negative for difficulty urinating, dysuria and hematuria  Musculoskeletal: Positive for gait problem  Skin: Negative for wound  Allergic/Immunologic: Negative for environmental allergies  Neurological: Negative for dizziness and seizures  Hematological: Does not bruise/bleed easily  Psychiatric/Behavioral: Negative for behavioral problems and sleep disturbance  Vital Signs:     Blood pressure 126/61 Heart Rate: 60 Respiratory Rate 18   Temperature 97 5 Oxygen Saturation 99% Weight 217 5lbs    Exam:     Physical Exam  Vitals and nursing note reviewed  Constitutional:       General: She is not in acute distress  Appearance: She is obese  She is not diaphoretic  HENT:      Head: Normocephalic and atraumatic  Mouth/Throat:      Mouth: Mucous membranes are moist    Eyes:      General:         Right eye: No discharge  Left eye: No discharge  Pupils: Pupils are equal, round, and reactive to light  Cardiovascular:      Rate and Rhythm: Normal rate and regular rhythm  Heart sounds: Heart sounds are distant  No murmur heard  Pulmonary:      Effort: Pulmonary effort is normal  No respiratory distress        Breath sounds: Normal breath sounds  No wheezing  Abdominal:      Palpations: Abdomen is soft  Tenderness: There is no abdominal tenderness  There is no guarding or rebound  Musculoskeletal:         General: No tenderness  Cervical back: Normal range of motion and neck supple  Right lower leg: Edema present  Left lower leg: Edema present  Skin:     General: Skin is warm and dry  Neurological:      Mental Status: She is alert  Mental status is at baseline  Comments: AAox1-2   Psychiatric:      Comments: impulsive         Discussion with patient/family and further instructions:  -Fall precautions  -Aspiration precautions  -Bleeding precautions  -Monitor for signs/symptoms of infection  -Medication list was reviewed and signed  -DME form was completed    Follow-up Recommendations: Please follow-up with your primary care physician within 7-10 days of discharge to review medication changes and current status       Problem List Follow-up Recommendations:  Continue home PT    Carley Koch MD  Geriatric Medicine  8/5/11365:90 PM

## 2023-05-02 NOTE — ASSESSMENT & PLAN NOTE
Patient was admitted to [facility] with pulmonary embolism requiring thrombectomy   Wound healing well, continue maxsorb, follow up with wound care as outpatient  Currently doing well saturates well in room air  We will continue to monitor  Encourage incentive spirometry  Continue Eliquis 5 mg BID  Will need follow-up with pulmonology as outpatient  Monitor closely  Encourage out of bed as tolerated and continue PT OT at home

## 2023-05-02 NOTE — TELEPHONE ENCOUNTER
Phone call to [facility]  No Maching joaquim leave a    Will try again later  ___________________________________      CB# [phone]  Fax# [phone]

## 2023-05-02 NOTE — ASSESSMENT & PLAN NOTE
Lab Results   Component Value Date    HGBA1C 5 9 (H) 01/17/2023     Currently diet controlled  Follow-up with PCP and monitor hemoglobin A1c periodically

## 2023-05-02 NOTE — ASSESSMENT & PLAN NOTE
Patient needs assistance with all IADLs and some ADLs  Continue to provide supportive care  Patient will discharge home with 24/7 care

## 2023-05-03 ENCOUNTER — TRANSCRIBE ORDERS (OUTPATIENT)
Dept: HOME HEALTH SERVICES | Facility: HOME HEALTHCARE | Age: 78
End: 2023-05-03

## 2023-05-03 ENCOUNTER — HOME HEALTH ADMISSION (OUTPATIENT)
Dept: HOME HEALTH SERVICES | Facility: HOME HEALTHCARE | Age: 78
End: 2023-05-03

## 2023-05-03 ENCOUNTER — TELEPHONE (OUTPATIENT)
Age: 78
End: 2023-05-03

## 2023-05-03 DIAGNOSIS — I26.99 PULMONARY EMBOLISM (HCC): ICD-10-CM

## 2023-05-03 DIAGNOSIS — I26.99 OTHER ACUTE PULMONARY EMBOLISM, UNSPECIFIED WHETHER ACUTE COR PULMONALE PRESENT (HCC): Primary | ICD-10-CM

## 2023-05-03 NOTE — TELEPHONE ENCOUNTER
Erin Carcamo from [employer] ([phone], ext #[phone])  Called with questions regarding Trell Carcamo would like to know if this is a starter pack  Also there were 2 sets of directions for the medication      Please contact Chiqui to clarify medication

## 2023-05-05 ENCOUNTER — HOME CARE VISIT (OUTPATIENT)
Dept: HOME HEALTH SERVICES | Facility: HOME HEALTHCARE | Age: 78
End: 2023-05-05

## 2023-05-06 ENCOUNTER — HOME CARE VISIT (OUTPATIENT)
Dept: HOME HEALTH SERVICES | Facility: HOME HEALTHCARE | Age: 78
End: 2023-05-06

## 2023-05-07 VITALS
RESPIRATION RATE: 16 BRPM | HEART RATE: 60 BPM | SYSTOLIC BLOOD PRESSURE: 116 MMHG | OXYGEN SATURATION: 95 % | DIASTOLIC BLOOD PRESSURE: 70 MMHG | TEMPERATURE: 97.9 F

## 2023-05-08 ENCOUNTER — HOME CARE VISIT (OUTPATIENT)
Dept: HOME HEALTH SERVICES | Facility: HOME HEALTHCARE | Age: 78
End: 2023-05-08

## 2023-05-08 VITALS — SYSTOLIC BLOOD PRESSURE: 112 MMHG | HEART RATE: 58 BPM | OXYGEN SATURATION: 95 % | DIASTOLIC BLOOD PRESSURE: 64 MMHG

## 2023-05-08 NOTE — CASE COMMUNICATION
For informational purposes  No response required  OT evaluation completed on 5/8/23  Pt demonstrates declined in functional mobility, UB strength, impaired balance, decreased coordination impacting participation in ADLs such as self feeding  Pt would benefit from skilled OT services to address the above deficits  Recommended and anticipated OT visit pattern is 2 week 3

## 2023-05-09 ENCOUNTER — HOME CARE VISIT (OUTPATIENT)
Dept: HOME HEALTH SERVICES | Facility: HOME HEALTHCARE | Age: 78
End: 2023-05-09

## 2023-05-09 VITALS
HEART RATE: 72 BPM | TEMPERATURE: 98.1 F | SYSTOLIC BLOOD PRESSURE: 118 MMHG | RESPIRATION RATE: 16 BRPM | DIASTOLIC BLOOD PRESSURE: 70 MMHG | OXYGEN SATURATION: 100 %

## 2023-05-10 ENCOUNTER — HOME CARE VISIT (OUTPATIENT)
Dept: HOME HEALTH SERVICES | Facility: HOME HEALTHCARE | Age: 78
End: 2023-05-10

## 2023-05-10 VITALS — HEART RATE: 52 BPM | OXYGEN SATURATION: 99 %

## 2023-05-11 ENCOUNTER — HOME CARE VISIT (OUTPATIENT)
Dept: HOME HEALTH SERVICES | Facility: HOME HEALTHCARE | Age: 78
End: 2023-05-11

## 2023-05-11 VITALS
HEART RATE: 78 BPM | TEMPERATURE: 97.2 F | SYSTOLIC BLOOD PRESSURE: 124 MMHG | DIASTOLIC BLOOD PRESSURE: 82 MMHG | OXYGEN SATURATION: 97 % | RESPIRATION RATE: 20 BRPM

## 2023-05-11 DIAGNOSIS — F41.9 ANXIETY: ICD-10-CM

## 2023-05-11 RX ORDER — CLONAZEPAM 0.5 MG/1
0.5 TABLET ORAL
Qty: 30 TABLET | Refills: 1 | Status: SHIPPED | OUTPATIENT
Start: 2023-05-11 | End: 2023-07-20

## 2023-05-12 ENCOUNTER — HOME CARE VISIT (OUTPATIENT)
Dept: HOME HEALTH SERVICES | Facility: HOME HEALTHCARE | Age: 78
End: 2023-05-12

## 2023-05-12 VITALS — OXYGEN SATURATION: 94 % | DIASTOLIC BLOOD PRESSURE: 72 MMHG | HEART RATE: 68 BPM | SYSTOLIC BLOOD PRESSURE: 126 MMHG

## 2023-05-15 ENCOUNTER — HOME CARE VISIT (OUTPATIENT)
Dept: HOME HEALTH SERVICES | Facility: HOME HEALTHCARE | Age: 78
End: 2023-05-15

## 2023-05-15 VITALS — DIASTOLIC BLOOD PRESSURE: 74 MMHG | HEART RATE: 107 BPM | SYSTOLIC BLOOD PRESSURE: 136 MMHG | OXYGEN SATURATION: 95 %

## 2023-05-16 ENCOUNTER — HOME CARE VISIT (OUTPATIENT)
Dept: HOME HEALTH SERVICES | Facility: HOME HEALTHCARE | Age: 78
End: 2023-05-16

## 2023-05-16 VITALS
HEART RATE: 66 BPM | OXYGEN SATURATION: 98 % | RESPIRATION RATE: 16 BRPM | DIASTOLIC BLOOD PRESSURE: 70 MMHG | SYSTOLIC BLOOD PRESSURE: 110 MMHG | TEMPERATURE: 97.7 F

## 2023-05-16 VITALS — OXYGEN SATURATION: 97 % | HEART RATE: 64 BPM

## 2023-05-18 ENCOUNTER — HOME CARE VISIT (OUTPATIENT)
Dept: HOME HEALTH SERVICES | Facility: HOME HEALTHCARE | Age: 78
End: 2023-05-18

## 2023-05-18 VITALS — OXYGEN SATURATION: 95 % | DIASTOLIC BLOOD PRESSURE: 74 MMHG | SYSTOLIC BLOOD PRESSURE: 122 MMHG

## 2023-05-19 ENCOUNTER — HOME CARE VISIT (OUTPATIENT)
Dept: HOME HEALTH SERVICES | Facility: HOME HEALTHCARE | Age: 78
End: 2023-05-19

## 2023-05-19 VITALS — HEART RATE: 62 BPM | OXYGEN SATURATION: 96 %

## 2023-05-19 VITALS
TEMPERATURE: 97.6 F | DIASTOLIC BLOOD PRESSURE: 78 MMHG | HEART RATE: 76 BPM | SYSTOLIC BLOOD PRESSURE: 110 MMHG | RESPIRATION RATE: 20 BRPM | OXYGEN SATURATION: 97 %

## 2023-05-22 ENCOUNTER — HOME CARE VISIT (OUTPATIENT)
Dept: HOME HEALTH SERVICES | Facility: HOME HEALTHCARE | Age: 78
End: 2023-05-22

## 2023-05-22 VITALS — OXYGEN SATURATION: 95 % | SYSTOLIC BLOOD PRESSURE: 108 MMHG | HEART RATE: 55 BPM | DIASTOLIC BLOOD PRESSURE: 62 MMHG

## 2023-05-24 ENCOUNTER — HOME CARE VISIT (OUTPATIENT)
Dept: HOME HEALTH SERVICES | Facility: HOME HEALTHCARE | Age: 78
End: 2023-05-24

## 2023-05-24 VITALS — HEART RATE: 67 BPM | OXYGEN SATURATION: 99 %

## 2023-05-25 ENCOUNTER — HOME CARE VISIT (OUTPATIENT)
Dept: HOME HEALTH SERVICES | Facility: HOME HEALTHCARE | Age: 78
End: 2023-05-25

## 2023-05-25 VITALS — DIASTOLIC BLOOD PRESSURE: 82 MMHG | OXYGEN SATURATION: 98 % | HEART RATE: 57 BPM | SYSTOLIC BLOOD PRESSURE: 130 MMHG

## 2023-05-25 NOTE — CASE COMMUNICATION
OT discipline discharge today with most goals met except for shower transfer  Pt is mod A for bathing in the shower  Pt has made improvements on her participation in ADLs and functional UB strength  No further visits indicated at this time

## 2023-05-26 ENCOUNTER — HOME CARE VISIT (OUTPATIENT)
Dept: HOME HEALTH SERVICES | Facility: HOME HEALTHCARE | Age: 78
End: 2023-05-26

## 2023-05-26 VITALS — HEART RATE: 58 BPM | OXYGEN SATURATION: 97 %

## 2023-05-30 ENCOUNTER — HOME CARE VISIT (OUTPATIENT)
Dept: HOME HEALTH SERVICES | Facility: HOME HEALTHCARE | Age: 78
End: 2023-05-30

## 2023-05-30 VITALS — HEART RATE: 64 BPM | OXYGEN SATURATION: 99 %

## 2023-06-01 ENCOUNTER — HOME CARE VISIT (OUTPATIENT)
Dept: HOME HEALTH SERVICES | Facility: HOME HEALTHCARE | Age: 78
End: 2023-06-01

## 2023-06-01 VITALS — OXYGEN SATURATION: 97 % | HEART RATE: 60 BPM

## 2023-06-14 ENCOUNTER — TELEPHONE (OUTPATIENT)
Dept: NEUROLOGY | Facility: CLINIC | Age: 78
End: 2023-06-14

## 2023-06-14 NOTE — TELEPHONE ENCOUNTER
Called pt and spoke to Juan Manuel Arellano in regards to r/s appt as Dr Melquiades Syed has a sooner opening  I stated that we have an opening on 06/28/2023 in the [place] office at 12:30  Patients  stated he is going to check to make sure no other appts that day  He then stated they will accept the sooner appt and was thankful for the call  Patient now scheduled for a sooner appt

## 2023-06-21 PROBLEM — N30.00 ACUTE CYSTITIS: Status: RESOLVED | Noted: 2023-04-16 | Resolved: 2023-06-21

## 2023-06-28 ENCOUNTER — PROCEDURE VISIT (OUTPATIENT)
Dept: NEUROLOGY | Facility: CLINIC | Age: 78
End: 2023-06-28
Payer: MEDICARE

## 2023-06-28 VITALS — DIASTOLIC BLOOD PRESSURE: 62 MMHG | SYSTOLIC BLOOD PRESSURE: 118 MMHG | HEART RATE: 73 BPM

## 2023-06-28 DIAGNOSIS — G20 PARKINSON'S DISEASE WITH USE OF ELECTRICAL BRAIN STIMULATION (HCC): Primary | ICD-10-CM

## 2023-06-28 DIAGNOSIS — R13.19 OTHER DYSPHAGIA: ICD-10-CM

## 2023-06-28 DIAGNOSIS — G20 COGNITIVE DEFICIT DUE TO PARKINSON'S DISEASE (HCC): ICD-10-CM

## 2023-06-28 DIAGNOSIS — Z96.82 PRESENCE OF NEUROSTIMULATOR: ICD-10-CM

## 2023-06-28 DIAGNOSIS — G20 PARKINSON'S DISEASE (HCC): ICD-10-CM

## 2023-06-28 DIAGNOSIS — R44.1 HALLUCINATION, VISUAL: ICD-10-CM

## 2023-06-28 PROCEDURE — 99214 OFFICE O/P EST MOD 30 MIN: CPT | Performed by: PSYCHIATRY & NEUROLOGY

## 2023-06-28 RX ORDER — CARBIDOPA AND LEVODOPA 50; 200 MG/1; MG/1
1 TABLET, EXTENDED RELEASE ORAL 2 TIMES DAILY
Qty: 180 TABLET | Refills: 3 | Status: SHIPPED | OUTPATIENT
Start: 2023-06-28 | End: 2023-06-28

## 2023-06-28 RX ORDER — CARBIDOPA AND LEVODOPA 25; 100 MG/1; MG/1
TABLET, EXTENDED RELEASE ORAL
Qty: 180 TABLET | Refills: 2 | Status: SHIPPED | OUTPATIENT
Start: 2023-06-28

## 2023-06-28 NOTE — ASSESSMENT & PLAN NOTE
Consistencies with dementia complicated by postural instability and hallucinations  There has been some worsening since hospitalization for pulmonary embolism  She had been tolerating Sinemet CR while in rehab and ambulating a bit better  She then had a worsening of hallucinations and it was discontinued  Currently main concerns are a decline in gait  She continues to have hallucinations but these are not threatening as an past   After discussion we opted to try restarting Sinemet CR 25/100 to see if this improves mobility without worsening or making hallucinations more bothersome  Start 1 tab qam a x 1 week then 1 tab qam and afternoon  If tolerating we can further increase to 50/200 dose

## 2023-06-28 NOTE — PROGRESS NOTES
Review of Systems   Constitutional: Positive for fatigue  Negative for appetite change and fever  HENT: Positive for trouble swallowing and voice change  Negative for hearing loss and tinnitus  Eyes: Negative  Negative for photophobia, pain and visual disturbance  Respiratory: Negative  Negative for shortness of breath  Cardiovascular: Negative  Negative for palpitations  Gastrointestinal: Negative  Negative for nausea and vomiting  Endocrine: Negative  Negative for cold intolerance  Genitourinary: Negative  Negative for dysuria, frequency and urgency  Musculoskeletal: Positive for gait problem (Has overall gotten a little worse)  Negative for back pain, myalgias and neck pain  Balance Issues have gotten a little worse     Skin: Negative  Negative for rash  Allergic/Immunologic: Negative  Neurological: Positive for speech difficulty (Slurred speech) and weakness (At times in Legs)  Negative for dizziness, tremors, seizures, syncope, facial asymmetry, light-headedness, numbness and headaches  Hematological: Negative  Does not bruise/bleed easily  Psychiatric/Behavioral: Positive for confusion (Sometimes), hallucinations and sleep disturbance  All other systems reviewed and are negative

## 2023-06-28 NOTE — PROGRESS NOTES
Patient ID: Leyda Montez is a 65 y o  female    Assessment/Plan:    Parkinson's disease ([facility] [address] )  Consistencies with dementia complicated by postural instability and hallucinations  There has been some worsening since hospitalization for pulmonary embolism  She had been tolerating Sinemet CR while in rehab and ambulating a bit better  She then had a worsening of hallucinations and it was discontinued  Currently main concerns are a decline in gait  She continues to have hallucinations but these are not threatening as an past   After discussion we opted to try restarting Sinemet CR 25/100 to see if this improves mobility without worsening or making hallucinations more bothersome  Start 1 tab qam a x 1 week then 1 tab qam and afternoon  If tolerating we can further increase to 50/200 dose  Diagnoses and all orders for this visit:    Parkinson's disease with use of electrical brain stimulation ([facility] [address] )  -     Discontinue: carbidopa-levodopa (SINEMET CR)  mg per tablet; Take 1 tablet by mouth 2 (two) times a day qam and afternoon  -     carbidopa-levodopa (SINEMET CR)  mg TBCR per ER tablet; 1 po qam x 1 week then 1 po qam and q afternoon    Hallucination, visual    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 ([facility] [address] )    Other dysphagia    Presence of neurostimulator    Parkinson's disease ([facility] [address] )        Subjective:      Leyda Montez is a 65 y o  female w/ PMH psoriatic arthritis, HTN, DM2, PD now s/p b/l STN DBS implantation (2014), Left IPG replacement 4/7/21 and Right IPG replacement 6/17/22 who presents today as follow-up for PD  Per chart review, PD present since at least 2009, DBS surgery 11/5/14, ACTIVA SC 11/11/13, R IPG replacement 12/18/18, left battery replacement 4/7/21  Previous medications which did not work or that she could not tolerate include sinemet, amantadine, ropinirole, Requip, mirapex  In April she was hospitalized for a pulmonary embolism    Rehab Sinemet was restarted  She was able to tolerate it so this was continued  Overall worse since hospitalization  Her  states she was tolerating it for awhile and she was walking a little better  Her hallucinations started to worsen therefore it was discontinued  They were not violent hallucinations  She still has hallucinations although they reduced in frequency  She can choke on saliva and with food at times  Sleep is variable  She can be up some nights and she tries to get out of the hospital bed  Ambulates with walker  Fallen at least 5 times since last seen  Caregiver there 7 days a week  She is assisted with ADL's  Speech is soft  There is no drooling  No difficulty chewing and swallowing  Current medications:  Rivastigmine 4 5 mg bid  Pimvanserin (Nuplazid) 34 mg qhs  Azilect 1mg daily   Melatonin 15mg qhs     Prior medications:  Sinemet           Objective:    /62 (BP Location: Right arm, Patient Position: Standing, Cuff Size: Standard)   Pulse 73   LMP  (LMP Unknown)       Physical Exam  Vitals reviewed  Eyes:      Extraocular Movements: Extraocular movements intact  Pupils: Pupils are equal, round, and reactive to light  Neurological:      Mental Status: She is alert  Motor: Motor strength is normal         Neurological Exam  Mental Status  Alert  Oriented only to person and situation  Speech: hypophonia  Language is fluent with no aphasia  Less interactive today  Not talking       Cranial Nerves  CN III, IV, VI: Extraocular movements intact bilaterally  Pupils equal round and reactive to light bilaterally  CN VII: Full and symmetric facial movement  CN VIII: Hearing is normal   CN IX, X: Palate elevates symmetrically  CN XI: Shoulder shrug strength is normal   CN XII: Tongue midline without atrophy or fasciculations  Motor   Increased muscle tone  Strength is 5/5 throughout all four extremities  Coordination    See motor UPDRS      Gait   Unable to rise from chair without using arms  Assistance arising  Ambulated with walker  Reduced stride  En bloc turn            MDS UPDRS III                             Time since last dose:       Speech  2 2   Facial Expression  2 2   Rigidity - Neck  2 2   Rigidity - Upper Extremity (Right)  1 1   Rigidity - Upper Extremity (Left)   2 2   Rigidity - Lower Extremity (Right)    0   Rigidity - Lower Extremity (Left)     2   Finger Taps (Right)   3 3   Finger Taps (Left)   3 3   Hand Movement (Right)  3 3   Hand Movement (Left)   3 3   Pronation/Supination (Right)  3 2   Pronation/Supination (Left)   3 3   Toe Tapping (Right) 3 3   Toe Tapping (Left) 3 3   Leg Agility (Right)  3 2   Leg Agility (Left)   3 2   Arising from Chair   3 3   Gait   3 3   Freezing of Gait 1 0   Postural Stability        Posture 2 2   Global spontaneity of movement 3 3   Postural Tremor (Right) 0 0   Postural Tremor (Left) 0 0   Kinetic Tremor (Right)  0 0   Kinetic Tremor (Left)  0 0   Rest tremor amplitude RUE 0 0   Rest tremor amplitude LUE 0 0   Rest tremor amplitude RLE 0 0   Reset tremor amplitude LLE 0 0   Lip/Jaw Tremor  0 0          Motor Exam Total:                    Leavy Osgood, MD  Movement disorder physician  [address] [employer]

## 2023-06-28 NOTE — PATIENT INSTRUCTIONS
Will try restarting Sinemet CR 25/100 to see if this improves mobility without worsening or making hallucinations more bothersome  Start 1 tab qam a x 1 week then 1 tab qam and afternoon  If tolerating we can further increase to 50/200 dose

## 2023-07-08 ENCOUNTER — APPOINTMENT (EMERGENCY)
Dept: CT IMAGING | Facility: HOSPITAL | Age: 78
End: 2023-07-08
Payer: MEDICARE

## 2023-07-08 ENCOUNTER — HOSPITAL ENCOUNTER (EMERGENCY)
Facility: HOSPITAL | Age: 78
Discharge: HOME | End: 2023-07-09
Attending: EMERGENCY MEDICINE
Payer: MEDICARE

## 2023-07-08 ENCOUNTER — APPOINTMENT (EMERGENCY)
Dept: ULTRASOUND IMAGING | Facility: HOSPITAL | Age: 78
End: 2023-07-08
Payer: MEDICARE

## 2023-07-08 VITALS
RESPIRATION RATE: 18 BRPM | HEART RATE: 60 BPM | OXYGEN SATURATION: 98 % | SYSTOLIC BLOOD PRESSURE: 134 MMHG | TEMPERATURE: 97.9 F | DIASTOLIC BLOOD PRESSURE: 63 MMHG

## 2023-07-08 DIAGNOSIS — N39.0 UTI (URINARY TRACT INFECTION): ICD-10-CM

## 2023-07-08 DIAGNOSIS — N93.9 ABNORMAL VAGINAL BLEEDING: Primary | ICD-10-CM

## 2023-07-08 LAB
ABO GROUP BLD: NORMAL
ALBUMIN SERPL BCP-MCNC: 3.9 G/DL (ref 3.5–5)
ALP SERPL-CCNC: 37 U/L (ref 34–104)
ALT SERPL W P-5'-P-CCNC: <3 U/L (ref 7–52)
ANION GAP SERPL CALCULATED.3IONS-SCNC: 7 MMOL/L
APTT PPP: 37 SECONDS (ref 23–37)
AST SERPL W P-5'-P-CCNC: 11 U/L (ref 13–39)
BACTERIA UR QL AUTO: ABNORMAL /HPF
BASOPHILS # BLD AUTO: 0.03 THOUSANDS/ÂΜL (ref 0–0.1)
BASOPHILS NFR BLD AUTO: 0 % (ref 0–1)
BILIRUB SERPL-MCNC: 0.34 MG/DL (ref 0.2–1)
BILIRUB UR QL STRIP: NEGATIVE
BLD GP AB SCN SERPL QL: NEGATIVE
BUN SERPL-MCNC: 19 MG/DL (ref 5–25)
CALCIUM SERPL-MCNC: 9.4 MG/DL (ref 8.4–10.2)
CHLORIDE SERPL-SCNC: 103 MMOL/L (ref 96–108)
CLARITY UR: ABNORMAL
CO2 SERPL-SCNC: 28 MMOL/L (ref 21–32)
COLOR UR: YELLOW
CREAT SERPL-MCNC: 0.87 MG/DL (ref 0.6–1.3)
EOSINOPHIL # BLD AUTO: 0.18 THOUSAND/ÂΜL (ref 0–0.61)
EOSINOPHIL NFR BLD AUTO: 2 % (ref 0–6)
ERYTHROCYTE [DISTWIDTH] IN BLOOD BY AUTOMATED COUNT: 13.2 % (ref 11.6–15.1)
GFR SERPL CREATININE-BSD FRML MDRD: 64 ML/MIN/1.73SQ M
GLUCOSE SERPL-MCNC: 86 MG/DL (ref 65–140)
GLUCOSE UR STRIP-MCNC: NEGATIVE MG/DL
HCT VFR BLD AUTO: 43 % (ref 34.8–46.1)
HGB BLD-MCNC: 13.8 G/DL (ref 11.5–15.4)
HGB UR QL STRIP.AUTO: ABNORMAL
IMM GRANULOCYTES # BLD AUTO: 0.04 THOUSAND/UL (ref 0–0.2)
IMM GRANULOCYTES NFR BLD AUTO: 0 % (ref 0–2)
INR PPP: 1.3 (ref 0.84–1.19)
KETONES UR STRIP-MCNC: NEGATIVE MG/DL
LEUKOCYTE ESTERASE UR QL STRIP: ABNORMAL
LYMPHOCYTES # BLD AUTO: 1.85 THOUSANDS/ÂΜL (ref 0.6–4.47)
LYMPHOCYTES NFR BLD AUTO: 19 % (ref 14–44)
MCH RBC QN AUTO: 30.7 PG (ref 26.8–34.3)
MCHC RBC AUTO-ENTMCNC: 32.1 G/DL (ref 31.4–37.4)
MCV RBC AUTO: 96 FL (ref 82–98)
MONOCYTES # BLD AUTO: 0.7 THOUSAND/ÂΜL (ref 0.17–1.22)
MONOCYTES NFR BLD AUTO: 7 % (ref 4–12)
NEUTROPHILS # BLD AUTO: 6.92 THOUSANDS/ÂΜL (ref 1.85–7.62)
NEUTS SEG NFR BLD AUTO: 72 % (ref 43–75)
NITRITE UR QL STRIP: POSITIVE
NON-SQ EPI CELLS URNS QL MICRO: ABNORMAL /HPF
NRBC BLD AUTO-RTO: 0 /100 WBCS
PH UR STRIP.AUTO: 5 [PH]
PLATELET # BLD AUTO: 206 THOUSANDS/UL (ref 149–390)
PMV BLD AUTO: 10.7 FL (ref 8.9–12.7)
POTASSIUM SERPL-SCNC: 4.1 MMOL/L (ref 3.5–5.3)
PROT SERPL-MCNC: 6.7 G/DL (ref 6.4–8.4)
PROT UR STRIP-MCNC: ABNORMAL MG/DL
PROTHROMBIN TIME: 16.1 SECONDS (ref 11.6–14.5)
RBC # BLD AUTO: 4.49 MILLION/UL (ref 3.81–5.12)
RBC #/AREA URNS AUTO: ABNORMAL /HPF
RH BLD: POSITIVE
SODIUM SERPL-SCNC: 138 MMOL/L (ref 135–147)
SP GR UR STRIP.AUTO: 1.01 (ref 1–1.03)
SPECIMEN EXPIRATION DATE: NORMAL
UROBILINOGEN UR QL STRIP.AUTO: 1 E.U./DL
WBC # BLD AUTO: 9.72 THOUSAND/UL (ref 4.31–10.16)
WBC #/AREA URNS AUTO: ABNORMAL /HPF

## 2023-07-08 PROCEDURE — G1004 CDSM NDSC: HCPCS

## 2023-07-08 PROCEDURE — 96365 THER/PROPH/DIAG IV INF INIT: CPT

## 2023-07-08 PROCEDURE — 86850 RBC ANTIBODY SCREEN: CPT | Performed by: PHYSICIAN ASSISTANT

## 2023-07-08 PROCEDURE — 86900 BLOOD TYPING SEROLOGIC ABO: CPT | Performed by: PHYSICIAN ASSISTANT

## 2023-07-08 PROCEDURE — 85730 THROMBOPLASTIN TIME PARTIAL: CPT | Performed by: PHYSICIAN ASSISTANT

## 2023-07-08 PROCEDURE — 36415 COLL VENOUS BLD VENIPUNCTURE: CPT | Performed by: PHYSICIAN ASSISTANT

## 2023-07-08 PROCEDURE — 74177 CT ABD & PELVIS W/CONTRAST: CPT

## 2023-07-08 PROCEDURE — 85610 PROTHROMBIN TIME: CPT | Performed by: PHYSICIAN ASSISTANT

## 2023-07-08 PROCEDURE — 76856 US EXAM PELVIC COMPLETE: CPT

## 2023-07-08 PROCEDURE — 85025 COMPLETE CBC W/AUTO DIFF WBC: CPT | Performed by: PHYSICIAN ASSISTANT

## 2023-07-08 PROCEDURE — 76830 TRANSVAGINAL US NON-OB: CPT

## 2023-07-08 PROCEDURE — 86901 BLOOD TYPING SEROLOGIC RH(D): CPT | Performed by: PHYSICIAN ASSISTANT

## 2023-07-08 PROCEDURE — 81001 URINALYSIS AUTO W/SCOPE: CPT | Performed by: PHYSICIAN ASSISTANT

## 2023-07-08 PROCEDURE — 80053 COMPREHEN METABOLIC PANEL: CPT | Performed by: PHYSICIAN ASSISTANT

## 2023-07-08 PROCEDURE — 99284 EMERGENCY DEPT VISIT MOD MDM: CPT

## 2023-07-08 RX ORDER — CEFTRIAXONE 1 G/50ML
1000 INJECTION, SOLUTION INTRAVENOUS ONCE
Status: COMPLETED | OUTPATIENT
Start: 2023-07-08 | End: 2023-07-08

## 2023-07-08 RX ORDER — CLONAZEPAM 0.5 MG/1
0.5 TABLET ORAL ONCE
Status: COMPLETED | OUTPATIENT
Start: 2023-07-08 | End: 2023-07-08

## 2023-07-08 RX ORDER — CEPHALEXIN 500 MG/1
500 CAPSULE ORAL 2 TIMES DAILY
Qty: 14 CAPSULE | Refills: 0 | Status: SHIPPED | OUTPATIENT
Start: 2023-07-08 | End: 2023-07-15

## 2023-07-08 RX ADMIN — IOHEXOL 100 ML: 350 INJECTION, SOLUTION INTRAVENOUS at 17:25

## 2023-07-08 RX ADMIN — CLONAZEPAM 0.5 MG: 0.5 TABLET ORAL at 20:45

## 2023-07-08 RX ADMIN — CEFTRIAXONE 1000 MG: 1 INJECTION, SOLUTION INTRAVENOUS at 22:08

## 2023-07-08 NOTE — ED PROVIDER NOTES
History  Chief Complaint   Patient presents with   • Vaginal Bleeding     Pt's caretaker at home assisted pt to bathroom - discovered that patient had significant blood clots in her brief and bleeding appearing to come from the vagina. No blood thinners. Pt reports some spotting approximately one week ago when she came home from the hospital. Pt has no pain or complaints at this time. Rosalva Eller is a 65 y.o. female with a past medical history of Parkinson's disease and pulmonary embolism on Eliquis via EMS with vaginal bleeding. Patient reports that about 1 week ago she began with spotting in her depends. Her caregiver noted that it was likely due to hemorrhoids the patient has been using hemorrhoid cream throughout the week. She reports the spotting has been very light. She reports that today she had a sudden urge to use the bathroom however when she sat on the toilet she had a significant amount of vaginal bleeding and clots. The caregiver then brought her to the bedroom and inspected the region and it appeared that the bleeding had been coming from the vaginal area. Patient denies any recent trauma to vaginal or rectal area, any vaginal pain, vaginal itching, abdominal pain, back pain, dysuria, frequency, rectal pain, lightheadedness, palpations, weakness, chest pain, or shortness of breath. She denies any history of gynecologic surgery and reports she still has her uterus and both ovaries. Prior to Admission Medications   Prescriptions Last Dose Informant Patient Reported? Taking?    Coenzyme Q10 400 MG CAPS   No Yes   Sig: Take 0.25 capsules (100 mg total) by mouth daily   Melatonin 5 MG CAPS   Yes Yes   Sig: Take 15 mg by mouth Bedtime   Nuplazid 34 MG CAPS   No Yes   Sig: Take 1tab daily   Omega-3 Fatty Acids (OMEGA-3 FISH OIL PO)   Yes Yes   PARoxetine (PAXIL) 20 mg tablet   No Yes   Sig: Take 1 tablet (20 mg total) by mouth daily   apixaban (Eliquis) 5 mg   No No   Sig: Take 1 tablet (5 mg total) by mouth 2 (two) times a day for 30 days, THEN 1 tablet (5 mg total) 2 (two) times a day. 10mg by mouth 2 times per day for 6 days then 5 mg by mouth 2 times per day for 6 months or indefinitely for acute pulmonary embolism. atenolol (TENORMIN) 50 mg tablet   No Yes   Sig: Take 1 tablet (50 mg total) by mouth daily   carbidopa-levodopa (SINEMET CR)  mg TBCR per ER tablet   No No   Si po qam x 1 week then 1 po qam and q afternoon   clonazePAM (KlonoPIN) 0.5 mg tablet   No No   Sig: Take 1 tablet (0.5 mg total) by mouth daily at bedtime   rivastigmine (EXELON) 4.5 MG capsule   No Yes   Sig: TAKE 1 CAPSULE (4.5 MG TOTAL) BY MOUTH 2 (TWO) TIMES A DAY   rosuvastatin (CRESTOR) 5 mg tablet   No Yes   Sig: Take 1 tablet (5 mg total) by mouth every other day      Facility-Administered Medications: None       Past Medical History:   Diagnosis Date   • Anxiety    • Broken hip (HCC)    • Depression    • Diabetes mellitus ([address])     pre diabetic   • Diabetes mellitus type 2, diet-controlled ([address])    • Hyperlipidemia    • Hypertension    • Parkinson disease ([address])    • Pneumonia        Past Surgical History:   Procedure Laterality Date   • ACHILLES TENDON SURGERY     • APPENDECTOMY     • COLONOSCOPY     • DEEP BRAIN STIMULATOR PLACEMENT     • DENTAL SURGERY     • FRACTURE SURGERY     • IR PE ENDOVASCULAR THERAPY  2023   • KNEE ARTHROSCOPY     • TN INSJ/RPLCMT CRANIAL NEUROSTIM PULSE GENERATOR Right 2018    Procedure: REPLACEMENT IMPLANTABLE PULSE GENERATOR (IPG) DEEP BRAIN STIMULATION (DBS);   Surgeon: Julio Vo MD;  Location: [facility];  Service: Neurosurgery   • TN INSJ/RPLCMT CRANIAL NEUROSTIM PULSE GENERATOR Left 2021    Procedure: REPLACEMENT IMPLANTABLE PULSE GENERATOR FOR DEEP BRAIN STIMULATOR, LEFT CHEST;  Surgeon: Julio Vo MD;  Location[facility];  Service: Neurosurgery   • TN INSJ/RPLCMT CRANIAL NEUROSTIM PULSE GENERATOR Right 2022    Procedure: Right chest IPG replacement for Deep Brain Stimulator;  Surgeon: Peter Lou MD;  Location:  MAIN OR;  Service: Neurosurgery   • TX LAPAROSCOPIC APPENDECTOMY N/A 8/16/2020    Procedure: APPENDECTOMY LAPAROSCOPIC;  Surgeon: Owen Green MD;  Location: [facility];  Service: General   • TX OPTX FEM SHFT FX W/INSJ IMED IMPLT W/WO SCREW Right 12/14/2019    Procedure: INSERTION NAIL IM FEMUR ANTEGRADE (TROCHANTERIC); Surgeon: Ivana Lees DO;  Location: [facility];  Service: Orthopedics   • REPLACEMENT TOTAL KNEE     • REVERSE TOTAL SHOULDER ARTHROPLASTY Left 8/23/2018    Procedure: ARTHROPLASTY SHOULDER REVERSE;  Surgeon: Aidee Mcneil MD;  Location[facility];  Service: Orthopedics   • TONSILLECTOMY         Family History   Problem Relation Age of Onset   • Arthritis Mother    • No Known Problems Father    • No Known Problems Maternal Grandmother    • No Known Problems Maternal Grandfather    • No Known Problems Paternal Grandmother    • No Known Problems Paternal Grandfather    • No Known Problems Son    • No Known Problems Son      I have reviewed and agree with the history as documented. E-Cigarette/Vaping   • E-Cigarette Use Never User      E-Cigarette/Vaping Substances   • Nicotine No    • THC No    • CBD No    • Flavoring No    • Other No    • Unknown No      Social History     Tobacco Use   • Smoking status: Never   • Smokeless tobacco: Never   Vaping Use   • Vaping Use: Never used   Substance Use Topics   • Alcohol use: Yes     Alcohol/week: 2.0 standard drinks of alcohol     Types: 2 Glasses of wine per week     Comment: occasional   • Drug use: No       Review of Systems   Constitutional: Negative for chills and fever. HENT: Negative for ear pain and sore throat. Eyes: Negative for pain and visual disturbance. Respiratory: Negative for cough and shortness of breath. Cardiovascular: Negative for chest pain and palpitations. Gastrointestinal: Negative for abdominal pain and vomiting. Genitourinary: Positive for vaginal bleeding. Negative for dysuria, hematuria and vaginal pain. Musculoskeletal: Negative for arthralgias and back pain. Skin: Negative for color change and rash. Neurological: Negative for seizures and syncope. All other systems reviewed and are negative. Physical Exam  Physical Exam  Vitals and nursing note reviewed. Exam conducted with a chaperone present. Constitutional:       General: She is not in acute distress. Appearance: She is well-developed. She is not ill-appearing, toxic-appearing or diaphoretic. HENT:      Head: Normocephalic and atraumatic. Eyes:      Conjunctiva/sclera: Conjunctivae normal.   Cardiovascular:      Rate and Rhythm: Normal rate and regular rhythm. Heart sounds: No murmur heard. Pulmonary:      Effort: Pulmonary effort is normal. No respiratory distress. Breath sounds: Normal breath sounds. No stridor. No wheezing, rhonchi or rales. Abdominal:      General: There is no distension. Palpations: Abdomen is soft. There is no mass. Tenderness: There is no abdominal tenderness. Genitourinary:     Amrit stage (genital): 5. Labia:         Right: No rash or tenderness. Left: No rash or tenderness. Rectum: Normal. Guaiac result positive (Likely contamination from bleeding from vaginal area). No mass, tenderness, anal fissure, external hemorrhoid or internal hemorrhoid. Normal anal tone. Comments: Moderate amount of blood and clots in and around vaginal region. Musculoskeletal:         General: No swelling. Cervical back: Neck supple. Skin:     General: Skin is warm and dry. Capillary Refill: Capillary refill takes less than 2 seconds. Neurological:      Mental Status: She is alert.    Psychiatric:         Mood and Affect: Mood normal.         Vital Signs  ED Triage Vitals [07/08/23 1458]   Temperature Pulse Respirations Blood Pressure SpO2   97.9 °F (36.6 °C) 64 16 128/74 99 % Temp Source Heart Rate Source Patient Position - Orthostatic VS BP Location FiO2 (%)   Oral Monitor Lying Right arm --      Pain Score       No Pain           Vitals:    07/08/23 1700 07/08/23 1730 07/08/23 1800 07/08/23 2025   BP: 112/52 127/56 129/58 138/78   Pulse: (!) 52 59 59 63   Patient Position - Orthostatic VS: Lying Lying Lying Lying         Visual Acuity      ED Medications  Medications   iohexol (OMNIPAQUE) 350 MG/ML injection (SINGLE-DOSE) 100 mL (100 mL Intravenous Given 7/8/23 1725)   clonazePAM (KlonoPIN) tablet 0.5 mg (0.5 mg Oral Given 7/8/23 2045)   cefTRIAXone (ROCEPHIN) IVPB (premix in dextrose) 1,000 mg 50 mL (0 mg Intravenous Stopped 7/8/23 2238)       Diagnostic Studies  Results Reviewed     Procedure Component Value Units Date/Time    Urine Microscopic [[id number]]  (Abnormal) Collected: 07/08/23 1818    Lab Status: Final result Specimen: Urine, Other Updated: 07/08/23 1902     RBC, UA Innumerable /hpf      WBC, UA 1-2 /hpf      Epithelial Cells Occasional /hpf      Bacteria, UA Innumerable /hpf     UA w Reflex to Microscopic w Reflex to Culture [[id number]]  (Abnormal) Collected: 07/08/23 1818    Lab Status: Final result Specimen: Urine, Other Updated: 07/08/23 1855     Color, UA Yellow     Clarity, UA Cloudy     Specific Gravity, UA 1.015     pH, UA 5.0     Leukocytes, UA Trace     Nitrite, UA Positive     Protein, UA Trace mg/dl      Glucose, UA Negative mg/dl      Ketones, UA Negative mg/dl      Urobilinogen, UA 1.0 E.U./dl      Bilirubin, UA Negative     Occult Blood, UA Large    Comprehensive metabolic panel [[id number]]  (Abnormal) Collected: 07/08/23 1607    Lab Status: Final result Specimen: Blood from Arm, Left Updated: 07/08/23 1653     Sodium 138 mmol/L      Potassium 4.1 mmol/L      Chloride 103 mmol/L      CO2 28 mmol/L      ANION GAP 7 mmol/L      BUN 19 mg/dL      Creatinine 0.87 mg/dL      Glucose 86 mg/dL      Calcium 9.4 mg/dL      AST 11 U/L      ALT <3 U/L Alkaline Phosphatase 37 U/L      Total Protein 6.7 g/dL      Albumin 3.9 g/dL      Total Bilirubin 0.34 mg/dL      eGFR 64 ml/min/1.73sq m     Narrative:      [place] guidelines for Chronic Kidney Disease (CKD):   •  Stage 1 with normal or high GFR (GFR > 90 mL/min/1.73 square meters)  •  Stage 2 Mild CKD (GFR = 60-89 mL/min/1.73 square meters)  •  Stage 3A Moderate CKD (GFR = 45-59 mL/min/1.73 square meters)  •  Stage 3B Moderate CKD (GFR = 30-44 mL/min/1.73 square meters)  •  Stage 4 Severe CKD (GFR = 15-29 mL/min/1.73 square meters)  •  Stage 5 End Stage CKD (GFR <15 mL/min/1.73 square meters)  Note: GFR calculation is accurate only with a steady state creatinine    Protime-INR [[id number]]  (Abnormal) Collected: 07/08/23 1607    Lab Status: Final result Specimen: Blood from Arm, Left Updated: 07/08/23 1633     Protime 16.1 seconds      INR 1.30    APTT [[id number]]  (Normal) Collected: 07/08/23 1607    Lab Status: Final result Specimen: Blood from Arm, Left Updated: 07/08/23 1633     PTT 37 seconds     CBC and differential [[id number]] Collected: 07/08/23 1607    Lab Status: Final result Specimen: Blood from Arm, Left Updated: 07/08/23 1614     WBC 9.72 Thousand/uL      RBC 4.49 Million/uL      Hemoglobin 13.8 g/dL      Hematocrit 43.0 %      MCV 96 fL      MCH 30.7 pg      MCHC 32.1 g/dL      RDW 13.2 %      MPV 10.7 fL      Platelets 015 Thousands/uL      nRBC 0 /100 WBCs      Neutrophils Relative 72 %      Immat GRANS % 0 %      Lymphocytes Relative 19 %      Monocytes Relative 7 %      Eosinophils Relative 2 %      Basophils Relative 0 %      Neutrophils Absolute 6.92 Thousands/µL      Immature Grans Absolute 0.04 Thousand/uL      Lymphocytes Absolute 1.85 Thousands/µL      Monocytes Absolute 0.70 Thousand/µL      Eosinophils Absolute 0.18 Thousand/µL      Basophils Absolute 0.03 Thousands/µL                  US pelvis complete w transvaginal   Final Result by Sepideh Sheikh DO (07/08 2220)      Extremely limited study due to overlying bowel gas. Cystic mass in the right adnexa/extending from the uterus. Further evaluation with MRI is recommended. I personally discussed this study with Shira Monday on 7/8/2023 10:18 PM.                                    Workstation performed: [id number]         CT abdomen pelvis with contrast   Final Result by Shayy Wolfe MD (07/08 1842)   No findings to account for vaginal bleeding. Stable right adnexal cysts. Tiny focus of nondependent gas within the bladder. Correlate clinically for recent instrumentation. Workstation performed: [id number]                    Procedures  Procedures         ED Course  ED Course as of 07/08/23 2302   Sat Jul 08, 2023   2220 Very limited pelvic study. Cystic mass at adnexa possible extending from uterus. Endometrium not visualized   2235 CBC and CMP are unremarkable. Urinalysis shows bacteria leukocytes, nitrites consistent with UTI. Will order dose of ceftriaxone in ER. Patient CT scan reveals "No findings to account for vaginal bleeding. Stable right adnexal cysts. Tiny focus of nondependent gas within the bladder. Correlate clinically for recent instrumentation."  Confirmed with patient once more that she has not had any recent procedures or instrumentation. Will order pelvic ultrasound and discuss case with GYN. SBIRT 20yo+    Flowsheet Row Most Recent Value   Initial Alcohol Screen: US AUDIT-C     1. How often do you have a drink containing alcohol? 0 Filed at: 07/08/2023 1500   2. How many drinks containing alcohol do you have on a typical day you are drinking? 0 Filed at: 07/08/2023 1500   3a. Male UNDER 65: How often do you have five or more drinks on one occasion? 0 Filed at: 07/08/2023 1500   3b. FEMALE Any Age, or MALE 65+: How often do you have 4 or more drinks on one occassion?  0 Filed at: 07/08/2023 1500   Audit-C Score 0 Filed at: 07/08/2023 1500   ELIANA: How many times in the past year have you. .. Used an illegal drug or used a prescription medication for non-medical reasons? Never Filed at: 07/08/2023 1500                    Medical Decision Making  Vinicius Preston is a 65 y.o. female with a past medical history of Parkinson's disease and pulmonary embolism on Eliquis via EMS with vaginal bleeding. Denies any trauma or pain. On exam pt is well appearing and in no acute distress. Vital signs within normal limits. Physical examination reveals dried moderate amount of blood and clots in and around vaginal region. There were no obvious hemorrhoids or abnormalities on rectal examination. Guaiac was positive but this is likely due to contamination from vaginal bleeding. No abdominal tenderness on exam. Discussed with patient and  at bedside that we will evaluate basic labs and start off with a CT scan. Discussed with them that we may have to perform a transvaginal ultrasound at some point and they are understanding. Will order CBC, CMP, type and screen, urinalysis, and CT abdomen pelvis with contrast.    CBC and CMP are unremarkable. Urinalysis shows bacteria leukocytes, nitrites consistent with UTI. Will order dose of ceftriaxone in ER. Patient CT scan reveals "No findings to account for vaginal bleeding. Stable right adnexal cysts. Tiny focus of nondependent gas within the bladder. Correlate clinically for recent instrumentation."  Confirmed with patient once more that she has not had any recent procedures or instrumentation. Will order pelvic ultrasound and discuss case with GYN. Ultrasound reveals "Extremely limited study due to overlying bowel gas. Cystic mass in the right adnexa/extending from the uterus. Further evaluation with MRI is recommended." I discussed case with Dr. Syeda Oropeza who evaluated patient and feels that considering patient is hemodynamically stable, she may follow up outpatient with GYN.  I discussed this with patient who is agreeable. Discussed all lab and imaging results with her. I discussed presence of likely urinary tract infection and we have sent antibiotics to the pharmacy. Advised that patient hold her Eliquis for the next two days and follow up very closely with OBGYN. She is understanding and agreeable with plan. I discussed this plan with my attending Dr. Dara Kenney who is in agreement with plan. Advised strict return precautions to the ED. Patient has remained stable throughout stay in ER and is stable for discharge. Abnormal vaginal bleeding: acute illness or injury  UTI (urinary tract infection): acute illness or injury  Amount and/or Complexity of Data Reviewed  Labs: ordered. Radiology: ordered. Risk  Prescription drug management. Disposition  Final diagnoses:   Abnormal vaginal bleeding   UTI (urinary tract infection)     Time reflects when diagnosis was documented in both MDM as applicable and the Disposition within this note     Time User Action Codes Description Comment    7/8/2023 10:52 PM Gustavo Cruz Add [N93.9] Abnormal vaginal bleeding     7/8/2023 10:52 PM Magdy Merrill Add [N39.0] UTI (urinary tract infection)       ED Disposition     ED Disposition   Discharge    Condition   Stable    Date/Time   Sat Jul 8, 2023 10:52 PM    Comment   Sydell Saint Bodnar discharge to home/self care. Follow-up Information     Follow up With Specialties Details Why Contact Info Additional Information    [facility] [place].  Jose C Oliver MD Obstetrics and Gynecology, Obstetrics, Gynecology Follow up in 1 week(s)  [id number] [facility]  [address] [facility] Obstetrics and Gynecology Follow up  [address] [id number] [address] [facility], [address] [facility], [address] [place] [address], [id number] [phone]          Patient's Medications   Discharge Prescriptions CEPHALEXIN (KEFLEX) 500 MG CAPSULE    Take 1 capsule (500 mg total) by mouth 2 (two) times a day for 7 days       Start Date: 7/8/2023  End Date: 7/15/2023       Order Dose: 500 mg       Quantity: 14 capsule    Refills: 0       No discharge procedures on file.     PDMP Review       Value Time User    PDMP Reviewed  Yes 6/28/2023  1:06 PM Mika Canas MD          ED Provider  Electronically Signed by           Abeba Abbasi PA-C  07/08/23 6755

## 2023-07-08 NOTE — ED NOTES
With assistance from ED [facility], assisted pt to use bedside commode to urinate. Urine found to be bloody. TT to [facility], [facility], to change urine dip to UA w/ reflex.      Brittanie Cristobal RN  07/08/23 1974

## 2023-07-09 NOTE — DISCHARGE INSTRUCTIONS
Hold your Eliquis for the next two days. Immediately return to the ER if your vaginal bleeding increased, you feel lightheaded, or you experience any new or concerning symptoms. The following findings require follow up:  Radiographic finding   Finding: Cystic mass in the right adnexa/extending from the uterus. Further evaluation with MRI is recommended   Follow up required:  Follow-up very closely with GYN for scheduling the MRI and for further evaluation of vaginal bleeding   Follow up should be done within 2 day(s)

## 2023-07-09 NOTE — ED NOTES
[employer] updated regarding pt needing a pelvic us.  requesting update with results.  states if pt is discharged tonight pt will need ride home as he does not drive at night.       Brian Higuera  07/08/23 2042

## 2023-07-09 NOTE — ED NOTES
US called and notified of US needed. Stated they will be in around 2100.         Kimo Hendricks  07/08/23 2023

## 2023-07-11 ENCOUNTER — OFFICE VISIT (OUTPATIENT)
Dept: PULMONOLOGY | Facility: CLINIC | Age: 78
End: 2023-07-11
Payer: MEDICARE

## 2023-07-11 ENCOUNTER — TELEPHONE (OUTPATIENT)
Dept: OBGYN CLINIC | Facility: CLINIC | Age: 78
End: 2023-07-11

## 2023-07-11 VITALS
RESPIRATION RATE: 16 BRPM | OXYGEN SATURATION: 96 % | SYSTOLIC BLOOD PRESSURE: 122 MMHG | HEIGHT: 67 IN | HEART RATE: 58 BPM | TEMPERATURE: 96.3 F | DIASTOLIC BLOOD PRESSURE: 78 MMHG | BODY MASS INDEX: 34.07 KG/M2

## 2023-07-11 DIAGNOSIS — E66.09 CLASS 1 OBESITY DUE TO EXCESS CALORIES WITH SERIOUS COMORBIDITY IN ADULT, UNSPECIFIED BMI: ICD-10-CM

## 2023-07-11 DIAGNOSIS — N93.9 ABNORMAL UTERINE BLEEDING: ICD-10-CM

## 2023-07-11 DIAGNOSIS — I26.09 OTHER ACUTE PULMONARY EMBOLISM WITH ACUTE COR PULMONALE (HCC): Primary | ICD-10-CM

## 2023-07-11 PROBLEM — E66.811 CLASS 1 OBESITY DUE TO EXCESS CALORIES IN ADULT: Status: ACTIVE | Noted: 2023-04-24

## 2023-07-11 PROCEDURE — 99214 OFFICE O/P EST MOD 30 MIN: CPT | Performed by: INTERNAL MEDICINE

## 2023-07-11 RX ORDER — AMOXICILLIN 500 MG/1
CAPSULE ORAL
COMMUNITY

## 2023-07-11 NOTE — TELEPHONE ENCOUNTER
Patient  calling in regards to appt they had today with Dr. Kristian Crow and stated they had a conversation with you as well. That this patient needs to be seen asap. Offered 7/21 and patients  stressed they were told they must be seen asap in office for a procedure. Is patient in need of sooner appt? Per schedule no opening for new patient until 7/21. Please advise.

## 2023-07-11 NOTE — PROGRESS NOTES
Pulmonary Outpatient Note   Terrell English 65 y.o. female MRN: [id number]  7/11/2023        Reason for Consultation:    Chief Complaint   Patient presents with   • Pulmonary Embolism   • Vaginal Bleeding         Assessment/Plan:    1. Other acute pulmonary embolism with acute cor pulmonale [facility])  Assessment & Plan:  History of extensive PE in April- intermediate-high risk  Had IR thrombectomy in April  Tolerated A/C until recently with vaginal bleeding- no prior bleeding. Uterus/endometrium not commented on on ultrasound. Has R adnexal mass that needs further evaluation- unrelated to bleeding. I would prefer she stay on A/C for now- will have been 3 months 7/16/23. Would probably prefer to extend to 6 months before considering stopping. If her bleeding increases then she is at just about the 3 month shantanu and it can be stopped (assuming this bleeding is not due to an underlying malignancy- if it is then her risk of repeat VTE is elevated). There was no major provoking factor for the PE. No prior personal history of VTE. Will refer to OB. GYN for evaluation and D/C given her post-menopausal bleeding. If she has malignancy then would recommend indefinite A/C if tolerated    RTC in 3 months. Resume Eliquis for now 5 mg BID. See GYN. Get updated echo- the one in the hospital showed RV strain from PE. Thankfully the patient has no symptoms of dyspnea on exertion or chest pain. Orders:  -     Echo complete w/ contrast if indicated; Future; Expected date: 07/11/2023    2. Class 1 obesity due to excess calories with serious comorbidity in adult, unspecified BMI  Assessment & Plan:  Weight loss encouraged      3.  Abnormal uterine bleeding        Health Maintenance  Immunization History   Administered Date(s) Administered   • COVID-19 MODERNA VACC 0.5 ML IM 01/11/2021, 02/08/2021, 10/13/2021, 04/29/2022   • COVID-19 Moderna Vac BIVALENT 12 Yr+ IM (BOOSTER ONLY) 0.5 ML 10/24/2022   • INFLUENZA 11/12/2015, 11/15/2016, 08/21/2017, 09/24/2018, 10/03/2019, 09/25/2020, 11/19/2021, 12/16/2022   • Pneumococcal Conjugate 13-Valent 02/16/2015   • Tdap 04/22/2021   • Tuberculin Skin Test-PPD Intradermal 09/10/2018          Return in about 3 months (around 10/11/2023). History of Present Illness   HPI:  Lupe Guardado is a 65 y.o. female who has history of intermediate-high risk PE 4/2023 s/p IR thrombectomy, Parkinson's disease who presents for pulmonary follow-up for PE. Seen by me in hospital in April. She was admitted for dyspnea, altered mental status. Found to be hypoxemic. Required BiPAP initially. She had intermediate-high risk PE with R heart strain. She had Bilateral thrombectomy with IR on 4/17. She was discharged 4/22 on eliquis. She was on room air on discharge    She went to ED with vaginal bleeding July 8- has been referred to GYN. She is still having some bleeding. Clots coming out  She was told to stop her eliquis- has been off for 48 hours. No bleeding before this. No dizziness or lightheadedness      No shortness of breath or chest pain. Occasionally with falls at home. No prior blood clot. No family history of blood clots. No recent immobilization or surgery before surgery. No recent travel          Review of Systems   Constitutional: Negative for chills and fever. HENT: Negative for ear pain and sore throat. Eyes: Negative for pain and visual disturbance. Respiratory: Negative for cough and shortness of breath. Cardiovascular: Negative for chest pain and palpitations. Gastrointestinal: Negative for abdominal pain and vomiting. Genitourinary: Positive for vaginal bleeding. Negative for dysuria and hematuria. Musculoskeletal: Negative for arthralgias and back pain. Skin: Negative for color change and rash. Neurological: Negative for seizures and syncope.         Chronic symptoms due to Parkinson Disease   All other systems reviewed and are negative. Historical Information   Past Medical History:   Diagnosis Date   • Anxiety    • Broken hip ([address])    • Depression    • Diabetes mellitus ([address])     pre diabetic   • Diabetes mellitus type 2, diet-controlled ([address])    • Hyperlipidemia    • Hypertension    • Parkinson disease ([address])    • Pneumonia      Past Surgical History:   Procedure Laterality Date   • ACHILLES TENDON SURGERY     • APPENDECTOMY     • COLONOSCOPY     • DEEP BRAIN STIMULATOR PLACEMENT     • DENTAL SURGERY     • FRACTURE SURGERY     • IR PE ENDOVASCULAR THERAPY  4/17/2023   • KNEE ARTHROSCOPY     • ND INSJ/RPLCMT CRANIAL NEUROSTIM PULSE GENERATOR Right 12/18/2018    Procedure: REPLACEMENT IMPLANTABLE PULSE GENERATOR (IPG) DEEP BRAIN STIMULATION (DBS); Surgeon: Treva Banda MD;  Location: [facility];  Service: Neurosurgery   • ND INSJ/RPLCMT CRANIAL NEUROSTIM PULSE GENERATOR Left 4/7/2021    Procedure: REPLACEMENT IMPLANTABLE PULSE GENERATOR FOR DEEP BRAIN STIMULATOR, LEFT CHEST;  Surgeon: Treva Banda MD;  Location[facility];  Service: Neurosurgery   • ND INSJ/RPLCMT CRANIAL NEUROSTIM PULSE GENERATOR Right 6/27/2022    Procedure: Right chest IPG replacement for Deep Brain Stimulator;  Surgeon: Jose L Botello MD;  Location[facility];  Service: Neurosurgery   • ND LAPAROSCOPIC APPENDECTOMY N/A 8/16/2020    Procedure: Rebekah Nagel;  Surgeon: Kaykay Singleton MD;  Location: [facility];  Service: General   • ND OPTX FEM SHFT FX W/INSJ IMED IMPLT W/WO SCREW Right 12/14/2019    Procedure: INSERTION NAIL IM FEMUR ANTEGRADE (TROCHANTERIC);   Surgeon: Luke Mortensen DO;  Location[facility];  Service: Orthopedics   • REPLACEMENT TOTAL KNEE     • REVERSE TOTAL SHOULDER ARTHROPLASTY Left 8/23/2018    Procedure: ARTHROPLASTY SHOULDER REVERSE;  Surgeon: Eric Henley MD;  Location[facility];  Service: Orthopedics   • TONSILLECTOMY       Family History   Problem Relation Age of Onset   • Arthritis Mother    • No Known Problems Father    • No Known Problems Maternal Grandmother    • No Known Problems Maternal Grandfather    • No Known Problems Paternal Grandmother    • No Known Problems Paternal Grandfather    • No Known Problems Son    • No Known Problems Son              Meds/Allergies     Current Outpatient Medications:   •  apixaban (Eliquis) 5 mg, Take 1 tablet (5 mg total) by mouth 2 (two) times a day for 30 days, THEN 1 tablet (5 mg total) 2 (two) times a day. 10mg by mouth 2 times per day for 6 days then 5 mg by mouth 2 times per day for 6 months or indefinitely for acute pulmonary embolism. , Disp: 60 tablet, Rfl: 0  •  atenolol (TENORMIN) 50 mg tablet, Take 1 tablet (50 mg total) by mouth daily, Disp: 30 tablet, Rfl: 0  •  carbidopa-levodopa (SINEMET CR)  mg TBCR per ER tablet, 1 po qam x 1 week then 1 po qam and q afternoon, Disp: 180 tablet, Rfl: 2  •  cephalexin (KEFLEX) 500 mg capsule, Take 1 capsule (500 mg total) by mouth 2 (two) times a day for 7 days, Disp: 14 capsule, Rfl: 0  •  Coenzyme Q10 400 MG CAPS, Take 0.25 capsules (100 mg total) by mouth daily, Disp: 30 capsule, Rfl: 0  •  Melatonin 5 MG CAPS, Take 15 mg by mouth Bedtime, Disp: , Rfl:   •  Nuplazid 34 MG CAPS, Take 1tab daily, Disp: 90 capsule, Rfl: 0  •  Omega-3 Fatty Acids (OMEGA-3 FISH OIL PO), , Disp: , Rfl:   •  PARoxetine (PAXIL) 20 mg tablet, Take 1 tablet (20 mg total) by mouth daily, Disp: 30 tablet, Rfl: 0  •  rivastigmine (EXELON) 4.5 MG capsule, TAKE 1 CAPSULE (4.5 MG TOTAL) BY MOUTH 2 (TWO) TIMES A DAY, Disp: 180 capsule, Rfl: 3  •  rosuvastatin (CRESTOR) 5 mg tablet, Take 1 tablet (5 mg total) by mouth every other day, Disp: 30 tablet, Rfl: 0  •  amoxicillin (AMOXIL) 500 mg capsule, , Disp: , Rfl:   •  clonazePAM (KlonoPIN) 0.5 mg tablet, Take 1 tablet (0.5 mg total) by mouth daily at bedtime, Disp: 30 tablet, Rfl: 1  Allergies   Allergen Reactions   • Sulfa Antibiotics Hives       Vitals: Blood pressure 122/78, pulse 58, temperature (!) 96.3 °F (35.7 °C), temperature source Tympanic, resp. rate 16, height 5' 7" (1.702 m), SpO2 96 %. Body mass index is 34.07 kg/m². Oxygen Therapy  SpO2: 96 %  Oxygen Therapy: None (Room air)      Physical Exam  Physical Exam  Vitals and nursing note reviewed. Constitutional:       General: She is not in acute distress. Appearance: She is well-developed. HENT:      Head: Normocephalic and atraumatic. Eyes:      Conjunctiva/sclera: Conjunctivae normal.   Cardiovascular:      Rate and Rhythm: Normal rate and regular rhythm. Heart sounds: No murmur heard. Pulmonary:      Effort: Pulmonary effort is normal. No respiratory distress. Breath sounds: Normal breath sounds. Abdominal:      Palpations: Abdomen is soft. Tenderness: There is no abdominal tenderness. Musculoskeletal:         General: No swelling. Cervical back: Neck supple. Skin:     General: Skin is warm and dry. Capillary Refill: Capillary refill takes less than 2 seconds. Neurological:      Mental Status: She is alert and oriented to person, place, and time. Psychiatric:         Mood and Affect: Mood normal.         Labs: I have personally reviewed pertinent lab results.     ABG: No results found for: "PHART", "RNN5JMS", "PO2ART", "CVU1MPD", "X4GEWXGD", "BEART", "SOURCE",   BNP:   Lab Results   Component Value Date     (H) 04/16/2023   ,   CBC:  Lab Results   Component Value Date    WBC 9.72 07/08/2023    HGB 13.8 07/08/2023    HCT 43.0 07/08/2023    MCV 96 07/08/2023     07/08/2023    EOSPCT 2 07/08/2023    EOSABS 0.18 07/08/2023    NEUTOPHILPCT 72 07/08/2023    LYMPHOPCT 19 07/08/2023   ,   CMP:   Lab Results   Component Value Date    SODIUM 138 07/08/2023    K 4.1 07/08/2023     07/08/2023    CO2 28 07/08/2023    ANIONGAP 6 11/06/2014    BUN 19 07/08/2023    CREATININE 0.87 07/08/2023    GLUCOSE 100 11/06/2014    CALCIUM 9.4 07/08/2023    AST 11 (L) 07/08/2023    ALT <3 (L) 07/08/2023    ALKPHOS 37 07/08/2023    EGFR 64 07/08/2023   ,   PT/INR:   Lab Results   Component Value Date    INR 1.30 (H) 07/08/2023   ,   Troponin: No results found for: "TROPONINI"      Imaging and other studies: I have personally reviewed pertinent reports. and I have personally reviewed pertinent films in PACS    Pelvic U/S 7/8/23  Unremarkable uterus and endometrium- limited study     Extremely limited study due to overlying bowel gas. Cystic mass in the right adnexa/extending from the uterus. Further evaluation with MRI is recommended      CTA Chest 4/16/23  Large emboli in the distal bilateral main pulmonary arteries extending into multiple segmental arteries. Right heart strain pattern (RV/LV ratio is 3.2).    No evidence of acute abnormality in the abdomen or pelvis. Colonic diverticulosis without diverticulitis.     Incidental 3.2 cm and 4.1 cm right adnexal simple-appearing cysts. According to current guidelines [employer] Radiol 2020; 35:860-921) in this postmenopausal woman, this should be followed up in 6 to 12 months by pelvic ultrasound. Pulmonary function testing:   Pulmonary Functions Testing Results:    No results found for: "FEV1", "FVC", "HGH3HQV", "TLC", "DLCO"        EKG, Pathology, and Other Studies: I have personally reviewed pertinent reports. TTE 4/17/23   Left Ventricle: Left ventricular cavity size is normal. Wall thickness is normal. The left ventricular ejection fraction is 65%. Systolic function is normal. Wall motion is normal. Diastolic function is mildly abnormal, consistent with grade I (abnormal) relaxation. •  Right Ventricle: Right ventricular cavity size is severely dilated. Systolic function is severely reduced. •  Right Atrium: The atrium is severely dilated. •  Mitral Valve: There is moderate annular calcification. •  Tricuspid Valve: There is mild regurgitation. •  Pulmonic Valve: There is mild regurgitation.   •  Pulmonary Artery: The estimated pulmonary artery systolic pressure is 59.0 mmHg. The pulmonary artery systolic pressure is moderate to severely increased. Shabbir Waldron M.D.   [facility]

## 2023-07-11 NOTE — ASSESSMENT & PLAN NOTE
History of extensive PE in April- intermediate-high risk  Had IR thrombectomy in April  Tolerated A/C until recently with vaginal bleeding- no prior bleeding. Uterus/endometrium not commented on on ultrasound. Has R adnexal mass that needs further evaluation- unrelated to bleeding. I would prefer she stay on A/C for now- will have been 3 months 7/16/23. Would probably prefer to extend to 6 months before considering stopping. If her bleeding increases then she is at just about the 3 month shantanu and it can be stopped (assuming this bleeding is not due to an underlying malignancy- if it is then her risk of repeat VTE is elevated). There was no major provoking factor for the PE. No prior personal history of VTE. Will refer to OB. GYN for evaluation and D/C given her post-menopausal bleeding. If she has malignancy then would recommend indefinite A/C if tolerated    RTC in 3 months. Resume Eliquis for now 5 mg BID. See GYN. Get updated echo- the one in the hospital showed RV strain from PE. Thankfully the patient has no symptoms of dyspnea on exertion or chest pain.

## 2023-07-12 ENCOUNTER — TELEPHONE (OUTPATIENT)
Dept: OBGYN CLINIC | Facility: CLINIC | Age: 78
End: 2023-07-12

## 2023-07-12 NOTE — TELEPHONE ENCOUNTER
Patient  answered. Scheduled patient in [place]. Patient does have a care giver and stated her bleeding has minimalized.

## 2023-07-17 ENCOUNTER — OFFICE VISIT (OUTPATIENT)
Age: 78
End: 2023-07-17
Payer: MEDICARE

## 2023-07-17 VITALS
SYSTOLIC BLOOD PRESSURE: 122 MMHG | HEIGHT: 67 IN | BODY MASS INDEX: 29.82 KG/M2 | DIASTOLIC BLOOD PRESSURE: 78 MMHG | WEIGHT: 190 LBS | TEMPERATURE: 96.8 F | OXYGEN SATURATION: 96 % | HEART RATE: 65 BPM

## 2023-07-17 DIAGNOSIS — R19.09 OTHER INTRA-ABDOMINAL AND PELVIC SWELLING, MASS AND LUMP: ICD-10-CM

## 2023-07-17 DIAGNOSIS — R97.8 OTHER ABNORMAL TUMOR MARKERS: ICD-10-CM

## 2023-07-17 DIAGNOSIS — N95.0 POSTMENOPAUSAL BLEEDING: Primary | ICD-10-CM

## 2023-07-17 DIAGNOSIS — N94.89 ADNEXAL MASS: ICD-10-CM

## 2023-07-17 DIAGNOSIS — N36.2 URETHRAL CARUNCLE: ICD-10-CM

## 2023-07-17 PROCEDURE — 99204 OFFICE O/P NEW MOD 45 MIN: CPT | Performed by: OBSTETRICS & GYNECOLOGY

## 2023-07-17 PROCEDURE — 87624 HPV HI-RISK TYP POOLED RSLT: CPT | Performed by: OBSTETRICS & GYNECOLOGY

## 2023-07-17 PROCEDURE — 58100 BIOPSY OF UTERUS LINING: CPT | Performed by: OBSTETRICS & GYNECOLOGY

## 2023-07-17 PROCEDURE — 88175 CYTOPATH C/V AUTO FLUID REDO: CPT | Performed by: OBSTETRICS & GYNECOLOGY

## 2023-07-17 PROCEDURE — 88305 TISSUE EXAM BY PATHOLOGIST: CPT | Performed by: SPECIALIST

## 2023-07-17 NOTE — ASSESSMENT & PLAN NOTE
Reviewed various possible etiologies of postmenopausal bleeding  Given no recent exam in last 10-15 years pap with HPV collected  Exam significant for urethral caruncle and moderate atrophy with friable vaginal walls. Bilateral vaginal walls bled with contact with speculum. Likely some contribution to episode of bleeding  Given age and unrevealing pelvic US still recommend endometrial sampling. Pt was amenable to trial of EMB which was performed today w/o difficulty.

## 2023-07-17 NOTE — PROGRESS NOTES
Subjective   Patient ID: Rosalva Eller is a 65 y.o. female. Patient is here for a problem visit. Chief Complaint   Patient presents with   • New Patient Visit     Vaginal bleeding 1 week ago. Cystic mass on US per       New patient - here with her  [facility]   H/o PE in April requiring IR thrombectomy - on eliquis  Per pulmonologist Dr. Gagnon Tonny is for ideally 6 months of [facility] therapy  Had episode of heavy vaginal bleeding - went to ER on . Bleeding had started about a week prior as noted by caregiver - was intermittent/light, but then became heavy on  with large clots   Was instructed to stop eliquis by the ER. When she saw Dr. Yin Galarza on  she was advised to resume this  No prior personal h/o VTE or major provoking factor for PE   Since resuming eliquis there has continued to be light intermittent bleeding   No associated cramping pain  Denies dysuria/hematuria      Pelvic US at ER visit very limited - endometrium not well visualized. Simple right adnexal mass seen 5.1 x 4.1 x 7.4 cm     Reports h/o ovarian cyst removal in her 30's but denies any other GYN history. No prior h/o PMB   Denies h/o abnml pap       Menstrual History:  OB History        2    Para   2    Term   2            AB        Living   2       SAB        IAB        Ectopic        Multiple        Live Births                    No LMP recorded (lmp unknown).  Patient is postmenopausal.         Past Medical History:   Diagnosis Date   • Anxiety    • Broken hip ([address])    • Depression    • Diabetes mellitus type 2, diet-controlled ([address])    • Hyperlipidemia    • Hypertension    • Parkinson disease ([address])    • Pneumonia    • Pulmonary embolism ([address]) 2023       Past Surgical History:   Procedure Laterality Date   • ACHILLES TENDON SURGERY     • APPENDECTOMY     • COLONOSCOPY     • DEEP BRAIN STIMULATOR PLACEMENT     • DENTAL SURGERY     • FRACTURE SURGERY     • IR PE ENDOVASCULAR THERAPY  2023   • KNEE ARTHROSCOPY     • SD INSJ/RPLCMT CRANIAL NEUROSTIM PULSE GENERATOR Right 12/18/2018    Procedure: REPLACEMENT IMPLANTABLE PULSE GENERATOR (IPG) DEEP BRAIN STIMULATION (DBS); Surgeon: Juju Chinchilla MD;  Location: [facility];  Service: Neurosurgery   • SD INSJ/RPLCMT CRANIAL NEUROSTIM PULSE GENERATOR Left 4/7/2021    Procedure: REPLACEMENT IMPLANTABLE PULSE GENERATOR FOR DEEP BRAIN STIMULATOR, LEFT CHEST;  Surgeon: Juju Chinchilla MD;  [facility];  Service: Neurosurgery   • SD INSJ/RPLCMT CRANIAL NEUROSTIM PULSE GENERATOR Right 6/27/2022    Procedure: Right chest IPG replacement for Deep Brain Stimulator;  Surgeon: Jimmie Leavitt MD;  [facility];  Service: Neurosurgery   • SD LAPAROSCOPIC APPENDECTOMY N/A 8/16/2020    Procedure: Hortensia Becerra;  Surgeon: Jolly Becker MD;  Location[facility];  Service: General   • SD OPTX FEM SHFT FX W/INSJ IMED IMPLT W/WO SCREW Right 12/14/2019    Procedure: INSERTION NAIL IM FEMUR ANTEGRADE (TROCHANTERIC); Surgeon: Paola Kim DO;  Location[facility];  Service: Orthopedics   • REPLACEMENT TOTAL KNEE     • REVERSE TOTAL SHOULDER ARTHROPLASTY Left 8/23/2018    Procedure: ARTHROPLASTY SHOULDER REVERSE;  Surgeon: Socorro Chun MD;  [facility];  Service: Orthopedics   • TONSILLECTOMY         Social History     Tobacco Use   • Smoking status: Never   • Smokeless tobacco: Never   Vaping Use   • Vaping Use: Never used   Substance Use Topics   • Alcohol use: Yes     Alcohol/week: 2.0 standard drinks of alcohol     Types: 2 Glasses of wine per week     Comment: occasional   • Drug use: No        Allergies   Allergen Reactions   • Sulfa Antibiotics Hives         Current Outpatient Medications:   •  apixaban (Eliquis) 5 mg, Take 1 tablet (5 mg total) by mouth 2 (two) times a day for 30 days, THEN 1 tablet (5 mg total) 2 (two) times a day.  10mg by mouth 2 times per day for 6 days then 5 mg by mouth 2 times per day for 6 months or indefinitely for acute pulmonary embolism. , Disp: 60 tablet, Rfl: 0  •  atenolol (TENORMIN) 50 mg tablet, Take 1 tablet (50 mg total) by mouth daily, Disp: 30 tablet, Rfl: 0  •  carbidopa-levodopa (SINEMET CR)  mg TBCR per ER tablet, 1 po qam x 1 week then 1 po qam and q afternoon, Disp: 180 tablet, Rfl: 2  •  clonazePAM (KlonoPIN) 0.5 mg tablet, Take 1 tablet (0.5 mg total) by mouth daily at bedtime, Disp: 30 tablet, Rfl: 1  •  Melatonin 5 MG CAPS, Take 15 mg by mouth Bedtime, Disp: , Rfl:   •  Nuplazid 34 MG CAPS, Take 1tab daily, Disp: 90 capsule, Rfl: 0  •  Omega-3 Fatty Acids (OMEGA-3 FISH OIL PO), , Disp: , Rfl:   •  PARoxetine (PAXIL) 20 mg tablet, Take 1 tablet (20 mg total) by mouth daily, Disp: 30 tablet, Rfl: 0  •  rivastigmine (EXELON) 4.5 MG capsule, TAKE 1 CAPSULE (4.5 MG TOTAL) BY MOUTH 2 (TWO) TIMES A DAY, Disp: 180 capsule, Rfl: 3  •  rosuvastatin (CRESTOR) 5 mg tablet, Take 1 tablet (5 mg total) by mouth every other day, Disp: 30 tablet, Rfl: 0  •  amoxicillin (AMOXIL) 500 mg capsule, , Disp: , Rfl:   •  Coenzyme Q10 400 MG CAPS, Take 0.25 capsules (100 mg total) by mouth daily (Patient not taking: Reported on 7/17/2023), Disp: 30 capsule, Rfl: 0      Review of Systems   Constitutional: Negative for appetite change, chills and fever. Eyes: Negative for visual disturbance. Respiratory: Negative for cough, chest tightness and shortness of breath. Cardiovascular: Negative for chest pain. Gastrointestinal: Negative for abdominal distention, abdominal pain, constipation, diarrhea, nausea and vomiting. Endocrine: Negative for cold intolerance and heat intolerance. Genitourinary: Positive for vaginal bleeding. Negative for difficulty urinating, dyspareunia, dysuria, frequency, genital sores, pelvic pain, urgency, vaginal discharge and vaginal pain. Musculoskeletal: Negative for arthralgias. Neurological: Negative for light-headedness and headaches.    Hematological: Does not bruise/bleed easily. Psychiatric/Behavioral: Negative for behavioral problems. All other systems reviewed and are negative. /78   Pulse 65   Temp (!) 96.8 °F (36 °C) (Tympanic)   Ht 5' 7" (1.702 m)   Wt 86.2 kg (190 lb)   LMP  (LMP Unknown)   SpO2 96%   BMI 29.76 kg/m²       Physical Exam  Constitutional:       General: She is not in acute distress. Appearance: Normal appearance. She is not ill-appearing. Genitourinary:      Bladder and urethral meatus normal.      Right Labia: No rash, tenderness, lesions or skin changes. Left Labia: No tenderness, lesions, skin changes or rash. No labial fusion noted. No inguinal adenopathy present in the right or left side. Vaginal bleeding present. No vaginal discharge, erythema, tenderness or ulceration. Moderate vaginal atrophy present. Vaginal exam comments: Bilateral vaginal walls friable with placement of speculum with subsequent bleeding  No bleeding noted from external cervix or os . Right Adnexa: not tender, not full, not palpable and no mass present. Left Adnexa: not tender, not full, not palpable and no mass present. Adnexa exam comments: Limited by habitus . No cervical motion tenderness, discharge, friability, lesion, polyp or eversion. Uterus is not enlarged, fixed, tender or irregular. No uterine mass detected. Uterus is anteverted. Urethral mass present. Urethra exam comments: Moderate size caruncle. Pelvic exam was performed with patient in the lithotomy position. HENT:      Head: Normocephalic. Cardiovascular:      Rate and Rhythm: Normal rate. Heart sounds: Normal heart sounds. Pulmonary:      Effort: Pulmonary effort is normal. No accessory muscle usage or respiratory distress. Abdominal:      General: There is no distension. Palpations: Abdomen is soft. There is no mass. Tenderness: There is no abdominal tenderness.  There is no guarding or rebound. Musculoskeletal:         General: Normal range of motion. Cervical back: No rigidity. Lymphadenopathy:      Lower Body: No right inguinal adenopathy. No left inguinal adenopathy. Neurological:      Mental Status: She is alert. Mental status is at baseline. Gait: Gait abnormal.   Skin:     General: Skin is warm and dry. Psychiatric:         Mood and Affect: Mood normal.         Behavior: Behavior normal.   Vitals and nursing note reviewed. Exam conducted with a chaperone present. US pelvis complete w transvaginal  Narrative: PELVIC ULTRASOUND, COMPLETE    INDICATION:  The patient is 65years old. Postmenopausal vaginal bleeding. COMPARISON: 4/16/2023    TECHNIQUE:   Transabdominal pelvic ultrasound was performed in sagittal and transverse planes with a curvilinear transducer. Additional transvaginal imaging was performed to better evaluate the endometrium and ovaries. Imaging included volumetric   sweeps as well as traditional still imaging technique. FINDINGS:    UTERUS:  The uterus is anteverted in position, measuring 4.6 x 1.5 x 2.5 cm. The uterus has a normal contour and echotexture. The cervix appears within normal limits. ENDOMETRIUM:  Endometrium is not visualized. OVARIES/ADNEXA:  Ovaries were not visualized. OTHER:  There is a cystic mass in the right adnexa/extending from the uterus measuring 5.1 x 4.1 x 7.4 cm. Impression: Extremely limited study due to overlying bowel gas. Cystic mass in the right adnexa/extending from the uterus. Further evaluation with MRI is recommended. I personally discussed this study with Lance Nixon on 7/8/2023 10:18 PM.    Workstation performed: [id number]  CT abdomen pelvis with contrast  Narrative: CT ABDOMEN AND PELVIS WITH IV CONTRAST    INDICATION:   Vaginal bleeding in postmenopausal female with large clots. .    COMPARISON: 4/16/2023    TECHNIQUE:  CT examination of the abdomen and pelvis was performed. Multiplanar 2D reformatted images were created from the source data. This examination, like all CT scans performed in the [facility], was performed utilizing techniques to minimize radiation dose exposure, including the use of iterative reconstruction and automated exposure control. Radiation dose length   product (DLP) for this visit:  709 mGy-cm    IV Contrast:  100 mL of iohexol (OMNIPAQUE)  Enteric Contrast:  Enteric contrast was not administered. FINDINGS:    ABDOMEN    LOWER CHEST:  No clinically significant abnormality identified in the visualized lower chest.    LIVER/BILIARY TREE: Enlarged fatty liver. GALLBLADDER:  No calcified gallstones. No pericholecystic inflammatory change. SPLEEN:  Unremarkable. PANCREAS:  Unremarkable. ADRENAL GLANDS:  Unremarkable. KIDNEYS/URETERS: No hydronephrosis or urinary tract calculus. One or more sharply circumscribed subcentimeter renal hypodensities are present, too small to accurately characterize, and statistically most likely benign findings. According to recent   literature (Radiology 2019) no further workup of these findings is recommended. Punctate nonobstructive right-sided nephrolithiasis. STOMACH AND BOWEL: Small sliding-type hiatal hernia. Diverticular disease also noted without diverticulitis. APPENDIX:  No findings to suggest appendicitis. ABDOMINOPELVIC CAVITY:  No ascites. No pneumoperitoneum. No lymphadenopathy. VESSELS:  Unremarkable for patient's age. PELVIS    REPRODUCTIVE ORGANS: Right adnexal simple appearing cysts appear stable. URINARY BLADDER: Tiny focus of nondependent gas within the bladder. .    ABDOMINAL WALL/INGUINAL REGIONS:  Unremarkable. OSSEOUS STRUCTURES:  No acute fracture or destructive osseous lesion. Impression: No findings to account for vaginal bleeding. Stable right adnexal cysts. Tiny focus of nondependent gas within the bladder.  Correlate clinically for recent instrumentation. Workstation performed: [id number]      Endometrial biopsy    Date/Time: 7/17/2023 2:00 PM    Performed by: Oamr Dye MD  Authorized by: Omar Dye MD  Universal Protocol:  Consent: Verbal consent obtained. Written consent obtained. Risks and benefits: risks, benefits and alternatives were discussed  Consent given by: patient  Patient understanding: patient states understanding of the procedure being performed  Patient consent: the patient's understanding of the procedure matches consent given  Procedure consent: procedure consent matches procedure scheduled  Relevant documents: relevant documents present and verified  Test results: test results available and properly labeled  Radiology Images displayed and confirmed. If images not available, report reviewed: imaging studies available  Required items: required blood products, implants, devices, and special equipment available  Patient identity confirmed: verbally with patient      Procedure:     Procedure: endometrial biopsy with Pipelle      A bivalve speculum was placed in the vagina: yes      Cervix cleaned and prepped: yes      The cervix was dilated: no      Uterus sounded: yes      Uterus sound depth (cm):  7    Specimen collected: specimen collected and sent to pathology      Patient tolerated procedure well with no complications: yes    Comments:     Procedure comments:  Mucus like material with small endometrial tissue obtained with 3 passes         Appropriate laboratory testing, imaging studies, and prior external records were reviewed:     Assessment/Plan:       Problem List Items Addressed This Visit        Other    Postmenopausal bleeding - Primary     Reviewed various possible etiologies of postmenopausal bleeding  Given no recent exam in last 10-15 years pap with HPV collected  Exam significant for urethral caruncle and moderate atrophy with friable vaginal walls.  Bilateral vaginal walls bled with contact with speculum. Likely some contribution to episode of bleeding  Given age and unrevealing pelvic US still recommend endometrial sampling. Pt was amenable to trial of EMB which was performed today w/o difficulty. Relevant Orders    Liquid-based pap, diagnostic    Tissue Exam    Adnexal mass     Discussed adnexal mass which was not well visualized on US and for which MRI could be considered.  concerned that patient would not be able to tolerate MRI well.  Discussed obtaining tumor markers and could consider US surveillance if they are not concerning          Relevant Orders        Inhibin A    Inhibin B    CEA    Cancer antigen 19-9    Liquid-based pap, diagnostic    Tissue Exam    Urethral caruncle   Other Visit Diagnoses     Other intra-abdominal and pelvic swelling, mass and lump        Relevant Orders        Liquid-based pap, diagnostic    Tissue Exam    Other abnormal tumor markers        Relevant Orders    CEA    Cancer antigen 19-9    Liquid-based pap, diagnostic    Tissue Exam

## 2023-07-17 NOTE — ASSESSMENT & PLAN NOTE
Discussed adnexal mass which was not well visualized on US and for which MRI could be considered.  concerned that patient would not be able to tolerate MRI well.  Discussed obtaining tumor markers and could consider US surveillance if they are not concerning

## 2023-07-18 LAB
HPV HR 12 DNA CVX QL NAA+PROBE: NEGATIVE
HPV16 DNA CVX QL NAA+PROBE: NEGATIVE
HPV18 DNA CVX QL NAA+PROBE: NEGATIVE

## 2023-07-19 DIAGNOSIS — F41.9 ANXIETY: ICD-10-CM

## 2023-07-20 PROCEDURE — 88305 TISSUE EXAM BY PATHOLOGIST: CPT | Performed by: SPECIALIST

## 2023-07-20 RX ORDER — CLONAZEPAM 0.5 MG/1
TABLET ORAL
Qty: 30 TABLET | Refills: 1 | Status: SHIPPED | OUTPATIENT
Start: 2023-07-20

## 2023-07-21 LAB
LAB AP GYN PRIMARY INTERPRETATION: NORMAL
Lab: NORMAL
PATH INTERP SPEC-IMP: NORMAL

## 2023-07-23 ENCOUNTER — HOSPITAL ENCOUNTER (EMERGENCY)
Facility: HOSPITAL | Age: 78
Discharge: HOME | End: 2023-07-23
Attending: EMERGENCY MEDICINE
Payer: MEDICARE

## 2023-07-23 VITALS
TEMPERATURE: 98 F | DIASTOLIC BLOOD PRESSURE: 63 MMHG | BODY MASS INDEX: 31.15 KG/M2 | RESPIRATION RATE: 14 BRPM | SYSTOLIC BLOOD PRESSURE: 128 MMHG | OXYGEN SATURATION: 97 % | HEART RATE: 63 BPM | WEIGHT: 198.85 LBS

## 2023-07-23 DIAGNOSIS — N93.9 ABNORMAL VAGINAL BLEEDING: Primary | ICD-10-CM

## 2023-07-23 DIAGNOSIS — Z79.01 ANTICOAGULATED: ICD-10-CM

## 2023-07-23 LAB
BASOPHILS # BLD AUTO: 0.04 THOUSANDS/ÂΜL (ref 0–0.1)
BASOPHILS NFR BLD AUTO: 0 % (ref 0–1)
EOSINOPHIL # BLD AUTO: 0.15 THOUSAND/ÂΜL (ref 0–0.61)
EOSINOPHIL NFR BLD AUTO: 2 % (ref 0–6)
ERYTHROCYTE [DISTWIDTH] IN BLOOD BY AUTOMATED COUNT: 13.2 % (ref 11.6–15.1)
HCT VFR BLD AUTO: 46.6 % (ref 34.8–46.1)
HGB BLD-MCNC: 14.6 G/DL (ref 11.5–15.4)
IMM GRANULOCYTES # BLD AUTO: 0.04 THOUSAND/UL (ref 0–0.2)
IMM GRANULOCYTES NFR BLD AUTO: 0 % (ref 0–2)
LYMPHOCYTES # BLD AUTO: 2 THOUSANDS/ÂΜL (ref 0.6–4.47)
LYMPHOCYTES NFR BLD AUTO: 22 % (ref 14–44)
MCH RBC QN AUTO: 30.2 PG (ref 26.8–34.3)
MCHC RBC AUTO-ENTMCNC: 31.3 G/DL (ref 31.4–37.4)
MCV RBC AUTO: 97 FL (ref 82–98)
MONOCYTES # BLD AUTO: 0.48 THOUSAND/ÂΜL (ref 0.17–1.22)
MONOCYTES NFR BLD AUTO: 5 % (ref 4–12)
NEUTROPHILS # BLD AUTO: 6.56 THOUSANDS/ÂΜL (ref 1.85–7.62)
NEUTS SEG NFR BLD AUTO: 71 % (ref 43–75)
NRBC BLD AUTO-RTO: 0 /100 WBCS
PLATELET # BLD AUTO: 232 THOUSANDS/UL (ref 149–390)
PMV BLD AUTO: 10.3 FL (ref 8.9–12.7)
RBC # BLD AUTO: 4.83 MILLION/UL (ref 3.81–5.12)
WBC # BLD AUTO: 9.27 THOUSAND/UL (ref 4.31–10.16)

## 2023-07-23 PROCEDURE — 99284 EMERGENCY DEPT VISIT MOD MDM: CPT | Performed by: EMERGENCY MEDICINE

## 2023-07-23 PROCEDURE — 99283 EMERGENCY DEPT VISIT LOW MDM: CPT

## 2023-07-23 PROCEDURE — 85025 COMPLETE CBC W/AUTO DIFF WBC: CPT | Performed by: EMERGENCY MEDICINE

## 2023-07-23 PROCEDURE — 36415 COLL VENOUS BLD VENIPUNCTURE: CPT | Performed by: EMERGENCY MEDICINE

## 2023-07-23 NOTE — ED PROVIDER NOTES
History  Chief Complaint   Patient presents with   • Vaginal Bleeding     Pt reports has a cystic mass andis having vaginal bleeding - was told to stop elquis for two days then bleeding stopped - restarted elquis and bleeding started again - Per ems visiting nurse said bleeding has stopped after shower      64 yo F presenting for evaluation of vaginal bleeding. Pt has history of similar the past few weeks. Seen in ER 7/8 and held Eliquis for 2 days. Has had intermittent bleeding since restarting Eliquis, but heavier earlier today, now minimal.   Pt offers no complaints, specifically denies abdominal pain, lightheadedness/dizziness, fatigue, etc.    On Eliquis for extensive PE in April 2023               Per independent review of external records:  7/17/23 OB/GYN visit (Dr. Sesar Lombardi)  - H/o PE in April requiring IR thrombectomy - on eliquis  Per pulmonologist Dr. Amador Memos is for ideally 6 months of [facility] therapy  Had episode of heavy vaginal bleeding - went to ER on 7/8. Bleeding had started about a week prior as noted by caregiver - was intermittent/light, but then became heavy on 7/8 with large clots   Was instructed to stop eliquis by the ER. When she saw Dr. Celina Davis on 7/11 she was advised to resume this  No prior personal h/o VTE or major provoking factor for PE   Since resuming eliquis there has continued to be light intermittent bleeding   - EMB  7/20 note: Discussed results and options of repeating US versus D&C. Given medical comorbidities agree that we prefer to avoid surgery at this time unless necessary. Will plan to repeat US to see if EMS can be better delineated. 7/8/23 Pelvic US: Extremely limited study due to overlying bowel gas. Cystic mass in the right adnexa/extending from the uterus. Further evaluation with MRI is recommended. - CT A/P: No findings to account for vaginal bleeding. Stable right adnexal cysts.       Prior to Admission Medications   Prescriptions Last Dose Informant Patient Reported? Taking? Coenzyme Q10 400 MG CAPS  Self No No   Sig: Take 0.25 capsules (100 mg total) by mouth daily   Patient not taking: Reported on 2023   Melatonin 5 MG CAPS  Self Yes Yes   Sig: Take 15 mg by mouth Bedtime   Nuplazid 34 MG CAPS  Self No Yes   Sig: Take 1tab daily   Omega-3 Fatty Acids (OMEGA-3 FISH OIL PO)  Self Yes No   PARoxetine (PAXIL) 20 mg tablet Unknown Self No No   Sig: Take 1 tablet (20 mg total) by mouth daily   amoxicillin (AMOXIL) 500 mg capsule Not Taking Self Yes No   Patient not taking: Reported on 2023   apixaban (Eliquis) 5 mg  Self No Yes   Sig: Take 1 tablet (5 mg total) by mouth 2 (two) times a day for 30 days, THEN 1 tablet (5 mg total) 2 (two) times a day. 10mg by mouth 2 times per day for 6 days then 5 mg by mouth 2 times per day for 6 months or indefinitely for acute pulmonary embolism.    atenolol (TENORMIN) 50 mg tablet  Self No Yes   Sig: Take 1 tablet (50 mg total) by mouth daily   carbidopa-levodopa (SINEMET CR)  mg TBCR per ER tablet  Self No Yes   Si po qam x 1 week then 1 po qam and q afternoon   clonazePAM (KlonoPIN) 0.5 mg tablet Unknown  No No   Sig: TAKE ONE TABLET BY MOUTH AT BEDTIME   rivastigmine (EXELON) 4.5 MG capsule Unknown Self No No   Sig: TAKE 1 CAPSULE (4.5 MG TOTAL) BY MOUTH 2 (TWO) TIMES A DAY   rosuvastatin (CRESTOR) 5 mg tablet Unknown Self No No   Sig: Take 1 tablet (5 mg total) by mouth every other day      Facility-Administered Medications: None       Past Medical History:   Diagnosis Date   • Anxiety    • Broken hip (HCC)    • Depression    • Diabetes mellitus type 2, diet-controlled (HCC)    • Hyperlipidemia    • Hypertension    • Parkinson disease (HCC)    • Pneumonia    • Pulmonary embolism ([address]) 2023       Past Surgical History:   Procedure Laterality Date   • ACHILLES TENDON SURGERY     • APPENDECTOMY     • COLONOSCOPY     • DEEP BRAIN STIMULATOR PLACEMENT     • DENTAL SURGERY     • FRACTURE SURGERY     • IR PE ENDOVASCULAR THERAPY  4/17/2023   • KNEE ARTHROSCOPY     • AZ INSJ/RPLCMT CRANIAL NEUROSTIM PULSE GENERATOR Right 12/18/2018    Procedure: REPLACEMENT IMPLANTABLE PULSE GENERATOR (IPG) DEEP BRAIN STIMULATION (DBS); Surgeon: Julio Vo MD;  [facility];  Service: Neurosurgery   • AZ INSJ/RPLCMT CRANIAL NEUROSTIM PULSE GENERATOR Left 4/7/2021    Procedure: REPLACEMENT IMPLANTABLE PULSE GENERATOR FOR DEEP BRAIN STIMULATOR, LEFT CHEST;  Surgeon: Julio Vo MD;  [facility];  Service: Neurosurgery   • AZ INSJ/RPLCMT CRANIAL NEUROSTIM PULSE GENERATOR Right 6/27/2022    Procedure: Right chest IPG replacement for Deep Brain Stimulator;  Surgeon: Sandra Carlos MD;  [facility];  Service: Neurosurgery   • AZ LAPAROSCOPIC APPENDECTOMY N/A 8/16/2020    Procedure: Venkata Roseneman;  Surgeon: Carmen Gomez MD;  [facility];  Service: General   • AZ OPTX FEM SHFT FX W/INSJ IMED IMPLT W/WO SCREW Right 12/14/2019    Procedure: INSERTION NAIL IM FEMUR ANTEGRADE (TROCHANTERIC); Surgeon: Alex Womack DO;  [facility];  Service: Orthopedics   • REPLACEMENT TOTAL KNEE     • REVERSE TOTAL SHOULDER ARTHROPLASTY Left 8/23/2018    Procedure: ARTHROPLASTY SHOULDER REVERSE;  Surgeon: Kayla Baldwin MD;  [facility];  Service: Orthopedics   • TONSILLECTOMY         Family History   Problem Relation Age of Onset   • Arthritis Mother    • No Known Problems Father    • No Known Problems Son    • No Known Problems Son    • No Known Problems Maternal Grandmother    • No Known Problems Maternal Grandfather    • No Known Problems Paternal Grandmother    • No Known Problems Paternal Grandfather    • Breast cancer Neg Hx    • Colon cancer Neg Hx    • Ovarian cancer Neg Hx      I have reviewed and agree with the history as documented.     E-Cigarette/Vaping   • E-Cigarette Use Never User      E-Cigarette/Vaping Substances   • Nicotine No    • THC No    • CBD No    • Flavoring No • Other No    • Unknown No      Social History     Tobacco Use   • Smoking status: Never   • Smokeless tobacco: Never   Vaping Use   • Vaping Use: Never used   Substance Use Topics   • Alcohol use: Yes     Alcohol/week: 2.0 standard drinks of alcohol     Types: 2 Glasses of wine per week     Comment: occasional   • Drug use: No       Review of Systems    Physical Exam  Physical Exam  Vitals and nursing note reviewed. Constitutional:       General: She is not in acute distress. Appearance: She is well-developed. HENT:      Head: Normocephalic and atraumatic. Nose: Nose normal.   Eyes:      Conjunctiva/sclera: Conjunctivae normal.   Cardiovascular:      Rate and Rhythm: Normal rate and regular rhythm. Heart sounds: Normal heart sounds. Pulmonary:      Effort: Pulmonary effort is normal. No respiratory distress. Breath sounds: Normal breath sounds. No stridor. No wheezing. Abdominal:      General: There is no distension. Palpations: Abdomen is soft. Tenderness: There is no abdominal tenderness. There is no guarding or rebound. Musculoskeletal:         General: No deformity. Cervical back: Normal range of motion and neck supple. Skin:     General: Skin is warm and dry. Findings: No rash. Neurological:      Mental Status: She is alert. Mental status is at baseline. Motor: No abnormal muscle tone. Psychiatric:         Thought Content:  Thought content normal.         Judgment: Judgment normal.         Vital Signs  ED Triage Vitals [07/23/23 1243]   Temperature Pulse Respirations Blood Pressure SpO2   98 °F (36.7 °C) 63 14 128/63 97 %      Temp Source Heart Rate Source Patient Position - Orthostatic VS BP Location FiO2 (%)   Oral -- Sitting Right arm --      Pain Score       No Pain           Vitals:    07/23/23 1243   BP: 128/63   Pulse: 63   Patient Position - Orthostatic VS: Sitting         Visual Acuity      ED Medications  Medications - No data to display    Diagnostic Studies  Results Reviewed     Procedure Component Value Units Date/Time    CBC and differential [[id number]]  (Abnormal) Collected: 07/23/23 1258    Lab Status: Final result Specimen: Blood from Arm, Left Updated: 07/23/23 1303     WBC 9.27 Thousand/uL      RBC 4.83 Million/uL      Hemoglobin 14.6 g/dL      Hematocrit 46.6 %      MCV 97 fL      MCH 30.2 pg      MCHC 31.3 g/dL      RDW 13.2 %      MPV 10.3 fL      Platelets 372 Thousands/uL      nRBC 0 /100 WBCs      Neutrophils Relative 71 %      Immat GRANS % 0 %      Lymphocytes Relative 22 %      Monocytes Relative 5 %      Eosinophils Relative 2 %      Basophils Relative 0 %      Neutrophils Absolute 6.56 Thousands/µL      Immature Grans Absolute 0.04 Thousand/uL      Lymphocytes Absolute 2.00 Thousands/µL      Monocytes Absolute 0.48 Thousand/µL      Eosinophils Absolute 0.15 Thousand/µL      Basophils Absolute 0.04 Thousands/µL                  No orders to display              Procedures  Procedures         ED Course  ED Course as of 07/23/23 1510   Sun Jul 23, 2023   1307 Hemoglobin: 14.6  stable   1358 Discussed with OB/GYN, can get outpt US as previously ordered and continue eliquis                                SBIRT 22yo+    Flowsheet Row Most Recent Value   Initial Alcohol Screen: US AUDIT-C     1. How often do you have a drink containing alcohol? 0 Filed at: 07/23/2023 1252   2. How many drinks containing alcohol do you have on a typical day you are drinking? 0 Filed at: 07/23/2023 1252   3a. Male UNDER 65: How often do you have five or more drinks on one occasion? 0 Filed at: 07/23/2023 1252   3b. FEMALE Any Age, or MALE 65+: How often do you have 4 or more drinks on one occassion? 0 Filed at: 07/23/2023 1252   Audit-C Score 0 Filed at: 07/23/2023 1252   ELIANA: How many times in the past year have you. .. Used an illegal drug or used a prescription medication for non-medical reasons?  Never Filed at: 07/23/2023 1252 Medical Decision Making  64 yo F with abnormal vaginal bleeding, has already been seen and being worked up  Will get CBC to r/o anemia and discuss with OB/GYN    OB/GYN team recommends outpt f/u with outpt US performed as already scheduled and to continue Eliquis    I discussed results/plan with pt/pt's  at bedside and close f/u and return precautions    Anticoagulated: chronic illness or injury  Amount and/or Complexity of Data Reviewed  External Data Reviewed: labs and notes. Labs: ordered. Decision-making details documented in ED Course. Radiology:  Decision-making details documented in ED Course. Discussion of management or test interpretation with external provider(s): Ob/gyn resident, Yuriy Seymour        Disposition  Final diagnoses:   Abnormal vaginal bleeding   Anticoagulated     Time reflects when diagnosis was documented in both MDM as applicable and the Disposition within this note     Time User Action Codes Description Comment    7/23/2023  1:42 PM Gaines Servando A Add [N93.9] Abnormal vaginal bleeding     7/23/2023  1:42 PM Glorious Knee Add [Z79.01] Anticoagulated       ED Disposition     ED Disposition   Discharge    Condition   Stable    Date/Time   Sun Jul 23, 2023  1:42 PM    1155 [facility] discharge to home/self care.                Follow-up Information     Follow up With Specialties Details Why Contact Info    Shorty Sol MD Obstetrics and Gynecology   [address]  [address] [place] [address]  [phone]            Discharge Medication List as of 7/23/2023  1:59 PM      CONTINUE these medications which have NOT CHANGED    Details   amoxicillin (AMOXIL) 500 mg capsule Historical Med      apixaban (Eliquis) 5 mg Multiple Dosages:Starting Wed 5/3/2023, Until Thu 6/1/2023 at 2359, THEN Starting Fri 6/2/2023, Until Tue 11/28/2023 at 2359Take 1 tablet (5 mg total) by mouth 2 (two) times a day for 30 days, THEN 1 tablet (5 mg total) 2 (two) times a day. 10mg by mo ut 2 times per day for 6 days then 5 mg by mouth 2 times per day for 6 months or indefinitely for acute pulmonary embolism., Normal      atenolol (TENORMIN) 50 mg tablet Take 1 tablet (50 mg total) by mouth daily, Starting Sat 4/22/2023, Normal      carbidopa-levodopa (SINEMET CR)  mg TBCR per ER tablet 1 po qam x 1 week then 1 po qam and q afternoon, Normal      clonazePAM (KlonoPIN) 0.5 mg tablet TAKE ONE TABLET BY MOUTH AT BEDTIME, Normal      Coenzyme Q10 400 MG CAPS Take 0.25 capsules (100 mg total) by mouth daily, Starting Sat 4/22/2023, Normal      Melatonin 5 MG CAPS Take 15 mg by mouth Bedtime, Historical Med      Nuplazid 34 MG CAPS Take 1tab daily, Normal      Omega-3 Fatty Acids (OMEGA-3 FISH OIL PO) Historical Med      PARoxetine (PAXIL) 20 mg tablet Take 1 tablet (20 mg total) by mouth daily, Starting Sat 4/22/2023, Normal      rivastigmine (EXELON) 4.5 MG capsule TAKE 1 CAPSULE (4.5 MG TOTAL) BY MOUTH 2 (TWO) TIMES A DAY, Starting Fri 3/24/2023, Normal      rosuvastatin (CRESTOR) 5 mg tablet Take 1 tablet (5 mg total) by mouth every other day, Starting Sat 4/22/2023, Normal             No discharge procedures on file.     PDMP Review       Value Time User    PDMP Reviewed  Yes 7/20/2023  7:07 AM Sabra Santillan MD          ED Provider  Electronically Signed by           Divya Crow DO  07/23/23 1270

## 2023-07-23 NOTE — DISCHARGE INSTRUCTIONS
Continue Eliquis  Follow up with OB/GYN and get ultrasound as previously ordered    Return to ER if any new/worsening symptoms including but not limited to heavy bleeding, abdominal pain, lightheadedness, dizziness, passing out, etc.

## 2023-07-24 ENCOUNTER — TELEPHONE (OUTPATIENT)
Dept: OBGYN CLINIC | Facility: CLINIC | Age: 78
End: 2023-07-24

## 2023-07-24 DIAGNOSIS — B37.31 VAGINAL YEAST INFECTION: Primary | ICD-10-CM

## 2023-07-24 RX ORDER — FLUCONAZOLE 150 MG/1
150 TABLET ORAL ONCE
Qty: 1 TABLET | Refills: 0 | Status: SHIPPED | OUTPATIENT
Start: 2023-07-24 | End: 2023-07-24

## 2023-07-24 NOTE — TELEPHONE ENCOUNTER
Patients  states that patient is having itching and discharge and would like medication for yeast infection sent to pharmacy. Please advise.

## 2023-07-24 NOTE — ADDENDUM NOTE
Addended by: [facility] on: 7/24/2023 01:09 PM     Modules accepted: Orders

## 2023-08-09 ENCOUNTER — APPOINTMENT (OUTPATIENT)
Dept: LAB | Facility: MEDICAL CENTER | Age: 78
End: 2023-08-09
Payer: MEDICARE

## 2023-08-09 ENCOUNTER — HOSPITAL ENCOUNTER (OUTPATIENT)
Dept: ULTRASOUND IMAGING | Facility: MEDICAL CENTER | Age: 78
Discharge: HOME | End: 2023-08-09
Payer: MEDICARE

## 2023-08-09 DIAGNOSIS — N94.89 ADNEXAL MASS: ICD-10-CM

## 2023-08-09 DIAGNOSIS — N95.0 POSTMENOPAUSAL BLEEDING: ICD-10-CM

## 2023-08-09 DIAGNOSIS — R97.8 OTHER ABNORMAL TUMOR MARKERS: ICD-10-CM

## 2023-08-09 DIAGNOSIS — R19.09 OTHER INTRA-ABDOMINAL AND PELVIC SWELLING, MASS AND LUMP: ICD-10-CM

## 2023-08-09 PROCEDURE — 86304 IMMUNOASSAY TUMOR CA 125: CPT

## 2023-08-09 PROCEDURE — 36415 COLL VENOUS BLD VENIPUNCTURE: CPT

## 2023-08-09 PROCEDURE — 82378 CARCINOEMBRYONIC ANTIGEN: CPT

## 2023-08-09 PROCEDURE — 83520 IMMUNOASSAY QUANT NOS NONAB: CPT

## 2023-08-09 PROCEDURE — 86336 INHIBIN A: CPT

## 2023-08-09 PROCEDURE — 86301 IMMUNOASSAY TUMOR CA 19-9: CPT

## 2023-08-09 PROCEDURE — 76856 US EXAM PELVIC COMPLETE: CPT

## 2023-08-09 PROCEDURE — 76830 TRANSVAGINAL US NON-OB: CPT

## 2023-08-10 LAB
CANCER AG125 SERPL-ACNC: 2.3 U/ML (ref 0–35)
CEA SERPL-MCNC: 0.9 NG/ML (ref 0–3)

## 2023-08-11 LAB — CANCER AG19-9 SERPL-ACNC: 6 U/ML (ref 0–35)

## 2023-08-14 LAB — INHIBIN B SERPL-MCNC: <7 PG/ML (ref 0–16.9)

## 2023-08-15 LAB — INHIBIN A SERPL-MCNC: 3 PG/ML

## 2023-08-16 ENCOUNTER — TELEPHONE (OUTPATIENT)
Dept: OBGYN CLINIC | Facility: CLINIC | Age: 78
End: 2023-08-16

## 2023-08-16 NOTE — TELEPHONE ENCOUNTER
Pts , Rai Alba (on Communication Consent) called asking about results of pts BW and U/S (results in chart). Please advise. Thank you.

## 2023-08-16 NOTE — TELEPHONE ENCOUNTER
Called and spoke with pt's . Reviewed tumor marker labs reassuring. Pelvic US demonstrated simple appearing right adnexal cysts similar in size to 2020 CT which is also reassuring  Discussed EMS of 3 mm is generally reassuring although presence of endometrial canal with PMB does usually warrant further investigation. When questioned about nature of bleeding since last ER visit he reports pt has very light bleeding intermittently/several days apart, although no further episodes of heavy bleeding. Discussed options of repeating imaging versus repeating office EMB versus D&C. They are leaning towards D&C procedure. Had previously discussed pt's h/o PE with Dr. Kristian Crow who states now that > 3 months from event would be reasonable to discontinue anticoagulation for short amount of time perioperatively.   Will have office call to set up prop appt/discussion

## 2023-08-25 DIAGNOSIS — R44.3 HALLUCINATIONS: ICD-10-CM

## 2023-08-25 RX ORDER — PIMAVANSERIN TARTRATE 34 MG/1
CAPSULE ORAL
Qty: 30 CAPSULE | Refills: 0 | Status: SHIPPED | OUTPATIENT
Start: 2023-08-25 | End: 2023-09-22

## 2023-08-30 NOTE — TELEPHONE ENCOUNTER
Hello, good morning. My name is Oval Jorge Alberto. I am a pharmacy technician. I am calling from [facility],, the [facility] specialty pharmacy on a recorded line. I was calling regarding the patient's medication issue for [employer] [facility]. Date of birth is [birth year]. The reason for our call today. We were calling the supervising physician for Scar Wynne, the nurse practitioner. We do need the, the doctor's name or NPI number. If you don't mind giving us a call back. Phone number is [phone].  And please do call us to speak to one of our clinical pharmacist. Thank you and have a good day

## 2023-08-31 ENCOUNTER — HOSPITAL ENCOUNTER (OUTPATIENT)
Dept: NON INVASIVE DIAGNOSTICS | Facility: HOSPITAL | Age: 78
Discharge: HOME | End: 2023-08-31
Attending: INTERNAL MEDICINE
Payer: MEDICARE

## 2023-08-31 VITALS
DIASTOLIC BLOOD PRESSURE: 63 MMHG | SYSTOLIC BLOOD PRESSURE: 128 MMHG | HEART RATE: 70 BPM | HEIGHT: 67 IN | WEIGHT: 198 LBS | BODY MASS INDEX: 31.08 KG/M2

## 2023-08-31 DIAGNOSIS — I26.09 OTHER ACUTE PULMONARY EMBOLISM WITH ACUTE COR PULMONALE (HCC): ICD-10-CM

## 2023-08-31 LAB
AORTIC ROOT: 3.1 CM
APICAL FOUR CHAMBER EJECTION FRACTION: 66 %
E WAVE DECELERATION TIME: 259 MS
FRACTIONAL SHORTENING: 32 % (ref 28–44)
INTERVENTRICULAR SEPTUM IN DIASTOLE (PARASTERNAL SHORT AXIS VIEW): 1.3 CM
INTERVENTRICULAR SEPTUM: 1.3 CM (ref 0.6–1.1)
LAAS-AP2: 9.5 CM2
LAAS-AP4: 17.9 CM2
LEFT ATRIUM AREA SYSTOLE SINGLE PLANE A4C: 15.3 CM2
LEFT ATRIUM SIZE: 3.5 CM
LEFT ATRIUM VOLUME (MOD BIPLANE): 35 ML
LEFT INTERNAL DIMENSION IN SYSTOLE: 2.5 CM (ref 2.1–4)
LEFT VENTRICULAR INTERNAL DIMENSION IN DIASTOLE: 3.7 CM (ref 3.5–6)
LEFT VENTRICULAR POSTERIOR WALL IN END DIASTOLE: 1.3 CM
LEFT VENTRICULAR STROKE VOLUME: 36 ML
LVSV (TEICH): 36 ML
MV E'TISSUE VEL-SEP: 8 CM/S
MV PEAK A VEL: 0.69 M/S
MV PEAK E VEL: 45 CM/S
MV STENOSIS PRESSURE HALF TIME: 75 MS
MV VALVE AREA P 1/2 METHOD: 2.93 CM2
PV PEAK GRADIENT: 12 MMHG
RIGHT ATRIUM AREA SYSTOLE A4C: 10 CM2
SL CV LEFT ATRIUM LENGTH A2C: 3.9 CM
SL CV LV EF: 60
SL CV PED ECHO LEFT VENTRICLE DIASTOLIC VOLUME (MOD BIPLANE) 2D: 59 ML
SL CV PED ECHO LEFT VENTRICLE SYSTOLIC VOLUME (MOD BIPLANE) 2D: 23 ML
TR MAX PG: 27 MMHG
TR PEAK VELOCITY: 2.6 M/S
TRICUSPID ANNULAR PLANE SYSTOLIC EXCURSION: 1.9 CM
TRICUSPID VALVE PEAK REGURGITATION VELOCITY: 2.62 M/S

## 2023-08-31 PROCEDURE — 93325 DOPPLER ECHO COLOR FLOW MAPG: CPT | Performed by: INTERNAL MEDICINE

## 2023-08-31 PROCEDURE — 93321 DOPPLER ECHO F-UP/LMTD STD: CPT | Performed by: INTERNAL MEDICINE

## 2023-08-31 PROCEDURE — 93308 TTE F-UP OR LMTD: CPT | Performed by: INTERNAL MEDICINE

## 2023-08-31 PROCEDURE — 93308 TTE F-UP OR LMTD: CPT

## 2023-09-11 ENCOUNTER — CONSULT (OUTPATIENT)
Dept: OBGYN CLINIC | Facility: CLINIC | Age: 78
End: 2023-09-11
Payer: MEDICARE

## 2023-09-11 VITALS
BODY MASS INDEX: 30.13 KG/M2 | HEART RATE: 59 BPM | WEIGHT: 192 LBS | SYSTOLIC BLOOD PRESSURE: 108 MMHG | HEIGHT: 67 IN | DIASTOLIC BLOOD PRESSURE: 70 MMHG

## 2023-09-11 DIAGNOSIS — N95.0 POSTMENOPAUSAL BLEEDING: Primary | ICD-10-CM

## 2023-09-11 PROCEDURE — 99213 OFFICE O/P EST LOW 20 MIN: CPT | Performed by: OBSTETRICS & GYNECOLOGY

## 2023-09-11 NOTE — PROGRESS NOTES
Subjective   Patient ID: Sebastian Nevarez is a 65 y.o. female. Patient is here for a problem visit. Chief Complaint   Patient presents with   • Consult     H/o PMB - intermittent, although none in last 3-4 weeks   Pelvic US with EMS 3 mm and complex fluid in canal.   Previous office EMB insufficient tissue     F/u with Dr. Danielle Rodríguez scheduled   Per previous discussion with him brief interruption in anticoagulation for procedure would be acceptable given >3 months from PE    Recent echo did not show right heart strain       Menstrual History:  OB History        2    Para   2    Term   2            AB        Living   2       SAB        IAB        Ectopic        Multiple        Live Births                    No LMP recorded (lmp unknown). Patient is postmenopausal.         Past Medical History:   Diagnosis Date   • Anxiety    • Broken hip ([address])    • Depression    • Diabetes mellitus type 2, diet-controlled ([address])    • Hyperlipidemia    • Hypertension    • Parkinson disease ([address])    • Pneumonia    • Pulmonary embolism ([address]) 2023       Past Surgical History:   Procedure Laterality Date   • ACHILLES TENDON SURGERY     • APPENDECTOMY     • COLONOSCOPY     • DEEP BRAIN STIMULATOR PLACEMENT     • DENTAL SURGERY     • FRACTURE SURGERY     • IR PE ENDOVASCULAR THERAPY  2023   • KNEE ARTHROSCOPY     • ND INSJ/RPLCMT CRANIAL NEUROSTIM PULSE GENERATOR Right 2018    Procedure: REPLACEMENT IMPLANTABLE PULSE GENERATOR (IPG) DEEP BRAIN STIMULATION (DBS);   Surgeon: Fiona Zamudio MD;  [facility];  Service: Neurosurgery   • ND INSJ/RPLCMT CRANIAL NEUROSTIM PULSE GENERATOR Left 2021    Procedure: REPLACEMENT IMPLANTABLE PULSE GENERATOR FOR DEEP BRAIN STIMULATOR, LEFT CHEST;  Surgeon: Fiona Zamudio MD;  [facility];  Service: Neurosurgery   • ND INSJ/RPLCMT CRANIAL NEUROSTIM PULSE GENERATOR Right 2022    Procedure: Right chest IPG replacement for Deep Brain Stimulator; Surgeon: Allen Grigsby MD;  [facility];  Service: Neurosurgery   • RI LAPAROSCOPIC APPENDECTOMY N/A 8/16/2020    Procedure: APPENDECTOMY LAPAROSCOPIC;  Surgeon: Liana Zabala MD;  [facility];  Service: General   • RI OPTX FEM SHFT FX W/INSJ IMED IMPLT W/WO SCREW Right 12/14/2019    Procedure: INSERTION NAIL IM FEMUR ANTEGRADE (TROCHANTERIC); Surgeon: Delmy Cordero DO;  [facility];  Service: Orthopedics   • REPLACEMENT TOTAL KNEE     • REVERSE TOTAL SHOULDER ARTHROPLASTY Left 8/23/2018    Procedure: ARTHROPLASTY SHOULDER REVERSE;  Surgeon: Viktoriya Hopkins MD;  [facility];  Service: Orthopedics   • TONSILLECTOMY         Social History     Tobacco Use   • Smoking status: Never   • Smokeless tobacco: Never   Vaping Use   • Vaping Use: Never used   Substance Use Topics   • Alcohol use: Yes     Alcohol/week: 2.0 standard drinks of alcohol     Types: 2 Glasses of wine per week     Comment: occasional   • Drug use: No        Allergies   Allergen Reactions   • Sulfa Antibiotics Hives         Current Outpatient Medications:   •  amoxicillin (AMOXIL) 500 mg capsule, , Disp: , Rfl:   •  apixaban (Eliquis) 5 mg, Take 1 tablet (5 mg total) by mouth 2 (two) times a day for 30 days, THEN 1 tablet (5 mg total) 2 (two) times a day. 10mg by mouth 2 times per day for 6 days then 5 mg by mouth 2 times per day for 6 months or indefinitely for acute pulmonary embolism. , Disp: 60 tablet, Rfl: 0  •  atenolol (TENORMIN) 50 mg tablet, Take 1 tablet (50 mg total) by mouth daily, Disp: 30 tablet, Rfl: 0  •  carbidopa-levodopa (SINEMET CR)  mg TBCR per ER tablet, 1 po qam x 1 week then 1 po qam and q afternoon, Disp: 180 tablet, Rfl: 2  •  clonazePAM (KlonoPIN) 0.5 mg tablet, TAKE ONE TABLET BY MOUTH AT BEDTIME, Disp: 30 tablet, Rfl: 1  •  Coenzyme Q10 400 MG CAPS, Take 0.25 capsules (100 mg total) by mouth daily (Patient not taking: Reported on 7/17/2023), Disp: 30 capsule, Rfl: 0  • Melatonin 5 MG CAPS, Take 15 mg by mouth Bedtime, Disp: , Rfl:   •  Nuplazid 34 MG CAPS, TAKE 1 CAPSULE BY MOUTH DAILY, Disp: 30 capsule, Rfl: 0  •  Omega-3 Fatty Acids (OMEGA-3 FISH OIL PO), , Disp: , Rfl:   •  PARoxetine (PAXIL) 20 mg tablet, Take 1 tablet (20 mg total) by mouth daily, Disp: 30 tablet, Rfl: 0  •  rivastigmine (EXELON) 4.5 MG capsule, TAKE 1 CAPSULE (4.5 MG TOTAL) BY MOUTH 2 (TWO) TIMES A DAY, Disp: 180 capsule, Rfl: 3  •  rosuvastatin (CRESTOR) 5 mg tablet, Take 1 tablet (5 mg total) by mouth every other day, Disp: 30 tablet, Rfl: 0      Review of Systems   Constitutional: Negative for appetite change, chills and fever. Eyes: Negative for visual disturbance. Respiratory: Negative for cough, chest tightness and shortness of breath. Cardiovascular: Negative for chest pain. Gastrointestinal: Negative for abdominal distention, abdominal pain, constipation, diarrhea, nausea and vomiting. Endocrine: Negative for cold intolerance and heat intolerance. Genitourinary: Negative for difficulty urinating, dyspareunia, dysuria, frequency, genital sores, pelvic pain, urgency, vaginal bleeding, vaginal discharge and vaginal pain. Musculoskeletal: Negative for arthralgias. Neurological: Negative for light-headedness and headaches. Hematological: Does not bruise/bleed easily. Psychiatric/Behavioral: Negative for behavioral problems. All other systems reviewed and are negative. /70   Pulse 59   Ht 5' 7" (1.702 m)   Wt 87.1 kg (192 lb)   LMP  (LMP Unknown)   BMI 30.07 kg/m²       Physical Exam  Constitutional:       General: She is not in acute distress. Appearance: Normal appearance. HENT:      Head: Normocephalic and atraumatic. Cardiovascular:      Rate and Rhythm: Normal rate. Pulmonary:      Effort: Pulmonary effort is normal. No respiratory distress. Abdominal:      General: There is no distension. Palpations: Abdomen is soft. Tenderness:  There is no abdominal tenderness. There is no guarding or rebound. Neurological:      General: No focal deficit present. Mental Status: She is alert. Psychiatric:         Mood and Affect: Mood normal.         Behavior: Behavior normal.   Vitals and nursing note reviewed. Appropriate laboratory testing, imaging studies, and prior external records were reviewed:     Assessment/Plan:       Problem List Items Addressed This Visit        Other    Postmenopausal bleeding - Primary     Reviewed workup overall inconclusive thus far and that only definitive means of ruling out endometrial hyperplasia/cancer would be with dilation and curettage/hysteroscopic procedure. Discussed dilation and curettage, hysteroscopy, possible polypectomy and associated risks/benefits/alternatives. Reviewed risks of pain, bleeding, infection, damage to surrounding structures, uterine perforation, fluid overload, need for additional procedures, and risks associated with anesthesia. Patient would like to avoid surgery at this point and manage expectantly given lack of recent bleeding. Reviewed potential risk of undiagnosed cancer and implications of this - she voiced understanding. Will plan on repeating pelvic US in approximately 3 months but if she has persistent bleeding prior to that would have to more strongly recommend procedure. Advised if pelvic US shows additional thickening of endometrial stripe would also be more inclined to pursue D&C.             Relevant Orders    US pelvis complete w transvaginal

## 2023-09-11 NOTE — ASSESSMENT & PLAN NOTE
Reviewed workup overall inconclusive thus far and that only definitive means of ruling out endometrial hyperplasia/cancer would be with dilation and curettage/hysteroscopic procedure. Discussed dilation and curettage, hysteroscopy, possible polypectomy and associated risks/benefits/alternatives. Reviewed risks of pain, bleeding, infection, damage to surrounding structures, uterine perforation, fluid overload, need for additional procedures, and risks associated with anesthesia. Patient would like to avoid surgery at this point and manage expectantly given lack of recent bleeding. Reviewed potential risk of undiagnosed cancer and implications of this - she voiced understanding. Will plan on repeating pelvic US in approximately 3 months but if she has persistent bleeding prior to that would have to more strongly recommend procedure. Advised if pelvic US shows additional thickening of endometrial stripe would also be more inclined to pursue D&C.

## 2023-09-22 DIAGNOSIS — R44.3 HALLUCINATIONS: ICD-10-CM

## 2023-09-22 RX ORDER — PIMAVANSERIN TARTRATE 34 MG/1
CAPSULE ORAL
Qty: 30 CAPSULE | Refills: 0 | Status: SHIPPED | OUTPATIENT
Start: 2023-09-22

## 2023-09-25 ENCOUNTER — OFFICE VISIT (OUTPATIENT)
Dept: PULMONOLOGY | Facility: CLINIC | Age: 78
End: 2023-09-25
Payer: MEDICARE

## 2023-09-25 VITALS
HEIGHT: 67 IN | WEIGHT: 192 LBS | BODY MASS INDEX: 30.13 KG/M2 | HEART RATE: 64 BPM | OXYGEN SATURATION: 96 % | TEMPERATURE: 97.1 F | SYSTOLIC BLOOD PRESSURE: 106 MMHG | DIASTOLIC BLOOD PRESSURE: 72 MMHG

## 2023-09-25 DIAGNOSIS — Z23 NEED FOR IMMUNIZATION AGAINST INFLUENZA: ICD-10-CM

## 2023-09-25 DIAGNOSIS — N93.9 ABNORMAL UTERINE BLEEDING: ICD-10-CM

## 2023-09-25 DIAGNOSIS — I26.99 OTHER ACUTE PULMONARY EMBOLISM, UNSPECIFIED WHETHER ACUTE COR PULMONALE PRESENT (HCC): Primary | ICD-10-CM

## 2023-09-25 PROCEDURE — G0008 ADMIN INFLUENZA VIRUS VAC: HCPCS

## 2023-09-25 PROCEDURE — 99213 OFFICE O/P EST LOW 20 MIN: CPT | Performed by: INTERNAL MEDICINE

## 2023-09-25 PROCEDURE — 90662 IIV NO PRSV INCREASED AG IM: CPT

## 2023-09-25 RX ORDER — TRAZODONE HYDROCHLORIDE 50 MG/1
50 TABLET ORAL
COMMUNITY

## 2023-09-25 NOTE — PROGRESS NOTES
Pulmonary Outpatient Note   Vasu Sees 65 y.o. female MRN: [id number]  9/25/2023        Reason for Consultation:    Chief Complaint   Patient presents with   • Pulmonary Embolism         Assessment/Plan:    1. Other acute pulmonary embolism, unspecified whether acute cor pulmonale present [facility])  Assessment & Plan:  History of extensive PE in April- intermediate-high risk  Had IR thrombectomy in April  Currently on Eliquis 5 mg BID    For now will continue full dose. She is considering GYN procedure for endometrial biopsy. For now she wants to defer as the bleeding has stopped. There was no major provoking factor for the PE. No prior personal history of VTE. She currently has no dyspnea. No bleeding. Her TTE showed normal RV function and size compared to during hospital course. Continue eliquis 5 mg BID for now  RTC in 3-4 months - consider reducing to 2.5 mg BID for extended A/C vs continuing full dose indefinitely. 2. Need for immunization against influenza  -     influenza vaccine, high-dose, PF 0.7 mL (FLUZONE HIGH-DOSE)    3. Abnormal uterine bleeding  Assessment & Plan:  If she ends up requiring surgery/anesthesia she is at her baseline pulmonary status- no complaints. She can proceed to surgery without further pulmonary testing. She can come off of her eliquis 48 hours prior to surgery, it can be resumed after surgery. Health Maintenance  Immunization History   Administered Date(s) Administered   • COVID-19 MODERNA VACC 0.5 ML IM 01/11/2021, 02/08/2021, 10/13/2021, 04/29/2022   • COVID-19 Moderna Vac BIVALENT 12 Yr+ IM 0.5 ML 10/24/2022   • INFLUENZA 11/12/2015, 11/15/2016, 08/21/2017, 09/24/2018, 10/03/2019, 09/25/2020, 11/19/2021, 12/16/2022   • Influenza, high dose seasonal 0.7 mL 09/25/2023   • Pneumococcal Conjugate 13-Valent 02/16/2015   • Tdap 04/22/2021   • Tuberculin Skin Test-PPD Intradermal 09/10/2018          Return in about 4 months (around 1/25/2024).     History of Present Illness   HPI:  Naveed Mendoza is a 65 y.o. female who has history of intermediate-high risk PE 4/2023 s/p IR thrombectomy, Parkinson's disease who presents for pulmonary follow-up for PE. Last seen in July- continued on Eliquis for PE. Referred to GYN for AUB. TTE 8/31 with no RV strain    More recently her abnormal uterine bleeding has stopped. No shortness of breath. No chest pain. From July 2023  Seen by me in hospital in April. She was admitted for dyspnea, altered mental status. Found to be hypoxemic. Required BiPAP initially. She had intermediate-high risk PE with R heart strain. She had Bilateral thrombectomy with IR on 4/17. She was discharged 4/22 on eliquis. She was on room air on discharge      Review of Systems   Constitutional: Negative for chills and fever. HENT: Negative for ear pain and sore throat. Eyes: Negative for pain and visual disturbance. Respiratory: Negative for cough and shortness of breath. Cardiovascular: Negative for chest pain and palpitations. Gastrointestinal: Negative for abdominal pain and vomiting. Genitourinary: Negative for dysuria and hematuria. Musculoskeletal: Negative for arthralgias and back pain. Skin: Negative for color change and rash. Neurological: Negative for seizures and syncope. All other systems reviewed and are negative.           Historical Information   Past Medical History:   Diagnosis Date   • Anxiety    • Broken hip ([address])    • Depression    • Diabetes mellitus type 2, diet-controlled ([address])    • Hyperlipidemia    • Hypertension    • Parkinson disease ([address])    • Pneumonia    • Pulmonary embolism ([address]) 04/16/2023     Past Surgical History:   Procedure Laterality Date   • ACHILLES TENDON SURGERY     • APPENDECTOMY     • COLONOSCOPY     • DEEP BRAIN STIMULATOR PLACEMENT     • DENTAL SURGERY     • FRACTURE SURGERY     • IR PE ENDOVASCULAR THERAPY  4/17/2023   • KNEE ARTHROSCOPY     • MA INSJ/RPLCMT CRANIAL NEUROSTIM PULSE GENERATOR Right 12/18/2018    Procedure: REPLACEMENT IMPLANTABLE PULSE GENERATOR (IPG) DEEP BRAIN STIMULATION (DBS); Surgeon: Richmond Keene MD;  Location: [facility];  Service: Neurosurgery   • IL INSJ/RPLCMT CRANIAL NEUROSTIM PULSE GENERATOR Left 4/7/2021    Procedure: REPLACEMENT IMPLANTABLE PULSE GENERATOR FOR DEEP BRAIN STIMULATOR, LEFT CHEST;  Surgeon: Richmond Keene MD;  Location[facility];  Service: Neurosurgery   • IL INSJ/RPLCMT CRANIAL NEUROSTIM PULSE GENERATOR Right 6/27/2022    Procedure: Right chest IPG replacement for Deep Brain Stimulator;  Surgeon: Alka Medellin MD;  Location[facility];  Service: Neurosurgery   • IL LAPAROSCOPIC APPENDECTOMY N/A 8/16/2020    Procedure: Alma Bending;  Surgeon: Jenny Blackwell MD;  Location[facility];  Service: General   • IL OPTX FEM SHFT FX W/INSJ IMED IMPLT W/WO SCREW Right 12/14/2019    Procedure: INSERTION NAIL IM FEMUR ANTEGRADE (TROCHANTERIC); Surgeon: Shea Owen DO;  Location[facility];  Service: Orthopedics   • REPLACEMENT TOTAL KNEE     • REVERSE TOTAL SHOULDER ARTHROPLASTY Left 8/23/2018    Procedure: ARTHROPLASTY SHOULDER REVERSE;  Surgeon: Suma Berger MD;  Location[facility];  Service: Orthopedics   • TONSILLECTOMY       Family History   Problem Relation Age of Onset   • Arthritis Mother    • No Known Problems Father    • No Known Problems Son    • No Known Problems Son    • No Known Problems Maternal Grandmother    • No Known Problems Maternal Grandfather    • No Known Problems Paternal Grandmother    • No Known Problems Paternal Grandfather    • Breast cancer Neg Hx    • Colon cancer Neg Hx    • Ovarian cancer Neg Hx              Meds/Allergies     Current Outpatient Medications:   •  apixaban (Eliquis) 5 mg, Take 1 tablet (5 mg total) by mouth 2 (two) times a day for 30 days, THEN 1 tablet (5 mg total) 2 (two) times a day.  10mg by mouth 2 times per day for 6 days then 5 mg by mouth 2 times per day for 6 months or indefinitely for acute pulmonary embolism. , Disp: 60 tablet, Rfl: 0  •  atenolol (TENORMIN) 50 mg tablet, Take 1 tablet (50 mg total) by mouth daily, Disp: 30 tablet, Rfl: 0  •  Coenzyme Q10 400 MG CAPS, Take 0.25 capsules (100 mg total) by mouth daily, Disp: 30 capsule, Rfl: 0  •  Melatonin 5 MG CAPS, Take 15 mg by mouth Bedtime, Disp: , Rfl:   •  Nuplazid 34 MG CAPS, TAKE 1 CAPSULE BY MOUTH DAILY, Disp: 30 capsule, Rfl: 0  •  Omega-3 Fatty Acids (OMEGA-3 FISH OIL PO), , Disp: , Rfl:   •  PARoxetine (PAXIL) 20 mg tablet, Take 1 tablet (20 mg total) by mouth daily, Disp: 30 tablet, Rfl: 0  •  rivastigmine (EXELON) 4.5 MG capsule, TAKE 1 CAPSULE (4.5 MG TOTAL) BY MOUTH 2 (TWO) TIMES A DAY, Disp: 180 capsule, Rfl: 3  •  rosuvastatin (CRESTOR) 5 mg tablet, Take 1 tablet (5 mg total) by mouth every other day, Disp: 30 tablet, Rfl: 0  •  traZODone (DESYREL) 50 mg tablet, Take 50 mg by mouth daily at bedtime, Disp: , Rfl:   •  amoxicillin (AMOXIL) 500 mg capsule, , Disp: , Rfl:   •  carbidopa-levodopa (SINEMET CR)  mg TBCR per ER tablet, 1 po qam x 1 week then 1 po qam and q afternoon (Patient not taking: Reported on 9/25/2023), Disp: 180 tablet, Rfl: 2  •  clonazePAM (KlonoPIN) 0.5 mg tablet, TAKE ONE TABLET BY MOUTH AT BEDTIME (Patient not taking: Reported on 9/25/2023), Disp: 30 tablet, Rfl: 1  Allergies   Allergen Reactions   • Sulfa Antibiotics Hives       Vitals: Blood pressure 106/72, pulse 64, temperature (!) 97.1 °F (36.2 °C), temperature source Tympanic, height 5' 7" (1.702 m), weight 87.1 kg (192 lb), SpO2 96 %. Body mass index is 30.07 kg/m². Oxygen Therapy  SpO2: 96 %  Oxygen Therapy: None (Room air)      Physical Exam  Physical Exam  Vitals and nursing note reviewed. Constitutional:       General: She is not in acute distress. Appearance: She is well-developed. HENT:      Head: Normocephalic and atraumatic.    Eyes:      Conjunctiva/sclera: Conjunctivae normal.   Cardiovascular: Rate and Rhythm: Normal rate and regular rhythm. Heart sounds: No murmur heard. Pulmonary:      Effort: Pulmonary effort is normal. No respiratory distress. Breath sounds: Normal breath sounds. Abdominal:      Palpations: Abdomen is soft. Tenderness: There is no abdominal tenderness. Musculoskeletal:         General: No swelling. Cervical back: Neck supple. Skin:     General: Skin is warm and dry. Capillary Refill: Capillary refill takes less than 2 seconds. Neurological:      Mental Status: She is alert. Psychiatric:         Mood and Affect: Mood normal.         Labs: I have personally reviewed pertinent lab results. ABG: No results found for: "PHART", "XRT5KNB", "PO2ART", "CLS9AVM", "M2YPKSXD", "BEART", "SOURCE",   BNP:   Lab Results   Component Value Date     (H) 04/16/2023   ,   CBC:  Lab Results   Component Value Date    WBC 9.27 07/23/2023    HGB 14.6 07/23/2023    HCT 46.6 (H) 07/23/2023    MCV 97 07/23/2023     07/23/2023    EOSPCT 2 07/23/2023    EOSABS 0.15 07/23/2023    NEUTOPHILPCT 71 07/23/2023    LYMPHOPCT 22 07/23/2023   ,   CMP:   Lab Results   Component Value Date    SODIUM 138 07/08/2023    K 4.1 07/08/2023     07/08/2023    CO2 28 07/08/2023    ANIONGAP 6 11/06/2014    BUN 19 07/08/2023    CREATININE 0.87 07/08/2023    GLUCOSE 100 11/06/2014    CALCIUM 9.4 07/08/2023    AST 11 (L) 07/08/2023    ALT <3 (L) 07/08/2023    ALKPHOS 37 07/08/2023    EGFR 64 07/08/2023   ,   PT/INR:   Lab Results   Component Value Date    INR 1.30 (H) 07/08/2023   ,   Troponin: No results found for: "TROPONINI"      Imaging and other studies: I have personally reviewed pertinent reports. and I have personally reviewed pertinent films in PACS    Pelvic U/S 8/9/23  IMPRESSION:  1. Simple appearing right adnexal cysts, appearing similar to the prior CT from 8/16/2020 given variation in imaging modality and measuring technique.  Follow ultrasound in 12 months may be considered. 2. Small amount of complex endometrial fluid. Correlate clinically. Follow-up ultrasound may be considered at a shorter interval if clinically warranted. 3. Uterine fibroid. 4. Left ovary is not well visualized. Pelvic U/S 7/8/23  Unremarkable uterus and endometrium- limited study     Extremely limited study due to overlying bowel gas. Cystic mass in the right adnexa/extending from the uterus. Further evaluation with MRI is recommended      CTA Chest 4/16/23  Large emboli in the distal bilateral main pulmonary arteries extending into multiple segmental arteries. Right heart strain pattern (RV/LV ratio is 3.2).    No evidence of acute abnormality in the abdomen or pelvis. Colonic diverticulosis without diverticulitis.     Incidental 3.2 cm and 4.1 cm right adnexal simple-appearing cysts. According to current guidelines Joao Copping Radiol 2020; 37:848-096) in this postmenopausal woman, this should be followed up in 6 to 12 months by pelvic ultrasound. Pulmonary function testing:   Pulmonary Functions Testing Results:    No results found for: "FEV1", "FVC", "STN9QHX", "TLC", "DLCO"        EKG, Pathology, and Other Studies: I have personally reviewed pertinent reports. TTE 8/31/23  •  Left Ventricle: Left ventricular cavity size is normal. Wall thickness is mildly increased. There is mild concentric hypertrophy. The left ventricular ejection fraction is 60%. Systolic function is normal. Wall motion is normal. Diastolic function is mildly abnormal, consistent with grade I (abnormal) relaxation. •  Mitral Valve: There is moderate annular calcification. Right Ventricle Right ventricular cavity size is normal. Systolic function is normal. Wall thickness is normal.           TTE 4/17/23   Left Ventricle: Left ventricular cavity size is normal. Wall thickness is normal. The left ventricular ejection fraction is 65%.  Systolic function is normal. Wall motion is normal. Diastolic function is mildly abnormal, consistent with grade I (abnormal) relaxation. •  Right Ventricle: Right ventricular cavity size is severely dilated. Systolic function is severely reduced. •  Right Atrium: The atrium is severely dilated. •  Mitral Valve: There is moderate annular calcification. •  Tricuspid Valve: There is mild regurgitation. •  Pulmonic Valve: There is mild regurgitation. •  Pulmonary Artery: The estimated pulmonary artery systolic pressure is 00.2 mmHg. The pulmonary artery systolic pressure is moderate to severely increased. Morro Gilliam M.D.   [facility]

## 2023-09-25 NOTE — ASSESSMENT & PLAN NOTE
History of extensive PE in April- intermediate-high risk  Had IR thrombectomy in April  Currently on Eliquis 5 mg BID    For now will continue full dose. She is considering GYN procedure for endometrial biopsy. For now she wants to defer as the bleeding has stopped. There was no major provoking factor for the PE. No prior personal history of VTE. She currently has no dyspnea. No bleeding. Her TTE showed normal RV function and size compared to during hospital course. Continue eliquis 5 mg BID for now  RTC in 3-4 months - consider reducing to 2.5 mg BID for extended A/C vs continuing full dose indefinitely.

## 2023-09-25 NOTE — ASSESSMENT & PLAN NOTE
If she ends up requiring surgery/anesthesia she is at her baseline pulmonary status- no complaints. She can proceed to surgery without further pulmonary testing. She can come off of her eliquis 48 hours prior to surgery, it can be resumed after surgery.

## 2023-10-18 ENCOUNTER — HOSPITAL ENCOUNTER (EMERGENCY)
Facility: HOSPITAL | Age: 78
Discharge: HOME | End: 2023-10-19
Attending: EMERGENCY MEDICINE
Payer: MEDICARE

## 2023-10-18 DIAGNOSIS — N93.8 DYSFUNCTIONAL UTERINE BLEEDING: Primary | ICD-10-CM

## 2023-10-18 DIAGNOSIS — N39.0 UTI (URINARY TRACT INFECTION): ICD-10-CM

## 2023-10-18 LAB
ANION GAP SERPL CALCULATED.3IONS-SCNC: 7 MMOL/L
APTT PPP: 35 SECONDS (ref 23–37)
BASOPHILS # BLD AUTO: 0.02 THOUSANDS/ÂΜL (ref 0–0.1)
BASOPHILS NFR BLD AUTO: 0 % (ref 0–1)
BUN SERPL-MCNC: 22 MG/DL (ref 5–25)
CALCIUM SERPL-MCNC: 9 MG/DL (ref 8.4–10.2)
CHLORIDE SERPL-SCNC: 103 MMOL/L (ref 96–108)
CO2 SERPL-SCNC: 29 MMOL/L (ref 21–32)
CREAT SERPL-MCNC: 0.97 MG/DL (ref 0.6–1.3)
EOSINOPHIL # BLD AUTO: 0.19 THOUSAND/ÂΜL (ref 0–0.61)
EOSINOPHIL NFR BLD AUTO: 2 % (ref 0–6)
ERYTHROCYTE [DISTWIDTH] IN BLOOD BY AUTOMATED COUNT: 13.5 % (ref 11.6–15.1)
GFR SERPL CREATININE-BSD FRML MDRD: 56 ML/MIN/1.73SQ M
GLUCOSE SERPL-MCNC: 145 MG/DL (ref 65–140)
HCT VFR BLD AUTO: 43.3 % (ref 34.8–46.1)
HGB BLD-MCNC: 13.5 G/DL (ref 11.5–15.4)
IMM GRANULOCYTES # BLD AUTO: 0.03 THOUSAND/UL (ref 0–0.2)
IMM GRANULOCYTES NFR BLD AUTO: 0 % (ref 0–2)
INR PPP: 1.22 (ref 0.84–1.19)
LYMPHOCYTES # BLD AUTO: 2.83 THOUSANDS/ÂΜL (ref 0.6–4.47)
LYMPHOCYTES NFR BLD AUTO: 29 % (ref 14–44)
MCH RBC QN AUTO: 30.4 PG (ref 26.8–34.3)
MCHC RBC AUTO-ENTMCNC: 31.2 G/DL (ref 31.4–37.4)
MCV RBC AUTO: 98 FL (ref 82–98)
MONOCYTES # BLD AUTO: 0.69 THOUSAND/ÂΜL (ref 0.17–1.22)
MONOCYTES NFR BLD AUTO: 7 % (ref 4–12)
NEUTROPHILS # BLD AUTO: 6.06 THOUSANDS/ÂΜL (ref 1.85–7.62)
NEUTS SEG NFR BLD AUTO: 62 % (ref 43–75)
NRBC BLD AUTO-RTO: 0 /100 WBCS
PLATELET # BLD AUTO: 232 THOUSANDS/UL (ref 149–390)
PMV BLD AUTO: 10.8 FL (ref 8.9–12.7)
POTASSIUM SERPL-SCNC: 4.2 MMOL/L (ref 3.5–5.3)
PROTHROMBIN TIME: 15.4 SECONDS (ref 11.6–14.5)
RBC # BLD AUTO: 4.44 MILLION/UL (ref 3.81–5.12)
SODIUM SERPL-SCNC: 139 MMOL/L (ref 135–147)
WBC # BLD AUTO: 9.82 THOUSAND/UL (ref 4.31–10.16)

## 2023-10-18 PROCEDURE — 81001 URINALYSIS AUTO W/SCOPE: CPT | Performed by: EMERGENCY MEDICINE

## 2023-10-18 PROCEDURE — 87086 URINE CULTURE/COLONY COUNT: CPT | Performed by: EMERGENCY MEDICINE

## 2023-10-18 PROCEDURE — 85730 THROMBOPLASTIN TIME PARTIAL: CPT | Performed by: EMERGENCY MEDICINE

## 2023-10-18 PROCEDURE — 36415 COLL VENOUS BLD VENIPUNCTURE: CPT | Performed by: EMERGENCY MEDICINE

## 2023-10-18 PROCEDURE — 80048 BASIC METABOLIC PNL TOTAL CA: CPT | Performed by: EMERGENCY MEDICINE

## 2023-10-18 PROCEDURE — 85610 PROTHROMBIN TIME: CPT | Performed by: EMERGENCY MEDICINE

## 2023-10-18 PROCEDURE — 85025 COMPLETE CBC W/AUTO DIFF WBC: CPT | Performed by: EMERGENCY MEDICINE

## 2023-10-19 ENCOUNTER — APPOINTMENT (EMERGENCY)
Dept: CT IMAGING | Facility: HOSPITAL | Age: 78
End: 2023-10-19
Payer: MEDICARE

## 2023-10-19 ENCOUNTER — TELEPHONE (OUTPATIENT)
Dept: OBGYN CLINIC | Facility: CLINIC | Age: 78
End: 2023-10-19

## 2023-10-19 VITALS
TEMPERATURE: 98.1 F | SYSTOLIC BLOOD PRESSURE: 125 MMHG | OXYGEN SATURATION: 97 % | RESPIRATION RATE: 18 BRPM | DIASTOLIC BLOOD PRESSURE: 54 MMHG | HEART RATE: 60 BPM

## 2023-10-19 LAB
BACTERIA UR QL AUTO: ABNORMAL /HPF
BILIRUB UR QL STRIP: NEGATIVE
CLARITY UR: ABNORMAL
COLOR UR: ABNORMAL
GLUCOSE UR STRIP-MCNC: NEGATIVE MG/DL
HGB UR QL STRIP.AUTO: ABNORMAL
KETONES UR STRIP-MCNC: NEGATIVE MG/DL
LEUKOCYTE ESTERASE UR QL STRIP: ABNORMAL
NITRITE UR QL STRIP: POSITIVE
NON-SQ EPI CELLS URNS QL MICRO: ABNORMAL /HPF
PH UR STRIP.AUTO: 5.5 [PH]
PROT UR STRIP-MCNC: ABNORMAL MG/DL
RBC #/AREA URNS AUTO: ABNORMAL /HPF
SP GR UR STRIP.AUTO: 1.02 (ref 1–1.03)
UROBILINOGEN UR STRIP-ACNC: <2 MG/DL
WBC #/AREA URNS AUTO: ABNORMAL /HPF

## 2023-10-19 PROCEDURE — G1004 CDSM NDSC: HCPCS

## 2023-10-19 PROCEDURE — 74177 CT ABD & PELVIS W/CONTRAST: CPT

## 2023-10-19 RX ORDER — CEFTRIAXONE 1 G/50ML
1000 INJECTION, SOLUTION INTRAVENOUS ONCE
Status: COMPLETED | OUTPATIENT
Start: 2023-10-19 | End: 2023-10-19

## 2023-10-19 RX ORDER — CEFDINIR 300 MG/1
300 CAPSULE ORAL EVERY 12 HOURS SCHEDULED
Qty: 14 CAPSULE | Refills: 0 | Status: SHIPPED | OUTPATIENT
Start: 2023-10-19 | End: 2023-10-26

## 2023-10-19 RX ADMIN — CEFTRIAXONE 1000 MG: 1 INJECTION, SOLUTION INTRAVENOUS at 00:24

## 2023-10-19 RX ADMIN — IOHEXOL 100 ML: 350 INJECTION, SOLUTION INTRAVENOUS at 01:15

## 2023-10-19 NOTE — ED PROVIDER NOTES
History  Chief Complaint   Patient presents with    Vaginal Bleeding     Pt arriving via ems from home where her caretaker reports to ems that when taking pt to bathroom caretaker noticed vaginal bleeding and clots     50-year-old female with history of Parkinson's disease, PE on Eliquis, hypertension, hyperlipidemia who presents with possible vaginal bleeding. EMS called because caretaker noticed small amount of clots around the vagina when taking the patient to the bathroom. Here, patient has no complaints. Understands that she is in the hospital because she is bleeding. Patient unsure where she is bleeding. Prior to Admission Medications   Prescriptions Last Dose Informant Patient Reported? Taking? Coenzyme Q10 400 MG CAPS  Self No No   Sig: Take 0.25 capsules (100 mg total) by mouth daily   Melatonin 5 MG CAPS  Self Yes No   Sig: Take 15 mg by mouth Bedtime   Nuplazid 34 MG CAPS  Self No No   Sig: TAKE 1 CAPSULE BY MOUTH DAILY   Omega-3 Fatty Acids (OMEGA-3 FISH OIL PO)  Self Yes No   PARoxetine (PAXIL) 20 mg tablet  Self No No   Sig: Take 1 tablet (20 mg total) by mouth daily   amoxicillin (AMOXIL) 500 mg capsule  Self Yes No   Patient not taking: Reported on 2023   apixaban (Eliquis) 5 mg  Self No No   Sig: Take 1 tablet (5 mg total) by mouth 2 (two) times a day for 30 days, THEN 1 tablet (5 mg total) 2 (two) times a day. 10mg by mouth 2 times per day for 6 days then 5 mg by mouth 2 times per day for 6 months or indefinitely for acute pulmonary embolism.    atenolol (TENORMIN) 50 mg tablet  Self No No   Sig: Take 1 tablet (50 mg total) by mouth daily   carbidopa-levodopa (SINEMET CR)  mg TBCR per ER tablet  Self No No   Si po qam x 1 week then 1 po qam and q afternoon   Patient not taking: Reported on 2023   clonazePAM (KlonoPIN) 0.5 mg tablet  Self No No   Sig: TAKE ONE TABLET BY MOUTH AT BEDTIME   Patient not taking: Reported on 2023   rivastigmine (EXELON) 4.5 MG capsule Self No No   Sig: TAKE 1 CAPSULE (4.5 MG TOTAL) BY MOUTH 2 (TWO) TIMES A DAY   rosuvastatin (CRESTOR) 5 mg tablet  Self No No   Sig: Take 1 tablet (5 mg total) by mouth every other day   traZODone (DESYREL) 50 mg tablet  Self Yes No   Sig: Take 50 mg by mouth daily at bedtime      Facility-Administered Medications: None       Past Medical History:   Diagnosis Date    Anxiety     Broken hip ([address])     Depression     Diabetes mellitus type 2, diet-controlled ([address])     Hyperlipidemia     Hypertension     Parkinson disease     Pneumonia     Pulmonary embolism ([address]) 04/16/2023       Past Surgical History:   Procedure Laterality Date    ACHILLES TENDON SURGERY      APPENDECTOMY      COLONOSCOPY      DEEP BRAIN STIMULATOR PLACEMENT      DENTAL SURGERY      FRACTURE SURGERY      IR PE ENDOVASCULAR THERAPY  4/17/2023    KNEE ARTHROSCOPY      AZ INSJ/RPLCMT CRANIAL NEUROSTIM PULSE GENERATOR Right 12/18/2018    Procedure: REPLACEMENT IMPLANTABLE PULSE GENERATOR (IPG) DEEP BRAIN STIMULATION (DBS); Surgeon: Con Finch MD;  [facility];  Service: Neurosurgery    AZ INSJ/RPLCMT CRANIAL NEUROSTIM PULSE GENERATOR Left 4/7/2021    Procedure: REPLACEMENT IMPLANTABLE PULSE GENERATOR FOR DEEP BRAIN STIMULATOR, LEFT CHEST;  Surgeon: Con Finch MD;  [facility];  Service: Neurosurgery    AZ INSJ/RPLCMT CRANIAL NEUROSTIM PULSE GENERATOR Right 6/27/2022    Procedure: Right chest IPG replacement for Deep Brain Stimulator;  Surgeon: Lynsey Anand MD;  [facility];  Service: Neurosurgery    AZ LAPAROSCOPIC APPENDECTOMY N/A 8/16/2020    Procedure: Kay Yoni;  Surgeon: Yari Schreiber MD;  [facility];  Service: General    [place] OPTX FEM SHFT FX W/INSJ IMED IMPLT W/WO SCREW Right 12/14/2019    Procedure: INSERTION NAIL IM FEMUR ANTEGRADE (TROCHANTERIC);   Surgeon: Bree Lazar DO;  [facility];  Service: [address] ARTHROPLASTY Left 8/23/2018    Procedure: ARTHROPLASTY SHOULDER REVERSE;  Surgeon: Dakota Marquez MD;  Location: [facility] OR;  Service: Orthopedics    TONSILLECTOMY         Family History   Problem Relation Age of Onset    Arthritis Mother     No Known Problems Father     No Known Problems Son     No Known Problems Son     No Known Problems Maternal Grandmother     No Known Problems Maternal Grandfather     No Known Problems Paternal Grandmother     No Known Problems Paternal Grandfather     Breast cancer Neg Hx     Colon cancer Neg Hx     Ovarian cancer Neg Hx      I have reviewed and agree with the history as documented. E-Cigarette/Vaping    E-Cigarette Use Never User      E-Cigarette/Vaping Substances    Nicotine No     THC No     CBD No     Flavoring No     Other No     Unknown No      Social History     Tobacco Use    Smoking status: Never    Smokeless tobacco: Never   Vaping Use    Vaping Use: Never used   Substance Use Topics    Alcohol use: Yes     Alcohol/week: 2.0 standard drinks of alcohol     Types: 2 Glasses of wine per week     Comment: occasional    Drug use: No       Review of Systems   Constitutional:  Negative for chills and fever. HENT:  Negative for rhinorrhea, sore throat and trouble swallowing. Eyes:  Negative for photophobia and visual disturbance. Respiratory:  Negative for cough, chest tightness and shortness of breath. Cardiovascular:  Negative for chest pain, palpitations and leg swelling. Gastrointestinal:  Negative for abdominal pain, blood in stool, diarrhea, nausea and vomiting. Endocrine: Negative for polyuria. Genitourinary:  Positive for vaginal bleeding. Negative for dysuria, flank pain, hematuria and vaginal discharge. Musculoskeletal:  Negative for back pain and neck pain. Skin:  Negative for color change and rash. Allergic/Immunologic: Negative for immunocompromised state.    Neurological:  Negative for dizziness, weakness, light-headedness, numbness and headaches. All other systems reviewed and are negative. Physical Exam  Physical Exam  Vitals and nursing note reviewed. Exam conducted with a chaperone present (Kaleigh RN). Constitutional:       General: She is not in acute distress. Appearance: She is well-developed. Comments: Chronically ill-appearing. HENT:      Head: Normocephalic and atraumatic. Mouth/Throat:      Lips: Pink. Mouth: Mucous membranes are moist.   Eyes:      General: Lids are normal.      Extraocular Movements: Extraocular movements intact. Conjunctiva/sclera: Conjunctivae normal.      Pupils: Pupils are equal, round, and reactive to light. Cardiovascular:      Rate and Rhythm: Normal rate and regular rhythm. Heart sounds: Normal heart sounds. No murmur heard. Pulmonary:      Effort: Pulmonary effort is normal.      Breath sounds: Normal breath sounds. Abdominal:      General: There is no distension. Palpations: Abdomen is soft. Tenderness: There is no abdominal tenderness. There is no guarding or rebound. Genitourinary:     Exam position: Supine. Comments: Small amount of blood clot near the vaginal os. No obvious, external source of bleeding identified. Area wiped with cough, no active bleeding. Musculoskeletal:         General: No swelling. Cervical back: Full passive range of motion without pain, normal range of motion and neck supple. Skin:     General: Skin is warm. Capillary Refill: Capillary refill takes less than 2 seconds. Neurological:      General: No focal deficit present. Mental Status: She is alert.    Psychiatric:         Mood and Affect: Mood normal.         Speech: Speech normal.         Behavior: Behavior normal.         Vital Signs  ED Triage Vitals [10/18/23 2234]   Temperature Pulse Respirations Blood Pressure SpO2   98.1 °F (36.7 °C) 60 18 144/66 97 %      Temp Source Heart Rate Source Patient Position - Orthostatic VS BP Location FiO2 (%) Oral Monitor Lying Left arm --      Pain Score       No Pain           Vitals:    10/18/23 2330 10/19/23 0000 10/19/23 0201 10/19/23 0230   BP: 108/56 124/62 107/59 109/64   Pulse: 64 61 61 63   Patient Position - Orthostatic VS: Lying Lying Lying Lying         Visual Acuity      ED Medications  Medications   cefTRIAXone (ROCEPHIN) IVPB (premix in dextrose) 1,000 mg 50 mL (0 mg Intravenous Stopped 10/19/23 0058)   iohexol (OMNIPAQUE) 350 MG/ML injection (MULTI-DOSE) 100 mL (100 mL Intravenous Given 10/19/23 0115)       Diagnostic Studies  Results Reviewed       Procedure Component Value Units Date/Time    Urine Microscopic [[id number]]  (Abnormal) Collected: 10/18/23 2352    Lab Status: Final result Specimen: Urine, Other Updated: 10/19/23 0020     RBC, UA Innumerable /hpf      WBC, UA Innumerable /hpf      Epithelial Cells Occasional /hpf      Bacteria, UA Innumerable /hpf     Urine culture [[id number]] Collected: 10/18/23 2352    Lab Status:  In process Specimen: Urine, Other Updated: 10/19/23 0020    UA w Reflex to Microscopic w Reflex to Culture [[id number]]  (Abnormal) Collected: 10/18/23 2352    Lab Status: Final result Specimen: Urine, Other Updated: 10/19/23 0003     Color, UA Light Orange     Clarity, UA Turbid     Specific Gravity, UA 1.022     pH, UA 5.5     Leukocytes, UA Small     Nitrite, UA Positive     Protein, UA Trace mg/dl      Glucose, UA Negative mg/dl      Ketones, UA Negative mg/dl      Urobilinogen, UA <2.0 mg/dl      Bilirubin, UA Negative     Occult Blood, UA Large    Basic metabolic panel [[id number]]  (Abnormal) Collected: 10/18/23 2245    Lab Status: Final result Specimen: Blood from Arm, Right Updated: 10/18/23 2321     Sodium 139 mmol/L      Potassium 4.2 mmol/L      Chloride 103 mmol/L      CO2 29 mmol/L      ANION GAP 7 mmol/L      BUN 22 mg/dL      Creatinine 0.97 mg/dL      Glucose 145 mg/dL      Calcium 9.0 mg/dL      eGFR 56 ml/min/1.73sq m     Narrative:      National Kidney Disease Foundation guidelines for Chronic Kidney Disease (CKD):     Stage 1 with normal or high GFR (GFR > 90 mL/min/1.73 square meters)    Stage 2 Mild CKD (GFR = 60-89 mL/min/1.73 square meters)    Stage 3A Moderate CKD (GFR = 45-59 mL/min/1.73 square meters)    Stage 3B Moderate CKD (GFR = 30-44 mL/min/1.73 square meters)    Stage 4 Severe CKD (GFR = 15-29 mL/min/1.73 square meters)    Stage 5 End Stage CKD (GFR <15 mL/min/1.73 square meters)  Note: GFR calculation is accurate only with a steady state creatinine    Protime-INR [[id number]]  (Abnormal) Collected: 10/18/23 2245    Lab Status: Final result Specimen: Blood from Arm, Right Updated: 10/18/23 2318     Protime 15.4 seconds      INR 1.22    APTT [[id number]]  (Normal) Collected: 10/18/23 2245    Lab Status: Final result Specimen: Blood from Arm, Right Updated: 10/18/23 2318     PTT 35 seconds     CBC and differential [[id number]]  (Abnormal) Collected: 10/18/23 2245    Lab Status: Final result Specimen: Blood from Arm, Right Updated: 10/18/23 2300     WBC 9.82 Thousand/uL      RBC 4.44 Million/uL      Hemoglobin 13.5 g/dL      Hematocrit 43.3 %      MCV 98 fL      MCH 30.4 pg      MCHC 31.2 g/dL      RDW 13.5 %      MPV 10.8 fL      Platelets 044 Thousands/uL      nRBC 0 /100 WBCs      Neutrophils Relative 62 %      Immat GRANS % 0 %      Lymphocytes Relative 29 %      Monocytes Relative 7 %      Eosinophils Relative 2 %      Basophils Relative 0 %      Neutrophils Absolute 6.06 Thousands/µL      Immature Grans Absolute 0.03 Thousand/uL      Lymphocytes Absolute 2.83 Thousands/µL      Monocytes Absolute 0.69 Thousand/µL      Eosinophils Absolute 0.19 Thousand/µL      Basophils Absolute 0.02 Thousands/µL                    CT abdomen pelvis with contrast   Final Result by Robert Navarrete DO (10/19 0242)      No CT evidence of acute pathology. See full report for additional findings.             Workstation performed: [id number] Procedures  Procedures         ED Course  ED Course as of 10/19/23 0247   u Oct 19, 2023   0022 Urine Microscopic(!)  Previous urine culture has grown MDRO E. Coli. Reviewing the sensitivities, should be susceptible to a 3rd gen cephalosporin. 0123 CT abdomen pelvis with contrast  Cystitis, right ovarian cyst as interpreted by myself. SBIRT 20yo+      Flowsheet Row Most Recent Value   Initial Alcohol Screen: US AUDIT-C     1. How often do you have a drink containing alcohol? 0 Filed at: 10/18/2023 2248   2. How many drinks containing alcohol do you have on a typical day you are drinking? 0 Filed at: 10/18/2023 2248   3b. FEMALE Any Age, or MALE 65+: How often do you have 4 or more drinks on one occassion? 0 Filed at: 10/18/2023 2248   Audit-C Score 0 Filed at: 10/18/2023 2248   ELIANA: How many times in the past year have you. .. Used an illegal drug or used a prescription medication for non-medical reasons? Never Filed at: 10/18/2023 2248                      Medical Decision Making  Vaginal bleeding versus rectal bleeding versus hematuria. Hemorrhagic cystitis versus uterine malignancy versus hemorrhoid versus diverticulosis. - given the presentation, will check CBC for marked leukocytosis  - BMP to check RFTs for RANJITH / markers of dehydration.  - Urine: will check for UTI or signs of pyelonephritis. - Lastly, will consider abdominal imaging.  - CT AP w Contrast: r/o appendicitis, cholecystitis, bowel obstruction or other acute abdominal pathology. Would also demonstrate signs of pyelonephritis, cystitis. - Disposition per workup. Problems Addressed:  Dysfunctional uterine bleeding: complicated acute illness or injury  UTI (urinary tract infection): complicated acute illness or injury with systemic symptoms    Amount and/or Complexity of Data Reviewed  Labs: ordered. Decision-making details documented in ED Course.   Radiology: ordered and independent interpretation performed. Decision-making details documented in ED Course. Risk  Prescription drug management. Disposition  Final diagnoses:   Dysfunctional uterine bleeding   UTI (urinary tract infection)     Time reflects when diagnosis was documented in both MDM as applicable and the Disposition within this note       Time User Action Codes Description Comment    10/19/2023  2:44 AM Briseida DEWEY Add [N93.8] Dysfunctional uterine bleeding     10/19/2023  2:45 AM Barry Bell Add [N39.0] UTI (urinary tract infection)           ED Disposition       ED Disposition   Discharge    Condition   Stable    Date/Time   u Oct 19, 2023 [address] discharge to home/self care. Follow-up Information       Follow up With Specialties Details Why Contact Info Additional Information    Norma Moe MD [facility] Medicine Schedule an appointment as soon as possible for a visit   [address] [facility] [facility]  [id number] [place] [address]  [address] [facility] [id number] [place] [address]       [address] [facility] Emergency Department Emergency Medicine Go to  If symptoms worsen [address] [id number]  [address] [facility] Emergency Department, [address] [place] [address], [address] [place] [address] Obstetrics and Gynecology Schedule an appointment as soon as possible for a visit   [address]  [facility] [address] [place] [id number]  [address] [facility], [address] [place] [address], [id number] [phone]            Patient's Medications   Discharge Prescriptions    CEFDINIR (OMNICEF) 300 MG CAPSULE    Take 1 capsule (300 mg total) by mouth every 12 (twelve) hours for 7 days       Start Date: 10/19/2023End Date: 10/26/2023       Order Dose: 300 mg       Quantity: 14 capsule    Refills: 0       No discharge procedures on file.     PDMP Review         Value Time User    PDMP Reviewed  Yes 7/20/2023  7:07 AM Thuy Trammell MD            ED Provider  Electronically Signed by             Bianca Kaminski MD  10/19/23 2277

## 2023-10-19 NOTE — TELEPHONE ENCOUNTER
Pts  called, stating that pt was in the ER last night due to vaginal bleeding. Pt was diagnosed w/UTI and put on antibiotics. He is asking your recommendations - does she need a f/u here, or just monitor to see if ABX resolve this? Please advise your recommendations. Thank you.

## 2023-10-20 LAB — BACTERIA UR CULT: NORMAL

## 2023-10-23 ENCOUNTER — CONSULT (OUTPATIENT)
Dept: OBGYN CLINIC | Facility: CLINIC | Age: 78
End: 2023-10-23

## 2023-10-23 VITALS
HEART RATE: 57 BPM | TEMPERATURE: 97.6 F | OXYGEN SATURATION: 99 % | SYSTOLIC BLOOD PRESSURE: 128 MMHG | BODY MASS INDEX: 29.38 KG/M2 | HEIGHT: 67 IN | DIASTOLIC BLOOD PRESSURE: 84 MMHG | WEIGHT: 187.2 LBS

## 2023-10-23 DIAGNOSIS — N95.0 POSTMENOPAUSAL BLEEDING: Primary | ICD-10-CM

## 2023-10-23 NOTE — H&P
Subjective   Patient ID: Brittanie Lozano is a 65 y.o. female. Patient is here for a problem visit. Chief Complaint   Patient presents with    Consult     Discuss D&C;      Patient has been bleeding intermittently since 2023 (dx with PE in 2023)  Patient was in the ER on 10/18 due to recurrent vaginal bleeding. No active bleeding at that time time and Hgb 13.5  She has previously been counseled on D&C [address] due to recurrent episodes of VB with insufficient endometrial sampling as an outpatient and pelvic US with EMS of 3 mm but presence of complex fluid - she had previously opted to manage expectantly as she was not bleeding at the time   Per Dr. Mariam Carrion previous recommendations it would be acceptable to stop anticoagulation 1 day before procedure and day of procedure (48 hours). He also stated that no further pulmonary testing would be needed   She has been made aware that presence of endometrial cancer could impact her long term anticoagulation plan     Intermittent light bleeding since last visit, but heavy on day of ER visit with passage of clots   Heavily again on  AM - then decreased again. Earlier this morning there was a small clot but then nothing   No cramping pelvic pain associated with this     Menstrual History:  OB History          2    Para   2    Term   2            AB        Living   2         SAB        IAB        Ectopic        Multiple        Live Births                      No LMP recorded (lmp unknown).  Patient is postmenopausal.         Past Medical History:   Diagnosis Date    Anxiety     Broken hip ([address])     Depression     Diabetes mellitus type 2, diet-controlled ([address])     Hyperlipidemia     Hypertension     Parkinson disease     Pneumonia     Pulmonary embolism ([address]) 2023       Past Surgical History:   Procedure Laterality Date    ACHILLES TENDON SURGERY      APPENDECTOMY      COLONOSCOPY      DEEP BRAIN STIMULATOR PLACEMENT      DENTAL SURGERY FRACTURE SURGERY      IR PE ENDOVASCULAR THERAPY  4/17/2023    KNEE ARTHROSCOPY      MA INSJ/RPLCMT CRANIAL NEUROSTIM PULSE GENERATOR Right 12/18/2018    Procedure: REPLACEMENT IMPLANTABLE PULSE GENERATOR (IPG) DEEP BRAIN STIMULATION (DBS); Surgeon: Fiona Zamudio MD;  [facility];  Service: Neurosurgery    MA INSJ/RPLCMT CRANIAL NEUROSTIM PULSE GENERATOR Left 4/7/2021    Procedure: REPLACEMENT IMPLANTABLE PULSE GENERATOR FOR DEEP BRAIN STIMULATOR, LEFT CHEST;  Surgeon: Fiona Zamudio MD;  [facility];  Service: Neurosurgery    MA INSJ/RPLCMT CRANIAL NEUROSTIM PULSE GENERATOR Right 6/27/2022    Procedure: Right chest IPG replacement for Deep Brain Stimulator;  Surgeon: Darrelyn Jeans, MD;  [facility];  Service: Neurosurgery    MA LAPAROSCOPIC APPENDECTOMY N/A 8/16/2020    Procedure: Bianca Cook;  Surgeon: Kurtis Izaguirre MD;  [facility];  Service: General    MA OPTX FEM SHFT FX W/INSJ IMED IMPLT W/WO SCREW Right 12/14/2019    Procedure: INSERTION NAIL IM FEMUR ANTEGRADE (TROCHANTERIC); Surgeon: Pop Mcclure DO;  [facility];  Service: Orthopedics    REPLACEMENT TOTAL KNEE      REVERSE TOTAL SHOULDER ARTHROPLASTY Left 8/23/2018    Procedure: ARTHROPLASTY SHOULDER REVERSE;  Surgeon: Malcom Landau, MD;  [facility];  Service: Orthopedics    TONSILLECTOMY         Social History     Tobacco Use    Smoking status: Never    Smokeless tobacco: Never   Vaping Use    Vaping Use: Never used   Substance Use Topics    Alcohol use: Yes     Alcohol/week: 2.0 standard drinks of alcohol     Types: 2 Glasses of wine per week     Comment: occasional    Drug use: No        Allergies   Allergen Reactions    Sulfa Antibiotics Hives         Current Outpatient Medications:     apixaban (Eliquis) 5 mg, Take 1 tablet (5 mg total) by mouth 2 (two) times a day for 30 days, THEN 1 tablet (5 mg total) 2 (two) times a day.  10mg by mouth 2 times per day for 6 days then 5 mg by mouth 2 times per day for 6 months or indefinitely for acute pulmonary embolism. , Disp: 60 tablet, Rfl: 0    atenolol (TENORMIN) 50 mg tablet, Take 1 tablet (50 mg total) by mouth daily, Disp: 30 tablet, Rfl: 0    cefdinir (OMNICEF) 300 mg capsule, Take 1 capsule (300 mg total) by mouth every 12 (twelve) hours for 7 days, Disp: 14 capsule, Rfl: 0    Melatonin 5 MG CAPS, Take 15 mg by mouth Bedtime, Disp: , Rfl:     Nuplazid 34 MG CAPS, TAKE 1 CAPSULE BY MOUTH DAILY, Disp: 30 capsule, Rfl: 0    Omega-3 Fatty Acids (OMEGA-3 FISH OIL PO), , Disp: , Rfl:     PARoxetine (PAXIL) 20 mg tablet, Take 1 tablet (20 mg total) by mouth daily, Disp: 30 tablet, Rfl: 0    rivastigmine (EXELON) 4.5 MG capsule, TAKE 1 CAPSULE (4.5 MG TOTAL) BY MOUTH 2 (TWO) TIMES A DAY, Disp: 180 capsule, Rfl: 3    rosuvastatin (CRESTOR) 5 mg tablet, Take 1 tablet (5 mg total) by mouth every other day, Disp: 30 tablet, Rfl: 0    traZODone (DESYREL) 50 mg tablet, Take 50 mg by mouth daily at bedtime, Disp: , Rfl:     amoxicillin (AMOXIL) 500 mg capsule, , Disp: , Rfl:     carbidopa-levodopa (SINEMET CR)  mg TBCR per ER tablet, 1 po qam x 1 week then 1 po qam and q afternoon (Patient not taking: Reported on 9/25/2023), Disp: 180 tablet, Rfl: 2    clonazePAM (KlonoPIN) 0.5 mg tablet, TAKE ONE TABLET BY MOUTH AT BEDTIME (Patient not taking: Reported on 9/25/2023), Disp: 30 tablet, Rfl: 1    Coenzyme Q10 400 MG CAPS, Take 0.25 capsules (100 mg total) by mouth daily (Patient not taking: Reported on 10/23/2023), Disp: 30 capsule, Rfl: 0      Review of Systems   Constitutional:  Negative for appetite change, chills and fever. Eyes:  Negative for visual disturbance. Respiratory:  Negative for cough, chest tightness and shortness of breath. Cardiovascular:  Negative for chest pain. Gastrointestinal:  Negative for abdominal distention, abdominal pain, constipation, diarrhea, nausea and vomiting.    Endocrine: Negative for cold intolerance and heat intolerance. Genitourinary:  Positive for vaginal bleeding. Negative for difficulty urinating, dyspareunia, dysuria, frequency, genital sores, pelvic pain, urgency, vaginal discharge and vaginal pain. Musculoskeletal:  Negative for arthralgias. Neurological:  Negative for light-headedness and headaches. Hematological:  Does not bruise/bleed easily. Psychiatric/Behavioral:  Negative for behavioral problems. All other systems reviewed and are negative. /84 (BP Location: Right arm, Patient Position: Sitting, Cuff Size: Adult)   Pulse 57   Temp 97.6 °F (36.4 °C) (Tympanic)   Ht 5' 7" (1.702 m)   Wt 84.9 kg (187 lb 3.2 oz)   LMP  (LMP Unknown)   SpO2 99%   BMI 29.32 kg/m²       Physical Exam  Constitutional:       General: She is not in acute distress. Appearance: Normal appearance. HENT:      Head: Normocephalic and atraumatic. Cardiovascular:      Rate and Rhythm: Normal rate. Pulmonary:      Effort: Pulmonary effort is normal. No respiratory distress. Neurological:      General: No focal deficit present. Mental Status: She is alert. Psychiatric:         Mood and Affect: Mood normal.         Behavior: Behavior normal.   Vitals and nursing note reviewed. Appropriate laboratory testing, imaging studies, and prior external records were reviewed:     Assessment/Plan:       Problem List Items Addressed This Visit       Postmenopausal bleeding - Primary     Reviewed inability to exclude endometrial cancer thus far based on previous workup and given recurrent bleeding hysteroscopic visualization is recommended  If there is no cancer found could consider trial or vaginal estrogen as atrophy could be a factor   Discussed dilation and curettage, hysteroscopy and associated risks/benefits/alternatives.  Reviewed risks of pain, bleeding, infection, damage to surrounding structures, uterine perforation, fluid overload, need for additional procedures, and risks associated with anesthesia. Patient voiced understanding of above and desires to proceed.   - PAT labs ordered  - surgical consent, preop packet and wash provided  - reviewed anticoagulation plan   - desires Weds 11/22 OR date

## 2023-10-23 NOTE — ASSESSMENT & PLAN NOTE
Reviewed inability to exclude endometrial cancer thus far based on previous workup and given recurrent bleeding hysteroscopic visualization is recommended  If there is no cancer found could consider trial or vaginal estrogen as atrophy could be a factor   Discussed dilation and curettage, hysteroscopy and associated risks/benefits/alternatives. Reviewed risks of pain, bleeding, infection, damage to surrounding structures, uterine perforation, fluid overload, need for additional procedures, and risks associated with anesthesia. Patient voiced understanding of above and desires to proceed.   - PAT labs ordered  - surgical consent, preop packet and wash provided  - reviewed anticoagulation plan   - desires Weds 11/22 OR date

## 2023-10-23 NOTE — PATIENT INSTRUCTIONS
Need to get labwork done no more than 30 days prior to day of surgery - labs need to be done at [address] [facility]   Preadmission testing will call 1 week and 1 day prior to surgery to review instructions     Surgery Weds 11/22     Stop eliquis the day before the surgery  Don't take eliquis the day of surgery  Resume eliquis day after procedure

## 2023-10-23 NOTE — PROGRESS NOTES
Subjective   Patient ID: Rula Robb is a 65 y.o. female. Patient is here for a problem visit. Chief Complaint   Patient presents with    Consult     Discuss D&C;      Patient has been bleeding intermittently since 2023 (dx with PE in 2023)  Patient was in the ER on 10/18 due to recurrent vaginal bleeding. No active bleeding at that time time and Hgb 13.5  She has previously been counseled on D&C [address] due to recurrent episodes of VB with insufficient endometrial sampling as an outpatient and pelvic US with EMS of 3 mm but presence of complex fluid - she had previously opted to manage expectantly as she was not bleeding at the time   Per Dr. Rudi Braun previous recommendations it would be acceptable to stop anticoagulation 1 day before procedure and day of procedure (48 hours). He also stated that no further pulmonary testing would be needed   She has been made aware that presence of endometrial cancer could impact her long term anticoagulation plan     Intermittent light bleeding since last visit, but heavy on day of ER visit with passage of clots   Heavily again on  AM - then decreased again. Earlier this morning there was a small clot but then nothing   No cramping pelvic pain associated with this     Menstrual History:  OB History          2    Para   2    Term   2            AB        Living   2         SAB        IAB        Ectopic        Multiple        Live Births                      No LMP recorded (lmp unknown).  Patient is postmenopausal.         Past Medical History:   Diagnosis Date    Anxiety     Broken hip ([address])     Depression     Diabetes mellitus type 2, diet-controlled ([address])     Hyperlipidemia     Hypertension     Parkinson disease     Pneumonia     Pulmonary embolism ([address]) 2023       Past Surgical History:   Procedure Laterality Date    ACHILLES TENDON SURGERY      APPENDECTOMY      COLONOSCOPY      DEEP BRAIN STIMULATOR PLACEMENT      DENTAL SURGERY FRACTURE SURGERY      IR PE ENDOVASCULAR THERAPY  4/17/2023    KNEE ARTHROSCOPY      NC INSJ/RPLCMT CRANIAL NEUROSTIM PULSE GENERATOR Right 12/18/2018    Procedure: REPLACEMENT IMPLANTABLE PULSE GENERATOR (IPG) DEEP BRAIN STIMULATION (DBS); Surgeon: Sharia Schirmer, MD;  Location: [facility];  Service: Neurosurgery    NC INSJ/RPLCMT CRANIAL NEUROSTIM PULSE GENERATOR Left 4/7/2021    Procedure: REPLACEMENT IMPLANTABLE PULSE GENERATOR FOR DEEP BRAIN STIMULATOR, LEFT CHEST;  Surgeon: Sharia Schirmer, MD;  Location[facility];  Service: Neurosurgery    NC INSJ/RPLCMT CRANIAL NEUROSTIM PULSE GENERATOR Right 6/27/2022    Procedure: Right chest IPG replacement for Deep Brain Stimulator;  Surgeon: Javier Johnston MD;  Location[facility];  Service: Neurosurgery    NC LAPAROSCOPIC APPENDECTOMY N/A 8/16/2020    Procedure: Katn Crumble;  Surgeon: Marcos Sanchez MD;  Location[facility];  Service: General    NC OPTX FEM SHFT FX W/INSJ IMED IMPLT W/WO SCREW Right 12/14/2019    Procedure: INSERTION NAIL IM FEMUR ANTEGRADE (TROCHANTERIC); Surgeon: Sergio Montoya DO;  [facility];  Service: Orthopedics    REPLACEMENT TOTAL KNEE      REVERSE TOTAL SHOULDER ARTHROPLASTY Left 8/23/2018    Procedure: ARTHROPLASTY SHOULDER REVERSE;  Surgeon: Ian Shannon MD;  [facility];  Service: Orthopedics    TONSILLECTOMY         Social History     Tobacco Use    Smoking status: Never    Smokeless tobacco: Never   Vaping Use    Vaping Use: Never used   Substance Use Topics    Alcohol use: Yes     Alcohol/week: 2.0 standard drinks of alcohol     Types: 2 Glasses of wine per week     Comment: occasional    Drug use: No        Allergies   Allergen Reactions    Sulfa Antibiotics Hives         Current Outpatient Medications:     apixaban (Eliquis) 5 mg, Take 1 tablet (5 mg total) by mouth 2 (two) times a day for 30 days, THEN 1 tablet (5 mg total) 2 (two) times a day.  10mg by mouth 2 times per day for 6 days then 5 mg by mouth 2 times per day for 6 months or indefinitely for acute pulmonary embolism. , Disp: 60 tablet, Rfl: 0    atenolol (TENORMIN) 50 mg tablet, Take 1 tablet (50 mg total) by mouth daily, Disp: 30 tablet, Rfl: 0    cefdinir (OMNICEF) 300 mg capsule, Take 1 capsule (300 mg total) by mouth every 12 (twelve) hours for 7 days, Disp: 14 capsule, Rfl: 0    Melatonin 5 MG CAPS, Take 15 mg by mouth Bedtime, Disp: , Rfl:     Nuplazid 34 MG CAPS, TAKE 1 CAPSULE BY MOUTH DAILY, Disp: 30 capsule, Rfl: 0    Omega-3 Fatty Acids (OMEGA-3 FISH OIL PO), , Disp: , Rfl:     PARoxetine (PAXIL) 20 mg tablet, Take 1 tablet (20 mg total) by mouth daily, Disp: 30 tablet, Rfl: 0    rivastigmine (EXELON) 4.5 MG capsule, TAKE 1 CAPSULE (4.5 MG TOTAL) BY MOUTH 2 (TWO) TIMES A DAY, Disp: 180 capsule, Rfl: 3    rosuvastatin (CRESTOR) 5 mg tablet, Take 1 tablet (5 mg total) by mouth every other day, Disp: 30 tablet, Rfl: 0    traZODone (DESYREL) 50 mg tablet, Take 50 mg by mouth daily at bedtime, Disp: , Rfl:     amoxicillin (AMOXIL) 500 mg capsule, , Disp: , Rfl:     carbidopa-levodopa (SINEMET CR)  mg TBCR per ER tablet, 1 po qam x 1 week then 1 po qam and q afternoon (Patient not taking: Reported on 9/25/2023), Disp: 180 tablet, Rfl: 2    clonazePAM (KlonoPIN) 0.5 mg tablet, TAKE ONE TABLET BY MOUTH AT BEDTIME (Patient not taking: Reported on 9/25/2023), Disp: 30 tablet, Rfl: 1    Coenzyme Q10 400 MG CAPS, Take 0.25 capsules (100 mg total) by mouth daily (Patient not taking: Reported on 10/23/2023), Disp: 30 capsule, Rfl: 0      Review of Systems   Constitutional:  Negative for appetite change, chills and fever. Eyes:  Negative for visual disturbance. Respiratory:  Negative for cough, chest tightness and shortness of breath. Cardiovascular:  Negative for chest pain. Gastrointestinal:  Negative for abdominal distention, abdominal pain, constipation, diarrhea, nausea and vomiting.    Endocrine: Negative for cold intolerance and heat intolerance. Genitourinary:  Positive for vaginal bleeding. Negative for difficulty urinating, dyspareunia, dysuria, frequency, genital sores, pelvic pain, urgency, vaginal discharge and vaginal pain. Musculoskeletal:  Negative for arthralgias. Neurological:  Negative for light-headedness and headaches. Hematological:  Does not bruise/bleed easily. Psychiatric/Behavioral:  Negative for behavioral problems. All other systems reviewed and are negative. /84 (BP Location: Right arm, Patient Position: Sitting, Cuff Size: Adult)   Pulse 57   Temp 97.6 °F (36.4 °C) (Tympanic)   Ht 5' 7" (1.702 m)   Wt 84.9 kg (187 lb 3.2 oz)   LMP  (LMP Unknown)   SpO2 99%   BMI 29.32 kg/m²       Physical Exam  Constitutional:       General: She is not in acute distress. Appearance: Normal appearance. HENT:      Head: Normocephalic and atraumatic. Cardiovascular:      Rate and Rhythm: Normal rate. Pulmonary:      Effort: Pulmonary effort is normal. No respiratory distress. Neurological:      General: No focal deficit present. Mental Status: She is alert. Psychiatric:         Mood and Affect: Mood normal.         Behavior: Behavior normal.   Vitals and nursing note reviewed. Appropriate laboratory testing, imaging studies, and prior external records were reviewed:     Assessment/Plan:       Problem List Items Addressed This Visit       Postmenopausal bleeding - Primary     Reviewed inability to exclude endometrial cancer thus far based on previous workup and given recurrent bleeding hysteroscopic visualization is recommended  If there is no cancer found could consider trial or vaginal estrogen as atrophy could be a factor   Discussed dilation and curettage, hysteroscopy and associated risks/benefits/alternatives.  Reviewed risks of pain, bleeding, infection, damage to surrounding structures, uterine perforation, fluid overload, need for additional procedures, and risks associated with anesthesia. Patient voiced understanding of above and desires to proceed.   - PAT labs ordered  - surgical consent, preop packet and wash provided  - reviewed anticoagulation plan   - desires Weds 11/22 OR date          Relevant Orders    Case request operating room: DILATATION AND CURETTAGE (D&C) WITH HYSTEROSCOPY (Completed)    Type and screen    CBC and Platelet    Basic metabolic panel

## 2023-11-06 DIAGNOSIS — R44.3 HALLUCINATIONS: ICD-10-CM

## 2023-11-06 RX ORDER — PIMAVANSERIN TARTRATE 34 MG/1
1 CAPSULE ORAL DAILY
Qty: 90 CAPSULE | Refills: 0 | Status: SHIPPED | OUTPATIENT
Start: 2023-11-06

## 2023-11-06 NOTE — TELEPHONE ENCOUNTER
Received a message through [address] [place] [address] stating that pt is requesting a 90 day script for Nuplazid to be sent to [facility]. Last OV was 6/28/23. Routed refill request to provider for review.

## 2023-11-09 ENCOUNTER — ANESTHESIA EVENT (OUTPATIENT)
Dept: PERIOP | Facility: HOSPITAL | Age: 78
End: 2023-11-09
Payer: MEDICARE

## 2023-11-10 RX ORDER — WHEAT DEXTRIN 1 G
TABLET,CHEWABLE ORAL
COMMUNITY

## 2023-11-10 NOTE — PRE-PROCEDURE INSTRUCTIONS
Pre-Surgery Instructions:   Medication Instructions    apixaban (Eliquis) 5 mg Instructions provided by MD; Hold x 48 hours per Dr. Corrina Koehler, LD 11/20    atenolol (TENORMIN) 50 mg tablet Take day of surgery. Calcium-Phosphorus-Vitamin D (CALCIUM/VITAMIN D3/ADULT GUMMY PO) Stop taking 7 days prior to surgery. Melatonin 5 MG CAPS Take night before surgery    Multiple Vitamins-Minerals (HM MULTIVITAMIN ADULT GUMMY PO) Stop taking 7 days prior to surgery. Nuplazid 34 MG CAPS Take day of surgery. Omega-3 Fatty Acids (OMEGA-3 FISH OIL PO) Stop taking 7 days prior to surgery. PARoxetine (PAXIL) 20 mg tablet Take day of surgery. rosuvastatin (CRESTOR) 5 mg tablet Take day of surgery. traZODone (DESYREL) 50 mg tablet Take night before surgery    Wheat Dextrin (Benefiber) CHEW Stop taking 7 days prior to surgery. Medication instructions for day surgery reviewed. Please use only a sip of water to take your instructed medications. Avoid all over the counter vitamins, supplements and NSAIDS for one week prior to surgery per anesthesia guidelines. Tylenol is ok to take as needed. You will receive a call one business day prior to surgery with an arrival time and hospital directions. If your surgery is scheduled on a Monday, the hospital will be calling you on the Friday prior to your surgery. If you have not heard from anyone by 8pm, please call the hospital supervisor through the hospital  at [phone]. Rozina Zoie [phone]). Do not eat or drink anything after midnight the night before your surgery, including candy, mints, lifesavers, or chewing gum. Do not drink alcohol 24hrs before your surgery. Try not to smoke at least 24hrs before your surgery. Follow the pre surgery showering instructions as listed in the [facility] Surgical Experience Booklet” or otherwise provided by your surgeon's office. Do not use a blade to shave the surgical area 1 week before surgery.  It is okay to use a clean electric clippers up to 24 hours before surgery. Do not apply any lotions, creams, including makeup, cologne, deodorant, or perfumes after showering on the day of your surgery. Do not use dry shampoo, hair spray, hair gel, or any type of hair products. No contact lenses, eye make-up, or artificial eyelashes. Remove nail polish, including gel polish, and any artificial, gel, or acrylic nails if possible. Remove all jewelry including rings and body piercing jewelry. Wear causal clothing that is easy to take on and off. Consider your type of surgery. Keep any valuables, jewelry, piercings at home. Please bring any specially ordered equipment (sling, braces) if indicated. Arrange for a responsible person to drive you to and from the hospital on the day of your surgery. Visitor Guidelines discussed. Call the surgeon's office with any new illnesses, exposures, or additional questions prior to surgery. Please reference your [facility] Surgical Experience Booklet” for additional information to prepare for your upcoming surgery.

## 2023-11-14 ENCOUNTER — APPOINTMENT (OUTPATIENT)
Dept: LAB | Facility: HOSPITAL | Age: 78
End: 2023-11-14
Payer: MEDICARE

## 2023-11-14 DIAGNOSIS — N95.0 POSTMENOPAUSAL BLEEDING: ICD-10-CM

## 2023-11-14 LAB
ABO GROUP BLD: NORMAL
ANION GAP SERPL CALCULATED.3IONS-SCNC: 7 MMOL/L
BLD GP AB SCN SERPL QL: NEGATIVE
BUN SERPL-MCNC: 19 MG/DL (ref 5–25)
CALCIUM SERPL-MCNC: 9.4 MG/DL (ref 8.4–10.2)
CHLORIDE SERPL-SCNC: 105 MMOL/L (ref 96–108)
CO2 SERPL-SCNC: 28 MMOL/L (ref 21–32)
CREAT SERPL-MCNC: 0.75 MG/DL (ref 0.6–1.3)
ERYTHROCYTE [DISTWIDTH] IN BLOOD BY AUTOMATED COUNT: 13.3 % (ref 11.6–15.1)
GFR SERPL CREATININE-BSD FRML MDRD: 76 ML/MIN/1.73SQ M
GLUCOSE SERPL-MCNC: 80 MG/DL (ref 65–140)
HCT VFR BLD AUTO: 41.8 % (ref 34.8–46.1)
HGB BLD-MCNC: 13.5 G/DL (ref 11.5–15.4)
MCH RBC QN AUTO: 31.2 PG (ref 26.8–34.3)
MCHC RBC AUTO-ENTMCNC: 32.3 G/DL (ref 31.4–37.4)
MCV RBC AUTO: 97 FL (ref 82–98)
PLATELET # BLD AUTO: 223 THOUSANDS/UL (ref 149–390)
PMV BLD AUTO: 11 FL (ref 8.9–12.7)
POTASSIUM SERPL-SCNC: 3.7 MMOL/L (ref 3.5–5.3)
RBC # BLD AUTO: 4.33 MILLION/UL (ref 3.81–5.12)
RH BLD: POSITIVE
SODIUM SERPL-SCNC: 140 MMOL/L (ref 135–147)
SPECIMEN EXPIRATION DATE: NORMAL
WBC # BLD AUTO: 9.21 THOUSAND/UL (ref 4.31–10.16)

## 2023-11-14 PROCEDURE — 86900 BLOOD TYPING SEROLOGIC ABO: CPT

## 2023-11-14 PROCEDURE — 86850 RBC ANTIBODY SCREEN: CPT

## 2023-11-14 PROCEDURE — 36415 COLL VENOUS BLD VENIPUNCTURE: CPT

## 2023-11-14 PROCEDURE — 86901 BLOOD TYPING SEROLOGIC RH(D): CPT

## 2023-11-14 PROCEDURE — 85027 COMPLETE CBC AUTOMATED: CPT

## 2023-11-14 PROCEDURE — 80048 BASIC METABOLIC PNL TOTAL CA: CPT

## 2023-11-22 ENCOUNTER — HOSPITAL ENCOUNTER (OUTPATIENT)
Facility: HOSPITAL | Age: 78
Setting detail: OUTPATIENT SURGERY
Discharge: HOME | End: 2023-11-22
Attending: OBSTETRICS & GYNECOLOGY | Admitting: OBSTETRICS & GYNECOLOGY
Payer: MEDICARE

## 2023-11-22 ENCOUNTER — TELEPHONE (OUTPATIENT)
Dept: OBGYN CLINIC | Facility: CLINIC | Age: 78
End: 2023-11-22

## 2023-11-22 ENCOUNTER — ANESTHESIA (OUTPATIENT)
Dept: PERIOP | Facility: HOSPITAL | Age: 78
End: 2023-11-22
Payer: MEDICARE

## 2023-11-22 VITALS
OXYGEN SATURATION: 95 % | RESPIRATION RATE: 17 BRPM | HEART RATE: 52 BPM | TEMPERATURE: 97.7 F | DIASTOLIC BLOOD PRESSURE: 50 MMHG | SYSTOLIC BLOOD PRESSURE: 95 MMHG

## 2023-11-22 DIAGNOSIS — N95.0 POSTMENOPAUSAL BLEEDING: ICD-10-CM

## 2023-11-22 LAB
GLUCOSE SERPL-MCNC: 106 MG/DL (ref 65–140)
GLUCOSE SERPL-MCNC: 125 MG/DL (ref 65–140)

## 2023-11-22 PROCEDURE — 58558 HYSTEROSCOPY BIOPSY: CPT | Performed by: OBSTETRICS & GYNECOLOGY

## 2023-11-22 PROCEDURE — 82948 REAGENT STRIP/BLOOD GLUCOSE: CPT

## 2023-11-22 PROCEDURE — 88305 TISSUE EXAM BY PATHOLOGIST: CPT | Performed by: PATHOLOGY

## 2023-11-22 RX ORDER — ONDANSETRON 2 MG/ML
INJECTION INTRAMUSCULAR; INTRAVENOUS AS NEEDED
Status: DISCONTINUED | OUTPATIENT
Start: 2023-11-22 | End: 2023-11-22

## 2023-11-22 RX ORDER — FENTANYL CITRATE/PF 50 MCG/ML
50 SYRINGE (ML) INJECTION
Status: DISCONTINUED | OUTPATIENT
Start: 2023-11-22 | End: 2023-11-22 | Stop reason: HOSPADM

## 2023-11-22 RX ORDER — OXYCODONE HYDROCHLORIDE 5 MG/1
5 TABLET ORAL EVERY 4 HOURS PRN
Status: DISCONTINUED | OUTPATIENT
Start: 2023-11-22 | End: 2023-11-22 | Stop reason: HOSPADM

## 2023-11-22 RX ORDER — SODIUM CHLORIDE 9 MG/ML
125 INJECTION, SOLUTION INTRAVENOUS CONTINUOUS
Status: DISCONTINUED | OUTPATIENT
Start: 2023-11-22 | End: 2023-11-22 | Stop reason: HOSPADM

## 2023-11-22 RX ORDER — ONDANSETRON 2 MG/ML
4 INJECTION INTRAMUSCULAR; INTRAVENOUS EVERY 6 HOURS PRN
Status: DISCONTINUED | OUTPATIENT
Start: 2023-11-22 | End: 2023-11-22 | Stop reason: HOSPADM

## 2023-11-22 RX ORDER — PROPOFOL 10 MG/ML
INJECTION, EMULSION INTRAVENOUS AS NEEDED
Status: DISCONTINUED | OUTPATIENT
Start: 2023-11-22 | End: 2023-11-22

## 2023-11-22 RX ORDER — ONDANSETRON 2 MG/ML
4 INJECTION INTRAMUSCULAR; INTRAVENOUS ONCE AS NEEDED
Status: DISCONTINUED | OUTPATIENT
Start: 2023-11-22 | End: 2023-11-22 | Stop reason: HOSPADM

## 2023-11-22 RX ORDER — LIDOCAINE HYDROCHLORIDE 10 MG/ML
INJECTION, SOLUTION EPIDURAL; INFILTRATION; INTRACAUDAL; PERINEURAL AS NEEDED
Status: DISCONTINUED | OUTPATIENT
Start: 2023-11-22 | End: 2023-11-22

## 2023-11-22 RX ORDER — DEXAMETHASONE SODIUM PHOSPHATE 10 MG/ML
INJECTION, SOLUTION INTRAMUSCULAR; INTRAVENOUS AS NEEDED
Status: DISCONTINUED | OUTPATIENT
Start: 2023-11-22 | End: 2023-11-22

## 2023-11-22 RX ORDER — ACETAMINOPHEN 325 MG/1
975 TABLET ORAL EVERY 6 HOURS PRN
Status: DISCONTINUED | OUTPATIENT
Start: 2023-11-22 | End: 2023-11-22 | Stop reason: HOSPADM

## 2023-11-22 RX ORDER — FENTANYL CITRATE 50 UG/ML
INJECTION, SOLUTION INTRAMUSCULAR; INTRAVENOUS AS NEEDED
Status: DISCONTINUED | OUTPATIENT
Start: 2023-11-22 | End: 2023-11-22

## 2023-11-22 RX ORDER — SODIUM CHLORIDE 9 MG/ML
INJECTION, SOLUTION INTRAVENOUS AS NEEDED
Status: DISCONTINUED | OUTPATIENT
Start: 2023-11-22 | End: 2023-11-22 | Stop reason: HOSPADM

## 2023-11-22 RX ADMIN — FENTANYL CITRATE 25 MCG: 50 INJECTION INTRAMUSCULAR; INTRAVENOUS at 07:57

## 2023-11-22 RX ADMIN — PROPOFOL 110 MG: 10 INJECTION, EMULSION INTRAVENOUS at 07:37

## 2023-11-22 RX ADMIN — ONDANSETRON 4 MG: 2 INJECTION INTRAMUSCULAR; INTRAVENOUS at 07:45

## 2023-11-22 RX ADMIN — DEXAMETHASONE SODIUM PHOSPHATE 10 MG: 10 INJECTION INTRAMUSCULAR; INTRAVENOUS at 07:45

## 2023-11-22 RX ADMIN — FENTANYL CITRATE 25 MCG: 50 INJECTION INTRAMUSCULAR; INTRAVENOUS at 07:45

## 2023-11-22 RX ADMIN — SODIUM CHLORIDE 125 ML/HR: 0.9 INJECTION, SOLUTION INTRAVENOUS at 06:42

## 2023-11-22 RX ADMIN — LIDOCAINE HYDROCHLORIDE 100 MG: 10 INJECTION, SOLUTION EPIDURAL; INFILTRATION; INTRACAUDAL; PERINEURAL at 07:37

## 2023-11-22 NOTE — ANESTHESIA PREPROCEDURE EVALUATION
Procedure:  (D&C) W/ HYSTEROSCOPY (Uterus)    Relevant Problems   CARDIO   (+) Essential hypertension   (+) Hyperlipidemia   (+) Hypertension   (+) Other hyperlipidemia   (+) Pulmonary embolism (HCC)      ENDO   (+) Type 2 diabetes mellitus without complication (HCC)      GI/HEPATIC   (+) Other dysphagia      NEURO/PSYCH   (+) Anxiety   (+) Depression   (+) Subdural hemorrhage (HCC)      Nervous and Auditory   (+) 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   (+) Parkinson's disease with use of electrical brain stimulation      Other   (+) Presence of neurostimulator        Physical Exam    Airway    Mallampati score: II  TM Distance: >3 FB  Neck ROM: full     Dental    lower dentures and upper dentures    Cardiovascular  Rhythm: regular, Rate: normal, Cardiovascular exam normal    Pulmonary  Pulmonary exam normal Breath sounds clear to auscultation    Other Findings  post-pubertal.      Anesthesia Plan  ASA Score- 3     Anesthesia Type- general with ASA Monitors. Additional Monitors:     Airway Plan: LMA. Comment: Patient and spouse stated that she is able to sign consent. Patient and  aware that cognitive issues may worsen after anesthesia . Plan Factors-    Chart reviewed. EKG reviewed. Imaging results reviewed. Existing labs reviewed. Patient summary reviewed. Patient is not a current smoker. Obstructive sleep apnea risk education given perioperatively. Induction- intravenous. Postoperative Plan-     Informed Consent- Anesthetic plan and risks discussed with patient and spouse.

## 2023-11-22 NOTE — OP NOTE
OPERATIVE REPORT  PATIENT NAME: Vinny Bernard    :    MRN: [id number]  Pt Location: [facility] OR ROOM 04    SURGERY DATE: 2023    Surgeon(s) and Role:     * Rupesh Mahan MD - Primary    Preop Diagnosis:  Postmenopausal bleeding [N95.0]    Post-Op Diagnosis Codes:     * Postmenopausal bleeding [N95.0]    Procedure(s):  (D&C) W/ HYSTEROSCOPY    Specimen(s):  ID Type Source Tests Collected by Time Destination   1 : Endometrial curettings Tissue Endometrium TISSUE EXAM Rupesh Mahan MD 2023 0801        Estimated Blood Loss:   Minimal    Drains:  * No LDAs found *    Anesthesia Type:   General LMA    Operative Indications:  Postmenopausal bleeding [N95.0]  Recurrent postmenopausal vaginal bleeding with insufficient outpatient endometrial sampling      Operative Findings:  Large 1 cm urethral caruncle, friable with contact  Normal multiparous appearing cervix  Atrophic appearing endometrial cavity with no focal lesions  Bilateral tubal ostia visualized   Fluid deficit 140 cc normal saline    Complications:   None    Procedure and Technique:    The patient was correctly identified as and taken to the OR where general anesthesia was obtained without difficulty. She was placed in the dorsal lithotomy position and was prepped and draped in a sterile fashion. The patient was examined under anesthesia and was found to have a normal sized uterus with no adnexal masses. The bladder was catheterized in a sterile fashion. The large urethral caruncle was noted as above. A Graves speculum and Jonathan retractor was inserted into the vagina and the anterior lip of the cervix was visualized and grasped with a single toothed tenaculum. Uterus was sounded to 8 cm. Using Jerome dilators, the cervix was progressively dilated to about 15 Portuguese to accommodate a 5 mm diagnostic hysteroscope.   The entire endometrial cavity was visualized as well as bilateral ostia and appeared atrophic with no focal lesions. Hysteroscope was removed. The uterine cavity was systematically curetted with a sharp curette and the curettings sent for analysis. The tenaculum and retractor were removed. There was no bleeding noted from tenaculum site. The patient tolerated the procedure well. Sponge and instrument count were correct x 2. The patient was taken to the PACU in a stable condition. Patient Disposition:  hemodynamically stable    I, Terrance Babb, was present and performed the entirety of the above procedure.      SIGNATURE: Terrance Babb MD  DATE: November 22, 2023  TIME: 8:10 AM

## 2023-11-22 NOTE — ANESTHESIA POSTPROCEDURE EVALUATION
Post-Op Assessment Note    CV Status:  Stable    Pain management: adequate       Mental Status:  Alert and awake   Hydration Status:  Euvolemic   PONV Controlled:  Controlled   Airway Patency:  Patent     Post Op Vitals Reviewed: Yes    No anethesia notable event occurred.     Staff: Anesthesiologist               /59 (11/22/23 0848)    Temp      Pulse (!) 50 (11/22/23 0848)   Resp (!) 24 (11/22/23 0848)    SpO2 96 % (11/22/23 0848)

## 2023-11-22 NOTE — TELEPHONE ENCOUNTER
Patient spouse scheduled post op appt. Patient  also wanted to know if she would still need to complete the u/s she is scheduled for on 11/27.  Please advise

## 2023-11-30 ENCOUNTER — TELEPHONE (OUTPATIENT)
Dept: PULMONOLOGY | Facility: CLINIC | Age: 78
End: 2023-11-30

## 2023-11-30 ENCOUNTER — TELEPHONE (OUTPATIENT)
Age: 78
End: 2023-11-30

## 2023-11-30 PROCEDURE — 88305 TISSUE EXAM BY PATHOLOGIST: CPT | Performed by: PATHOLOGY

## 2023-11-30 NOTE — TELEPHONE ENCOUNTER
AISHWARYA for spouse to call back to schedule follow up with Dr. Jose Zheng in [facility].

## 2023-12-13 ENCOUNTER — PROCEDURE VISIT (OUTPATIENT)
Dept: NEUROLOGY | Facility: CLINIC | Age: 78
End: 2023-12-13
Payer: MEDICARE

## 2023-12-13 VITALS
TEMPERATURE: 96.7 F | HEIGHT: 67 IN | DIASTOLIC BLOOD PRESSURE: 72 MMHG | BODY MASS INDEX: 29.32 KG/M2 | SYSTOLIC BLOOD PRESSURE: 98 MMHG

## 2023-12-13 DIAGNOSIS — F41.9 ANXIETY: ICD-10-CM

## 2023-12-13 DIAGNOSIS — G47.00 INSOMNIA: ICD-10-CM

## 2023-12-13 DIAGNOSIS — G47.52 REM BEHAVIORAL DISORDER: ICD-10-CM

## 2023-12-13 DIAGNOSIS — R44.1 HALLUCINATION, VISUAL: ICD-10-CM

## 2023-12-13 DIAGNOSIS — G20.A1 COGNITIVE DEFICIT DUE TO PARKINSON'S DISEASE: ICD-10-CM

## 2023-12-13 DIAGNOSIS — G20.A1 PARKINSON'S DISEASE WITH USE OF ELECTRICAL BRAIN STIMULATION: Primary | ICD-10-CM

## 2023-12-13 PROCEDURE — 99215 OFFICE O/P EST HI 40 MIN: CPT | Performed by: PSYCHIATRY & NEUROLOGY

## 2023-12-13 RX ORDER — TRAZODONE HYDROCHLORIDE 100 MG/1
100 TABLET ORAL
Qty: 30 TABLET | Refills: 5 | Status: SHIPPED | OUTPATIENT
Start: 2023-12-13

## 2023-12-13 NOTE — PROGRESS NOTES
Patient ID: Sally Oliva is a 78 y.o. female    Assessment/Plan:    Parkinson's disease with use of electrical brain stimulation (HCC)  Parkinson disease complicated by prominent postural instability, dementia with psychosis and insomnia.  Mobility has significantly declined in recent years.  Now ambulating only with walker with supervision and assistance to prevent falls.  This has led to ulcers that she has difficulty shifting in chair.    She continues to have hallucinations.  She has always been sensitive to medications.  Nuplazid has partially helped and control hallucinations.  Previous retrial of levodopa have led to increased hallucinations.  Will remain on current regimen of rivastigmine, and Nuplazid along with current stimulation settings.        Will try increasing trazodone. Potential for serotonin syndrome with use of paroxetine and trazodone reviewed.   Will obtain EKG.  If increasing doses trazodone jasso snot improve sleep will consider retrial of low dose quetiapine.      REM behavioral disorder  Patient with insomnia and REM sleep behavior disorder.  Recently started on trazodone 25 mg which has not been effective.  Will try increasing trazodone. Potential for serotonin syndrome with use of paroxetine and trazodone reviewed.   Will obtain EKG.  If increasing doses trazodone does not improve sleep will consider retrial of low dose quetiapine.        Diagnoses and all orders for this visit:    Parkinson's disease with use of electrical brain stimulation  -     ECG 12 lead; Future    Hallucination, visual  -     ECG 12 lead; Future    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    REM behavioral disorder    Anxiety  -     traZODone (DESYREL) 100 mg tablet; Take 1 tablet (100 mg total) by mouth daily at bedtime    Insomnia  -     traZODone (DESYREL) 100 mg tablet; Take 1 tablet (100 mg total) by mouth daily at bedtime    I have spent a total time of 41 minutes on 12/19/23 in caring for this  "patient including Prognosis, Patient and family education, Impressions, Counseling / Coordination of care, Documenting in the medical record, and Obtaining or reviewing history  .      Subjective:      Sally Oliva is a 78 y.o. female w/ PMH psoriatic arthritis, HTN, DM2, PD now s/p b/l STN DBS implantation (2014), Left IPG replacement 4/7/21 and Right IPG replacement 6/17/22 who presents today as follow-up for PD. Per chart review, PD present since at least 2009, DBS surgery 11/5/14, ACTIVA SC 11/11/13, R IPG replacement 12/18/18, left battery replacement 4/7/21. Previous medications which did not work or that she could not tolerate include sinemet, amantadine, ropinirole, Requip, mirapex.     She presents today with caregiver and .      Legs have been getting weaker. They are utilizing a walker.     Ambulates with walker. With supervision. She can sometime collapse while walking so only walks short distances.    Caregiver there 7 days a week.   She is assisted with all ADL's.    Speech is soft. There is no drooling. She can have difficulty swallowing with choking at times. She is having a reduction in appetite.   She has been having some behavioral issues occasionally in the am but more common after 3:30pm. This can last hours to all evening.   She will try to go home and get out of the chair. She does not recognize her home at times. They try to reassure her and talk her down.  She will sometimes refused to ADL's and can be aggressive pushing caregivers.   She has visual hallucinations on a daily basis but not as bad as prior.    She will go 2-3 nights with little to no sleep, trying to get out of her hospital bed. Her  therefore cannot sleep.      Current medications:  Rivastigmine 4.5 mg bid  Pimvanserin (Nuplazid) 34 mg qhs  Azilect 1mg daily   Melatonin 15mg qhs     Prior medications:  Sinemet             Objective:    BP 98/72   Temp (!) 96.7 °F (35.9 °C)   Ht 5' 7\" (1.702 m)   LMP  (LMP " Unknown)   BMI 29.32 kg/m²       Physical Exam  Vitals reviewed.   Eyes:      Extraocular Movements: Extraocular movements intact.   Neurological:      Mental Status: She is alert.      Motor: Motor strength is normal.        Neurological Exam  Mental Status  Alert. Oriented only to person and situation. Speech: hypophonia. Language is fluent with no aphasia. Attention and concentration are normal.    Cranial Nerves  CN III, IV, VI: Extraocular movements intact bilaterally.   Right pupil: 1 mm.   Left pupil: 1 mm.  CN VII: Full and symmetric facial movement.  CN VIII: Hearing is normal.  CN XI: Shoulder shrug strength is normal.  CN XII: Tongue midline without atrophy or fasciculations.    Motor   Increased muscle tone. Strength is 5/5 throughout all four extremities.    Sensory  Light touch is normal in upper and lower extremities.     Coordination  Right: Finger-to-nose normal.Left: Finger-to-nose normal.  See motor UPDRS.    Gait  Casual gait: Unable to rise from chair without using arms.  Arose with assistance.  Moderately stooped over her walker.  Freezing on initiation and turns. .              MDS UPDRS III                             Time since last dose:    12/13/23   Speech  2 2   Facial Expression  2 2   Rigidity - Neck  2 2   Rigidity - Upper Extremity (Right)  1 2   Rigidity - Upper Extremity (Left)   2 2   Rigidity - Lower Extremity (Right)  0 0   Rigidity - Lower Extremity (Left)   2 2   Finger Taps (Right)   3 3   Finger Taps (Left)   3 3   Hand Movement (Right)  3 3   Hand Movement (Left)   3 3   Pronation/Supination (Right)  2 3   Pronation/Supination (Left)   3 3   Toe Tapping (Right) 3 3   Toe Tapping (Left) 3 3   Leg Agility (Right)  2 3   Leg Agility (Left)   2 3   Arising from Chair   3 3   Gait   3 3   Freezing of Gait 0 1   Postural Stability     3   Posture 2 3   Global spontaneity of movement 3 3   Postural Tremor (Right) 0 0   Postural Tremor (Left) 0 0   Kinetic Tremor (Right)  0 0    Kinetic Tremor (Left)  0 0   Rest tremor amplitude RUE 0 0   Rest tremor amplitude LUE 0 0   Rest tremor amplitude RLE 0 0   Reset tremor amplitude LLE 0 0   Lip/Jaw Tremor  0 0          Motor Exa             Balbina Lopez MD  Movement disorder physician  [facility]

## 2023-12-13 NOTE — PATIENT INSTRUCTIONS
PD complicated by postural instability, dementia with psychosis and insomnia.  Will try increasing trazodone. Potential for serotonin syndrome with use of paroxetine and trazodone reviewed.   Will obtain EKG.  If increasing doses trazodone jasso snot improve sleep will consider retrial of low dose quetiapine.

## 2023-12-13 NOTE — PROGRESS NOTES
Patient ID: Sally Oliva is a 78 y.o. female.    Assessment/Plan:    No problem-specific Assessment & Plan notes found for this encounter.       {Assess/PlanSmartLinks:28165}       Subjective:    HPI    {[facility] Neurology HPI texts:90992}    {Common ambulatory SmartLinks:29250}         Objective:    There were no vitals taken for this visit.    Physical Exam    Neurological Exam      ROS:    Review of Systems   Constitutional:  Negative for appetite change, fatigue and fever.   HENT: Negative.  Negative for hearing loss, tinnitus, trouble swallowing and voice change.    Eyes: Negative.  Negative for photophobia, pain and visual disturbance.   Respiratory: Negative.  Negative for shortness of breath.    Cardiovascular: Negative.  Negative for palpitations.   Gastrointestinal: Negative.  Negative for nausea and vomiting.   Endocrine: Negative.  Negative for cold intolerance.   Genitourinary: Negative.  Negative for dysuria, frequency and urgency.   Musculoskeletal:  Positive for gait problem. Negative for back pain, myalgias and neck pain.        Having difficulty walking, legs are very weak   Skin: Negative.  Negative for rash.   Allergic/Immunologic: Negative.    Neurological:  Negative for dizziness, tremors, seizures, syncope, facial asymmetry, speech difficulty, weakness, light-headedness, numbness and headaches.   Hematological: Negative.  Does not bruise/bleed easily.   Psychiatric/Behavioral: Negative.  Negative for confusion, hallucinations and sleep disturbance.         Mood swings in afternoon sometimes aggressive. Wants to go somewhere but not sure where   All other systems reviewed and are negative.

## 2023-12-13 NOTE — ASSESSMENT & PLAN NOTE
Parkinson disease complicated by prominent postural instability, dementia with psychosis and insomnia.  Mobility has significantly declined in recent years.  Now ambulating only with walker with supervision and assistance to prevent falls.  This has led to ulcers that she has difficulty shifting in chair.    She continues to have hallucinations.  She has always been sensitive to medications.  Nuplazid has partially helped and control hallucinations.  Previous retrial of levodopa have led to increased hallucinations.  Will remain on current regimen of rivastigmine, and Nuplazid along with current stimulation settings.        Will try increasing trazodone. Potential for serotonin syndrome with use of paroxetine and trazodone reviewed.   Will obtain EKG.  If increasing doses trazodone jasso snot improve sleep will consider retrial of low dose quetiapine.

## 2023-12-15 ENCOUNTER — OFFICE VISIT (OUTPATIENT)
Dept: OBGYN CLINIC | Facility: CLINIC | Age: 78
End: 2023-12-15
Payer: MEDICARE

## 2023-12-15 VITALS
HEART RATE: 64 BPM | SYSTOLIC BLOOD PRESSURE: 122 MMHG | WEIGHT: 176 LBS | DIASTOLIC BLOOD PRESSURE: 72 MMHG | HEIGHT: 67 IN | BODY MASS INDEX: 27.62 KG/M2 | OXYGEN SATURATION: 99 %

## 2023-12-15 DIAGNOSIS — N36.2 URETHRAL CARUNCLE: ICD-10-CM

## 2023-12-15 DIAGNOSIS — Z09 POSTOP CHECK: Primary | ICD-10-CM

## 2023-12-15 PROCEDURE — 99212 OFFICE O/P EST SF 10 MIN: CPT | Performed by: PHYSICIAN ASSISTANT

## 2023-12-15 NOTE — PROGRESS NOTES
Patient here for f/u after hysteroscopy D&C performed on 11/22/23 with Dr Garett Klein for postmenopausal bleeding. Pathology:  Final Diagnosis   A. Uterus, Endometrium, Curetting:  - Scant fragments of atrophic endometrium. Patient denies fever, discharge, pain    Occasional spotting since surgery. Reviewed that urethral caruncle noted on exam.  Recommend eval with urology. Also discussed option for replens. Patient with hx of pulmonary embolism on eliquis so not candidate for estrogen. Vitals:    12/15/23 1336   BP: 122/72   Pulse: 64   SpO2: 99%         Referral placed to urology. Patient to call if increased bleeding  Recommend replens  Total time spent today 10 minutes. Greater than 50% of total time was spent with the patient and / or family counseling and / or coordination of care.

## 2023-12-19 ENCOUNTER — TELEPHONE (OUTPATIENT)
Dept: NEUROLOGY | Facility: CLINIC | Age: 78
End: 2023-12-19

## 2023-12-19 DIAGNOSIS — G20.A1 PARKINSON'S DISEASE WITH USE OF ELECTRICAL BRAIN STIMULATION: Primary | ICD-10-CM

## 2023-12-19 NOTE — TELEPHONE ENCOUNTER
FUNMILAYO called and spoke with patient's spouse, Quinn at [phone].  Quinn would like to see if patient can get home care.  Spouse would like referral to [facility] for skilled nursing for wound care as well as physical therapy.      Dr. Luz,     If appropriate, would you please place a referral for [facility] for SN, and PT?  I believe once a skilled nurse goes into the home, they can assess for further needs as well.  Thank you in advance.

## 2023-12-19 NOTE — ASSESSMENT & PLAN NOTE
Patient with insomnia and REM sleep behavior disorder.  Recently started on trazodone 25 mg which has not been effective.  Will try increasing trazodone. Potential for serotonin syndrome with use of paroxetine and trazodone reviewed.   Will obtain EKG.  If increasing doses trazodone does not improve sleep will consider retrial of low dose quetiapine.

## 2023-12-21 NOTE — TELEPHONE ENCOUNTER
FUNMILAYO called [facility] to check the status of referral for home healthcare (skilled nursing and PT).  Patient requires a face to face prior to home care opening up the case to go out to see patient.      Is this something that can be done?  Please advise.  Thank you in advance.

## 2023-12-22 NOTE — TELEPHONE ENCOUNTER
SW called patient's spouse at [phone].  Spouse stated he mentioned the wound as they were leaving appointment with neurology so wound was not looked at at that time.  SW explained need for face to face in order for home care to start care.  Spouse is going to call PCP to see if/when he can get patient in to have an appointment.  If patient can get in with the PCP sooner, they could potentially writer orders for and do the face to face for home care.  SW waiting to hear back from patient's spouse and/or neurology if patient able to get an appointment.  SW remains available.

## 2024-01-08 NOTE — TELEPHONE ENCOUNTER
FUNMILAYO called patient's spouse at [phone].  Spouse, Quinn believes he will be able to get his wife in to see PCP this week to work on getting home PT and skilled nursing for wound care.  No other needs at this time.  SW remains available.

## 2024-01-15 NOTE — TELEPHONE ENCOUNTER
No appointment yet scheduled for patient to see PCP regarding home care services family requested.  Patient and spouse aware of what to do if still interested in getting home care.  No further questions or needs at this time.  SW will be available for future social needs as requested.   Will close task at this time.

## 2024-01-18 DIAGNOSIS — R44.3 HALLUCINATIONS: ICD-10-CM

## 2024-01-18 RX ORDER — PIMAVANSERIN TARTRATE 34 MG/1
1 CAPSULE ORAL DAILY
Qty: 30 CAPSULE | Refills: 0 | Status: SHIPPED | OUTPATIENT
Start: 2024-01-18

## 2024-02-21 DIAGNOSIS — R44.3 HALLUCINATIONS: ICD-10-CM

## 2024-02-21 RX ORDER — PIMAVANSERIN TARTRATE 34 MG/1
1 CAPSULE ORAL DAILY
Qty: 30 CAPSULE | Refills: 0 | Status: SHIPPED | OUTPATIENT
Start: 2024-02-21

## 2024-03-08 ENCOUNTER — OFFICE VISIT (OUTPATIENT)
Dept: PULMONOLOGY | Facility: CLINIC | Age: 79
End: 2024-03-08
Payer: MEDICARE

## 2024-03-08 VITALS
DIASTOLIC BLOOD PRESSURE: 64 MMHG | BODY MASS INDEX: 29 KG/M2 | TEMPERATURE: 96.8 F | SYSTOLIC BLOOD PRESSURE: 106 MMHG | HEART RATE: 54 BPM | OXYGEN SATURATION: 91 % | WEIGHT: 184.8 LBS | HEIGHT: 67 IN

## 2024-03-08 DIAGNOSIS — Z86.711 HISTORY OF PULMONARY EMBOLISM: Primary | ICD-10-CM

## 2024-03-08 DIAGNOSIS — G20.A1 PARKINSON'S DISEASE WITH USE OF ELECTRICAL BRAIN STIMULATION: ICD-10-CM

## 2024-03-08 PROCEDURE — 99214 OFFICE O/P EST MOD 30 MIN: CPT | Performed by: INTERNAL MEDICINE

## 2024-03-08 NOTE — PROGRESS NOTES
Pulmonary Outpatient Note   Sally Oliva 78 y.o. female MRN: [id number]  3/9/2024        Reason for Consultation:    Chief Complaint   Patient presents with    Pulmonary Embolism         Assessment/Plan:    1. History of pulmonary embolism  Assessment & Plan:  History of extensive PE in April 2023 intermediate-high risk  Had IR thrombectomy   Currently on Eliquis 5 mg BID    No recent bleeding issues    There was no major provoking factor for the PE.  No prior personal history of VTE.  She does have some decreased mobility from Parkinsons    No dyspnea    Discussed options with patient and her  and caretaker.  Through joint decision making we have decided to switch from Eliquis 5 mg BID to lower dose 2.5 mg BID for prolonged course.  She will continue this indefinitely.  I advised her and her  of signs/symptoms of DVT and PE and they will seek prompt attention if these occur.    RTC 1 year.    Orders:  -     apixaban (ELIQUIS) 2.5 mg; Take 1 tablet (2.5 mg total) by mouth 2 (two) times a day    2. Parkinson's disease with use of electrical brain stimulation            Health Maintenance  Immunization History   Administered Date(s) Administered    COVID-19 MODERNA VACC 0.5 ML IM 01/11/2021, 02/08/2021, 10/13/2021, 04/29/2022    COVID-19 Moderna Vac BIVALENT 12 Yr+ IM 0.5 ML 10/24/2022    INFLUENZA 11/12/2015, 11/15/2016, 08/21/2017, 09/24/2018, 10/03/2019, 09/25/2020, 11/19/2021, 12/16/2022    Influenza, high dose seasonal 0.7 mL 09/25/2023    Pneumococcal Conjugate 13-Valent 02/16/2015    Tdap 04/22/2021    Tuberculin Skin Test-PPD Intradermal 09/10/2018          Return in about 1 year (around 3/8/2025).    History of Present Illness   HPI:  Sally Oliva is a 78 y.o. female who has history of intermediate-high risk PE 4/2023 s/p IR thrombectomy, Parkinson's disease who presents for pulmonary follow-up for PE.    No shortness of breath.    No bleeding.    No chest pain.      September 2023  Last  seen in July- continued on Eliquis for PE.  Referred to GYN for AUB.  TTE 8/31 with no RV strain    More recently her abnormal uterine bleeding has stopped.    No shortness of breath. No chest pain.      From July 2023  Seen by me in hospital in April.   She was admitted for dyspnea, altered mental status.  Found to be hypoxemic.  Required BiPAP initially.  She had intermediate-high risk PE with R heart strain. She had Bilateral thrombectomy with IR on 4/17.    She was discharged 4/22 on eliquis.  She was on room air on discharge      Review of Systems   Constitutional:  Negative for chills and fever.   HENT:  Negative for ear pain and sore throat.    Eyes:  Negative for pain and visual disturbance.   Respiratory:  Negative for cough and shortness of breath.    Cardiovascular:  Negative for chest pain and palpitations.   Gastrointestinal:  Negative for abdominal pain and vomiting.   Genitourinary:  Negative for dysuria and hematuria.   Musculoskeletal:  Negative for arthralgias and back pain.   Skin:  Negative for color change and rash.   Neurological:  Negative for seizures and syncope.   All other systems reviewed and are negative.          Historical Information   Past Medical History:   Diagnosis Date    Anxiety     Broken hip (HCC)     Depression     Diabetes mellitus type 2, diet-controlled (HCC)     Hyperlipidemia     Hypertension     Parkinson disease     Pneumonia     Pulmonary embolism (HCC) 04/16/2023     Past Surgical History:   Procedure Laterality Date    ACHILLES TENDON SURGERY      APPENDECTOMY      COLONOSCOPY      DEEP BRAIN STIMULATOR PLACEMENT      DENTAL SURGERY      FRACTURE SURGERY      IR PE ENDOVASCULAR THERAPY  4/17/2023    KNEE ARTHROSCOPY      SD HYSTEROSCOPY BX ENDOMETRIUM&/POLYPC W/WO D&C N/A 11/22/2023    Procedure: (D&C) W/ HYSTEROSCOPY;  Surgeon: Charissa Mondragon MD;  Location: [facility];  Service: Gynecology    SD INSJ/RPLCMT CRANIAL NEUROSTIM PULSE GENERATOR Right 12/18/2018     Procedure: REPLACEMENT IMPLANTABLE PULSE GENERATOR (IPG) DEEP BRAIN STIMULATION (DBS);  Surgeon: Bon Ross MD;  Location: [facility];  Service: Neurosurgery    ID INSJ/RPLCMT CRANIAL NEUROSTIM PULSE GENERATOR Left 4/7/2021    Procedure: REPLACEMENT IMPLANTABLE PULSE GENERATOR FOR DEEP BRAIN STIMULATOR, LEFT CHEST;  Surgeon: Bon Ross MD;  [facility];  Service: Neurosurgery    ID INSJ/RPLCMT CRANIAL NEUROSTIM PULSE GENERATOR Right 6/27/2022    Procedure: Right chest IPG replacement for Deep Brain Stimulator;  Surgeon: Freedom Ross MD;  [facility];  Service: Neurosurgery    ID LAPAROSCOPIC APPENDECTOMY N/A 8/16/2020    Procedure: APPENDECTOMY LAPAROSCOPIC;  Surgeon: Wild Last MD;  [facility];  Service: General    ID OPTX FEM SHFT FX W/INSJ IMED IMPLT W/WO SCREW Right 12/14/2019    Procedure: INSERTION NAIL IM FEMUR ANTEGRADE (TROCHANTERIC);  Surgeon: Jose Weinstein DO;  [facility];  Service: Orthopedics    REPLACEMENT TOTAL KNEE      REVERSE TOTAL SHOULDER ARTHROPLASTY Left 8/23/2018    Procedure: ARTHROPLASTY SHOULDER REVERSE;  Surgeon: Jared Eden MD;  [facility];  Service: Orthopedics    TONSILLECTOMY       Family History   Problem Relation Age of Onset    Arthritis Mother     No Known Problems Father     No Known Problems Son     No Known Problems Son     No Known Problems Maternal Grandmother     No Known Problems Maternal Grandfather     No Known Problems Paternal Grandmother     No Known Problems Paternal Grandfather     Breast cancer Neg Hx     Colon cancer Neg Hx     Ovarian cancer Neg Hx              Meds/Allergies     Current Outpatient Medications:     apixaban (ELIQUIS) 2.5 mg, Take 1 tablet (2.5 mg total) by mouth 2 (two) times a day, Disp: 180 tablet, Rfl: 3    atenolol (TENORMIN) 50 mg tablet, Take 1 tablet (50 mg total) by mouth daily, Disp: 30 tablet, Rfl: 0    Melatonin 5 MG CAPS, Take 15 mg by mouth Bedtime, Disp: , Rfl:      "Multiple Vitamins-Minerals (HM MULTIVITAMIN ADULT GUMMY PO), Take by mouth, Disp: , Rfl:     Nuplazid 34 MG CAPS, TAKE 1 CAPSULE BY MOUTH DAILY, Disp: 30 capsule, Rfl: 0    Omega-3 Fatty Acids (OMEGA-3 FISH OIL PO), , Disp: , Rfl:     PARoxetine (PAXIL) 20 mg tablet, Take 1 tablet (20 mg total) by mouth daily, Disp: 30 tablet, Rfl: 0    rivastigmine (EXELON) 4.5 MG capsule, TAKE 1 CAPSULE (4.5 MG TOTAL) BY MOUTH 2 (TWO) TIMES A DAY, Disp: 180 capsule, Rfl: 3    rosuvastatin (CRESTOR) 5 mg tablet, Take 1 tablet (5 mg total) by mouth every other day, Disp: 30 tablet, Rfl: 0    traZODone (DESYREL) 100 mg tablet, Take 1 tablet (100 mg total) by mouth daily at bedtime, Disp: 30 tablet, Rfl: 5    Wheat Dextrin (Benefiber) CHEW, Chew, Disp: , Rfl:   Allergies   Allergen Reactions    Sulfa Antibiotics Hives       Vitals: Blood pressure 106/64, pulse (!) 54, temperature (!) 96.8 °F (36 °C), temperature source Tympanic, height 5' 7\" (1.702 m), weight 83.8 kg (184 lb 12.8 oz), SpO2 91%. Body mass index is 28.94 kg/m². Oxygen Therapy  SpO2: 91 %  Oxygen Therapy: None (Room air)      Physical Exam  Physical Exam  Vitals and nursing note reviewed.   Constitutional:       General: She is not in acute distress.     Appearance: She is well-developed.   HENT:      Head: Normocephalic and atraumatic.   Eyes:      Conjunctiva/sclera: Conjunctivae normal.   Cardiovascular:      Rate and Rhythm: Normal rate and regular rhythm.      Heart sounds: No murmur heard.  Pulmonary:      Effort: Pulmonary effort is normal. No respiratory distress.      Breath sounds: Normal breath sounds.   Abdominal:      Palpations: Abdomen is soft.      Tenderness: There is no abdominal tenderness.   Musculoskeletal:         General: No swelling.      Cervical back: Neck supple.      Right lower leg: No edema.      Left lower leg: No edema.   Skin:     General: Skin is warm and dry.      Capillary Refill: Capillary refill takes less than 2 seconds. " "  Neurological:      Mental Status: She is alert.   Psychiatric:         Mood and Affect: Mood normal.         Labs:   I have personally reviewed pertinent lab results.    ABG: No results found for: \"PHART\", \"ZPC4BDM\", \"PO2ART\", \"KGR1BXA\", \"N0PLWBWC\", \"BEART\", \"SOURCE\",   BNP:   Lab Results   Component Value Date     (H) 04/16/2023   ,   CBC:  Lab Results   Component Value Date    WBC 9.21 11/14/2023    HGB 13.5 11/14/2023    HCT 41.8 11/14/2023    MCV 97 11/14/2023     11/14/2023    EOSPCT 2 10/18/2023    EOSABS 0.19 10/18/2023    NEUTOPHILPCT 62 10/18/2023    LYMPHOPCT 29 10/18/2023   ,   CMP:   Lab Results   Component Value Date    SODIUM 140 11/14/2023    K 3.7 11/14/2023     11/14/2023    CO2 28 11/14/2023    ANIONGAP 6 11/06/2014    BUN 19 11/14/2023    CREATININE 0.75 11/14/2023    GLUCOSE 100 11/06/2014    CALCIUM 9.4 11/14/2023    AST 13 08/18/2023    ALT 10 08/18/2023    ALKPHOS 38 08/18/2023    EGFR 76 11/14/2023   ,   PT/INR:   Lab Results   Component Value Date    PT 12.3 12/08/2018    INR 1.22 (H) 10/18/2023   ,   Troponin: No results found for: \"TROPONINI\"      Imaging and other studies: I have personally reviewed pertinent reports.   and I have personally reviewed pertinent films in PACS    Pelvic U/S 8/9/23  IMPRESSION:  1. Simple appearing right adnexal cysts, appearing similar to the prior CT from 8/16/2020 given variation in imaging modality and measuring technique. Follow ultrasound in 12 months may be considered.  2. Small amount of complex endometrial fluid. Correlate clinically. Follow-up ultrasound may be considered at a shorter interval if clinically warranted.  3. Uterine fibroid.  4. Left ovary is not well visualized.    Pelvic U/S 7/8/23  Unremarkable uterus and endometrium- limited study     Extremely limited study due to overlying bowel gas. Cystic mass in the right adnexa/extending from the uterus. Further evaluation with MRI is recommended      CTA Chest " "4/16/23  Large emboli in the distal bilateral main pulmonary arteries extending into multiple segmental arteries.  Right heart strain pattern (RV/LV ratio is 3.2).     No evidence of acute abnormality in the abdomen or pelvis.  Colonic diverticulosis without diverticulitis.     Incidental 3.2 cm and 4.1 cm right adnexal simple-appearing cysts.  According to current guidelines (J Am Brianna Radiol 2020; 17:248-254) in this postmenopausal woman, this should be followed up in 6 to 12 months by pelvic ultrasound.    Pulmonary function testing:   Pulmonary Functions Testing Results:    No results found for: \"FEV1\", \"FVC\", \"OHS3FQA\", \"TLC\", \"DLCO\"        EKG, Pathology, and Other Studies: I have personally reviewed pertinent reports.       TTE 8/31/23    Left Ventricle: Left ventricular cavity size is normal. Wall thickness is mildly increased. There is mild concentric hypertrophy. The left ventricular ejection fraction is 60%. Systolic function is normal. Wall motion is normal. Diastolic function is mildly abnormal, consistent with grade I (abnormal) relaxation.    Mitral Valve: There is moderate annular calcification.  Right Ventricle Right ventricular cavity size is normal. Systolic function is normal. Wall thickness is normal.           TTE 4/17/23   Left Ventricle: Left ventricular cavity size is normal. Wall thickness is normal. The left ventricular ejection fraction is 65%. Systolic function is normal. Wall motion is normal. Diastolic function is mildly abnormal, consistent with grade I (abnormal) relaxation.    Right Ventricle: Right ventricular cavity size is severely dilated. Systolic function is severely reduced.    Right Atrium: The atrium is severely dilated.    Mitral Valve: There is moderate annular calcification.    Tricuspid Valve: There is mild regurgitation.    Pulmonic Valve: There is mild regurgitation.    Pulmonary Artery: The estimated pulmonary artery systolic pressure is 59.0 mmHg. The pulmonary " artery systolic pressure is moderate to severely increased.      Nahum Muñoz M.D.  [facility]

## 2024-03-09 PROBLEM — Z86.711 HISTORY OF PULMONARY EMBOLISM: Status: ACTIVE | Noted: 2023-04-16

## 2024-03-09 NOTE — ASSESSMENT & PLAN NOTE
History of extensive PE in April 2023 intermediate-high risk  Had IR thrombectomy   Currently on Eliquis 5 mg BID    No recent bleeding issues    There was no major provoking factor for the PE.  No prior personal history of VTE.  She does have some decreased mobility from Parkinsons    No dyspnea    Discussed options with patient and her  and caretaker.  Through joint decision making we have decided to switch from Eliquis 5 mg BID to lower dose 2.5 mg BID for prolonged course.  She will continue this indefinitely.  I advised her and her  of signs/symptoms of DVT and PE and they will seek prompt attention if these occur.    RTC 1 year.

## 2024-03-15 DIAGNOSIS — G20.A1 COGNITIVE DEFICIT DUE TO PARKINSON'S DISEASE: ICD-10-CM

## 2024-03-15 RX ORDER — RIVASTIGMINE TARTRATE 4.5 MG/1
4.5 CAPSULE ORAL 2 TIMES DAILY
Qty: 180 CAPSULE | Refills: 3 | Status: SHIPPED | OUTPATIENT
Start: 2024-03-15

## 2024-03-27 DIAGNOSIS — R44.3 HALLUCINATIONS: ICD-10-CM

## 2024-03-27 RX ORDER — PIMAVANSERIN TARTRATE 34 MG/1
1 CAPSULE ORAL DAILY
Qty: 30 CAPSULE | Refills: 0 | Status: SHIPPED | OUTPATIENT
Start: 2024-03-27

## 2024-04-25 DIAGNOSIS — R44.3 HALLUCINATIONS: ICD-10-CM

## 2024-04-26 RX ORDER — PIMAVANSERIN TARTRATE 34 MG/1
1 CAPSULE ORAL DAILY
Qty: 30 CAPSULE | Refills: 0 | Status: SHIPPED | OUTPATIENT
Start: 2024-04-26

## 2024-05-16 ENCOUNTER — OFFICE VISIT (OUTPATIENT)
Dept: NEUROLOGY | Facility: CLINIC | Age: 79
End: 2024-05-16
Payer: MEDICARE

## 2024-05-16 VITALS — SYSTOLIC BLOOD PRESSURE: 129 MMHG | DIASTOLIC BLOOD PRESSURE: 77 MMHG

## 2024-05-16 DIAGNOSIS — R44.1 HALLUCINATION, VISUAL: ICD-10-CM

## 2024-05-16 DIAGNOSIS — G20.A1 PARKINSON'S DISEASE WITH USE OF ELECTRICAL BRAIN STIMULATION: Primary | ICD-10-CM

## 2024-05-16 DIAGNOSIS — G47.52 REM BEHAVIORAL DISORDER: ICD-10-CM

## 2024-05-16 PROCEDURE — 99213 OFFICE O/P EST LOW 20 MIN: CPT | Performed by: PSYCHIATRY & NEUROLOGY

## 2024-05-16 PROCEDURE — 95984 ALYS BRN NPGT PRGRMG ADDL 15: CPT | Performed by: PSYCHIATRY & NEUROLOGY

## 2024-05-16 PROCEDURE — 95983 ALYS BRN NPGT PRGRMG 15 MIN: CPT | Performed by: PSYCHIATRY & NEUROLOGY

## 2024-05-16 RX ORDER — CLONAZEPAM 0.5 MG/1
0.5 TABLET ORAL
Qty: 30 TABLET | Refills: 0 | Status: SHIPPED | OUTPATIENT
Start: 2024-05-16

## 2024-05-16 NOTE — ASSESSMENT & PLAN NOTE
Progression of PD with increased delusions and hallucinations, postural instability and insomnia.   Increases and carbidopa/levodopa have been difficult given tendency towards psychosis.  Will continue on current dosing of medication.  The placement was interrogated for impedance and battery check.  Mild adjustments were made with improvement in rigidity.  See body of the clinical note for detail.  30 minutes spent on deep brain stimulation programming, and evaluation.  If she has any delayed adverse effects patient  can be used to reduce settings.  Patients  was not clear on whether he would be able to do this.  Aide at side said she would assist.  If they are having difficulty they were given the number to contact [facility] for guidance.

## 2024-05-16 NOTE — PROGRESS NOTES
Subjective:    Sally Oliva is a 78 y.o. female w/ PMH psoriatic arthritis, HTN, DM2, PD now s/p b/l STN DBS implantation (2014), Left IPG replacement 4/7/21 and Right IPG replacement 6/17/22 who presents today as follow-up for PD. Per chart review, PD present since at least 2009, DBS surgery 11/5/14, ACTIVA SC 11/11/13, R IPG replacement 12/18/18, left battery replacement 4/7/21. Previous medications which did not work or that she could not tolerate include sinemet, amantadine, ropinirole, Requip, mirapex.     She presents today with caregiver and .     Ambulates with walker with assistance as she tends to hunch over so they are using the walker more.   Overall appears to be getting weaker. She tends to lean to the right even while seated.   Caregiver there 7 days a week.   She is assisted with all ADL's.    She is choking when eating. She will sometimes eats with her eyes closed and slowly.   Sleeps is variable with good and bad nights. She can dose off during the day. It is unclear if trazodone helped sleep.   No tremors.   She has visual hallucinations during the day and night. She has delusion where she thinks she is not home and wants to go home. More commonly occurs in the late afternoon.   Cognitive decline with difficulty following her typical routine.        Current medications:  Rivastigmine 4.5 mg bid  Pimvanserin (Nuplazid) 34 mg qhs  Azilect 1mg daily   Paxil  Trazodone - for sleep     Prior medications:  Sinemet           /77 (BP Location: Left arm, Patient Position: Sitting, Cuff Size: Standard)   LMP  (LMP Unknown)          Objective:    Blood pressure 129/77.    Physical Exam  Vitals reviewed.   Eyes:      Extraocular Movements: Extraocular movements intact.      Pupils: Pupils are equal, round, and reactive to light.   Neurological:      Mental Status: She is alert.      Motor: Motor strength is normal.        Neurological Exam  Mental Status  Alert. Oriented to person, place,  time and situation. Speech: hypophonia. Language is fluent with no aphasia. Attention and concentration are normal.    Cranial Nerves  CN III, IV, VI: Extraocular movements intact bilaterally. Pupils equal round and reactive to light bilaterally.  CN VII: Full and symmetric facial movement.  CN VIII: Hearing is normal.  CN IX, X: Palate elevates symmetrically  CN XI: Shoulder shrug strength is normal.  CN XII: Tongue midline without atrophy or fasciculations.    Motor   Normal muscle tone. Strength is 5/5 throughout all four extremities.    Sensory  Light touch is normal in upper and lower extremities.     Coordination  Right: Finger-to-nose normal. Rapid alternating movement abnormality:Left: Finger-to-nose normal. Rapid alternating movement abnormality:  See motor UPDRS.    Gait  Casual gait: Able to rise from chair without using arms.        ROS:    Review of Systems   Constitutional:  Positive for fatigue (Increased). Negative for appetite change and fever.   HENT:  Positive for trouble swallowing. Negative for hearing loss, tinnitus and voice change.    Eyes: Negative.  Negative for photophobia, pain and visual disturbance.   Respiratory: Negative.  Negative for shortness of breath.    Cardiovascular: Negative.  Negative for palpitations.   Gastrointestinal: Negative.  Negative for nausea and vomiting.   Endocrine: Negative.  Negative for cold intolerance.   Genitourinary: Negative.  Negative for dysuria, frequency and urgency.   Musculoskeletal:  Positive for gait problem and myalgias. Negative for back pain, neck pain and neck stiffness.        Balance Issues     Skin: Negative.  Negative for rash.   Allergic/Immunologic: Negative.    Neurological:  Positive for speech difficulty and weakness (Legs). Negative for dizziness, tremors, seizures, syncope, facial asymmetry, light-headedness, numbness and headaches.   Hematological: Negative.  Does not bruise/bleed easily.   Psychiatric/Behavioral:  Positive for  hallucinations and sleep disturbance. Negative for confusion.    All other systems reviewed and are negative.    MDS UPDRS III                             Time since last dose:  5/16/24 12/13/23   Speech  2 2   Facial Expression  3 2   Rigidity - Neck  2 2   Rigidity - Upper Extremity (Right)  2 2   Rigidity - Upper Extremity (Left)   2 2   Rigidity - Lower Extremity (Right)  2 0   Rigidity - Lower Extremity (Left)   2 2   Finger Taps (Right)   3 3   Finger Taps (Left)   3 3   Hand Movement (Right)  3 3   Hand Movement (Left)   3 3   Pronation/Supination (Right)  3 3   Pronation/Supination (Left)   3 3   Toe Tapping (Right) 3 3   Toe Tapping (Left) 2 2   Leg Agility (Right)  2 3   Leg Agility (Left)   2 3   Arising from Chair    3   Gait    3   Freezing of Gait  1   Postural Stability    3   Posture  3   Global spontaneity of movement 3 3   Postural Tremor (Right) 0 0   Postural Tremor (Left) 0 0   Kinetic Tremor (Right)  0 0   Kinetic Tremor (Left)  0 0   Rest tremor amplitude RUE 0 0   Rest tremor amplitude LUE 0 0   Rest tremor amplitude RLE 0 0   Reset tremor amplitude LLE 0 0   Lip/Jaw Tremor  0 0          Motor Exa           Deep brain interrogation:  Left chest/ VIM  Percept PC [id number]  [id number]  EBL: 7 yrs  Impedance: ok  C+2-, PW60, 160Hz, 2.6mA (1-3)   Increased to 2.8mA     Right chest/ VIM  Percept PC [id number]   [id number]  Implanted: June 27/2022  EBL: 4 years , 2 months   Impedance ok   C+9-, PW90, 160Hz, 3.0mA (0-3.4)  Increased to 3.1mA

## 2024-05-16 NOTE — PATIENT INSTRUCTIONS
Progression of PD with increased delusions and hallucinations, postural instability and insomnia.     Man concern for Sally is poor sleep. Trazodone has been ineffective. Will taper off by having her take 1/2 tab for 1 week then discontinue. Will then start clonazepam 0.5mg nightly. If ineffective but tolerated will increase to 1mg nightly.       Deep brain stimulator adjusted. If having difficulty contact [employer] rep Ruperto [phone] for instruction in adjusting back down if needed.  Left chest/ right body 2.8mA(prior 2.5)   Right chest/ left body 3.1mA  (prior 3)

## 2024-05-16 NOTE — ASSESSMENT & PLAN NOTE
Man concern for Sally is poor sleep. Trazodone has been ineffective. Will taper off by having her take 1/2 tab for 1 week then discontinue. Will then start clonazepam 0.5mg nightly. If ineffective but tolerated will increase to 1mg nightly.

## 2024-05-16 NOTE — ASSESSMENT & PLAN NOTE
Daytime hallucinations and delusions which are only partially controlled with Nuplazid.  This fluctuates by day.  We will see if improved sleep may improve this.  If not may consider adding a small dose of quetiapine.

## 2024-05-24 DIAGNOSIS — R44.3 HALLUCINATIONS: ICD-10-CM

## 2024-05-24 RX ORDER — PIMAVANSERIN TARTRATE 34 MG/1
1 CAPSULE ORAL DAILY
Qty: 30 CAPSULE | Refills: 0 | Status: SHIPPED | OUTPATIENT
Start: 2024-05-24

## 2024-06-13 DIAGNOSIS — G47.52 REM BEHAVIORAL DISORDER: ICD-10-CM

## 2024-06-13 RX ORDER — CLONAZEPAM 0.5 MG/1
0.5 TABLET ORAL
Qty: 30 TABLET | Refills: 2 | Status: SHIPPED | OUTPATIENT
Start: 2024-06-13

## 2024-06-21 DIAGNOSIS — R44.3 HALLUCINATIONS: ICD-10-CM

## 2024-06-21 RX ORDER — PIMAVANSERIN TARTRATE 34 MG/1
1 CAPSULE ORAL DAILY
Qty: 30 CAPSULE | Refills: 0 | Status: SHIPPED | OUTPATIENT
Start: 2024-06-21

## 2024-06-25 ENCOUNTER — TELEPHONE (OUTPATIENT)
Age: 79
End: 2024-06-25

## 2024-06-25 NOTE — TELEPHONE ENCOUNTER
Alex SALGADO, the pharmacist from [facility] called to notify office that the refill sent on 6/13/24 was lost in transport. They are sending her a 10 day supply while they try to track the package. In the event they are not able to track package they will contact the office again for a refill.

## 2024-07-01 NOTE — TELEPHONE ENCOUNTER
"Received a call from the pharmacist at [facility]. Pt's clonazepam has been lost in transit. There has been \"no movement\" on the tracking since 6/17. The pharmacist is requesting permission from Dr. Luz to use one of the refills to send another shipment to pt. Pharmacist may be called back at #[phone]. Reference #[id number]. Routed to provider to advise if one of the refills may be used.  "

## 2024-07-02 NOTE — TELEPHONE ENCOUNTER
Spoke with Grady at [facility]. Advised that per Dr. Luz, a refill may be used and the medication can be sent out at this time.

## 2024-07-02 NOTE — TELEPHONE ENCOUNTER
Balbina Lopez MD  You; [facility] [place] Clinical Team 41 hour ago (7:13 AM)       Yes can use refill

## 2024-07-10 ENCOUNTER — TELEPHONE (OUTPATIENT)
Dept: NEUROLOGY | Facility: CLINIC | Age: 79
End: 2024-07-10

## 2024-07-11 NOTE — TELEPHONE ENCOUNTER
The event form is regarding Nuplazid. Called pt to get more information regarding if she had an adverse effect to the medication, and if she has stopped taking it. Called the home phone number. Phone rang, and then was disconnected. Unable to leave a message. Called the cell phone number and left a message on voice mail requesting a return call.

## 2024-07-16 NOTE — TELEPHONE ENCOUNTER
Patient's spouse  called to report the patient did not have any adverse reaction to the medication and they are still taking the medication mentioned below    Patient spouse did state that the patient hallucinations are still getting worse even with the medication     Spouse stated if any other question to please call back they just missed the call                     Thank you!

## 2024-07-17 DIAGNOSIS — R44.3 HALLUCINATIONS: ICD-10-CM

## 2024-07-23 ENCOUNTER — TELEPHONE (OUTPATIENT)
Dept: NEUROLOGY | Facility: CLINIC | Age: 79
End: 2024-07-23

## 2024-07-30 NOTE — TELEPHONE ENCOUNTER
Yadira Serrano LPN LM    7/23/24 10:57 AM  Note     [facility]  Nuplazid 34 mg cap  request for refills.

## 2024-07-30 NOTE — TELEPHONE ENCOUNTER
July 30, 2024  Aurelia STROUD    7/30/24 10:39 AM  Note     Received fax from [facility] for medication refill re Nuplazid 34MG.

## 2024-07-31 RX ORDER — PIMAVANSERIN TARTRATE 34 MG/1
1 CAPSULE ORAL DAILY
Qty: 30 CAPSULE | Refills: 5 | Status: SHIPPED | OUTPATIENT
Start: 2024-07-31

## 2024-08-01 ENCOUNTER — TELEPHONE (OUTPATIENT)
Dept: NEUROLOGY | Facility: CLINIC | Age: 79
End: 2024-08-01

## 2024-08-19 DIAGNOSIS — G47.52 REM BEHAVIORAL DISORDER: ICD-10-CM

## 2024-08-20 RX ORDER — CLONAZEPAM 0.5 MG/1
0.5 TABLET ORAL
Qty: 30 TABLET | Refills: 2 | Status: SHIPPED | OUTPATIENT
Start: 2024-08-20

## 2024-10-24 ENCOUNTER — PROCEDURE VISIT (OUTPATIENT)
Dept: NEUROLOGY | Facility: CLINIC | Age: 79
End: 2024-10-24
Payer: MEDICARE

## 2024-10-24 VITALS — DIASTOLIC BLOOD PRESSURE: 62 MMHG | HEART RATE: 62 BPM | SYSTOLIC BLOOD PRESSURE: 110 MMHG

## 2024-10-24 DIAGNOSIS — R44.3 HALLUCINATIONS: ICD-10-CM

## 2024-10-24 DIAGNOSIS — G20.A1 COGNITIVE DEFICIT DUE TO PARKINSON'S DISEASE (HCC): ICD-10-CM

## 2024-10-24 DIAGNOSIS — R44.1 HALLUCINATION, VISUAL: ICD-10-CM

## 2024-10-24 DIAGNOSIS — G20.A1 PARKINSON'S DISEASE WITH USE OF ELECTRICAL BRAIN STIMULATION (HCC): Primary | ICD-10-CM

## 2024-10-24 PROCEDURE — 99214 OFFICE O/P EST MOD 30 MIN: CPT | Performed by: PSYCHIATRY & NEUROLOGY

## 2024-10-24 RX ORDER — PIMAVANSERIN TARTRATE 34 MG/1
1 CAPSULE ORAL DAILY
Qty: 30 CAPSULE | Refills: 5 | Status: SHIPPED | OUTPATIENT
Start: 2024-10-24

## 2024-10-24 RX ORDER — RIVASTIGMINE TARTRATE 6 MG/1
6 CAPSULE ORAL 2 TIMES DAILY WITH MEALS
Qty: 180 CAPSULE | Refills: 2 | Status: SHIPPED | OUTPATIENT
Start: 2024-10-24

## 2024-10-24 NOTE — ASSESSMENT & PLAN NOTE
Main concern today is progressive decline in cognition as well as increase in visual hallucinations.  She is on Nuplazid which initially had provided partial relief with hallucinations.  I am hesitant to discontinue this given this may in part be reducing hallucinations.     Will try increasing rivastigmine to 6mg twice daily If there are any side effects please contact the office and we will reduce back top 4.5 twice daily.  This may improve focus and reduce hallucinations.  Continue Nuplazid.   Discontinue clonazepam since this has not been helpful.

## 2024-10-24 NOTE — ASSESSMENT & PLAN NOTE
Advance PD with aggressive cognitive decline and increase hallucinations along with postural stability and insomnia.  We have been unable to reintroduce levodopa or introduce another medication given significant hallucinations.  She remains therefore treated solely by deep brain stimulation.

## 2024-10-24 NOTE — PATIENT INSTRUCTIONS
Will try increasing rivastigmine to 6mg twice daily If there are any side effects please contact the office and we will reduce back top 4.5 twice daily.  Continue Nuplazid.   Discontinue clonazepam since this has not been helpful.

## 2024-10-24 NOTE — PROGRESS NOTES
Ambulatory Visit  Name: Sally Oliva      : 1945      MRN: [id number]  Encounter Provider: Balbina Lopez MD  Encounter Date: 10/24/2024   Encounter department: 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  We have been unable to reintroduce levodopa or introduce another medication given significant hallucinations.  She remains therefore treated solely by deep brain stimulation.       Hallucination, visual    Main concern today is progressive decline in cognition as well as increase in visual hallucinations.  She is on Nuplazid which initially had provided partial relief with hallucinations.  I am hesitant to discontinue this given this may in part be reducing hallucinations.     Will try increasing rivastigmine to 6mg twice daily If there are any side effects please contact the office and we will reduce back top 4.5 twice daily.  This may improve focus and reduce hallucinations.  Continue Nuplazid.   Discontinue clonazepam since this has not been helpful.            History of Present Illness   Sally Oliva is a 78 y.o. female w/ PMH psoriatic arthritis, HTN, DM2, PD now s/p b/l STN DBS implantation (), Left IPG replacement 21 and Right IPG replacement 22 who presents today as follow-up for PD. Per chart review, PD present since at least , DBS surgery 14, ACTIVA SC 13, R IPG replacement 18, left battery replacement 21. Previous medications which did not work or that she could not tolerate include sinemet,  amantadine, ropinirole, Requip, mirapex.     She presents today with caregiver and .     Cognitive decline since last seen.   Ambulates with walker with assistance. She cannot ambulate long distances as her leg will get weak.    Caregiver there 7 days a week. Caregiver during the day try to have her walk with walker. Overnight they use the wheelchair.   She is assisted with all ADL's.    Sleep is inconsistent. She has trouble initiating sleep and have difficulty maintaining sleep. She doze off at times but no prolonged naps in the day.    She has visual hallucinations during the day and night. This is worse where she sees more kids. She can see her son and grandaughter when they are not present. Hallucinations are not scary or threatening.   Delusions     Current medications:  Rivastigmine 4.5 mg bid  Pimvanserin (Nuplazid) 34 mg qhs  Paxil      Prior medications:  Sinemet   Azilect 1mg daily   Clonazepam  Melatonin         Review of Systems  I have personally reviewed the MA's review of systems and made changes as necessary.      Objective     /62 (BP Location: Right arm, Patient Position: Sitting, Cuff Size: Standard)   Pulse 62   LMP  (LMP Unknown)     Physical Exam  Vitals reviewed.   Eyes:      Extraocular Movements: EOM normal.      Pupils: Pupils are equal, round, and reactive to light.   Neurological:      Motor: Motor strength is normal.     Coordination: Finger-Nose-Finger Test normal.       Neurologic Exam     Mental Status   Disoriented to person.   Follows 3 step commands.   Attention: normal. Concentration: normal.   Speech: (hypophonia)  Level of consciousness: alert    Cranial Nerves     CN III, IV, VI   Pupils are equal, round, and reactive to light.  Extraocular motions are normal.   Right pupil: Size: 1 mm.   Left pupil: Size: 1 mm.     CN VII   Facial expression full, symmetric.     CN VIII   Hearing: intact    CN XI   Right trapezius strength: normal  Left trapezius strength:  normal    CN XII   Tongue deviation: none    Motor Exam   Overall muscle tone: increased    Strength   Strength 5/5 throughout.     Gait, Coordination, and Reflexes     Coordination   Finger to nose coordination: normal    Tremor   Resting tremor: absentNot ambulated today     MDS UPDRS III                             Time since last dose:  10/24/24 5/16/24 12/13/23   Speech  2 2 2   Facial Expression  3 3 2   Rigidity - Neck  2 2 2   Rigidity - Upper Extremity (Right)  2 2 2   Rigidity - Upper Extremity (Left)   2 2 2   Rigidity - Lower Extremity (Right)  2 2 0   Rigidity - Lower Extremity (Left)   3 2 2   Finger Taps (Right)   3 3 3   Finger Taps (Left)   3 3 3   Hand Movement (Right)  2 3 3   Hand Movement (Left)   2 3 3   Pronation/Supination (Right)  3 3 3   Pronation/Supination (Left)   3 3 3   Toe Tapping (Right) 3 3 3   Toe Tapping (Left) 2 2 2   Leg Agility (Right)  2 2 3   Leg Agility (Left)   2 2 3   Arising from Chair      3   Gait      3   Freezing of Gait    1   Postural Stability      3   Posture    3   Global spontaneity of movement 3 3 3   Postural Tremor (Right) 0 0 0   Postural Tremor (Left) 0 0 0   Kinetic Tremor (Right)  0 0 0   Kinetic Tremor (Left)  1 0 0   Rest tremor amplitude RUE 0 0 0   Rest tremor amplitude LUE 0 0 0   Rest tremor amplitude RLE 0 0 0   Reset tremor amplitude LLE 0 0 0   Lip/Jaw Tremor  0 0 0            Motor Exa

## 2024-10-24 NOTE — PROGRESS NOTES
Review of Systems   Constitutional:  Negative for appetite change, fatigue and fever.   HENT:  Positive for trouble swallowing. Negative for hearing loss, tinnitus and voice change.    Eyes: Negative.  Negative for photophobia, pain and visual disturbance.   Respiratory: Negative.  Negative for shortness of breath.    Cardiovascular: Negative.  Negative for palpitations.   Gastrointestinal: Negative.  Negative for nausea and vomiting.   Endocrine: Negative.  Negative for cold intolerance.   Genitourinary: Negative.  Negative for dysuria, frequency and urgency.   Musculoskeletal:  Positive for gait problem. Negative for back pain, myalgias, neck pain and neck stiffness.        Balance Issues     Skin: Negative.  Negative for rash.   Allergic/Immunologic: Negative.    Neurological:  Positive for speech difficulty ([place] at times) and weakness (At times in Legs). Negative for dizziness, tremors, seizures, syncope, facial asymmetry, light-headedness, numbness and headaches.   Hematological:  Bruises/bleeds easily (Bruise).   Psychiatric/Behavioral:  Positive for hallucinations and sleep disturbance. Negative for confusion.    All other systems reviewed and are negative.

## 2024-10-24 NOTE — ASSESSMENT & PLAN NOTE
Orders:    rivastigmine (EXELON) 6 mg capsule; Take 1 capsule (6 mg total) by mouth 2 (two) times a day with meals

## 2025-02-24 DIAGNOSIS — Z86.711 HISTORY OF PULMONARY EMBOLISM: ICD-10-CM

## 2025-02-25 RX ORDER — APIXABAN 2.5 MG/1
2.5 TABLET, FILM COATED ORAL 2 TIMES DAILY
Qty: 180 TABLET | Refills: 0 | Status: SHIPPED | OUTPATIENT
Start: 2025-02-25

## 2025-04-23 ENCOUNTER — OFFICE VISIT (OUTPATIENT)
Dept: PULMONOLOGY | Facility: CLINIC | Age: 80
End: 2025-04-23
Payer: MEDICARE

## 2025-04-23 VITALS
SYSTOLIC BLOOD PRESSURE: 100 MMHG | HEART RATE: 51 BPM | OXYGEN SATURATION: 99 % | TEMPERATURE: 96.5 F | HEIGHT: 67 IN | DIASTOLIC BLOOD PRESSURE: 60 MMHG | BODY MASS INDEX: 28.94 KG/M2

## 2025-04-23 DIAGNOSIS — G20.A1 PARKINSON'S DISEASE WITH USE OF ELECTRICAL BRAIN STIMULATION (HCC): ICD-10-CM

## 2025-04-23 DIAGNOSIS — Z86.711 HISTORY OF PULMONARY EMBOLISM: Primary | ICD-10-CM

## 2025-04-23 DIAGNOSIS — R00.1 BRADYCARDIA: ICD-10-CM

## 2025-04-23 PROCEDURE — 99214 OFFICE O/P EST MOD 30 MIN: CPT | Performed by: INTERNAL MEDICINE

## 2025-04-23 NOTE — ASSESSMENT & PLAN NOTE
She has tolerated extended eliquis. Will continue 2.5 mg BID indefinitely.  I will prescribe this for 1 year and she can either follow-up w/ me annually or request that her PCP take over prescribing this. Either is fine. Her  expresses understanding of the options  Orders:    apixaban (Eliquis) 2.5 mg; Take 1 tablet (2.5 mg total) by mouth 2 (two) times a day

## 2025-04-23 NOTE — PROGRESS NOTES
Follow-up  Visit - Pulmonary Medicine   Name: Sally Oliva      : 1945      MRN: [id number]  Encounter Provider: Nahum Muñoz MD  Encounter Date: 2025   Encounter department: [facility] [place]  :  Assessment & Plan  History of pulmonary embolism  She has tolerated extended eliquis. Will continue 2.5 mg BID indefinitely.  I will prescribe this for 1 year and she can either follow-up w/ me annually or request that her PCP take over prescribing this. Either is fine. Her  expresses understanding of the options  Orders:    apixaban (Eliquis) 2.5 mg; Take 1 tablet (2.5 mg total) by mouth 2 (two) times a day    Bradycardia  Heart rate is 51 today. /61.  She takes atenolol 50 mg daily  I told her and her  to discuss with PCP if this is necessary anymore.  I have ordered an EKG to assess her bradycardia- if findings are concerning will make appropriate referral.  Orders:    ECG 12 lead; Future    Parkinson's disease with use of electrical brain stimulation (HCC)  With progressive symptoms. Has home care         Return in about 1 year (around 2026).    History of Present Illness   Sally Oliva is a 79 y.o. female who presents for follow-up for history of PE.    Last seen 3/2024. Maintained on indefinite Eliquis but at lower dose 2.5 mg BID after discussion of risks/benefits with her and her   History of extensive PE in 2023 intermediate-high risk  Had IR thrombectomy       In the last year- progression of Parkinson's.  More limited mobility  No shortness of breath.  On eliquis 2.5 mg BID. No bleeding.    Occasional cough    No leg swelling.    Review of Systems    Aside from what is mentioned in the HPI, ROS is otherwise negative    Past Medical History   Past Medical History:   Diagnosis Date    Anxiety     Broken hip (HCC)     Depression     Diabetes mellitus type 2, diet-controlled (HCC)     Hyperlipidemia     Hypertension     Parkinson  disease (HCC)     Pneumonia     Pulmonary embolism (HCC) 04/16/2023     Past Surgical History:   Procedure Laterality Date    ACHILLES TENDON SURGERY      APPENDECTOMY      COLONOSCOPY      DEEP BRAIN STIMULATOR PLACEMENT      DENTAL SURGERY      FRACTURE SURGERY      IR PE ENDOVASCULAR THERAPY  4/17/2023    KNEE ARTHROSCOPY      AK HYSTEROSCOPY BX ENDOMETRIUM&/POLYPC W/WO D&C N/A 11/22/2023    Procedure: (D&C) W/ HYSTEROSCOPY;  Surgeon: Charissa Mondragon MD;  [facility]: [facility];  Service: Gynecology    AK INSJ/RPLCMT CRANIAL NEUROSTIM PULSE GENERATOR Right 12/18/2018    Procedure: REPLACEMENT IMPLANTABLE PULSE GENERATOR (IPG) DEEP BRAIN STIMULATION (DBS);  Surgeon: Bon Ross MD;  Location: [facility];  Service: Neurosurgery    AK INSJ/RPLCMT CRANIAL NEUROSTIM PULSE GENERATOR Left 4/7/2021    Procedure: REPLACEMENT IMPLANTABLE PULSE GENERATOR FOR DEEP BRAIN STIMULATOR, LEFT CHEST;  Surgeon: Bon Ross MD;  [facility];  Service: Neurosurgery    AK INSJ/RPLCMT CRANIAL NEUROSTIM PULSE GENERATOR Right 6/27/2022    Procedure: Right chest IPG replacement for Deep Brain Stimulator;  Surgeon: Freedom Ross MD;  [facility];  Service: Neurosurgery    AK LAPAROSCOPIC APPENDECTOMY N/A 8/16/2020    Procedure: APPENDECTOMY LAPAROSCOPIC;  Surgeon: Wild Last MD;  [facility];  Service: General    AK OPTX FEM SHFT FX W/INSJ IMED IMPLT W/WO SCREW Right 12/14/2019    Procedure: INSERTION NAIL IM FEMUR ANTEGRADE (TROCHANTERIC);  Surgeon: Jose Weinstein DO;  [facility];  Service: Orthopedics    REPLACEMENT TOTAL KNEE      REVERSE TOTAL SHOULDER ARTHROPLASTY Left 8/23/2018    Procedure: ARTHROPLASTY SHOULDER REVERSE;  Surgeon: Jared Eden MD;  [facility];  Service: Orthopedics    TONSILLECTOMY       Family History   Problem Relation Age of Onset    Arthritis Mother     No Known Problems Father     No Known Problems Son     No Known Problems Son     No Known Problems  "Maternal Grandmother     No Known Problems Maternal Grandfather     No Known Problems Paternal Grandmother     No Known Problems Paternal Grandfather     Breast cancer Neg Hx     Colon cancer Neg Hx     Ovarian cancer Neg Hx       reports that she has never smoked. She has never used smokeless tobacco. She reports current alcohol use of about 2.0 standard drinks of alcohol per week. She reports that she does not use drugs.  Current Outpatient Medications   Medication Instructions    Acetaminophen (TYLENOL PO) As needed    apixaban (ELIQUIS) 2.5 mg, Oral, 2 times daily    atenolol (TENORMIN) 50 mg, Oral, Daily    Multiple Vitamins-Minerals (HM MULTIVITAMIN ADULT GUMMY PO) Take by mouth    Nuplazid 34 MG CAPS 1 capsule, Oral, Daily    Omega-3 Fatty Acids (OMEGA-3 FISH OIL PO)     PARoxetine (PAXIL) 20 mg, Oral, Daily    rivastigmine (EXELON) 6 mg, Oral, 2 times daily with meals    rosuvastatin (CRESTOR) 5 mg, Oral, Every other day    Wheat Dextrin (Benefiber) CHEW Chew     Allergies   Allergen Reactions    Sulfa Antibiotics Hives         Medical History Reviewed by provider this encounter:  Tobacco  Allergies  Meds  Problems  Med Hx  Surg Hx  Fam Hx     .    Objective   /60 (BP Location: Left arm, Patient Position: Sitting, Cuff Size: Standard)   Pulse (!) 51   Temp (!) 96.5 °F (35.8 °C) (Tympanic)   Ht 5' 7\" (1.702 m)   LMP  (LMP Unknown)   SpO2 99%   BMI 28.94 kg/m²     Physical Exam  Vitals and nursing note reviewed.   Constitutional:       General: She is not in acute distress.     Appearance: She is well-developed.   HENT:      Head: Normocephalic and atraumatic.   Eyes:      Conjunctiva/sclera: Conjunctivae normal.   Cardiovascular:      Rate and Rhythm: Normal rate and regular rhythm.      Heart sounds: No murmur heard.  Pulmonary:      Effort: Pulmonary effort is normal. No respiratory distress.      Breath sounds: Normal breath sounds.   Abdominal:      Palpations: Abdomen is soft. " "  Musculoskeletal:         General: No swelling.      Cervical back: Neck supple.   Skin:     General: Skin is warm and dry.      Capillary Refill: Capillary refill takes less than 2 seconds.   Neurological:      Mental Status: She is alert.   Psychiatric:         Mood and Affect: Mood normal.           Diagnostic Data:  Labs: I personally reviewed the most recent laboratory data pertinent to today's visit.  Lab Results   Component Value Date    WBC 9.21 11/14/2023    HGB 13.5 11/14/2023    HCT 41.8 11/14/2023    MCV 97 11/14/2023     11/14/2023     Lab Results   Component Value Date    SODIUM 140 11/14/2023    K 3.7 11/14/2023     11/14/2023    CO2 28 11/14/2023    BUN 19 11/14/2023    CREATININE 0.75 11/14/2023    GLUC 80 11/14/2023    CALCIUM 9.4 11/14/2023         Radiology results:  Radiology Results Review: I have reviewed the following images/report studies in PACS:    CTA Chest 4/16/23  Large emboli in the distal bilateral main pulmonary arteries extending into multiple segmental arteries.  Right heart strain pattern (RV/LV ratio is 3.2).     No evidence of acute abnormality in the abdomen or pelvis.  Colonic diverticulosis without diverticulitis.     Incidental 3.2 cm and 4.1 cm right adnexal simple-appearing cysts.  According to current guidelines (J Am Brianna Radiol 2020; 17:248-254) in this postmenopausal woman, this should be followed up in 6 to 12 months by pelvic ultrasound.  PFT/spirometry results:  No results found for: \"FEV1\", \"FVC\", \"OBV4TCT\", \"TLC\", \"DLCO\"         Nahum Muñoz MD      "

## 2025-05-01 ENCOUNTER — OFFICE VISIT (OUTPATIENT)
Dept: NEUROLOGY | Facility: CLINIC | Age: 80
End: 2025-05-01
Payer: MEDICARE

## 2025-05-01 VITALS — HEART RATE: 68 BPM | SYSTOLIC BLOOD PRESSURE: 107 MMHG | DIASTOLIC BLOOD PRESSURE: 77 MMHG

## 2025-05-01 DIAGNOSIS — G20.A1 COGNITIVE DEFICIT DUE TO PARKINSON'S DISEASE (HCC): ICD-10-CM

## 2025-05-01 DIAGNOSIS — G20.A1 PARKINSON'S DISEASE WITH USE OF ELECTRICAL BRAIN STIMULATION (HCC): ICD-10-CM

## 2025-05-01 DIAGNOSIS — R44.1 HALLUCINATION, VISUAL: Primary | ICD-10-CM

## 2025-05-01 DIAGNOSIS — R13.19 OTHER DYSPHAGIA: ICD-10-CM

## 2025-05-01 PROCEDURE — G2211 COMPLEX E/M VISIT ADD ON: HCPCS | Performed by: PSYCHIATRY & NEUROLOGY

## 2025-05-01 PROCEDURE — 95983 ALYS BRN NPGT PRGRMG 15 MIN: CPT | Performed by: PSYCHIATRY & NEUROLOGY

## 2025-05-01 PROCEDURE — 99214 OFFICE O/P EST MOD 30 MIN: CPT | Performed by: PSYCHIATRY & NEUROLOGY

## 2025-05-01 NOTE — ASSESSMENT & PLAN NOTE
She experiences hallucinations, mostly involving children and conversations, which occur throughout the day. These hallucinations sometimes make her fearful but not often.  Will continue on Nuplazid 34 mg daily.

## 2025-05-01 NOTE — PROGRESS NOTES
Name: Sally Oliva      : 1945      MRN: [id number]  Encounter Provider: Balbina Lopez MD  Encounter Date: 2025   Encounter department: [facility] [place]  :  Assessment & Plan  Parkinson's disease with use of electrical brain stimulation (HCC)  She has not experienced any tremors or shaking recently, and her walking has shown some improvement. However, her hands are starting to tighten and curl, especially when sitting. It has been a year since her device was checked. An adjustment to her device settings will be made on both sides to see if it helps with her symptoms. Occupational therapy will be sent to work with her on daily activities and muscle strengthening exercises.  Orders:  •  Referral to [facility]; Future    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 (HCC)  She exhibits signs of dementia, including forgetfulness and needing prompts for daily activities. She also experiences hallucinations and vivid dreams, which are increasing in frequency. She is currently on rivastigmine.  Orders:  •  Referral to [facility]; Future    Hallucination, visual  She experiences hallucinations, mostly involving children and conversations, which occur throughout the day. These hallucinations sometimes make her fearful but not often.  Will continue on Nuplazid 34 mg daily.       Other dysphagia  She experiences difficulty swallowing, particularly with chicken and pills, which often get stuck and cause her to spit them out. A swallow study conducted several years ago indicated that she could handle thin liquids but had difficulty with regular food. Home-based speech therapy will be initiated to reassess her swallowing capabilities and ensure an appropriate diet. The therapist will also introduce exercises to strengthen her muscles and provide tips to prevent choking.         Assessment & Plan  She has not experienced any tremors or shaking  recently, and her walking has shown some improvement. However, her hands are starting to tighten and curl, especially when sitting. It has been a year since her device was checked. An adjustment to her device settings will be made on both sides to see if it helps with her symptoms. Occupational therapy will be sent to work with her on daily activities and muscle strengthening exercises.    4. Sleep disturbances.  She has difficulty falling and staying asleep, often napping during the day and sometimes being too tired to get out of bed. This varies day by day.    5. Hallucinations.  She experiences hallucinations, mostly involving children and conversations, which occur throughout the day. These hallucinations sometimes make her fearful but not often.        History of Present Illness   History of Present Illness  The patient presents for evaluation of Parkinson's disease, dysphagia, dementia, and sleep disturbance. She is accompanied by her caregiver.    Coughing fits occur during meals, with one instance of choking reported. The diet remains unchanged, but difficulty swallowing certain foods, particularly chicken, is noted. Pill ingestion is problematic, often resulting in spitting out or difficulty swallowing with water. Occasional choking on saliva at night is reported, occurring up to 20 times. A swallow study conducted several years ago revealed tolerance for thin liquids but difficulty with regular food, necessitating softer selections.    Assistance is required for daily activities such as dressing, showering, and transfers due to memory lapses. She is currently on Pimavanserin and Nuplazid. No tremors or shaking are reported. Walking has improved slightly, although balance remains poor. Hand tightening and curling when sitting are observed. The pulmonologist has recommended discontinuing atenolol to increase blood pressure. No numbness or tingling is reported.    Sleep disturbances are significant, with  frequent napping throughout the day. Some days, she is too fatigued to leave her bed, while on other days, she functions normally.    Hallucinations and vivid dreams are experienced, occasionally inducing fear. These hallucinations occur throughout the day.    SOCIAL HISTORY  Diet: Normal diet.  Sleep: Hard time going to sleep and staying asleep. Naps a lot during the day.      MEDICATIONS  CURRENT MEDS:  Pemsa  Nuplazid  Rivastigmine  PREVIOUS MEDS:  Atenolol  Reason for Discontinuation: To raise blood pressure     Review of Systems I have personally reviewed the MA's review of systems and made changes as necessary.         Objective   /77 (BP Location: Right arm, Patient Position: Sitting, Cuff Size: Large)   Pulse 68   LMP  (LMP Unknown)     Physical Exam  Neurological Exam  Physical Exam  Cranial Nerve Examination    CN III IV VI: Extraocular movements intact.    CN VII: Facial movements are symmetrical.    CN XI: Shoulder shrug and head turn are normal.    CN XII: Tongue is midline with normal movements.    Motor Examination    Muscle Bulk and Tone: Hands are starting to tighten up and curl.    Strength: Strength is normal in upper extremities.    Coordination: Finger-to-nose test normal.  MDS UPDRS III                             Time since last dose:  5/1/25 10/24/24 5/16/24 12/13/23   Speech  2 2 2 2   Facial Expression  3 3 3 2   Rigidity - Neck  2 2 2 2   Rigidity - Upper Extremity (Right)  3 2 2 2   Rigidity - Upper Extremity (Left)   2 2 2 2   Rigidity - Lower Extremity (Right)  2 2 2 0   Rigidity - Lower Extremity (Left)   3 3 2 2   Finger Taps (Right)   3 3 3 3   Finger Taps (Left)   3 3 3 3   Hand Movement (Right)  3 2 3 3   Hand Movement (Left)   3 2 3 3   Pronation/Supination (Right)  3 3 3 3   Pronation/Supination (Left)   3 3 3 3   Toe Tapping (Right)  3 3 3   Toe Tapping (Left)  2 2 2   Leg Agility (Right)  2 2 2 3   Leg Agility (Left)   2 2 2 3   Arising from Chair        3   Gait         3   Freezing of Gait      1   Postural Stability        3   Posture      3   Global spontaneity of movement 3 3 3 3   Postural Tremor (Right) 0 0 0 0   Postural Tremor (Left) 0 0 0 0   Kinetic Tremor (Right)  0 0 0 0   Kinetic Tremor (Left)  1 1 0 0   Rest tremor amplitude RUE 0 0 0 0   Rest tremor amplitude LUE 0 0 0 0   Rest tremor amplitude RLE 0 0 0 0   Reset tremor amplitude LLE 0 0 0 0   Lip/Jaw Tremor  0 0 0 0              Motor Exam                Deep brain interrogation:  Left chest/ STN/ left chest    Percept PC [id number]  [id number]  EBL:  5 yrs , 7 months    Impedance: ok  C+2-, PW60, 160Hz, 2.9mA (1-3)   Increased to 3.1 mA  (1-3.3)       Right chest/ STN  Percept PC [id number]   [id number]  Implanted: June 27/2022  EBL: 3 years , 1 months   Impedance ok   C+9-, PW90, 160Hz, 3.1mA (0-3.4)  Increased to 3.1mA  (0-3.5mA)       Results

## 2025-05-01 NOTE — ASSESSMENT & PLAN NOTE
She has not experienced any tremors or shaking recently, and her walking has shown some improvement. However, her hands are starting to tighten and curl, especially when sitting. It has been a year since her device was checked. An adjustment to her device settings will be made on both sides to see if it helps with her symptoms. Occupational therapy will be sent to work with her on daily activities and muscle strengthening exercises.  Orders:    Referral to Home Health- [facility]; Future

## 2025-05-01 NOTE — PROGRESS NOTES
Review of Systems   Constitutional:  Negative for appetite change, fatigue and fever.   HENT:  Positive for trouble swallowing and voice change. Negative for hearing loss and tinnitus.    Eyes: Negative.  Negative for photophobia, pain and visual disturbance.   Respiratory: Negative.  Negative for shortness of breath.    Cardiovascular: Negative.  Negative for palpitations.   Gastrointestinal: Negative.  Negative for nausea and vomiting.   Endocrine: Negative.  Negative for cold intolerance.   Genitourinary: Negative.  Negative for dysuria, frequency and urgency.   Musculoskeletal:  Positive for gait problem (Ongoing with no change). Negative for back pain, myalgias, neck pain and neck stiffness.        Balance Issues     Skin: Negative.  Negative for rash.   Allergic/Immunologic: Negative.    Neurological:  Positive for speech difficulty and weakness (At times in her Legs). Negative for dizziness, tremors, seizures, syncope, facial asymmetry, light-headedness, numbness and headaches.   Hematological:  Bruises/bleeds easily (Bruise).   Psychiatric/Behavioral:  Positive for hallucinations and sleep disturbance. Negative for confusion.         Vivid Dreams     All other systems reviewed and are negative.

## 2025-05-01 NOTE — ASSESSMENT & PLAN NOTE
She exhibits signs of dementia, including forgetfulness and needing prompts for daily activities. She also experiences hallucinations and vivid dreams, which are increasing in frequency. She is currently on rivastigmine.  Orders:    Referral to Home Health- [facility]; Future

## 2025-05-02 ENCOUNTER — HOME HEALTH ADMISSION (OUTPATIENT)
Dept: HOME HEALTH SERVICES | Facility: HOME HEALTHCARE | Age: 80
End: 2025-05-02
Payer: MEDICARE

## 2025-05-06 ENCOUNTER — HOME CARE VISIT (OUTPATIENT)
Dept: HOME HEALTH SERVICES | Facility: HOME HEALTHCARE | Age: 80
End: 2025-05-06
Payer: MEDICARE

## 2025-05-06 ENCOUNTER — HOME CARE VISIT (OUTPATIENT)
Dept: HOME HEALTH SERVICES | Facility: HOME HEALTHCARE | Age: 80
End: 2025-05-06
Attending: PSYCHIATRY & NEUROLOGY
Payer: MEDICARE

## 2025-05-06 VITALS — DIASTOLIC BLOOD PRESSURE: 64 MMHG | SYSTOLIC BLOOD PRESSURE: 108 MMHG | HEART RATE: 50 BPM | OXYGEN SATURATION: 97 %

## 2025-05-06 PROCEDURE — 400013 VN SOC

## 2025-05-06 PROCEDURE — 10330081 VN NO-PAY CLAIM PROCEDURE

## 2025-05-06 PROCEDURE — G0153 HHCP-SVS OF S/L PATH,EA 15MN: HCPCS

## 2025-05-06 PROCEDURE — G0152 HHCP-SERV OF OT,EA 15 MIN: HCPCS

## 2025-05-06 NOTE — ASSESSMENT & PLAN NOTE
She experiences difficulty swallowing, particularly with chicken and pills, which often get stuck and cause her to spit them out. A swallow study conducted several years ago indicated that she could handle thin liquids but had difficulty with regular food. Home-based speech therapy will be initiated to reassess her swallowing capabilities and ensure an appropriate diet. The therapist will also introduce exercises to strengthen her muscles and provide tips to prevent choking.

## 2025-05-07 NOTE — CASE COMMUNICATION
Correction to admission note:  Pt admitted for Speech therapy and Occupational Therapy.  Not skilled nursing.

## 2025-05-07 NOTE — CASE COMMUNICATION
Medication discrepancies or Major drug interactions None.   > Added Vitamin C 250 mg to med list.     Abnormal clinical findings  Poor postural control, difficulty swallowing, impaired ADL's and mobility.    This report is informational only, no response is needed  [facility] has Admitted your patient to Home Health service with the following disciplines: SN and OT  Patient stated goals of care  To be able to swallow better and not  have slurred speech.   Potential barriers to goal achievement  Parkinson's Disease progression, cognitive deficits.    Primary focus of home health care:Neurological  Anticipated visit pattern and next visit date  OT 1 x 2.  Next visit Thursday 5/15/25  Thank you for allowing us to participate in the care of your patient.      Josselyn Laurent, OTR/L, COS-C

## 2025-05-08 ENCOUNTER — HOME CARE VISIT (OUTPATIENT)
Dept: HOME HEALTH SERVICES | Facility: HOME HEALTHCARE | Age: 80
End: 2025-05-08
Payer: MEDICARE

## 2025-05-08 PROCEDURE — G0321 AUDIO-ONLY HHS: HCPCS

## 2025-05-09 ENCOUNTER — HOME CARE VISIT (OUTPATIENT)
Dept: HOME HEALTH SERVICES | Facility: HOME HEALTHCARE | Age: 80
End: 2025-05-09
Payer: MEDICARE

## 2025-05-09 VITALS — HEART RATE: 54 BPM | OXYGEN SATURATION: 98 % | TEMPERATURE: 97.6 F

## 2025-05-09 PROCEDURE — G0153 HHCP-SVS OF S/L PATH,EA 15MN: HCPCS

## 2025-05-09 NOTE — CASE COMMUNICATION
This message is informative only.  No action required.       eval made 5.6.25.     Impression.    Observed moderate oral stage dysphagia characterized by delayed ap bolus transfer and labored mastication when eating dry cracker.   Cued Pt. and CG to add butter or jam to cracker.   Also observed possible mild pharyngeal stage dysphagia characterized by coughing and audibe swallow. when drinking thin liquids.    Observed moderate dysa rthria with careful listening  characterized by limited articulatory postures.   Cognitive linguistic deficits-- Severe in orientation. DNT other areas due to fatigue.   Observed language to be WFL in conversational speech.    Hearing.  mild [place]   Vision.  She reportedly wears glasses for reading.        Rec.   Sp tx. 2x wk x 4 wks.  Continue informal eval.  Practice assignments given  Cont on regular soft, moist foods cut up into small  pieces  Cont on thin liquids --compensatory techniques including single sips slowly w chin tuck.  Use light coffee cup w handle. Trial short thin straw as tolerated. Sit upright when eating and drinking.   Aspiration precautions--moniter temp and lung condition.  Contact Dr. if change in either one.  Mouth wash at least 2x per day  VBS w speech if swallowing diffiulty continues  Discuss adaptive equipment options with OT

## 2025-05-14 ENCOUNTER — HOME CARE VISIT (OUTPATIENT)
Dept: HOME HEALTH SERVICES | Facility: HOME HEALTHCARE | Age: 80
End: 2025-05-14
Payer: MEDICARE

## 2025-05-14 VITALS
HEART RATE: 59 BPM | OXYGEN SATURATION: 95 % | DIASTOLIC BLOOD PRESSURE: 72 MMHG | SYSTOLIC BLOOD PRESSURE: 131 MMHG | TEMPERATURE: 97.3 F

## 2025-05-14 PROCEDURE — G0153 HHCP-SVS OF S/L PATH,EA 15MN: HCPCS

## 2025-05-14 PROCEDURE — G0180 MD CERTIFICATION HHA PATIENT: HCPCS | Performed by: PSYCHIATRY & NEUROLOGY

## 2025-05-15 ENCOUNTER — HOME CARE VISIT (OUTPATIENT)
Dept: HOME HEALTH SERVICES | Facility: HOME HEALTHCARE | Age: 80
End: 2025-05-15
Payer: MEDICARE

## 2025-05-15 VITALS — HEART RATE: 69 BPM | OXYGEN SATURATION: 99 % | DIASTOLIC BLOOD PRESSURE: 74 MMHG | SYSTOLIC BLOOD PRESSURE: 116 MMHG

## 2025-05-15 PROCEDURE — G0152 HHCP-SERV OF OT,EA 15 MIN: HCPCS

## 2025-05-15 NOTE — CASE COMMUNICATION
OT discipline discharge today as planned.  Family has been educated on available AE for self feeding and verbalize understanding.  OT provided with a no spill 2 handled cup with straw to keep.  OT goals met.  DC to speech therapy at this time.

## 2025-05-16 ENCOUNTER — HOME CARE VISIT (OUTPATIENT)
Dept: HOME HEALTH SERVICES | Facility: HOME HEALTHCARE | Age: 80
End: 2025-05-16
Payer: MEDICARE

## 2025-05-16 VITALS
HEART RATE: 66 BPM | DIASTOLIC BLOOD PRESSURE: 70 MMHG | TEMPERATURE: 97.2 F | SYSTOLIC BLOOD PRESSURE: 118 MMHG | OXYGEN SATURATION: 93 %

## 2025-05-16 PROCEDURE — G0153 HHCP-SVS OF S/L PATH,EA 15MN: HCPCS

## 2025-05-20 NOTE — ASSESSMENT & PLAN NOTE
Left a message for patient to return call.   Parkinson's complicated by postural instability and gait  She is doing well after PT and now using a rollator  I agree that continued OT can help with improving ADL's and transfers, Time spent discussing PD, answering questoins regarding progression and treatment options with the patient and family  She was unable to tolerate levodopa at low dose in the past due to hallucinations  She stopped donepezil as she felt it did not help after 2 weeks on the full dose  She will continue on Azilect and has agreed to retry levodopa but trying to get he to tolerate it by using a slow titration  We will retry Sinemet 25/100  Week 1: 1/2 tab qam  Week 2: 1/2 tab bid  Week 3: 1/2 tab tid  Week 4: 1 tab in and 1/2 tab afternoon and evening  Week 5: 1 tab qam and afternoon and 1/2 in evening  Week 6: 1 tab 3 times daily  If tolerating but no change in gait call for further instruction for increasing the dose

## 2025-05-21 ENCOUNTER — HOME CARE VISIT (OUTPATIENT)
Dept: HOME HEALTH SERVICES | Facility: HOME HEALTHCARE | Age: 80
End: 2025-05-21
Payer: MEDICARE

## 2025-05-21 VITALS — SYSTOLIC BLOOD PRESSURE: 111 MMHG | HEART RATE: 69 BPM | DIASTOLIC BLOOD PRESSURE: 57 MMHG | TEMPERATURE: 97.3 F

## 2025-05-21 DIAGNOSIS — R44.3 HALLUCINATIONS: ICD-10-CM

## 2025-05-21 PROCEDURE — G0153 HHCP-SVS OF S/L PATH,EA 15MN: HCPCS

## 2025-05-21 NOTE — TELEPHONE ENCOUNTER
Cc'ed Chart Request    Reason for call:   [x] Refill   [] Prior Auth  [] Other:     Office:   [] PCP/Provider -   [x] Specialty/Provider - Balbina Lopez MD    Medication: Nuplazid 34 MG CAPS     Dose/Frequency: Take 1 capsule by mouth daily     Quantity: 30 capsule     Pharmacy: [facility]     Local Pharmacy   Does the patient have enough for 3 days?   [] Yes   [x] No - Send as HP to POD    Mail Away Pharmacy   Does the patient have enough for 10 days?   [] Yes   [] No - Send as HP to POD

## 2025-05-22 DIAGNOSIS — R44.3 HALLUCINATIONS: ICD-10-CM

## 2025-05-22 RX ORDER — PIMAVANSERIN TARTRATE 34 MG/1
1 CAPSULE ORAL DAILY
Qty: 30 CAPSULE | Refills: 5 | Status: SHIPPED | OUTPATIENT
Start: 2025-05-22

## 2025-05-22 RX ORDER — PIMAVANSERIN TARTRATE 34 MG/1
1 CAPSULE ORAL DAILY
Qty: 30 CAPSULE | Refills: 5 | Status: SHIPPED | OUTPATIENT
Start: 2025-05-22 | End: 2025-05-22 | Stop reason: SDUPTHER

## 2025-05-22 NOTE — TELEPHONE ENCOUNTER
Received a call from the pharmacist at [facility]. They received a script for Nuplazid, but stated that it needs to be sent to a specialty pharmacy. There is a scanned refill request under media from [facility]. Pended med to be sent to [facility].

## 2025-05-23 ENCOUNTER — HOME CARE VISIT (OUTPATIENT)
Dept: HOME HEALTH SERVICES | Facility: HOME HEALTHCARE | Age: 80
End: 2025-05-23
Payer: MEDICARE

## 2025-05-23 VITALS — DIASTOLIC BLOOD PRESSURE: 62 MMHG | HEART RATE: 57 BPM | SYSTOLIC BLOOD PRESSURE: 115 MMHG

## 2025-05-23 PROCEDURE — G0153 HHCP-SVS OF S/L PATH,EA 15MN: HCPCS

## 2025-05-28 ENCOUNTER — HOME CARE VISIT (OUTPATIENT)
Dept: HOME HEALTH SERVICES | Facility: HOME HEALTHCARE | Age: 80
End: 2025-05-28
Payer: MEDICARE

## 2025-05-28 PROCEDURE — G0153 HHCP-SVS OF S/L PATH,EA 15MN: HCPCS

## 2025-05-30 ENCOUNTER — HOME CARE VISIT (OUTPATIENT)
Dept: HOME HEALTH SERVICES | Facility: HOME HEALTHCARE | Age: 80
End: 2025-05-30
Payer: MEDICARE

## 2025-05-30 PROCEDURE — G0153 HHCP-SVS OF S/L PATH,EA 15MN: HCPCS

## 2025-05-31 VITALS — DIASTOLIC BLOOD PRESSURE: 69 MMHG | SYSTOLIC BLOOD PRESSURE: 123 MMHG | HEART RATE: 55 BPM

## 2025-06-04 ENCOUNTER — HOME CARE VISIT (OUTPATIENT)
Dept: HOME HEALTH SERVICES | Facility: HOME HEALTHCARE | Age: 80
End: 2025-06-04
Payer: MEDICARE

## 2025-06-04 PROCEDURE — G0153 HHCP-SVS OF S/L PATH,EA 15MN: HCPCS

## 2025-06-06 ENCOUNTER — HOME CARE VISIT (OUTPATIENT)
Dept: HOME HEALTH SERVICES | Facility: HOME HEALTHCARE | Age: 80
End: 2025-06-06
Payer: MEDICARE

## 2025-06-06 PROCEDURE — G0153 HHCP-SVS OF S/L PATH,EA 15MN: HCPCS

## 2025-06-07 VITALS — OXYGEN SATURATION: 98 % | HEART RATE: 62 BPM | DIASTOLIC BLOOD PRESSURE: 59 MMHG | SYSTOLIC BLOOD PRESSURE: 121 MMHG

## 2025-06-11 ENCOUNTER — HOME CARE VISIT (OUTPATIENT)
Dept: HOME HEALTH SERVICES | Facility: HOME HEALTHCARE | Age: 80
End: 2025-06-11
Payer: MEDICARE

## 2025-06-11 VITALS — OXYGEN SATURATION: 92 % | HEART RATE: 61 BPM | SYSTOLIC BLOOD PRESSURE: 106 MMHG | DIASTOLIC BLOOD PRESSURE: 61 MMHG

## 2025-06-11 PROCEDURE — G0153 HHCP-SVS OF S/L PATH,EA 15MN: HCPCS

## 2025-06-13 ENCOUNTER — HOME CARE VISIT (OUTPATIENT)
Dept: HOME HEALTH SERVICES | Facility: HOME HEALTHCARE | Age: 80
End: 2025-06-13
Payer: MEDICARE

## 2025-06-13 VITALS — DIASTOLIC BLOOD PRESSURE: 62 MMHG | HEART RATE: 59 BPM | OXYGEN SATURATION: 98 % | SYSTOLIC BLOOD PRESSURE: 111 MMHG

## 2025-06-13 PROCEDURE — G0153 HHCP-SVS OF S/L PATH,EA 15MN: HCPCS

## 2025-06-16 NOTE — CASE COMMUNICATION
This message is informative only.  No action required.      Pt. dced from  ST and  [facility] services 6.13.25    Impression.   Observed  mild which is an increase from moderate oral stage dysphagia characterized by delayed ap bolus transfer and labored mastication when eating dry cracker. Cued Pt. and CG to add butter or jam to cracker. Observed WFL swallow at pharyngeal stage with compensatory techniques which is an increase from possibl e mild pharyngeal stage dysphagia characterized by coughing and audible swallow. when drinking thin liquids.     Observed mild dysarthria most days with moderate dysarthria periodically when Pt. is tired which is an increase from  from moderate dysarthria with careful listening characterized by limited articulatory postures particularly on phonemes t,d,n and l.     Good Progress made but conitnues to have Cognitive linguistic deficits i n the following areas: mild.moderate which is an increase from Moderate in immediate memory, problem solving.   mild. moderate which is an increase from moderate/severe in STM,  moderate which is an increase from moderate  severe in organization     Observed language to be WFL in conversational speech. mild [place] Vision. She reportedly wears glasses for reading.    Rec.   DC from Sp. tx due to good progress made     Practice assignments carri ramsey   Cont on regular soft, moist foods cut up into small pieces w compensatory techniques--small bites slowly w chin tuck and wash tech as needed   Cont on thin liquids --compensatory techniques including single sips slowly w chin tuck. Use light cup w 1 or 2 handles, straw, and screw on lid. . Sit upright when eating and drinking with pillow prop on her R side    Aspiration precautions--moniter temp and lung condition. Contact  if  change in either one.   Mouth wash at least 2x per day   VBS w speech if swallowing diffiulty would continue --  did not see the need for VBS w sp at present    Speak saying 1  word at a time and move lips and tongue as much as possible.   Refer if change in status

## 2025-07-24 ENCOUNTER — TELEPHONE (OUTPATIENT)
Age: 80
End: 2025-07-24

## 2025-07-24 NOTE — TELEPHONE ENCOUNTER
Inbound call received from Elías at [facility] to make us aware they are not able to reach the [facility] to schedule Nuplazid delivery. They requested a second phone number to try to reach patient at. [facility] main number [phone] but was also given the mobile number: [phone]. [facility] said if they cannot reach the Patient they will place the medication on hold and send a letter to the [facility].    Outbound call made to [facility] and her  quinn answered. Quinn said he will assist in contacting [facility] to ensure medication delivery.

## 2025-08-06 DIAGNOSIS — G20.A1 COGNITIVE DEFICIT DUE TO PARKINSON'S DISEASE (HCC): ICD-10-CM

## 2025-08-07 RX ORDER — RIVASTIGMINE TARTRATE 6 MG/1
6 CAPSULE ORAL 2 TIMES DAILY WITH MEALS
Qty: 180 CAPSULE | Refills: 1 | Status: SHIPPED | OUTPATIENT
Start: 2025-08-07

## (undated) DEVICE — ANTENNA PROGRAMMER PATIENT DBS

## (undated) DEVICE — REM POLYHESIVE ADULT PATIENT RETURN ELECTRODE: Brand: VALLEYLAB

## (undated) DEVICE — SUT SILK 2-0 SH 30 IN K833H

## (undated) DEVICE — TUBING SUCTION 5MM X 12 FT

## (undated) DEVICE — CEMENT MIXING BOWL W/SPATULA

## (undated) DEVICE — SUT VICRYL 2-0 SH 27 IN UNDYED J417H

## (undated) DEVICE — MEDI-VAC YANKAUER SUCTION HANDLE W/BULBOUS AND CONTROL VENT: Brand: CARDINAL HEALTH

## (undated) DEVICE — SCD SEQUENTIAL COMPRESSION COMFORT SLEEVE MEDIUM KNEE LENGTH: Brand: KENDALL SCD

## (undated) DEVICE — ELECTRODE BLADE MOD E-Z CLEAN 2.5IN 6.4CM -0012M

## (undated) DEVICE — ADHESIVE SKIN HIGH VISCOSITY EXOFIN 1ML

## (undated) DEVICE — GLOVE SRG BIOGEL ECLIPSE 8

## (undated) DEVICE — DRAPE C-ARM X-RAY

## (undated) DEVICE — 4.0MM THREE-FLUTED DRILL BIT QC/260MM/65MM CALIBRATION

## (undated) DEVICE — GLOVE INDICATOR PI UNDERGLOVE SZ 8 BLUE

## (undated) DEVICE — BULB SYRINGE,IRRIGATION WITH PROTECTIVE CAP: Brand: DOVER

## (undated) DEVICE — BETHLEHEM UNIVERSAL MINOR GEN: Brand: CARDINAL HEALTH

## (undated) DEVICE — SUT MONOCRYL PLUS 4-0 PS-2 18 IN MCP496G

## (undated) DEVICE — GLOVE INDICATOR PI UNDERGLOVE SZ 8.5 BLUE

## (undated) DEVICE — GLOVE INDICATOR PI UNDERGLOVE SZ 7.5 BLUE

## (undated) DEVICE — INTENDED FOR TISSUE SEPARATION, AND OTHER PROCEDURES THAT REQUIRE A SHARP SURGICAL BLADE TO PUNCTURE OR CUT.: Brand: BARD-PARKER SAFETY BLADES SIZE 10, STERILE

## (undated) DEVICE — SKIN MARKER DUAL TIP WITH RULER CAP, FLEXIBLE RULER AND LABELS: Brand: DEVON

## (undated) DEVICE — LIGHT HANDLE COVER SLEEVE DISP BLUE STELLAR

## (undated) DEVICE — PENCIL ELECTROSURG E-Z CLEAN -0035H

## (undated) DEVICE — ENDOPOUCH RETRIEVER SPECIMEN RETRIEVAL BAGS: Brand: ENDOPOUCH RETRIEVER

## (undated) DEVICE — MEDI-VAC YANK SUCT HNDL W/TPRD BULBOUS TIP: Brand: CARDINAL HEALTH

## (undated) DEVICE — SYRINGE CATH TIP 50ML

## (undated) DEVICE — 3.2MM GUIDE WIRE 400MM

## (undated) DEVICE — 3M™ STERI-STRIP™ REINFORCED ADHESIVE SKIN CLOSURES, R1547, 1/2 IN X 4 IN (12 MM X 100 MM), 6 STRIPS/ENVELOPE: Brand: 3M™ STERI-STRIP™

## (undated) DEVICE — PAD GROUNDING ADULT

## (undated) DEVICE — PROGRAMMER NEUROSTIM ACTIVA PC RECHARGEABLE DBS THERAPY

## (undated) DEVICE — ANTIBACTERIAL VIOLET BRAIDED (POLYGLACTIN 910), SYNTHETIC ABSORBABLE SUTURE: Brand: COATED VICRYL

## (undated) DEVICE — CHLORAPREP HI-LITE 26ML ORANGE

## (undated) DEVICE — DRAPE CAMERA/LASER

## (undated) DEVICE — TRAY FOLEY 16FR URIMETER SURESTEP

## (undated) DEVICE — TROCAR: Brand: KII® SLEEVE

## (undated) DEVICE — NEEDLE 22 G X 1 1/2 SAFETY

## (undated) DEVICE — PVC URETHRAL CATHETER: Brand: DOVER

## (undated) DEVICE — GLOVE SRG BIOGEL ECLIPSE 7

## (undated) DEVICE — PLUMEPEN PRO 10FT

## (undated) DEVICE — PREP SURGICAL PURPREP 26ML

## (undated) DEVICE — 3M™ STERI-STRIP™ COMPOUND BENZOIN TINCTURE 40 BAGS/CARTON 4 CARTONS/CASE C1544: Brand: 3M™ STERI-STRIP™

## (undated) DEVICE — ETS45 RELOAD STANDARD 45MM: Brand: ENDOPATH

## (undated) DEVICE — STANDARD SURGICAL GOWN, L: Brand: CONVERTORS

## (undated) DEVICE — PROGRAMMER PATIENT PERCEPT

## (undated) DEVICE — SPONGE PVP SCRUB WING STERILE

## (undated) DEVICE — 2000CC GUARDIAN II: Brand: GUARDIAN

## (undated) DEVICE — STRL ALLENTOWN HYSTEROSCOPY PK: Brand: CARDINAL HEALTH

## (undated) DEVICE — HARMONIC ACE 5MM DIAMETER SHEARS 36CM SHAFT LENGTH + ADAPTIVE TISSUE TECHNOLOGY FOR USE WITH GENERATOR G11: Brand: HARMONIC ACE

## (undated) DEVICE — 3000CC GUARDIAN II: Brand: GUARDIAN

## (undated) DEVICE — GLOVE SRG BIOGEL 7.5

## (undated) DEVICE — ALLENTOWN LAP CHOLE APP PACK: Brand: CARDINAL HEALTH

## (undated) DEVICE — GLOVE SRG BIOGEL 6

## (undated) DEVICE — 1820 FOAM BLOCK NEEDLE COUNTER: Brand: DEVON

## (undated) DEVICE — SUT MONOCRYL 4-0 PS-2 27 IN Y426H

## (undated) DEVICE — INTENDED FOR TISSUE SEPARATION, AND OTHER PROCEDURES THAT REQUIRE A SHARP SURGICAL BLADE TO PUNCTURE OR CUT.: Brand: BARD-PARKER SAFETY BLADES SIZE 15, STERILE

## (undated) DEVICE — SPONGE LAP 18 X 18 IN

## (undated) DEVICE — DRESSING MEPILEX AG BORDER 4 X 8 IN

## (undated) DEVICE — THE SIMPULSE SOLO SYSTEM WITH ULTREX RETRACTABLE SPLASH SHIELD TIP: Brand: SIMPULSE SOLO

## (undated) DEVICE — HEAVY DUTY TABLE COVER: Brand: CONVERTORS

## (undated) DEVICE — GLOVE SRG BIOGEL ECLIPSE 7.5

## (undated) DEVICE — BLADE SAGITTAL 63.0 X 19.5MM

## (undated) DEVICE — GLOVE PI ULTRA TOUCH SZ 6

## (undated) DEVICE — DRESSING MEPILEX AG BORDER 4 X 4 IN

## (undated) DEVICE — ENDOPATH 5MM CURVED SCISSORS WITH MONOPOLAR CAUTERY: Brand: ENDOPATH

## (undated) DEVICE — INTENDED FOR TISSUE SEPARATION, AND OTHER PROCEDURES THAT REQUIRE A SHARP SURGICAL BLADE TO PUNCTURE OR CUT.: Brand: BARD-PARKER ® CARBON RIB-BACK BLADES

## (undated) DEVICE — PREMIUM DRY TRAY LF: Brand: MEDLINE INDUSTRIES, INC.

## (undated) DEVICE — DRESSING TELFA 2 X 3 IN STRL

## (undated) DEVICE — SUT VICRYL 0 UR-6 27 IN J603H

## (undated) DEVICE — 3M™ IOBAN™ 2 ANTIMICROBIAL INCISE DRAPE 6640EZ: Brand: IOBAN™ 2

## (undated) DEVICE — 6617 IOBAN II PATIENT ISOLATION DRAPE 5/BX,4BX/CS: Brand: STERI-DRAPE™ IOBAN™ 2

## (undated) DEVICE — GLOVE SRG BIOGEL 7

## (undated) DEVICE — INTENDED FOR TISSUE SEPARATION, AND OTHER PROCEDURES THAT REQUIRE A SHARP SURGICAL BLADE TO PUNCTURE OR CUT.: Brand: BARD-PARKER SAFETY BLADES SIZE 11, STERILE

## (undated) DEVICE — GLOVE INDICATOR PI UNDERGLOVE SZ 6.5 BLUE

## (undated) DEVICE — 3M™ TEGADERM™ TRANSPARENT FILM DRESSING FRAME STYLE, 1626W, 4 IN X 4-3/4 IN (10 CM X 12 CM), 50/CT 4CT/CASE: Brand: 3M™ TEGADERM™

## (undated) DEVICE — U-DRAPE: Brand: CONVERTORS

## (undated) DEVICE — POSITIONER OSI BEACH CHAIR FOAM

## (undated) DEVICE — POSITIONER TRIMANO LIMB BEACH CHAIR

## (undated) DEVICE — NEEDLE HYPO 22G X 1-1/2 IN

## (undated) DEVICE — GLOVE SRG BIOGEL ORTHOPEDIC 7.5

## (undated) DEVICE — CAPIT RVRS TM TOTAL SHOULDER

## (undated) DEVICE — IRRIG ENDO FLO TUBING

## (undated) DEVICE — UNDER BUTTOCKS DRAPE W/FLUID CONTROL POUCH: Brand: CONVERTORS

## (undated) DEVICE — DRAPE EQUIPMENT RF WAND

## (undated) DEVICE — 3M™ STERI-DRAPE™ U-DRAPE 1015: Brand: STERI-DRAPE™

## (undated) DEVICE — GLOVE SRG BIOGEL 8

## (undated) DEVICE — SPONGE SCRUB 4 PCT CHLORHEXIDINE

## (undated) DEVICE — 10FR FRAZIER SUCTION HANDLE: Brand: CARDINAL HEALTH

## (undated) DEVICE — SINGLE PORT MANIFOLD: Brand: NEPTUNE 2

## (undated) DEVICE — GLOVE INDICATOR PI UNDERGLOVE SZ 7 BLUE

## (undated) DEVICE — PROXIMATE PLUS MD MULTI-DIRECTIONAL RELEASE SKIN STAPLERS CONTAINS 35 STAINLESS STEEL STAPLES APPROXIMATE CLOSED DIMENSIONS: 6.9MM X 3.9MM WIDE: Brand: PROXIMATE

## (undated) DEVICE — COBAN 4 IN STERILE

## (undated) DEVICE — SUT VICRYL 0 CT-1 36 IN J946H

## (undated) DEVICE — OCCLUSIVE GAUZE STRIP,3% BISMUTH TRIBROMOPHENATE IN PETROLATUM BLEND: Brand: XEROFORM

## (undated) DEVICE — TROCAR: Brand: KII FIOS FIRST ENTRY

## (undated) DEVICE — INSTRUMENT POUCH: Brand: CONVERTORS

## (undated) DEVICE — CYSTO TUBING SINGLE IRRIGATION

## (undated) DEVICE — ENDOPATH ETS-FLEX45 ARTICULATING ENDOSCOPIC LINEAR CUTTER, NO RELOAD: Brand: ENDOPATH

## (undated) DEVICE — HOOD: Brand: FLYTE

## (undated) DEVICE — VIAL DECANTER

## (undated) DEVICE — PREP IM ENCHANCED TOTAL HIP BONE                                    PREPARATION KIT: Brand: PREP-IM

## (undated) DEVICE — SURGICAL GOWN, XL SMARTSLEEVE: Brand: CONVERTORS

## (undated) DEVICE — GLOVE SRG BIOGEL 8.5

## (undated) DEVICE — STRL ALLENTOWN HIP SHOULDER PK: Brand: CARDINAL HEALTH

## (undated) DEVICE — IV EXTENSION TUBING 33 IN

## (undated) DEVICE — DISPOSABLE EQUIPMENT COVER: Brand: SMALL TOWEL DRAPE

## (undated) DEVICE — SUT PDS II 2-0 SH 27 IN Z317H

## (undated) DEVICE — CHEST ROLL FOAM POSITIONER: Brand: CARDINAL HEALTH

## (undated) DEVICE — TROCARS: Brand: KII® BALLOON BLUNT TIP SYSTEM

## (undated) DEVICE — SUT VICRYL 0 CT-1 27 IN J260H

## (undated) DEVICE — DRAPE SHEET THREE QUARTER

## (undated) DEVICE — GLOVE PI ULTRA TOUCH SZ.6.5

## (undated) DEVICE — MAYO STAND COVER: Brand: CONVERTORS

## (undated) DEVICE — GLOVE SRG BIOGEL 6.5

## (undated) DEVICE — PROXIMATE SKIN STAPLERS (35 WIDE) CONTAINS 35 STAINLESS STEEL STAPLES (FIXED HEAD): Brand: PROXIMATE

## (undated) DEVICE — SUT VICRYL 2-0 CT-2 27 IN J269H

## (undated) DEVICE — NEEDLE 25G X 1 1/2

## (undated) DEVICE — 2963 MEDIPORE SOFT CLOTH TAPE 3 IN X 10 YD 12 RLS/CS: Brand: 3M™ MEDIPORE™

## (undated) DEVICE — [HIGH FLOW INSUFFLATOR,  DO NOT USE IF PACKAGE IS DAMAGED,  KEEP DRY,  KEEP AWAY FROM SUNLIGHT,  PROTECT FROM HEAT AND RADIOACTIVE SOURCES.]: Brand: PNEUMOSURE

## (undated) DEVICE — COBAN 6 IN STERILE

## (undated) DEVICE — SYRINGE 20ML LL